# Patient Record
Sex: FEMALE | Race: WHITE | NOT HISPANIC OR LATINO | Employment: UNEMPLOYED | URBAN - METROPOLITAN AREA
[De-identification: names, ages, dates, MRNs, and addresses within clinical notes are randomized per-mention and may not be internally consistent; named-entity substitution may affect disease eponyms.]

---

## 2016-12-05 LAB
ALT SERPL-CCNC: 21 U/L (ref 14–63)
AST SERPL-CCNC: 30 U/L (ref 15–37)
CREAT SERPL-MCNC: 0.35 MG/DL (ref 0.51–0.95)
GFR SERPL CREATININE-BSD FRML MDRD: >60 ML/MIN/1.73M2 (ref 60–150)

## 2017-01-19 ENCOUNTER — TELEPHONE (OUTPATIENT)
Dept: MATERNAL FETAL MEDICINE | Facility: CLINIC | Age: 22
End: 2017-01-19

## 2017-01-19 NOTE — TELEPHONE ENCOUNTER
Returned phone call to pt and message left. Pt had called re when could she stop her Lovenox. Per Dr. Roberson, pt needs to have an appointment with Hematology set. Will also offer to help pt set up an appointment for birth control as discussed at last visit. Argenis Cardona RN

## 2017-01-20 ENCOUNTER — TELEPHONE (OUTPATIENT)
Dept: MATERNAL FETAL MEDICINE | Facility: CLINIC | Age: 22
End: 2017-01-20

## 2017-01-20 NOTE — TELEPHONE ENCOUNTER
Second attempt to reach patient re pt phone call from yesterday. Left message for pt to call back re plan to discontinue her Lovenox. Per Dr. Roberson, she would like to have pt set up with Hematology to establish plan. Will await pt phone call. Argenis Cardona RN

## 2017-01-23 DIAGNOSIS — O22.33 DVT COMPLICATING PREGNANCY, THIRD TRIMESTER: Primary | ICD-10-CM

## 2017-01-23 NOTE — NURSING NOTE
"Dianna called in today and said that she needed a refill on her Lovenox as she was out.  Per last [facility] note Dianna should continue on Lovenox for 8 weeks after her delivery.  Prescription was eprescribed to [facility] in [place] for 18 more doses and then will set up appointment with hematology.  Dianna \"wasn't sure\" about having an IUD placed today, but spoke with Dianna and will schedule her an appointment with hematology and apt with S to discuss birth control options and possible IUD placement. Analia Moura, RN  "

## 2017-01-24 ENCOUNTER — TELEPHONE (OUTPATIENT)
Dept: MATERNAL FETAL MEDICINE | Facility: CLINIC | Age: 22
End: 2017-01-24

## 2017-01-24 NOTE — TELEPHONE ENCOUNTER
Dianna was called and message left regarding Hematology apt at the infusion center on the 2nd Floor in the [facility] on Thurs Feb 2 at 10:30 am.  Dianna was given the [facility] phone number to call back also as she has an apt with [facility] for postpartum and possible IUD placed on 2/2 at 1:15 pm. Analia Moura RN

## 2017-01-25 ENCOUNTER — HOSPITAL ENCOUNTER (OUTPATIENT)
Dept: WOUND CARE | Facility: CLINIC | Age: 22
Discharge: HOME | End: 2017-01-25
Attending: SURGERY | Admitting: SURGERY
Payer: MEDICARE

## 2017-01-25 PROCEDURE — 11042 DBRDMT SUBQ TIS 1ST 20SQCM/<: CPT

## 2017-01-25 PROCEDURE — 11042 DBRDMT SUBQ TIS 1ST 20SQCM/<: CPT | Performed by: SURGERY

## 2017-01-25 NOTE — PROGRESS NOTES
[facility]       DATE OF SERVICE:  2017      Ms. Dianna Cottrell is a 21-year-old quadriplegic who we have been treating for a right ischial tuberosity pressure ulcer.  She began treatment for this in 2015.  In 2016, we began negative pressure wound therapy, and she has been making slow but steady progress.  She has no new complaints.      PHYSICAL EXAMINATION:  The patient today is afebrile with temperature of 98.4, blood pressure 73/47, pulse of 95 and respirations of 16.  The right IT pressure ulcer measures 0.6 x 0.6 x 0.6 cm with undermining from 12 to 12 of 1 cm.  There is some slough in this and I think it should be debrided.      PROCEDURE NOTE:  After informed consent and topical anesthetic of 4% Xylocaine, the wound is debrided into and including the subcutaneous tissue with a sharp curet.  Hemostasis is secured with packing.      PLAN:  Since we are making progress, and although the wound is small, we will continue with the negative wound pressure therapy at this time.  The patient will return to the [facility] [place] [facility] in 4 weeks' time for reassessment.         ERICK HOLLINGSWORTH MD             D: 2017 11:57   T: 2017 12:14   MT: SHAHID      Name:     DIANNA COTTRELL   MRN:      [id number]        Account:      [id number]   :      1995           Visit Date:   2017      Document: [id number]

## 2017-02-02 ENCOUNTER — OFFICE VISIT (OUTPATIENT)
Dept: HEMATOLOGY | Facility: CLINIC | Age: 22
End: 2017-02-02
Attending: PHYSICIAN ASSISTANT
Payer: MEDICARE

## 2017-02-02 VITALS
DIASTOLIC BLOOD PRESSURE: 58 MMHG | HEART RATE: 94 BPM | TEMPERATURE: 98.6 F | OXYGEN SATURATION: 94 % | SYSTOLIC BLOOD PRESSURE: 93 MMHG

## 2017-02-02 DIAGNOSIS — I82.4Z2 ACUTE VENOUS EMBOLISM AND THROMBOSIS OF DEEP VESSELS OF DISTAL LOWER EXTREMITY, LEFT (H): Primary | ICD-10-CM

## 2017-02-02 DIAGNOSIS — G82.20 PARAPLEGIA FOLLOWING SPINAL CORD INJURY (H): ICD-10-CM

## 2017-02-02 DIAGNOSIS — O22.33 DVT COMPLICATING PREGNANCY, THIRD TRIMESTER: ICD-10-CM

## 2017-02-02 DIAGNOSIS — I82.409 DVT (DEEP VENOUS THROMBOSIS) (H): Primary | ICD-10-CM

## 2017-02-02 PROCEDURE — 40000809 ZZH STATISTIC NO DOCUMENTATION TO SUPPORT CHARGE

## 2017-02-02 PROCEDURE — 99215 OFFICE O/P EST HI 40 MIN: CPT | Performed by: PHYSICIAN ASSISTANT

## 2017-02-02 ASSESSMENT — PAIN SCALES - GENERAL: PAINLEVEL: NO PAIN (0)
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
## 2017-02-02 NOTE — PATIENT INSTRUCTIONS
1.  Restart Lovenox 50 mg every 12 hour and continue through 11/8/17.  2.  See IR (Interventional Radiology) to rule out possible May-Thurner (May need to continue anticoagulation if stenting is required).  3.  Call if at high risk times for venous clots to determine whether blood thinners are needed.

## 2017-02-08 ENCOUNTER — TELEPHONE (OUTPATIENT)
Dept: INTERVENTIONAL RADIOLOGY/VASCULAR | Facility: CLINIC | Age: 22
End: 2017-02-08

## 2017-02-08 NOTE — TELEPHONE ENCOUNTER
Previsit call for new pt consult for lower extremity swelling, DVT, referral from Hematology    Informed her that we will need to do imaging prior to her appt on Tues 2/14 with Dr. Tena.     I will see if I can preschedule imaging however I would like for her to return my call so that we can further discuss and talk about her appts.   Left direct line for her.     Floridalma Rodriguez RN, BSN  Interventional Radiology Nurse Coordinator   Phone: [phone]

## 2017-02-13 ENCOUNTER — TELEPHONE (OUTPATIENT)
Dept: INTERVENTIONAL RADIOLOGY/VASCULAR | Facility: CLINIC | Age: 22
End: 2017-02-13

## 2017-02-13 ENCOUNTER — TELEPHONE (OUTPATIENT)
Dept: MATERNAL FETAL MEDICINE | Facility: CLINIC | Age: 22
End: 2017-02-13

## 2017-02-13 NOTE — TELEPHONE ENCOUNTER
Returning pt's phone call regarding her appt tomorrow with Dr Tena for consult DVT LLE.   Informed her about the appts for tomorrow regarding the US and as to why we need it as her last US was when it was dx.  Informed her of US appt details and that she is to fast prior to her US Iliac.     She verbalized understanding of this information and will call us with any other questions.     Floridalma Rodriguez RN, BSN  Interventional Radiology Nurse Coordinator   Phone: [phone]

## 2017-02-13 NOTE — TELEPHONE ENCOUNTER
Writer left message with Dianna regarding appointments tomorrow with hematology and that MFM could possibly schedule an IUD placement to coordinate with her appointment with hematology for tomorrow.  Pt left phone number for [facility] line [phone] to call back to discuss IUD placement appointment. Analia Moura RN

## 2017-02-13 NOTE — TELEPHONE ENCOUNTER
Called pt and left a VM on her cell phone regarding her upcoming appt tomorrow with Dr. Tena.     Informed her that I did prescheule US prior to her appt with Dr. Tena and have asked that she return my phone call so that we can further talk in detail.     Left direct line for her.     Floridalma Rodriguez RN, BSN  Interventional Radiology Nurse Coordinator   Phone: [phone]

## 2017-02-14 ENCOUNTER — OFFICE VISIT (OUTPATIENT)
Dept: RADIOLOGY | Facility: CLINIC | Age: 22
End: 2017-02-14
Attending: RADIOLOGY
Payer: MEDICARE

## 2017-02-14 VITALS
DIASTOLIC BLOOD PRESSURE: 64 MMHG | OXYGEN SATURATION: 95 % | HEART RATE: 99 BPM | TEMPERATURE: 98.5 F | SYSTOLIC BLOOD PRESSURE: 99 MMHG | HEIGHT: 65 IN | RESPIRATION RATE: 16 BRPM

## 2017-02-14 DIAGNOSIS — I82.402 ACUTE DEEP VEIN THROMBOSIS (DVT) OF LEFT LOWER EXTREMITY, UNSPECIFIED VEIN (H): Primary | ICD-10-CM

## 2017-02-14 PROCEDURE — 99212 OFFICE O/P EST SF 10 MIN: CPT | Mod: ZF

## 2017-02-14 ASSESSMENT — PAIN SCALES - GENERAL: PAINLEVEL: NO PAIN (0)
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
## 2017-02-14 NOTE — MR AVS SNAPSHOT
"              After Visit Summary   2/14/2017    Dianna Cottrell    MRN: [id number]           Patient Information     Date Of Birth          1995        Visit Information        Provider Department      2/14/2017 2:00 PM Garland Tena MD [facility]        Today's Diagnoses     Acute deep vein thrombosis (DVT) of left lower extremity, unspecified vein (H)    -  1       Follow-ups after your visit        Your next 10 appointments already scheduled     Mar 08, 2017 11:15 AM CST   Return Visit with Manan Burroughs MD   [facility] [place] ([facility])    [address]  [address]  [facility] [address] [phone]              Who to contact     If you have questions or need follow up information about today's clinic visit or your schedule please contact [facility] directly at [phone].  Normal or non-critical lab and imaging results will be communicated to you by MyChart, letter or phone within 4 business days after the clinic has received the results. If you do not hear from us within 7 days, please contact the clinic through MyChart or phone. If you have a critical or abnormal lab result, we will notify you by phone as soon as possible.  Submit refill requests through [employer] or call your pharmacy and they will forward the refill request to us. Please allow 3 business days for your refill to be completed.          Additional Information About Your Visit        MyChart Information     [employer] lets you send messages to your doctor, view your test results, renew your prescriptions, schedule appointments and more. To sign up, go to [email or web address] . Click on \"Log in\" on the left side of the screen, which will take you to the Welcome page. Then click on \"Sign up Now\" on the right side of the page.     You will be asked to enter the access code listed below, as well as some personal information. Please follow the " "directions to create your username and password.     Your access code is: [email or web address]  Expires: 2017  1:34 PM     Your access code will  in 90 days. If you need help or a new code, please call your [facility] clinic or [phone].        Care EveryWhere ID     This is your Care EveryWhere ID. This could be used by other organizations to access your [facility] medical records  [id number]        Your Vitals Were     Pulse Temperature Respirations Height Pulse Oximetry       99 98.5  F (36.9  C) (Oral) 16 1.651 m (5' 5\") 95%        Blood Pressure from Last 3 Encounters:   17 99/64   17 93/58   16 (!) 84/59    Weight from Last 3 Encounters:   16 44.6 kg (98 lb 6.4 oz)   16 50.1 kg (110 lb 8 oz)   16 50.1 kg (110 lb 8 oz)              Today, you had the following     No orders found for display         Today's Medication Changes          These changes are accurate as of: 17 11:59 PM.  If you have any questions, ask your nurse or doctor.               These medicines have changed or have updated prescriptions.        Dose/Directions    oxyCODONE 5 MG IR tablet   Commonly known as:  ROXICODONE   This may have changed:    - when to take this  - reasons to take this  - Another medication with the same name was removed. Continue taking this medication, and follow the directions you see here.   Used for:  S/P  section        Dose:  5 mg   Take 1 tablet (5 mg) by mouth every 6 hours   Quantity:  30 tablet   Refills:  0       sertraline 100 MG tablet   Commonly known as:  ZOLOFT   This may have changed:  Another medication with the same name was removed. Continue taking this medication, and follow the directions you see here.        TAKE 1 TABLET BY MOUTH ONCE DAILY. NEED TO BE SEEN FOR FURTHER FILLS.   Refills:  0         Stop taking these medicines if you haven't already. Please contact your care team if you have questions.     cephALEXin 500 MG capsule   Commonly " known as:  KEFLEX   Stopped by:  Garland Tena MD           omeprazole 20 MG CR capsule   Commonly known as:  priLOSEC   Stopped by:  Garland Tena MD           valACYclovir 500 MG tablet   Commonly known as:  VALTREX   Stopped by:  Garland Tena MD                    Primary Care Provider Fax #    [facility] [phone]       [address] [place] [address]        Thank you!     Thank you for choosing [facility]  for your care. Our goal is always to provide you with excellent care. Hearing back from our patients is one way we can continue to improve our services. Please take a few minutes to complete the written survey that you may receive in the mail after your visit with us. Thank you!             Your Updated Medication List - Protect others around you: Learn how to safely use, store and throw away your medicines at www.disposemymeds.org.          This list is accurate as of: 2/14/17 11:59 PM.  Always use your most recent med list.                   Brand Name Dispense Instructions for use    baclofen 10 MG tablet    LIORESAL     Take 10 mg by mouth 3 times daily.       BISACODYL      1 suppository daily       COLACE PO      Take by mouth daily.       Cranberry 450 MG Tabs      Take 450 mg by mouth daily.       DAILY MULTIVITAMIN PO      Take 2 tablets by mouth daily       ferrous sulfate 325 (65 FE) MG tablet    IRON    60 tablet    TAKE 1 TABLET BY MOUTH TWICE A DAY WITH MEALS       fesoterodine fumarate 4 MG Tb24 24 hr tablet    TOVIAZ     Take 8 mg by mouth daily       gabapentin 300 MG capsule    NEURONTIN     Take 300 mg by mouth daily       medroxyPROGESTERone 150 MG/ML injection    DEPO-PROVERA    1 mL    Inject 1 mL (150 mg) into the muscle every 3 months       midodrine 1.25 mg half-tab    PROAMATINE     Take 2.5 mg by mouth 3 times daily.       order for DME     30 days    Equipment being ordered:[facility] Fax [phone]   Secondary Dressing Microklenz Qty  1 bottle Length of Need: 1 month Frequency of dressing change: QOD       oxyCODONE 5 MG IR tablet    ROXICODONE    30 tablet    Take 1 tablet (5 mg) by mouth every 6 hours       promethazine 25 MG tablet    PHENERGAN     TAKE 1 TABLET BY MOUTH EVERY 6 HOURS AS NEEDED FOR NAUSEA       senna-docusate 8.6-50 MG per tablet    SENOKOT-S;PERICOLACE    40 tablet    Take 1-2 tablets by mouth 2 times daily as needed for constipation       sertraline 100 MG tablet    ZOLOFT     TAKE 1 TABLET BY MOUTH ONCE DAILY. NEED TO BE SEEN FOR FURTHER FILLS.       TRAZODONE HCL      1 tablet. PRN       TYLENOL 325 MG tablet   Generic drug:  acetaminophen      Take 325-650 mg by mouth every 6 hours as needed.

## 2017-02-14 NOTE — NURSING NOTE
"Dianna Cottrell is a 21 year old female who presents for:  Chief Complaint   Patient presents with     Oncology Clinic Visit     DVT        Initial Vitals:  BP (!) 75/43 (BP Location: Left arm, Patient Position: Chair, Cuff Size: Adult Small)  Pulse 99  Temp 98.5  F (36.9  C) (Oral)  Resp 16  Ht 1.651 m (5' 5\")  LMP   SpO2 95% Estimated body mass index is 16.37 kg/(m^2) as calculated from the following:    Height as of 12/6/16: 1.651 m (5' 5\").    Weight as of 12/20/16: 44.6 kg (98 lb 6.4 oz).. There is no height or weight on file to calculate BSA. BP completed using cuff size: small regular  No Pain (0) No LMP recorded. Allergies and medications reviewed.     Medications: Medication refills not needed today.  Pharmacy name entered into [facility]: [id number] [place] [address]    Comments:     7 minutes for nursing intake (face to face time)   Nola Pelayo LPN        "

## 2017-02-22 ENCOUNTER — HOSPITAL ENCOUNTER (OUTPATIENT)
Dept: WOUND CARE | Facility: CLINIC | Age: 22
Discharge: HOME | End: 2017-02-22
Attending: SURGERY | Admitting: SURGERY
Payer: MEDICARE

## 2017-02-22 DIAGNOSIS — G82.20 PARAPLEGIA (H): ICD-10-CM

## 2017-02-22 DIAGNOSIS — L89.313 DECUBITUS ULCER OF RIGHT ISCHIUM, STAGE 3 (H): ICD-10-CM

## 2017-02-22 PROCEDURE — 97602 WOUND(S) CARE NON-SELECTIVE: CPT

## 2017-02-22 PROCEDURE — A6021 COLLAGEN DRESSING <=16 SQ IN: HCPCS

## 2017-02-22 PROCEDURE — A6212 FOAM DRG <=16 SQ IN W/BORDER: HCPCS

## 2017-02-22 PROCEDURE — 99212 OFFICE O/P EST SF 10 MIN: CPT | Performed by: SURGERY
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
## 2017-03-08 ENCOUNTER — HOSPITAL ENCOUNTER (OUTPATIENT)
Dept: WOUND CARE | Facility: CLINIC | Age: 22
Discharge: HOME | End: 2017-03-08
Attending: SURGERY | Admitting: SURGERY
Payer: MEDICARE

## 2017-03-08 DIAGNOSIS — L89.313: ICD-10-CM

## 2017-03-08 PROCEDURE — 99212 OFFICE O/P EST SF 10 MIN: CPT | Performed by: SURGERY

## 2017-03-08 PROCEDURE — A6212 FOAM DRG <=16 SQ IN W/BORDER: HCPCS

## 2017-03-08 PROCEDURE — 97602 WOUND(S) CARE NON-SELECTIVE: CPT

## 2017-03-08 PROCEDURE — A6021 COLLAGEN DRESSING <=16 SQ IN: HCPCS

## 2017-03-08 NOTE — PROGRESS NOTES
[facility]        REQUESTING PHYSICIAN:  None stated.        DATE OF SERVICE:  2017        Ms. Dianna Cottrell is a 21-year-old quadriplegic with decubiti over the right ischial tuberosity.  She had been on NPWT for quite some time but 2 weeks ago, we put the VAC on hold and had her wound being dressed with Endoform and Mepilex cover.  This is being changed 3 times a week.  The patient has no new complaints.      PHYSICAL EXAMINATION:  The patient today has blood pressure of 90/57, pulse of 77, respirations 16.  The right IT pressure ulcer today measures 0.4 x 0.4 x 1.1 and the undermining is 1.4.  These measurements are improvements from 2 weeks ago.  There is no sign of infection and no purulence so that I do not believe that debridement is necessary.        IMPRESSION:  Improving right IT pressure ulcer in a quadriplegic patient.      PLAN:  I think at this time it would be best to continue the Endoform and this will be changed 3 times a week.  The patient will return to the [facility] in 4 weeks' time.         ERICK HOLLINGSWORTH MD             D: 2017 12:02   T: 2017 12:29   MT: krista      Name:     DIANNA COTTRELL   MRN:      [id number]        Account:      [id number]   :      1995           Visit Date:   2017      Document: [id number]

## 2017-03-14 ENCOUNTER — TELEPHONE (OUTPATIENT)
Dept: HEMATOLOGY | Facility: CLINIC | Age: 22
End: 2017-03-14

## 2017-03-14 NOTE — TELEPHONE ENCOUNTER
[facility]  [address] [place] [address], [facility] [address], [place] [address]  Phone: [phone], Fax: [phone].      Telephone call to follow-up visit with IR (seen by IR 2/14/17)    [facility] in [facility] on 2/2/17  In summary, Dianna is a 21 year old female paraplegic who experienced an extensive left lower extremity DVT at 29 weeks pregnancy (11/8/16). Delivered by C section on 12/6/16 and was on anticoagulation for about 6 weeks post-partum and then stopped.  She is currently not on anticoagulation.  She states she was unable to afford the prescription that was given to her on 2/2/17 visit.  She reports no increase in leg swelling or symptoms similar to initial clot.    Her last ultrasound done off anticoagulation on 2/14/17 did not show extension of the clot when compared to initial clot.  IR reportedly did not feel that stenting would help her as she is asymptomatic and non-ambulatory.  It is unclear if she has May Thurner syndrome.   Discussed with Dianna that she is at some increased risk of recurrent clotting in that leg given the extensive clot burden.  She could consider ongoing anticoagulation with an oral agent.  She will think about this and call our clinic if she would like to discuss further.  We did discuss that she needs to be on preventative anticoagulation during times of increased risk in the future (pregnancy, surgery, hospitalization).  She will continue to monitor her leg for signs of recurrent clot.            Grace Coffman MPH, PA-C  [facility]  [facility]  [phone] main line  [phone] pager  [phone] fax           2/14/17:  Extensive left lower extremity DVT extending from the distal  common iliac vein through the proximal calf. Extent does not appear to  be significantly changed from 11/8/2016.

## 2017-04-05 ENCOUNTER — HOSPITAL ENCOUNTER (OUTPATIENT)
Dept: WOUND CARE | Facility: CLINIC | Age: 22
Discharge: HOME | End: 2017-04-05
Attending: SURGERY | Admitting: SURGERY
Payer: MEDICARE

## 2017-04-05 PROCEDURE — 99212 OFFICE O/P EST SF 10 MIN: CPT | Performed by: SURGERY

## 2017-04-05 PROCEDURE — 99211 OFF/OP EST MAY X REQ PHY/QHP: CPT

## 2017-04-05 NOTE — PROGRESS NOTES
[facility]       DATE OF VISIT:  2017      Ms. Dianna Cottrell is a 22-year-old quadriplegic with a pressure ulcer over the right IT.  She has had pressure ulcers in the past which have healed.  At any rate, she returns for ongoing care for right IT pressure ulcer.  She has no new complaints.      PHYSICAL EXAMINATION:  Examining the patient today, she is quite cheerful and has no new complaints.  Examining her buttocks, the right IT pressure ulcer is completely healed.      PLAN:  At this point, she just needs ongoing offloading.  She is well aware of this and would call should there be any new problems.  Otherwise, she will return to the [facility] on a p.r.n. basis.         ERICK HOLLINGSWORTH MD             D: 2017 13:08   T: 2017 14:57   MT: MILTON      Name:     DIANNA COTTRELL   MRN:      [id number]        Account:      [id number]   :      1995           Visit Date:   2017      Document: [id number]

## 2017-11-20 ENCOUNTER — OFFICE VISIT (OUTPATIENT)
Dept: OBGYN | Facility: CLINIC | Age: 22
End: 2017-11-20
Attending: OBSTETRICS & GYNECOLOGY
Payer: MEDICARE

## 2017-11-20 DIAGNOSIS — Z30.09 GENERAL COUNSELLING AND ADVICE ON CONTRACEPTION: Primary | ICD-10-CM

## 2017-11-20 PROCEDURE — 99212 OFFICE O/P EST SF 10 MIN: CPT | Mod: ZF

## 2017-11-20 NOTE — MR AVS SNAPSHOT
After Visit Summary   2017    Dianna Cottrell    MRN: [id number]           Patient Information     Date Of Birth          1995        Visit Information        Provider Department      2017 2:45 PM Carlotta Lock MD [facility]        Today's Diagnoses     General counselling and advice on contraception    -  1       Follow-ups after your visit        Your next 10 appointments already scheduled     Dec 06, 2017  2:45 PM CST   Pro[facility]   [facility] ([facility])    [facility] [address]  [address]  [facility] [id number] [phone]              Who to contact     Please call your clinic at [phone] to:    Ask questions about your health    Make or cancel appointments    Discuss your medicines    Learn about your test results    Speak to your doctor   If you have compliments or concerns about an experience at your clinic, or if you wish to file a complaint, please contact [facility] Patient Relations at [phone] or email us at [email or web address]         Additional Information About Your Visit        MyChart Information     [employer] is an electronic gateway that provides easy, online access to your medical records. With [facility], you can request a clinic appointment, read your test results, renew a prescription or communicate with your care team.     To sign up for [employer] visit the website at [email or web address]   You will be asked to enter the access code listed below, as well as some personal information. Please follow the directions to create your username and password.     Your access code is: [email or web address]  Expires: 2018  6:30 AM     Your access code will  in 90 days. If you need help or a new code, please contact your [facility] or  call [phone] for assistance.        Care EveryWhere ID     This is your Care EveryWhere ID. This could be used by other organizations to access your [facility] medical records  [id number]         Blood Pressure from Last 3 Encounters:   17 99/64   17 93/58   16 (!) 84/59    Weight from Last 3 Encounters:   16 44.6 kg (98 lb 6.4 oz)   16 50.1 kg (110 lb 8 oz)   16 50.1 kg (110 lb 8 oz)              Today, you had the following     No orders found for display         Today's Medication Changes          These changes are accurate as of: 17 11:59 PM.  If you have any questions, ask your nurse or doctor.               These medicines have changed or have updated prescriptions.        Dose/Directions    oxyCODONE IR 5 MG tablet   Commonly known as:  ROXICODONE   This may have changed:    - when to take this  - reasons to take this   Used for:  S/P  section        Dose:  5 mg   Take 1 tablet (5 mg) by mouth every 6 hours   Quantity:  30 tablet   Refills:  0                Primary Care Provider Fax #    [facility] [phone]       [address] [place] [address]        Equal Access to Services     OVI SU AH: Sandy Leal, wapatoda alicja, qaybta kaalmada adelynn, ander campuzano. So [facility] [phone].    ATENCIÓN: Si habla español, tiene a villa disposición servicios gratuitos de asistencia lingüística. Llame al [phone].    We comply with applicable federal civil rights laws and [place] laws. We do not discriminate on the basis of race, color, national origin, age, disability, sex, sexual orientation, or gender identity.            Thank you!     Thank you for choosing [facility]  for your care. Our goal is always to provide you with excellent care. Hearing back from our patients is one way we can continue to improve our services. Please take a few minutes to  complete the written survey that you may receive in the mail after your visit with us. Thank you!             Your Updated Medication List - Protect others around you: Learn how to safely use, store and throw away your medicines at www.disposemymeds.org.          This list is accurate as of: 17 11:59 PM.  Always use your most recent med list.                   Brand Name Dispense Instructions for use Diagnosis    baclofen 10 MG tablet    LIORESAL     Take 10 mg by mouth 3 times daily.        BISACODYL      1 suppository daily        COLACE PO      Take by mouth daily.        Cranberry 450 MG Tabs      Take 450 mg by mouth daily.        DAILY MULTIVITAMIN PO      Take 2 tablets by mouth daily        ferrous sulfate 325 (65 FE) MG tablet    IRON    60 tablet    TAKE 1 TABLET BY MOUTH TWICE A DAY WITH MEALS    S/P  section       fesoterodine fumarate 4 MG Tb24 24 hr tablet    TOVIAZ     Take 8 mg by mouth daily        gabapentin 300 MG capsule    NEURONTIN     Take 300 mg by mouth daily        medroxyPROGESTERone 150 MG/ML injection    DEPO-PROVERA    1 mL    Inject 1 mL (150 mg) into the muscle every 3 months    Postpartum care and examination immediately after delivery       midodrine 1.25 mg half-tab    PROAMATINE     Take 2.5 mg by mouth 3 times daily.        * order for DME     30 days    Equipment being ordered: [facility] Fax [phone]  Wheelchair seating eval and mapping of current cushion    Decubitus ulcer of right ischium, stage 3 (H), Paraplegia (H)       * order for DME     30 days    Equipment being ordered:[facility] Fax [phone]  Primary dressing Endform 2x2 Qty 1 box Secondary Dressing 4x4 mepilex boder Length of Need: 1 month Frequency of dressing change: QOD    Decubitus ulcer of buttock, right, stage III       oxyCODONE IR 5 MG tablet    ROXICODONE    30 tablet    Take 1 tablet (5 mg) by mouth every 6 hours    S/P  section       promethazine 25 MG  tablet    PHENERGAN     TAKE 1 TABLET BY MOUTH EVERY 6 HOURS AS NEEDED FOR NAUSEA        senna-docusate 8.6-50 MG per tablet    SENOKOT-S;PERICOLACE    40 tablet    Take 1-2 tablets by mouth 2 times daily as needed for constipation    S/P  section       sertraline 100 MG tablet    ZOLOFT     TAKE 1 TABLET BY MOUTH ONCE DAILY. NEED TO BE SEEN FOR FURTHER FILLS.        TRAZODONE HCL      1 tablet. PRN        TYLENOL 325 MG tablet   Generic drug:  acetaminophen      Take 325-650 mg by mouth every 6 hours as needed.        * Notice:  This list has 2 medication(s) that are the same as other medications prescribed for you. Read the directions carefully, and ask your doctor or other care provider to review them with you.

## 2017-11-20 NOTE — LETTER
11/20/2017       RE: Dianna Cottrell  [address] [facility] [address]     Dear Colleague,    Thank you for referring your patient, Dianna Cottrell, to the [facility] at [facility]. Please see a copy of my visit note below.    Pt is here to discuss IUD.  Has not had one before but really wants LARC. Had c/s for first pregnancy.  Plans to consider future pregnancy, but not in near future.     She does not have really any sensation in the vagina or abdomen.      Today she is here alone. No PCA.     O: VSS  Gen: in wheel chair, but able to get into lithotomy position    A/P: 23 yo  who desires Mirena IUD.    1, Plan to place IUD in office w/ u/s guidance d/t lack of sensation  2. F/u for appt.     Carlotta Lock MD, FACOG  [facility]  OB/GYN    11/21/2017  2:20 PM          Again, thank you for allowing me to participate in the care of your patient.      Sincerely,    Carlotta Lock MD

## 2017-11-21 NOTE — PROGRESS NOTES
Pt is here to discuss IUD.  Has not had one before but really wants LARC. Had c/s for first pregnancy.  Plans to consider future pregnancy, but not in near future.     She does not have really any sensation in the vagina or abdomen.      Today she is here alone. No PCA.     O: VSS  Gen: in wheel chair, but able to get into lithotomy position    A/P: 23 yo  who desires Mirena IUD.    1, Plan to place IUD in office w/ u/s guidance d/t lack of sensation  2. F/u for appt.     Carlotta Lock MD, FACOG  [facility]  OB/GYN    11/21/2017  2:20 PM

## 2017-12-06 ENCOUNTER — APPOINTMENT (OUTPATIENT)
Dept: OBGYN | Facility: CLINIC | Age: 22
End: 2017-12-06
Attending: OBSTETRICS & GYNECOLOGY
Payer: MEDICARE

## 2017-12-08 ENCOUNTER — TELEPHONE (OUTPATIENT)
Dept: OBGYN | Facility: CLINIC | Age: 22
End: 2017-12-08

## 2017-12-08 NOTE — TELEPHONE ENCOUNTER
Spoke with Dianna who prefers the 12/13 appointment at 3:00pm. Informed her that there is a chance that Dr. Lock would be delayed due to an emergency since she is on-call that day.  Dianna indicated understanding and reported that she doesn't have somewhere else to be that day so if Dr Lewis is delayed its OK.    Scheduled ultrasound, procedure room, and Dr. Lock for the above named dated/time.

## 2017-12-08 NOTE — TELEPHONE ENCOUNTER
Tried to reach Dianna but received voicemail.  Left message to call back to advise if either of the following dates will work for her to come to clinic:  December 13 from 3-4pm  December 19 from 1:30-2:30 OR 2:00-3:00pm    NOTE: pt needs ultrasound guided IUD placement. Dr. Lock advised to schedule her on her admin time or call day. The above dates/times are held for ultrasound and the procedure room.    IF ALL TIMES WORK: SCHEDULING 12/19 AT 1:30pm is best, 2:00pm second best, 12/13 at 3:00pm least best.

## 2017-12-13 ENCOUNTER — OFFICE VISIT (OUTPATIENT)
Dept: OBGYN | Facility: CLINIC | Age: 22
End: 2017-12-13
Attending: OBSTETRICS & GYNECOLOGY
Payer: MEDICARE

## 2017-12-13 VITALS
SYSTOLIC BLOOD PRESSURE: 110 MMHG | BODY MASS INDEX: 16.88 KG/M2 | WEIGHT: 105 LBS | DIASTOLIC BLOOD PRESSURE: 67 MMHG | HEART RATE: 47 BPM | HEIGHT: 66 IN

## 2017-12-13 DIAGNOSIS — Z30.430 ENCOUNTER FOR IUD INSERTION: Primary | ICD-10-CM

## 2017-12-13 PROCEDURE — 25000125 ZZHC RX 250: Mod: ZF

## 2017-12-13 PROCEDURE — 58300 INSERT INTRAUTERINE DEVICE: CPT | Mod: ZF | Performed by: OBSTETRICS & GYNECOLOGY

## 2017-12-13 PROCEDURE — 40000809 ZZH STATISTIC NO DOCUMENTATION TO SUPPORT CHARGE

## 2017-12-13 PROCEDURE — 99215 OFFICE O/P EST HI 40 MIN: CPT | Mod: 25,ZF

## 2017-12-13 NOTE — MR AVS SNAPSHOT
After Visit Summary   2017    Dianna Cottrell    MRN: [id number]           Patient Information     Date Of Birth          1995        Visit Information        Provider Department      2017 3:00 PM Shelbie Castellanos MD [facility]        Today's Diagnoses     Encounter for IUD insertion    -  1      Care Instructions      IUD pamphlet reviewed and given to patient.          Follow-ups after your visit        Who to contact     Please call your clinic at [phone] to:    Ask questions about your health    Make or cancel appointments    Discuss your medicines    Learn about your test results    Speak to your doctor   If you have compliments or concerns about an experience at your clinic, or if you wish to file a complaint, please contact [facility] Patient Relations at [phone] or email us at [email or web address]         Additional Information About Your Visit        MyChart Information     CoverHound is an electronic gateway that provides easy, online access to your medical records. With CoverHound, you can request a clinic appointment, read your test results, renew a prescription or communicate with your care team.     To sign up for [employer] visit the website at [email or web address]   You will be asked to enter the access code listed below, as well as some personal information. Please follow the directions to create your username and password.     Your access code is: [email or web address]  Expires: 2018  6:30 AM     Your access code will  in 90 days. If you need help or a new code, please contact your [facility] or call [phone] for assistance.        Care EveryWhere ID     This is your Care EveryWhere ID. This could be used by other organizations to access your [place] medical records  [id number]        Your Vitals Were     Pulse Height Last Period BMI (Body Mass Index)     "      47 1.676 m (5' 6\") 12/09/2017 16.95 kg/m2         Blood Pressure from Last 3 Encounters:   12/13/17 110/67   02/14/17 99/64   02/02/17 93/58    Weight from Last 3 Encounters:   12/13/17 47.6 kg (105 lb)   12/20/16 44.6 kg (98 lb 6.4 oz)   12/06/16 50.1 kg (110 lb 8 oz)              We Performed the Following     Insertion of IUD [14242]          Today's Medication Changes          These changes are accurate as of: 12/13/17  4:24 PM.  If you have any questions, ask your nurse or doctor.               Start taking these medicines.        Dose/Directions    levonorgestrel 20 MCG/24HR IUD   Commonly known as:  MIRENA (52 MG)   Used for:  Encounter for IUD insertion   Started by:  Shelbie Castellanos MD        Dose:  1 each   1 each (20 mcg) by Intrauterine route once for 1 dose   Quantity:  1 each   Refills:  0            Where to get your medicines      Some of these will need a paper prescription and others can be bought over the counter.  Ask your nurse if you have questions.     You don't need a prescription for these medications     levonorgestrel 20 MCG/24HR IUD                Primary Care Provider Fax #    [facility] [phone]       [address] [place] [address]        Equal Access to Services     OVI SU AH: Hadii robin andre hadasho Soomaali, waaxda luqadaha, qaybta kaalmada adealexda, ander campuzano. So [facility] [phone].    ATENCIÓN: Si habla español, tiene a villa disposición servicios gratuitos de asistencia lingüística. Dano al [phone].    We comply with applicable federal civil rights laws and [place] laws. We do not discriminate on the basis of race, color, national origin, age, disability, sex, sexual orientation, or gender identity.            Thank you!     Thank you for choosing [facility]  for your care. Our goal is always to provide you with excellent care. Hearing back from our patients is one way we can " continue to improve our services. Please take a few minutes to complete the written survey that you may receive in the mail after your visit with us. Thank you!             Your Updated Medication List - Protect others around you: Learn how to safely use, store and throw away your medicines at www.disposemymeds.org.          This list is accurate as of: 17  4:24 PM.  Always use your most recent med list.                   Brand Name Dispense Instructions for use Diagnosis    baclofen 10 MG tablet    LIORESAL     Take 10 mg by mouth 3 times daily.        BISACODYL      1 suppository daily        COLACE PO      Take by mouth daily.        Cranberry 450 MG Tabs      Take 450 mg by mouth daily.        DAILY MULTIVITAMIN PO      Take 2 tablets by mouth daily        ferrous sulfate 325 (65 FE) MG tablet    IRON    60 tablet    TAKE 1 TABLET BY MOUTH TWICE A DAY WITH MEALS    S/P  section       fesoterodine fumarate 4 MG Tb24 24 hr tablet    TOVIAZ     Take 8 mg by mouth daily        gabapentin 300 MG capsule    NEURONTIN     Take 300 mg by mouth daily        levonorgestrel 20 MCG/24HR IUD    MIRENA (52 MG)    1 each    1 each (20 mcg) by Intrauterine route once for 1 dose    Encounter for IUD insertion       medroxyPROGESTERone 150 MG/ML injection    DEPO-PROVERA    1 mL    Inject 1 mL (150 mg) into the muscle every 3 months    Postpartum care and examination immediately after delivery       midodrine 1.25 mg half-tab    PROAMATINE     Take 2.5 mg by mouth 3 times daily.        * order for DME     30 days    Equipment being ordered: [facility] Fax [phone]  Wheelchair seating eval and mapping of current cushion    Decubitus ulcer of right ischium, stage 3 (H), Paraplegia (H)       * order for DME     30 days    Equipment being ordered:[facility] [employer] [facility] Fax [phone]  Primary dressing Endform 2x2 Qty 1 box Secondary Dressing 4x4 mepilex boder Length of Need: 1 month Frequency of dressing  change: QOD    Decubitus ulcer of buttock, right, stage III       senna-docusate 8.6-50 MG per tablet    SENOKOT-S;PERICOLACE    40 tablet    Take 1-2 tablets by mouth 2 times daily as needed for constipation    S/P  section       sertraline 100 MG tablet    ZOLOFT     TAKE 1 TABLET BY MOUTH ONCE DAILY. NEED TO BE SEEN FOR FURTHER FILLS.        TRAZODONE HCL      1 tablet. PRN        TYLENOL 325 MG tablet   Generic drug:  acetaminophen      Take 325-650 mg by mouth every 6 hours as needed.        * Notice:  This list has 2 medication(s) that are the same as other medications prescribed for you. Read the directions carefully, and ask your doctor or other care provider to review them with you.

## 2017-12-13 NOTE — LETTER
"2017       RE: Dianna Cottrell  [address] [facility] [address]     Dear Colleague,    Thank you for referring your patient, Dianna Cottrell, to the [facility] at [facility]. Please see a copy of my visit note below.      SUBJECTIVE: Dianna Cottrell is a 22 year old  female, requests Mirena IUD insertion    Verification of Procedure:  Just before the procedure begans through verbal and active participation of team members, I verified:     Initials   Patient Name [facility]   Patient  [facility]   Procedure to be performed [facility]     Consent:  Risks, benefits of treatment, and alternative options for contraception were discussed.  Patient's questions were elicited and answered.  Written consent was obtained and scanned into medical record.       OBJECTIVE: /67  Pulse (!) 47  Ht 1.676 m (5' 6\")  Wt 47.6 kg (105 lb)  LMP 2017  BMI 16.95 kg/m2    Pelvic Exam:  EG/BUS: Normal genital architecture without lesions, erythema or abnormal secretions. Bartholin's, Urethra, [place]'s glands are normal.   Vagina: moist, pink, rugae with creamy, white and odorlesssecretions  Cervix: no lesions  Uterus: anteverted,    Adnexa: Within normal limits and No masses, nodularity, tenderness  Rectum: anus normal      PROCEDURE NOTE  --  Mirena Insertion    Reason for Insertion:  contraception.      Counseling:  Patient counseled on efficacy, benefits, risks, potential side effects of IUD.  Insertion procedure and risk of infection, perforation, and spontaneous expulsion reviewed.   Advised to plan removal and/or replacement of IUD in 5 years from today or when desired.        Under sterile technique, cervix was visualized with a medium Latonia speculum and prepped with Betadine solution. The uterus was sounded to 7 cm. The Mirena IUD insertion apparatus was prepared and placed through the cervix without significant resistance and deployed at the " fundus in the usual fashion. The strings were trimmed 3 cm from the external os.  Procedure done under ultrasound guidance, IUD confirmed fundal placement    Device Lot #: [id number]  Device Expiration Date: 06/20     EBL:  Minimal     Complications:  None      Dianna Cottrell tolerated procedure well.    PLAN:      Written information on IUD use reviewed and given.  Symptoms to report reviewed. To report heavy bleeding, severe cramping, or abnormal vaginal discharge.  May take Ibuprofen 400-800 mg PO TID PRN or Naproxen 500 mg PO BID for cramping. Reminded to check for IUD strings every month. Patient has been counseled to use backup birth control method for 1 week.  Return to clinic in 4-6 weeks for string check. Dianna Cottrell  verbalized understanding of instructions.      Again, thank you for allowing me to participate in the care of your patient.      Sincerely,    Shelbie Castellanos MD

## 2017-12-13 NOTE — PROGRESS NOTES
"  SUBJECTIVE: Dianna Cottrell is a 22 year old  female, requests Mirena IUD insertion    Verification of Procedure:  Just before the procedure begans through verbal and active participation of team members, I verified:     Initials   Patient Name [facility]   Patient  [facility]   Procedure to be performed SL     Consent:  Risks, benefits of treatment, and alternative options for contraception were discussed.  Patient's questions were elicited and answered.  Written consent was obtained and scanned into medical record.       OBJECTIVE: /67  Pulse (!) 47  Ht 1.676 m (5' 6\")  Wt 47.6 kg (105 lb)  LMP 2017  BMI 16.95 kg/m2    Pelvic Exam:  EG/BUS: Normal genital architecture without lesions, erythema or abnormal secretions. Bartholin's, Urethra, Grace[place]'s glands are normal.   Vagina: moist, pink, rugae with creamy, white and odorlesssecretions  Cervix: no lesions  Uterus: anteverted,    Adnexa: Within normal limits and No masses, nodularity, tenderness  Rectum: anus normal      PROCEDURE NOTE  --  Mirena Insertion    Reason for Insertion:  contraception.      Counseling:  Patient counseled on efficacy, benefits, risks, potential side effects of IUD.  Insertion procedure and risk of infection, perforation, and spontaneous expulsion reviewed.   Advised to plan removal and/or replacement of IUD in 5 years from today or when desired.        Under sterile technique, cervix was visualized with a medium Latonia speculum and prepped with Betadine solution. The uterus was sounded to 7 cm. The Mirena IUD insertion apparatus was prepared and placed through the cervix without significant resistance and deployed at the fundus in the usual fashion. The strings were trimmed 3 cm from the external os.  Procedure done under ultrasound guidance, IUD confirmed fundal placement    Device Lot #: [id number]  Device Expiration Date:      EBL:  Minimal     Complications:  None      Dianna Cottrell tolerated procedure " well.    PLAN:      Written information on IUD use reviewed and given.  Symptoms to report reviewed. To report heavy bleeding, severe cramping, or abnormal vaginal discharge.  May take Ibuprofen 400-800 mg PO TID PRN or Naproxen 500 mg PO BID for cramping. Reminded to check for IUD strings every month. Patient has been counseled to use backup birth control method for 1 week.  Return to clinic in 4-6 weeks for string check. Dianna Cottrell  verbalized understanding of instructions.    Shelbie Castellanos MD

## 2018-01-11 NOTE — PROGRESS NOTES
[facility] (formerly [facility])[3] [place]  [address]  [place] [address]  [phone]  Dept: [phone]    PRE-OP EVALUATION:  Today's date: 2018    Dianna Cottrell (: 1995) presents for pre-operative evaluation assessment as requested by Dr. Clement  She requires evaluation and anesthesia risk assessment prior to undergoing surgery/procedure for treatment of  Neurogenic bladder- botox injections on bladder .  Proposed procedure: Botox injections    Date of Surgery/ Procedure: 2018  Time of Surgery/ Procedure: 2:00pm  Hospital/Surgical Facility: [facility]   Fax number for surgical facility: [phone]  Primary Physician: [facility]  Type of Anesthesia Anticipated: to be determined    Patient has a Health Care Directive or Living Will:  NO    1. NO - Do you have a history of heart attack, stroke, stent, bypass or surgery on an artery in the head, neck, heart or legs?  2. NO - Do you ever have any pain or discomfort in your chest?  3. NO - Do you have a history of  Heart Failure?  4. NO - Are you troubled by shortness of breath when: walking on the level, up a slight hill or at night?  5. NO - Do you currently have a cold, bronchitis or other respiratory infection?  6. NO - Do you have a cough, shortness of breath or wheezing?  7. NO - Do you sometimes get pains in the calves of your legs when you walk?  8. YES - Do you or anyone in your family have previous history of blood clots?- Had DVT in her 2nd pregnancy at 29 weeks (2016). Consult with hematology [facility] 2017.   9. NO - Do you or does anyone in your family have a serious bleeding problem such as prolonged bleeding following surgeries or cuts?  10. NO - Have you ever had problems with anemia or been told to take iron pills?  11. NO - Have you had any abnormal blood loss such as black, tarry or bloody stools, or abnormal vaginal bleeding?  12. NO - Have you ever had a blood  "transfusion?  13. Yes - Have you or any of your relatives ever had problems with anesthesia?   14. NO - Do you have sleep apnea, excessive snoring or daytime drowsiness?  15. NO - Do you have any prosthetic heart valves?  16. NO - Do you have prosthetic joints?  17. NO - Is there any chance that you may be pregnant?        HPI:                                                      Brief HPI related to upcoming procedure:   Neurogenic bladder with urinary incontinence secondary to paraplegia. Cystoscopy done every 2 years. Has been on myrbetriz, toviaz in the past. Abx for UTI prophylaxis. Had botox injections previously, attempting again.      Mirena IUD- so far \"ok\". Placed 2017.   Has not been sexually active since it was placed.     Bowels- all last week diarrhea. Eating habits consistent. One-two episode of diarrhea daily and a few accidents. Over weekend then normal BM. That weekend after getting out of shower had episode of nausea, emesis into mouth but nothing that came out. Then another episode of diarrhea after that.  Last unusual BM 2 days ago. BMs normal since. No blood in the stool. Normal amount of stool.   No fevers.   Has live in care giver in her private home- who got it. Son had diarrheal illness.     No longer on iron. Was on during pregnancy.     Blood clot- during 2nd pregnancy w/son. No longer on blood thinners.         MEDICAL HISTORY:                                                    Patient Active Problem List    Diagnosis Date Noted     Lesions of vulva 2016     Priority: Medium     Hypertension 2016     Priority: Medium      labor 2016     Priority: Medium     DVT complicating pregnancy (H) 2016     Priority: Medium     2016       Postpartum care and examination immediately after delivery 2013     Priority: Medium     S/P  section 08/15/2013     Priority: Medium     SROM (spontaneous rupture of membranes) 2013     Priority: " Medium     Paraplegia following spinal cord injury (H) 2013     Priority: Medium     Supervision of other high-risk pregnancy 2013     Priority: Medium     Problem list name updated by automated process. Provider to review       c5 burst fracture 2012     Priority: Medium     C5-C7 fracture with cord injury, Dec. 2012          Past Medical History:   Diagnosis Date     Anemia     with pregnancy     c5 burst fracture 2012    C5-C7 fracture with cord injury     Compression fracture of L1 lumbar vertebra (H) 2012    L1 superior endplate compression fracture     Fracture of thoracic spine without spinal cord lesion (H) 2012    T3-T8 spinous process fractures     Hypertension 2016     Vocal cord dysfunction     Left vocal cord weakness noted by ENT post extubation 2012     Past Surgical History:   Procedure Laterality Date     C4-C7 interbody fusion with anterior screw and plate fixation and posterior erna and pedicle screw fixation with interspace bone graft and C5 and C6 partial corpectomies  2012      SECTION  2013    Procedure:  SECTION;   Section ;  Surgeon: Ricki Nelson MD;  Location: [facility]      SECTION N/A 2016    Procedure:  SECTION;  Surgeon: Floridalma Kiran MD;  Location: [facility]     IR IVC FILTER PLACEMENT  2012     LUMBAR DRAIN  2012     Current Outpatient Prescriptions   Medication Sig Dispense Refill     order for DME Equipment being ordered:[facility] Fax [phone]    Primary dressing Endform 2x2 Qty 1 box  Secondary Dressing 4x4 mepilex boder  Length of Need: 1 month  Frequency of dressing change: QOD 30 days 0     order for DME Equipment being ordered: [facility] Fax [phone]    Wheelchair seating eval and mapping of current cushion 30 days 0     senna-docusate (SENOKOT-S;PERICOLACE) 8.6-50 MG per tablet Take 1-2 tablets by mouth 2 times daily as needed for  constipation 40 tablet 0     sertraline (ZOLOFT) 100 MG tablet TAKE 1 TABLET BY MOUTH ONCE DAILY. NEED TO BE SEEN FOR FURTHER FILLS.  0     Multiple Vitamin (DAILY MULTIVITAMIN PO) Take 2 tablets by mouth daily       Docusate Sodium (COLACE PO) Take by mouth daily.        gabapentin (NEURONTIN) 300 MG capsule Take 300 mg by mouth daily        midodrine (PROAMATINE) 1.25 mg Take 2.5 mg by mouth 3 times daily.       baclofen (LIORESAL) 10 MG tablet Take 10 mg by mouth 3 times daily.       acetaminophen (TYLENOL) 325 MG tablet Take 325-650 mg by mouth every 6 hours as needed.       BISACODYL 1 suppository daily        Cranberry 450 MG TABS Take 450 mg by mouth daily.       levonorgestrel (MIRENA, 52 MG,) 20 MCG/24HR IUD 1 each (20 mcg) by Intrauterine route once for 1 dose 1 each 0     ferrous sulfate (IRON) 325 (65 FE) MG tablet TAKE 1 TABLET BY MOUTH TWICE A DAY WITH MEALS (Patient not taking: Reported on 1/12/2018) 60 tablet 1     fesoterodine fumarate (TOVIAZ) 4 MG TB24 Take 8 mg by mouth daily       TRAZODONE HCL 1 tablet. PRN       OTC products: None, except as noted above    Allergies   Allergen Reactions     Succinylcholine      Spinal cord injury 12/18/12, patient at risk for extrajunctional receptors and hyperkalemia      Latex Allergy: NO    Social History   Substance Use Topics     Smoking status: Former Smoker     Packs/day: 0.50     Types: Cigarettes     Smokeless tobacco: Never Used      Comment: have 2-3 cigarettes a day.     Alcohol use No     History   Drug Use No       REVIEW OF SYSTEMS:                                                    C: NEGATIVE for fever, chills, change in weight  I: NEGATIVE for worrisome rashes, moles or lesions  E: NEGATIVE for vision changes or irritation  E/M: NEGATIVE for ear, mouth and throat problems  R: NEGATIVE for significant cough or SOB  B: NEGATIVE for masses, tenderness or discharge  CV: NEGATIVE for chest pain, palpitations or peripheral edema  GI: NEGATIVE for  nausea, abdominal pain, heartburn, or change in bowel habits  : NEGATIVE for frequency, dysuria, or hematuria  M: NEGATIVE for significant arthralgias or myalgia  NEURO: POSITIVE for paraplegia, with limited function of upper extremities.  E: NEGATIVE for temperature intolerance, skin/hair changes  H: NEGATIVE for bleeding problems  P: NEGATIVE for changes in mood or affect    EXAM:                                                    /58  Pulse 60  Temp 98.2  F (36.8  C)  Resp 16  LMP 12/09/2017  Breastfeeding? No    GENERAL APPEARANCE: healthy, alert and no distress, in her power chair     EYES: EOMI, PERRL, wearing glasses     HENT: ear canals and TM's normal and nose and mouth without ulcers or lesions     NECK: no adenopathy, no asymmetry, masses, and thyroid normal to palpation     RESP: lungs clear to auscultation - no rales, rhonchi or wheezes     CV: regular rates and rhythm, normal S1 S2, no S3 or S4 and no murmur, click or rub     ABDOMEN:  soft, nontender, no masses and bowel sounds normal     MS: atrophic lower extremities, no edema or asymmetry. Limited function of upper extremities     SKIN: warm and dry     NEURO: alert and oriented, mentation intact and speech normal     PSYCH: mentation appears normal. and affect normal/bright     LYMPHATICS: No axillary, cervical, or supraclavicular nodes    DIAGNOSTICS:                                                      Results for orders placed or performed in visit on 01/12/18   CBC with platelets   Result Value Ref Range    WBC 15.0 (H) 4.0 - 11.0 10e9/L    RBC Count 4.91 3.8 - 5.2 10e12/L    Hemoglobin 14.0 11.7 - 15.7 g/dL    Hematocrit 41.8 35.0 - 47.0 %    MCV 85 78 - 100 fl    MCH 28.5 26.5 - 33.0 pg    MCHC 33.5 31.5 - 36.5 g/dL    RDW 14.7 10.0 - 15.0 %    Platelet Count 366 150 - 450 10e9/L   HCG qualitative Blood   Result Value Ref Range    HCG Qualitative Serum Negative NEG^Negative         Recent Labs   Lab Test  12/10/16   0632   12/09/16   0700  12/08/16   0705  12/07/16   1550   HGB  8.3*  8.4*  8.7*   --    PLT  578*  480*  496*   --    NA   --    --   140  140   POTASSIUM   --    --   3.8  3.4   CR   --    --   0.33*  0.33*        IMPRESSION:                                                    Reason for surgery/procedure: Neurogenic bladder  Diagnosis/reason for consult: Preoperative clearance    The proposed surgical procedure is considered LOW risk.    REVISED CARDIAC RISK INDEX  The patient has the following serious cardiovascular risks for perioperative complications such as (MI, PE, VFib and 3  AV Block):  No serious cardiac risks  INTERPRETATION: 0 risks: Class I (very low risk - 0.4% complication rate)    The patient has the following additional risks for perioperative complications:  History of DVT      ICD-10-CM    1. Preop general physical exam Z01.818 CBC with platelets     HCG qualitative Blood   2. Neurogenic bladder N31.9    3. Paraplegia following spinal cord injury (H) G82.20    4. Personal history of DVT (deep vein thrombosis) Z86.718        RECOMMENDATIONS:                                                      History of DVT, consulted with pt's hematology provider:  bladder injections are a low risk procedure in regard to clotting and a place where bleeding would be an issue so I do not think she needs prophylactic anticoagulation.        --Patient is to take all scheduled medications on the day of surgery     APPROVAL GIVEN to proceed with proposed procedure, without further diagnostic evaluation       Signed Electronically by: Suzan Willis PA-C    Copy of this evaluation report is provided to requesting physician.    [facility]

## 2018-01-12 ENCOUNTER — OFFICE VISIT (OUTPATIENT)
Dept: FAMILY MEDICINE | Facility: CLINIC | Age: 23
End: 2018-01-12
Payer: MEDICARE

## 2018-01-12 VITALS
HEART RATE: 60 BPM | RESPIRATION RATE: 16 BRPM | TEMPERATURE: 98.2 F | DIASTOLIC BLOOD PRESSURE: 58 MMHG | SYSTOLIC BLOOD PRESSURE: 112 MMHG

## 2018-01-12 DIAGNOSIS — N31.9 NEUROGENIC BLADDER: ICD-10-CM

## 2018-01-12 DIAGNOSIS — Z86.718 PERSONAL HISTORY OF DVT (DEEP VEIN THROMBOSIS): ICD-10-CM

## 2018-01-12 DIAGNOSIS — G82.20 PARAPLEGIA FOLLOWING SPINAL CORD INJURY (H): ICD-10-CM

## 2018-01-12 DIAGNOSIS — Z01.818 PREOP GENERAL PHYSICAL EXAM: Primary | ICD-10-CM

## 2018-01-12 PROBLEM — Z97.5 IUD (INTRAUTERINE DEVICE) IN PLACE: Status: ACTIVE | Noted: 2018-01-12

## 2018-01-12 PROBLEM — Z97.5 IUD (INTRAUTERINE DEVICE) IN PLACE: Chronic | Status: ACTIVE | Noted: 2018-01-12

## 2018-01-12 LAB
ERYTHROCYTE [DISTWIDTH] IN BLOOD BY AUTOMATED COUNT: 14.7 % (ref 10–15)
HCG SERPL QL: NEGATIVE
HCT VFR BLD AUTO: 41.8 % (ref 35–47)
HGB BLD-MCNC: 14 G/DL (ref 11.7–15.7)
MCH RBC QN AUTO: 28.5 PG (ref 26.5–33)
MCHC RBC AUTO-ENTMCNC: 33.5 G/DL (ref 31.5–36.5)
MCV RBC AUTO: 85 FL (ref 78–100)
PLATELET # BLD AUTO: 366 10E9/L (ref 150–450)
RBC # BLD AUTO: 4.91 10E12/L (ref 3.8–5.2)
WBC # BLD AUTO: 15 10E9/L (ref 4–11)

## 2018-01-12 PROCEDURE — 85027 COMPLETE CBC AUTOMATED: CPT | Performed by: PHYSICIAN ASSISTANT

## 2018-01-12 PROCEDURE — 36415 COLL VENOUS BLD VENIPUNCTURE: CPT | Performed by: PHYSICIAN ASSISTANT

## 2018-01-12 PROCEDURE — 84703 CHORIONIC GONADOTROPIN ASSAY: CPT | Performed by: PHYSICIAN ASSISTANT

## 2018-01-12 PROCEDURE — 99215 OFFICE O/P EST HI 40 MIN: CPT | Performed by: PHYSICIAN ASSISTANT

## 2018-01-12 ASSESSMENT — PAIN SCALES - GENERAL: PAINLEVEL: NO PAIN (0)

## 2018-01-12 NOTE — MR AVS SNAPSHOT
After Visit Summary   1/12/2018    Dianna [place]rell    MRN: [id number]           Patient Information     Date Of Birth          1995        Visit Information        Provider Department      1/12/2018 1:00 PM Suzan Willis PA-C [facility]        Today's Diagnoses     Preop general physical exam    -  1    Neurogenic bladder        Paraplegia following spinal cord injury (H)        Personal history of DVT (deep vein thrombosis)          Care Instructions      Before Your Surgery      Call your surgeon if there is any change in your health. This includes signs of a cold or flu (such as a sore throat, runny nose, cough, rash or fever).    Do not smoke, drink alcohol or take over the counter medicine (unless your surgeon or primary care doctor tells you to) for the 24 hours before and after surgery.    If you take prescribed drugs: Follow your doctor s orders about which medicines to take and which to stop until after surgery.    Eating and drinking prior to surgery: follow the instructions from your surgeon    Take a shower or bath the night before surgery. Use the soap your surgeon gave you to gently clean your skin. If you do not have soap from your surgeon, use your regular soap. Do not shave or scrub the surgery site.  Wear clean pajamas and have clean sheets on your bed.             Follow-ups after your visit        Who to contact     If you have questions or need follow up information about today's clinic visit or your schedule please contact [facility] directly at [phone].  Normal or non-critical lab and imaging results will be communicated to you by MyChart, letter or phone within 4 business days after the clinic has received the results. If you do not hear from us within 7 days, please contact the clinic through MyChart or phone. If you have a critical or abnormal lab result, we will notify you by phone as soon as possible.  Submit refill requests  "through [employer] or call your pharmacy and they will forward the refill request to us. Please allow 3 business days for your refill to be completed.          Additional Information About Your Visit        [employer] Information     [employer] lets you send messages to your doctor, view your test results, renew your prescriptions, schedule appointments and more. To sign up, go to [email or web address] . Click on \"Log in\" on the left side of the screen, which will take you to the Welcome page. Then click on \"Sign up Now\" on the right side of the page.     You will be asked to enter the access code listed below, as well as some personal information. Please follow the directions to create your username and password.     Your access code is: [email or web address]  Expires: 2018  6:30 AM     Your access code will  in 90 days. If you need help or a new code, please call your [place] clinic or [phone].        Care EveryWhere ID     This is your Care EveryWhere ID. This could be used by other organizations to access your [place] medical records  [id number]        Your Vitals Were     Pulse Temperature Respirations Last Period Breastfeeding?       60 98.2  F (36.8  C) 16 2017 No        Blood Pressure from Last 3 Encounters:   18 112/58   17 110/67   17 99/64    Weight from Last 3 Encounters:   17 105 lb (47.6 kg)   16 98 lb 6.4 oz (44.6 kg)   16 110 lb 8 oz (50.1 kg)              We Performed the Following     CBC with platelets     HCG qualitative Blood          Today's Medication Changes          These changes are accurate as of: 18  1:36 PM.  If you have any questions, ask your nurse or doctor.               Stop taking these medicines if you haven't already. Please contact your care team if you have questions.     medroxyPROGESTERone 150 MG/ML injection   Commonly known as:  DEPO-PROVERA   Stopped by:  Suzan Willis PA-C                    Primary Care Provider " Fax #    [facility] [phone]       [address] [facility] [address]        Equal Access to Services     OVI SU : Hadii aad ku hadjodisalma Elvis, wapatoda christineyaaha, eronta kaalmada stefano, ander tonyin hayaajon castrolexis felix tavo campuzano. So [facility] [phone].    ATENCIÓN: Si habla español, tiene a villa disposición servicios gratuitos de asistencia lingüística. Dano al [phone].    We comply with applicable federal civil rights laws and [place] laws. We do not discriminate on the basis of race, color, national origin, age, disability, sex, sexual orientation, or gender identity.            Thank you!     Thank you for choosing [facility]  for your care. Our goal is always to provide you with excellent care. Hearing back from our patients is one way we can continue to improve our services. Please take a few minutes to complete the written survey that you may receive in the mail after your visit with us. Thank you!             Your Updated Medication List - Protect others around you: Learn how to safely use, store and throw away your medicines at www.disposemymeds.org.          This list is accurate as of: 18  1:36 PM.  Always use your most recent med list.                   Brand Name Dispense Instructions for use Diagnosis    baclofen 10 MG tablet    LIORESAL     Take 10 mg by mouth 3 times daily.        BISACODYL      1 suppository daily        COLACE PO      Take by mouth daily.        Cranberry 450 MG Tabs      Take 450 mg by mouth daily.        DAILY MULTIVITAMIN PO      Take 2 tablets by mouth daily        ferrous sulfate 325 (65 FE) MG tablet    IRON    60 tablet    TAKE 1 TABLET BY MOUTH TWICE A DAY WITH MEALS    S/P  section       fesoterodine fumarate 4 MG Tb24 24 hr tablet    TOVIAZ     Take 8 mg by mouth daily        gabapentin 300 MG capsule    NEURONTIN     Take 300 mg by mouth daily        levonorgestrel 20 MCG/24HR IUD    MIRENA (52 MG)    1  each    1 each (20 mcg) by Intrauterine route once for 1 dose    Encounter for IUD insertion       midodrine 1.25 mg half-tab    PROAMATINE     Take 2.5 mg by mouth 3 times daily.        * order for DME     30 days    Equipment being ordered: [facility] Fax [phone]  Wheelchair seating eval and mapping of current cushion    Decubitus ulcer of right ischium, stage 3 (H), Paraplegia (H)       * order for DME     30 days    Equipment being ordered:[facility] Fax [phone]  Primary dressing Endform 2x2 Qty 1 box Secondary Dressing 4x4 mepilex boder Length of Need: 1 month Frequency of dressing change: QOD    Decubitus ulcer of buttock, right, stage III       senna-docusate 8.6-50 MG per tablet    SENOKOT-S;PERICOLACE    40 tablet    Take 1-2 tablets by mouth 2 times daily as needed for constipation    S/P  section       sertraline 100 MG tablet    ZOLOFT     TAKE 1 TABLET BY MOUTH ONCE DAILY. NEED TO BE SEEN FOR FURTHER FILLS.        TRAZODONE HCL      1 tablet. PRN        TYLENOL 325 MG tablet   Generic drug:  acetaminophen      Take 325-650 mg by mouth every 6 hours as needed.        * Notice:  This list has 2 medication(s) that are the same as other medications prescribed for you. Read the directions carefully, and ask your doctor or other care provider to review them with you.

## 2018-01-12 NOTE — NURSING NOTE
"Chief Complaint   Patient presents with     Pre-Op Exam     Panel Management     HPv, flu shot, chlamydia screening        Initial /58  Pulse 60  Temp 98.2  F (36.8  C)  Resp 16  LMP 12/09/2017  Breastfeeding? No Estimated body mass index is 16.95 kg/(m^2) as calculated from the following:    Height as of 12/13/17: 5' 6\" (1.676 m).    Weight as of 12/13/17: 105 lb (47.6 kg).  Medication Reconciliation: complete     Jillian Mcmillan MA    "

## 2018-01-17 ENCOUNTER — TELEPHONE (OUTPATIENT)
Dept: FAMILY MEDICINE | Facility: CLINIC | Age: 23
End: 2018-01-17

## 2018-01-17 NOTE — TELEPHONE ENCOUNTER
Notes Recorded by Tammie Barlow CMA on 1/16/2018 at 1:45 PM  Left message for patient to call back. Please give below labs           Please contact pt with the following message:     Pre- op labs are stable. Her hemoglobin is normal at 14.0.   Take care, Suzan Willis PA-C

## 2018-01-28 ENCOUNTER — TRANSFERRED RECORDS (OUTPATIENT)
Dept: HEALTH INFORMATION MANAGEMENT | Facility: CLINIC | Age: 23
End: 2018-01-28

## 2018-02-03 ENCOUNTER — TRANSFERRED RECORDS (OUTPATIENT)
Dept: HEALTH INFORMATION MANAGEMENT | Facility: CLINIC | Age: 23
End: 2018-02-03

## 2018-02-06 LAB
CREAT SERPL-MCNC: 0.39 MG/DL (ref 0.51–0.95)
GFR SERPL CREATININE-BSD FRML MDRD: >60 ML/MIN/1.73M2 (ref 60–150)
GLUCOSE SERPL-MCNC: 101 MG/DL (ref 74–100)
POTASSIUM SERPL-SCNC: 3.4 MMOL/L (ref 3.5–5.1)

## 2018-02-08 NOTE — PROGRESS NOTES
SUBJECTIVE:   Dianna Cottrell is a 22 year old female who presents to clinic today for the following health issues:      History of Present Illness     Diet:  Regular (no restrictions)  Frequency of exercise:  None  Taking medications regularly:  Yes  Medication side effects:  None  Additional concerns today:  No        Hospital Follow-up Visit:    Hospital/Nursing Home/IP Rehab Facility: [facility]   Date of Admission: 1/28/2018  Date of Discharge: 2/6/2018  Reason(s) for Admission:  Pneumonia  - right (?)             Problems taking medications regularly:  None       Medication changes since discharge: None    Summary of hospitalization:  [facility] discharge summary reviewed  See outside records, reviewed and scanned  Diagnostic Tests/Treatments reviewed.  Follow up needed: likely repeat chest xray  Other Healthcare Providers Involved in Patient s Care:         Homecare  Update since discharge: improved.     Post Discharge Medication Reconciliation: discharge medications reconciled, continue medications without change.  Plan of care communicated with patient     Coding guidelines for this visit:  Type of Medical   Decision Making Face-to-Face Visit       within 7 Days of discharge Face-to-Face Visit        within 14 days of discharge   Moderate Complexity [id number]   High Complexity 97611 [id number]          Had cough for a few days with chills- went to the ER near her home- [facility]- on 01/28/2018. Developed a fever in the ER.  Dx with pneumonia. Negative influenza testing.   Was going to be discharged after 3 days but developed mucus plug and kept an additional 6 days.    Was on levaquin, albuterol and heparin due to DVT hx.    Finishing levaquin antibiotic. Last dose was today or tomorrow.   Side effects: Little bit looser stools but still doing daily bowel regimen. One episode of incontinence of stool a few days ago.     Is feeling good, better.   Cough is gone.  Sent home with nebulizer -  albuterol. Using every 4 hours for a tightness in chest and that goes away with the neb.  More sx sitting upright. Better with slight recline. When laying feels some pressure on her chest that is new since being sick.     Eating has been normal for a week.   Sleeps at 30 degree HOB elevation at healthy baseline. Has been doing ok with that.     No fevers. No painful breathing.  No nausea. No vomiting.   No LE edema/swelling.   No sinus/ENT sx.     Did get flu shot this year.  Pneumococcal 13-Sommer : 2015  Pneumovax 23 : 2014  Tdap: 2014    This morning her care staff (RN) who does am cath noted low BP. BP this am systolic was 86. She felt well. Denied dizziness.   Pt takes midodrine for hypotension.     Had botox injections of the bladder 2018 a few days prior to the pneumonia.   Thinks has been helpful.   Not wet between cathing since the botox.   Has been having more urine output at times where previously was having low output.    Problem list and histories reviewed & adjusted, as indicated.  Additional history: as documented    Patient Active Problem List   Diagnosis     Paraplegia following spinal cord injury (H)     Supervision of other high-risk pregnancy     c5 burst fracture     S/P  section     DVT complicating pregnancy (H)     Hypertension     Lesions of vulva     IUD (intrauterine device) in place- placed 2017     Past Surgical History:   Procedure Laterality Date     C4-C7 interbody fusion with anterior screw and plate fixation and posterior erna and pedicle screw fixation with interspace bone graft and C5 and C6 partial corpectomies  2012      SECTION  2013    Procedure:  SECTION;   Section ;  Surgeon: Ricki Nelson MD;  Location: [facility] L[facility]      SECTION N/A 2016    Procedure:  SECTION;  Surgeon: Floridalma Kiran MD;  Location: [facility]     IR IVC FILTER PLACEMENT  2012     LUMBAR DRAIN  2012        Social History   Substance Use Topics     Smoking status: Former Smoker     Packs/day: 0.50     Types: Cigarettes     Smokeless tobacco: Never Used     Alcohol use No     History reviewed. No pertinent family history.      Current Outpatient Prescriptions   Medication Sig Dispense Refill     order for DME Equipment being ordered:[facility] Fax [phone]    Primary dressing Endform 2x2 Qty 1 box  Secondary Dressing 4x4 mepilex boder  Length of Need: 1 month  Frequency of dressing change: QOD 30 days 0     order for DME Equipment being ordered: [facility] Fax [phone]    Wheelchair seating eval and mapping of current cushion 30 days 0     senna-docusate (SENOKOT-S;PERICOLACE) 8.6-50 MG per tablet Take 1-2 tablets by mouth 2 times daily as needed for constipation 40 tablet 0     sertraline (ZOLOFT) 100 MG tablet TAKE 1 TABLET BY MOUTH ONCE DAILY. NEED TO BE SEEN FOR FURTHER FILLS.  0     Multiple Vitamin (DAILY MULTIVITAMIN PO) Take 2 tablets by mouth daily       Docusate Sodium (COLACE PO) Take by mouth daily.        gabapentin (NEURONTIN) 300 MG capsule Take 300 mg by mouth daily        midodrine (PROAMATINE) 1.25 mg Take 2.5 mg by mouth 3 times daily.       baclofen (LIORESAL) 10 MG tablet Take 10 mg by mouth 3 times daily.       Cranberry 450 MG TABS Take 450 mg by mouth daily.       levonorgestrel (MIRENA, 52 MG,) 20 MCG/24HR IUD 1 each (20 mcg) by Intrauterine route once for 1 dose 1 each 0     ferrous sulfate (IRON) 325 (65 FE) MG tablet TAKE 1 TABLET BY MOUTH TWICE A DAY WITH MEALS (Patient not taking: Reported on 1/12/2018) 60 tablet 1     fesoterodine fumarate (TOVIAZ) 4 MG TB24 Take 8 mg by mouth daily       TRAZODONE HCL 1 tablet. PRN       acetaminophen (TYLENOL) 325 MG tablet Take 325-650 mg by mouth every 6 hours as needed.       BISACODYL 1 suppository daily        Allergies   Allergen Reactions     Succinylcholine      Spinal cord injury 12/18/12, patient at risk for  extrajunctional receptors and hyperkalemia     BP Readings from Last 3 Encounters:   02/12/18 120/58   01/12/18 112/58   12/13/17 110/67    Wt Readings from Last 3 Encounters:   12/13/17 105 lb (47.6 kg)   12/20/16 98 lb 6.4 oz (44.6 kg)   12/06/16 110 lb 8 oz (50.1 kg)                  Labs reviewed in EPIC    ROS:  Constitutional, HEENT, cardiovascular, pulmonary, gi and gu systems are negative, except as otherwise noted.    OBJECTIVE:     /58  Pulse 85  Temp 98.9  F (37.2  C) (Temporal)  Resp 18  LMP 02/10/2018  SpO2 100%  There is no height or weight on file to calculate BMI.  GENERAL: healthy, alert and no distress, slender, sitting in power chair  EYES: Eyes grossly normal to inspection, PERRL and conjunctivae and sclerae normal, wearing glasses  HENT: ear canals and TM's normal, nose and mouth without ulcers or lesions  NECK: no adenopathy, no asymmetry, masses, or scars and thyroid normal to palpation  RESP: lungs clear to auscultation - no rales, rhonchi or wheezes  CV: regular rate and rhythm, normal S1 S2, no S3 or S4, no murmur, click or rub, no peripheral edema and peripheral pulses strong  ABDOMEN: +BS, soft, nontender  MS: atrophic lower extremities, no edema  NEURO: sitting in power chair, limited use of upper extremities/arms. Normal speech, mentation.   PSYCH: mentation appears normal, affect normal/bright    Diagnostic Test Results:  none     ASSESSMENT/PLAN:     1. Hospital discharge follow-up  Records were not available at time of discharge. Obtained and reviewed later in the week.    2. H/O: pneumonia  Plan for repeat chest xray in 4 weeks.   Symptomatically is doing better.  Continue nebs as needed- if sx do not continue on improving course as expected follow up for recheck  Discussed possible referral to pulmonology due to frequent hospitalizations for pneumonia (1 per year). Not structural lung disease but mechanical, neuromuscular difficulty clearing mucus with URIs due to  incomplete-quadriplegic status.    Pneumonia vaccinations up to date- see [facility] Where- see above  - PULMONARY MEDICINE REFERRAL    3. Paraplegia following spinal cord injury (H)  Continue with PMR specialists- through [facility].   Continue w/urology.       Follow Up: For worsening symptoms (ie new fevers, worsening pain, etc), non-improvement as expected/discussed, questions regarding your medications or treatment plan. Discussed parameters for follow up and included in After Visit Summary given to patient.      Suzan Willis PA-C  [facility]

## 2018-02-12 ENCOUNTER — OFFICE VISIT (OUTPATIENT)
Dept: FAMILY MEDICINE | Facility: CLINIC | Age: 23
End: 2018-02-12
Payer: MEDICARE

## 2018-02-12 VITALS
SYSTOLIC BLOOD PRESSURE: 120 MMHG | TEMPERATURE: 98.9 F | HEART RATE: 85 BPM | OXYGEN SATURATION: 100 % | RESPIRATION RATE: 18 BRPM | DIASTOLIC BLOOD PRESSURE: 58 MMHG

## 2018-02-12 DIAGNOSIS — Z09 HOSPITAL DISCHARGE FOLLOW-UP: Primary | ICD-10-CM

## 2018-02-12 DIAGNOSIS — Z87.01 H/O: PNEUMONIA: ICD-10-CM

## 2018-02-12 DIAGNOSIS — G82.20 PARAPLEGIA FOLLOWING SPINAL CORD INJURY (H): Chronic | ICD-10-CM

## 2018-02-12 PROCEDURE — 99214 OFFICE O/P EST MOD 30 MIN: CPT | Performed by: PHYSICIAN ASSISTANT

## 2018-02-12 ASSESSMENT — PAIN SCALES - GENERAL: PAINLEVEL: NO PAIN (0)

## 2018-02-12 NOTE — MR AVS SNAPSHOT
After Visit Summary   2/12/2018    Dianna Cottrell    MRN: [id number]           Patient Information     Date Of Birth          1995        Visit Information        Provider Department      2/12/2018 1:00 PM Suzan Willis PA-C [facility]        Today's Diagnoses     Hospital discharge follow-up    -  1    H/O: pneumonia        Paraplegia following spinal cord injury (H)           Follow-ups after your visit        Additional Services     PULMONARY MEDICINE REFERRAL       Your provider has referred you to: [facility]: [facility] (Adults & Pediatrics) [facility] [phone]   [email or web address]    Please be aware that coverage of these services is subject to the terms and limitations of your health insurance plan.  Call member services at your health plan with any benefit or coverage questions.      Please bring the following with you to your appointment:    (1) Any X-Rays, CTs or MRIs which have been performed.  Contact the facility where they were done to arrange for  prior to your scheduled appointment.    (2) List of current medications   (3) This referral request   (4) Any documents/labs given to you for this referral            PULMONARY MEDICINE REFERRAL       Your provider has referred you to: [facility]: [facility] (Adults & Pediatrics) [facility] [phone]   [email or web address]    Please be aware that coverage of these services is subject to the terms and limitations of your health insurance plan.  Call member services at your health plan with any benefit or coverage questions.      Please bring the following with you to your appointment:    (1) Any X-Rays, CTs or MRIs which have been performed.  Contact the facility where they were done to arrange for  prior to your scheduled appointment.    (2) List of current  "medications   (3) This referral request   (4) Any documents/labs given to you for this referral                  Who to contact     If you have questions or need follow up information about today's clinic visit or your schedule please contact [facility] directly at [phone].  Normal or non-critical lab and imaging results will be communicated to you by MyChart, letter or phone within 4 business days after the clinic has received the results. If you do not hear from us within 7 days, please contact the clinic through MyChart or phone. If you have a critical or abnormal lab result, we will notify you by phone as soon as possible.  Submit refill requests through [employer] or call your pharmacy and they will forward the refill request to us. Please allow 3 business days for your refill to be completed.          Additional Information About Your Visit        MyChart Information     [employer] lets you send messages to your doctor, view your test results, renew your prescriptions, schedule appointments and more. To sign up, go to [email or web address] . Click on \"Log in\" on the left side of the screen, which will take you to the Welcome page. Then click on \"Sign up Now\" on the right side of the page.     You will be asked to enter the access code listed below, as well as some personal information. Please follow the directions to create your username and password.     Your access code is: [email or web address]  Expires: 2018  2:40 PM     Your access code will  in 90 days. If you need help or a new code, please call your [place] clinic or [phone].        Care EveryWhere ID     This is your Care EveryWhere ID. This could be used by other organizations to access your [place] medical records  [id number]        Your Vitals Were     Pulse Temperature Respirations Last Period Pulse Oximetry       85 98.9  F (37.2  C) (Temporal) 18 02/10/2018 100%        Blood Pressure from Last 3 Encounters:   18 " 108/64   02/12/18 120/58   01/12/18 112/58    Weight from Last 3 Encounters:   12/13/17 105 lb (47.6 kg)   12/20/16 98 lb 6.4 oz (44.6 kg)   12/06/16 110 lb 8 oz (50.1 kg)              We Performed the Following     PULMONARY MEDICINE REFERRAL     PULMONARY MEDICINE REFERRAL        Primary Care Provider Fax #    [facility] [phone]       [address] [place] [address]        Equal Access to Services     OVI SU : Hadii aad ku hadasho Soomaali, waaxda luqadaha, qaybta kaalmada adeegyada, waxay idiin hayaan adeeg kharagagan laesther . So [facility] [phone].    ATENCIÓN: Si habla español, tiene a villa disposición servicios gratuitos de asistencia lingüística. [facility] [phone].    We comply with applicable federal civil rights laws and [place] laws. We do not discriminate on the basis of race, color, national origin, age, disability, sex, sexual orientation, or gender identity.            Thank you!     Thank you for choosing [facility]  for your care. Our goal is always to provide you with excellent care. Hearing back from our patients is one way we can continue to improve our services. Please take a few minutes to complete the written survey that you may receive in the mail after your visit with us. Thank you!             Your Updated Medication List - Protect others around you: Learn how to safely use, store and throw away your medicines at www.disposemymeds.org.          This list is accurate as of 2/12/18 11:59 PM.  Always use your most recent med list.                   Brand Name Dispense Instructions for use Diagnosis    baclofen 10 MG tablet    LIORESAL     Take 10 mg by mouth 3 times daily.        BISACODYL      1 suppository daily        COLACE PO      Take by mouth daily.        Cranberry 450 MG Tabs      Take 450 mg by mouth daily.        DAILY MULTIVITAMIN PO      Take 2 tablets by mouth daily        ferrous sulfate 325 (65 FE) MG tablet    IRON    60  tablet    TAKE 1 TABLET BY MOUTH TWICE A DAY WITH MEALS    S/P  section       fesoterodine fumarate 4 MG Tb24 24 hr tablet    TOVIAZ     Take 8 mg by mouth daily        gabapentin 300 MG capsule    NEURONTIN     Take 300 mg by mouth daily        levonorgestrel 20 MCG/24HR IUD    MIRENA (52 MG)    1 each    1 each (20 mcg) by Intrauterine route once for 1 dose    Encounter for IUD insertion       midodrine 1.25 mg half-tab    PROAMATINE     Take 2.5 mg by mouth 3 times daily.        * order for DME     30 days    Equipment being ordered: [facility] [employer] [facility] Fax [phone]  Wheelchair seating eval and mapping of current cushion    Decubitus ulcer of right ischium, stage 3 (H), Paraplegia (H)       * order for DME     30 days    Equipment being ordered:[facility] Fax [phone]  Primary dressing Endform 2x2 Qty 1 box Secondary Dressing 4x4 mepilex boder Length of Need: 1 month Frequency of dressing change: QOD    Decubitus ulcer of buttock, right, stage III       senna-docusate 8.6-50 MG per tablet    SENOKOT-S;PERICOLACE    40 tablet    Take 1-2 tablets by mouth 2 times daily as needed for constipation    S/P  section       sertraline 100 MG tablet    ZOLOFT     TAKE 1 TABLET BY MOUTH ONCE DAILY. NEED TO BE SEEN FOR FURTHER FILLS.        TRAZODONE HCL      1 tablet. PRN        TYLENOL 325 MG tablet   Generic drug:  acetaminophen      Take 325-650 mg by mouth every 6 hours as needed.        * Notice:  This list has 2 medication(s) that are the same as other medications prescribed for you. Read the directions carefully, and ask your doctor or other care provider to review them with you.

## 2018-02-13 ENCOUNTER — HOSPITAL ENCOUNTER (OUTPATIENT)
Dept: WOUND CARE | Facility: CLINIC | Age: 23
Discharge: HOME | End: 2018-02-13
Attending: PHYSICIAN ASSISTANT | Admitting: PHYSICIAN ASSISTANT
Payer: MEDICARE

## 2018-02-13 VITALS — DIASTOLIC BLOOD PRESSURE: 64 MMHG | SYSTOLIC BLOOD PRESSURE: 108 MMHG | HEART RATE: 94 BPM | TEMPERATURE: 97.4 F

## 2018-02-13 PROCEDURE — 99213 OFFICE O/P EST LOW 20 MIN: CPT | Performed by: PHYSICIAN ASSISTANT

## 2018-02-13 PROCEDURE — G0463 HOSPITAL OUTPT CLINIC VISIT: HCPCS

## 2018-02-13 NOTE — DISCHARGE INSTRUCTIONS
[facility]  [address] [facility] [address], [place] [address]  Appointment Phone [phone] Nurse Advisors [phone]    Dianna Cottrell      1995   Right IT: Cleanse with soap and water, apply Sween 24 to buttocks daily to prevent drying and cracking and callus.       Call us at [phone] if you have any questions about your wounds, have redness or swelling around your wound, have a fever of 101 or greater or if you have any other problems or concerns. We answer the phone Monday through Friday 8 am to 4 pm, please leave a message as we check the voicemail frequently throughout the day.     Follow up with Provider - if needed

## 2018-02-13 NOTE — PROGRESS NOTES
[facility]    HISTORY OF PRESENT ILLNESS: Ms. Dianna Cottrell is a 22-year-old quadriplegic woman known to our clinic for a right IT pressure ulcer that has been resolved for some time now. She presents today as she was told by her caretaker that there was some concern of the area opening up again. Reports good adherence to offloading techniques.     Recently discharged for pneumonia inpatient treatment.     REVIEW OF SYSTEMS:   CONSTITUTIONAL: Denies fever or chills  GI: denies nausea or vomiting    PAST MEDICAL HISTORY:  has a past medical history of Anemia; c5 burst fracture (12/18/2012); Compression fracture of L1 lumbar vertebra (H) (12/18/2012); Fracture of thoracic spine without spinal cord lesion (H) (12/18/2012); Hypertension (12/6/2016); and Vocal cord dysfunction.    MEDICATIONS:   Current Outpatient Prescriptions   Medication     levonorgestrel (MIRENA, 52 MG,) 20 MCG/24HR IUD     order for DME     order for DME     senna-docusate (SENOKOT-S;PERICOLACE) 8.6-50 MG per tablet     ferrous sulfate (IRON) 325 (65 FE) MG tablet     sertraline (ZOLOFT) 100 MG tablet     fesoterodine fumarate (TOVIAZ) 4 MG TB24     Multiple Vitamin (DAILY MULTIVITAMIN PO)     Docusate Sodium (COLACE PO)     gabapentin (NEURONTIN) 300 MG capsule     midodrine (PROAMATINE) 1.25 mg     baclofen (LIORESAL) 10 MG tablet     TRAZODONE HCL     acetaminophen (TYLENOL) 325 MG tablet     BISACODYL     Cranberry 450 MG TABS     No current facility-administered medications for this encounter.      VITALS: /64  Pulse 94  Temp 97.4  F (36.3  C) (Tympanic)  LMP 02/10/2018    PHYSICAL EXAM:  GENERAL: Patient is alert and oriented and in no acute distress  INTEGUMENTARY: callus over right IT, partial thickness ulcer about 2mm x 2mm    PROCEDURE: None indicated.     ASSESSMENT: Stage 2 pressure ulcer of right IT    PLAN:   1. Moisturize with bland emollient daily  2. Continue good offloading techniques    FOLLOW-UP:  AUSTIN FERRER PA-C

## 2018-02-13 NOTE — MR AVS SNAPSHOT
"                  MRN:[id number]                      After Visit Summary   2018    Dianna Cottrell    MRN: [id number]           Visit Information        Provider Department      2018  2:15 PM Toyin Larson PA-C [facility] [place]          Further instructions from your care team             [facility] [place]  [address] [facility] [address], [place] [address]  Appointment Phone [phone] Nurse Advisors [phone]    Dianna Cottrell      1995   Right IT: Cleanse with soap and water, apply Sween 24 to buttocks daily to prevent drying and cracking and callus.       Call us at [phone] if you have any questions about your wounds, have redness or swelling around your wound, have a fever of 101 or greater or if you have any other problems or concerns. We answer the phone Monday through Friday 8 am to 4 pm, please leave a message as we check the voicemail frequently throughout the day.     Follow up with Provider - if needed             MyChart Information     [employer] lets you send messages to your doctor, view your test results, renew your prescriptions, schedule appointments and more. To sign up, go to [email or web address] . Click on \"Log in\" on the left side of the screen, which will take you to the Welcome page. Then click on \"Sign up Now\" on the right side of the page.     You will be asked to enter the access code listed below, as well as some personal information. Please follow the directions to create your username and password.     Your access code is: [email or web address]  Expires: 2018  2:40 PM     Your access code will  in 90 days. If you need help or a new code, please call your [place] clinic or [phone].        Care EveryWhere ID     This is your Care EveryWhere ID. This could be used by other organizations to access your [place] medical records  [id number]        Equal Access to Services     OVI SU " AH: Sandy Leal, wapatoda lumichaeladaha, qacathleenta kaander allen. So [facility] [phone].    ATENCIÓN: Si habla español, tiene a villa disposición servicios gratuitos de asistencia lingüística. Llame al [phone].    We comply with applicable federal civil rights laws and [place] laws. We do not discriminate on the basis of race, color, national origin, age, disability, sex, sexual orientation, or gender identity.

## 2018-02-14 PROBLEM — Z87.01 H/O: PNEUMONIA: Status: ACTIVE | Noted: 2018-02-14

## 2018-02-19 ENCOUNTER — TELEPHONE (OUTPATIENT)
Dept: FAMILY MEDICINE | Facility: CLINIC | Age: 23
End: 2018-02-19

## 2018-02-19 NOTE — TELEPHONE ENCOUNTER
Please review and advise if you would like the patient to be evaluated in clinic or alternative setting for blood pressure 86/50 with a heart rate 92 and has no other symptoms. Home care called the clinic. Patient has not taking  Midodrine 10mg yet today but is about to. Please have team call patient with plan.     [facility] called -  Dianna Cottrell is a 22 year old female who calls with concerns of low blood pressure.    NURSING ASSESSMENT:  Description:  Past 30 minutes, average blood pressures are 86/50 with a heart rate 92. Feels fine. Takes her blood pressure medication (Midodrine 10mg taking twice a day updated 02/08/2018) after home care visit. Denies sweaty, clammy, pale, faint, dizzy, bleeding.   Onset/duration:  today  Precip. factors:  Spinal cord injury - takes medications to increase blood pressure   Associated symptoms:  Low blood pressure 86/50 with heart rate 92  Improves/worsens symptoms: same   Pain scale (0-10)   0/10  LMP/preg/breast feeding:  Patient's last menstrual period was 02/10/2018.  Last exam/Treatment:  02/12/2018  Allergies:   Allergies   Allergen Reactions     Succinylcholine      Spinal cord injury 12/18/12, patient at risk for extrajunctional receptors and hyperkalemia     NURSING PLAN: Routed to provider Yes    RECOMMENDED DISPOSITION:  Will await providers recommendations as the patient is not having symptoms and is about to take her Midodrine 10mg   Will comply with recommendation: Yes  If further questions/concerns or if symptoms do not improve, worsen or new symptoms develop, call your PCP or [place] [facility] as soon as possible.    NOTES:  Disposition was determined by the first positive assessment question, therefore all previous assessment questions were negative    Guideline used:  Telephone Triage Protocols for Nurses, Fifth Edition, [place]ty Shipman  [facility]  Nursing Judgment    Lilli Simmons, RN, BSN

## 2018-02-19 NOTE — TELEPHONE ENCOUNTER
Pt should take her midodrine and the have BP rechecked. Pt takes the midodrine to treat the low pressures. So prior to taking her dose, pressures likely are on the low side. Suzan Willis PA-C

## 2018-02-19 NOTE — TELEPHONE ENCOUNTER
LM for the patient to return call to the clinic to discuss the below. Will await to hear from patient. Lilli Simmons RN, BSN

## 2018-02-21 NOTE — TELEPHONE ENCOUNTER
LM for the patient to return call to the clinic to discuss the below. Will await to hear from patient. Lilli Simmons RN, BSN     If no response by 02/22/2018 will close encounter. Lilli Simmons RN, BSN

## 2018-03-05 ENCOUNTER — TELEPHONE (OUTPATIENT)
Dept: FAMILY MEDICINE | Facility: CLINIC | Age: 23
End: 2018-03-05

## 2018-03-05 NOTE — TELEPHONE ENCOUNTER
You placed a referral for patient to pulmonary on 2/18/18.  Patient has not scheduled as of yet.      Please review and forward to team if follow up with the patient is needed.     Thank you!  Carol Ann/Clinic Referrals Dyad II

## 2018-03-05 NOTE — LETTER
[facility] ([facility])  [address]  [place] [address]  [phone]      March 5, 2018    Dianna Cottrell                                                                                                                     [address] [address]        Dear Dianna,    We received a notice that you are to be scheduled with a specialty clinic. The referral has been placed by your provider and you can call to schedule an appointment directly.     Enclosed, you will find the referral with the phone number to call to schedule an appointment.  If you have already scheduled this, you may disregard this letter.    Please call us if you have any questions or concerns.      Sincerely,       [facility] Support Staff / lynda

## 2018-04-02 ENCOUNTER — TELEPHONE (OUTPATIENT)
Dept: FAMILY MEDICINE | Facility: CLINIC | Age: 23
End: 2018-04-02

## 2018-04-02 NOTE — TELEPHONE ENCOUNTER
Reason for call:  Form  Reason for Call:  Form, our goal is to have forms completed with 72 hours, however, some forms may require a visit or additional information.    Type of letter, form or note:  medical    Who is the form from?: [facility]    Where did the form come from: form was faxed in    What clinic location was the form placed at?: [facility] - [phone]    Where the form was placed:  in box     What number is listed as a contact on the form?: [phone]       Additional comments: Fax to [phone]    Call taken on 11/8/2016 at 3:08 PM by Jacque Ball

## 2018-04-09 ENCOUNTER — TELEPHONE (OUTPATIENT)
Dept: FAMILY MEDICINE | Facility: CLINIC | Age: 23
End: 2018-04-09

## 2018-04-09 NOTE — TELEPHONE ENCOUNTER
Reason for call:  Form  Reason for Call:  Form, our goal is to have forms completed with 72 hours, however, some forms may require a visit or additional information.    Type of letter, form or note:  medical    Who is the form from?: [facility]    Where did the form come from: form was faxed in    What clinic location was the form placed at?: [facility] - [phone]    Where the form was placed:  in box     What number is listed as a contact on the form?:[phone]       Additional comments: Fax to [phone]    Call taken on 11/8/2016 at 3:08 PM by Jacque Ball

## 2018-04-20 ENCOUNTER — TELEPHONE (OUTPATIENT)
Dept: FAMILY MEDICINE | Facility: CLINIC | Age: 23
End: 2018-04-20

## 2018-04-20 NOTE — TELEPHONE ENCOUNTER
Reason for call:  Form  Reason for Call:  Form, our goal is to have forms completed with 72 hours, however, some forms may require a visit or additional information.    Type of letter, form or note:  medical    Who is the form from?: [facility]    Where did the form come from: form was faxed in    What clinic location was the form placed at?: [facility] - [phone]    Where the form was placed:  in box     What number is listed as a contact on the form?: [phone]       Additional comments: Fax to [phone]    Call taken on 11/8/2016 at 3:08 PM by Jacque Ball

## 2018-05-07 ENCOUNTER — TELEPHONE (OUTPATIENT)
Dept: FAMILY MEDICINE | Facility: CLINIC | Age: 23
End: 2018-05-07

## 2018-05-09 NOTE — PROGRESS NOTES
"  SUBJECTIVE:   Dianna Cottrell is a 23 year old female who presents to clinic today for the following health issues:      History of Present Illness     Depression & Anxiety Follow-up:     Depression/Anxiety:  Depression & Anxiety    Status since last visit::  Worsened    Other associated symptoms of depression and anxiety::  YES    Significant life event::  No    Current substance use::  Cannabis       Today's PHQ-9         PHQ-9 Total Score:         PHQ-9 Q9 Suicidal ideation:       Thoughts of suicide or self harm:      Self-harm Plan:        Self-harm Action:          Safety concerns for self or others:            Diet:  Regular (no restrictions)  Frequency of exercise:  None  Taking medications regularly:  Yes  Medication side effects:  None  Additional concerns today:  No    PHQ-9 SCORE 8/27/2013 10/1/2013 5/10/2018   Total Score 0 0 -   Total Score MyChart - - 2 (Minimal depression)   Total Score - - 2     АНДРЕЙ-7 SCORE 5/10/2018   Total Score 6 (mild anxiety)   Total Score 6     Depression is manageable. But endorses irritable, frustrated. Not down. Not withdrawing. No SI/HI  Anxiety is worse now. Sx increase when there are changes in her PCA care. Frequently has changes in her PCAs- Requires 24 hr PCA care- quadriplegic. Working with new services to find care.   When anxious chews finger nails. \"No nails left\"  Son is 15mo old- very busy child, active, has temper. Then that spikes stress levels and anxiety. \"He knows I can't do anything about his behavior\". She is never alone with him- has care assistance.   Thinks needs something more when anxiety is bad that she can take to calm my nerves. A friend told her about xanax.   Sleep- not problem initiating, more waking at times recently.   Eating- stable.   Worry- yes  Using marijuana daily. Because helps w/anxiety and spasticity and also for recreation. Has used for years  Has not seen psychologist for \"awhile\".     On Sertraline 100mg   Side effects: " none  Med hx: celexa maybe in teen years- but had side effects.     Problem list and histories reviewed & adjusted, as indicated.  Additional history: as documented      Patient Active Problem List   Diagnosis     Paraplegia following spinal cord injury (H)     c5 burst fracture     DVT complicating pregnancy (H)     Lesions of vulva     IUD (intrauterine device) in place- placed 2017     H/O: pneumonia     АНДРЕЙ (generalized anxiety disorder)     Past Surgical History:   Procedure Laterality Date     C4-C7 interbody fusion with anterior screw and plate fixation and posterior erna and pedicle screw fixation with interspace bone graft and C5 and C6 partial corpectomies  2012      SECTION  2013    Procedure:  SECTION;   Section ;  Surgeon: Ricki Nelson MD;  Location: [facility]      SECTION N/A 2016    Procedure:  SECTION;  Surgeon: Floridalma Kiran MD;  Location: [facility]     IR IVC FILTER PLACEMENT  2012     LUMBAR DRAIN  2012       Social History   Substance Use Topics     Smoking status: Former Smoker     Packs/day: 0.50     Types: Cigarettes     Smokeless tobacco: Never Used     Alcohol use No     History reviewed. No pertinent family history.      Current Outpatient Prescriptions   Medication Sig Dispense Refill     baclofen (LIORESAL) 10 MG tablet Take 10 mg by mouth 3 times daily.       BISACODYL 1 suppository daily        Cranberry 450 MG TABS Take 450 mg by mouth daily.       Docusate Sodium (COLACE PO) Take by mouth daily.        fesoterodine fumarate (TOVIAZ) 4 MG TB24 Take 8 mg by mouth daily       gabapentin (NEURONTIN) 300 MG capsule Take 300 mg by mouth daily        midodrine (PROAMATINE) 1.25 mg Take 2.5 mg by mouth 3 times daily.       Multiple Vitamin (DAILY MULTIVITAMIN PO) Take 2 tablets by mouth daily       order for DME Equipment being ordered: [facility] Fax [phone]    Wheelchair seating eval and mapping of  current cushion 30 days 0     order for DME Equipment being ordered:[facility] Fax [phone]    Primary dressing Endform 2x2 Qty 1 box  Secondary Dressing 4x4 mepilex boder  Length of Need: 1 month  Frequency of dressing change: QOD 30 days 0     senna-docusate (SENOKOT-S;PERICOLACE) 8.6-50 MG per tablet Take 1-2 tablets by mouth 2 times daily as needed for constipation 40 tablet 0     sertraline (ZOLOFT) 100 MG tablet TAKE 1 TABLET BY MOUTH ONCE DAILY. NEED TO BE SEEN FOR FURTHER FILLS.  0     acetaminophen (TYLENOL) 325 MG tablet Take 325-650 mg by mouth every 6 hours as needed.       ferrous sulfate (IRON) 325 (65 FE) MG tablet TAKE 1 TABLET BY MOUTH TWICE A DAY WITH MEALS (Patient not taking: Reported on 1/12/2018) 60 tablet 1     levonorgestrel (MIRENA, 52 MG,) 20 MCG/24HR IUD 1 each (20 mcg) by Intrauterine route once for 1 dose 1 each 0     TRAZODONE HCL 1 tablet. PRN       Allergies   Allergen Reactions     Succinylcholine      Spinal cord injury 12/18/12, patient at risk for extrajunctional receptors and hyperkalemia     BP Readings from Last 3 Encounters:   05/10/18 90/52   02/13/18 108/64   02/12/18 120/58    Wt Readings from Last 3 Encounters:   12/13/17 105 lb (47.6 kg)   12/20/16 98 lb 6.4 oz (44.6 kg)   12/06/16 110 lb 8 oz (50.1 kg)                Labs reviewed in EPIC    ROS:  CONSTITUTIONAL: NEGATIVE for fever, chills, change in weight  ENT/MOUTH: NEGATIVE for ear, mouth and throat problems  RESP: NEGATIVE for significant cough or SOB  CV: NEGATIVE for chest pain, palpitations or peripheral edema  GI: NEGATIVE for nausea, abdominal pain, heartburn, or change in bowel habits  : NEGATIVE for frequency, dysuria, or hematuria. Recently treated UTI.     OBJECTIVE:     BP 90/52  Pulse 94  Temp 99.3  F (37.4  C) (Temporal)  Resp 18  LMP 04/26/2018 (Approximate)  Breastfeeding? No  There is no height or weight on file to calculate BMI.  GENERAL: thin, well appearing, sitting in power  chair. Finger nails are chewed down.   EYES: Eyes grossly normal to inspection, PERRL and conjunctivae and sclerae normal  HENT: ear canals and TM's normal, nose and mouth without ulcers or lesions  NECK: no adenopathy, no asymmetry  RESP: lungs clear to auscultation - no rales, rhonchi or wheezes  CV: regular rate and rhythm, normal S1 S2, no S3 or S4, no murmur, click or rub, no peripheral edema and peripheral pulses strong  ABDOMEN: soft, nontender, no hepatosplenomegaly, no masses and bowel sounds normal  MS: no gross musculoskeletal defects noted, no edema  PSYCH: mentation appears normal, affect normal/bright, no agitation, speech normal volume, eye contact good. No agitaiton.         ASSESSMENT/PLAN:     1. АНДРЕЙ (generalized anxiety disorder)  Increase sertraline from 100mg to 150mg daily since tolerates well and has been helpful  Vistaril prn anxiety- discussed possible sedating side effects  Discussed benzo's and would like to avoid.  Encouraged to refrain from marijuana use  Discussed getting back in touch with psychologist- has been a while since has had a visit  4 part breathing, biofeedback, self cares, involving son's pediatrician w/behaviors discussed.   - hydrOXYzine (VISTARIL) 25 MG capsule; Take 1 capsule (25 mg) by mouth 3 times daily as needed for itching or anxiety  Dispense: 20 capsule; Refill: 0  - sertraline (ZOLOFT) 100 MG tablet; Take 1.5 tablets (150 mg) by mouth daily  Dispense: 90 tablet; Refill: 0    Follow Up: For worsening or changing symptoms, non-improvement as expected/discussed, questions regarding your medications or treatment plan patient was instructed to contact the clinic. Discussed parameters for follow up and included in After Visit Summary given to patient.      Suzan Willis PA-C  [facility]  Answers for HPI/ROS submitted by the patient on 5/10/2018   PHQ-2 Score: 0

## 2018-05-10 ENCOUNTER — OFFICE VISIT (OUTPATIENT)
Dept: FAMILY MEDICINE | Facility: CLINIC | Age: 23
End: 2018-05-10
Payer: MEDICARE

## 2018-05-10 VITALS
TEMPERATURE: 99.3 F | RESPIRATION RATE: 18 BRPM | SYSTOLIC BLOOD PRESSURE: 90 MMHG | HEART RATE: 94 BPM | DIASTOLIC BLOOD PRESSURE: 52 MMHG

## 2018-05-10 DIAGNOSIS — F41.1 GAD (GENERALIZED ANXIETY DISORDER): Primary | ICD-10-CM

## 2018-05-10 PROCEDURE — 99214 OFFICE O/P EST MOD 30 MIN: CPT | Performed by: PHYSICIAN ASSISTANT

## 2018-05-10 RX ORDER — SERTRALINE HYDROCHLORIDE 100 MG/1
150 TABLET, FILM COATED ORAL DAILY
Qty: 90 TABLET | Refills: 0 | Status: SHIPPED | OUTPATIENT
Start: 2018-05-10 | End: 2018-07-02

## 2018-05-10 RX ORDER — HYDROXYZINE PAMOATE 25 MG/1
25 CAPSULE ORAL 3 TIMES DAILY PRN
Qty: 20 CAPSULE | Refills: 0 | Status: SHIPPED | OUTPATIENT
Start: 2018-05-10 | End: 2019-04-22

## 2018-05-10 ASSESSMENT — ANXIETY QUESTIONNAIRES
5. BEING SO RESTLESS THAT IT IS HARD TO SIT STILL: SEVERAL DAYS
7. FEELING AFRAID AS IF SOMETHING AWFUL MIGHT HAPPEN: NOT AT ALL
GAD7 TOTAL SCORE: 6
6. BECOMING EASILY ANNOYED OR IRRITABLE: SEVERAL DAYS
GAD7 TOTAL SCORE: 6
4. TROUBLE RELAXING: NOT AT ALL
1. FEELING NERVOUS, ANXIOUS, OR ON EDGE: SEVERAL DAYS
2. NOT BEING ABLE TO STOP OR CONTROL WORRYING: SEVERAL DAYS
GAD7 TOTAL SCORE: 6
7. FEELING AFRAID AS IF SOMETHING AWFUL MIGHT HAPPEN: NOT AT ALL
3. WORRYING TOO MUCH ABOUT DIFFERENT THINGS: MORE THAN HALF THE DAYS

## 2018-05-10 ASSESSMENT — PATIENT HEALTH QUESTIONNAIRE - PHQ9
10. IF YOU CHECKED OFF ANY PROBLEMS, HOW DIFFICULT HAVE THESE PROBLEMS MADE IT FOR YOU TO DO YOUR WORK, TAKE CARE OF THINGS AT HOME, OR GET ALONG WITH OTHER PEOPLE: SOMEWHAT DIFFICULT
SUM OF ALL RESPONSES TO PHQ QUESTIONS 1-9: 2
SUM OF ALL RESPONSES TO PHQ QUESTIONS 1-9: 2

## 2018-05-10 ASSESSMENT — PAIN SCALES - GENERAL: PAINLEVEL: NO PAIN (0)

## 2018-05-10 NOTE — PATIENT INSTRUCTIONS
Sertraline dose increased form 100mg to 150mg daily  Hydroxyzine 25mg as needed for high anxiety situations- may make you tired    Get back in with your counselor    4 Part breathing for anxiety    Talk with son's pediatrician about behavior

## 2018-05-10 NOTE — MR AVS SNAPSHOT
"              After Visit Summary   5/10/2018    Dianna [place]rell    MRN: [id number]           Patient Information     Date Of Birth          1995        Visit Information        Provider Department      5/10/2018 2:20 PM Suzan Willis PA-C [facility] [place]        Today's Diagnoses     АНДРЕЙ (generalized anxiety disorder)    -  1    Need for HPV vaccine          Care Instructions    Sertraline dose increased form 100mg to 150mg daily  Hydroxyzine 25mg as needed for high anxiety situations- may make you tired    Get back in with your counselor    4 Part breathing for anxiety    Talk with son's pediatrician about behavior                    Follow-ups after your visit        Follow-up notes from your care team     Return in about 4 weeks (around 6/7/2018).      Who to contact     If you have questions or need follow up information about today's clinic visit or your schedule please [facility] at [phone].  Normal or non-critical lab and imaging results will be communicated to you by MyChart, letter or phone within 4 business days after the clinic has received the results. If you do not hear from us within 7 days, please contact the clinic through [employer] or phone. If you have a critical or abnormal lab result, we will notify you by phone as soon as possible.  Submit refill requests through [employer] or call your pharmacy and they will forward the refill request to us. Please allow 3 business days for your refill to be completed.          Additional Information About Your Visit        [employer] Information     [employer] lets you send messages to your doctor, view your test results, renew your prescriptions, schedule appointments and more. To sign up, go to [email or web address] . Click on \"Log in\" on the left side of the screen, which will take you to the Welcome page. Then click on \"Sign up Now\" on the right side of the page.     You will be asked to enter the access " code listed below, as well as some personal information. Please follow the directions to create your username and password.     Your access code is: [email or web address]  Expires: 2018  3:40 PM     Your access code will  in 90 days. If you need help or a new code, please call your [facility] clinic or [phone].        Care EveryWhere ID     This is your Care EveryWhere ID. This could be used by other organizations to access your [facility] medical records  [id number]        Your Vitals Were     Pulse Temperature Respirations Last Period Breastfeeding?       94 99.3  F (37.4  C) (Temporal) 18 2018 (Approximate) No        Blood Pressure from Last 3 Encounters:   05/10/18 90/52   18 108/64   18 120/58    Weight from Last 3 Encounters:   17 105 lb (47.6 kg)   16 98 lb 6.4 oz (44.6 kg)   16 110 lb 8 oz (50.1 kg)              Today, you had the following     No orders found for display         Today's Medication Changes          These changes are accurate as of 5/10/18  3:22 PM.  If you have any questions, ask your nurse or doctor.               Start taking these medicines.        Dose/Directions    hydrOXYzine 25 MG capsule   Commonly known as:  VISTARIL   Used for:  АНДРЕЙ (generalized anxiety disorder)   Started by:  Suzan Willis PA-C        Dose:  25 mg   Take 1 capsule (25 mg) by mouth 3 times daily as needed for itching or anxiety   Quantity:  20 capsule   Refills:  0         These medicines have changed or have updated prescriptions.        Dose/Directions    * sertraline 100 MG tablet   Commonly known as:  ZOLOFT   This may have changed:  Another medication with the same name was added. Make sure you understand how and when to take each.   Changed by:  Suzan Willis PA-C        TAKE 1 TABLET BY MOUTH ONCE DAILY. NEED TO BE SEEN FOR FURTHER FILLS.   Refills:  0       * sertraline 100 MG tablet   Commonly known as:  ZOLOFT   This may have changed:  You were  already taking a medication with the same name, and this prescription was added. Make sure you understand how and when to take each.   Used for:  АНДРЕЙ (generalized anxiety disorder)   Changed by:  Suzan Willis PA-C        Dose:  150 mg   Take 1.5 tablets (150 mg) by mouth daily   Quantity:  90 tablet   Refills:  0       * Notice:  This list has 2 medication(s) that are the same as other medications prescribed for you. Read the directions carefully, and ask your doctor or other care provider to review them with you.         Where to get your medicines      These medications were sent to [place] [address] [place] [facility] [place] - [address]  [address] [facility] [address]     Phone:  [phone]     hydrOXYzine 25 MG capsule    sertraline 100 MG tablet                Primary Care Provider Office Phone # Fax #    Suzan Willis PA-C [phone] [address] [address]        Equal Access to Services     [facility] AH: Hadii aad ku hadasho Soomaali, waaxda luqadaha, qaybta kaalmada adeegyada, waxay idiin hayaan maegan vo . So [facility] [phone].    ATENCIÓN: Si habla español, tiene a villa disposición servicios gratuitos de asistencia lingüística. Llame al [phone].    We comply with applicable federal civil rights laws and [place] laws. We do not discriminate on the basis of race, color, national origin, age, disability, sex, sexual orientation, or gender identity.            Thank you!     Thank you for choosing [facility]  for your care. Our goal is always to provide you with excellent care. Hearing back from our patients is one way we can continue to improve our services. Please take a few minutes to complete the written survey that you may receive in the mail after your visit with us. Thank you!             Your Updated Medication List - Protect others around you: Learn how to safely use, store and throw away your medicines at  www.disposemymeds.org.          This list is accurate as of 5/10/18  3:22 PM.  Always use your most recent med list.                   Brand Name Dispense Instructions for use Diagnosis    baclofen 10 MG tablet    LIORESAL     Take 10 mg by mouth 3 times daily.        BISACODYL      1 suppository daily        COLACE PO      Take by mouth daily.        Cranberry 450 MG Tabs      Take 450 mg by mouth daily.        DAILY MULTIVITAMIN PO      Take 2 tablets by mouth daily        ferrous sulfate 325 (65 Fe) MG tablet    IRON    60 tablet    TAKE 1 TABLET BY MOUTH TWICE A DAY WITH MEALS    S/P  section       fesoterodine fumarate 4 MG Tb24 24 hr tablet    TOVIAZ     Take 8 mg by mouth daily        gabapentin 300 MG capsule    NEURONTIN     Take 300 mg by mouth daily        hydrOXYzine 25 MG capsule    VISTARIL    20 capsule    Take 1 capsule (25 mg) by mouth 3 times daily as needed for itching or anxiety    АНДРЕЙ (generalized anxiety disorder)       levonorgestrel 20 MCG/24HR IUD    MIRENA (52 MG)    1 each    1 each (20 mcg) by Intrauterine route once for 1 dose    Encounter for IUD insertion       midodrine 1.25 mg half-tab    PROAMATINE     Take 2.5 mg by mouth 3 times daily.        * order for DME     30 days    Equipment being ordered: [facility] Fax [phone]  Wheelchair seating eval and mapping of current cushion    Decubitus ulcer of right ischium, stage 3 (H), Paraplegia (H)       * order for DME     30 days    Equipment being ordered:[facility] Fax [phone]  Primary dressing Endform 2x2 Qty 1 box Secondary Dressing 4x4 mepilex boder Length of Need: 1 month Frequency of dressing change: QOD    Decubitus ulcer of buttock, right, stage III       senna-docusate 8.6-50 MG per tablet    SENOKOT-S;PERICOLACE    40 tablet    Take 1-2 tablets by mouth 2 times daily as needed for constipation    S/P  section       * sertraline 100 MG tablet    ZOLOFT     TAKE 1 TABLET BY MOUTH ONCE  DAILY. NEED TO BE SEEN FOR FURTHER FILLS.        * sertraline 100 MG tablet    ZOLOFT    90 tablet    Take 1.5 tablets (150 mg) by mouth daily    АНДРЕЙ (generalized anxiety disorder)       TRAZODONE HCL      1 tablet. PRN        TYLENOL 325 MG tablet   Generic drug:  acetaminophen      Take 325-650 mg by mouth every 6 hours as needed.        * Notice:  This list has 4 medication(s) that are the same as other medications prescribed for you. Read the directions carefully, and ask your doctor or other care provider to review them with you.

## 2018-05-11 ASSESSMENT — ANXIETY QUESTIONNAIRES: GAD7 TOTAL SCORE: 6

## 2018-05-11 ASSESSMENT — PATIENT HEALTH QUESTIONNAIRE - PHQ9: SUM OF ALL RESPONSES TO PHQ QUESTIONS 1-9: 2

## 2018-05-17 ENCOUNTER — HOSPITAL ENCOUNTER (OUTPATIENT)
Dept: WOUND CARE | Facility: CLINIC | Age: 23
Discharge: HOME | End: 2018-05-17
Attending: SURGERY | Admitting: SURGERY
Payer: MEDICARE

## 2018-05-17 VITALS
TEMPERATURE: 99 F | SYSTOLIC BLOOD PRESSURE: 111 MMHG | HEART RATE: 99 BPM | DIASTOLIC BLOOD PRESSURE: 76 MMHG | RESPIRATION RATE: 16 BRPM

## 2018-05-17 PROCEDURE — G0463 HOSPITAL OUTPT CLINIC VISIT: HCPCS

## 2018-05-17 PROCEDURE — 99213 OFFICE O/P EST LOW 20 MIN: CPT | Performed by: PHYSICIAN ASSISTANT

## 2018-05-17 NOTE — DISCHARGE INSTRUCTIONS
[facility]  [address] [facility] [address] [address]  Appointment Phone [phone] [facility]visors [phone]    Dianna Cottrell      1995  Your wound is Healed!! Dressings are only required for one more week to protect the skin. Get Pressure Mapped through [facility] to ensure you are not getting new pressure areas.    Repositioning:    Bed:  Reposition pt MINIMALLY every 1-2 hours in bed to relieve pressure and promote perfusion to tissue.     Chair:  When up to chair pt should not sit for longer than one hour total before either tilt or returning to bed for at least 10 minutes, again to relieve pressure and promote perfusion to tissue.     o Pt should also sit on a chair cushion when up to the chair.          Toyin Yi PA-C. May 17, 2018    Call us at [phone] if you have any questions about your wounds, have redness or swelling around your wound, have a fever of 101 or greater or if you have any other problems or concerns. We answer the phone Monday through Friday 8 am to 4 pm, please leave a message as we check the voicemail frequently throughout the day.     Follow up with Provider - if needed

## 2018-05-17 NOTE — MR AVS SNAPSHOT
"                  MRN:[id number]                      After Visit Summary   5/17/2018    Dianna [place]    MRN: [id number]           Visit Information        Provider Department      5/17/2018  2:00 PM Toyin Yi PA-C [facility] [place]          Further instructions from your care team             [facility] [place]  [address] [facility] [address], [place] [address]  Appointment Phone [phone] [facility] [phone]    Dianna Cottrell      1995  Your wound is Healed!! Dressings are only required for one more week to protect the skin. Get Pressure Mapped through [facility] to ensure you are not getting new pressure areas.    Repositioning:    Bed:  Reposition pt MINIMALLY every 1-2 hours in bed to relieve pressure and promote perfusion to tissue.     Chair:  When up to chair pt should not sit for longer than one hour total before either tilt or returning to bed for at least 10 minutes, again to relieve pressure and promote perfusion to tissue.     o Pt should also sit on a chair cushion when up to the chair.          Toyin Yi PA-C. May 17, 2018    Call us at [phone] if you have any questions about your wounds, have redness or swelling around your wound, have a fever of 101 or greater or if you have any other problems or concerns. We answer the phone Monday through Friday 8 am to 4 pm, please leave a message as we check the voicemail frequently throughout the day.     Follow up with Provider - if needed         MyChart Information     [employer] lets you send messages to your doctor, view your test results, renew your prescriptions, schedule appointments and more. To sign up, go to [email or web address] . Click on \"Log in\" on the left side of the screen, which will take you to the Welcome page. Then click on \"Sign up Now\" on the right side of the page.     You will be asked to enter the access code listed below, as well as " some personal information. Please follow the directions to create your username and password.     Your access code is: [email or web address]  Expires: 8/15/2018  2:39 PM     Your access code will  in 90 days. If you need help or a new code, please call your [facility] clinic or [phone].        Care EveryWhere ID     This is your Care EveryWhere ID. This could be used by other organizations to access your [facility] medical records  [id number]        Equal Access to Services     [facility]ATHIE : Sandy Leal, wajeniffer helm, qagorge kaalval agarwal, ander vo . So [facility] [phone].    ATENCIÓN: Si habla español, tiene a villa disposición servicios gratuitos de asistencia lingüística. Llame al [phone].    We comply with applicable federal civil rights laws and [place] laws. We do not discriminate on the basis of race, color, national origin, age, disability, sex, sexual orientation, or gender identity.

## 2018-05-22 NOTE — ADDENDUM NOTE
Encounter addended by: Toyin Yi PA-C on: 5/22/2018  1:57 PM<BR>     Actions taken: Pend clinical note, Sign clinical note

## 2018-05-22 NOTE — PROGRESS NOTES
[place]     HISTORY OF PRESENT ILLNESS: Ms. Dianna Cottrell is a 23-year-old quadriplegic woman known to our clinic for a right IT pressure ulcer that has been resolved for some time now. She presents today as she was told by her caretaker that there was a red, bruised area over her sacrum. Reports good adherence to offloading techniques.      REVIEW OF SYSTEMS:   CONSTITUTIONAL: Denies fever or chills  GI: denies nausea or vomiting     PAST MEDICAL HISTORY:  has a past medical history of Anemia; c5 burst fracture (12/18/2012); Compression fracture of L1 lumbar vertebra (H) (12/18/2012); Fracture of thoracic spine without spinal cord lesion (H) (12/18/2012); Hypertension (12/6/2016); and Vocal cord dysfunction.     MEDICATIONS:   Current Outpatient Prescriptions   Medication     acetaminophen (TYLENOL) 325 MG tablet     baclofen (LIORESAL) 10 MG tablet     BISACODYL     Cranberry 450 MG TABS     Docusate Sodium (COLACE PO)     ferrous sulfate (IRON) 325 (65 FE) MG tablet     fesoterodine fumarate (TOVIAZ) 4 MG TB24     gabapentin (NEURONTIN) 300 MG capsule     hydrOXYzine (VISTARIL) 25 MG capsule     midodrine (PROAMATINE) 1.25 mg     Multiple Vitamin (DAILY MULTIVITAMIN PO)     order for DME     order for DME     senna-docusate (SENOKOT-S;PERICOLACE) 8.6-50 MG per tablet     sertraline (ZOLOFT) 100 MG tablet     TRAZODONE HCL     levonorgestrel (MIRENA, 52 MG,) 20 MCG/24HR IUD     No current facility-administered medications for this encounter.      VITALS: /76  Pulse 99  Temp 99  F (37.2  C)  Resp 16  LMP 04/26/2018 (Approximate)     PHYSICAL EXAM:  GENERAL: Patient is alert and oriented and in no acute distress  INTEGUMENTARY: non-blanchable erythema, no open sore     PROCEDURE: None indicated.      ASSESSMENT: Stage 2 pressure ulcer of sacrum     PLAN:   1. CriticAid  2. Continue good offloading techniques  3. Recommend using Mepilex foam for about a week just to protect the  area     FOLLOW-UP: AUSTIN FERRER PA-C

## 2018-05-24 ENCOUNTER — TELEPHONE (OUTPATIENT)
Dept: FAMILY MEDICINE | Facility: CLINIC | Age: 23
End: 2018-05-24

## 2018-05-24 DIAGNOSIS — G82.20 PARAPLEGIA FOLLOWING SPINAL CORD INJURY (H): Primary | Chronic | ICD-10-CM

## 2018-05-24 NOTE — TELEPHONE ENCOUNTER
Fax from [facility] requesting Rx for power wheel chair repair. Rx printed.. Suzan Willis PA-C

## 2018-06-06 ENCOUNTER — HOSPITAL ENCOUNTER (OUTPATIENT)
Dept: WOUND CARE | Facility: CLINIC | Age: 23
Discharge: HOME | End: 2018-06-06
Attending: PHYSICIAN ASSISTANT | Admitting: PHYSICIAN ASSISTANT
Payer: MEDICARE

## 2018-06-06 VITALS — HEART RATE: 82 BPM | TEMPERATURE: 96.7 F | DIASTOLIC BLOOD PRESSURE: 73 MMHG | SYSTOLIC BLOOD PRESSURE: 113 MMHG

## 2018-06-06 DIAGNOSIS — L89.153 DECUBITUS ULCER OF SACRAL REGION, STAGE 3 (H): Primary | ICD-10-CM

## 2018-06-06 PROCEDURE — 11042 DBRDMT SUBQ TIS 1ST 20SQCM/<: CPT

## 2018-06-06 PROCEDURE — 11042 DBRDMT SUBQ TIS 1ST 20SQCM/<: CPT | Performed by: PHYSICIAN ASSISTANT

## 2018-06-06 NOTE — DISCHARGE INSTRUCTIONS
[facility]  [address] [facility] [address], [place] [address]  Appointment Phone [phone] Nurse Advisors [phone]    Dianna Cottrell      1995    Wound Dressing Change: Sacral Wound   Cleanse wound with: saline or wound   Cover wound with Medihoney Gel  Cover wound with ABD pad  Change dressing daily and as needed.  Repositioning:    Bed:  Reposition pt MINIMALLY every 1-2 hours in bed to relieve pressure and promote perfusion to tissue.     Chair:  When up to chair pt should not sit for longer than one hour total before either tilting or returning to bed for at least 10 minutes, again to relieve pressure and promote perfusion to tissue.     o Pt should also sit on a chair cushion when up to the chair.     Call Choco Hanks to get pressure mapping [phone]   Toyin Yi PA-C. June 6, 2018    Call us at [phone] if you have any questions about your wounds, have redness or swelling around your wound, have a fever of 101 or greater or if you have any other problems or concerns. We answer the phone Monday through Friday 8 am to 4 pm, please leave a message as we check the voicemail frequently throughout the day.     Follow up with Provider - 2 weeks       
Improved

## 2018-06-06 NOTE — MR AVS SNAPSHOT
"                  MRN:[id number]                      After Visit Summary   6/6/2018    Dianna Cottrell    MRN: [id number]           Visit Information        Provider Department      6/6/2018  1:15 PM Toyin Yi PA-C [facility] [place]          Further instructions from your care team             [facility]  [address] [facility] [address], [facility] [address]  Appointment Phone [phone] Nurse Advisors [phone]    Dianna Cottrell      1995    Wound Dressing Change: Sacral Wound   Cleanse wound with: saline or wound   Cover wound with Medihoney Gel  Cover wound with ABD pad  Change dressing daily and as needed.  Repositioning:    Bed:  Reposition pt MINIMALLY every 1-2 hours in bed to relieve pressure and promote perfusion to tissue.     Chair:  When up to chair pt should not sit for longer than one hour total before either tilting or returning to bed for at least 10 minutes, again to relieve pressure and promote perfusion to tissue.     o Pt should also sit on a chair cushion when up to the chair.     Call Choco Hanks to get pressure mapping [phone]   Toyin Yi PA-C. June 6, 2018    Call us at [phone] if you have any questions about your wounds, have redness or swelling around your wound, have a fever of 101 or greater or if you have any other problems or concerns. We answer the phone Monday through Friday 8 am to 4 pm, please leave a message as we check the voicemail frequently throughout the day.     Follow up with Provider - 2 weeks         Tyf[employer] Information     Ty[employer]one lets you send messages to your doctor, view your test results, renew your prescriptions, schedule appointments and more. To sign up, go to [email or web address] . Click on \"Log in\" on the left side of the screen, which will take you to the Welcome page. Then click on \"Sign up Now\" on the right side of the page.     You will be " asked to enter the access code listed below, as well as some personal information. Please follow the directions to create your username and password.     Your access code is: [email or web address]  Expires: 8/15/2018  2:39 PM     Your access code will  in 90 days. If you need help or a new code, please call your [facility] clinic or [phone].        Care EveryWhere ID     This is your Care EveryWhere ID. This could be used by other organizations to access your [facility] medical records  [id number]        Equal Access to Services     [facility]: Hadcahno Leal, barbara helm, alonso agarwal, ander campuzano. So [facility] [phone].    ATENCIÓN: Si habla español, tiene a villa disposición servicios gratuitos de asistencia lingüística. Dano al [phone].    We comply with applicable federal civil rights laws and [place] laws. We do not discriminate on the basis of race, color, national origin, age, disability, sex, sexual orientation, or gender identity.

## 2018-06-11 ENCOUNTER — TELEPHONE (OUTPATIENT)
Dept: WOUND CARE | Facility: CLINIC | Age: 23
End: 2018-06-11

## 2018-06-11 DIAGNOSIS — L89.153 DECUBITUS ULCER OF SACRAL REGION, STAGE 3 (H): Primary | ICD-10-CM

## 2018-06-12 NOTE — ADDENDUM NOTE
Encounter addended by: Toyin Yi PA-C on: 6/11/2018  8:55 PM<BR>     Actions taken: Pend clinical note, Sign clinical note

## 2018-06-12 NOTE — PROGRESS NOTES
[place] [facility]     HISTORY OF PRESENT ILLNESS: Ms. Dianna Cottrell is a 23-year-old quadriplegic woman known to our clinic for a right IT pressure ulcer that has been resolved for some time now. We saw her a couple of weeks ago for a pressure injury over her coccyx that was not open and unfortunately it has since broken down.     WOUND CARE: None    OFFLOADING: Sleeps on group 2 mattress. Has not had her chair mapped recently (from Kirill Hanks)     REVIEW OF SYSTEMS:   CONSTITUTIONAL: Denies fever or chills  GI: denies nausea or vomiting     PAST MEDICAL HISTORY:  has a past medical history of Anemia; c5 burst fracture (12/18/2012); Compression fracture of L1 lumbar vertebra (H) (12/18/2012); Fracture of thoracic spine without spinal cord lesion (H) (12/18/2012); Hypertension (12/6/2016); and Vocal cord dysfunction.     MEDICATIONS:   Current Outpatient Prescriptions   Medication     acetaminophen (TYLENOL) 325 MG tablet     baclofen (LIORESAL) 10 MG tablet     BISACODYL     Cranberry 450 MG TABS     Docusate Sodium (COLACE PO)     ferrous sulfate (IRON) 325 (65 FE) MG tablet     fesoterodine fumarate (TOVIAZ) 4 MG TB24     gabapentin (NEURONTIN) 300 MG capsule     hydrOXYzine (VISTARIL) 25 MG capsule     midodrine (PROAMATINE) 1.25 mg     Multiple Vitamin (DAILY MULTIVITAMIN PO)     order for DME     order for DME     order for DME     order for DME     senna-docusate (SENOKOT-S;PERICOLACE) 8.6-50 MG per tablet     sertraline (ZOLOFT) 100 MG tablet     TRAZODONE HCL     levonorgestrel (MIRENA, 52 MG,) 20 MCG/24HR IUD     No current facility-administered medications for this encounter.      VITALS: /73  Pulse 82  Temp 96.7  F (35.9  C)     PHYSICAL EXAM:  GENERAL: Patient is alert and oriented and in no acute distress  INTEGUMENTARY:   WOUND ASSESSMENT #3:     Location: coccyx     Size: 1.2 cm x 0.9 cm with a depth of 0.1 cm    Drainage: copious amount of serosanguinous drainage    Wound  description: shallow and granular, some overlying slough    PROCEDURE: Per standing protocol 4% topical lidocaine was applied to the wound by the CMA. After informed consent was obtained, a surgical debridement was performed using a 15 blade down to and including subcutaneous tissue of <20 cm. Hemostasis was achieved with pressure. The patient tolerated the procedure well.       ASSESSMENT: Stage 3 pressure ulcer of sacrum     PLAN:   1. Dress with MediHoney and Mepilex border  2. Recommend minimal time in chair  3. Recommend getting chair pressure mapped      FOLLOW-UP: 1-2 weeks     VANIA FERRER PA-C

## 2018-07-02 ENCOUNTER — TELEPHONE (OUTPATIENT)
Dept: FAMILY MEDICINE | Facility: CLINIC | Age: 23
End: 2018-07-02

## 2018-07-02 DIAGNOSIS — F41.1 GAD (GENERALIZED ANXIETY DISORDER): ICD-10-CM

## 2018-07-02 NOTE — TELEPHONE ENCOUNTER
Reason for call:  Form  Reason for Call:  Form, our goal is to have forms completed with 72 hours, however, some forms may require a visit or additional information.    Type of letter, form or note:  medical    Who is the form from?: [facility]    Where did the form come from: form was faxed in    What clinic location was the form placed at?: [facility] - [phone]    Where the form was placed:  in box     What number is listed as a contact on the form?: [phone]       Additional comments: Fax to [phone]    Call taken on 11/8/2016 at 3:08 PM by Jacque Ball

## 2018-07-03 RX ORDER — SERTRALINE HYDROCHLORIDE 100 MG/1
TABLET, FILM COATED ORAL
Qty: 135 TABLET | Refills: 0 | Status: SHIPPED | OUTPATIENT
Start: 2018-07-03 | End: 2019-02-11

## 2018-07-03 NOTE — TELEPHONE ENCOUNTER
"Requested Prescriptions   Pending Prescriptions Disp Refills     sertraline (ZOLOFT) 100 MG tablet [Pharmacy Med Name: SERTRALINE  MG TABLET] 90 tablet 0     Sig: TAKE 1.5 TABLETS BY MOUTH DAILY    SSRIs Protocol Passed    7/2/2018 10:26 AM       Passed - Recent (12 mo) or future (30 days) visit within the authorizing provider's specialty    Patient had office visit in the last 12 months or has a visit in the next 30 days with authorizing provider or within the authorizing provider's specialty.  See \"Patient Info\" tab in inbasket, or \"Choose Columns\" in Meds & Orders section of the refill encounter.           Passed - Patient is age 18 or older       Passed - No active pregnancy on record       Passed - No positive pregnancy test in last 12 months        Routing refill request to provider for review/approval because:  Patient needs to be seen because:  Per 5/10/18 OV plan, pt was to f/u in 4 weeks.    Kristen Loving RN          "

## 2018-07-05 DIAGNOSIS — F41.1 GAD (GENERALIZED ANXIETY DISORDER): ICD-10-CM

## 2018-07-06 RX ORDER — SERTRALINE HYDROCHLORIDE 100 MG/1
TABLET, FILM COATED ORAL
Qty: 90 TABLET | Refills: 1 | Status: SHIPPED | OUTPATIENT
Start: 2018-07-06 | End: 2018-09-11

## 2018-07-06 NOTE — TELEPHONE ENCOUNTER
"Requested Prescriptions   Pending Prescriptions Disp Refills     sertraline (ZOLOFT) 100 MG tablet [Pharmacy Med Name: SERTRALINE  MG TABLET] 90 tablet 0     Sig: TAKE 1.5 TABLETS BY MOUTH DAILY    SSRIs Protocol Passed    7/5/2018 10:23 AM       Passed - Recent (12 mo) or future (30 days) visit within the authorizing provider's specialty    Patient had office visit in the last 12 months or has a visit in the next 30 days with authorizing provider or within the authorizing provider's specialty.  See \"Patient Info\" tab in inbasket, or \"Choose Columns\" in Meds & Orders section of the refill encounter.      АНДРЕЙ-7 SCORE 5/10/2018   Total Score 6 (mild anxiety)   Total Score 6       PHQ-9 SCORE 8/27/2013 10/1/2013 5/10/2018   Total Score 0 0 -   Total Score MyChart - - 2 (Minimal depression)   Total Score - - 2          Passed - Patient is age 18 or older       Passed - No active pregnancy on record       Passed - No positive pregnancy test in last 12 months        Prescription approved per [facility] Refill Protocol.    Hadley García, RN, BSN    "

## 2018-07-13 ENCOUNTER — TRANSFERRED RECORDS (OUTPATIENT)
Dept: HEALTH INFORMATION MANAGEMENT | Facility: CLINIC | Age: 23
End: 2018-07-13

## 2018-07-18 ENCOUNTER — HOSPITAL ENCOUNTER (OUTPATIENT)
Dept: WOUND CARE | Facility: CLINIC | Age: 23
Discharge: HOME | End: 2018-07-18
Attending: PHYSICIAN ASSISTANT | Admitting: PHYSICIAN ASSISTANT
Payer: MEDICARE

## 2018-07-18 VITALS
SYSTOLIC BLOOD PRESSURE: 126 MMHG | TEMPERATURE: 97.7 F | RESPIRATION RATE: 16 BRPM | HEART RATE: 75 BPM | DIASTOLIC BLOOD PRESSURE: 79 MMHG

## 2018-07-18 DIAGNOSIS — L89.153 DECUBITUS ULCER OF COCCYGEAL REGION, STAGE 3 (H): ICD-10-CM

## 2018-07-18 PROCEDURE — 11043 DBRDMT MUSC&/FSCA 1ST 20/<: CPT

## 2018-07-18 PROCEDURE — 11043 DBRDMT MUSC&/FSCA 1ST 20/<: CPT | Performed by: SURGERY

## 2018-07-18 NOTE — DISCHARGE INSTRUCTIONS
[facility]  [address] [facility] [address], [place] [address]  Appointment Phone [phone] Nurse Advisors [phone]  [facility] Phone:  Fax: [phone]  Dianna Cottrell      1995  Wound Dressing Change: sacral  Cleanse wound with saline or wound cleanser  Open up the sheet of Mesalt, cut a single layer of mesalt to the size of the wound, than cover wound with Mesalt   Followed by gauze and tape  Change dressing daily.  Repositioning:    Bed:  Reposition pt MINIMALLY every 1-2 hours in bed to relieve pressure and promote perfusion to tissue Head of Bed less than 30 degrees    Chair:  When up to chair pt should not sit for longer than 15-30 minutes at a time before either tiliting or returning to bed for at least 10 minutes, again to relieve pressure and promote perfusion to tissue.     o Pt should also sit on a chair cushion when up to the chair for max of 2 hours at a time       Toyin Yi PA-C. July 18, 2018    Call us at [phone] if you have any questions about your wounds, have redness or swelling around your wound, have a fever of 101 or greater or if you have any other problems or concerns. We answer the phone Monday through Friday 8 am to 4 pm, please leave a message as we check the voicemail frequently throughout the day.     Follow up with Provider - 1-2 weeks

## 2018-07-18 NOTE — MR AVS SNAPSHOT
MRN:[id number]                      After Visit Summary   7/18/2018    Dianna Cottrell    MRN: [id number]           Visit Information        Provider Department      7/18/2018  3:00 PM Toyin Yi PA-C [facility] [place]        Your next 10 appointments already scheduled     Jul 18, 2018  3:00 PM CDT   Return Visit with Toyin Yi PA-C   [facility] [place] ([facility])    [address]  [address]  [facility] [address] [phone]                Further instructions from your care team             [facility] [place]  [address] [facility] [address], [place] [address]  Appointment Phone [phone] Nurse Advisors [phone]  [facility] Phone:  Fax: [phone]  Dianna LEISA Simba      1995  Wound Dressing Change: sacral  Cleanse wound with saline or wound cleanser  Open up the sheet of Mesalt, cut a single layer of mesalt to the size of the wound, than cover wound with Mesalt   Followed by gauze and tape  Change dressing daily.  Repositioning:    Bed:  Reposition pt MINIMALLY every 1-2 hours in bed to relieve pressure and promote perfusion to tissue Head of Bed less than 30 degrees    Chair:  When up to chair pt should not sit for longer than 15-30 minutes at a time before either tiliting or returning to bed for at least 10 minutes, again to relieve pressure and promote perfusion to tissue.     o Pt should also sit on a chair cushion when up to the chair for max of 2 hours at a time       Toyin Yi PA-C. July 18, 2018    Call us at [phone] if you have any questions about your wounds, have redness or swelling around your wound, have a fever of 101 or greater or if you have any other problems or concerns. We answer the phone Monday through Friday 8 am to 4 pm, please leave a message as we check the voicemail frequently throughout the  day.     Follow up with Provider - 1-2 weeks              Care EveryWhere ID     This is your Care EveryWhere ID. This could be used by other organizations to access your [facility] medical records  [id number]        Equal Access to Services     [facility] : Sandy Leal, wajeniffer helm, qaybta kaalmada stefano, ander campuzano. So [facility] [phone].    ATENCIÓN: Si habla español, tiene a villa disposición servicios gratuitos de asistencia lingüística. Llame al [phone].    We comply with applicable federal civil rights laws and [place] laws. We do not discriminate on the basis of race, color, national origin, age, disability, sex, sexual orientation, or gender identity.

## 2018-07-19 NOTE — PROGRESS NOTES
[place] [facility]      HISTORY OF PRESENT ILLNESS: Ms. Dianna Cottrell is a 23-year-old quadriplegic woman known to our clinic for a right IT pressure ulcer that has been resolved for some time now. In the last month she has developed breakdown over her coccyx. Today it is necrotic.     DATE WOUND ACQUIRED: 6/2018     WOUND CARE: MediHoney and Mepilex     OFFLOADING: Sleeps on group 2 mattress. Just had chair mapped but was not mapped while tilted which she spends a lot of time doing.       REVIEW OF SYSTEMS:   CONSTITUTIONAL: Denies fever or chills  GI: denies nausea or vomiting      PAST MEDICAL HISTORY:  has a past medical history of Anemia; c5 burst fracture (12/18/2012); Compression fracture of L1 lumbar vertebra (H) (12/18/2012); Fracture of thoracic spine without spinal cord lesion (H) (12/18/2012); Hypertension (12/6/2016); and Vocal cord dysfunction.      MEDICATIONS:   Current Outpatient Prescriptions   Medication     acetaminophen (TYLENOL) 325 MG tablet     baclofen (LIORESAL) 10 MG tablet     BISACODYL     Cranberry 450 MG TABS     Docusate Sodium (COLACE PO)     ferrous sulfate (IRON) 325 (65 FE) MG tablet     fesoterodine fumarate (TOVIAZ) 4 MG TB24     gabapentin (NEURONTIN) 300 MG capsule     hydrOXYzine (VISTARIL) 25 MG capsule     midodrine (PROAMATINE) 1.25 mg     Multiple Vitamin (DAILY MULTIVITAMIN PO)     order for DME     order for DME     order for DME     order for DME     senna-docusate (SENOKOT-S;PERICOLACE) 8.6-50 MG per tablet     sertraline (ZOLOFT) 100 MG tablet     sertraline (ZOLOFT) 100 MG tablet     TRAZODONE HCL     levonorgestrel (MIRENA, 52 MG,) 20 MCG/24HR IUD     No current facility-administered medications for this encounter.      VITALS: /79  Pulse 75  Temp 97.7  F (36.5  C) (Tympanic)  Resp 16      PHYSICAL EXAM:  GENERAL: Patient is alert and oriented and in no acute distress  INTEGUMENTARY:   WOUND ASSESSMENT #3:     Location: coccyx                        Size: 2.0 cm x 2.0 cm with a depth of 0.6 cm    Drainage: copious amount of serosanguinous drainage    Wound description: necrotic muscle     PROCEDURE: Per standing protocol 4% topical lidocaine was applied to the wound by the [facility]. After informed consent was obtained, a surgical debridement was performed using a 15 blade down to and including necrotic muscle of <20 cm. Hemostasis was achieved with pressure. The patient tolerated the procedure well.        ASSESSMENT: Stage 3 pressure ulcer of sacrum      PLAN:   1. Dress with MeSalt and change daily  2. Recommend minimal time in chair  3. Recommend getting chair re-pressure mapped       FOLLOW-UP: 1-2 weeks      VANIA FERRER PA-C

## 2018-07-25 ENCOUNTER — MEDICAL CORRESPONDENCE (OUTPATIENT)
Dept: HEALTH INFORMATION MANAGEMENT | Facility: CLINIC | Age: 23
End: 2018-07-25

## 2018-07-31 ENCOUNTER — HOSPITAL ENCOUNTER (OUTPATIENT)
Dept: WOUND CARE | Facility: CLINIC | Age: 23
Discharge: HOME | End: 2018-07-31
Attending: PHYSICIAN ASSISTANT | Admitting: PHYSICIAN ASSISTANT
Payer: MEDICARE

## 2018-07-31 VITALS
TEMPERATURE: 98.5 F | HEART RATE: 74 BPM | SYSTOLIC BLOOD PRESSURE: 120 MMHG | DIASTOLIC BLOOD PRESSURE: 81 MMHG | RESPIRATION RATE: 18 BRPM

## 2018-07-31 DIAGNOSIS — L89.153 DECUBITUS ULCER OF COCCYGEAL REGION, STAGE 3 (H): ICD-10-CM

## 2018-07-31 PROCEDURE — 11042 DBRDMT SUBQ TIS 1ST 20SQCM/<: CPT | Performed by: PHYSICIAN ASSISTANT

## 2018-07-31 PROCEDURE — 11042 DBRDMT SUBQ TIS 1ST 20SQCM/<: CPT

## 2018-07-31 PROCEDURE — A6212 FOAM DRG <=16 SQ IN W/BORDER: HCPCS

## 2018-07-31 NOTE — MR AVS SNAPSHOT
MRN:[id number]                      After Visit Summary   7/31/2018    Dianna Cottrell    MRN: [id number]           Visit Information        Provider Department      7/31/2018  2:30 PM Toyin Yi PA-C [facility] [place]          Further instructions from your care team                 [facility]  [address] [facility] [address], [place] [address]  Appointment Phone [phone] Nurse Advisors [phone]  [facility] Phone:  Fax: [phone]  Dianna Cottrell      1995  Wound Dressing Change: sacral  Cleanse wound with saline or wound cleanser  Open up the sheet of Mesalt, cut a single layer of mesalt to the size of the wound, than cover wound with Mesalt   Followed by gauze and tape  Change dressing daily.  Repositioning:    Bed:  Reposition pt MINIMALLY every 1-2 hours in bed to relieve pressure and promote perfusion to tissue Head of Bed less than 30 degrees    Chair:  When up to chair pt should not sit for longer than 15-30 minutes at a time before either tiliting or returning to bed for at least 10 minutes, again to relieve pressure and promote perfusion to tissue.       Pt should also sit on a chair cushion when up to the chair for max of 2 hours at a time         Toyin Yi PA-C. July 31, 2018    Call us at [phone] if you have any questions about your wounds, have redness or swelling around your wound, have a fever of 101 or greater or if you have any other problems or concerns. We answer the phone Monday through Friday 8 am to 4 pm, please leave a message as we check the voicemail frequently throughout the day.      Follow up with Provider - 1-2 weeks     Kirill Hanks Phone [phone] call about wheelchair    Care EveryWhere ID     This is your Care EveryWhere ID. This could be used by other organizations to access your [place] medical records  [id number]        Equal  Access to Services     [facility] : Sandy Leal, wajeniffer helm, qaander steele. So [facility] [phone].    ATENCIÓN: Si habla español, tiene a villa disposición servicios gratuitos de asistencia lingüística. Llame al [phone].    We comply with applicable federal civil rights laws and [place] laws. We do not discriminate on the basis of race, color, national origin, age, disability, sex, sexual orientation, or gender identity.

## 2018-07-31 NOTE — DISCHARGE INSTRUCTIONS
[facility]  [address] [facility] [address], [place] [address]  Appointment Phone [phone] Nurse Advisors [phone]  [facility] Phone:  Fax: [phone]  Dianna DE LA FUENTE Simba      1995  Wound Dressing Change: sacral  Cleanse wound with saline or wound cleanser  Open up the sheet of Mesalt, cut a single layer of mesalt to the size of the wound, than cover wound with Mesalt   Followed by gauze and tape  Change dressing daily.  Repositioning:    Bed:  Reposition pt MINIMALLY every 1-2 hours in bed to relieve pressure and promote perfusion to tissue Head of Bed less than 30 degrees    Chair:  When up to chair pt should not sit for longer than 15-30 minutes at a time before either tiliting or returning to bed for at least 10 minutes, again to relieve pressure and promote perfusion to tissue.       Pt should also sit on a chair cushion when up to the chair for max of 2 hours at a time         Toyin Yi PA-C. July 31, 2018    Call us at [phone] if you have any questions about your wounds, have redness or swelling around your wound, have a fever of 101 or greater or if you have any other problems or concerns. We answer the phone Monday through Friday 8 am to 4 pm, please leave a message as we check the voicemail frequently throughout the day.      Follow up with Provider - 1-2 weeks     Kirill Hanks Phone [phone] call about wheelchair

## 2018-08-21 ENCOUNTER — HOSPITAL ENCOUNTER (OUTPATIENT)
Dept: WOUND CARE | Facility: CLINIC | Age: 23
Discharge: HOME | End: 2018-08-21
Attending: PHYSICIAN ASSISTANT | Admitting: PHYSICIAN ASSISTANT
Payer: MEDICARE

## 2018-08-21 VITALS
TEMPERATURE: 97.4 F | HEART RATE: 79 BPM | SYSTOLIC BLOOD PRESSURE: 96 MMHG | DIASTOLIC BLOOD PRESSURE: 58 MMHG | RESPIRATION RATE: 18 BRPM

## 2018-08-21 DIAGNOSIS — L89.153 DECUBITUS ULCER OF SACRAL REGION, STAGE 3 (H): ICD-10-CM

## 2018-08-21 DIAGNOSIS — L89.153 DECUBITUS ULCER OF COCCYGEAL REGION, STAGE 3 (H): ICD-10-CM

## 2018-08-21 PROCEDURE — 11042 DBRDMT SUBQ TIS 1ST 20SQCM/<: CPT

## 2018-08-21 PROCEDURE — 11042 DBRDMT SUBQ TIS 1ST 20SQCM/<: CPT | Performed by: PHYSICIAN ASSISTANT

## 2018-08-21 PROCEDURE — A6021 COLLAGEN DRESSING <=16 SQ IN: HCPCS

## 2018-08-21 NOTE — PROGRESS NOTES
[facility]      HISTORY OF PRESENT ILLNESS: Ms. Dianna Cottrell is a 23-year-old quadriplegic woman known to our clinic for a right IT pressure ulcer that has been resolved for some time now. Since June she has developed a pressure ulcer over her tailbone. We have been regularly debriding it and she has made improvement. It continues to decrease in size.   DATE WOUND ACQUIRED: 6/2018     WOUND CARE: MeSalt      OFFLOADING: Sleeps on group 2 mattress. Just had chair mapped but was not mapped while tilted which she spends a lot of time doing.       REVIEW OF SYSTEMS:   CONSTITUTIONAL: Denies fever or chills  GI: denies nausea or vomiting      PAST MEDICAL HISTORY:  has a past medical history of Anemia; c5 burst fracture (12/18/2012); Compression fracture of L1 lumbar vertebra (H) (12/18/2012); Fracture of thoracic spine without spinal cord lesion (H) (12/18/2012); Hypertension (12/6/2016); and Vocal cord dysfunction.    SOCIAL HISTORY: has PCA help, has two kids, hoping to do public speaking in the future      MEDICATIONS:   Current Outpatient Prescriptions   Medication     acetaminophen (TYLENOL) 325 MG tablet     baclofen (LIORESAL) 10 MG tablet     BISACODYL     Cranberry 450 MG TABS     Docusate Sodium (COLACE PO)     ferrous sulfate (IRON) 325 (65 FE) MG tablet     fesoterodine fumarate (TOVIAZ) 4 MG TB24     gabapentin (NEURONTIN) 300 MG capsule     hydrOXYzine (VISTARIL) 25 MG capsule     levonorgestrel (MIRENA, 52 MG,) 20 MCG/24HR IUD     midodrine (PROAMATINE) 1.25 mg     Multiple Vitamin (DAILY MULTIVITAMIN PO)     order for DME     order for DME     order for DME     order for DME     senna-docusate (SENOKOT-S;PERICOLACE) 8.6-50 MG per tablet     sertraline (ZOLOFT) 100 MG tablet     sertraline (ZOLOFT) 100 MG tablet     TRAZODONE HCL     No current facility-administered medications for this encounter.      VITALS: BP 96/58  Pulse 79  Temp 97.4  F (36.3  C)  Resp 18      PHYSICAL  EXAM:  GENERAL: Patient is alert and oriented and in no acute distress  INTEGUMENTARY:   WOUND ASSESSMENT #3:     Location: coccyx                       Size: 1.0 cm x 1.1 cm with a depth of 0.8 cm with 1 cm of undermining    Drainage: copious amount of serosanguinous drainage    Wound description: slough overlying granulation tissue         PROCEDURE: Per standing protocol 4% topical lidocaine was applied to the wound by the [facility]. After informed consent was obtained, a surgical debridement was performed using a sharp curette down to and including subcutaneous tissue of <20 cm. Hemostasis was achieved with pressure. The patient tolerated the procedure well.        ASSESSMENT: Stage 3 pressure ulcer of sacrum      PLAN:   1. Dress with Endoform and change daily  2. Recommend minimal time in chair  3. Recommend getting chair re-pressure mapped       FOLLOW-UP: 2 weeks      VANIA FERRER PA-C

## 2018-08-21 NOTE — DISCHARGE INSTRUCTIONS
[facility]  [address] [facility] [address], [place] [address]  Appointment Phone [phone] Nurse Advisors [phone]  [facility] Phone:  Fax: [phone]  Dianna DE LA FUENTE Simba      1995  Wound Dressing Change: sacral  Cleanse wound with saline or wound cleanser  Lightly pack wound with Endoform to size of wound and undermining.  Followed by gauze and tape  Change dressing daily.  Repositioning:    Bed:  Reposition pt MINIMALLY every 1-2 hours in bed to relieve pressure and promote perfusion to tissue Head of Bed less than 30 degrees    Chair:  When up to chair pt should not sit for longer than 15-30 minutes at a time before either tiliting or returning to bed for at least 10 minutes, again to relieve pressure and promote perfusion to tissue.       Pt should also sit on a chair cushion when up to the chair for max of 2 hours at a time           Toyin Yi PA-C.August 21, 2018    Call us at [phone] if you have any questions about your wounds, have redness or swelling around your wound, have a fever of 101 or greater or if you have any other problems or concerns. We answer the phone Monday through Friday 8 am to 4 pm, please leave a message as we check the voicemail frequently throughout the day.       Follow up with Provider - 1-2 weeks     Kirill Hanks Phone [phone] call about wheelchair

## 2018-08-21 NOTE — MR AVS SNAPSHOT
MRN:[id number]                      After Visit Summary   8/21/2018    Dianna Cottrell    MRN: [id number]           Visit Information        Provider Department      8/21/2018  2:30 PM Toyin Yi PA-C [facility] [place]          Further instructions from your care team              [facility]  [address] [facility] [address], [place] [address]  Appointment Phone [phone] Nurse Advisors [phone]  [facility] Phone:  Fax: [phone]  Dianna Cottrell      1995  Wound Dressing Change: sacral  Cleanse wound with saline or wound cleanser  Lightly pack wound with Endoform to size of wound and undermining.  Followed by gauze and tape  Change dressing daily.  Repositioning:    Bed:  Reposition pt MINIMALLY every 1-2 hours in bed to relieve pressure and promote perfusion to tissue Head of Bed less than 30 degrees    Chair:  When up to chair pt should not sit for longer than 15-30 minutes at a time before either tiliting or returning to bed for at least 10 minutes, again to relieve pressure and promote perfusion to tissue.       Pt should also sit on a chair cushion when up to the chair for max of 2 hours at a time           Toyin Yi PA-C.August 21, 2018    Call us at [phone] if you have any questions about your wounds, have redness or swelling around your wound, have a fever of 101 or greater or if you have any other problems or concerns. We answer the phone Monday through Friday 8 am to 4 pm, please leave a message as we check the voicemail frequently throughout the day.       Follow up with Provider - 1-2 weeks     Kirill Hanks Phone [phone] call about wheelchair      Care EveryWhere ID     This is your Care EveryWhere ID. This could be used by other organizations to access your [place] medical records  [id number]        Equal Access to Services     OVI HILL: Sandy  robin Leal, barbara helm, alonso agarwal, ander campuzano. So [facility] [phone].    ATENCIÓN: Si habla español, tiene a villa disposición servicios gratuitos de asistencia lingüística. Llame al [phone].    We comply with applicable federal civil rights laws and [place] laws. We do not discriminate on the basis of race, color, national origin, age, disability, sex, sexual orientation, or gender identity.

## 2018-09-11 ENCOUNTER — HOSPITAL ENCOUNTER (OUTPATIENT)
Dept: WOUND CARE | Facility: CLINIC | Age: 23
Discharge: HOME | End: 2018-09-11
Attending: PHYSICIAN ASSISTANT | Admitting: PHYSICIAN ASSISTANT
Payer: MEDICARE

## 2018-09-11 VITALS
SYSTOLIC BLOOD PRESSURE: 82 MMHG | RESPIRATION RATE: 18 BRPM | DIASTOLIC BLOOD PRESSURE: 52 MMHG | TEMPERATURE: 97.7 F | HEART RATE: 96 BPM

## 2018-09-11 DIAGNOSIS — L89.153 DECUBITUS ULCER OF COCCYGEAL REGION, STAGE 3 (H): ICD-10-CM

## 2018-09-11 PROCEDURE — A6021 COLLAGEN DRESSING <=16 SQ IN: HCPCS

## 2018-09-11 PROCEDURE — 11042 DBRDMT SUBQ TIS 1ST 20SQCM/<: CPT

## 2018-09-11 PROCEDURE — 11042 DBRDMT SUBQ TIS 1ST 20SQCM/<: CPT | Performed by: PHYSICIAN ASSISTANT

## 2018-09-11 NOTE — MR AVS SNAPSHOT
MRN:[id number]                      After Visit Summary   9/11/2018    Dianna Cottrell    MRN: [id number]           Visit Information        Provider Department      9/11/2018  2:15 PM Toyin Yi PA-C [facility] [place]          Further instructions from your care team              [facility]  [address] [facility] [address], [place] [address]  Appointment Phone [phone] Nurse Advisors [phone]  [facility] Phone:  Fax: [phone]  Dianna Cottrell      1995  Wound Dressing Change: sacral  Cleanse wound with saline or wound cleanser  Lightly pack wound with Endoform to size of wound and undermining.  Followed by gauze and tape  Change dressing daily.  Repositioning:    Bed:  Reposition pt MINIMALLY every 1-2 hours in bed to relieve pressure and promote perfusion to tissue Head of Bed less than 30 degrees    Chair:  When up to chair pt should not sit for longer than 15-30 minutes at a time before either tiliting or returning to bed for at least 10 minutes, again to relieve pressure and promote perfusion to tissue.       Pt should also sit on a chair cushion when up to the chair for max of 2 hours at a time           Toyin Yi PA-C.September 11, 2018     Call us at [phone] if you have any questions about your wounds, have redness or swelling around your wound, have a fever of 101 or greater or if you have any other problems or concerns. We answer the phone Monday through Friday 8 am to 4 pm, please leave a message as we check the voicemail frequently throughout the day.       Follow up with Provider - 1-2 weeks     Kirill Hanks Phone [phone] call about wheelchair      Care EveryWhere ID     This is your Care EveryWhere ID. This could be used by other organizations to access your [place] medical records  [id number]        Equal Access to Services     OVI SU AH:  Hadii robin Leal, wapatoda lumichaeladaha, qacathleenta kaalmada stefano, ander campuzano. So [facility] [phone].    ATENCIÓN: Si habla español, tiene a villa disposición servicios gratuitos de asistencia lingüística. Llame al [phone].    We comply with applicable federal civil rights laws and [place] laws. We do not discriminate on the basis of race, color, national origin, age, disability, sex, sexual orientation, or gender identity.

## 2018-09-11 NOTE — DISCHARGE INSTRUCTIONS
[facility]  [address] [facility] [address], [place] [address]  Appointment Phone [phone] Nurse Advisors [phone]  [facility] Phone:  Fax: [phone]  Dianna DE LA FUENTE Simba      1995  Wound Dressing Change: sacral  Cleanse wound with saline or wound cleanser  Lightly pack wound with Endoform to size of wound and undermining.  Followed by gauze and tape  Change dressing daily.  Repositioning:    Bed:  Reposition pt MINIMALLY every 1-2 hours in bed to relieve pressure and promote perfusion to tissue Head of Bed less than 30 degrees    Chair:  When up to chair pt should not sit for longer than 15-30 minutes at a time before either tiliting or returning to bed for at least 10 minutes, again to relieve pressure and promote perfusion to tissue.       Pt should also sit on a chair cushion when up to the chair for max of 2 hours at a time           Toyin Yi PA-C.September 11, 2018     Call us at [phone] if you have any questions about your wounds, have redness or swelling around your wound, have a fever of 101 or greater or if you have any other problems or concerns. We answer the phone Monday through Friday 8 am to 4 pm, please leave a message as we check the voicemail frequently throughout the day.       Follow up with Provider - 1-2 weeks     Kirill Hanks Phone [phone] call about wheelchair

## 2018-09-13 NOTE — PROGRESS NOTES
[facility]      HISTORY OF PRESENT ILLNESS: Ms. Dianna Cottrell is a 23-year-old quadriplegic woman known to our clinic for a right IT pressure ulcer that has been resolved for some time now. Since June she has developed a pressure ulcer over her tailbone. We have been regularly debriding it and she has made improvement. It continues to decrease in size.     DATE WOUND ACQUIRED: 6/2018     WOUND CARE: MeSalt      OFFLOADING: Sleeps on group 2 mattress. Just had chair mapped but was not mapped while tilted which she spends a lot of time doing.       PAST MEDICAL HISTORY:  has a past medical history of Anemia; c5 burst fracture (12/18/2012); Compression fracture of L1 lumbar vertebra (H) (12/18/2012); Fracture of thoracic spine without spinal cord lesion (H) (12/18/2012); Hypertension (12/6/2016); and Vocal cord dysfunction.    SOCIAL HISTORY: has PCA help, has two kids, hoping to do public speaking in the future      MEDICATIONS: reviewed, see med rec for up to date list, negative for anticoagulants, negative for immunosuppressants, negative for NSAIDS     VITALS: BP (!) 82/52  Pulse 96  Temp 97.7  F (36.5  C) (Temporal)  Resp 18      PHYSICAL EXAM:  GENERAL: Patient is alert and oriented and in no acute distress  INTEGUMENTARY:   WOUND ASSESSMENT #3:     Location: coccyx                       Size: 0.5 cm x 0.8 cm with a depth of 0.3 cm     Drainage: copious amount of serosanguinous drainage    Wound description: slough overlying granulation tissue         PROCEDURE: Per standing protocol 4% topical lidocaine was applied to the wound by the [facility]. After informed consent was obtained, a surgical debridement was performed using a sharp curette down to and including subcutaneous tissue of <20 cm. Hemostasis was achieved with pressure. The patient tolerated the procedure well.        ASSESSMENT: Stage 3 pressure ulcer of sacrum      PLAN:   1. Dress with Endoform and change daily  2. Recommend  minimal time in chair  3. Recommend getting chair re-pressure mapped       FOLLOW-UP: 2 weeks      VANIA FERRER PA-C

## 2018-10-02 ENCOUNTER — HOSPITAL ENCOUNTER (OUTPATIENT)
Dept: WOUND CARE | Facility: CLINIC | Age: 23
Discharge: HOME | End: 2018-10-02
Attending: PHYSICIAN ASSISTANT | Admitting: PHYSICIAN ASSISTANT
Payer: MEDICARE

## 2018-10-02 VITALS
RESPIRATION RATE: 18 BRPM | SYSTOLIC BLOOD PRESSURE: 66 MMHG | HEART RATE: 98 BPM | TEMPERATURE: 97.5 F | DIASTOLIC BLOOD PRESSURE: 44 MMHG

## 2018-10-02 DIAGNOSIS — L89.153 DECUBITUS ULCER OF COCCYGEAL REGION, STAGE 3 (H): ICD-10-CM

## 2018-10-02 PROCEDURE — G0463 HOSPITAL OUTPT CLINIC VISIT: HCPCS

## 2018-10-02 PROCEDURE — 99213 OFFICE O/P EST LOW 20 MIN: CPT | Performed by: PHYSICIAN ASSISTANT

## 2018-10-02 NOTE — DISCHARGE INSTRUCTIONS
.      [facility]  [address] [facility] [address], [place] [address]  Appointment Phone [phone] Nurse Advisors [phone]  [facility] Phone:  Fax: [phone]    Dianna LEISA Simba      1995  Your wound is Healed!! Dressings are no longer required.        Toyin Yi PA-C. October 2, 2018    Call us at [phone] if you have any questions about your wounds, have redness or swelling around your wound, have a fever of 101 or greater or if you have any other problems or concerns. We answer the phone Monday through Friday 8 am to 4 pm, please leave a message as we check the voicemail frequently throughout the day.     Follow up with Provider - as needed

## 2018-10-02 NOTE — PROGRESS NOTES
Patient arrived for wound care visit. Certified Wound Care Nurse time spent evaluating patient record, completed a full evaluation and documented healed wound(s) & adonay-wound skin; provided recommendation based on treatment plan. Applied dressing, reviewed discharge instructions, patient education, and discussed plan of care with appropriate medical team staff members and patient and/or family members.

## 2018-10-02 NOTE — MR AVS SNAPSHOT
MRN:[id number]                      After Visit Summary   10/2/2018    Dianna Cottrell    MRN: [id number]           Visit Information        Provider Department      10/2/2018  2:15 PM Toyin Yi PA-C [facility] [place]          Further instructions from your care team       .      [facility]  [address], [place] [address]  Appointment Phone [phone] Nurse Advisors [phone]  [facility] Phone:  Fax: [phone]    Dianna Cottrell      1995  Your wound is Healed!! Dressings are no longer required.        Toyin Yi PA-C. October 2, 2018    Call us at [phone] if you have any questions about your wounds, have redness or swelling around your wound, have a fever of 101 or greater or if you have any other problems or concerns. We answer the phone Monday through Friday 8 am to 4 pm, please leave a message as we check the voicemail frequently throughout the day.     Follow up with Provider - as needed       Care EveryWhere ID     This is your Care EveryWhere ID. This could be used by other organizations to access your [place] medical records  [id number]        Equal Access to Services     OVI SU : Sandy browno Somianali, waaxda luqadaha, qaybta kaalmada adeegyada, ander campuzano. So [facility] [phone].    ATENCIÓN: Si habla español, tiene a villa disposición servicios gratuitos de asistencia lingüística. Llcole al [phone].    We comply with applicable federal civil rights laws and [place] laws. We do not discriminate on the basis of race, color, national origin, age, disability, sex, sexual orientation, or gender identity.

## 2018-10-02 NOTE — PROGRESS NOTES
[facility]      HISTORY OF PRESENT ILLNESS: Ms. Dianna Cottrell is a 23-year-old quadriplegic woman known to our clinic for a right IT pressure ulcer that has been resolved for some time now. Since June she has developed a pressure ulcer over her tailbone. We have treated this with MeSalt and then most recently Endoform. Today it appears completely resolved.     DATE WOUND ACQUIRED: 6/2018     WOUND CARE: Endoform     OFFLOADING: Sleeps on group 2 mattress. Just had chair mapped but was not mapped while tilted which she spends a lot of time doing.     REVIEW OF SYSTEMS:   CONSTITUTIONAL: Denies fever or chills  GI: denies nausea or vomiting      PAST MEDICAL HISTORY:  has a past medical history of Anemia; c5 burst fracture (12/18/2012); Compression fracture of L1 lumbar vertebra (H) (12/18/2012); Fracture of thoracic spine without spinal cord lesion (H) (12/18/2012); Hypertension (12/6/2016); and Vocal cord dysfunction.    SOCIAL HISTORY: has PCA help, has two kids, hoping to do public speaking in the future      MEDICATIONS: reviewed, see med rec for up to date list, negative for anticoagulants, negative for immunosuppressants, negative for NSAIDS     VITALS: BP (!) 66/44  Pulse 98  Temp 97.5  F (36.4  C) (Temporal)  Resp 18      PHYSICAL EXAM:  GENERAL: Patient is alert and oriented and in no acute distress  INTEGUMENTARY: coccyx now well healed         ASSESSMENT: resolved, stage 3 pressure ulcer of sacrum      PLAN:   1. Recommend getting chair re-pressure mapped       FOLLOW-UP: AUSTIN AGUILAR PA-C

## 2018-10-25 ENCOUNTER — TELEPHONE (OUTPATIENT)
Dept: FAMILY MEDICINE | Facility: CLINIC | Age: 23
End: 2018-10-25

## 2018-10-25 NOTE — TELEPHONE ENCOUNTER
Reason for Call:  Form, our goal is to have forms completed with 72 hours, however, some forms may require a visit or additional information.    Type of letter, form or note:  medical    Who is the form from?: Home care    Where did the form come from: form was faxed in    What clinic location was the form placed at?: [facility] - [phone]    Where the form was placed: [facility]    What number is listed as a contact on the form?: [phone]       Additional comments: please fax completed form to [phone]    Call taken on 10/25/2018 at 6:21 PM by Tomasa Hand

## 2018-10-26 ENCOUNTER — TELEPHONE (OUTPATIENT)
Dept: FAMILY MEDICINE | Facility: CLINIC | Age: 23
End: 2018-10-26

## 2018-10-26 NOTE — TELEPHONE ENCOUNTER
Reason for Call:  Form, our goal is to have forms completed with 72 hours, however, some forms may require a visit or additional information.     Type of letter, form or note:  medical     Who is the form from?: Home care     Where did the form come from: form was faxed in     What clinic location was the form placed at?: [facility] - [phone]     Where the form was placed: 's Box     What number is listed as a contact on the form?: [phone]     Additional comments: please fax completed form to [phone]

## 2019-01-27 DIAGNOSIS — F41.1 GAD (GENERALIZED ANXIETY DISORDER): ICD-10-CM

## 2019-01-29 RX ORDER — SERTRALINE HYDROCHLORIDE 100 MG/1
TABLET, FILM COATED ORAL
Qty: 45 TABLET | Refills: 0 | Status: SHIPPED | OUTPATIENT
Start: 2019-01-29 | End: 2019-02-11

## 2019-01-29 NOTE — TELEPHONE ENCOUNTER
Zoloft  Routing refill request to provider for review/approval because:  Appears to be a break in medication - should have been out around 10/2018    Suzan Malagon, RN, BSN

## 2019-02-07 NOTE — PROGRESS NOTES
"  SUBJECTIVE:   Dianna Cottrell is a 23 year old female who presents to clinic today for the following health issues:      HPI   Depression and Anxiety Follow-Up- sertraline- doing well.     Status since last visit: stable     Other associated symptoms:None    Complicating factors:     Significant life event: No     Current substance abuse:  Marijuana- once daily. Thinks helps with spasticity. Wants to talk with PMR MD about it. Sometimes helps anxiety and sometimes makes it worse.  Thinks dose good.  No side effects    Has been feeling good- mood good. A lot of it has to do with not being in a relationship. That has been stressful in the past. Hector's father is in \"treatment\" and has mental health problems. She does not want to be in a relationship and is happy with having boundaries.   Sleep good.     Wants to go and volunteer- speaking engagements to tell her story.   Has some contacts with [facility] and [place].     Having problems trying to find PCAs.  Has live-in [facility] for the children- ages 6yo and 1yo (son)- due to her physical limitations. Current  put in her notice and she is in the process of finding someone new.   Son is in  now- he is high energy. That has helped her mood - he is doing well and it provides some structure for him, relief for her.  John is in - doing well.     Has been having diarrhea - for 4 days.  One episode per day w/her suppository at night.   No vomiting. No fevers. No recent abx.  Her son has had diarrhea also but more solid today than days prior; daughter w/vomitig and diarrhea; and mother has same sx now too after care taking for everyone.      Seeing urologist still.   Has RNs that come daily and urine has been checked and they correspond w/urology with concerns.   Spasticity gets worse w/UTIs. No fevers/chills.     Flu shot-  Yes up to date.   Pneumonia shots UTD    Due to see her PMR MD. Overdue- rescheduled due to weather. Annual visits. "     Pt states no active pressure sores    PHQ 5/10/2018   PHQ-9 Total Score 2   Q9: Suicide Ideation Not at all     АНДРЕЙ-7 SCORE 5/10/2018   Total Score 6 (mild anxiety)   Total Score 6       PHQ-9  English  PHQ-9   Any Language  АНДРЕЙ-7  Suicide Assessment Five-step Evaluation and Treatment (SAFE-T)  Problem list and histories reviewed & adjusted, as indicated.  Additional history: as documented    Patient Active Problem List   Diagnosis     Paraplegia following spinal cord injury (H)     c5 burst fracture     DVT complicating pregnancy     Lesions of vulva     IUD (intrauterine device) in place- placed 2017     H/O: pneumonia     АНДРЕЙ (generalized anxiety disorder)     Past Surgical History:   Procedure Laterality Date     C4-C7 interbody fusion with anterior screw and plate fixation and posterior erna and pedicle screw fixation with interspace bone graft and C5 and C6 partial corpectomies  2012      SECTION  2013    Procedure:  SECTION;   Section ;  Surgeon: Ricki Nelson MD;  Location: [facility]      SECTION N/A 2016    Procedure:  SECTION;  Surgeon: Floridalma Kiran MD;  Location: [facility]     IR IVC FILTER PLACEMENT  2012     LUMBAR DRAIN  2012       Social History     Tobacco Use     Smoking status: Former Smoker     Packs/day: 0.50     Types: Cigarettes     Smokeless tobacco: Never Used     Tobacco comment: was social smoker    Substance Use Topics     Alcohol use: No     Alcohol/week: 0.0 oz     Frequency: Never     Family History   Problem Relation Age of Onset     Hypertension No family hx of      Diabetes No family hx of          Current Outpatient Medications   Medication Sig Dispense Refill     baclofen (LIORESAL) 10 MG tablet Take 10 mg by mouth 3 times daily.       BISACODYL 1 suppository daily        Cranberry 450 MG TABS Take 450 mg by mouth daily.       Docusate Sodium (COLACE PO) Take by mouth daily.        gabapentin  "(NEURONTIN) 300 MG capsule Take 300 mg by mouth daily        hydrOXYzine (VISTARIL) 25 MG capsule Take 1 capsule (25 mg) by mouth 3 times daily as needed for itching or anxiety 20 capsule 0     midodrine (PROAMATINE) 1.25 mg Take 2.5 mg by mouth 3 times daily.       Multiple Vitamin (DAILY MULTIVITAMIN PO) Take 2 tablets by mouth daily       order for DME Equipment being ordered: Pressure Mapping of wheelchair at [facility] Phone [phone] Fax [phone] 1 Device 0     order for DME [facility] [facility] Phone [phone] Fax [phone]    Primary Dressing Medihoney GEl   Qty not needed  Secondary Dressing 2x2 gauze loaf Qty 1  Secondary Dressing abd pad Qty 10  Secondary Dressing 2'medipore tape Qty  1  Length of Need: 1 month  Frequency of dressing change: daily    Frequency of dressing change: QOD 30 days 0     order for DME Equipment being ordered: Wheelchair-  \"Patient continues to need and use daily her power wheelchair and the wheelchair will continue to need repairs for the next 12 months.\" 1 each 0     order for DME Equipment being ordered: [facility] Fax [phone]    Wheelchair seating eval and mapping of current cushion 30 days 0     senna-docusate (SENOKOT-S;PERICOLACE) 8.6-50 MG per tablet Take 1-2 tablets by mouth 2 times daily as needed for constipation 40 tablet 0     sertraline (ZOLOFT) 100 MG tablet TAKE 1.5 TABLETS BY MOUTH ONCE DAILY 45 tablet 0     sertraline (ZOLOFT) 100 MG tablet TAKE 1.5 TABLETS BY MOUTH DAILY 135 tablet 0     acetaminophen (TYLENOL) 325 MG tablet Take 325-650 mg by mouth every 6 hours as needed.       ferrous sulfate (IRON) 325 (65 FE) MG tablet TAKE 1 TABLET BY MOUTH TWICE A DAY WITH MEALS (Patient not taking: Reported on 2/11/2019) 60 tablet 1     TRAZODONE HCL 1 tablet. PRN       Allergies   Allergen Reactions     Succinylcholine      Spinal cord injury 12/18/12, patient at risk for extrajunctional receptors and hyperkalemia     BP Readings from Last " "3 Encounters:   02/11/19 106/70   10/02/18 (!) 66/44   09/11/18 (!) 82/52    Wt Readings from Last 3 Encounters:   12/13/17 47.6 kg (105 lb)   12/20/16 44.6 kg (98 lb 6.4 oz)   12/06/16 50.1 kg (110 lb 8 oz)                  Labs reviewed in EPIC    ROS:  CONSTITUTIONAL: NEGATIVE for fever, chills;+ change in weight gain- wondering if mirena but \"I also snack at night\".  INTEGUMENTARY/SKIN: NEGATIVE for worrisome rashes, moles or lesions- no active sores or wounds  EYES: NEGATIVE for vision changes or irritation  ENT/MOUTH: NEGATIVE for ear, mouth and throat problems  RESP: NEGATIVE for significant cough or SOB  BREAST: NEGATIVE for masses, tenderness or discharge  CV: NEGATIVE for chest pain, palpitations or peripheral edema  GI: as above  : as above  MUSCULOSKELETAL: no concerns, not currently in any OT/PT. Does have some improved strength in arms attributes to her kids and trying to do more with them.  NEURO: NEGATIVE for weakness, dizziness   ENDOCRINE: NEGATIVE for temperature intolerance, skin/hair changes  HEME: NEGATIVE for bleeding problems  PSYCHIATRIC: NEGATIVE for changes in mood or affect      OBJECTIVE:     /70   Pulse 68   Temp 99.4  F (37.4  C) (Oral)   Resp 16   SpO2 97%   Breastfeeding? No   There is no height or weight on file to calculate BMI.  GENERAL: thin, well appearing, alert and no distress  EYES: Eyes grossly normal to inspection, PERRL and conjunctivae and sclerae normal  HENT: ear canals and TM's normal, nose and mouth without ulcers or lesions  NECK: no adenopathy, no asymmetry, masses, or scars and thyroid normal to palpation  RESP: lungs clear to auscultation - no rales, rhonchi or wheezes  CV: regular rate and rhythm, normal S1 S2, no S3 or S4, no murmur, click or rub  ABDOMEN: soft, nontender, no masses and bowel sounds normal; muscle spasm noted after palpation  MS: atrophy of lower extremities; pedal pulses normal, no edema  SKIN: no suspicious lesions or " rashes  NEURO: atrophy of extremities, some limited ROM of upper extremities; mentation intact and speech normal  PSYCH: mentation appears normal, affect normal/bright, good eye contact, conversant. Neatly groomed. No SI/HI. No agitation.     Diagnostic Test Results:  none     ASSESSMENT/PLAN:     1. АНДРЕЙ (generalized anxiety disorder)  2. Major depressive disorder with single episode, in partial remission (H)  Doing well on present dose. Refilled x 6mo  Continue with therapist prn  Advised cessation of marijuana use  Continue w/self cares  - sertraline (ZOLOFT) 100 MG tablet; Take 1.5 tablets (150 mg) by mouth daily  Dispense: 135 tablet; Refill: 1    3. Quadriplegia, post-traumatic (H)- incomplete quad, limited use upper extremities  Continue with PMR MD and team    Follow Up: 6mo  Due for GYN exam 09/2019  The patient was instructed to contact clinic for worsening symptoms, non-improvement as expected/discussed, and for questions regarding medications or treatment plan. Discussed parameters for follow up and included in After Visit Summary given to patient.      Suzan Willis PA-C  [facility]

## 2019-02-11 ENCOUNTER — OFFICE VISIT (OUTPATIENT)
Dept: FAMILY MEDICINE | Facility: CLINIC | Age: 24
End: 2019-02-11
Payer: MEDICARE

## 2019-02-11 VITALS
OXYGEN SATURATION: 97 % | DIASTOLIC BLOOD PRESSURE: 70 MMHG | HEART RATE: 68 BPM | SYSTOLIC BLOOD PRESSURE: 106 MMHG | TEMPERATURE: 99.4 F | RESPIRATION RATE: 16 BRPM

## 2019-02-11 DIAGNOSIS — F32.4 MAJOR DEPRESSIVE DISORDER WITH SINGLE EPISODE, IN PARTIAL REMISSION (H): ICD-10-CM

## 2019-02-11 DIAGNOSIS — S14.109S QUADRIPLEGIA, POST-TRAUMATIC (H): ICD-10-CM

## 2019-02-11 DIAGNOSIS — G82.50 QUADRIPLEGIA, POST-TRAUMATIC (H): ICD-10-CM

## 2019-02-11 DIAGNOSIS — F41.1 GAD (GENERALIZED ANXIETY DISORDER): Primary | ICD-10-CM

## 2019-02-11 PROCEDURE — 99214 OFFICE O/P EST MOD 30 MIN: CPT | Performed by: PHYSICIAN ASSISTANT

## 2019-02-11 RX ORDER — SERTRALINE HYDROCHLORIDE 100 MG/1
150 TABLET, FILM COATED ORAL DAILY
Qty: 135 TABLET | Refills: 1 | Status: SHIPPED | OUTPATIENT
Start: 2019-02-11 | End: 2019-11-19

## 2019-02-11 SDOH — HEALTH STABILITY: MENTAL HEALTH: HOW OFTEN DO YOU HAVE A DRINK CONTAINING ALCOHOL?: NEVER

## 2019-02-11 ASSESSMENT — PAIN SCALES - GENERAL: PAINLEVEL: NO PAIN (0)

## 2019-02-11 NOTE — PATIENT INSTRUCTIONS
Refilled the sertraline at the same dose  Follow up in 6 months    You will be due to see GYN at end of September 2019     Talk with PMR doctor about exercises/options and the questions about spasticity medical/marijuana if any indication/prescribers

## 2019-02-12 ASSESSMENT — ANXIETY QUESTIONNAIRES
6. BECOMING EASILY ANNOYED OR IRRITABLE: NOT AT ALL
2. NOT BEING ABLE TO STOP OR CONTROL WORRYING: SEVERAL DAYS
5. BEING SO RESTLESS THAT IT IS HARD TO SIT STILL: NOT AT ALL
IF YOU CHECKED OFF ANY PROBLEMS ON THIS QUESTIONNAIRE, HOW DIFFICULT HAVE THESE PROBLEMS MADE IT FOR YOU TO DO YOUR WORK, TAKE CARE OF THINGS AT HOME, OR GET ALONG WITH OTHER PEOPLE: NOT DIFFICULT AT ALL
3. WORRYING TOO MUCH ABOUT DIFFERENT THINGS: SEVERAL DAYS
GAD7 TOTAL SCORE: 3
1. FEELING NERVOUS, ANXIOUS, OR ON EDGE: SEVERAL DAYS
7. FEELING AFRAID AS IF SOMETHING AWFUL MIGHT HAPPEN: NOT AT ALL

## 2019-02-12 ASSESSMENT — PATIENT HEALTH QUESTIONNAIRE - PHQ9
5. POOR APPETITE OR OVEREATING: NOT AT ALL
SUM OF ALL RESPONSES TO PHQ QUESTIONS 1-9: 4

## 2019-02-13 ASSESSMENT — ANXIETY QUESTIONNAIRES: GAD7 TOTAL SCORE: 3

## 2019-04-01 ENCOUNTER — TELEPHONE (OUTPATIENT)
Dept: FAMILY MEDICINE | Facility: CLINIC | Age: 24
End: 2019-04-01

## 2019-04-01 NOTE — TELEPHONE ENCOUNTER
Reason for call:  Form  Reason for Call:  Form, our goal is to have forms completed with 72 hours, however, some forms may require a visit or additional information.    Type of letter, form or note:  Medical    Who is the form from?: [facility]      Where did the form come from: form was faxed in    What clinic location was the form placed at?: [facility] - [phone]    Where the form was placed: 's in box     What number is listed as a contact on the form?: [phone]       Additional comments: Fax back to [phone]    Call taken on 4/1/2019 at 3:07 PM by Vasquez Mcmillan

## 2019-04-04 ENCOUNTER — TELEPHONE (OUTPATIENT)
Dept: FAMILY MEDICINE | Facility: CLINIC | Age: 24
End: 2019-04-04

## 2019-04-04 NOTE — TELEPHONE ENCOUNTER
Please contact pt- I would like her to be seen due to duration of sx and her comorbidities. I am not comfortable ordering the UA. She does need to establish with a new urologist also. Suzan Willis PA-C

## 2019-04-04 NOTE — TELEPHONE ENCOUNTER
"Dianna Cottrell is a 24 year old female. I spoke with [facility]eline from [facility].    NURSING ASSESSMENT:  Description: \"She just gets the feeling that she has a UTI\" along with strong smelling urine and cloudiness.   Onset/duration: Over the weekend  Precip. factors: Chronic UTI's - they just found out that her urologist retired and she has not established with someone new. Patient is a quadriplegic   Associated symptoms:  Afebrile. She is voided between straight caths as well. RN noticed white discharge last weekend  Improves/worsens symptoms:  Cranberry juice and fluids  Last exam/Treatment: on file  Allergies:   Allergies   Allergen Reactions     Succinylcholine      Spinal cord injury 12/18/12, patient at risk for extrajunctional receptors and hyperkalemia       RECOMMENDED DISPOSITION: I recommend she be seen. Jackeline reports that her urologist previously ordered UA's for her. Patient does not have means to get in to be seen. Will have PCP review.   Will comply with recommendation:   If further questions/concerns or if Sx do not improve, worsen or new Sx develop, call your PCP or [facility] as soon as possible.    NOTES:  Disposition was determined by the first positive assessment question, therefore all previous assessment questions were negative.     Guideline used:  Telephone Triage Protocols for Nurses, Fifth Edition, Katty Malagon, RN, BSN      "

## 2019-04-04 NOTE — TELEPHONE ENCOUNTER
Clarified with Jackeline at [facility].  Request for orders for UA and UC.  Please fax to [phone] asap.            Reason for Call: Request for an order or referral:     Order or referral being requested: UA     Date needed: as soon as possible     Has the patient been seen by the PCP for this problem? YES     Additional comments: none     Phone number Patient can be reached at:  Other phone number:  Jackeline at [phone]     Best Time:  now     Can we leave a detailed message on this number?  YES     Call taken on 4/4/2019 at 1:54 PM by Vasquez Mcmillan

## 2019-04-04 NOTE — TELEPHONE ENCOUNTER
Spoke with home care Jackeline.  She will let patient know and help her schedule an appointment and try to find a new urologist.    Meño Lin, RN, BSN

## 2019-04-22 DIAGNOSIS — F41.1 GAD (GENERALIZED ANXIETY DISORDER): ICD-10-CM

## 2019-04-22 RX ORDER — HYDROXYZINE PAMOATE 25 MG/1
25 CAPSULE ORAL 3 TIMES DAILY PRN
Qty: 20 CAPSULE | Refills: 0 | Status: SHIPPED | OUTPATIENT
Start: 2019-04-22 | End: 2020-09-03

## 2019-05-13 ENCOUNTER — TRANSFERRED RECORDS (OUTPATIENT)
Dept: HEALTH INFORMATION MANAGEMENT | Facility: CLINIC | Age: 24
End: 2019-05-13

## 2019-05-20 ENCOUNTER — TRANSFERRED RECORDS (OUTPATIENT)
Dept: HEALTH INFORMATION MANAGEMENT | Facility: CLINIC | Age: 24
End: 2019-05-20

## 2019-05-24 ENCOUNTER — TELEPHONE (OUTPATIENT)
Dept: FAMILY MEDICINE | Facility: CLINIC | Age: 24
End: 2019-05-24

## 2019-05-24 NOTE — TELEPHONE ENCOUNTER
Form needs to be signed by MD.  Routing to SF.  Form placed in [employer] in-box to be completed.

## 2019-05-24 NOTE — TELEPHONE ENCOUNTER
Reason for Call:  Form, our goal is to have forms completed with 72 hours, however, some forms may require a visit or additional information.    Type of letter, form or note:  medical    Who is the form from?: Home care    Where did the form come from: form was faxed in    What clinic location was the form placed at?: [facility] - [phone]    Where the form was placed: [employer] Box/Folder    What number is listed as a contact on the form?: [phone]       Additional comments: Please sign and fax back to [phone]    Call taken on 5/24/2019 at 8:33 AM by Renetta Carmona

## 2019-05-30 NOTE — PATIENT INSTRUCTIONS
Before Your Surgery      Call your surgeon if there is any change in your health. This includes signs of a cold or flu (such as a sore throat, runny nose, cough, rash or fever).    Do not smoke, drink alcohol or take over the counter medicine (unless your surgeon or primary care doctor tells you to) for the 24 hours before and after surgery.    If you take prescribed drugs: Follow your doctor s orders about which medicines to take and which to stop until after surgery.    Eating and drinking prior to surgery: follow the instructions from your surgeon    Take a shower or bath the night before surgery. Use the soap your surgeon gave you to gently clean your skin. If you do not have soap from your surgeon, use your regular soap. Do not shave or scrub the surgery site.  Wear clean pajamas and have clean sheets on your bed.     ---  Can take morning meds with small sip of water  Hold the stool softener, cranberry and multivit until later in the day.    Will check labs today

## 2019-05-30 NOTE — PROGRESS NOTES
[facility] [place]  [address]  [place] [address]  [phone]  Dept: [phone]    PRE-OP EVALUATION:  Today's date: 2019    Dianna Cottrell (: 1995) presents for pre-operative evaluation assessment as requested by Dr. Max.  She requires evaluation and anesthesia risk assessment prior to undergoing surgery/procedure for treatment of botox Injection.    Fax number for surgical facility: [facility]   Primary Physician: Suzan Willis  Type of Anesthesia Anticipated: to be determined    Patient has a Health Care Directive or Living Will:  NO    Preop Questions 2019   Who is doing your surgery? leatha   What are you having done? botox on bladder   Date of Surgery/Procedure: jackson 10 2019   Facility or Hospital where procedure/surgery will be performed: [facility]   1.  Do you have a history of Heart attack, stroke, stent, coronary bypass surgery, or other heart surgery? No   2.  Do you ever have any pain or discomfort in your chest? No   3.  Do you have a history of  Heart Failure? No   4.   Are you troubled by shortness of breath when:  walking on a level surface, or up a slight hill, or at night? No   5.  Do you currently have a cold, bronchitis or other respiratory infection? No   6.  Do you have a cough, shortness of breath, or wheezing? No   7.  Do you sometimes get pains in the calves of your legs when you walk? No   8. Do you or anyone in your family have previous history of blood clots? YES - pt had DVT with 2nd pregnancy.  Consult hematology at [facility] 2017   9.  Do you or does anyone in your family have a serious bleeding problem such as prolonged bleeding following surgeries or cuts? No   10. Have you ever had problems with anemia or been told to take iron pills? No   11. Have you had any abnormal blood loss such as black, tarry or bloody stools, or abnormal vaginal bleeding? No   12. Have you ever had a blood  "transfusion? No   13. Have you or any of your relatives ever had problems with anesthesia? YES- succinylcholine noted in history- Spinal cord injury 12/18/12, patient at risk for extrajunctional receptors and hyperkalemia   14. Do you have sleep apnea, excessive snoring or daytime drowsiness? No   15. Do you have any prosthetic heart valves? No   16. Do you have prosthetic joints? No   17. Is there any chance that you may be pregnant? No         HPI:     HPI related to upcoming procedure:   Neurogenic bladder with urinary incontinence secondary to incomplete quadriplegia. Has had botox injections in the bladder in the past which has been beneficial. Cystoscopy done every 2 years. Has been on myrbetriz, toviaz in the past. Abx for UTI prophylaxis. New Urologist- Dr. Marco Max.     Mirena IUD placed 12/13/2017. Spotting sporadically. Sexually active -not presently    DVT during 2nd pregnancy as noted above. No longer on blood thinners. Previously have consulted with pt's hematology provider prior to botox injections and was advised:  \"Bladder injections are a low risk procedure in regard to clotting and a place where bleeding would be an issue so I do not think she needs prophylactic anticoagulation.\"    Doing well on her sertraline.     On midodrine for hypotension and baclofen for spasticity ad gabapentin at night for tingling/sensation changes- per PMR MD Pak at [facility] - last visit with him was 03/01/2019. Hx of autonomic dysreflexia- infrequently but triggers have been bladder distention.     Pt denies any personal medical history of diabetes, hypertension, hyperlipidemia, thyroid problems, CKD, irregular heartbeat/a.fib, CAD/PVD, sleep apnea, Asthma/COPD.   Patient is a nonsmoker.      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 2012          Past Medical History:   Diagnosis Date     Anemia     with pregnancy     c5 burst fracture 2012    C5-C7 fracture with cord injury     Compression fracture of L1 lumbar vertebra (H) 2012    L1 superior endplate compression fracture     Fracture of thoracic spine without spinal cord lesion (H) 2012    T3-T8 spinous process fractures     Hypertension 2016     Vocal cord dysfunction     Left vocal cord weakness noted by ENT post extubation 2012     Past Surgical History:   Procedure Laterality Date     C4-C7 interbody fusion with anterior screw and plate fixation and posterior erna and pedicle screw fixation with interspace bone graft and C5 and C6 partial corpectomies  2012      SECTION  2013    Procedure:  SECTION;   Section ;  Surgeon: Ricki Nelson MD;  Location: [facility]      SECTION N/A 2016    Procedure:  SECTION;  Surgeon: Floridalma Kiran MD;  [facility]: [facility]     IR IVC FILTER PLACEMENT  2012     LUMBAR DRAIN  2012     Current Outpatient Medications   Medication Sig Dispense Refill     acetaminophen (TYLENOL) 325 MG tablet Take 325-650 mg by mouth every 6 hours as needed.       baclofen (LIORESAL) 10 MG tablet Take 10 mg by mouth 3 times daily.       BISACODYL 1 suppository daily        Cranberry 450 MG TABS Take 450 mg by mouth daily.       Docusate Sodium (COLACE PO) Take  "by mouth daily.        ferrous sulfate (IRON) 325 (65 FE) MG tablet TAKE 1 TABLET BY MOUTH TWICE A DAY WITH MEALS (Patient not taking: Reported on 2/11/2019) 60 tablet 1     gabapentin (NEURONTIN) 300 MG capsule Take 300 mg by mouth daily        hydrOXYzine (VISTARIL) 25 MG capsule Take 1 capsule (25 mg) by mouth 3 times daily as needed for anxiety 20 capsule 0     midodrine (PROAMATINE) 1.25 mg Take 2.5 mg by mouth 3 times daily.       Multiple Vitamin (DAILY MULTIVITAMIN PO) Take 2 tablets by mouth daily       order for DME Equipment being ordered: Pressure Mapping of wheelchair at [facility] Phone [phone] Fax [phone] 1 Device 0     order for DME [facility] Phone [phone] Fax [phone]    Primary Dressing Medihoney GEl   Qty not needed  Secondary Dressing 2x2 gauze loaf Qty 1  Secondary Dressing abd pad Qty 10  Secondary Dressing 2'medipore tape Qty  1  Length of Need: 1 month  Frequency of dressing change: daily    Frequency of dressing change: QOD 30 days 0     order for DME Equipment being ordered: Wheelchair-  \"Patient continues to need and use daily her power wheelchair and the wheelchair will continue to need repairs for the next 12 months.\" 1 each 0     order for DME Equipment being ordered: [facility] Fax [phone]    Wheelchair seating eval and mapping of current cushion 30 days 0     senna-docusate (SENOKOT-S;PERICOLACE) 8.6-50 MG per tablet Take 1-2 tablets by mouth 2 times daily as needed for constipation 40 tablet 0     sertraline (ZOLOFT) 100 MG tablet Take 1.5 tablets (150 mg) by mouth daily 135 tablet 1     TRAZODONE HCL 1 tablet. PRN       OTC products: Daily vitamin     Allergies   Allergen Reactions     Succinylcholine      Spinal cord injury 12/18/12, patient at risk for extrajunctional receptors and hyperkalemia      Latex Allergy: NO    Social History     Tobacco Use     Smoking status: Former Smoker     Packs/day: 0.50     Types: Cigarettes     " "Smokeless tobacco: Never Used     Tobacco comment: was social smoker    Substance Use Topics     Alcohol use: No     Alcohol/week: 0.0 oz     Frequency: Never     History   Drug Use     Types: Marijuana     Comment: pt states \"sometimes\"       REVIEW OF SYSTEMS:   CONSTITUTIONAL: NEGATIVE for fever, chills, change in weight  INTEGUMENTARY/SKIN: NEGATIVE for worrisome rashes, moles or lesions  EYES: NEGATIVE for vision changes or irritation  ENT/MOUTH: NEGATIVE for ear, mouth and throat problems- seasonal allergies- itchy/watery eyes, runny nose- some days worse than others   RESP: NEGATIVE for significant cough or SOB  BREAST: NEGATIVE for masses, tenderness or discharge  CV: NEGATIVE for chest pain, palpitations or peripheral edema  GI: NEGATIVE for nausea, abdominal pain, heartburn; regular BM on her bowel program- soft due to recent abx for UTI (augmentin)   : neurogenic bladder; recent UTI. Finished treatment this week. Spacticity and odor to urine is better.   MUSCULOSKELETAL: NEGATIVE for significant arthralgias or myalgia  NEURO: NEGATIVE for weakness, dizziness or paresthesias  ENDOCRINE: NEGATIVE for temperature intolerance, skin/hair changes  HEME: NEGATIVE for bleeding problems  PSYCHIATRIC: NEGATIVE for changes in mood or affect    EXAM:   BP 98/56   Pulse 67   Temp 99.3  F (37.4  C) (Oral)   Resp 18   LMP  (LMP Unknown)   SpO2 98%   Breastfeeding? No   GENERAL: thin, well appearing, sitting in power chair.   EYES: Eyes grossly normal to inspection, wearing glasses, PERRL and conjunctivae and sclerae normal  HENT: ear canals and TM's normal, nose and mouth without ulcers or lesions  NECK: no adenopathy, no asymmetry, thyroid normal to palpation  RESP: lungs clear to auscultation - no rales, rhonchi or wheezes  CV: regular rate and rhythm, normal S1 S2, no S3 or S4, no murmur, click or rub  ABDOMEN: soft, nontender, no masses and bowel sounds normal  MS: atrophic lower extremities, no peripheral " edema and peripheral pulses strongno gross musculoskeletal defects noted, no edema  PSYCH: mentation appears normal, affect normal/bright, no agitation, speech normal volume, eye contact good. No agitaiton.   SKIN: warm and dry, no lesions, mild acne.   NEURO: alert and oriented, mentation intact and speech normal  PSYCH:mentation appears normal. and affect normal/bright  LYMPHATICS: No axillary, cervical, or supraclavicular nodes      DIAGNOSTICS:     Results for orders placed or performed in visit on 05/31/19   CBC with platelets   Result Value Ref Range    WBC 15.4 (H) 4.0 - 11.0 10e9/L    RBC Count 4.80 3.8 - 5.2 10e12/L    Hemoglobin 13.5 11.7 - 15.7 g/dL    Hematocrit 41.2 35.0 - 47.0 %    MCV 86 78 - 100 fl    MCH 28.1 26.5 - 33.0 pg    MCHC 32.8 31.5 - 36.5 g/dL    RDW 14.3 10.0 - 15.0 %    Platelet Count 393 150 - 450 10e9/L   HCG qualitative, Blood ([id number])   Result Value Ref Range    HCG Qualitative Serum Negative NEG^Negative         Recent Labs   Lab Test 02/06/18 01/12/18  1338 12/10/16  0645  12/08/16  0705 12/07/16  1550   HGB  --  14.0 8.3*   < > 8.7*  --    PLT  --  366 578*   < > 496*  --    NA  --   --   --   --  140 140   POTASSIUM 3.4*  --   --   --  3.8 3.4   CR 0.39*  --   --   --  0.33* 0.33*    < > = values in this interval not displayed.        IMPRESSION:   Reason for surgery/procedure: Neurogenic bladder  Diagnosis/reason for consult: Preoperative clearance    The proposed surgical procedure is considered LOW risk.    REVISED CARDIAC RISK INDEX  The patient has the following serious cardiovascular risks for perioperative complications such as (MI, PE, VFib and 3  AV Block):  No serious cardiac risks  INTERPRETATION: 0 risks: Class I (very low risk - 0.4% complication rate)    The patient has the following additional risks for perioperative complications:  No identified additional risks  Hx of DVT      ICD-10-CM    1. Preop general physical exam Z01.818    2. Neurogenic bladder N31.9     3. Quadriplegia, post-traumatic (H)- incomplete quad, limited use upper extremities G82.50    4. Personal history of DVT (deep vein thrombosis) Z86.718      1. Preop general physical exam  - CBC with platelets  - HCG qualitative, Blood ([id number])    2. Neurogenic bladder  Surgery as scheduled    3. Quadriplegia, post-traumatic (H)- incomplete quad, limited use upper extremities  Continue w/PMR MD   Stable on current medications    4. Personal history of DVT (deep vein thrombosis)  As above in HPI. Low risk procedure, anticoagulation not advised.       RECOMMENDATIONS:       Pulmonary Risk  Incentive spirometry post op  Respiratory Therapy (Respiratory Care IP Consult)  post op  NG tube decompression if abdominal distension or significant vomiting       --Patient is to take all scheduled medications on the day of surgery EXCEPT for modifications listed below.    APPROVAL GIVEN to proceed with proposed procedure, without further diagnostic evaluation       Signed Electronically by: Suzan Willis PA-C    Copy of this evaluation report is provided to requesting physician.    [facility] Preop Guidelines    Revised Cardiac Risk Index

## 2019-05-31 ENCOUNTER — OFFICE VISIT (OUTPATIENT)
Dept: FAMILY MEDICINE | Facility: CLINIC | Age: 24
End: 2019-05-31
Payer: MEDICARE

## 2019-05-31 VITALS
TEMPERATURE: 99.3 F | DIASTOLIC BLOOD PRESSURE: 56 MMHG | OXYGEN SATURATION: 98 % | HEART RATE: 67 BPM | SYSTOLIC BLOOD PRESSURE: 98 MMHG | RESPIRATION RATE: 18 BRPM

## 2019-05-31 DIAGNOSIS — S14.109S QUADRIPLEGIA, POST-TRAUMATIC (H): ICD-10-CM

## 2019-05-31 DIAGNOSIS — Z01.818 PREOP GENERAL PHYSICAL EXAM: Primary | ICD-10-CM

## 2019-05-31 DIAGNOSIS — G82.50 QUADRIPLEGIA, POST-TRAUMATIC (H): ICD-10-CM

## 2019-05-31 DIAGNOSIS — Z86.718 PERSONAL HISTORY OF DVT (DEEP VEIN THROMBOSIS): ICD-10-CM

## 2019-05-31 DIAGNOSIS — N31.9 NEUROGENIC BLADDER: ICD-10-CM

## 2019-05-31 LAB
ERYTHROCYTE [DISTWIDTH] IN BLOOD BY AUTOMATED COUNT: 14.3 % (ref 10–15)
HCG SERPL QL: NEGATIVE
HCT VFR BLD AUTO: 41.2 % (ref 35–47)
HGB BLD-MCNC: 13.5 G/DL (ref 11.7–15.7)
MCH RBC QN AUTO: 28.1 PG (ref 26.5–33)
MCHC RBC AUTO-ENTMCNC: 32.8 G/DL (ref 31.5–36.5)
MCV RBC AUTO: 86 FL (ref 78–100)
PLATELET # BLD AUTO: 393 10E9/L (ref 150–450)
RBC # BLD AUTO: 4.8 10E12/L (ref 3.8–5.2)
WBC # BLD AUTO: 15.4 10E9/L (ref 4–11)

## 2019-05-31 PROCEDURE — 36415 COLL VENOUS BLD VENIPUNCTURE: CPT | Performed by: PHYSICIAN ASSISTANT

## 2019-05-31 PROCEDURE — 85027 COMPLETE CBC AUTOMATED: CPT | Performed by: PHYSICIAN ASSISTANT

## 2019-05-31 PROCEDURE — 99214 OFFICE O/P EST MOD 30 MIN: CPT | Performed by: PHYSICIAN ASSISTANT

## 2019-05-31 PROCEDURE — 84703 CHORIONIC GONADOTROPIN ASSAY: CPT | Performed by: PHYSICIAN ASSISTANT

## 2019-05-31 RX ORDER — MIDODRINE HYDROCHLORIDE 5 MG/1
10 TABLET ORAL 2 TIMES DAILY
Qty: 6 TABLET | Refills: 0 | COMMUNITY
Start: 2019-05-31

## 2019-05-31 ASSESSMENT — ANXIETY QUESTIONNAIRES
6. BECOMING EASILY ANNOYED OR IRRITABLE: SEVERAL DAYS
1. FEELING NERVOUS, ANXIOUS, OR ON EDGE: SEVERAL DAYS
7. FEELING AFRAID AS IF SOMETHING AWFUL MIGHT HAPPEN: SEVERAL DAYS
GAD7 TOTAL SCORE: 5
4. TROUBLE RELAXING: NOT AT ALL
GAD7 TOTAL SCORE: 5
5. BEING SO RESTLESS THAT IT IS HARD TO SIT STILL: NOT AT ALL
GAD7 TOTAL SCORE: 5
7. FEELING AFRAID AS IF SOMETHING AWFUL MIGHT HAPPEN: SEVERAL DAYS
3. WORRYING TOO MUCH ABOUT DIFFERENT THINGS: SEVERAL DAYS
2. NOT BEING ABLE TO STOP OR CONTROL WORRYING: SEVERAL DAYS

## 2019-05-31 ASSESSMENT — PAIN SCALES - GENERAL: PAINLEVEL: NO PAIN (0)

## 2019-05-31 ASSESSMENT — PATIENT HEALTH QUESTIONNAIRE - PHQ9
SUM OF ALL RESPONSES TO PHQ QUESTIONS 1-9: 1
10. IF YOU CHECKED OFF ANY PROBLEMS, HOW DIFFICULT HAVE THESE PROBLEMS MADE IT FOR YOU TO DO YOUR WORK, TAKE CARE OF THINGS AT HOME, OR GET ALONG WITH OTHER PEOPLE: NOT DIFFICULT AT ALL
SUM OF ALL RESPONSES TO PHQ QUESTIONS 1-9: 1

## 2019-06-01 ASSESSMENT — ANXIETY QUESTIONNAIRES: GAD7 TOTAL SCORE: 5

## 2019-06-01 ASSESSMENT — PATIENT HEALTH QUESTIONNAIRE - PHQ9: SUM OF ALL RESPONSES TO PHQ QUESTIONS 1-9: 1

## 2019-06-10 ENCOUNTER — TELEPHONE (OUTPATIENT)
Dept: FAMILY MEDICINE | Facility: CLINIC | Age: 24
End: 2019-06-10

## 2019-06-10 ENCOUNTER — TRANSFERRED RECORDS (OUTPATIENT)
Dept: HEALTH INFORMATION MANAGEMENT | Facility: CLINIC | Age: 24
End: 2019-06-10

## 2019-06-10 NOTE — TELEPHONE ENCOUNTER
Our goal is to have forms completed with 72 hours, however some forms may require a visit or additional information.    Who is the form from?: [facility]  (if other please explain)  Where the form came from: form was faxed in  What clinic location was the form placed at?: [facility]  Where the form was placed: placed in TC inbox     What number is listed as a contact on the form?: [phone]  Fax number to return form to:  [phone]        Call taken on 6/10/2019 at 1:24 PM by Floridalma Merino

## 2019-06-11 ENCOUNTER — TELEPHONE (OUTPATIENT)
Dept: FAMILY MEDICINE | Facility: CLINIC | Age: 24
End: 2019-06-11

## 2019-06-11 NOTE — TELEPHONE ENCOUNTER
Our goal is to have forms completed with 72 hours, however some forms may require a visit or additional information.    Who is the form from?: [facility] (if other please explain)  Where the form came from: form was faxed in  What clinic location was the form placed at?: [facility]  Where the form was placed: placed in TC inbox     What number is listed as a contact on the form?: [phone]  Fax number to return form to:  [phone]        Call taken on 6/11/2019 at 1:41 PM by Floridalma [place]

## 2019-06-14 ENCOUNTER — TELEPHONE (OUTPATIENT)
Dept: FAMILY MEDICINE | Facility: CLINIC | Age: 24
End: 2019-06-14

## 2019-06-14 NOTE — TELEPHONE ENCOUNTER
Reason for call:  Form  Reason for Call:  Form, our goal is to have forms completed with 72 hours, however, some forms may require a visit or additional information.    Type of letter, form or note:  Medical    Who is the form from?: [facility]      Where did the form come from: form was faxed in    What clinic location was the form placed at?: [facility] - [phone]    Where the form was placed: 's in box     What number is listed as a contact on the form?: [phone]       Additional comments: Fax back to [phone]    Call taken on 6/14/2019 at 8:11 AM by Vasquez Mcmillan

## 2019-06-17 NOTE — TELEPHONE ENCOUNTER
These records are for notification only.    It is not a form to be signed by the Provider.    Records placed at Providers desk to review.

## 2019-06-19 ENCOUNTER — TELEPHONE (OUTPATIENT)
Dept: FAMILY MEDICINE | Facility: CLINIC | Age: 24
End: 2019-06-19

## 2019-06-19 NOTE — TELEPHONE ENCOUNTER
The form does not require a signature. It is for notification only.  Placed at Providers desk to review.

## 2019-06-19 NOTE — TELEPHONE ENCOUNTER
Reason for call:  Form  Reason for Call:  Form, our goal is to have forms completed with 72 hours, however, some forms may require a visit or additional information.    Type of letter, form or note:  Medical    Who is the form from?: [facility]      Where did the form come from: form was faxed in    What clinic location was the form placed at?: [facility] - [phone]    Where the form was placed: 's in box     What number is listed as a contact on the form?: [phone]       Additional comments: Fax back to [phone]    Call taken on 6/19/2019 at 8:16 AM by Vasquez Mcmillan

## 2019-06-24 ENCOUNTER — TELEPHONE (OUTPATIENT)
Dept: FAMILY MEDICINE | Facility: CLINIC | Age: 24
End: 2019-06-24

## 2019-06-24 NOTE — TELEPHONE ENCOUNTER
Our goal is to have forms completed with 72 hours, however some forms may require a visit or additional information.    Who is the form from?: [employer]. (if other please explain)  Where the form came from: form was faxed in  What clinic location was the form placed at?: [facility]  Where the form was placed: placed in TC inbox     What number is listed as a contact on the form?: [phone]  Fax number to return form to:  [phone]        Call taken on 6/24/2019 at 7:32 AM by Floridalma [place]

## 2019-06-27 ENCOUNTER — MEDICAL CORRESPONDENCE (OUTPATIENT)
Dept: HEALTH INFORMATION MANAGEMENT | Facility: CLINIC | Age: 24
End: 2019-06-27

## 2019-06-27 ENCOUNTER — TRANSFERRED RECORDS (OUTPATIENT)
Dept: HEALTH INFORMATION MANAGEMENT | Facility: CLINIC | Age: 24
End: 2019-06-27

## 2019-07-08 ENCOUNTER — TELEPHONE (OUTPATIENT)
Dept: FAMILY MEDICINE | Facility: CLINIC | Age: 24
End: 2019-07-08

## 2019-07-08 NOTE — TELEPHONE ENCOUNTER
Our goal is to have forms completed with 72 hours, however some forms may require a visit or additional information.    Who is the form from?: [facility] (if other please explain)  Where the form came from: form was faxed in  What clinic location was the form placed at?: [facility]  Where the form was placed: placed in TC inbox     What number is listed as a contact on the form?: [phone]  Fax number to return form to:  [phone]        Call taken on 7/8/2019 at 1:35 PM by Floridalma [place]

## 2019-07-19 ENCOUNTER — TRANSFERRED RECORDS (OUTPATIENT)
Dept: HEALTH INFORMATION MANAGEMENT | Facility: CLINIC | Age: 24
End: 2019-07-19

## 2019-08-13 ENCOUNTER — TELEPHONE (OUTPATIENT)
Dept: FAMILY MEDICINE | Facility: CLINIC | Age: 24
End: 2019-08-13

## 2019-08-13 NOTE — TELEPHONE ENCOUNTER
Reason for call:  Form  Reason for Call:  Form, our goal is to have forms completed with 72 hours, however, some forms may require a visit or additional information.    Type of letter, form or note:  Medical    Who is the form from?: [facility]      Where did the form come from: form was faxed in    What clinic location was the form placed at?: [facility] - [phone]    Where the form was placed: 's in box     What number is listed as a contact on the form?: [phone]       Additional comments: Fax back to [phone]    Call taken on 8/13/2019 at 2:03 PM by Vasquez Mcmillan

## 2019-08-14 ENCOUNTER — TRANSFERRED RECORDS (OUTPATIENT)
Dept: HEALTH INFORMATION MANAGEMENT | Facility: CLINIC | Age: 24
End: 2019-08-14

## 2019-08-14 DIAGNOSIS — Z53.9 DIAGNOSIS NOT YET DEFINED: Primary | ICD-10-CM

## 2019-08-14 PROCEDURE — G0180 MD CERTIFICATION HHA PATIENT: HCPCS | Performed by: FAMILY MEDICINE

## 2019-09-03 ENCOUNTER — TELEPHONE (OUTPATIENT)
Dept: FAMILY MEDICINE | Facility: CLINIC | Age: 24
End: 2019-09-03

## 2019-09-03 ENCOUNTER — MEDICAL CORRESPONDENCE (OUTPATIENT)
Dept: HEALTH INFORMATION MANAGEMENT | Facility: CLINIC | Age: 24
End: 2019-09-03

## 2019-09-03 NOTE — TELEPHONE ENCOUNTER
Reason for call:  Form  Reason for Call:  Form, our goal is to have forms completed with 72 hours, however, some forms may require a visit or additional information.    Type of letter, form or note:  Medical    Who is the form from?: [facility]      Where did the form come from: form was faxed in    What clinic location was the form placed at?: [facility] - [phone]    Where the form was placed: 's in box     What number is listed as a contact on the form?: [phone]       Additional comments: Fax back to [phone]    Call taken on 9/3/2019 at 2:56 PM by Vasquez Mcmillan

## 2019-09-10 ENCOUNTER — TELEPHONE (OUTPATIENT)
Dept: FAMILY MEDICINE | Facility: CLINIC | Age: 24
End: 2019-09-10

## 2019-09-10 NOTE — TELEPHONE ENCOUNTER
Our goal is to have forms completed with 72 hours, however some forms may require a visit or additional information.    Who is the form from?: [facility]  (if other please explain)  Where the form came from: form was faxed in  What clinic location was the form placed at?: [facility]  Where the form was placed: placed in TC inbox     What number is listed as a contact on the form?: [phone]  Fax number to return form to:  [phone]        Call taken on 9/10/2019 at 1:33 PM by Floridalma Merino

## 2019-09-11 ENCOUNTER — TELEPHONE (OUTPATIENT)
Dept: FAMILY MEDICINE | Facility: CLINIC | Age: 24
End: 2019-09-11

## 2019-09-11 NOTE — TELEPHONE ENCOUNTER
Reason for call:  Form  Reason for Call:  Form, our goal is to have forms completed with 72 hours, however, some forms may require a visit or additional information.    Type of letter, form or note:  Medical    Who is the form from?: [facility]    Where did the form come from: form was faxed in    What clinic location was the form placed at?: [facility] - [phone]    Where the form was placed: 's in box     What number is listed as a contact on the form?: [phone]       Additional comments: Fax back to [phone]    Call taken on 9/11/2019 at 11:30 AM by Vasquez Mcmillan

## 2019-09-11 NOTE — TELEPHONE ENCOUNTER
This form is for orders/ updated medications.    Form placed in Dr. Yuli Petersen in-box to be completed.

## 2019-09-12 ENCOUNTER — MEDICAL CORRESPONDENCE (OUTPATIENT)
Dept: HEALTH INFORMATION MANAGEMENT | Facility: CLINIC | Age: 24
End: 2019-09-12

## 2019-09-20 ENCOUNTER — TELEPHONE (OUTPATIENT)
Dept: FAMILY MEDICINE | Facility: CLINIC | Age: 24
End: 2019-09-20

## 2019-09-20 NOTE — TELEPHONE ENCOUNTER
Form placed in Providers in-box to be completed.    
Forms completed. Please fax.   
Reason for call:  Form  Reason for Call:  Form, our goal is to have forms completed with 72 hours, however, some forms may require a visit or additional information.    Type of letter, form or note:  Medical    Who is the form from?: [facility]        Where did the form come from: form was faxed in    What clinic location was the form placed at?: [facility] - [phone]    Where the form was placed: 's in box     What number is listed as a contact on the form?: [phone]       Additional comments: Fax back to [phone]    Call taken on 9/20/2019 at 10:42 AM by Vasquez Mcmillan        
faxed  
normal...

## 2019-09-26 ENCOUNTER — TELEPHONE (OUTPATIENT)
Dept: FAMILY MEDICINE | Facility: CLINIC | Age: 24
End: 2019-09-26

## 2019-09-26 NOTE — TELEPHONE ENCOUNTER
Our goal is to have forms completed with 72 hours, however some forms may require a visit or additional information.    Who is the form from?: [facility](if other please explain)  Where the form came from: form was faxed in  What clinic location was the form placed at?: [facility]  Where the form was placed: placed in TC inbox     What number is listed as a contact on the form?: [phone]  Fax number to return form to:  [phone]        Call taken on 9/26/2019 at 12:15 PM by Floridalma [place]

## 2019-10-02 ENCOUNTER — TRANSFERRED RECORDS (OUTPATIENT)
Dept: HEALTH INFORMATION MANAGEMENT | Facility: CLINIC | Age: 24
End: 2019-10-02

## 2019-10-04 ENCOUNTER — TELEPHONE (OUTPATIENT)
Dept: FAMILY MEDICINE | Facility: CLINIC | Age: 24
End: 2019-10-04

## 2019-10-04 DIAGNOSIS — B37.31 YEAST INFECTION OF THE VAGINA: Primary | ICD-10-CM

## 2019-10-04 RX ORDER — MICONAZOLE NITRATE 2 %
1 CREAM WITH APPLICATOR VAGINAL DAILY
Qty: 45 G | Refills: 0 | Status: SHIPPED | OUTPATIENT
Start: 2019-10-04 | End: 2019-11-19

## 2019-10-04 NOTE — TELEPHONE ENCOUNTER
Reason for Call: Request for an order or referral:    Order or referral being requested: Verbal orders to apply bacitracin to an open sore and OTC meds for yeast infection    Date needed: as soon as possible    Has the patient been seen by the PCP for this problem? NO    Additional comments: Home care is requesting a verbal order for bacitracin to be applied to an open sore. Additionally, the patient appears to have a yeast infection and they are requesting OTC medication until she can come in for and office visit. Please call [facility] with any questions.     Phone number Patient can be reached at:  Other phone number:  [place] [phone]    Best Time:  any    Can we leave a detailed message on this number?  YES    Call taken on 10/4/2019 at 11:20 AM by Cristina Kauffman

## 2019-10-04 NOTE — TELEPHONE ENCOUNTER
Spoke with Isael lowry's RN Case manager. Dianna has white, thick cottage cheese like discharge coming form the vagina. She has UTI which urology is managing starting antibiotic to treat. Requesting order for OTC treatment for the yeast.    Per - having leaking urine and is wet between cathing. This is new issue to be addressed w/her urology team. Treating infection. Skin irritation, inflammation of vulva and yeast sx as above. Treat yeast sx. Keep area clean, dry, barrier cream if needed. OV if not improving.   Suzan Willis PA-C

## 2019-10-08 ENCOUNTER — TELEPHONE (OUTPATIENT)
Dept: FAMILY MEDICINE | Facility: CLINIC | Age: 24
End: 2019-10-08

## 2019-10-08 NOTE — TELEPHONE ENCOUNTER
Reason for call:  Form  Reason for Call:  Form, our goal is to have forms completed with 72 hours, however, some forms may require a visit or additional information.    Type of letter, form or note:  Medical    Who is the form from?: [facility]      Where did the form come from: form was faxed in    What clinic location was the form placed at?: [facility] - [phone]    Where the form was placed: 's in box     What number is listed as a contact on the form?: [phone]       Additional comments: Fax back to [phone]    Call taken on 10/8/2019 at 11:03 AM by Vasquez Mcmillan

## 2019-10-09 ENCOUNTER — TELEPHONE (OUTPATIENT)
Dept: FAMILY MEDICINE | Facility: CLINIC | Age: 24
End: 2019-10-09

## 2019-10-10 ENCOUNTER — TELEPHONE (OUTPATIENT)
Dept: FAMILY MEDICINE | Facility: CLINIC | Age: 24
End: 2019-10-10

## 2019-10-10 NOTE — TELEPHONE ENCOUNTER
Reason for call:  Form  Reason for Call:  Form, our goal is to have forms completed with 72 hours, however, some forms may require a visit or additional information.    Type of letter, form or note:  Medical    Who is the form from?: [facility]      Where did the form come from: form was faxed in    What clinic location was the form placed at?: [facility] - [phone]    Where the form was placed: 's in box     What number is listed as a contact on the form?: [phone]       Additional comments: Fax back to [phone]    Call taken on 10/10/2019 at 1:29 PM by Vasquez Mcmillan

## 2019-10-10 NOTE — TELEPHONE ENCOUNTER
Our goal is to have forms completed with 72 hours, however some forms may require a visit or additional information.    Who is the form from?: [facility]  (if other please explain)  Where the form came from: form was faxed in  What clinic location was the form placed at?: [facility]  Where the form was placed: placed in TC inbox     What number is listed as a contact on the form?: [phone]  Fax number to return form to:  [phone]        Call taken on 10/10/2019 at 8:10 AM by Floridalma Merino

## 2019-10-11 ENCOUNTER — MEDICAL CORRESPONDENCE (OUTPATIENT)
Dept: HEALTH INFORMATION MANAGEMENT | Facility: CLINIC | Age: 24
End: 2019-10-11

## 2019-10-14 ENCOUNTER — HOSPITAL ENCOUNTER (OUTPATIENT)
Dept: WOUND CARE | Facility: CLINIC | Age: 24
Discharge: HOME | End: 2019-10-14
Attending: PHYSICIAN ASSISTANT | Admitting: PHYSICIAN ASSISTANT
Payer: MEDICARE

## 2019-10-14 ENCOUNTER — TELEPHONE (OUTPATIENT)
Dept: FAMILY MEDICINE | Facility: CLINIC | Age: 24
End: 2019-10-14

## 2019-10-14 VITALS
HEART RATE: 96 BPM | SYSTOLIC BLOOD PRESSURE: 99 MMHG | RESPIRATION RATE: 18 BRPM | TEMPERATURE: 96.8 F | DIASTOLIC BLOOD PRESSURE: 69 MMHG

## 2019-10-14 DIAGNOSIS — L89.153 DECUBITUS ULCER OF SACRAL REGION, STAGE 3 (H): ICD-10-CM

## 2019-10-14 DIAGNOSIS — L89.313 PRESSURE INJURY OF RIGHT ISCHIUM, STAGE 3 (H): ICD-10-CM

## 2019-10-14 DIAGNOSIS — B37.2 YEAST INFECTION OF THE SKIN: Primary | ICD-10-CM

## 2019-10-14 PROCEDURE — A6212 FOAM DRG <=16 SQ IN W/BORDER: HCPCS

## 2019-10-14 PROCEDURE — 99214 OFFICE O/P EST MOD 30 MIN: CPT | Performed by: PHYSICIAN ASSISTANT

## 2019-10-14 PROCEDURE — A6022 COLLAGEN DRSG>16<=48 SQ IN: HCPCS

## 2019-10-14 PROCEDURE — 97602 WOUND(S) CARE NON-SELECTIVE: CPT

## 2019-10-14 RX ORDER — DOCUSATE CALCIUM 240 MG
CAPSULE ORAL
COMMUNITY
End: 2019-11-19

## 2019-10-14 RX ORDER — FLUCONAZOLE 150 MG/1
150 TABLET ORAL ONCE
Qty: 1 TABLET | Refills: 0 | Status: SHIPPED | OUTPATIENT
Start: 2019-10-14 | End: 2019-10-14

## 2019-10-14 RX ORDER — POVIDONE-IODINE 10 MG/ML
SOLUTION TOPICAL DAILY PRN
Status: ON HOLD | COMMUNITY
Start: 2014-05-16 | End: 2023-06-28

## 2019-10-14 NOTE — PROGRESS NOTES
[facility]      HISTORY OF PRESENT ILLNESS: Ms. Dianna Cottrell is a 24-year-old quadriplegic woman known to our clinic for a right IT pressure ulcer who returns for a recurrence of this ulceration. Her primary concern today was a rash on her left buttocks and she was unaware that her right scar tissue had broken down. Having issues with UTIs and vaginal yeast infections and feels like she has not been good about managing the extra moisture. Has not received antibiotics for her UTI yet and has been doing just monistat suppositories for her vaginal infection.     DATE WOUND ACQUIRED: 6/2018     WOUND CARE: Endoform     OFFLOADING: Sleeps on group 2 mattress. Just had chair mapped but was not mapped while tilted which she spends a lot of time doing.     REVIEW OF SYSTEMS:   CONSTITUTIONAL: Denies fever or chills  GI: denies nausea or vomiting      PAST MEDICAL HISTORY:  has a past medical history of Anemia; c5 burst fracture (12/18/2012); Compression fracture of L1 lumbar vertebra (H) (12/18/2012); Fracture of thoracic spine without spinal cord lesion (H) (12/18/2012); Hypertension (12/6/2016); and Vocal cord dysfunction.    SOCIAL HISTORY: has PCA help, has two kids, hoping to do public speaking in the future      MEDICATIONS: reviewed, see med rec for up to date list, negative for anticoagulants, negative for immunosuppressants, negative for NSAIDS     VITALS: BP 99/69   Pulse 96   Temp 96.8  F (36  C) (Temporal)   Resp 18       PHYSICAL EXAM:  GENERAL: Patient is alert and oriented and in no acute distress  INTEGUMENTARY:   Wound (used by OP [facility] only) 10/14/19 1407 Right ischial tuberosity pressure injury (Active)   Length (cm) 1.5 10/14/2019  2:00 PM   Width (cm) 1.2 10/14/2019  2:00 PM   Depth (cm) 0.2 10/14/2019  2:00 PM   Wound (cm^2) 1.8 cm^2 10/14/2019  2:00 PM   Wound Volume (cm^3) 0.36 cm^3 10/14/2019  2:00 PM   Dressing Appearance moist drainage 10/14/2019  2:00 PM   Drainage  Characteristics/Odor serosanguineous 10/14/2019  2:00 PM   Drainage Amount moderate 10/14/2019  2:00 PM   Thickness/Stage Stage 3 10/14/2019  2:15 PM   Base slough;red/granulating 10/14/2019  2:15 PM   Periwound intact 10/14/2019  2:15 PM   Periwound Temperature warm 10/14/2019  2:15 PM   Care, Wound non-select wound debridement performed 10/14/2019  2:15 PM       ASSESSMENT:   1. Stage 3 pressure ulcer R IT  2. Cutaneous candidiasis of left buttocks      PLAN:   1. Cindy + Mepilex changed every other day  2. Diflucan 150mg every day, if vaginal symptoms persist needs to follow up with PCP/OBGYN  3. Antifungal cream on buttocks rash BID  4. Follow-up with urology regarding urology      FOLLOW-UP: 2-3 wks      VANIA AGUILAR PA-C

## 2019-10-14 NOTE — TELEPHONE ENCOUNTER
Reason for call:  Form  Reason for Call:  Form, our goal is to have forms completed with 72 hours, however, some forms may require a visit or additional information.    Type of letter, form or note:  Medical    Who is the form from?: [facility]      Where did the form come from: form was faxed in    What clinic location was the form placed at?: [facility] - [phone]    Where the form was placed: 's in box     What number is listed as a contact on the form?: [phone]       Additional comments: Fax back to [phone]    Call taken on 10/14/2019 at 9:58 AM by Vasquez Mcmillan

## 2019-10-14 NOTE — DISCHARGE INSTRUCTIONS
[facility] [place]  [address] [facility] [address], [place] [address]  Appointment Phone [phone] [facility] [phone]    Dianna Cottrell      1995    [facility] [place] Phone: [phone] Fax: [phone]    Wound Dressing Change to right ischial tuberosity  Cleanse wound with wound cleanser  Skin Care: Apply moisturizing cream to skin surrounding the wound (but not in the wound):Antifungal cream to rash on left gluteal  Cover wound with Cindy cut to size of wound (this will dissolve into the wound)  Cover wound with 4x4 Mepilex border  Change dressing Monday, Wednesday, Friday    Repositioning:    Bed:  Reposition pt MINIMALLY every 1-2 hours in bed to relieve pressure and promote perfusion to tissue.     Chair:  When up to chair pt should not sit for longer than one hour total before returning to bed for at least 10 minutes, again to relieve pressure and promote perfusion to tissue. Pt should also sit on a chair cushion when up to the chair.      Toyin Yi PA-C. October 14, 2019    Call us at [phone] if you have any questions about your wounds, have redness or swelling around your wound, have a fever of 101 or greater or if you have any other problems or concerns. We answer the phone Monday through Friday 8 am to 4 pm, please leave a message as we check the voicemail frequently throughout the day.     Follow up with Suzan Yi PA-C in 2 weeks

## 2019-10-16 ENCOUNTER — TELEPHONE (OUTPATIENT)
Dept: FAMILY MEDICINE | Facility: CLINIC | Age: 24
End: 2019-10-16

## 2019-10-16 NOTE — TELEPHONE ENCOUNTER
Reason for call:  Form  Reason for Call:  Form, our goal is to have forms completed with 72 hours, however, some forms may require a visit or additional information.    Type of letter, form or note:  Medical    Who is the form from?: [facility]      Where did the form come from: form was faxed in    What clinic location was the form placed at?: [facility] - [phone]    Where the form was placed: 's in box     What number is listed as a contact on the form?: [phone]       Additional comments: Fax back to [phone]    Call taken on 10/16/2019 at 4:40 PM by Vasquez Mcmillan

## 2019-10-17 NOTE — ADDENDUM NOTE
Encounter addended by: Juana Shetty RN on: 10/17/2019 2:01 PM   Actions taken: Order list changed, Diagnosis association updated

## 2019-10-18 ENCOUNTER — MEDICAL CORRESPONDENCE (OUTPATIENT)
Dept: HEALTH INFORMATION MANAGEMENT | Facility: CLINIC | Age: 24
End: 2019-10-18

## 2019-10-18 ENCOUNTER — TELEPHONE (OUTPATIENT)
Dept: FAMILY MEDICINE | Facility: CLINIC | Age: 24
End: 2019-10-18

## 2019-10-18 NOTE — TELEPHONE ENCOUNTER
Reason for call:  Form  Reason for Call:  Form, our goal is to have forms completed with 72 hours, however, some forms may require a visit or additional information.    Type of letter, form or note:  Medical    Who is the form from?: [facility]      Where did the form come from: form was faxed in    What clinic location was the form placed at?: [facility] - [phone]    Where the form was placed: 's in box     What number is listed as a contact on the form?: [phone]       Additional comments: Fax back to [phone]    Call taken on 10/18/2019 at 1:47 PM by Vasquez Mcmillan

## 2019-10-25 ENCOUNTER — TELEPHONE (OUTPATIENT)
Dept: FAMILY MEDICINE | Facility: CLINIC | Age: 24
End: 2019-10-25

## 2019-10-25 NOTE — TELEPHONE ENCOUNTER
Our goal is to have forms completed with 72 hours, however some forms may require a visit or additional information.    Who is the form from?: [facility]  (if other please explain)  Where the form came from: form was faxed in  What clinic location was the form placed at?: [facility]  Where the form was placed: placed in TC inbox     What number is listed as a contact on the form?: [phone] -[facility]  Fax number to return form to:  [phone]        Call taken on 10/25/2019 at 11:11 AM by Floridalma Merino

## 2019-10-28 ENCOUNTER — MEDICAL CORRESPONDENCE (OUTPATIENT)
Dept: HEALTH INFORMATION MANAGEMENT | Facility: CLINIC | Age: 24
End: 2019-10-28

## 2019-10-30 ENCOUNTER — TELEPHONE (OUTPATIENT)
Dept: WOUND CARE | Facility: CLINIC | Age: 24
End: 2019-10-30

## 2019-10-30 DIAGNOSIS — L89.153 DECUBITUS ULCER OF SACRAL REGION, STAGE 3 (H): ICD-10-CM

## 2019-10-30 NOTE — TELEPHONE ENCOUNTER
Received call that Patient is out of dressing supplies. Reviewed order, Cindy was not ordered will refill rx for cindy. Adhesives foams were delivered on 10/26/19 per Handi

## 2019-11-14 ENCOUNTER — TELEPHONE (OUTPATIENT)
Dept: FAMILY MEDICINE | Facility: CLINIC | Age: 24
End: 2019-11-14

## 2019-11-14 NOTE — TELEPHONE ENCOUNTER
Our goal is to have forms completed with 72 hours, however some forms may require a visit or additional information.    Who is the form from?: [facility] (if other please explain)  Where the form came from: form was faxed in  What clinic location was the form placed at?: [facility]  Where the form was placed: placed in [facility] inbox     What number is listed as a contact on the form?: [phone]  Fax number to return form to: [phone]        Call taken on 11/14/2019 at 2:05 PM by Flroidalma Merino

## 2019-11-15 ENCOUNTER — MEDICAL CORRESPONDENCE (OUTPATIENT)
Dept: HEALTH INFORMATION MANAGEMENT | Facility: CLINIC | Age: 24
End: 2019-11-15

## 2019-11-19 ENCOUNTER — TRANSFERRED RECORDS (OUTPATIENT)
Dept: HEALTH INFORMATION MANAGEMENT | Facility: CLINIC | Age: 24
End: 2019-11-19

## 2019-11-19 ENCOUNTER — HOSPITAL ENCOUNTER (OUTPATIENT)
Dept: WOUND CARE | Facility: CLINIC | Age: 24
Discharge: HOME | End: 2019-11-19
Attending: SURGERY | Admitting: SURGERY
Payer: MEDICARE

## 2019-11-19 VITALS
TEMPERATURE: 97.2 F | DIASTOLIC BLOOD PRESSURE: 59 MMHG | HEART RATE: 59 BPM | SYSTOLIC BLOOD PRESSURE: 131 MMHG | RESPIRATION RATE: 16 BRPM

## 2019-11-19 DIAGNOSIS — F41.1 GAD (GENERALIZED ANXIETY DISORDER): ICD-10-CM

## 2019-11-19 DIAGNOSIS — L89.153 DECUBITUS ULCER OF SACRAL REGION, STAGE 3 (H): ICD-10-CM

## 2019-11-19 DIAGNOSIS — G82.20 PARAPLEGIA (H): ICD-10-CM

## 2019-11-19 DIAGNOSIS — L89.313 PRESSURE INJURY OF RIGHT ISCHIUM, STAGE 3 (H): ICD-10-CM

## 2019-11-19 DIAGNOSIS — F32.4 MAJOR DEPRESSIVE DISORDER WITH SINGLE EPISODE, IN PARTIAL REMISSION (H): ICD-10-CM

## 2019-11-19 DIAGNOSIS — L89.313 DECUBITUS ULCER OF RIGHT ISCHIUM, STAGE 3 (H): ICD-10-CM

## 2019-11-19 PROCEDURE — 11042 DBRDMT SUBQ TIS 1ST 20SQCM/<: CPT | Performed by: PHYSICIAN ASSISTANT

## 2019-11-19 PROCEDURE — 11042 DBRDMT SUBQ TIS 1ST 20SQCM/<: CPT

## 2019-11-19 PROCEDURE — A6212 FOAM DRG <=16 SQ IN W/BORDER: HCPCS

## 2019-11-19 RX ORDER — HYOSCYAMINE SULFATE 0.125 MG
TABLET ORAL
Refills: 3 | COMMUNITY
Start: 2019-10-30 | End: 2022-04-08

## 2019-11-19 NOTE — DISCHARGE INSTRUCTIONS
[facility]  [address], [place] [address]  Appointment Phone [phone]    Dianna DE LA FUENTE Simba      1995    [facility] Phone: [phone] Fax: [phone]    Wound Dressing Change to right ischial tuberosity  Cleanse wound with wound cleanser  Open up the sheet of Mesalt, cut a single layer of mesalt to the size of the wound, than cover wound with Mesalt   Followed by 4x4 Mepilex border  Change dressing daily    Repositioning:    Bed:  Reposition pt MINIMALLY every 1-2 hours in bed to relieve pressure and promote perfusion to tissue.     Chair:  When up to chair pt should not sit for longer than one hour total before returning to bed for at least 10 minutes, again to relieve pressure and promote perfusion to tissue. We will send a referral to Kirill Saunders to get your wheelchair pressure mapped.    Please call [facility] [phone] ([address]. [place], [place]) to schedule your MRI appointment if you have not heard from them in two business days. Arrive 15 minutes early. If you need to cancel or change the appointment please call [facility] [phone] ([address] [place], [place])     Toyin Yi PA-C. November 19, 2019    Call us at [phone] if you have any questions about your wounds, have redness or swelling around your wound, have a fever of 101 or greater or if you have any other problems or concerns. We answer the phone Monday through Friday 8 am to 4 pm, please leave a message as we check the voicemail frequently throughout the day.     Follow up with Suzan Yi PA-C in 2 weeks

## 2019-11-19 NOTE — PROGRESS NOTES
[facility]      HISTORY OF PRESENT ILLNESS: Ms. Dianna Cottrell is a 24-year-old quadriplegic woman known to our clinic for a right IT pressure ulcer who returns for a recurrence of this ulceration. Having issues with UTIs and vaginal yeast infections and feels like she has not been good about managing the extra moisture.     INTERVAL HISTORY:    Wound is the same diameter but deeper    Has been up for 6-8 hours at a time on occasion    In the past NPWT has worked well for her    DATE WOUND ACQUIRED: 6/2018     WOUND CARE: Cindy     OFFLOADING: Sleeps on group 2 mattress. chair mapped well summer 2018     REVIEW OF SYSTEMS:   CONSTITUTIONAL: Denies fever or chills  GI: denies nausea or vomiting      PAST MEDICAL HISTORY:  has a past medical history of Anemia; c5 burst fracture (12/18/2012); Compression fracture of L1 lumbar vertebra (H) (12/18/2012); Fracture of thoracic spine without spinal cord lesion (H) (12/18/2012); Hypertension (12/6/2016); and Vocal cord dysfunction.    SOCIAL HISTORY: has PCA help, has two kids, hoping to do public speaking in the future      MEDICATIONS: reviewed, see med rec for up to date list, negative for anticoagulants, negative for immunosuppressants, negative for NSAIDS     VITALS: /59   Pulse 59   Temp 97.2  F (36.2  C) (Temporal)   Resp 16       PHYSICAL EXAM:  GENERAL: Patient is alert and oriented and in no acute distress  INTEGUMENTARY:   Wound (used by OP [facility] only) 10/14/19 1407 Right ischial tuberosity pressure injury (Active)   Length (cm) 1.5 11/19/2019  1:00 PM   Width (cm) 1.2 11/19/2019  1:00 PM   Depth (cm) 1.4 11/19/2019  1:00 PM   Wound (cm^2) 1.8 cm^2 11/19/2019  1:00 PM   Wound Volume (cm^3) 2.52 cm^3 11/19/2019  1:00 PM   Wound healing % 0 11/19/2019  1:00 PM   Dressing Appearance moist drainage 11/19/2019  1:00 PM   Drainage Characteristics/Odor serosanguineous 11/19/2019  1:00 PM   Drainage Amount moderate 11/19/2019  1:00 PM        PROCEDURE: 4% topical lidocaine was applied to the wound by the nursing staff. Patient was determined to be capable of making their own medical decisions and informed consent was obtained. Using a 15 blade a surgical debridement was performed down to and including subcutaneous tissue of <20 cm. Hemostasis was achieved with pressure. The patient tolerated the procedure well.        ASSESSMENT:   1. Stage 3 pressure ulcer R IT        PLAN:   1. MeSalt + cover dressing of choice  2. Stressed the need for better offloading  3. MRI to r/o osteo  4. Plan to begin VAC if  MRI cleared and wound       FOLLOW-UP: 2 weeks      VANIA AGUILAR PA-C

## 2019-11-20 NOTE — TELEPHONE ENCOUNTER
Pending Prescriptions:                       Disp   Refills    sertraline (ZOLOFT) 100 MG tablet [Pharma*135 ta*1            Sig: TAKE 1.5 TABLETS BY MOUTH DAILY    Routing refill request to provider for review/approval because:  A break in medication  LOV 5/31/2019    Next 5 appointments (look out 90 days)    Dec 03, 2019  1:00 PM CST  (Arrive by 12:45 PM)  Return Visit with Toyin Yi PA-C  [facility] [place] ([facility]) [address] [place] [address] [address]  [phone]        Dorcas Gutierrez, RN, BSN

## 2019-11-25 ENCOUNTER — TELEPHONE (OUTPATIENT)
Dept: FAMILY MEDICINE | Facility: CLINIC | Age: 24
End: 2019-11-25

## 2019-11-25 RX ORDER — SERTRALINE HYDROCHLORIDE 100 MG/1
TABLET, FILM COATED ORAL
Qty: 135 TABLET | Refills: 0 | Status: SHIPPED | OUTPATIENT
Start: 2019-11-25 | End: 2020-02-20

## 2019-11-25 NOTE — TELEPHONE ENCOUNTER
Reason for call:  Form  Reason for Call:  Form, our goal is to have forms completed with 72 hours, however, some forms may require a visit or additional information.    Type of letter, form or note:  Medical    Who is the form from?: [facility]      Where did the form come from: form was faxed in    What clinic location was the form placed at?: [facility] - [phone]    Where the form was placed: 's in box     What number is listed as a contact on the form?: [phone]       Additional comments: Fax back to [phone]    Call taken on 11/25/2019 at 1:53 PM by Vasquez Mcmillan

## 2019-11-27 ENCOUNTER — TELEPHONE (OUTPATIENT)
Dept: FAMILY MEDICINE | Facility: CLINIC | Age: 24
End: 2019-11-27

## 2019-11-27 NOTE — TELEPHONE ENCOUNTER
Our goal is to have forms completed with 72 hours, however some forms may require a visit or additional information.    Who is the form from?: [facility]  (if other please explain)  Where the form came from: form was faxed in  What clinic location was the form placed at?: [facility]  Where the form was placed: placed in [facility] inbox     What number is listed as a contact on the form?: [phone]  Fax number to return form to:  [phone]      Call taken on 11/27/2019 at 8:33 AM by Floridalma Merino

## 2019-12-03 ENCOUNTER — HOSPITAL ENCOUNTER (OUTPATIENT)
Dept: MRI IMAGING | Facility: CLINIC | Age: 24
Discharge: HOME | End: 2019-12-03
Attending: PHYSICIAN ASSISTANT | Admitting: PHYSICIAN ASSISTANT
Payer: MEDICARE

## 2019-12-03 DIAGNOSIS — L89.313 DECUBITUS ULCER OF RIGHT ISCHIUM, STAGE 3 (H): ICD-10-CM

## 2019-12-03 PROCEDURE — 25500064 ZZH RX 255 OP 636: Performed by: PHYSICIAN ASSISTANT

## 2019-12-03 PROCEDURE — 72197 MRI PELVIS W/O & W/DYE: CPT

## 2019-12-03 PROCEDURE — A9585 GADOBUTROL INJECTION: HCPCS | Performed by: PHYSICIAN ASSISTANT

## 2019-12-03 RX ORDER — GADOBUTROL 604.72 MG/ML
0.1 INJECTION INTRAVENOUS ONCE
Status: DISCONTINUED | OUTPATIENT
Start: 2019-12-03 | End: 2019-12-03

## 2019-12-03 RX ADMIN — GADOBUTROL 7 ML: 604.72 INJECTION INTRAVENOUS at 17:23

## 2019-12-04 ENCOUNTER — TELEPHONE (OUTPATIENT)
Dept: WOUND CARE | Facility: CLINIC | Age: 24
End: 2019-12-04

## 2019-12-04 DIAGNOSIS — L89.313 PRESSURE INJURY OF RIGHT ISCHIUM, STAGE 3 (H): Primary | ICD-10-CM

## 2019-12-04 NOTE — TELEPHONE ENCOUNTER
Spoke with patient over the phone. MRI suspicious for osteo of R IT. Hopefully acute as sore is relatively new. Will refer to ID with the hope to initiate IV antibiotics so we can start wound VAC. Will also refer to Dr. Zacarias in January for surgical consult in case this is needed.

## 2019-12-05 ENCOUNTER — TRANSFERRED RECORDS (OUTPATIENT)
Dept: HEALTH INFORMATION MANAGEMENT | Facility: CLINIC | Age: 24
End: 2019-12-05

## 2019-12-06 ENCOUNTER — TRANSFERRED RECORDS (OUTPATIENT)
Dept: HEALTH INFORMATION MANAGEMENT | Facility: CLINIC | Age: 24
End: 2019-12-06

## 2019-12-09 ENCOUNTER — TELEPHONE (OUTPATIENT)
Dept: WOUND CARE | Facility: CLINIC | Age: 24
End: 2019-12-09

## 2019-12-09 NOTE — TELEPHONE ENCOUNTER
Attempted to call RN and her voicemail is not set up. Will fax the end of visit summary to [facility] in [place].

## 2019-12-09 NOTE — PROGRESS NOTES
[facility] [place]  [address], [address]  [place] [address]  [phone]  Dept: [phone]    PRE-OP EVALUATION:  Today's date: 2019    Dianna Cottrell (: 1995) presents for pre-operative evaluation assessment as requested by Dr. Max.  She requires evaluation and anesthesia risk assessment prior to undergoing surgery/procedure for treatment of Bladder botox .    Fax number for surgical facility:   Primary Physician: Suzan Willis  Type of Anesthesia Anticipated: to be determined    Patient has a Health Care Directive or Living Will:  NO    Preop Questions 2019   Who is doing your surgery? mason max   What are you having done? botox on bladder   Date of Surgery/Procedure: 2019   Facility or Hospital where procedure/surgery will be performed: [facility]   1.  Do you have a history of Heart attack, stroke, stent, coronary bypass surgery, or other heart surgery? No   2.  Do you ever have any pain or discomfort in your chest? No   3.  Do you have a history of  Heart Failure? No   4.   Are you troubled by shortness of breath when:  walking on a level surface, or up a slight hill, or at night? No   5.  Do you currently have a cold, bronchitis or other respiratory infection? No   6.  Do you have a cough, shortness of breath, or wheezing? No   7.  Do you sometimes get pains in the calves of your legs when you walk? No   8. Do you or anyone in your family have previous history of blood clots? YES - pt with DVT in pregnancy    9.  Do you or does anyone in your family have a serious bleeding problem such as prolonged bleeding following surgeries or cuts? No   10. Have you ever had problems with anemia or been told to take iron pills? No   11. Have you had any abnormal blood loss such as black, tarry or bloody stools, or abnormal vaginal bleeding? No   12. Have you ever had a blood transfusion? No   13. Have you or any of your relatives ever had problems  "with anesthesia? YES-succinylcholine noted in history- Spinal cord injury 12/18/12, patient at risk for extrajunctional receptors and hyperkalemia    14. Do you have sleep apnea, excessive snoring or daytime drowsiness? No   15. Do you have any prosthetic heart valves? No   16. Do you have prosthetic joints? No   17. Is there any chance that you may be pregnant? No         HPI:     HPI related to upcoming procedure: neurogenic bladder with incontinence due to quadriplegia. She has more difficulty with her bladder for a few months. Has been wet between intermittent cathing. Had skin breakdown from not staying dry. Her urologist now has her with indwelling catheter until she can have botox injections due to the skin issues.  She went to ER in [place] in [place] last week for febrile UTI concerns after noticing blood in urinary catheter. She had fever. She can't feel pain really with quadriplegic status. She states they were concerned for kidney infection. They did IV abx, IV fluids. She is on levaquin.     She has an ischial pressure sore for a few months.   [facility]   Has been seeing wound care since original sore a few years ago- 2016. Sees them every 2 weeks.   Recently she had MRI - concern for osteomyelitis. They did not start antibiotics yet.   Had wound vac in the past for ulcer in this area in 2016. She would like that again f its an option.   She states she does not reposition as often as she should- \"me being stubborn\". States she and therapist discussed and may have periods of up to 17 hr of not repositioning.    PMR is [facility] neuroscience and [facility] for psychology/mental health. Next PMR visit is Jan 5, 2020  On midodrine for hypotension and baclofen for spasticity and gabapentin at night for tingling/sensation changes- rx from her PMR MD Pak at [facility] [employer] - last visit with him was 03/01/2019. Hx of autonomic dysreflexia- " infrequently but triggers have been bladder distention.     Mirena IUD placed 12/13/2017. Spotting sporadically. Sexually active -not presently     DVT during 2nd pregnancy as noted above. No longer on blood thinners. Previously have consulted with pt's hematology provider prior to botox injections- Bladder injections are a low risk procedure in regard to clotting and a place where bleeding would be an issue so advised to defer prophylactic anticoagulation.     Doing well on her sertraline. Sees psychologist at [facility] regularly.      Pt denies any personal medical history of diabetes, hypertension, hyperlipidemia, thyroid problems, CKD, irregular heartbeat/a.fib, CAD/PVD, sleep apnea, Asthma/COPD.   Patient is a nonsmoker.    MEDICAL HISTORY:     Patient Active Problem List    Diagnosis Date Noted     Autonomic dysreflexia 12/13/2019     Priority: Medium     History of- infrequently; triggers bladder distention       Leukocytosis 12/13/2019     Priority: Medium     Pressure injury of right ischium, stage 3 (H) 10/14/2019     Priority: Medium     Quadriplegia, post-traumatic (H)- incomplete quad, limited use upper extremities 02/11/2019     Priority: Medium     Major depressive disorder with single episode, in partial remission (H) 02/11/2019     Priority: Medium     АНДРЕЙ (generalized anxiety disorder) 05/10/2018     Priority: Medium     H/O: pneumonia 02/14/2018     Priority: Medium     IUD (intrauterine device) in place- placed 12/2017 01/12/2018     Priority: Medium     Lesions of vulva 12/31/2016     Priority: Medium     DVT complicating pregnancy 11/08/2016     Priority: Medium     11/8/2016       c5 burst fracture 12/18/2012     Priority: Medium     C5-C7 fracture with cord injury, Dec. 2012          Past Medical History:   Diagnosis Date     Anemia     with pregnancy     c5 burst fracture 12/18/2012    C5-C7 fracture with cord injury     Compression fracture of L1 lumbar vertebra (H) 12/18/2012    L1 superior  endplate compression fracture     Fracture of thoracic spine without spinal cord lesion (H) 2012    T3-T8 spinous process fractures     Hypertension 2016     Vocal cord dysfunction     Left vocal cord weakness noted by ENT post extubation 2012     Past Surgical History:   Procedure Laterality Date     C4-C7 interbody fusion with anterior screw and plate fixation and posterior erna and pedicle screw fixation with interspace bone graft and C5 and C6 partial corpectomies  2012      SECTION  2013    Procedure:  SECTION;   Section ;  Surgeon: Ricki Nelson MD;  Location: [facility]      SECTION N/A 2016    Procedure:  SECTION;  Surgeon: Floridalma Kiran MD;  [facility]     IR IVC FILTER PLACEMENT  2012     LUMBAR DRAIN  2012     Current Outpatient Medications   Medication Sig Dispense Refill     acetaminophen (TYLENOL) 325 MG tablet Take 325-650 mg by mouth every 6 hours as needed.       baclofen (LIORESAL) 10 MG tablet Take 10 mg by mouth 3 times daily.       BISACODYL 1 suppository daily        Cranberry 450 MG TABS Take 450 mg by mouth daily.       Docusate Sodium (COLACE PO) Take by mouth daily.        gabapentin (NEURONTIN) 300 MG capsule Take 300 mg by mouth daily        GABAPENTIN PO        hydrOXYzine (VISTARIL) 25 MG capsule Take 1 capsule (25 mg) by mouth 3 times daily as needed for anxiety 20 capsule 0     hyoscyamine (ANASPAZ/LEVSIN) 0.125 MG tablet TAKE 1 TABLET BY MOUTH THREE TIMES A DAY.  3     midodrine (PROAMATINE) 5 MG tablet Take 10 mg by mouth 2 times daily  6 tablet 0     Multiple Vitamin (DAILY MULTIVITAMIN PO) Take 2 tablets by mouth daily       order for DME [facility] Phone [phone] Fax [phone]    Primary Dressing Mesalt 2x2 Qty 5 sheets  Secondary Dressing 4x4 adhesive foam Qty 30  One box of skin prep, please contact patient's CADI waiver if not covered by insurance  Length of Need: 1  "month  Frequency of dressing change: daily 30 days 0     order for DME Equipment being ordered: [facility] Fax [phone]    Wheelchair seating eval and mapping of current cushion 30 days 0     order for DME Equipment being ordered: Pressure Mapping of wheelchair at [facility] Phone [phone] Fax [phone] 1 Device 0     order for DME Equipment being ordered: Wheelchair-  \"Patient continues to need and use daily her power wheelchair and the wheelchair will continue to need repairs for the next 12 months.\" 1 each 0     povidone-iodine 10 % swab        senna-docusate (SENOKOT-S;PERICOLACE) 8.6-50 MG per tablet Take 1-2 tablets by mouth 2 times daily as needed for constipation 40 tablet 0     sertraline (ZOLOFT) 100 MG tablet TAKE 1.5 TABLETS BY MOUTH DAILY 135 tablet 0     TRAZODONE HCL 1 tablet. PRN       OTC products: None, except as noted above    Allergies   Allergen Reactions     Succinylcholine      Spinal cord injury 12/18/12, patient at risk for extrajunctional receptors and hyperkalemia      Latex Allergy: NO    Social History     Tobacco Use     Smoking status: Former Smoker     Packs/day: 0.50     Types: Cigarettes     Smokeless tobacco: Never Used     Tobacco comment: was social smoker    Substance Use Topics     Alcohol use: No     Alcohol/week: 0.0 standard drinks     Frequency: Never     History   Drug Use     Types: Marijuana     Comment: pt states \"sometimes\"       REVIEW OF SYSTEMS:   CONSTITUTIONAL: NEGATIVE for fever, chills- since the ER visit   INTEGUMENTARY/SKIN: as above  EYES: NEGATIVE for vision changes or irritation  ENT/MOUTH: NEGATIVE for ear, mouth and throat problems  RESP: NEGATIVE for significant cough or SOB  BREAST: NEGATIVE for masses, tenderness or discharge  CV: NEGATIVE for chest pain, palpitations or peripheral edema  GI: NEGATIVE for nausea, abdominal pain, heartburn, or change in bowel habits  : as above   MUSCULOSKELETAL: NEGATIVE for significant " "arthralgias or myalgia  NEURO: in powerchair; quadriplegic    ENDOCRINE: NEGATIVE for temperature intolerance, skin/hair changes  HEME: NEGATIVE for bleeding problems  PSYCHIATRIC: NEGATIVE for changes in mood or affect; ran out of her sertraline for awhile \"it was bad, I need to be on it, very emotional\". Has been back on and feeling stable.     EXAM:   BP 96/72   Pulse 88   Temp 99  F (37.2  C) (Temporal)   Resp 16   SpO2 94%   GENERAL: thin, well appearing, sitting in power chair.   EYES: Eyes grossly normal to inspection, wearing glasses, PERRL and conjunctivae and sclerae normal  HENT: ear canals and TM's normal, nose and mouth without ulcers or lesions  NECK: no adenopathy, no asymmetry, thyroid normal to palpation  RESP: lungs clear to auscultation - no rales, rhonchi or wheezes  CV: regular rate and rhythm, normal S1 S2, no S3 or S4, no murmur, click or rub  ABDOMEN: soft, nontender, no masses and bowel sounds normal  : limited exam of labia w/pt reclined in power chair- no obvious erythema of visible labia majora or skin breakdown, catheter in place.   MS: atrophic lower extremities, no peripheral edema and peripheral pulses strong, no edema  PSYCH: mentation appears normal, affect normal/bright, no agitation, speech normal volume, eye contact good. No agitaiton.   SKIN: warm and dry, no lesions  NEURO: alert and oriented, mentation intact and speech normal  PSYCH:mentation appears normal. and affect normal/bright  LYMPHATICS: No axillary, cervical, or supraclavicular nodes    DIAGNOSTICS:     Results for orders placed or performed in visit on 12/12/19   CBC with platelets     Status: Abnormal   Result Value Ref Range    WBC 12.2 (H) 4.0 - 11.0 10e9/L    RBC Count 4.65 3.8 - 5.2 10e12/L    Hemoglobin 13.4 11.7 - 15.7 g/dL    Hematocrit 40.8 35.0 - 47.0 %    MCV 88 78 - 100 fl    MCH 28.8 26.5 - 33.0 pg    MCHC 32.8 31.5 - 36.5 g/dL    RDW 14.2 10.0 - 15.0 %    Platelet Count 345 150 - 450 10e9/L   HCG " Qual, Urine ([id number])     Status: None   Result Value Ref Range    HCG Qual Urine Negative NEG^Negative         Recent Labs   Lab Test 05/31/19  1428 02/06/18 01/12/18  1338  12/08/16  0705 12/07/16  1550   HGB 13.5  --  14.0   < > 8.7*  --      --  366   < > 496*  --    NA  --   --   --   --  140 140   POTASSIUM  --  3.4*  --   --  3.8 3.4   CR  --  0.39*  --   --  0.33* 0.33*    < > = values in this interval not displayed.        IMPRESSION:   Reason for surgery/procedure: Neurogenic bladder   Diagnosis/reason for consult: preoperative clearance     The proposed surgical procedure is considered LOW risk.    REVISED CARDIAC RISK INDEX  The patient has the following serious cardiovascular risks for perioperative complications such as (MI, PE, VFib and 3  AV Block):  No serious cardiac risks  INTERPRETATION: 1 risks: Class II (low risk - 0.9% complication rate)    The patient has the following additional risks for perioperative complications:  Hx of DVT      ICD-10-CM    1. Preop general physical exam Z01.818 CBC with platelets     HCG Qual, Urine ([id number])   2. Neurogenic bladder N31.9    3. Pressure injury of right ischium, stage 3 (H) L89.313    4. Quadriplegia, post-traumatic (H)- incomplete quad, limited use upper extremities G82.50    5. Personal history of DVT (deep vein thrombosis) Z86.718    6. Leukocytosis, unspecified type D72.829    7. Need for vaccination Z23 VACCINE ADMINISTRATION, EACH ADDITIONAL     1. Preop general physical exam  - CBC with platelets  - HCG Qual, Urine ([id number])    2. Neurogenic bladder  Surgery as scheduled. Continue with urology     3. Pressure injury of right ischium, stage 3 (H)  Pt is seeing wound care team today.   Discussed risks for pressure sores. She has admitted to not allowing repositioning as often as advised- advised following recommendations from care team and PMR MD and wound team.     4. Quadriplegia, post-traumatic (H)- incomplete quad, limited use upper  extremities  Continue w/PMR MD  Discussed today pt's are teams are across multiple health systems and various parts of the [facility] area- [facility], [facility], [facility], and [place] (nearest ER).   She is not interested in trying to consolidate to 1 health system and prefers to continue with her known care teams.     5. Personal history of DVT (deep vein thrombosis)  Bladder injections are a low risk procedure in regard to clotting and a place where bleeding would be an issue so advised to defer prophylactic anticoagulation.    6. Leukocytosis, unspecified type  Noted on prior CBCs dating back a number of years. Consider hematology consult. Stable.     7. Need for vaccination  Updated immunizations today   - INFLUENZA VACCINE IM > 6 MONTHS VALENT IIV4 [40617]  - VACCINE ADMINISTRATION, INITIAL  - PPSV23, IM/SUBQ (2+ YRS) - [id number]  - VACCINE ADMINISTRATION, EACH ADDITIONAL        RECOMMENDATIONS:       Pulmonary Risk  Incentive spirometry post op  Respiratory Therapy (Respiratory Care IP Consult)  post op  NG tube decompression if abdominal distension or significant vomiting       --Patient is to take all scheduled medications on the day of surgery    APPROVAL GIVEN to proceed with proposed procedure, without further diagnostic evaluation       Signed Electronically by: Suzan Willis PA-C    Copy of this evaluation report is provided to requesting physician.    [facility] Preop Guidelines    Revised Cardiac Risk Index  Answers for HPI/ROS submitted by the patient on 12/12/2019   If you checked off any problems, how difficult have these problems made it for you to do your work, take care of things at home, or get along with other people?: Somewhat difficult  PHQ9 TOTAL SCORE: 4  АНДРЕЙ 7 TOTAL SCORE: 3

## 2019-12-09 NOTE — PATIENT INSTRUCTIONS
Before Your Surgery      Call your surgeon if there is any change in your health. This includes signs of a cold or flu (such as a sore throat, runny nose, cough, rash or fever).    Do not smoke, drink alcohol or take over the counter medicine (unless your surgeon or primary care doctor tells you to) for the 24 hours before and after surgery.    If you take prescribed drugs: Follow your doctor s orders about which medicines to take and which to stop until after surgery.    Eating and drinking prior to surgery: follow the instructions from your surgeon    Take a shower or bath the night before surgery. Use the soap your surgeon gave you to gently clean your skin. If you do not have soap from your surgeon, use your regular soap. Do not shave or scrub the surgery site.  Wear clean pajamas and have clean sheets on your bed.     Can take morning meds with small sip of water  Hold the stool softener, cranberry and multivit until later in the day.  Will check labs today

## 2019-12-09 NOTE — TELEPHONE ENCOUNTER
[facility] had not received referral, faxed referral to them.  Called, left message with Dianna of actions taken.

## 2019-12-09 NOTE — TELEPHONE ENCOUNTER
Dianna called because they have not received the referral for infection control yet.  Please call her when completed at   [phone].

## 2019-12-09 NOTE — TELEPHONE ENCOUNTER
[facility] Wound    Who is the name of the provider?:  Kassidy      What is the location you see this provider at?: [place]    Reason for call:  Needs to updated Rx for wound supplies.    Can we leave a detailed message on this number?  YES

## 2019-12-10 ENCOUNTER — TELEPHONE (OUTPATIENT)
Dept: WOUND CARE | Facility: CLINIC | Age: 24
End: 2019-12-10

## 2019-12-10 DIAGNOSIS — L89.153 DECUBITUS ULCER OF SACRAL REGION, STAGE 3 (H): ICD-10-CM

## 2019-12-10 NOTE — TELEPHONE ENCOUNTER
[facility] Wound    Who is the name of the provider?:  Kassidy      What is the location you see this provider at?: [place]    Reason for call:   2nd Message   Needs order for daily wound care and a Rx for supplies.  [facility] cannot fill supplies w/o order.   Wound is getting deeper.    Can we leave a detailed message on this number?  YES

## 2019-12-12 ENCOUNTER — HOSPITAL ENCOUNTER (OUTPATIENT)
Dept: WOUND CARE | Facility: CLINIC | Age: 24
Discharge: HOME | End: 2019-12-12
Attending: PHYSICIAN ASSISTANT | Admitting: PHYSICIAN ASSISTANT
Payer: MEDICARE

## 2019-12-12 ENCOUNTER — OFFICE VISIT (OUTPATIENT)
Dept: FAMILY MEDICINE | Facility: CLINIC | Age: 24
End: 2019-12-12
Payer: MEDICARE

## 2019-12-12 VITALS
HEART RATE: 88 BPM | OXYGEN SATURATION: 94 % | TEMPERATURE: 99 F | RESPIRATION RATE: 16 BRPM | SYSTOLIC BLOOD PRESSURE: 96 MMHG | DIASTOLIC BLOOD PRESSURE: 72 MMHG

## 2019-12-12 VITALS
TEMPERATURE: 97 F | RESPIRATION RATE: 16 BRPM | HEART RATE: 121 BPM | SYSTOLIC BLOOD PRESSURE: 111 MMHG | DIASTOLIC BLOOD PRESSURE: 58 MMHG

## 2019-12-12 DIAGNOSIS — L89.313 PRESSURE INJURY OF RIGHT ISCHIUM, STAGE 3 (H): ICD-10-CM

## 2019-12-12 DIAGNOSIS — N31.9 NEUROGENIC BLADDER: ICD-10-CM

## 2019-12-12 DIAGNOSIS — Z23 NEED FOR VACCINATION: ICD-10-CM

## 2019-12-12 DIAGNOSIS — G82.50 QUADRIPLEGIA, POST-TRAUMATIC (H): Chronic | ICD-10-CM

## 2019-12-12 DIAGNOSIS — Z86.718 PERSONAL HISTORY OF DVT (DEEP VEIN THROMBOSIS): ICD-10-CM

## 2019-12-12 DIAGNOSIS — D72.829 LEUKOCYTOSIS, UNSPECIFIED TYPE: ICD-10-CM

## 2019-12-12 DIAGNOSIS — Z01.818 PREOP GENERAL PHYSICAL EXAM: Primary | ICD-10-CM

## 2019-12-12 DIAGNOSIS — L89.153 DECUBITUS ULCER OF SACRAL REGION, STAGE 3 (H): ICD-10-CM

## 2019-12-12 DIAGNOSIS — S14.109S QUADRIPLEGIA, POST-TRAUMATIC (H): Chronic | ICD-10-CM

## 2019-12-12 LAB
ERYTHROCYTE [DISTWIDTH] IN BLOOD BY AUTOMATED COUNT: 14.2 % (ref 10–15)
HCG UR QL: NEGATIVE
HCT VFR BLD AUTO: 40.8 % (ref 35–47)
HGB BLD-MCNC: 13.4 G/DL (ref 11.7–15.7)
MCH RBC QN AUTO: 28.8 PG (ref 26.5–33)
MCHC RBC AUTO-ENTMCNC: 32.8 G/DL (ref 31.5–36.5)
MCV RBC AUTO: 88 FL (ref 78–100)
PLATELET # BLD AUTO: 345 10E9/L (ref 150–450)
RBC # BLD AUTO: 4.65 10E12/L (ref 3.8–5.2)
WBC # BLD AUTO: 12.2 10E9/L (ref 4–11)

## 2019-12-12 PROCEDURE — 85027 COMPLETE CBC AUTOMATED: CPT | Performed by: PHYSICIAN ASSISTANT

## 2019-12-12 PROCEDURE — 97597 DBRDMT OPN WND 1ST 20 CM/<: CPT

## 2019-12-12 PROCEDURE — 99215 OFFICE O/P EST HI 40 MIN: CPT | Mod: 25 | Performed by: PHYSICIAN ASSISTANT

## 2019-12-12 PROCEDURE — 90732 PPSV23 VACC 2 YRS+ SUBQ/IM: CPT | Performed by: PHYSICIAN ASSISTANT

## 2019-12-12 PROCEDURE — 90686 IIV4 VACC NO PRSV 0.5 ML IM: CPT | Performed by: PHYSICIAN ASSISTANT

## 2019-12-12 PROCEDURE — G0009 ADMIN PNEUMOCOCCAL VACCINE: HCPCS | Performed by: PHYSICIAN ASSISTANT

## 2019-12-12 PROCEDURE — 36415 COLL VENOUS BLD VENIPUNCTURE: CPT | Performed by: PHYSICIAN ASSISTANT

## 2019-12-12 PROCEDURE — 11042 DBRDMT SUBQ TIS 1ST 20SQCM/<: CPT | Performed by: PHYSICIAN ASSISTANT

## 2019-12-12 PROCEDURE — G0008 ADMIN INFLUENZA VIRUS VAC: HCPCS | Performed by: PHYSICIAN ASSISTANT

## 2019-12-12 PROCEDURE — A6021 COLLAGEN DRESSING <=16 SQ IN: HCPCS

## 2019-12-12 PROCEDURE — 11042 DBRDMT SUBQ TIS 1ST 20SQCM/<: CPT

## 2019-12-12 PROCEDURE — A6212 FOAM DRG <=16 SQ IN W/BORDER: HCPCS

## 2019-12-12 PROCEDURE — 81025 URINE PREGNANCY TEST: CPT | Performed by: PHYSICIAN ASSISTANT

## 2019-12-12 RX ORDER — LEVOFLOXACIN 500 MG/1
TABLET, FILM COATED ORAL
Refills: 0 | COMMUNITY
Start: 2019-12-06 | End: 2020-02-05

## 2019-12-12 ASSESSMENT — ANXIETY QUESTIONNAIRES
5. BEING SO RESTLESS THAT IT IS HARD TO SIT STILL: NOT AT ALL
GAD7 TOTAL SCORE: 3
GAD7 TOTAL SCORE: 3
7. FEELING AFRAID AS IF SOMETHING AWFUL MIGHT HAPPEN: NOT AT ALL
7. FEELING AFRAID AS IF SOMETHING AWFUL MIGHT HAPPEN: NOT AT ALL
3. WORRYING TOO MUCH ABOUT DIFFERENT THINGS: SEVERAL DAYS
1. FEELING NERVOUS, ANXIOUS, OR ON EDGE: SEVERAL DAYS
4. TROUBLE RELAXING: NOT AT ALL
GAD7 TOTAL SCORE: 3
6. BECOMING EASILY ANNOYED OR IRRITABLE: NOT AT ALL
2. NOT BEING ABLE TO STOP OR CONTROL WORRYING: SEVERAL DAYS

## 2019-12-12 ASSESSMENT — PATIENT HEALTH QUESTIONNAIRE - PHQ9
10. IF YOU CHECKED OFF ANY PROBLEMS, HOW DIFFICULT HAVE THESE PROBLEMS MADE IT FOR YOU TO DO YOUR WORK, TAKE CARE OF THINGS AT HOME, OR GET ALONG WITH OTHER PEOPLE: SOMEWHAT DIFFICULT
SUM OF ALL RESPONSES TO PHQ QUESTIONS 1-9: 4
SUM OF ALL RESPONSES TO PHQ QUESTIONS 1-9: 4

## 2019-12-12 NOTE — NURSING NOTE
Prior to immunization administration, verified patients identity using patient s name and date of birth. Please see Immunization Activity for additional information.     Screening Questionnaire for Adult Immunization    Are you sick today?   No   Do you have allergies to medications, food, a vaccine component or latex?   No   Have you ever had a serious reaction after receiving a vaccination?   No   Do you have a long-term health problem with heart, lung, kidney, or metabolic disease (e.g., diabetes), asthma, a blood disorder, no spleen, complement component deficiency, a cochlear implant, or a spinal fluid leak?  Are you on long-term aspirin therapy?   No   Do you have cancer, leukemia, HIV/AIDS, or any other immune system problem?   No   Do you have a parent, brother, or sister with an immune system problem?   No   In the past 3 months, have you taken medications that affect  your immune system, such as prednisone, other steroids, or anticancer drugs; drugs for the treatment of rheumatoid arthritis, Crohn s disease, or psoriasis; or have you had radiation treatments?   No   Have you had a seizure, or a brain or other nervous system problem?   No   During the past year, have you received a transfusion of blood or blood     products, or been given immune (gamma) globulin or antiviral drug?   No   For women: Are you pregnant or is there a chance you could become        pregnant during the next month?   No   Have you received any vaccinations in the past 4 weeks?   No     Immunization questionnaire answers were all negative.        Patient instructed to remain in clinic for 15 minutes afterwards, and to report any adverse reaction to me immediately.       Screening performed by Shelbie Acosta MA on 12/12/2019 at 12:08 PM.

## 2019-12-13 ENCOUNTER — TELEPHONE (OUTPATIENT)
Dept: FAMILY MEDICINE | Facility: CLINIC | Age: 24
End: 2019-12-13

## 2019-12-13 PROBLEM — G90.4 AUTONOMIC DYSREFLEXIA: Status: ACTIVE | Noted: 2019-12-13

## 2019-12-13 PROBLEM — D72.829 LEUKOCYTOSIS: Status: ACTIVE | Noted: 2019-12-13

## 2019-12-13 ASSESSMENT — ANXIETY QUESTIONNAIRES: GAD7 TOTAL SCORE: 3

## 2019-12-13 ASSESSMENT — PATIENT HEALTH QUESTIONNAIRE - PHQ9: SUM OF ALL RESPONSES TO PHQ QUESTIONS 1-9: 4

## 2019-12-13 NOTE — TELEPHONE ENCOUNTER
Suzan Willis PA-C Peterson, Katie Rae, PA-C             Please contact pt with the following message:     Negative urine pregnancy. Hemoglobin is normal- no anemia. White count stable .   Suzan Willis PA-C      Left detailed message informing patient.

## 2019-12-17 ENCOUNTER — TELEPHONE (OUTPATIENT)
Dept: WOUND CARE | Facility: CLINIC | Age: 24
End: 2019-12-17

## 2019-12-17 ENCOUNTER — TELEPHONE (OUTPATIENT)
Dept: FAMILY MEDICINE | Facility: CLINIC | Age: 24
End: 2019-12-17

## 2019-12-17 DIAGNOSIS — L89.313 PRESSURE INJURY OF RIGHT ISCHIUM, STAGE 3 (H): Primary | ICD-10-CM

## 2019-12-17 DIAGNOSIS — G82.20 PARAPLEGIA (H): ICD-10-CM

## 2019-12-17 NOTE — DISCHARGE INSTRUCTIONS
[facility]  [address] [facility] [address], [place] [address]  Appointment Phone [phone]    Dianna Cottrell 1995    [facility] fax [phone]    Wound Dressing Change to right ischial tuberosity, bone not exposed but there is scar tissue at base of wound  Cleanse wound with wound cleanser  Apply Endoform Antimicrobial into base of wound  Followed by 4x4 Mepilex border. May use gauze and tape when you run out of foam  Change dressing daily    Call Infectious Disease at [phone] to make an appointment.    Repositioning:    Bed: Reposition pt MINIMALLY every 1-2 hours in bed to relieve pressure and  promote perfusion to tissue.    Chair: When up to chair pt should not sit for longer than one hour total before  returning to bed for at least 10 minutes, again to relieve pressure and promote  perfusion to tissue. We will send a referral to [facility] to get your wheelchair  pressure mapped. Call them at [phone] to make this appointment.    Stop smoking your e-cigarettes    Add in 1 packet of Gael Supplement into your favorite beverage TWICE a day. You may purchase at [facility] outpatient pharmacy located in [facility].  A diet high in protein is important for wound healing, we recommend getting 90 grams of protein per day. Taking protein shakes or bars are a good way to get extra protein in your diet.     Good sources of protein:  Pork 26g per 3 oz  Whey protein powder - 24g per scoop (on average)  Greek yogurt - 23g per 8oz   Chicken or Turkey - 23g per 3oz  Fish - 20-25g per 3oz  Beef - 18-23g per 3oz  Navy beans - 20g per cup  Cottage cheese - 14g per 1/2 cup   Lentils - 13g per 1/4 cup  Beef jerky 13g per 1oz  2% milk - 8g per cup  Peanut butter - 8g per 2 tablespoons  Eggs - 6g per egg  Mixed nuts - 6g per 2oz    Toyin Yi PA-C. December 12, 2019    Call us at [phone] if you have any  questions about your wounds, have redness or  swelling around your wound, have a fever of 101 or greater or if you have any other  problems or concerns. We answer the phone Monday through Friday 8 am to 4 pm,  please leave a message as we check the voicemail frequently throughout the day.    Follow up with Dr. Zacarias as scheduled

## 2019-12-17 NOTE — PROGRESS NOTES
[facility]      HISTORY OF PRESENT ILLNESS: Ms. Dianna Cottrell is a 24-year-old quadriplegic woman known to our clinic for a right IT pressure ulcer who returns for a recurrence of this ulceration. Having issues with UTIs and vaginal yeast infections and feels like she has not been good about managing the extra moisture.     INTERVAL HISTORY:    12/3/19 - MRI suspicious for osteomyelitis    Referred to ID to discuss whether she could trial IV antibiotics to treat osteo, also referred to Dr. Zacarias     Base of wound is now pale scar tissue    Has not made appt with [facility] to have chair mapped    DATE WOUND ACQUIRED: 6/2018     WOUND CARE: [facility]     OFFLOADING: chair mapped well summer 2018     REVIEW OF SYSTEMS:   CONSTITUTIONAL: Denies fever or chills  GI: denies nausea or vomiting      PAST MEDICAL HISTORY:  has a past medical history of Anemia; c5 burst fracture (12/18/2012); Compression fracture of L1 lumbar vertebra (H) (12/18/2012); Fracture of thoracic spine without spinal cord lesion (H) (12/18/2012); Hypertension (12/6/2016); and Vocal cord dysfunction.    SOCIAL HISTORY: has PCA help, has two kids, hoping to do public speaking in the future      MEDICATIONS: reviewed, see med rec for up to date list, negative for anticoagulants, negative for immunosuppressants, negative for NSAIDS     VITALS: /58   Pulse 121   Temp 97  F (36.1  C) (Temporal)   Resp 16       PHYSICAL EXAM:  GENERAL: Patient is alert and oriented and in no acute distress  INTEGUMENTARY:   See wound care flowsheet for detailed exam and wound measurements.       PROCEDURE: 4% topical lidocaine was applied to the wound by the nursing staff. Patient was determined to be capable of making their own medical decisions and informed consent was obtained. Using a 15 blade a surgical debridement was performed down to and including subcutaneous tissue of <20 cm. Hemostasis was achieved with pressure. The patient  tolerated the procedure well.      ASSESSMENT:   1. Stage 3 pressure ulcer R IT  2. Probable R IT osteomyelitis      PLAN:   1. endo + cover dressing of choice  2. Stressed the need for better offloading - get chair mapped at [facility]  3. Patient will need group 2 mattress due to stage 3 ulceration that has failed to improve on a normal mattress despite regular wound cares and repositioning. Patient has impaired sensation and is unable to reposition independently.  4. The patient needs a semi electric hospital bed for the following reasons:The patient requires positioning of the body in ways not feasible with an ordinary bed due to the pressure ulcers which are expected to last at least 1 month. The patient requires a bed height different than a fixed height hospital bed to permit transfers to a wheelchair. The patient requires changes in body position every 2 hours to offload areas of pressure. The patient is repositioned by caregivers as they are unable to make their own body changes due to paraplegia.   5. Referred to ID to discuss antibiotic therapy  6. Referred to Dr. Zacarias to discuss debridement/flap, this would likely be difficult for her as she has small children      FOLLOW-UP: 3 weeks with Dr. Rell AGUILAR PA-C

## 2019-12-17 NOTE — TELEPHONE ENCOUNTER
Reason for call:  Form  Reason for Call:  Form, our goal is to have forms completed with 72 hours, however, some forms may require a visit or additional information.    Type of letter, form or note:  Medical    Who is the form from?: Latanya      Where did the form come from: form was faxed in    What clinic location was the form placed at?: [facility] - [phone]    Where the form was placed: 's in box     What number is listed as a contact on the form?: [phone]       Additional comments: Fax back to [phone]    Call taken on 12/17/2019 at 1:00 PM by Vasquez Mcmillan

## 2019-12-17 NOTE — TELEPHONE ENCOUNTER
Lisette from [facility] wants treatment plan for Dianna.  Call her at .    She also wants treatment plan for another patient who we have never seen.

## 2019-12-17 NOTE — TELEPHONE ENCOUNTER
Returned call to home care nurse who gave a new fax number of [phone] to fax wound care orders to. Discussed with her that the patient needs to call Infectious Disease and [facility] to get her wheelchair pressure mapped. Home care nurse relayed that patient does spend a significant time in bed but admits that the HOB is more than 30 degrees and the patient does not have a Group 2 mattress on her hospital bed frame. An order will be submitted to [facility] for a Group 2 mattress but educated the home care nurse that this is not 100% offloading of the patient's right IT wound while she is sitting straight up in bed and she still needs to lay flat. Verbalizes understanding.

## 2019-12-19 ENCOUNTER — TELEPHONE (OUTPATIENT)
Dept: FAMILY MEDICINE | Facility: CLINIC | Age: 24
End: 2019-12-19

## 2019-12-19 NOTE — TELEPHONE ENCOUNTER
Reason for call:  Form  Reason for Call:  Form, our goal is to have forms completed with 72 hours, however, some forms may require a visit or additional information.    Type of letter, form or note:  Medical    Who is the form from?: [employer]      Where did the form come from: form was faxed in    What clinic location was the form placed at?: [facility] - [phone]    Where the form was placed: 's in box     What number is listed as a contact on the form?: [phone]       Additional comments: Fax back to [phone]    Call taken on 12/19/2019 at 9:03 AM by Vasquez Mcmillan

## 2019-12-24 ENCOUNTER — TELEPHONE (OUTPATIENT)
Dept: WOUND CARE | Facility: CLINIC | Age: 24
End: 2019-12-24

## 2019-12-24 DIAGNOSIS — L89.313 PRESSURE INJURY OF RIGHT ISCHIUM, STAGE 3 (H): Primary | Chronic | ICD-10-CM

## 2019-12-24 NOTE — TELEPHONE ENCOUNTER
Wrote the orders for pressure mapping and faxed them to [facility]. M for Dianna Cottrell that it had been done.

## 2019-12-24 NOTE — TELEPHONE ENCOUNTER
Dianna Johnson called because [facility] has not received the orders for pressure mapping.   They need orders faxed to .  She would like a call back after they have been faxed.   Her phone number is .

## 2019-12-27 ENCOUNTER — TELEPHONE (OUTPATIENT)
Dept: FAMILY MEDICINE | Facility: CLINIC | Age: 24
End: 2019-12-27
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
## 2020-01-07 ENCOUNTER — TRANSFERRED RECORDS (OUTPATIENT)
Dept: HEALTH INFORMATION MANAGEMENT | Facility: CLINIC | Age: 25
End: 2020-01-07

## 2020-01-08 ENCOUNTER — OFFICE VISIT (OUTPATIENT)
Dept: FAMILY MEDICINE | Facility: CLINIC | Age: 25
End: 2020-01-08
Payer: MEDICARE

## 2020-01-08 VITALS
SYSTOLIC BLOOD PRESSURE: 98 MMHG | TEMPERATURE: 97.7 F | HEART RATE: 88 BPM | OXYGEN SATURATION: 98 % | RESPIRATION RATE: 16 BRPM | DIASTOLIC BLOOD PRESSURE: 60 MMHG

## 2020-01-08 DIAGNOSIS — Z12.4 SCREENING FOR MALIGNANT NEOPLASM OF CERVIX: ICD-10-CM

## 2020-01-08 DIAGNOSIS — S14.109S QUADRIPLEGIA, POST-TRAUMATIC (H): Chronic | ICD-10-CM

## 2020-01-08 DIAGNOSIS — G82.50 QUADRIPLEGIA, POST-TRAUMATIC (H): Chronic | ICD-10-CM

## 2020-01-08 DIAGNOSIS — Z87.01 HISTORY OF PNEUMONIA: ICD-10-CM

## 2020-01-08 DIAGNOSIS — Z09 HOSPITAL DISCHARGE FOLLOW-UP: Primary | ICD-10-CM

## 2020-01-08 DIAGNOSIS — R06.2 WHEEZE: ICD-10-CM

## 2020-01-08 PROCEDURE — 99214 OFFICE O/P EST MOD 30 MIN: CPT | Performed by: PHYSICIAN ASSISTANT

## 2020-01-08 RX ORDER — INHALER, ASSIST DEVICES
SPACER (EA) MISCELLANEOUS
Qty: 1 EACH | Refills: 0 | Status: ON HOLD | OUTPATIENT
Start: 2020-01-08 | End: 2023-03-20

## 2020-01-08 RX ORDER — LEVALBUTEROL INHALATION SOLUTION 0.63 MG/3ML
1 SOLUTION RESPIRATORY (INHALATION) EVERY 4 HOURS PRN
Qty: 75 ML | Refills: 2 | Status: SHIPPED | OUTPATIENT
Start: 2020-01-08 | End: 2020-02-05

## 2020-01-08 RX ORDER — LEVALBUTEROL TARTRATE 45 UG/1
45 AEROSOL, METERED ORAL
COMMUNITY
Start: 2020-01-03 | End: 2020-02-05

## 2020-01-08 ASSESSMENT — ANXIETY QUESTIONNAIRES
5. BEING SO RESTLESS THAT IT IS HARD TO SIT STILL: NOT AT ALL
4. TROUBLE RELAXING: NOT AT ALL
GAD7 TOTAL SCORE: 3
7. FEELING AFRAID AS IF SOMETHING AWFUL MIGHT HAPPEN: NOT AT ALL
2. NOT BEING ABLE TO STOP OR CONTROL WORRYING: SEVERAL DAYS
6. BECOMING EASILY ANNOYED OR IRRITABLE: NOT AT ALL
3. WORRYING TOO MUCH ABOUT DIFFERENT THINGS: SEVERAL DAYS
1. FEELING NERVOUS, ANXIOUS, OR ON EDGE: SEVERAL DAYS
GAD7 TOTAL SCORE: 3
7. FEELING AFRAID AS IF SOMETHING AWFUL MIGHT HAPPEN: NOT AT ALL
GAD7 TOTAL SCORE: 3

## 2020-01-08 ASSESSMENT — PATIENT HEALTH QUESTIONNAIRE - PHQ9
SUM OF ALL RESPONSES TO PHQ QUESTIONS 1-9: 2
SUM OF ALL RESPONSES TO PHQ QUESTIONS 1-9: 2
10. IF YOU CHECKED OFF ANY PROBLEMS, HOW DIFFICULT HAVE THESE PROBLEMS MADE IT FOR YOU TO DO YOUR WORK, TAKE CARE OF THINGS AT HOME, OR GET ALONG WITH OTHER PEOPLE: NOT DIFFICULT AT ALL

## 2020-01-08 ASSESSMENT — PAIN SCALES - GENERAL: PAINLEVEL: NO PAIN (0)

## 2020-01-08 NOTE — PATIENT INSTRUCTIONS
Letter of medical necessity for having bladder botox done in hospital setting vs clinic setting- request from urology(?)    Due for pap smear and GYN exam w/ this year.     Repeat chest xray end of February - can be done radiology at [facility] Locations:  [facility] radiology- Call to schedule [phone]     Stop vaping   Use xopenex neb continue for now.  Spacer if using the inhaler     pulm medicine consult - prevention strategies in winter months- nebulized steroids daily vs other options(?)

## 2020-01-09 ENCOUNTER — HOSPITAL ENCOUNTER (OUTPATIENT)
Dept: WOUND CARE | Facility: CLINIC | Age: 25
Discharge: HOME | End: 2020-01-09
Attending: SURGERY | Admitting: SURGERY
Payer: MEDICARE

## 2020-01-09 VITALS
SYSTOLIC BLOOD PRESSURE: 117 MMHG | RESPIRATION RATE: 16 BRPM | HEART RATE: 75 BPM | DIASTOLIC BLOOD PRESSURE: 78 MMHG | TEMPERATURE: 97 F

## 2020-01-09 DIAGNOSIS — L89.313 PRESSURE INJURY OF RIGHT ISCHIUM, STAGE 3 (H): ICD-10-CM

## 2020-01-09 PROCEDURE — 97602 WOUND(S) CARE NON-SELECTIVE: CPT

## 2020-01-09 PROCEDURE — A6209 FOAM DRSG <=16 SQ IN W/O BDR: HCPCS

## 2020-01-09 PROCEDURE — A6021 COLLAGEN DRESSING <=16 SQ IN: HCPCS

## 2020-01-09 ASSESSMENT — PATIENT HEALTH QUESTIONNAIRE - PHQ9: SUM OF ALL RESPONSES TO PHQ QUESTIONS 1-9: 2

## 2020-01-09 ASSESSMENT — ANXIETY QUESTIONNAIRES: GAD7 TOTAL SCORE: 3
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
## 2020-01-09 NOTE — DISCHARGE INSTRUCTIONS
[facility]  [address] [place] [place] [address]    Call us at [phone] if you have any questions about your wounds, have redness or swelling around your wound, have a fever of 101 or greater or if you have any other problems or concerns. We answer the phone Monday through Friday 8 am to 4 pm, please leave a message as we check the voicemail frequently throughout the day.     Dianna Cottrell      1995    [facility] fax [phone]    Wound Dressing Change to right ischial tuberosity, bone not exposed but there is scar tissue at base of wound  Cleanse wound with wound cleanser  Apply Mesalt into base of wound  Followed by 4x4 Mepilex border. May use gauze and tape when you run out of foam  Change dressing daily     Fred Zacarias M.D.. January 9, 2020    Follow up with Provider: Suzan in 4 weeks  Dr. Zacarias's surgery scheduler will call you to schedule surgery

## 2020-01-13 ENCOUNTER — TELEPHONE (OUTPATIENT)
Dept: FAMILY MEDICINE | Facility: CLINIC | Age: 25
End: 2020-01-13

## 2020-01-14 ENCOUNTER — TELEPHONE (OUTPATIENT)
Dept: FAMILY MEDICINE | Facility: CLINIC | Age: 25
End: 2020-01-14

## 2020-01-14 DIAGNOSIS — R06.2 WHEEZE: Primary | ICD-10-CM

## 2020-01-14 NOTE — TELEPHONE ENCOUNTER
Start a prior auth. Pt has side effects on albuterol (shakiness) that she does not have with xopenex.  Suzan Willis PA-C

## 2020-01-14 NOTE — TELEPHONE ENCOUNTER
Prior Authorization Retail Medication Request  Medication/Dose:  levalbuterol (XOPENEX) 0.63 MG/3ML neb solution    Provider, do you want to change the medication or start a PA?    Please outline justification why patient needs this medication?    What medications has patient previously Tried and Failed?      Insurance ID (if provided):    Insurance Phone (if provided):

## 2020-01-14 NOTE — TELEPHONE ENCOUNTER
Reason for Call:  Other prescription    Detailed comments: patient calling because she was informed that the xopenex neb solution is not covered. She is wondering if KP wants to do a PA or prescribe something else. Patient kind of would like to try and get it covered.     Phone Number Patient can be reached at: Cell number on file:    Telephone Information:   Mobile [phone]       Best Time: any    Can we leave a detailed message on this number? YES    Call taken on 1/14/2020 at 9:26 AM by Donya Tuttle

## 2020-01-15 NOTE — TELEPHONE ENCOUNTER
Central Prior Authorization Team   Phone: [phone]      PA Initiation    Medication: levalbuterol (XOPENEX) 0.63 MG/3ML neb solution  Insurance Company: [facility] [employer] - Phone [phone] Fax [phone]  Pharmacy Filling the Rx[id number] - [place], [place] - [address] [place] [address] TRAIL  Filling Pharmacy Phone: [phone]  Filling Pharmacy Fax:    Start Date: 1/15/2020

## 2020-01-16 ENCOUNTER — MEDICAL CORRESPONDENCE (OUTPATIENT)
Dept: HEALTH INFORMATION MANAGEMENT | Facility: CLINIC | Age: 25
End: 2020-01-16

## 2020-01-16 RX ORDER — ALBUTEROL SULFATE 0.83 MG/ML
2.5 SOLUTION RESPIRATORY (INHALATION) EVERY 6 HOURS PRN
Qty: 1 BOX | Refills: 5 | Status: SHIPPED | OUTPATIENT
Start: 2020-01-16 | End: 2020-02-05

## 2020-01-16 NOTE — TELEPHONE ENCOUNTER
PRIOR AUTHORIZATION DENIED    Medication: levalbuterol (XOPENEX) 0.63 MG/3ML neb solution    Denial Date: 1/16/2020    Denial Rational:      Appeal Information:    If you would like to appeal, please supply P/A team with a letter of medical necessity with clinical reason.

## 2020-01-16 NOTE — TELEPHONE ENCOUNTER
Please let pt know prior auth was denied. I will send albuterol in as alternative. Suzan Wilils PA-C

## 2020-01-21 ENCOUNTER — TELEPHONE (OUTPATIENT)
Dept: WOUND CARE | Facility: CLINIC | Age: 25
End: 2020-01-21

## 2020-01-21 DIAGNOSIS — Z87.01 HISTORY OF PNEUMONIA: Primary | ICD-10-CM

## 2020-01-22 ENCOUNTER — TELEPHONE (OUTPATIENT)
Dept: FAMILY MEDICINE | Facility: CLINIC | Age: 25
End: 2020-01-22

## 2020-01-22 NOTE — TELEPHONE ENCOUNTER
Called patient and gave her the surgery scheduler's phone number [phone]. She will call them.

## 2020-01-22 NOTE — TELEPHONE ENCOUNTER
RECORDS RECEIVED FROM: internal   DATE RECEIVED: 4/22/20   NOTES STATUS DETAILS   OFFICE NOTE from referring provider Internal 1/8/20 Suzan Willis   OFFICE NOTE from other specialist N/A    DISCHARGE SUMMARY from hospital N/A    DISCHARGE REPORT from the ER N/A    MEDICATION LIST Internal    IMAGING  (NEED IMAGES AND REPORTS)     CT SCAN N/A    CHEST XRAY (CXR) Received/ internal / in process  2/4/18, 2/6/18, 1/28/18,   4/22/20- scheduled    TESTS     PULMONARY FUNCTION TESTING (PFT) In process 4/22/20- scheduled

## 2020-01-22 NOTE — TELEPHONE ENCOUNTER
Our goal is to have forms completed with 72 hours, however some forms may require a visit or additional information.    Who is the form from?: [facility]  (if other please explain)  Where the form came from: form was faxed in  What clinic location was the form placed at?: [facility]  Where the form was placed: placed in TC inbox     What number is listed as a contact on the form?: [phone]  Fax number to return form to:  [phone]        Call taken on 1/22/2020 at 10:39 AM by Floridalma Merino

## 2020-01-24 ENCOUNTER — TELEPHONE (OUTPATIENT)
Dept: WOUND CARE | Facility: CLINIC | Age: 25
End: 2020-01-24

## 2020-01-24 DIAGNOSIS — L89.313 PRESSURE INJURY OF RIGHT ISCHIUM, STAGE 3 (H): Primary | Chronic | ICD-10-CM

## 2020-01-24 NOTE — TELEPHONE ENCOUNTER
Lisette would like Verbal Orders for patient, due to her paying for her own supplies.     Orders, Monday, Wednesday, Friday. Or As Neccessary

## 2020-01-24 NOTE — TELEPHONE ENCOUNTER
Patient has to pay for dressings on her own. There is minimal drainage. Current order is mesalt and foam and change daily.  Home Care Nurse Lisette wanted to ask for additional Mesalt to be ordered and also a need to change the cover dressing.   Gave verbal orders for :   Cut mesalt into strips fill undermining and leave a 2 inch tail. Cover with adhesive foam and change it mwf as long as there is no more than a quarter sized drainage at the time to change. Lisette the home care nurse said she understood the instructions.   Requests another order for mesalt gauze. Will place the order for that threw [employer].

## 2020-01-24 NOTE — TELEPHONE ENCOUNTER
LVM with verbal orders for patient so she can get her dressings.     Wound Dressing Change to right ischial tuberosity, bone not exposed but there  is scar tissue at base of wound  Cleanse wound with wound cleanser  Apply Mesalt into base of wound  Followed by 4x4 Mepilex border. May use gauze and tape when you run out of foam  Change dressing daily

## 2020-01-27 ENCOUNTER — TELEPHONE (OUTPATIENT)
Dept: WOUND CARE | Facility: CLINIC | Age: 25
End: 2020-01-27

## 2020-02-04 DIAGNOSIS — L89.314 DECUBITUS ULCER OF RIGHT ISCHIUM, STAGE 4 (H): Primary | ICD-10-CM

## 2020-02-04 RX ORDER — CEFAZOLIN SODIUM 2 G/50ML
2 SOLUTION INTRAVENOUS
Status: CANCELLED | OUTPATIENT
Start: 2020-02-04

## 2020-02-04 RX ORDER — CEFAZOLIN SODIUM 1 G/50ML
1 INJECTION, SOLUTION INTRAVENOUS SEE ADMIN INSTRUCTIONS
Status: CANCELLED | OUTPATIENT
Start: 2020-02-04

## 2020-02-05 ENCOUNTER — HOSPITAL ENCOUNTER (OUTPATIENT)
Facility: CLINIC | Age: 25
End: 2020-02-05
Attending: SURGERY | Admitting: SURGERY
Payer: MEDICARE

## 2020-02-05 ENCOUNTER — HOSPITAL ENCOUNTER (OUTPATIENT)
Dept: WOUND CARE | Facility: CLINIC | Age: 25
Discharge: HOME | End: 2020-02-05
Attending: PHYSICIAN ASSISTANT | Admitting: PHYSICIAN ASSISTANT
Payer: MEDICARE

## 2020-02-05 ENCOUNTER — TELEPHONE (OUTPATIENT)
Dept: SURGERY | Facility: CLINIC | Age: 25
End: 2020-02-05

## 2020-02-05 VITALS
HEART RATE: 77 BPM | SYSTOLIC BLOOD PRESSURE: 117 MMHG | DIASTOLIC BLOOD PRESSURE: 79 MMHG | RESPIRATION RATE: 16 BRPM | TEMPERATURE: 97.4 F

## 2020-02-05 DIAGNOSIS — L89.314 DECUBITUS ULCER OF RIGHT ISCHIUM, STAGE 4 (H): ICD-10-CM

## 2020-02-05 DIAGNOSIS — L89.313 PRESSURE INJURY OF RIGHT ISCHIUM, STAGE 3 (H): ICD-10-CM

## 2020-02-05 PROCEDURE — 97602 WOUND(S) CARE NON-SELECTIVE: CPT

## 2020-02-05 PROCEDURE — 99213 OFFICE O/P EST LOW 20 MIN: CPT | Performed by: PHYSICIAN ASSISTANT

## 2020-02-05 PROCEDURE — A6212 FOAM DRG <=16 SQ IN W/BORDER: HCPCS

## 2020-02-05 NOTE — DISCHARGE INSTRUCTIONS
[facility]  [address] [id number] [address]    Call us at [phone] if you have any questions about your wounds, have redness or swelling around your wound, have a fever of 101 or greater or if you have any other problems or concerns. We answer the phone Monday through Friday 8 am to 4 pm, please leave a message as we check the voicemail frequently throughout the day.     Dianna Cottrell      1995    [facility] fax [phone]    Wound care recommendation to right ischial tuberosity  Irrigate undermining with wound cleanser, apply Mesalt strip with the insertion end narrowed into a point into wound bed and leave a tail and cover with 4x4 Mepilex border. Change Monday, Wednesday and Friday     Toyin Yi PA-C. February 5, 2020    Wound Healing Clinic follow up with Suzan in one month and call Dr. Rell Naqvi's surgery scheduler at [phone] to reschedule your flap surgery

## 2020-02-05 NOTE — PROGRESS NOTES
[facility] [place]      HISTORY OF PRESENT ILLNESS: Ms. Dianna Cottrell is a 24-year-old quadriplegic woman who returns for a R IT ulcer. MRI on 12/3/19 was indicative of osteomyelitis. Met with Dr. Zacarias last visit and anticipating a flap procedure in March. Wound has been stable since last follow-up.     DATE WOUND ACQUIRED: 6/2018     WOUND CARE: MeSalt + Mepilex     OFFLOADING: APM mattress, chair with Roho cushion mapped well 12/20/19     REVIEW OF SYSTEMS:   CONSTITUTIONAL: Denies fever or chills  GI: denies nausea or vomiting      PAST MEDICAL HISTORY:  has a past medical history of Anemia; C5 burst fracture (12/18/2012); Compression fracture of L1 lumbar vertebra (H) (12/18/2012); Fracture of thoracic spine without spinal cord lesion (H) (12/18/2012); Hypertension (12/6/2016); and Vocal cord dysfunction.    SOCIAL HISTORY: has PCA help, has two kids, hoping to do public speaking in the future      MEDICATIONS: reviewed, see med rec for up to date list, negative for anticoagulants, negative for immunosuppressants, negative for NSAIDS     VITALS: /79   Pulse 77   Temp 97.4  F (36.3  C) (Temporal)   Resp 16       PHYSICAL EXAM:  GENERAL: Patient is alert and oriented and in no acute distress  INTEGUMENTARY: RIT with stage 4 pressure ulcer measuring 1.2 cm x 1 cm x 1.2 cm, copious amount of serosanguinous drainage (See wound care flowsheet for detailed exam and wound measurements.)         ASSESSMENT:   1. Stage 4 pressure ulcer R IT  2. R IT osteomyelitis      PLAN:   1. To the right IT wound: MeSalt + 4x4 Mepilex foam changed three times a week   2. Foam dressings are medically necessary due to copious drainage from the wounds as well as the offloading properties that they provide.       FOLLOW-UP: 4 weeks      VANIA AGUILAR PA-C

## 2020-02-06 ENCOUNTER — TELEPHONE (OUTPATIENT)
Dept: FAMILY MEDICINE | Facility: CLINIC | Age: 25
End: 2020-02-06

## 2020-02-06 NOTE — TELEPHONE ENCOUNTER
Our goal is to have forms completed with 72 hours, however some forms may require a visit or additional information.    Who is the form from?: [facility]  (if other please explain)  Where the form came from: form was faxed in  What clinic location was the form placed at?: [facility]  Where the form was placed: placed in TC inbox     What number is listed as a contact on the form?: [phone]  Fax number to return form to:  [phone]        Call taken on 2/6/2020 at 3:08 PM by Floridalma Merino

## 2020-02-07 ENCOUNTER — TELEPHONE (OUTPATIENT)
Dept: SURGERY | Facility: CLINIC | Age: 25
End: 2020-02-07

## 2020-02-07 NOTE — TELEPHONE ENCOUNTER
Patient LVM for me that she wants surgery after 3/16.    I LVM for her that the only other available date in March is 3/23. I will tentatively schedule for 3/23 with Dr. Zacarias at [facility].    In my VM, I reminded her that she will need to see her PCP for an H&P within 30 days of surgery.     Provided my direct number.

## 2020-02-11 ENCOUNTER — TELEPHONE (OUTPATIENT)
Dept: SURGERY | Facility: CLINIC | Age: 25
End: 2020-02-11

## 2020-02-11 ENCOUNTER — TELEPHONE (OUTPATIENT)
Dept: WOUND CARE | Facility: CLINIC | Age: 25
End: 2020-02-11

## 2020-02-11 NOTE — TELEPHONE ENCOUNTER
I scheduled surgery for this patient with Dr. Zacarias on 3/23 at [facility].   Scheduled per pt.    I called the patient and was able to confirm the scheduled dates.     I sent a surgery packet to them via US mail, per their preference. This contains education and directions for the surgery.     They are aware that they will receive a call  ~2 days prior to the scheduled procedure and will be given an exact arrival/start time.    Will have a pre-op H&P on 3/12.    Will schedule post-op with [facility].

## 2020-02-12 ENCOUNTER — TELEPHONE (OUTPATIENT)
Dept: FAMILY MEDICINE | Facility: CLINIC | Age: 25
End: 2020-02-12

## 2020-02-12 NOTE — TELEPHONE ENCOUNTER
Our goal is to have forms completed with 72 hours, however some forms may require a visit or additional information.    [facility] other please explain[facility]  Where the form came from: form was faxed in  What clinic location was the form placed at?: [facility]  Where the form was placed: placed in TC inbox     What number is listed as a contact on the form?: [phone]  Fax number to return form to:  [phone]        Call taken on 2/12/2020 at 2:24 PM by Floridalma Merino

## 2020-02-20 DIAGNOSIS — F41.1 GAD (GENERALIZED ANXIETY DISORDER): ICD-10-CM

## 2020-02-20 DIAGNOSIS — F32.4 MAJOR DEPRESSIVE DISORDER WITH SINGLE EPISODE, IN PARTIAL REMISSION (H): ICD-10-CM

## 2020-02-20 RX ORDER — SERTRALINE HYDROCHLORIDE 100 MG/1
TABLET, FILM COATED ORAL
Qty: 135 TABLET | Refills: 0 | Status: SHIPPED | OUTPATIENT
Start: 2020-02-20 | End: 2020-05-18

## 2020-02-21 NOTE — TELEPHONE ENCOUNTER
Prescription approved per [facility] Refill Protocol.  Meño Lin, RN, BSN

## 2020-02-26 ENCOUNTER — TELEPHONE (OUTPATIENT)
Dept: FAMILY MEDICINE | Facility: CLINIC | Age: 25
End: 2020-02-26

## 2020-02-26 NOTE — TELEPHONE ENCOUNTER
Reason for call:  Form  Reason for Call:  Form, our goal is to have forms completed with 72 hours, however, some forms may require a visit or additional information.    Type of letter, form or note:  Medical    Who is the form from?: [facility]      Where did the form come from: form was faxed in    What clinic location was the form placed at?: [facility] - [phone]    Where the form was placed: 's in box     What number is listed as a contact on the form?: [phone]       Additional comments: Fax back to [phone]    Call taken on 2/26/2020 at 4:50 PM by Vasquez Mcmillan

## 2020-03-03 ENCOUNTER — HOSPITAL ENCOUNTER (OUTPATIENT)
Dept: WOUND CARE | Facility: CLINIC | Age: 25
Discharge: HOME | End: 2020-03-03
Attending: PHYSICIAN ASSISTANT | Admitting: PHYSICIAN ASSISTANT
Payer: MEDICARE

## 2020-03-03 VITALS
RESPIRATION RATE: 16 BRPM | SYSTOLIC BLOOD PRESSURE: 107 MMHG | HEART RATE: 110 BPM | TEMPERATURE: 96.7 F | DIASTOLIC BLOOD PRESSURE: 73 MMHG

## 2020-03-03 DIAGNOSIS — L89.313 PRESSURE INJURY OF RIGHT ISCHIUM, STAGE 3 (H): ICD-10-CM

## 2020-03-03 PROCEDURE — A6212 FOAM DRG <=16 SQ IN W/BORDER: HCPCS

## 2020-03-03 PROCEDURE — 99213 OFFICE O/P EST LOW 20 MIN: CPT | Performed by: PHYSICIAN ASSISTANT

## 2020-03-03 PROCEDURE — 97602 WOUND(S) CARE NON-SELECTIVE: CPT

## 2020-03-03 NOTE — PROGRESS NOTES
[place] [facility]      HISTORY OF PRESENT ILLNESS: Ms. Dianna Cottrell is a 24-year-old quadriplegic woman who returns for a R IT ulcer. MRI on 12/3/19 was indicative of osteomyelitis. Met with Dr. Zacarias last visit and anticipating a flap procedure in March. Wound has been stable since last follow-up. As surgery nears she is wondering about other options that would not require as prolonged of bed rest.     DATE WOUND ACQUIRED: 6/2018     WOUND CARE: MeSalt + Mepilex     OFFLOADING: APM mattress, chair with Roho cushion mapped well 12/20/19     REVIEW OF SYSTEMS:   CONSTITUTIONAL: Denies fever or chills  GI: denies nausea or vomiting      PAST MEDICAL HISTORY:  has a past medical history of Anemia; C5 burst fracture (12/18/2012); Compression fracture of L1 lumbar vertebra (H) (12/18/2012); Fracture of thoracic spine without spinal cord lesion (H) (12/18/2012); Hypertension (12/6/2016); and Vocal cord dysfunction.    SOCIAL HISTORY: has PCA help, has two kids, hoping to do public speaking in the future      MEDICATIONS: reviewed, see med rec for up to date list, negative for anticoagulants, negative for immunosuppressants, negative for NSAIDS     VITALS: /73   Pulse 110   Temp 96.7  F (35.9  C) (Temporal)   Resp 16       PHYSICAL EXAM:  GENERAL: Patient is alert and oriented and in no acute distress  INTEGUMENTARY:   Wound (used by OP [facility] only) 10/14/19 1407 Right ischial tuberosity pressure injury (Active)   Length (cm) 1.5 3/3/2020  1:00 PM   Width (cm) 1 3/3/2020  1:00 PM   Depth (cm) 1.2 3/3/2020  1:00 PM   Wound (cm^2) 1.5 cm^2 3/3/2020  1:00 PM   Wound Volume (cm^3) 1.8 cm^3 3/3/2020  1:00 PM   Wound healing % 16.67 3/3/2020  1:00 PM   Undermining [Depth (cm)/Location] 8-1 O'clock/Depth 2.2 3/3/2020  1:00 PM   Dressing Appearance moist drainage 3/3/2020  1:00 PM   Drainage Characteristics/Odor serosanguineous 3/3/2020  1:00 PM   Drainage Amount copious 3/3/2020  1:52 PM    Thickness/Stage Stage 4 3/3/2020  1:34 PM   Base red/granulating 3/3/2020  1:34 PM   Periwound intact 3/3/2020  1:34 PM   Care, Wound non-select wound debridement performed 3/3/2020  1:34 PM         ASSESSMENT:   1. Stage 4 pressure ulcer R IT  2. R IT osteomyelitis      PLAN:   1. Discussed that other options will likely result in recurrence of wound but encouraged her to discuss with Dr. Zacarias before surgery  2. To the right IT wound: MeSalt + 4x4 Mepilex foam changed three times a week   3. Foam dressings are medically necessary due to copious drainage from the wounds as well as the offloading properties that they provide.       FOLLOW-UP: with Dr. Zacarias before surgery      VANIA AGUILAR PA-C

## 2020-03-03 NOTE — DISCHARGE INSTRUCTIONS
[facility]  [address] [id number] [address]    Call us at [phone] if you have any questions about your wounds, have redness  or swelling around your wound, have a fever of 101 or greater or if you have any  other problems or concerns. We answer the phone Monday through Friday 8 am to 4  pm, please leave a message as we check the voicemail frequently throughout the day.    Dianna Cottrell 1995    [facility] fax [phone]    Wound care recommendation to right ischial tuberosity  Irrigate undermining with wound cleanser, apply Mesalt strip with the insertion end narrowed into a point into wound bed and leave a tail and cover with 4x4 Mepilex  border. Change Monday, Wednesday and Friday    Toyin Yi PA-C. March 3, 2020    Wound Healing Clinic follow up with Rell on April 19th at 11:40 to discuss your surgery questions

## 2020-03-06 NOTE — PATIENT INSTRUCTIONS
Before Your Surgery      Call your surgeon if there is any change in your health. This includes signs of a cold or flu (such as a sore throat, runny nose, cough, rash or fever).    Do not smoke, drink alcohol or take over the counter medicine (unless your surgeon or primary care doctor tells you to) for the 24 hours before and after surgery.    If you take prescribed drugs: Follow your doctor s orders about which medicines to take and which to stop until after surgery.    Eating and drinking prior to surgery: follow the instructions from your surgeon    Take a shower or bath the night before surgery. Use the soap your surgeon gave you to gently clean your skin. If you do not have soap from your surgeon, use your regular soap. Do not shave or scrub the surgery site.  Wear clean pajamas and have clean sheets on your bed.     Can take the morning medications with sip of water    Hold the stool softener, cranberry, and multivit until later in the day    Plan to see hematology prior to the wound surgery- in [facility]

## 2020-03-06 NOTE — PROGRESS NOTES
[facility] [place]  [address], [address]  [place] [address]  [phone]  Dept: [phone]    PRE-OP EVALUATION:  Today's date: 3/12/2020    Dianna Cottrell (: 1995) presents for pre-operative evaluation assessment as requested by Dr. Zacarias.  She requires evaluation and anesthesia risk assessment prior to undergoing surgery/procedure for treatment of       Proposed Surgery/ Procedure:  1. Botox bladder injections         2. Debridement right ischial decubitus, possible posterior thigh       flap, possible VAC, possible SPY-PHI.   Date of Surgery/ Procedure:  1. Botox is 3/16/20 with Dr. Max        2. Posterior thigh flap is 3/23/20 with Dr. Zacarias  Time of Surgery/ Procedure: [facility]  Hospital/Surgical Facility:  1. [facility]       2. [facility]      Primary Physician: Suzan Willis  Type of Anesthesia Anticipated: to be determined    Patient has a Health Care Directive or Living Will:  NO    1. NO - Do you have a history of heart attack, stroke, stent, bypass or surgery on an artery in the head, neck, heart or legs?  2. NO - Do you ever have any pain or discomfort in your chest?  3. NO - Do you have a history of  Heart Failure?  4. NO - Are you troubled by shortness of breath when: walking on the level, up a slight hill or at night?  5. NO - Do you currently have a cold, bronchitis or other respiratory infection?  6. NO - Do you have a cough, shortness of breath or wheezing?  7. NO - Do you sometimes get pains in the calves of your legs when you walk?  8. YES - Do you or anyone in your family have previous history of blood clots? Personal hx of DVT in pregnancy #2  9. NO - Do you or does anyone in your family have a serious bleeding problem such as prolonged bleeding following surgeries or cuts?  10. NO - Have you ever had problems with anemia or been told to take iron pills?  11. NO - Have you had any abnormal blood  loss such as black, tarry or bloody stools, or abnormal vaginal bleeding?  12. NO - Have you ever had a blood transfusion?  13. YES - Have you or any of your relatives ever had problems with anesthesia?YES-succinylcholine noted in allergy  history- Spinal cord injury 12/18/12, patient at risk for extrajunctional receptors and hyperkalemia   14. NO - Do you have sleep apnea, excessive snoring or daytime drowsiness?  15. NO - Do you have any prosthetic heart valves?  16. NO - Do you have prosthetic joints?  17. NO - Is there any chance that you may be pregnant?      HPI:     HPI related to upcoming procedure:   1. neurogenic bladder with incontinence due to quadriplegia. She has botox injections every 4-6months. She has a rogers currently because she is wet between intermittent cathing and they are avoiding that due to her wound.     2. Ischial pressure sore  Wound care- Kassidy LIEBERMAN and  at [facility] today.   Has been seeing wound care since original sore a few years ago- 2016. Sees them every 2 weeks.   Had MRI 12/2019- concern for osteomyelitis. No antibiotics   Had wound vac in the past for ulcer in this area in 2016. She would like that again if its an option.      PMR is with [facility] neuroscience (PMR MD Pak) and [facility] for psychology/mental health.   On midodrine for hypotension and baclofen for spasticity and gabapentin at night for tingling/sensation changes. Hx of autonomic dysreflexia- infrequently but triggers have been bladder distention.     DVT during 2nd pregnancy as noted above. No longer on blood thinners. Previously have consulted with pt's hematology provider prior to botox injections- Bladder injections are a low risk procedure in regard to clotting and a place where bleeding would be an issue so advised to defer prophylactic anticoagulation.    She has had frequent pneumonia. She is seeing pulmonology for consult- scheduled for April. She is  concerned she may need to reschedule. No recent fevers, chills, cough, wheeze, or URI sx.     IUD in place     Pt denies any personal medical history of diabetes, hypertension, hyperlipidemia, thyroid problems, CKD, irregular heartbeat/a.fib, CAD/PVD, sleep apnea, Asthma/COPD.       MEDICAL HISTORY:     Patient Active Problem List    Diagnosis Date Noted     Decubitus ulcer of right ischium, stage 4 (H) 02/05/2020     Priority: Medium     Added automatically from request for surgery [id number]       Autonomic dysreflexia 12/13/2019     Priority: Medium     History of- infrequently; triggers bladder distention       Leukocytosis 12/13/2019     Priority: Medium     Pressure injury of right ischium, stage 3 (H) 10/14/2019     Priority: Medium     Quadriplegia, post-traumatic (H)- incomplete quad, limited use upper extremities 02/11/2019     Priority: Medium     Major depressive disorder with single episode, in partial remission (H) 02/11/2019     Priority: Medium     АНДРЕЙ (generalized anxiety disorder) 05/10/2018     Priority: Medium     H/O: pneumonia 02/14/2018     Priority: Medium     IUD (intrauterine device) in place- placed 12/2017 01/12/2018     Priority: Medium     Lesions of vulva 12/31/2016     Priority: Medium     DVT complicating pregnancy 11/08/2016     Priority: Medium     11/8/2016       c5 burst fracture 12/18/2012     Priority: Medium     C5-C7 fracture with cord injury, Dec. 2012          Past Medical History:   Diagnosis Date     Anemia     with pregnancy     c5 burst fracture 12/18/2012    C5-C7 fracture with cord injury     Compression fracture of L1 lumbar vertebra (H) 12/18/2012    L1 superior endplate compression fracture     Fracture of thoracic spine without spinal cord lesion (H) 12/18/2012    T3-T8 spinous process fractures     Hypertension 12/6/2016     Vocal cord dysfunction     Left vocal cord weakness noted by ENT post extubation 12/2012     Past Surgical History:   Procedure Laterality Date  "    C4-C7 interbody fusion with anterior screw and plate fixation and posterior erna and pedicle screw fixation with interspace bone graft and C5 and C6 partial corpectomies  2012      SECTION  2013    Procedure:  SECTION;   Section ;  Surgeon: Ricki Nelson MD;  Location: [facility] L[facility]      SECTION N/A 2016    Procedure:  SECTION;  Surgeon: Floridalma Kiran MD;  Location: [facility]     IR IVC FILTER PLACEMENT  2012     LUMBAR DRAIN  2012     Current Outpatient Medications   Medication Sig Dispense Refill     acetaminophen (TYLENOL) 325 MG tablet Take 325-650 mg by mouth every 6 hours as needed.       baclofen (LIORESAL) 10 MG tablet Take 10 mg by mouth 3 times daily.       BISACODYL 1 suppository daily        Cranberry 450 MG TABS Take 450 mg by mouth daily.       Docusate Sodium (COLACE PO) Take by mouth daily.        gabapentin (NEURONTIN) 300 MG capsule Take 300 mg by mouth daily        GABAPENTIN PO        hydrOXYzine (VISTARIL) 25 MG capsule Take 1 capsule (25 mg) by mouth 3 times daily as needed for anxiety 20 capsule 0     hyoscyamine (ANASPAZ/LEVSIN) 0.125 MG tablet TAKE 1 TABLET BY MOUTH THREE TIMES A DAY.  3     midodrine (PROAMATINE) 5 MG tablet Take 10 mg by mouth 2 times daily  6 tablet 0     Multiple Vitamin (DAILY MULTIVITAMIN PO) Take 2 tablets by mouth daily       order for DME [facility] Phone [phone] Fax [phone]    Primary Dressing Mesalt gauze strips  Qty 12  Secondary Dressing 4x4 Mepilex border Qty 30    Length of Need: 1 month  Frequency of dressing change: every other day 30 Device 0     order for DME Equipment being ordered: [facility]   p: [phone] f: [phone]  EMAIL to [email or web address]  [email or web address]    Request for group 2 air mattress & semi-electric hospital bed    Ht:5'8\"  Wt:110 ;bs  Length of need: lifetime    Pt. is wheelchair bound & has been in a " "comprehensive ulcer treatment program for >2 months. We will follow monthly until wound closure    To obtain medical records:  p: [phone] f: [phone] 1 Device 0     order for DME [facility] Phone [phone] Fax [phone]    Primary Dressing Endoform Antimicrobial 2x2 Qty 1 box  Secondary Dressing [id number] foams Qty 30  Secondary Dressing 4x4 nonsterile gauze Qty 200 ct loaf  Secondary Dressing 2\" medipore tape Qty 3 rolls  One box of skin prep, please contact patient's CADI waiver if not covered by insurance  Length of Need: 1 month  Frequency of dressing change: daily 30 days 0     order for DME Equipment being ordered: [facility] Fax [phone]    Wheelchair seating eval and mapping of current cushion 30 days 0     order for DME Equipment being ordered: Pressure Mapping of wheelchair at [facility] Phone [phone] Fax [phone] 1 Device 0     order for DME Equipment being ordered: Wheelchair-  \"Patient continues to need and use daily her power wheelchair and the wheelchair will continue to need repairs for the next 12 months.\" 1 each 0     povidone-iodine 10 % swab        senna-docusate (SENOKOT-S;PERICOLACE) 8.6-50 MG per tablet Take 1-2 tablets by mouth 2 times daily as needed for constipation 40 tablet 0     SERTRALINE 100 MG PO tablet TAKE 1 AND 1/2 TABLETS BY MOUTH EVERY  tablet 0     spacer (OPTICHAMBER JAIDA) holding chamber To be used with inhaler 1 each 0     OTC products: None, except as noted above    Allergies   Allergen Reactions     Succinylcholine      Spinal cord injury 12/18/12, patient at risk for extrajunctional receptors and hyperkalemia      Latex Allergy: NO    Social History     Tobacco Use     Smoking status: Former Smoker     Packs/day: 0.50     Types: Cigarettes     Smokeless tobacco: Never Used     Tobacco comment: was social smoker/uses an e-cig   Substance Use Topics     Alcohol use: No     Alcohol/week: 0.0 standard drinks     " "Frequency: Never     History   Drug Use     Types: Marijuana     Comment: pt states \"sometimes\"       REVIEW OF SYSTEMS:   CONSTITUTIONAL: NEGATIVE for fever, chills, change in weight  INTEGUMENTARY/SKIN: NEGATIVE for worrisome rashes, moles or lesions  EYES: NEGATIVE for vision changes or irritation  ENT/MOUTH: NEGATIVE for ear, mouth and throat problems  RESP: NEGATIVE for significant cough or SOB  BREAST: NEGATIVE for masses, tenderness or discharge  CV: NEGATIVE for chest pain, palpitations or peripheral edema  GI: NEGATIVE for nausea, abdominal pain, heartburn, or change in bowel habits  : NEGATIVE for frequency, dysuria, or hematuria  MUSCULOSKELETAL: NEGATIVE for significant arthralgias or myalgia  NEURO: in power chair, quadriplegic   ENDOCRINE: NEGATIVE for temperature intolerance, skin/hair changes  HEME: NEGATIVE for bleeding problems  PSYCHIATRIC: NEGATIVE for changes in mood or affect    EXAM:   /64   Pulse 68   Temp 97.6  F (36.4  C) (Temporal)   Resp 16   SpO2 98%   GENERAL: thin, well appearing, sitting in power chair.   EYES: Eyes grossly normal to inspection, wearing glasses, PERRL and conjunctivae and sclerae normal  HENT: ear canals and TM's normal, nose and mouth without ulcers or lesions  NECK: no adenopathy, no asymmetry, thyroid normal to palpation  RESP: lungs clear to auscultation - no rales, rhonchi or wheezes  CV: regular rate and rhythm, normal S1 S2, no S3 or S4, no murmur, click or rub  ABDOMEN: soft, nontender, no masses and bowel sounds normal  MS: atrophic lower extremities, no peripheral edema and peripheral pulses strong, no edema  PSYCH: mentation appears normal, affect normal/bright, no agitation, speech normal volume, eye contact good. No agitaiton.   SKIN: warm and dry, no lesions  NEURO: alert and oriented, mentation intact and speech normal  PSYCH:mentation appears normal. and affect normal/bright  LYMPHATICS: No axillary, cervical, or supraclavicular " nodes      DIAGNOSTICS:     Results for orders placed or performed in visit on 03/12/20   CBC with platelets differential     Status: Abnormal   Result Value Ref Range    WBC 14.9 (H) 4.0 - 11.0 10e9/L    RBC Count 4.81 3.8 - 5.2 10e12/L    Hemoglobin 13.3 11.7 - 15.7 g/dL    Hematocrit 40.6 35.0 - 47.0 %    MCV 84 78 - 100 fl    MCH 27.7 26.5 - 33.0 pg    MCHC 32.8 31.5 - 36.5 g/dL    RDW 14.4 10.0 - 15.0 %    Platelet Count 411 150 - 450 10e9/L    % Neutrophils 68.2 %    % Lymphocytes 21.6 %    % Monocytes 7.0 %    % Eosinophils 2.9 %    % Basophils 0.3 %    Absolute Neutrophil 10.2 (H) 1.6 - 8.3 10e9/L    Absolute Lymphocytes 3.2 0.8 - 5.3 10e9/L    Absolute Monocytes 1.1 0.0 - 1.3 10e9/L    Absolute Eosinophils 0.4 0.0 - 0.7 10e9/L    Absolute Basophils 0.1 0.0 - 0.2 10e9/L    Diff Method Automated Method    Reticulocyte Count     Status: None   Result Value Ref Range    % Retic 1.3 0.5 - 2.0 %    Absolute Retic 65.0 25 - 95 10e9/L         Recent Labs   Lab Test 12/12/19  1207 05/31/19  1428 02/06/18 12/08/16  0705 12/07/16  1550   HGB 13.4 13.5  --    < > 8.7*  --     393  --    < > 496*  --    NA  --   --   --   --  140 140   POTASSIUM  --   --  3.4*  --  3.8 3.4   CR  --   --  0.39*  --  0.33* 0.33*    < > = values in this interval not displayed.        IMPRESSION:   Reason for surgery/procedure: Neurogenic bladder and decubitus ulcer   Diagnosis/reason for consult: preoperative clearance     The proposed surgical procedure is considered INTERMEDIATE risk.    REVISED CARDIAC RISK INDEX  The patient has the following serious cardiovascular risks for perioperative complications such as (MI, PE, VFib and 3  AV Block):  No serious cardiac risks  INTERPRETATION: 2 risks: Class III (moderate risk - 6.6% complication rate)    The patient has the following additional risks for perioperative complications:  Hx of DVT  Quadriplegic status w/hx of pneumonia       ICD-10-CM    1. Preop general physical exam   Z01.818    2. Neurogenic bladder  N31.9    3. Decubitus ulcer of right ischium, stage 4 (H)  L89.314    4. Quadriplegia, post-traumatic (H)- incomplete quad, limited use upper extremities  G82.50    5. Personal history of DVT (deep vein thrombosis)  Z86.718 Oncology/Hematology Adult Referral   6. IUD (intrauterine device) in place- placed 12/2017  Z97.5    7. Leukocytosis, unspecified type  D72.829 Blood Morphology Pathologist Review     CBC with platelets differential     Reticulocyte Count     Oncology/Hematology Adult Referral   8. H/O: pneumonia  Z87.01      1. Preop general physical exam  Labs and evaluation as above     2. Neurogenic bladder  Surgery as scheduled, continue with urology    3. Decubitus ulcer of right ischium, stage 4 (H)  Continue with wound care team.     4. Quadriplegia, post-traumatic (H)- incomplete quad, limited use upper extremities  Continue w/PMR MD    5. Personal history of DVT (deep vein thrombosis)  Bladder injections are a low risk procedure in regard to clotting.  However, her surgery for the pressure ulcer per pt may be 2-3 hr surgery. With her hx of DVT, immobile status, would like hematology consult prior to surgery for anticoagulation recommendations   - Oncology/Hematology Adult Referral; Future    6. IUD (intrauterine device) in place- placed 12/2017  Not sexually active, IUD in place, urine pregnancy not obtained. Can be obtained day of surgery as desired by surgical team .     7. Leukocytosis, unspecified type  Noted on prior CBCs dating back a few years, hematology consult.   - Blood Morphology Pathologist Review  - CBC with platelets differential  - Reticulocyte Count  - Oncology/Hematology Adult Referral; Future    8. H/O: pneumonia  Pt is seeing pulmonology in April.   Advised strongly discontinuation of vaping and smoking     RECOMMENDATIONS:     --Consult hospital rounder / IM to assist post-op medical management    Pulmonary Risk  Incentive spirometry post  op  Respiratory Therapy (Respiratory Care IP Consult)  post op  NG tube decompression if abdominal distension or significant vomiting       --Patient is to take all scheduled medications on the day of surgery EXCEPT for modifications listed below.    APPROVAL GIVEN to proceed with proposed procedure, without further diagnostic evaluation for neurogenic bladder.  Approval for surgery for decubitus ulcer pending consult hematology.      Signed Electronically by: Suzan Willis PA-C    Copy of this evaluation report is provided to requesting physician.    [facility] Preop Guidelines    Revised Cardiac Risk Index

## 2020-03-09 ENCOUNTER — OFFICE VISIT (OUTPATIENT)
Dept: OBGYN | Facility: CLINIC | Age: 25
End: 2020-03-09
Attending: OBSTETRICS & GYNECOLOGY
Payer: MEDICARE

## 2020-03-09 ENCOUNTER — TELEPHONE (OUTPATIENT)
Dept: WOUND CARE | Facility: CLINIC | Age: 25
End: 2020-03-09

## 2020-03-09 VITALS
HEART RATE: 103 BPM | WEIGHT: 112 LBS | BODY MASS INDEX: 18.08 KG/M2 | DIASTOLIC BLOOD PRESSURE: 60 MMHG | SYSTOLIC BLOOD PRESSURE: 95 MMHG

## 2020-03-09 DIAGNOSIS — Z00.00 ENCOUNTER FOR HEALTH MAINTENANCE EXAMINATION: Primary | ICD-10-CM

## 2020-03-09 DIAGNOSIS — N83.201 CYST OF RIGHT OVARY: ICD-10-CM

## 2020-03-09 DIAGNOSIS — Z12.4 SCREENING FOR MALIGNANT NEOPLASM OF CERVIX: ICD-10-CM

## 2020-03-09 PROCEDURE — G0145 SCR C/V CYTO,THINLAYER,RESCR: HCPCS | Performed by: OBSTETRICS & GYNECOLOGY

## 2020-03-09 PROCEDURE — 87624 HPV HI-RISK TYP POOLED RSLT: CPT | Performed by: OBSTETRICS & GYNECOLOGY

## 2020-03-09 PROCEDURE — G0463 HOSPITAL OUTPT CLINIC VISIT: HCPCS | Mod: 25,ZF

## 2020-03-09 PROCEDURE — G0476 HPV COMBO ASSAY CA SCREEN: HCPCS | Performed by: OBSTETRICS & GYNECOLOGY

## 2020-03-09 NOTE — TELEPHONE ENCOUNTER
Lisette requesting orders to see her Tuesday Thursday and Saturday instead of MW. Gave the Ok and also let her know that she is having surgery on 3/23/2020.

## 2020-03-09 NOTE — PROGRESS NOTES
CC/HPI:   Dianna Cottrell is a 24 year old female  who presents today for her annual exam. She is currently using IUD Mirena  and would like to continue with this method of birth control.    She has really light bleeding every once in awhile, nothing consistent. Mirena in place, got it in 2017. Needs Pap today.    Does not desire breast exam today.     Had pneumonia this past winter, but has been healthy otherwise.     HISTORIES:  Patient Active Problem List   Diagnosis     c5 burst fracture     DVT complicating pregnancy     Lesions of vulva     IUD (intrauterine device) in place- placed 2017     H/O: pneumonia     АНДРЕЙ (generalized anxiety disorder)     Quadriplegia, post-traumatic (H)- incomplete quad, limited use upper extremities     Major depressive disorder with single episode, in partial remission (H)     Pressure injury of right ischium, stage 3 (H)     Autonomic dysreflexia     Leukocytosis     Decubitus ulcer of right ischium, stage 4 (H)     Past Medical History:   Diagnosis Date     Anemia     with pregnancy     c5 burst fracture 2012    C5-C7 fracture with cord injury     Compression fracture of L1 lumbar vertebra (H) 2012    L1 superior endplate compression fracture     Fracture of thoracic spine without spinal cord lesion (H) 2012    T3-T8 spinous process fractures     Hypertension 2016     Vocal cord dysfunction     Left vocal cord weakness noted by ENT post extubation 2012     Past Surgical History:   Procedure Laterality Date     C4-C7 interbody fusion with anterior screw and plate fixation and posterior erna and pedicle screw fixation with interspace bone graft and C5 and C6 partial corpectomies  2012      SECTION  2013    Procedure:  SECTION;   Section ;  Surgeon: Ricki Nelson MD;  Location: [facility]      SECTION N/A 2016    Procedure:  SECTION;  Surgeon: Floridalma Kiran MD;  Location: [facility]  "    IR IVC FILTER PLACEMENT  12/19/2012     LUMBAR DRAIN  12/18/2012     Current Outpatient Medications   Medication Sig Dispense Refill     acetaminophen (TYLENOL) 325 MG tablet Take 325-650 mg by mouth every 6 hours as needed.       baclofen (LIORESAL) 10 MG tablet Take 10 mg by mouth 3 times daily.       BISACODYL 1 suppository daily        Cranberry 450 MG TABS Take 450 mg by mouth daily.       Docusate Sodium (COLACE PO) Take by mouth daily.        gabapentin (NEURONTIN) 300 MG capsule Take 300 mg by mouth daily        GABAPENTIN PO        hydrOXYzine (VISTARIL) 25 MG capsule Take 1 capsule (25 mg) by mouth 3 times daily as needed for anxiety 20 capsule 0     hyoscyamine (ANASPAZ/LEVSIN) 0.125 MG tablet TAKE 1 TABLET BY MOUTH THREE TIMES A DAY.  3     midodrine (PROAMATINE) 5 MG tablet Take 10 mg by mouth 2 times daily  6 tablet 0     Multiple Vitamin (DAILY MULTIVITAMIN PO) Take 2 tablets by mouth daily       order for DME [facility] Phone [phone] Fax [phone]    Primary Dressing Mesalt gauze strips  Qty 12  Secondary Dressing 4x4 Mepilex border Qty 30    Length of Need: 1 month  Frequency of dressing change: every other day 30 Device 0     order for DME Equipment being ordered: [facility]   p: [phone] f: [phone]  EMAIL to [email or web address]  [email or web address]    Request for group 2 air mattress & semi-electric hospital bed    Ht:5'8\"  Wt:110 ;bs  Length of need: lifetime    Pt. is wheelchair bound & has been in a comprehensive ulcer treatment program for >2 months. We will follow monthly until wound closure    To obtain medical records:  p: [phone] f: [phone] 1 Device 0     order for DME [facility] Phone [phone] Fax [phone]    Primary Dressing Endoform Antimicrobial 2x2 Qty 1 box  Secondary Dressing 3a3Nzzfyqsj foams Qty 30  Secondary Dressing 4x4 nonsterile gauze Qty 200 ct loaf  Secondary Dressing 2\" medipore tape Qty " "3 rolls  One box of skin prep, please contact patient's CADI waiver if not covered by insurance  Length of Need: 1 month  Frequency of dressing change: daily 30 days 0     order for DME Equipment being ordered: [facility] Fax [phone]    Wheelchair seating eval and mapping of current cushion 30 days 0     order for DME Equipment being ordered[facility] Phone [phone] Fax [phone] 1 Device 0     order for DME Equipment being ordered: Wheelchair-  \"Patient continues to need and use daily her power wheelchair and the wheelchair will continue to need repairs for the next 12 months.\" 1 each 0     povidone-iodine 10 % swab        senna-docusate (SENOKOT-S;PERICOLACE) 8.6-50 MG per tablet Take 1-2 tablets by mouth 2 times daily as needed for constipation 40 tablet 0     SERTRALINE 100 MG PO tablet TAKE 1 AND 1/2 TABLETS BY MOUTH EVERY  tablet 0     spacer (OPTICHAMBER JAIDA) holding chamber To be used with inhaler 1 each 0     Allergies   Allergen Reactions     Succinylcholine      Spinal cord injury 12/18/12, patient at risk for extrajunctional receptors and hyperkalemia     Social History     Socioeconomic History     Marital status: Single     Spouse name: Not on file     Number of children: Not on file     Years of education: Not on file     Highest education level: Not on file   Occupational History     Not on file   Social Needs     Financial resource strain: Not on file     Food insecurity     Worry: Not on file     Inability: Not on file     Transportation needs     Medical: Not on file     Non-medical: Not on file   Tobacco Use     Smoking status: Former Smoker     Packs/day: 0.50     Types: Cigarettes     Smokeless tobacco: Never Used     Tobacco comment: was social smoker/uses an e-cig   Substance and Sexual Activity     Alcohol use: No     Alcohol/week: 0.0 standard drinks     Frequency: Never     Drug use: Yes     Types: Marijuana     " "Comment: pt states \"sometimes\"     Sexual activity: Not Currently     Partners: Male     Birth control/protection: I.U.D.   Lifestyle     Physical activity     Days per week: Not on file     Minutes per session: Not on file     Stress: Not on file   Relationships     Social connections     Talks on phone: Not on file     Gets together: Not on file     Attends Taoism service: Not on file     Active member of club or organization: Not on file     Attends meetings of clubs or organizations: Not on file     Relationship status: Not on file     Intimate partner violence     Fear of current or ex partner: Not on file     Emotionally abused: Not on file     Physically abused: Not on file     Forced sexual activity: Not on file   Other Topics Concern     Parent/sibling w/ CABG, MI or angioplasty before 65F 55M? Not Asked   Social History Narrative     Not on file     Family History   Problem Relation Age of Onset     Hypertension No family hx of      Diabetes No family hx of           Gyn Hx:   No LMP recorded. (Menstrual status: IUD).      Review Of Systems:  CONSTITUTIONAL: NEGATIVE for fever, chills  EYES: NEGATIVE for vision changes   RESP: NEGATIVE for significant cough or SOB  CV: NEGATIVE for chest pain, palpitations   GI: NEGATIVE for nausea, abdominal pain, heartburn, or change in bowel habits  : NEGATIVE for frequency, dysuria, or hematuria  MUSCULOSKELETAL: NEGATIVE for significant arthralgias or myalgia  NEURO: NEGATIVE for weakness, dizziness or paresthesias or headache    EXAM:  BP 95/60   Pulse 103   Wt 50.8 kg (112 lb)   Breastfeeding No   BMI 18.08 kg/m    Body mass index is 18.08 kg/m .    General - pleasant female in no acute distress.  Skin - no suspicious lesions or rashes  Lungs - clear to auscultation bilaterally.  Heart - regular rate and rhythm without murmur.  Breasts- declined  Abdomen - soft, nontender, nondistended, no masses or organomegaly noted.  Musculoskeletal - no gross " deformities, in wheelchair with no sensation in lower extremities or pelvis.  Pelvic - EG: normal  female, rogers present, vulva reveals no erythema or lesions.   BUS: within normal limits.  Vagina: well rugated, no lesions polyps or suspicious  discharge.     Cervix: no lesions, polyps discharge or CMT, anterior. Easily visualized to obtain Pap smear and HPV. IUD strings extend 2.5 cm from cervical os.   Uterus: firm, anteverted, normal size and nontender.  Adnexa: no masses or tenderness.  Anus- normal, no lesions.  Rectovaginal - deferred.      ASSESSMENT/PLAN  Dianna is a 24 year old  who presents for a routine annual wellness visit and is doing well today.     Encounter for health maintenance examination  - Pap with reflex HPV    R ovarian asymmetry  This may represent an ovarian cyst. Plan to imagine to determine next steps.   - Transvaginal US    Additional teaching done at this visit regarding birth control.     The patient will be notified of any results via Telephone Call.    I have reviewed this patient with the attending physician, Dr. Lock.   Zena Shrestha, MS3  [facility] [employer]    I was present with the medical student who participated in the service and in the documentation of the note. I have verified the history and personally performed the physical exam and medical decision making. I agree with the assessment and plan of care as documented in the note.    Carlotta Lock MD

## 2020-03-09 NOTE — LETTER
March 18, 2020      Dianna Cottrell  [address] [facility] [address]        Dear ,    We are writing to inform you of your test results.    {results letter list:[id number]}    Resulted Orders   Pap imaged thin layer screen reflex to HPV if ASCUS - recommended age 25 - 29 years   Result Value Ref Range    PAP NIL     Copath Report         Patient Name: DIANNA COTTRELL  MR#: [id number]  Specimen #: [id number]  Collected: 3/9/2020  Received: 3/11/2020  Reported: 3/13/2020 09:08  Ordering Phy(s): YESSICA SCHUSTER    For improved result formatting, select 'View Enhanced Report Format' under   Linked Documents section.    SPECIMEN/STAIN PROCESS:  Pap imaged thin layer prep screening (Surepath, FocalPoint with guided   screening)       Pap-Cyto x 1, HPV ordered x 1    SOURCE: Endocervical  ----------------------------------------------------------------   Pap imaged thin layer prep screening (Surepath, FocalPoint with guided   screening)  SPECIMEN ADEQUACY:  Satisfactory for evaluation.  -Transformation zone component absent.    CYTOLOGIC INTERPRETATION:    Negative for intraepithelial lesion or malignancy    Electronically signed out by:  FLOR Lane (ASCP)    CLINICAL HISTORY:    Intra-Uterine Device, A previous normal pap  Date of Last Pap: 09/29/2016,    Papanicolaou Test Limitations:  Cervical cytology is a  screening test with   limited sensitivity; regular  screening is critical for cancer prevention; Pap tests are primarily   effective for the diagnosis/prevention of  squamous cell carcinoma, not adenocarcinomas or other cancers.    The technical component of this testing was completed at the [facility], with the professional component performed   at the [facility], [address] [place] [id number] ([phone])    COLLECTION SITE:  Client:  [facility]  [facility], [place]  Location: [facility] (B)       HPV High Risk Types DNA Cervical   Result Value Ref Range    HPV Source SurePath     HPV 16 DNA Positive (A) NEG^Negative    HPV 18 DNA Negative NEG^Negative    Other HR HPV Positive (A) NEG^Negative    Final Diagnosis       This patient's sample is positive for HPV 16 DNA and other HR HPV DNA (types 31, 33, 35,   39, 45, 51, 52, 56, 58, 59, 66 or 68).        Comment:      This test was developed and its performance characteristics determined by the   [facility], [facility]. It   has not been cleared or approved by the FDA. The laboratory is regulated under   CLIA as qualified to perform high-complexity testing. This test is used for   clinical purposes. It should not be regarded as investigational or for   research.  (Note)  METHODOLOGY:  The Roche carrillo 4800 system uses automated extraction,   simultaneous amplification of HPV (L1 region) and beta-globin,    followed by  real time detection of fluorescent labeled HPV and beta   globin using specific oligonucleotide probes . The test specifically   identifies types HPV 16 DNA and HPV 18 DNA while concurrently   detecting the rest of the high risk types (31, 33, 35, 39, 45, 51,   52, 56, 58, 59, 66 or 68).  COMMENTS:  This test is not intended for use as a screening device   for women under age 30 with normal cervical cytology.  Results should   be correl ated with cytologic and histologic findings. Close clinical   followup is recommended.      Specimen Description Cervical Cells       Comment:      [id number]         If you have any questions or concerns, please call the clinic at the number listed above.       Sincerely,        Carlotta Lock MD

## 2020-03-09 NOTE — LETTER
March 18, 2020      Dianna Cottrell  [address] [facility] [facility] [address]        Dear ,    We are writing to inform you of your test results.    {results letter list:[id number]}    Resulted Orders   Pap imaged thin layer screen reflex to HPV if ASCUS - recommended age 25 - 29 years   Result Value Ref Range    PAP NIL     Copath Report         Patient Name: DIANNA COTTRELL  MR#: [id number]  Specimen #: [id number]  Collected: 3/9/2020  Received: 3/11/2020  Reported: 3/13/2020 09:08  Ordering Phy(s): YESSICA SCHUSTER    For improved result formatting, select 'View Enhanced Report Format' under   Linked Documents section.    SPECIMEN/STAIN PROCESS:  Pap imaged thin layer prep screening (Surepath, FocalPoint with guided   screening)       Pap-Cyto x 1, HPV ordered x 1    SOURCE: Endocervical  ----------------------------------------------------------------   Pap imaged thin layer prep screening (Surepath, FocalPoint with guided   screening)  SPECIMEN ADEQUACY:  Satisfactory for evaluation.  -Transformation zone component absent.    CYTOLOGIC INTERPRETATION:    Negative for intraepithelial lesion or malignancy    Electronically signed out by:  FLOR Lane (ASCP)    CLINICAL HISTORY:    Intra-Uterine Device, A previous normal pap  Date of Last Pap: 09/29/2016,    Papanicolaou Test Limitations:  Cervical cytology is a  screening test with   limited sensitivity; regular  screening is critical for cancer prevention; Pap tests are primarily   effective for the diagnosis/prevention of  squamous cell carcinoma, not adenocarcinomas or other cancers.    The technical component of this testing was completed at the [facility], with the professional component performed   at the [facility], [address] [place] [phone] ([phone])    COLLECTION SITE:  Client:  [facility]  [facility], [place]  Location: [facility] (B)       HPV High Risk Types DNA Cervical   Result Value Ref Range    HPV Source SurePath     HPV 16 DNA Positive (A) NEG^Negative    HPV 18 DNA Negative NEG^Negative    Other HR HPV Positive (A) NEG^Negative    Final Diagnosis       This patient's sample is positive for HPV 16 DNA and other HR HPV DNA (types 31, 33, 35,   39, 45, 51, 52, 56, 58, 59, 66 or 68).        Comment:      This test was developed and its performance characteristics determined by the   [facility], [facility]. It   has not been cleared or approved by the FDA. The laboratory is regulated under   CLIA as qualified to perform high-complexity testing. This test is used for   clinical purposes. It should not be regarded as investigational or for   research.  (Note)  METHODOLOGY:  The Roche carrillo 4800 system uses automated extraction,   simultaneous amplification of HPV (L1 region) and beta-globin,    followed by  real time detection of fluorescent labeled HPV and beta   globin using specific oligonucleotide probes . The test specifically   identifies types HPV 16 DNA and HPV 18 DNA while concurrently   detecting the rest of the high risk types (31, 33, 35, 39, 45, 51,   52, 56, 58, 59, 66 or 68).  COMMENTS:  This test is not intended for use as a screening device   for women under age 30 with normal cervical cytology.  Results should   be correl ated with cytologic and histologic findings. Close clinical   followup is recommended.      Specimen Description Cervical Cells       Comment:      [id number]         If you have any questions or concerns, please call the clinic at the number listed above.       Sincerely,        Carlotta Lock MD

## 2020-03-09 NOTE — LETTER
3/9/2020       RE: Dianna Cottrell  [address] [facility] [address]     Dear Colleague,    Thank you for referring your patient, Dianna Cottrell, to the [facility] at [facility]. Please see a copy of my visit note below.    CC/HPI:   Dianna Cottrell is a 24 year old female  who presents today for her annual exam. She is currently using IUD Mirena  and would like to continue with this method of birth control.    She has really light bleeding every once in awhile, nothing consistent. Mirena in place, got it in December of 2017. Needs Pap today.    Does not desire breast exam today.     Had pneumonia this past winter, but has been healthy otherwise.     HISTORIES:  Patient Active Problem List   Diagnosis     c5 burst fracture     DVT complicating pregnancy     Lesions of vulva     IUD (intrauterine device) in place- placed 12/2017     H/O: pneumonia     АНДРЕЙ (generalized anxiety disorder)     Quadriplegia, post-traumatic (H)- incomplete quad, limited use upper extremities     Major depressive disorder with single episode, in partial remission (H)     Pressure injury of right ischium, stage 3 (H)     Autonomic dysreflexia     Leukocytosis     Decubitus ulcer of right ischium, stage 4 (H)     Past Medical History:   Diagnosis Date     Anemia     with pregnancy     c5 burst fracture 12/18/2012    C5-C7 fracture with cord injury     Compression fracture of L1 lumbar vertebra (H) 12/18/2012    L1 superior endplate compression fracture     Fracture of thoracic spine without spinal cord lesion (H) 12/18/2012    T3-T8 spinous process fractures     Hypertension 12/6/2016     Vocal cord dysfunction     Left vocal cord weakness noted by ENT post extubation 12/2012     Past Surgical History:   Procedure Laterality Date     C4-C7 interbody fusion with anterior screw and plate fixation and posterior erna and pedicle screw fixation with interspace bone graft  "and C5 and C6 partial corpectomies  2012      SECTION  2013    Procedure:  SECTION;   Section ;  Surgeon: Ricki Nelson MD;  Location: [facility]      SECTION N/A 2016    Procedure:  SECTION;  Surgeon: Floridalma Kiran MD;  Location: [facility]     IR IVC FILTER PLACEMENT  2012     LUMBAR DRAIN  2012     Current Outpatient Medications   Medication Sig Dispense Refill     acetaminophen (TYLENOL) 325 MG tablet Take 325-650 mg by mouth every 6 hours as needed.       baclofen (LIORESAL) 10 MG tablet Take 10 mg by mouth 3 times daily.       BISACODYL 1 suppository daily        Cranberry 450 MG TABS Take 450 mg by mouth daily.       Docusate Sodium (COLACE PO) Take by mouth daily.        gabapentin (NEURONTIN) 300 MG capsule Take 300 mg by mouth daily        GABAPENTIN PO        hydrOXYzine (VISTARIL) 25 MG capsule Take 1 capsule (25 mg) by mouth 3 times daily as needed for anxiety 20 capsule 0     hyoscyamine (ANASPAZ/LEVSIN) 0.125 MG tablet TAKE 1 TABLET BY MOUTH THREE TIMES A DAY.  3     midodrine (PROAMATINE) 5 MG tablet Take 10 mg by mouth 2 times daily  6 tablet 0     Multiple Vitamin (DAILY MULTIVITAMIN PO) Take 2 tablets by mouth daily       order for DME [facility] Phone [phone] Fax [phone]    Primary Dressing Mesalt gauze strips  Qty 12  Secondary Dressing 4x4 Mepilex border Qty 30    Length of Need: 1 month  Frequency of dressing change: every other day 30 Device 0     order for DME Equipment being ordered: [facility]   p: [phone] f: [phone]  EMAIL to [email or web address]    Request for group 2 air mattress & semi-electric hospital bed    Ht:5'8\"  Wt:110 ;bs  Length of need: lifetime    Pt. is wheelchair bound & has been in a comprehensive ulcer treatment program for >2 months. We will follow monthly until wound closure    To obtain medical records:  p: [phone] " "f: [phone] 1 Device 0     order for DME [facility] Phone [phone] Fax [phone]    Primary Dressing Endoform Antimicrobial 2x2 Qty 1 box  Secondary Dressing [id number] foams Qty 30  Secondary Dressing 4x4 nonsterile gauze Qty 200 ct loaf  Secondary Dressing 2\" medipore tape Qty 3 rolls  One box of skin prep, please contact patient's CADI waiver if not covered by insurance  Length of Need: 1 month  Frequency of dressing change: daily 30 days 0     order for DME Equipment being ordered: [facility] Fax [phone]    Wheelchair seating eval and mapping of current cushion 30 days 0     order for DME Equipment being ordered: Pressure Mapping of wheelchair at [facility] Phone [phone] Fax [phone] 1 Device 0     order for DME Equipment being ordered: Wheelchair-  \"Patient continues to need and use daily her power wheelchair and the wheelchair will continue to need repairs for the next 12 months.\" 1 each 0     povidone-iodine 10 % swab        senna-docusate (SENOKOT-S;PERICOLACE) 8.6-50 MG per tablet Take 1-2 tablets by mouth 2 times daily as needed for constipation 40 tablet 0     SERTRALINE 100 MG PO tablet TAKE 1 AND 1/2 TABLETS BY MOUTH EVERY  tablet 0     spacer (OPTICHAMBER JAIDA) holding chamber To be used with inhaler 1 each 0     Allergies   Allergen Reactions     Succinylcholine      Spinal cord injury 12/18/12, patient at risk for extrajunctional receptors and hyperkalemia     Social History     Socioeconomic History     Marital status: Single     Spouse name: Not on file     Number of children: Not on file     Years of education: Not on file     Highest education level: Not on file   Occupational History     Not on file   Social Needs     Financial resource strain: Not on file     Food insecurity     Worry: Not on file     Inability: Not on file     Transportation needs     Medical: Not on file     Non-medical: Not on file   Tobacco Use     Smoking status: " "Former Smoker     Packs/day: 0.50     Types: Cigarettes     Smokeless tobacco: Never Used     Tobacco comment: was social smoker/uses an e-cig   Substance and Sexual Activity     Alcohol use: No     Alcohol/week: 0.0 standard drinks     Frequency: Never     Drug use: Yes     Types: Marijuana     Comment: pt states \"sometimes\"     Sexual activity: Not Currently     Partners: Male     Birth control/protection: I.U.D.   Lifestyle     Physical activity     Days per week: Not on file     Minutes per session: Not on file     Stress: Not on file   Relationships     Social connections     Talks on phone: Not on file     Gets together: Not on file     Attends [employer] service: Not on file     Active member of club or organization: Not on file     Attends meetings of clubs or organizations: Not on file     Relationship status: Not on file     Intimate partner violence     Fear of current or ex partner: Not on file     Emotionally abused: Not on file     Physically abused: Not on file     Forced sexual activity: Not on file   Other Topics Concern     Parent/sibling w/ CABG, MI or angioplasty before 65F 55M? Not Asked   Social History Narrative     Not on file     Family History   Problem Relation Age of Onset     Hypertension No family hx of      Diabetes No family hx of           Gyn Hx:   No LMP recorded. (Menstrual status: IUD).      Review Of Systems:  CONSTITUTIONAL: NEGATIVE for fever, chills  EYES: NEGATIVE for vision changes   RESP: NEGATIVE for significant cough or SOB  CV: NEGATIVE for chest pain, palpitations   GI: NEGATIVE for nausea, abdominal pain, heartburn, or change in bowel habits  : NEGATIVE for frequency, dysuria, or hematuria  MUSCULOSKELETAL: NEGATIVE for significant arthralgias or myalgia  NEURO: NEGATIVE for weakness, dizziness or paresthesias or headache    EXAM:  BP 95/60   Pulse 103   Wt 50.8 kg (112 lb)   Breastfeeding No   BMI 18.08 kg/m    Body mass index is 18.08 kg/m .    General - " pleasant female in no acute distress.  Skin - no suspicious lesions or rashes  Lungs - clear to auscultation bilaterally.  Heart - regular rate and rhythm without murmur.  Breasts- declined  Abdomen - soft, nontender, nondistended, no masses or organomegaly noted.  Musculoskeletal - no gross deformities, in wheelchair with no sensation in lower extremities or pelvis.  Pelvic - EG: normal  female, rogers present, vulva reveals no erythema or lesions.   BUS: within normal limits.  Vagina: well rugated, no lesions polyps or suspicious  discharge.     Cervix: no lesions, polyps discharge or CMT, anterior. Easily visualized to obtain Pap smear and HPV. IUD strings extend 2.5 cm from cervical os.   Uterus: firm, anteverted, normal size and nontender.  Adnexa: no masses or tenderness.  Anus- normal, no lesions.  Rectovaginal - deferred.      ASSESSMENT/PLAN  Dianna is a 24 year old  who presents for a routine annual wellness visit and is doing well today.     Encounter for health maintenance examination  - Pap with reflex HPV    R ovarian asymmetry  This may represent an ovarian cyst. Plan to imagine to determine next steps.   - Transvaginal US    Additional teaching done at this visit regarding birth control.     The patient will be notified of any results via Telephone Call.    I have reviewed this patient with the attending physician, Dr. Lock.   Zena Shrestha, MS3  [employer]    I was present with the medical student who participated in the service and in the documentation of the note. I have verified the history and personally performed the physical exam and medical decision making. I agree with the assessment and plan of care as documented in the note.    Carlotta Lock MD

## 2020-03-12 ENCOUNTER — OFFICE VISIT (OUTPATIENT)
Dept: FAMILY MEDICINE | Facility: CLINIC | Age: 25
End: 2020-03-12
Payer: MEDICARE

## 2020-03-12 ENCOUNTER — TELEPHONE (OUTPATIENT)
Dept: OBGYN | Facility: CLINIC | Age: 25
End: 2020-03-12

## 2020-03-12 ENCOUNTER — TELEPHONE (OUTPATIENT)
Dept: FAMILY MEDICINE | Facility: CLINIC | Age: 25
End: 2020-03-12

## 2020-03-12 VITALS
RESPIRATION RATE: 16 BRPM | TEMPERATURE: 97.6 F | DIASTOLIC BLOOD PRESSURE: 64 MMHG | OXYGEN SATURATION: 98 % | HEART RATE: 68 BPM | SYSTOLIC BLOOD PRESSURE: 102 MMHG

## 2020-03-12 DIAGNOSIS — Z87.01 H/O: PNEUMONIA: ICD-10-CM

## 2020-03-12 DIAGNOSIS — Z01.818 PREOP GENERAL PHYSICAL EXAM: Primary | ICD-10-CM

## 2020-03-12 DIAGNOSIS — N31.9 NEUROGENIC BLADDER: ICD-10-CM

## 2020-03-12 DIAGNOSIS — Z97.5 IUD (INTRAUTERINE DEVICE) IN PLACE: Chronic | ICD-10-CM

## 2020-03-12 DIAGNOSIS — L89.314 DECUBITUS ULCER OF RIGHT ISCHIUM, STAGE 4 (H): ICD-10-CM

## 2020-03-12 DIAGNOSIS — G82.50 QUADRIPLEGIA, POST-TRAUMATIC (H): Chronic | ICD-10-CM

## 2020-03-12 DIAGNOSIS — S14.109S QUADRIPLEGIA, POST-TRAUMATIC (H): Chronic | ICD-10-CM

## 2020-03-12 DIAGNOSIS — D72.829 LEUKOCYTOSIS, UNSPECIFIED TYPE: ICD-10-CM

## 2020-03-12 DIAGNOSIS — Z86.718 PERSONAL HISTORY OF DVT (DEEP VEIN THROMBOSIS): ICD-10-CM

## 2020-03-12 LAB
BASOPHILS # BLD AUTO: 0.1 10E9/L (ref 0–0.2)
BASOPHILS NFR BLD AUTO: 0.3 %
DIFFERENTIAL METHOD BLD: ABNORMAL
EOSINOPHIL # BLD AUTO: 0.4 10E9/L (ref 0–0.7)
EOSINOPHIL NFR BLD AUTO: 2.9 %
ERYTHROCYTE [DISTWIDTH] IN BLOOD BY AUTOMATED COUNT: 14.4 % (ref 10–15)
HCT VFR BLD AUTO: 40.6 % (ref 35–47)
HGB BLD-MCNC: 13.3 G/DL (ref 11.7–15.7)
LYMPHOCYTES # BLD AUTO: 3.2 10E9/L (ref 0.8–5.3)
LYMPHOCYTES NFR BLD AUTO: 21.6 %
MCH RBC QN AUTO: 27.7 PG (ref 26.5–33)
MCHC RBC AUTO-ENTMCNC: 32.8 G/DL (ref 31.5–36.5)
MCV RBC AUTO: 84 FL (ref 78–100)
MONOCYTES # BLD AUTO: 1.1 10E9/L (ref 0–1.3)
MONOCYTES NFR BLD AUTO: 7 %
NEUTROPHILS # BLD AUTO: 10.2 10E9/L (ref 1.6–8.3)
NEUTROPHILS NFR BLD AUTO: 68.2 %
PLATELET # BLD AUTO: 411 10E9/L (ref 150–450)
RBC # BLD AUTO: 4.81 10E12/L (ref 3.8–5.2)
RETICS # AUTO: 65 10E9/L (ref 25–95)
RETICS/RBC NFR AUTO: 1.3 % (ref 0.5–2)
WBC # BLD AUTO: 14.9 10E9/L (ref 4–11)

## 2020-03-12 PROCEDURE — 36415 COLL VENOUS BLD VENIPUNCTURE: CPT | Performed by: PHYSICIAN ASSISTANT

## 2020-03-12 PROCEDURE — 85025 COMPLETE CBC W/AUTO DIFF WBC: CPT | Performed by: PHYSICIAN ASSISTANT

## 2020-03-12 PROCEDURE — 85045 AUTOMATED RETICULOCYTE COUNT: CPT | Performed by: PHYSICIAN ASSISTANT

## 2020-03-12 PROCEDURE — 85060 BLOOD SMEAR INTERPRETATION: CPT | Performed by: PHYSICIAN ASSISTANT

## 2020-03-12 PROCEDURE — 99214 OFFICE O/P EST MOD 30 MIN: CPT | Performed by: PHYSICIAN ASSISTANT

## 2020-03-12 NOTE — TELEPHONE ENCOUNTER
Our goal is to have forms completed with 72 hours, however some forms may require a visit or additional information.    Who is the form from?: [facility]  (if other please explain)  Where the form came from: form was faxed in  What clinic location was the form placed at?: [facility]  Where the form was placed: placed in TC inbox     What number is listed as a contact on the form?: [phone]  Fax number to return form to:  [phone]        Call taken on 3/12/2020 at 2:19 PM by Floridalma Merino

## 2020-03-13 ENCOUNTER — TELEPHONE (OUTPATIENT)
Dept: HEMATOLOGY | Facility: CLINIC | Age: 25
End: 2020-03-13

## 2020-03-13 LAB
COPATH REPORT: NORMAL
PAP: NORMAL

## 2020-03-16 ENCOUNTER — TRANSFERRED RECORDS (OUTPATIENT)
Dept: HEALTH INFORMATION MANAGEMENT | Facility: CLINIC | Age: 25
End: 2020-03-16

## 2020-03-16 ENCOUNTER — TELEPHONE (OUTPATIENT)
Dept: SURGERY | Facility: CLINIC | Age: 25
End: 2020-03-16

## 2020-03-16 PROBLEM — Z87.01 H/O: PNEUMONIA: Chronic | Status: ACTIVE | Noted: 2018-02-14

## 2020-03-16 NOTE — TELEPHONE ENCOUNTER
Left detailed voicemail that we are cancelling surgery with Dr. Zacarias on 3/23 due to the COVID-19 concerns. Stated that we will call when we are able to reschedule as we are not able to do so at this time.    Noted that I am not a nurse and cannot answer any medical questions regarding this. Provided my direct number.

## 2020-03-17 LAB
FINAL DIAGNOSIS: ABNORMAL
HPV HR 12 DNA CVX QL NAA+PROBE: POSITIVE
HPV16 DNA SPEC QL NAA+PROBE: POSITIVE
HPV18 DNA SPEC QL NAA+PROBE: NEGATIVE
SPECIMEN DESCRIPTION: ABNORMAL
SPECIMEN SOURCE CVX/VAG CYTO: ABNORMAL

## 2020-03-18 LAB — COPATH REPORT: NORMAL

## 2020-03-25 ENCOUNTER — TELEPHONE (OUTPATIENT)
Dept: FAMILY MEDICINE | Facility: CLINIC | Age: 25
End: 2020-03-25

## 2020-03-25 NOTE — TELEPHONE ENCOUNTER
Reason for call:  Form  Reason for Call:  Form, our goal is to have forms completed with 72 hours, however, some forms may require a visit or additional information.    Type of letter, form or note:  Medical    Who is the form from?: [facility]      Where did the form come from: form was faxed in    What clinic location was the form placed at?: [facility] - [phone]    Where the form was placed: 's in box     What number is listed as a contact on the form?: [phone]       Additional comments: Fax back to [phone]    Call taken on 3/25/2020 at 3:56 PM by Vasquez Mcmillan

## 2020-03-25 NOTE — ADDENDUM NOTE
Encounter addended by: Renetta Hong RN on: 3/25/2020 2:21 PM   Actions taken: Flowsheet accepted, Clinical Note Signed

## 2020-03-26 ENCOUNTER — TELEPHONE (OUTPATIENT)
Dept: WOUND CARE | Facility: CLINIC | Age: 25
End: 2020-03-26

## 2020-03-26 ENCOUNTER — TELEPHONE (OUTPATIENT)
Dept: FAMILY MEDICINE | Facility: CLINIC | Age: 25
End: 2020-03-26

## 2020-03-26 NOTE — TELEPHONE ENCOUNTER
LVM again and repeated the previous message about doing a telephone visit via face time so we an order supplies.

## 2020-03-26 NOTE — TELEPHONE ENCOUNTER
Reason for call:  Form  Reason for Call:  Form, our goal is to have forms completed with 72 hours, however, some forms may require a visit or additional information.    Type of letter, form or note:  Medical    Who is the form from?: [facility]      Where did the form come from: form was faxed in    What clinic location was the form placed at?: [facility] - [phone]    Where the form was placed: 's in box     What number is listed as a contact on the form?: [phone]       Additional comments: Fax back to [phone]    Call taken on 3/26/2020 at 4:55 PM by Vasquez Mcmillan

## 2020-03-26 NOTE — TELEPHONE ENCOUNTER
Called and LVM for patient that she will likely need a new order of supplies by 4/3/2020. We are not bringing patients into the clinic so we would like to set up a telephone visit to get a picture of the wound and measurements of the wound so we can evaluate the plan and get you through to surgery.

## 2020-03-27 ENCOUNTER — TELEPHONE (OUTPATIENT)
Dept: FAMILY MEDICINE | Facility: CLINIC | Age: 25
End: 2020-03-27

## 2020-04-08 ENCOUNTER — TRANSFERRED RECORDS (OUTPATIENT)
Dept: HEALTH INFORMATION MANAGEMENT | Facility: CLINIC | Age: 25
End: 2020-04-08

## 2020-04-10 ENCOUNTER — MYC MEDICAL ADVICE (OUTPATIENT)
Dept: OBGYN | Facility: CLINIC | Age: 25
End: 2020-04-10

## 2020-04-15 ENCOUNTER — HOSPITAL ENCOUNTER (OUTPATIENT)
Dept: WOUND CARE | Facility: CLINIC | Age: 25
End: 2020-04-15
Attending: PHYSICIAN ASSISTANT
Payer: MEDICARE

## 2020-04-15 DIAGNOSIS — L89.313 PRESSURE INJURY OF RIGHT ISCHIUM, STAGE 3 (H): ICD-10-CM

## 2020-04-15 PROCEDURE — 99442 ZZC PHYSICIAN TELEPHONE EVALUATION 11-20 MIN: CPT | Performed by: PHYSICIAN ASSISTANT

## 2020-04-15 RX ORDER — LEVOFLOXACIN 500 MG/1
TABLET, FILM COATED ORAL
COMMUNITY
Start: 2020-04-14 | End: 2020-05-06

## 2020-04-15 NOTE — PROGRESS NOTES
[facility]      HISTORY OF PRESENT ILLNESS: Ms. Dianna Cottrell is a 24-year-old quadriplegic woman who returns for a R IT ulcer. MRI on 12/3/19 was indicative of osteomyelitis. Was anticipating flap procedure in March with Dr. Zacarias btu has been on hold due to COVID19 pandemic. She is is wondering about other options that would not require as prolonged of bed rest.     INTERVAL HISTORY:    Wound about the same size    Home care nurse feels wound bed looks healthier    DATE WOUND ACQUIRED: 6/2018     WOUND CARE: MeSalt + Mepilex     OFFLOADING: APM mattress, chair with Roho cushion mapped well 12/20/19     REVIEW OF SYSTEMS:   CONSTITUTIONAL: Denies fever or chills  GI: denies nausea or vomiting      PAST MEDICAL HISTORY:  has a past medical history of Anemia; C5 burst fracture (12/18/2012); Compression fracture of L1 lumbar vertebra (H) (12/18/2012); Fracture of thoracic spine without spinal cord lesion (H) (12/18/2012); Hypertension (12/6/2016); and Vocal cord dysfunction.    SOCIAL HISTORY: has PCA help, has two kids, hoping to do public speaking in the future      MEDICATIONS: reviewed, see med rec for up to date list, negative for anticoagulants, negative for immunosuppressants, negative for NSAIDS       PHYSICAL EXAM:  GENERAL: Patient is alert and oriented and in no acute distress  INTEGUMENTARY:   Wound (used by OP [facility] only) 10/14/19 1407 Right ischial tuberosity pressure injury (Active)   Length (cm) 1.5 04/15/20 1535   Width (cm) 1 04/15/20 1535   Depth (cm) 1.2 04/15/20 1535   Wound (cm^2) 1.5 cm^2 04/15/20 1535   Wound Volume (cm^3) 1.8 cm^3 04/15/20 1535   Wound healing % 16.67 04/15/20 1535   Dressing Appearance moist drainage 04/15/20 1535   Drainage Characteristics/Odor serosanguineous 04/15/20 1535   Drainage Amount copious 04/15/20 1535   Thickness/Stage Stage 4 04/15/20 1535   Base red/granulating 04/15/20 1535   Periwound intact 04/15/20 1535   Care, Wound non-select wound  debridement performed 04/15/20 2644             ASSESSMENT:   1. Stage 4 pressure ulcer R IT  2. R IT osteomyelitis      PLAN:   1. Discussed that other options will likely result in recurrence of wound but encouraged her to discuss with Dr. Zacarias before surgery  2. To the right IT wound: Khurram + 4x4 Mepilex foam changed three times a week   3. Foam dressings are medically necessary due to copious drainage from the wounds as well as the offloading properties that they provide.       FOLLOW-UP: 3 weeks with e-visit then with Dr. Zacarias before surgery  DURATION OF CALL: 11-20mins      VANIA AGUILAR PA-C

## 2020-04-15 NOTE — DISCHARGE INSTRUCTIONS
[facility]  [address] [address]    Call us at [phone] if you have any questions about your wounds, have redness  or swelling around your wound, have a fever of 101 or greater or if you have any  other problems or concerns. We answer the phone Monday through Friday 8 am to 4  pm, please leave a message as we check the voicemail frequently throughout the day.    Dianna Cottrell 1995    [facility][note 3] fax [phone]    Wound care recommendation to right ischial tuberosity  Cleanse wound with wound cleanser or saline, apply Cindy to wound bed followed by Mepliex border and change Monday, Wednesday and Friday    Toyin Yi PA-C. April 15, 2020    [facility] follow up with a telephone visit with Suzan Yi PA-C May 6th at 11:30

## 2020-04-15 NOTE — PROGRESS NOTES
Patient evaluated during telephone visit. Certified Wound Care Nurse time spent evaluating patient record, completed a full evaluation and documented wound(s) & adonay-wound skin; provided recommendation based on treatment plan. Reviewed discharge instructions, patient education, and discussed plan of care with appropriate medical team staff members and patient and/or family members.

## 2020-04-16 ENCOUNTER — TELEPHONE (OUTPATIENT)
Dept: FAMILY MEDICINE | Facility: CLINIC | Age: 25
End: 2020-04-16

## 2020-04-16 NOTE — TELEPHONE ENCOUNTER
Reason for call:  Form  Reason for Call:  Form, our goal is to have forms completed with 72 hours, however, some forms may require a visit or additional information.    Type of letter, form or note:  Medical    Who is the form from?: [facility]      Where did the form come from: form was faxed in    What clinic location was the form placed at?: [facility] - [phone]    Where the form was placed: 's in box     What number is listed as a contact on the form?: [phone]       Additional comments: Fax back to [phone]    Call taken on 4/16/2020 at 4:04 PM by Vasquez Mcmillan

## 2020-04-17 ENCOUNTER — TELEPHONE (OUTPATIENT)
Dept: OBGYN | Facility: CLINIC | Age: 25
End: 2020-04-17

## 2020-04-17 NOTE — TELEPHONE ENCOUNTER
Patient left message asking to discuss test results from Dr. Lock. Nurse left message for patient to call back.

## 2020-04-17 NOTE — TELEPHONE ENCOUNTER
"Form faxed by me and placed in tray 2 \"items faxed\" on Rachael's desk.    Sergo Cornelius MA on 4/17/2020 at 11:35 AM    "

## 2020-04-17 NOTE — TELEPHONE ENCOUNTER
From filled out and placed on Rachael's desk to fax.    Dariel Ugalde MD  [facility]

## 2020-04-21 ENCOUNTER — TELEPHONE (OUTPATIENT)
Dept: FAMILY MEDICINE | Facility: CLINIC | Age: 25
End: 2020-04-21

## 2020-04-21 NOTE — TELEPHONE ENCOUNTER
Our goal is to have forms completed with 72 hours, however some forms may require a visit or additional information.    Who is the form from?: [facility]  (if other please explain)  Where the form came from: form was faxed in  What clinic location was the form placed at?: [facility]  Where the form was placed: placed in [facility] inbox     What number is listed as a contact on the form?: [phone]  Fax number to return form to:  [phone]        Call taken on 4/21/2020 at 2:40 PM by Floridalma Merino
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
## 2020-04-22 ENCOUNTER — VIRTUAL VISIT (OUTPATIENT)
Dept: PULMONOLOGY | Facility: CLINIC | Age: 25
End: 2020-04-22
Attending: PHYSICIAN ASSISTANT
Payer: MEDICARE

## 2020-04-22 ENCOUNTER — PRE VISIT (OUTPATIENT)
Dept: PULMONOLOGY | Facility: CLINIC | Age: 25
End: 2020-04-22

## 2020-04-22 DIAGNOSIS — J18.9 FREQUENT EPISODES OF PNEUMONIA: Primary | ICD-10-CM

## 2020-04-22 DIAGNOSIS — R06.89 AIRWAY CLEARANCE IMPAIRMENT: ICD-10-CM

## 2020-04-22 DIAGNOSIS — Z78.9 ELECTRONIC CIGARETTE USE: ICD-10-CM

## 2020-04-22 DIAGNOSIS — Z72.0 CURRENT TOBACCO USE: ICD-10-CM

## 2020-04-22 DIAGNOSIS — G82.53 QUADRIPLEGIA, C5-C7 COMPLETE (H): ICD-10-CM

## 2020-04-22 DIAGNOSIS — F12.90 MARIJUANA SMOKER: ICD-10-CM

## 2020-04-22 RX ORDER — ALBUTEROL SULFATE 0.83 MG/ML
2.5 SOLUTION RESPIRATORY (INHALATION) EVERY 4 HOURS PRN
Qty: 100 ML | Refills: 3 | Status: SHIPPED | OUTPATIENT
Start: 2020-04-22 | End: 2022-09-28

## 2020-04-22 RX ORDER — SODIUM CHLORIDE FOR INHALATION 3 %
3 VIAL, NEBULIZER (ML) INHALATION EVERY 6 HOURS PRN
Qty: 300 ML | Refills: 1 | Status: SHIPPED | OUTPATIENT
Start: 2020-04-22 | End: 2024-05-20

## 2020-04-22 NOTE — TELEPHONE ENCOUNTER
Form completed and placed in  inbox.    Sunny Armijo MD, FAAFP  Family Medicine Physician  [facility] [place]  [address] [place] [address]

## 2020-04-22 NOTE — PATIENT INSTRUCTIONS
I think most of your issues with frequent pneumonias are due to impaired clearance from your quadriplegia.     In the winter months, you should try to use albuterol nebulizer, at least twice a day, ideally with the Aerobika (flutter valve) connected to the nebulizer machine. Immediately after using the nebulizer, use the cough assist at least 5 times to try to clear up any loosened sputum.     If this does not seem to be effective, add in saline nebulizers with the albuterol (either right after albuterol, or combined with it), then do the cough assist as above.     Work on complete cessation of e cigarettes and combustible marijuana!     Follow up in 6 months with breathing tests.

## 2020-04-27 ENCOUNTER — TELEPHONE (OUTPATIENT)
Dept: FAMILY MEDICINE | Facility: CLINIC | Age: 25
End: 2020-04-27

## 2020-04-27 NOTE — TELEPHONE ENCOUNTER
Our goal is to have forms completed with 72 hours, however some forms may require a visit or additional information.    Who is the form from?: [facility]  (if other please explain)  Where the form came from: form was faxed in  What clinic location was the form placed at?: [place]  Where the form was placed: placed in TC inbox     What number is listed as a contact on the form?: [phone]  Fax number to return form to:  [phone]        Call taken on 4/27/2020 at 4:45 PM by Floridalma Merino

## 2020-04-28 NOTE — TELEPHONE ENCOUNTER
Forms faxed to Dr. Petersen.    Hailey Martinez, [facility]

## 2020-05-01 ENCOUNTER — PREP FOR PROCEDURE (OUTPATIENT)
Dept: SURGERY | Facility: CLINIC | Age: 25
End: 2020-05-01

## 2020-05-01 DIAGNOSIS — L89.154 PRESSURE INJURY OF SACRAL REGION, STAGE 4 (H): Primary | ICD-10-CM

## 2020-05-06 ENCOUNTER — HOSPITAL ENCOUNTER (OUTPATIENT)
Dept: WOUND CARE | Facility: CLINIC | Age: 25
End: 2020-05-06
Attending: PHYSICIAN ASSISTANT
Payer: MEDICARE

## 2020-05-06 DIAGNOSIS — L89.313 PRESSURE INJURY OF RIGHT ISCHIUM, STAGE 3 (H): ICD-10-CM

## 2020-05-06 PROCEDURE — 99442 ZZC PHYSICIAN TELEPHONE EVALUATION 11-20 MIN: CPT | Performed by: PHYSICIAN ASSISTANT
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
## 2020-05-06 NOTE — DISCHARGE INSTRUCTIONS
[facility]  [address] [address]    Call us at [phone] if you have any questions about your wounds, have redness  or swelling around your wound, have a fever of 101 or greater or if you have any  other problems or concerns. We answer the phone Monday through Friday 8 am to 4  pm, please leave a message as we check the voicemail frequently throughout the day.    Dianna Cottrell 1995    [facility] fax [phone]    Wound care recommendation to right ischial tuberosity  Cleanse wound with wound cleanser or saline, apply Cindy to wound bed followed by Mepliex border and change Monday, Wednesday and Friday    Gerald Bruner PA-C, May 6, 2020    [facility] follow up with Dr. Zacarias on Thursday May 21st at 2:40

## 2020-05-16 DIAGNOSIS — F41.1 GAD (GENERALIZED ANXIETY DISORDER): ICD-10-CM

## 2020-05-16 DIAGNOSIS — F32.4 MAJOR DEPRESSIVE DISORDER WITH SINGLE EPISODE, IN PARTIAL REMISSION (H): ICD-10-CM

## 2020-05-18 RX ORDER — SERTRALINE HYDROCHLORIDE 100 MG/1
TABLET, FILM COATED ORAL
Qty: 135 TABLET | Refills: 0 | Status: SHIPPED | OUTPATIENT
Start: 2020-05-18 | End: 2020-09-03

## 2020-05-18 NOTE — TELEPHONE ENCOUNTER
Prescription approved per [facility] Refill Protocol.    Suzan Malagon, RN, BSN

## 2020-05-21 ENCOUNTER — HOSPITAL ENCOUNTER (OUTPATIENT)
Dept: WOUND CARE | Facility: CLINIC | Age: 25
Discharge: HOME | End: 2020-05-21
Attending: SURGERY | Admitting: SURGERY
Payer: MEDICARE

## 2020-05-21 VITALS
TEMPERATURE: 97.3 F | SYSTOLIC BLOOD PRESSURE: 104 MMHG | RESPIRATION RATE: 20 BRPM | HEART RATE: 107 BPM | DIASTOLIC BLOOD PRESSURE: 64 MMHG

## 2020-05-21 DIAGNOSIS — L89.313 PRESSURE INJURY OF RIGHT ISCHIUM, STAGE 3 (H): ICD-10-CM

## 2020-05-21 DIAGNOSIS — L89.314 DECUBITUS ULCER OF RIGHT ISCHIUM, STAGE 4 (H): Primary | ICD-10-CM

## 2020-05-21 LAB
CRP SERPL-MCNC: 10.5 MG/L (ref 0–8)
ERYTHROCYTE [SEDIMENTATION RATE] IN BLOOD BY WESTERGREN METHOD: 18 MM/H (ref 0–20)
PREALB SERPL IA-MCNC: 24 MG/DL (ref 15–45)

## 2020-05-21 PROCEDURE — 84134 ASSAY OF PREALBUMIN: CPT | Performed by: SURGERY

## 2020-05-21 PROCEDURE — 17250 CHEM CAUT OF GRANLTJ TISSUE: CPT

## 2020-05-21 PROCEDURE — A6022 COLLAGEN DRSG>16<=48 SQ IN: HCPCS

## 2020-05-21 PROCEDURE — A6212 FOAM DRG <=16 SQ IN W/BORDER: HCPCS

## 2020-05-21 PROCEDURE — 85652 RBC SED RATE AUTOMATED: CPT | Performed by: SURGERY

## 2020-05-21 PROCEDURE — 86140 C-REACTIVE PROTEIN: CPT | Performed by: SURGERY

## 2020-05-21 NOTE — DISCHARGE INSTRUCTIONS
[facility]  [address] [facility] [address], [place] [address]    Call us at [phone] if you have any questions about your wounds, have redness or swelling around your wound, have a fever of 101 or greater or if you have any other problems or concerns. We answer the phone Monday through Friday 8 am to 4 pm, please leave a message as we check the voicemail frequently throughout the day.     Dianna Cottrell      1995    [facility] fax [phone]    Wound care recommendation to right ischial tuberosity  Cleanse wound with wound cleanser or saline  Apply Cindy to wound bed   Cover with Mepliex border   Change dressing Monday, Wednesday and Friday     Fred Zacarias M.D.. May 21, 2020    Follow up with Provider - 4-6 weeks     Please call [facility] [phone] ([phone]) to schedule your MRI appointment if you have not heard from them in two business days.    Arrive 15 minutes early.  If you need to cancel or change the appointment please call [facility] [phone] ([phone]LE[address]ilar)

## 2020-05-26 ENCOUNTER — MEDICAL CORRESPONDENCE (OUTPATIENT)
Dept: HEALTH INFORMATION MANAGEMENT | Facility: CLINIC | Age: 25
End: 2020-05-26

## 2020-06-11 NOTE — PROGRESS NOTES
Visit Date:   05/21/2020      [facility]      This is a 25-year-old quadriplegic female who we originally had scheduled for at least a debridement of her wound over a month ago, but this was canceled due to the pandemic.  She is here today for followup and we had a chance to discuss more of the options, given the current public health risks and the main concern has been for any of my dequan and quads that need flap coverage that unless they have a safe place to do the 4-6 weeks of them extended bed rest, it is probably not a good idea for me to be sending the patient into a congruent living facilities.  If someone has resources at home in that could replace that being in a facility or if there are already coming from a facility that is another story, but Dianna does not have those resources or in that current care setting.  Currently, she is getting visiting home nursing wound care Monday, Wednesday, Friday for her right ischial decubitus.  The wound itself is pretty small, but there is underlying osteo.  She is not really having any systemic signs or symptoms that are interfering with her daily life other than having the dressings, but she agrees that at this point, it is probably not a safe time to be proceeding with definitive closure.  We could conceivably bring her in for a quick day surgery procedure where we just debride things and get bone biopsies to treat with antibiotics, but there is always a chance that she could have recurrent infection if there is a delay between antibiotic therapy and surgery.  The wound itself as I mentioned is not terribly large.  It measured 1.2 x 0.8 x 0.5 with 0.9 cm undermining from the side of the 3 o'clock position.  The tissues themselves appeared clean, but there is kind of hypertrophic quality to the granulation tissue kind of friable that was suggestive of a biofilm layer.  The issue honestly that she is getting her dressings every other day.  Today we did  just treat that hypertrophic granulation tissue with silver nitrate and will have her continue with the Cindy every other day.      I wanted to get a sed rate and CRP as well as a prealbumin to see what her inflammatory markers. To see if her osteo is stable as well.  Her MRI was a while back, so I think actually redoing the MRI might not be a bad thing.  The wound is trying to heal, so perhaps there is underlying osteo has either resolved or subclinical.  We did talk about the possibility of biopsies just for antibiotics and then we could possibly treat it with a VAC and see if we could get things to heal up without surgery, but we will base that possibility on her inflammatory markers.  She really should have mattress mapping at home if she is going to be spending you now a fair amount of time offloading in bed.  We will see how things go here with the testing and then see her back in 4-6 week to talk again and also to see how her wound is progressing.  If we were to do surgery probably the soonest she would consider would be in the fall since there is a lot of unknowns with regard to the COVID-19 pandemic.  Please see nursing notes for wound dimensions as well as photos and vital signs, which were essentially within normal limits other than a heart rate of 107.      ADDENDUM:  We did discuss the options briefly on the phone with her mother who was not able to be physically present with her in the exam room today.     With regard to proper offloading bed and mattress, Dianna requires positioning of her body parts in ways not feasible with an ordinary bed and for alleviation of pain due to stage 4 pressure injury on her ischial region. She requires an adjustable bed height different than that of a fixed height bed to permit transfers from bed to chair, commode and wheelchair. She also requires frequent and or immediate changes in body position due to the above diagnosis and these diagnoses; C5 burst fracture  (2012); Compression fracture of L1 lumbar vertebra (H) (2012); Fracture of thoracic spine without spinal cord lesion (H) (2012), in addition to quadriplegia.     Labs:   Recent Labs   Lab Test 20  1600 20  1404  16  1430   ALBUMIN  --   --   --  3.6   HGB  --  13.3   < >  --    WBC  --  14.9*   < >  --    CRP 10.5*  --   --  4.8    < > = values in this interval not displayed.     Nutrition requirements were discussed with patient today.  Vitals:  /64   Pulse 107   Temp 97.3  F (36.3  C) (Temporal)   Resp 20   Wound:     Photo:          CRAIG THAYER MD             D: 2020   T: 2020   MT:       Name:     SYLVESTER BLACKWELL   MRN:      [id number]        Account:      [id number]   :      1995           Visit Date:   2020      Document: [id number]

## 2020-06-12 ENCOUNTER — TELEPHONE (OUTPATIENT)
Dept: FAMILY MEDICINE | Facility: CLINIC | Age: 25
End: 2020-06-12

## 2020-06-12 NOTE — TELEPHONE ENCOUNTER
Our goal is to have forms completed with 72 hours, however some forms may require a visit or additional information.    Who is the form from?: [facility](if other please explain)  Where the form came from: form was faxed in  What clinic location was the form placed at?: [place]  Where the form was placed: placed in TC inbox     What number is listed as a contact on the form?: [phone]  Fax number to return form to:  [phone]        Call taken on 6/12/2020 at 8:14 AM by Floridalma Merino

## 2020-06-12 NOTE — TELEPHONE ENCOUNTER
Our goal is to have forms completed with 72 hours, however some forms may require a visit or additional information.    Who is the form from?: [facility] (if other please explain)  Where the form came from: form was faxed in  What clinic location was the form placed at?: [facility]  Where the form was placed: placed in TC inbox     What number is listed as a contact on the form?: [phone]  Fax number to return form to:  [phone]        Call taken on 6/12/2020 at 3:46 PM by Floridalma Merino

## 2020-06-14 ENCOUNTER — MEDICAL CORRESPONDENCE (OUTPATIENT)
Dept: HEALTH INFORMATION MANAGEMENT | Facility: CLINIC | Age: 25
End: 2020-06-14

## 2020-06-15 ENCOUNTER — TELEPHONE (OUTPATIENT)
Dept: WOUND CARE | Facility: CLINIC | Age: 25
End: 2020-06-15

## 2020-06-15 NOTE — TELEPHONE ENCOUNTER
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 She requires a bed  Height different than that of a fixed height bed to permit transfers from bed to chair, commode and wheelchair. She also requires frequent and or immediate changes in body position due to the above diagnosis and these diagnoses; C5 burst fracture (12/18/2012); Compression fracture of L1 lumbar vertebra (H) (12/18/2012); Fracture of thoracic spine without spinal cord lesion (H) (12/18/2012), in addition to quadriplegia.

## 2020-06-18 ENCOUNTER — TELEPHONE (OUTPATIENT)
Dept: WOUND CARE | Facility: CLINIC | Age: 25
End: 2020-06-18

## 2020-06-18 NOTE — TELEPHONE ENCOUNTER
[facility] Wound    Who is the name of the provider?:  Fay      What is the location you see this provider at?: [place]    Reason for call:  2nd Request, faxed request for update on 6/15.  Still waiting for face to face update for hospital bed.     Please fax updated order to: [phone]    Can we leave a detailed message on this number?  YES

## 2020-06-22 ENCOUNTER — TELEPHONE (OUTPATIENT)
Dept: FAMILY MEDICINE | Facility: CLINIC | Age: 25
End: 2020-06-22

## 2020-06-22 NOTE — TELEPHONE ENCOUNTER
Our goal is to have forms completed with 72 hours, however some forms may require a visit or additional information.    Who is the form from?: [facility] (if other please explain)  Where the form came from: form was faxed in  What clinic location was the form placed at?: [facility]  Where the form was placed: placed in TC inbox     What number is listed as a contact on the form?: [phone]  Fax number to return form to:  [phone]        Call taken on 6/22/2020 at 3:57 PM by Floridalma Merino

## 2020-06-23 ENCOUNTER — TELEPHONE (OUTPATIENT)
Dept: WOUND CARE | Facility: CLINIC | Age: 25
End: 2020-06-23

## 2020-06-23 ENCOUNTER — MEDICAL CORRESPONDENCE (OUTPATIENT)
Dept: HEALTH INFORMATION MANAGEMENT | Facility: CLINIC | Age: 25
End: 2020-06-23

## 2020-06-23 DIAGNOSIS — Z53.9 DIAGNOSIS NOT YET DEFINED: Primary | ICD-10-CM

## 2020-06-23 DIAGNOSIS — L89.314 DECUBITUS ULCER OF RIGHT ISCHIUM, STAGE 4 (H): Primary | ICD-10-CM

## 2020-06-23 PROCEDURE — G0180 MD CERTIFICATION HHA PATIENT: HCPCS | Performed by: NURSE PRACTITIONER

## 2020-07-06 ENCOUNTER — TELEPHONE (OUTPATIENT)
Dept: FAMILY MEDICINE | Facility: CLINIC | Age: 25
End: 2020-07-06

## 2020-07-06 NOTE — TELEPHONE ENCOUNTER
Our goal is to have forms completed with 72 hours, however some forms may require a visit or additional information.    Who is the form from?: [facility]  (if other please explain)  Where the form came from: form was faxed in  What clinic location was the form placed at?: [facility]  Where the form was placed: placed in TC inbox     What number is listed as a contact on the form?: [phone]  Fax number to return form to:  [phone]        Call taken on 7/6/2020 at 3:03 PM by Floridalma Merino

## 2020-07-09 ENCOUNTER — MEDICAL CORRESPONDENCE (OUTPATIENT)
Dept: HEALTH INFORMATION MANAGEMENT | Facility: CLINIC | Age: 25
End: 2020-07-09

## 2020-07-10 ENCOUNTER — TELEPHONE (OUTPATIENT)
Dept: FAMILY MEDICINE | Facility: CLINIC | Age: 25
End: 2020-07-10

## 2020-07-10 ENCOUNTER — HOSPITAL ENCOUNTER (OUTPATIENT)
Dept: MRI IMAGING | Facility: CLINIC | Age: 25
Discharge: HOME | End: 2020-07-10
Attending: SURGERY | Admitting: SURGERY
Payer: MEDICARE

## 2020-07-10 DIAGNOSIS — L89.314 DECUBITUS ULCER OF RIGHT ISCHIUM, STAGE 4 (H): ICD-10-CM

## 2020-07-10 PROCEDURE — 25500064 ZZH RX 255 OP 636: Performed by: SURGERY

## 2020-07-10 PROCEDURE — A9585 GADOBUTROL INJECTION: HCPCS | Performed by: SURGERY

## 2020-07-10 PROCEDURE — 72197 MRI PELVIS W/O & W/DYE: CPT

## 2020-07-10 RX ORDER — GADOBUTROL 604.72 MG/ML
5 INJECTION INTRAVENOUS ONCE
Status: COMPLETED | OUTPATIENT
Start: 2020-07-10 | End: 2020-07-10

## 2020-07-10 RX ADMIN — GADOBUTROL 5 ML: 604.72 INJECTION INTRAVENOUS at 14:21

## 2020-07-10 NOTE — TELEPHONE ENCOUNTER
KP out until 7/20/20.  Would anyone be willing for me to [facility]?

## 2020-07-10 NOTE — TELEPHONE ENCOUNTER
Our goal is to have forms completed with 72 hours, however some forms may require a visit or additional information.    Who is the form from?: [facility] (if other please explain)  Where the form came from: form was faxed in  What clinic location was the form placed at?: [facility]  Where the form was placed: placed in TC inbox     What number is listed as a contact on the form?: [phone]  Fax number to return form to:  [phone]        Call taken on 7/10/2020 at 9:44 AM by Floridalma Merino

## 2020-07-17 ENCOUNTER — TELEPHONE (OUTPATIENT)
Dept: FAMILY MEDICINE | Facility: CLINIC | Age: 25
End: 2020-07-17

## 2020-07-17 NOTE — TELEPHONE ENCOUNTER
Reason for call:  Form  Reason for Call:  Form, our goal is to have forms completed with 72 hours, however, some forms may require a visit or additional information.    Type of letter, form or note:  medical    Who is the form from?:  [facility]     Where did the form come from: form was faxed in    What clinic location was the form placed at?: [facility] - [phone]    Where the form was placed: faxing to [facility] - as  is out of clinic this week.    What number is listed as a contact on the form?:  [phone]       Additional comments:  Fax to [phone]    created by Rachael Bahena

## 2020-07-17 NOTE — TELEPHONE ENCOUNTER
Provider,  We did not receive the forms via fax when you last sent them from [employer].  Please try to fax again to the pod.  Thanks.

## 2020-07-22 ENCOUNTER — TELEPHONE (OUTPATIENT)
Dept: FAMILY MEDICINE | Facility: CLINIC | Age: 25
End: 2020-07-22

## 2020-07-22 NOTE — TELEPHONE ENCOUNTER
Our goal is to have forms completed with 72 hours, however some forms may require a visit or additional information.    Who is the form from?: [facility] (if other please explain)  Where the form came from: form was faxed in  What clinic location was the form placed at?: [facility]  Where the form was placed: placed in TC inbox     What number is listed as a contact on the form?: [phone]  Fax number to return form to:  [phone]        Call taken on 7/22/2020 at 3:37 PM by Floridalma Merino

## 2020-08-06 ENCOUNTER — HOSPITAL ENCOUNTER (OUTPATIENT)
Dept: WOUND CARE | Facility: CLINIC | Age: 25
Discharge: HOME | End: 2020-08-06
Attending: SURGERY | Admitting: SURGERY
Payer: MEDICARE

## 2020-08-06 ENCOUNTER — MEDICAL CORRESPONDENCE (OUTPATIENT)
Dept: HEALTH INFORMATION MANAGEMENT | Facility: CLINIC | Age: 25
End: 2020-08-06

## 2020-08-06 VITALS
TEMPERATURE: 97.4 F | SYSTOLIC BLOOD PRESSURE: 122 MMHG | DIASTOLIC BLOOD PRESSURE: 70 MMHG | RESPIRATION RATE: 16 BRPM | HEART RATE: 112 BPM

## 2020-08-06 DIAGNOSIS — M86.60 OTHER CHRONIC OSTEOMYELITIS, UNSPECIFIED SITE (H): ICD-10-CM

## 2020-08-06 DIAGNOSIS — L89.313 PRESSURE INJURY OF RIGHT ISCHIUM, STAGE 3 (H): ICD-10-CM

## 2020-08-06 DIAGNOSIS — S31.819D: Primary | ICD-10-CM

## 2020-08-06 PROCEDURE — 97602 WOUND(S) CARE NON-SELECTIVE: CPT

## 2020-08-06 RX ORDER — CEFAZOLIN SODIUM 2 G/50ML
2 SOLUTION INTRAVENOUS
Status: CANCELLED | OUTPATIENT
Start: 2020-08-06

## 2020-08-06 RX ORDER — CEFAZOLIN SODIUM 1 G/50ML
1 INJECTION, SOLUTION INTRAVENOUS SEE ADMIN INSTRUCTIONS
Status: CANCELLED | OUTPATIENT
Start: 2020-08-06

## 2020-08-06 NOTE — DISCHARGE INSTRUCTIONS
[facility]  [address] [facility] [address], [place] [address]    Call us at [phone] if you have any questions about your wounds, have redness or swelling around your wound, have a fever of 101 or greater or if you have any other problems or concerns. We answer the phone Monday through Friday 8 am to 4 pm, please leave a message as we check the voicemail frequently throughout the day.     [facility] fax [phone]    Dianna Cottrell      1995    Medications/supplements to aid in healin. 1 packet of Gael Supplement into your favorite beverage twice a day. Purchase at [facility] in [address]  2. Vitamin D3 10,000 iu per day.  3. Vitamin C 2,000 mg daily  4. Folate 400 mcg daily   5. Vitamin B 12 1000 mcg daily    A diet high in protein is important for wound healing, we recommend getting 90 grams of protein per day. Taking protein shakes or bars are a good way to get extra protein in your diet.  Other good sources of protein:  Pork 26g per 3 oz  Whey protein powder - 24g per scoop (on average)  Greek yogurt - 23g per 8oz   Chicken or Turkey - 23g per 3oz  Fish - 20-25g per 3oz  Beef - 18-23g per 3oz  Navy beans - 20g per cup  Cottage cheese - 14g per 1/2 cup   Lentils - 13g per 1/4 cup  Beef jerky 13g per 1oz  2% milk - 8g per cup  Peanut butter - 8g per 2 tablespoons  Eggs - 6g per egg  Mixed nuts - 6g per 2oz     Wound care recommendations to right ischial tuberosity ulcer  Cleanse wound with saline or wound cleanser. Apply Cindy to wound bed followed by 4x4 Mepilex border and change Monday, Wednesday and Friday     Plan for surgical debridement in the OR at [facility], Dr. Zacarias's surgery scheduler will call you for a date and time.      Fred Zacarias M.D. 2020    Wound Clinc follow up as scheduled

## 2020-08-06 NOTE — PROGRESS NOTES
Visit Date:   08/06/2020      [facility] WOUND INSTITUTE VISIT NOTE      This is a 25-year-old partial quadriplegic female who is here today for further followup of her chronic right ischial decubitus.  She has been doing Cindy every other day with visiting home nursing services Monday, Wednesday, Friday from [facility] which is now known as [facility].  She does not report any issues or problems with that and overall she is feeling fine.  I had last seen her at the end of May and at that time we sent some labs that showed a prealbumin 24, sed rate of 10.5 and CRP of 18.  We also got an MRI that showed a slight increase in activity that is suggestive of osteomyelitis in the right ischial tuberosity region.  That being said, for the most part she feels fine physically.  The pandemic certainly is getting her down a bit as she tries to manage her 3 and 7-year-old kids at home.  For the most part, she is homebound other than her occasional doctors' appointments.  She is still waiting to get her mattress mapped, but that is in the works.        Her wound today looks clean, very small, minimal undermining superiorly, minimal depth of thickened edges.  Today's measurements are 0.2 x 0.2 x 0.5 cm.  It really seems like it should be able to just finish healing.  She had originally talked about not doing any kind of flap closure until it is safe to be in a skilled nursing facility because she would not be able to do her postop recovery at home.  In the interest of doing something to try and gain closure rather than doing a big flap for a small hole, I did offer her the possibility of an I and D with bone biopsies to see if anything grows that we could possibly treat with antibiotics.  If that were the case, then we could go ahead and use a VAC or at least stimulate some more acute healing by secondary intention.  She is open to that and would be willing to get a history and physical and COVID test if we can find her a date this  month.        For now, we will continue with the Cindy.  She can follow-up with our physician's assistant, Suzan Yi, in September and then I can see her back in October to see if we still need to do something.        Please see nursing notes for dimensions of the wound, photos and vital signs today.  Other than a heart rate of 112, her vitals are within normal limits.      Labs:   Recent Labs   Lab Test 20  1600 20  1404  16  1430   ALBUMIN  --   --   --  3.6   HGB  --  13.3   < >  --    WBC  --  14.9*   < >  --    CRP 10.5*  --   --  4.8    < > = values in this interval not displayed.     Nutrition requirements were discussed with patient today.  Vitals:  /70   Pulse 112   Temp 97.4  F (36.3  C) (Temporal)   Resp 16   Wound:   Wound (used by OP [facility] only) 10/14/19 1407 Right ischial tuberosity pressure injury (Active)   Length (cm) 0.2 20 1400   Width (cm) 0.5 20 1400   Depth (cm) 0.5 20 1400   Wound (cm^2) 0.1 cm^2 20 1400   Wound Volume (cm^3) 0.05 cm^3 20 1400   Wound healing % 94.44 20 1400   Dressing Appearance moist drainage 20 1400   Drainage Characteristics/Odor serosanguineous 20 1400   Drainage Amount moderate 20 1400   Thickness/Stage Stage 4 20 1441   Base red/granulating 20 1441   Periwound intact 20 1441   Periwound Temperature warm 20 1441   Care, Wound non-select wound debridement performed 20 1441      Photo:        CRAIG THAYER MD             D: 2020   T: 2020   MT: BELLA      Name:     SYLVESTER BLACKWELL   MRN:      [id number]        Account:      [id number]   :      1995           Visit Date:   2020      Document: [id number]

## 2020-08-12 ENCOUNTER — TELEPHONE (OUTPATIENT)
Dept: FAMILY MEDICINE | Facility: CLINIC | Age: 25
End: 2020-08-12

## 2020-08-12 NOTE — TELEPHONE ENCOUNTER
Our goal is to have forms completed with 72 hours, however some forms may require a visit or additional information.    Who is the form from?: [facility](if other please explain)  Where the form came from: form was faxed in  What clinic location was the form placed at?: [facility]  Where the form was placed: placed in TC inbox     What number is listed as a contact on the form?: [phone]  Fax number to return form to:  [phone]        Call taken on 8/12/2020 at 6:23 PM by Floridalma Merino

## 2020-08-17 DIAGNOSIS — F32.4 MAJOR DEPRESSIVE DISORDER WITH SINGLE EPISODE, IN PARTIAL REMISSION (H): ICD-10-CM

## 2020-08-17 DIAGNOSIS — F41.1 GAD (GENERALIZED ANXIETY DISORDER): ICD-10-CM

## 2020-08-18 ENCOUNTER — TELEPHONE (OUTPATIENT)
Dept: FAMILY MEDICINE | Facility: CLINIC | Age: 25
End: 2020-08-18

## 2020-08-18 NOTE — TELEPHONE ENCOUNTER
Our goal is to have forms completed with 72 hours, however some forms may require a visit or additional information.    Who is the form from?: [facility]  (if other please explain)  Where the form came from: form was faxed in  What clinic location was the form placed at?: [facility]  Where the form was placed: placed in TC inbox     What number is listed as a contact on the form?: [phone]  Fax number to return form to:  [phone]        Call taken on 8/18/2020 at 11:51 AM by Floridalma Merino

## 2020-08-18 NOTE — TELEPHONE ENCOUNTER
I am virtual at home until 08/24/2020. Can forms wait or does covering provider need to review?  Suzan Willis PA-C

## 2020-08-18 NOTE — TELEPHONE ENCOUNTER
Pending Prescriptions:                       Disp   Refills    sertraline (ZOLOFT) 100 MG tablet [Pharmac*135 ta*0        Sig: TAKE 1 AND 1/2 TABLETS BY MOUTH EVERY DAY    Patient is due for med check. Please call and schedule.    Dorcas Gutierrez, MSN, RN

## 2020-08-19 ENCOUNTER — TRANSFERRED RECORDS (OUTPATIENT)
Dept: HEALTH INFORMATION MANAGEMENT | Facility: CLINIC | Age: 25
End: 2020-08-19

## 2020-08-19 NOTE — TELEPHONE ENCOUNTER
Called patient left message. Need to schedule a virtual visit for medication check per note below. Thank you

## 2020-08-21 RX ORDER — SERTRALINE HYDROCHLORIDE 100 MG/1
TABLET, FILM COATED ORAL
Qty: 135 TABLET | Refills: 0 | OUTPATIENT
Start: 2020-08-21

## 2020-08-24 ENCOUNTER — TELEPHONE (OUTPATIENT)
Dept: FAMILY MEDICINE | Facility: CLINIC | Age: 25
End: 2020-08-24

## 2020-08-24 NOTE — TELEPHONE ENCOUNTER
Our goal is to have forms completed with 72 hours, however some forms may require a visit or additional information.    Who is the form from?: [facility] (if other please explain)  Where the form came from: form was faxed in  What clinic location was the form placed at?: [facility]  Where the form was placed: placed in TC inbox     What number is listed as a contact on the form?: [phone]   Fax number to return form to:  [phone]        Call taken on 8/24/2020 at 4:29 PM by Floridalma Merino

## 2020-08-25 ENCOUNTER — TELEPHONE (OUTPATIENT)
Dept: FAMILY MEDICINE | Facility: CLINIC | Age: 25
End: 2020-08-25

## 2020-08-25 ENCOUNTER — MYC MEDICAL ADVICE (OUTPATIENT)
Dept: OBGYN | Facility: CLINIC | Age: 25
End: 2020-08-25

## 2020-08-25 NOTE — TELEPHONE ENCOUNTER
Message to pt to schedule colpo.    Carlotta Lock MD, FACOG  [facility]  OB/GYN    8/25/2020  2:08 PM

## 2020-08-25 NOTE — TELEPHONE ENCOUNTER
Reason for call:  Form  Reason for Call:  Form, our goal is to have forms completed with 72 hours, however, some forms may require a visit or additional information.    Type of letter, form or note:  Medical    Who is the form from?: [facility]      Where did the form come from: form was faxed in    What clinic location was the form placed at?: [facility] - [phone]    Where the form was placed: 's in box     What number is listed as a contact on the form?: [phone]       Additional comments: Fax back to [phone]    Call taken on 8/25/2020 at 2:05 PM by Vasquez Mcmillan

## 2020-08-31 NOTE — PROGRESS NOTES
"Dianna Cottrell is a 25 year old female who is being evaluated via a billable telephone visit.      The patient has been notified of following:     \"This telephone visit will be conducted via a call between you and your physician/provider. We have found that certain health care needs can be provided without the need for a physical exam.  This service lets us provide the care you need with a short phone conversation.  If a prescription is necessary we can send it directly to your pharmacy.  If lab work is needed we can place an order for that and you can then stop by our lab to have the test done at a later time.    Telephone visits are billed at different rates depending on your insurance coverage. During this emergency period, for some insurers they may be billed the same as an in-person visit.  Please reach out to your insurance provider with any questions.    If during the course of the call the physician/provider feels a telephone visit is not appropriate, you will not be charged for this service.\"    Patient has given verbal consent for Telephone visit?  Yes    What phone number would you like to be contacted at? [phone]    How would you like to obtain your AVS? Desmondt    Kenisha     Dianna Cottrell is a 25 year old female who presents via phone visit today for the following health issues:    HPI    Depression and Anxiety Follow-Up- stable     How are you doing with your depression since your last visit? Worsened due to family loss, otherwise stable.      How are you doing with your anxiety since your last visit?  Stable.    Are you having other symptoms that might be associated with depression or anxiety? No    Have you had a significant life event? Cousin passed away recently.      Do you have any concerns with your use of alcohol or other drugs? No    She continues to see her therapist  She feels her mood is stable on her sertraline. She would like to continue on the present dose.   Her mom has " been living with her which she thinks has been mostly a good change.   Her daughter is starting school next week.   Her son is Pre-K age.     She is having surgery for a chonic  Decubitus ulcer. She was going to have surgery in the spring but it was canceled due to covid. At her spring preop she was referred to Hematology due to her DVT hx and a longer surgery.     She did see Pulm for consult for her pneumonia hx. She was advised nebs with saline on a scheduled basis and a follow up with PFTs in 6months. Also advised smoking cessation.   She is still vaping, using marijuana.           Social History     Tobacco Use     Smoking status: Current Every Day Smoker     Packs/day: 0.50     Types: Cigarettes, Vaping Device     Smokeless tobacco: Never Used     Tobacco comment: used 1/2-1 ppd for 4 years, quit 2012, now daily e cig use.    Substance Use Topics     Alcohol use: No     Alcohol/week: 0.0 standard drinks     Frequency: Never     Drug use: Yes     Types: Marijuana     Comment: 3 joints per day     PHQ 12/12/2019 1/8/2020 9/3/2020   PHQ-9 Total Score 4 2 2   Q9: Thoughts of better off dead/self-harm past 2 weeks Not at all Not at all Not at all     АНДРЕЙ-7 SCORE 12/12/2019 1/8/2020 9/3/2020   Total Score 3 (minimal anxiety) 3 (minimal anxiety) -   Total Score 3 3 3     Last PHQ-9 9/3/2020   1.  Little interest or pleasure in doing things 0   2.  Feeling down, depressed, or hopeless 1   3.  Trouble falling or staying asleep, or sleeping too much 0   4.  Feeling tired or having little energy 1   5.  Poor appetite or overeating 0   6.  Feeling bad about yourself 0   7.  Trouble concentrating 0   8.  Moving slowly or restless 0   Q9: Thoughts of better off dead/self-harm past 2 weeks 0   PHQ-9 Total Score 2   Difficulty at work, home, or with people Not difficult at all     АНДРЕЙ-7  9/3/2020   1. Feeling nervous, anxious, or on edge 1   2. Not being able to stop or control worrying 1   3. Worrying too much about  different things 1   4. Trouble relaxing 0   5. Being so restless that it is hard to sit still 0   6. Becoming easily annoyed or irritable 0   7. Feeling afraid, as if something awful might happen 0   АНДРЕЙ-7 Total Score 3   If you checked any problems, how difficult have they made it for you to do your work, take care of things at home, or get along with other people? Not difficult at all       Suicide Assessment Five-step Evaluation and Treatment (SAFE-T)      Review of Systems   Constitutional, HEENT, cardiovascular, pulmonary, gi and gu systems are negative, except as otherwise noted.       Objective          Vitals:  No vitals were obtained today due to virtual visit.    healthy, alert and no distress  PSYCH: Alert and oriented times 3; coherent speech, normal   rate and volume, able to articulate logical thoughts, able   to abstract reason, no tangential thoughts, no hallucinations   or delusions  Her affect is normal and pleasant  RESP: No cough, no audible wheezing, able to talk in full sentences  Remainder of exam unable to be completed due to telephone visits            Assessment/Plan:    Assessment & Plan     АНДРЕЙ (generalized anxiety disorder)  Refilled sertraline at present dose.   Refilled hydroxyzine for prn use  Continue with therapist  Encouraged to discontinue marijuana use  Self cares   - hydrOXYzine (VISTARIL) 25 MG capsule; Take 1 capsule (25 mg) by mouth 3 times daily as needed for anxiety  - sertraline (ZOLOFT) 100 MG tablet; Take 1.5 tablets (150 mg) by mouth daily    Major depressive disorder with single episode, in partial remission (H)  See abvoe   - sertraline (ZOLOFT) 100 MG tablet; Take 1.5 tablets (150 mg) by mouth daily    Decubitus ulcer of right ischium, stage 4 (H)   Personal history of DVT (deep vein thrombosis)  Pt will be seen in clinic for preop.   She was referred for hematology consult earlier in the year.  Plan to schedule prior to her surgery       Tobacco Cessation:    reports that she has been smoking cigarettes and vaping device. She has been smoking about 0.50 packs per day. She has never used smokeless tobacco.  Tobacco Cessation Action Plan: Information offered: Patient not interested at this time        Follow Up: The patient was instructed to contact clinic for worsening symptoms, non-improvement as expected/discussed, and for questions regarding medications or treatment plan. Discussed parameters for follow up and included in After Visit Summary given to patient.      Return in about 2 weeks (around 9/17/2020) for for preop.    Suzan Willis PA-C  [facility]3] GIANNI    Phone call duration:  14 minutes

## 2020-09-01 ENCOUNTER — TELEPHONE (OUTPATIENT)
Dept: SURGERY | Facility: CLINIC | Age: 25
End: 2020-09-01

## 2020-09-01 NOTE — TELEPHONE ENCOUNTER
Patient called and left a message wondering when she would have her biopsy done with Dr. Zacarias. Sent staff message.

## 2020-09-02 ENCOUNTER — TELEPHONE (OUTPATIENT)
Dept: SURGERY | Facility: CLINIC | Age: 25
End: 2020-09-02

## 2020-09-02 ENCOUNTER — TELEPHONE (OUTPATIENT)
Dept: FAMILY MEDICINE | Facility: CLINIC | Age: 25
End: 2020-09-02

## 2020-09-02 DIAGNOSIS — Z11.59 ENCOUNTER FOR SCREENING FOR OTHER VIRAL DISEASES: Primary | ICD-10-CM

## 2020-09-02 PROBLEM — M86.60: Status: ACTIVE | Noted: 2020-09-02

## 2020-09-02 PROBLEM — S31.819D: Status: ACTIVE | Noted: 2020-09-02

## 2020-09-02 NOTE — TELEPHONE ENCOUNTER
Reason for call:  Form  Reason for Call:  Form, our goal is to have forms completed with 72 hours, however, some forms may require a visit or additional information.    Type of letter, form or note:  Medical  320  Who is the form from?: [facility]    Where did the form come from: form was faxed in    What clinic location was the form placed at?: [facility] - [phone]    Where the form was placed: 's in box     What number is listed as a contact on the form?: [phone]       Additional comments: Fax back to [phone]    Call taken on 9/2/2020 at 2:37 PM by Vasquez Mcmillan

## 2020-09-02 NOTE — TELEPHONE ENCOUNTER
Surgery is scheduled with Dr. Zacarias on 9/18 at [place].  Scheduled per MD    H&P: to be completed by PCP.    The surgery packet was provided via [employer].        Pt is aware that they will be contacted to schedule a COVID-19 test before surgery.    They are aware that they will receive a call  ~2 days prior to the scheduled procedure and will be given an exact arrival/start time.    I contacted the patient via phone and left a message to confirm the scheduled dates. [employer] also sent.

## 2020-09-03 ENCOUNTER — TELEPHONE (OUTPATIENT)
Dept: FAMILY MEDICINE | Facility: CLINIC | Age: 25
End: 2020-09-03

## 2020-09-03 ENCOUNTER — VIRTUAL VISIT (OUTPATIENT)
Dept: FAMILY MEDICINE | Facility: CLINIC | Age: 25
End: 2020-09-03
Payer: MEDICARE

## 2020-09-03 DIAGNOSIS — L89.314 DECUBITUS ULCER OF RIGHT ISCHIUM, STAGE 4 (H): Primary | ICD-10-CM

## 2020-09-03 DIAGNOSIS — Z86.718 PERSONAL HISTORY OF DVT (DEEP VEIN THROMBOSIS): ICD-10-CM

## 2020-09-03 DIAGNOSIS — F32.4 MAJOR DEPRESSIVE DISORDER WITH SINGLE EPISODE, IN PARTIAL REMISSION (H): ICD-10-CM

## 2020-09-03 DIAGNOSIS — F41.1 GAD (GENERALIZED ANXIETY DISORDER): ICD-10-CM

## 2020-09-03 PROCEDURE — 99442 ZZC PHYSICIAN TELEPHONE EVALUATION 11-20 MIN: CPT | Performed by: PHYSICIAN ASSISTANT

## 2020-09-03 RX ORDER — HYDROXYZINE PAMOATE 25 MG/1
25 CAPSULE ORAL 3 TIMES DAILY PRN
Qty: 20 CAPSULE | Refills: 1 | Status: SHIPPED | OUTPATIENT
Start: 2020-09-03 | End: 2021-09-15

## 2020-09-03 RX ORDER — SERTRALINE HYDROCHLORIDE 100 MG/1
150 TABLET, FILM COATED ORAL DAILY
Qty: 135 TABLET | Refills: 1 | Status: SHIPPED | OUTPATIENT
Start: 2020-09-03 | End: 2021-03-01

## 2020-09-03 ASSESSMENT — ANXIETY QUESTIONNAIRES
2. NOT BEING ABLE TO STOP OR CONTROL WORRYING: SEVERAL DAYS
IF YOU CHECKED OFF ANY PROBLEMS ON THIS QUESTIONNAIRE, HOW DIFFICULT HAVE THESE PROBLEMS MADE IT FOR YOU TO DO YOUR WORK, TAKE CARE OF THINGS AT HOME, OR GET ALONG WITH OTHER PEOPLE: NOT DIFFICULT AT ALL
3. WORRYING TOO MUCH ABOUT DIFFERENT THINGS: SEVERAL DAYS
7. FEELING AFRAID AS IF SOMETHING AWFUL MIGHT HAPPEN: NOT AT ALL
1. FEELING NERVOUS, ANXIOUS, OR ON EDGE: SEVERAL DAYS
5. BEING SO RESTLESS THAT IT IS HARD TO SIT STILL: NOT AT ALL
GAD7 TOTAL SCORE: 3
6. BECOMING EASILY ANNOYED OR IRRITABLE: NOT AT ALL

## 2020-09-03 ASSESSMENT — PATIENT HEALTH QUESTIONNAIRE - PHQ9
SUM OF ALL RESPONSES TO PHQ QUESTIONS 1-9: 2
5. POOR APPETITE OR OVEREATING: NOT AT ALL

## 2020-09-03 NOTE — TELEPHONE ENCOUNTER
TC please help pt scheduling the following per Suzan Willis.     I placed a hematology referral for pt March 2020- it was canceled due to covid. Surgery rescheduled (has a preop with me next week). She needs hematology consult prior to surgery - can we help her schedule this?     Also- she saw  pulmonlogy virtually at the  or [place] April 2020. She is supposed to do PFTs and see him back (in person maybe?) Oct 2020- can we help her arrange this? Thanks.

## 2020-09-04 ENCOUNTER — TRANSCRIBE ORDERS (OUTPATIENT)
Dept: FAMILY MEDICINE | Facility: CLINIC | Age: 25
End: 2020-09-04

## 2020-09-04 ENCOUNTER — TELEPHONE (OUTPATIENT)
Dept: OBGYN | Facility: CLINIC | Age: 25
End: 2020-09-04

## 2020-09-04 DIAGNOSIS — D72.829 LEUKOCYTOSIS, UNSPECIFIED TYPE: Primary | ICD-10-CM

## 2020-09-04 DIAGNOSIS — Z86.718 PERSONAL HISTORY OF DVT (DEEP VEIN THROMBOSIS): ICD-10-CM

## 2020-09-04 ASSESSMENT — ANXIETY QUESTIONNAIRES: GAD7 TOTAL SCORE: 3

## 2020-09-04 NOTE — TELEPHONE ENCOUNTER
Read Dr. Chavez's recommendation and we are to do colposcopy in clinic- I left this message for patient and to call back and schedule at her convenience/        ----- Message from Leon Vincent sent at 9/4/2020  3:31 PM CDT -----  Regarding: FW: call back/ colposcopy  Does she need to be seen or scheduled in a surgery setting?     Thanks    Leon  ----- Message -----  From: Crista Garcia  Sent: 9/3/2020   1:49 PM CDT  To: [facility]  Subject: call back/ colposcopy                            Pt requesting call back to discuss scheduling a colposcopy procedure with Dr. Lock. Pt stated that she had discussed with Dr. Lock in the past that her appt may need to be scheduled in an OR setting due to her health condition. Pt can be reached at [phone]. Thanks.      Call Center    Please DO NOT send this message and/or reply back to sender.  Call Center Representatives DO NOT respond to messages.

## 2020-09-04 NOTE — TELEPHONE ENCOUNTER
Left message asking patient to return call.  Please inform pt of information below.    Hematology - Called [facility] [place]: [phone] - they will have a nurse review the referral and call pt to help her get appt set up.  Informed them that we need to get an appt before her surgery on 9/18.  The pt rep will make a note of this and have the nurse navigator reach out to pt.    PFT- where does pt want this to be scheduled?  [facility] in ?  They can call [phone] to do this.  Pulmonology - with Dr. Bahena - they can call the number below to schedule this follow up appt as well.  [address] [facility] [address]    Department Phone: [phone]

## 2020-09-04 NOTE — TELEPHONE ENCOUNTER
Patient called clinic back. I relayed message from below. No call back needed. I have her the numbers for each.

## 2020-09-09 ENCOUNTER — MEDICAL CORRESPONDENCE (OUTPATIENT)
Dept: HEALTH INFORMATION MANAGEMENT | Facility: CLINIC | Age: 25
End: 2020-09-09

## 2020-09-10 ENCOUNTER — VIRTUAL VISIT (OUTPATIENT)
Dept: HEMATOLOGY | Facility: CLINIC | Age: 25
End: 2020-09-10
Attending: PHYSICIAN ASSISTANT
Payer: MEDICARE

## 2020-09-10 DIAGNOSIS — L89.314 DECUBITUS ULCER OF RIGHT ISCHIUM, STAGE 4 (H): ICD-10-CM

## 2020-09-10 DIAGNOSIS — G90.4 AUTONOMIC DYSREFLEXIA: ICD-10-CM

## 2020-09-10 DIAGNOSIS — M86.60 OTHER CHRONIC OSTEOMYELITIS, UNSPECIFIED SITE (H): ICD-10-CM

## 2020-09-10 DIAGNOSIS — S14.109S QUADRIPLEGIA, POST-TRAUMATIC (H): Chronic | ICD-10-CM

## 2020-09-10 DIAGNOSIS — Z86.718 PERSONAL HISTORY OF DVT (DEEP VEIN THROMBOSIS): Primary | ICD-10-CM

## 2020-09-10 DIAGNOSIS — D72.829 LEUKOCYTOSIS, UNSPECIFIED TYPE: ICD-10-CM

## 2020-09-10 DIAGNOSIS — G82.50 QUADRIPLEGIA, POST-TRAUMATIC (H): Chronic | ICD-10-CM

## 2020-09-10 PROCEDURE — 40001009 ZZH VIDEO/TELEPHONE VISIT; NO CHARGE

## 2020-09-10 PROCEDURE — 99207 ZZC CDG-CODE CATEGORY CHANGED: CPT | Performed by: PHYSICIAN ASSISTANT

## 2020-09-10 PROCEDURE — 99214 OFFICE O/P EST MOD 30 MIN: CPT | Mod: 95 | Performed by: PHYSICIAN ASSISTANT

## 2020-09-10 NOTE — PROGRESS NOTES
[facility] [place]  [address], [address]  [place] [address]  [phone]  Dept: [phone]    PRE-OP EVALUATION:  Today's date: 2020    Dianna Cottrell (: 1995) presents for pre-operative evaluation assessment as requested by Dr. Zacarias.  She requires evaluation and anesthesia risk assessment prior to undergoing surgery/procedure for treatment of Wound, open, buttock with complication, right. Other Chronic osteomyelitis, unspecified site .    Proposed Surgery/ Procedure: Irrigation and Debridement, buttock, right ischial with bone biopsy   Date of Surgery/ Procedure: 2020  Time of Surgery/ Procedure: 9:15  Hospital/Surgical Facility: [facility]   Surgery Fax Number: Note does not need to be faxed, will be available electronically in Epic.  Primary Physician: Suzan Willis  Type of Anesthesia Anticipated: General    Preoperative Questionnaire:   No - Have you ever had a heart attack or stroke?  No - Have you ever had surgery on your heart or blood vessels, such as a stent, coronary (heart) bypass, or surgery on an artery in the head, neck, heart, or legs?  No - Do you have chest pain when you are physically active?  No - Do you have a history of heart failure?  No - Do you currently have a cold, bronchitis, or symptoms of other respiratory (head and chest) infections?  No - Do you have a cough, shortness of breath, or wheezing?  YES - DO YOU OR ANYONE IN YOUR FAMILY HAVE A HISTORY OF BLOOD CLOTS? DVT in her 2nd pregnancy  No - Do you or anyone in your family have a serious bleeding problem, such as long-lasting bleeding after surgeries or cuts?  No - Have you ever had anemia or been told to take iron pills?  No - Have you had any abnormal blood loss such as black, tarry or bloody stools, or abnormal vaginal bleeding?  No - Have you ever had a blood transfusion?  Yes - Are you willing to have a blood transfusion if it is medically needed before, during, or after  your surgery?  YES - HAVE YOU OR ANYONE IN YOUR FAMILY EVER HAD PROBLEMS WITH ANESTHESIA (SEDATION FOR SURGERY)? YES-succinylcholine noted in allergy  history- Spinal cord injury 12/18/12, patient at risk for extrajunctional receptors and hyperkalemia   No - Do you have sleep apnea, excessive snoring, or daytime drowsiness?   No - Do you have any artifical heart valves or other implanted medical devices, such as a pacemaker, defibrillator, or continuous glucose monitor?  No - Do you have any artifical joints?  No - Are you allergic to latex?  No - Is there any chance that you may be pregnant?    Patient has a Health Care Directive or Living Will:  NO    HPI:     HPI related to upcoming procedure: pressure sore with osetomyelitis. Kassidy LIEBERMAN and  at [facility]     PMR is with [facility] neuroscience (PMR MD ) and [facility] for psychology/mental health.   On midodrine for hypotension and baclofen for spasticity and gabapentin at night for tingling/sensation changes. Hx of autonomic dysreflexia- infrequently but triggers have been bladder distention.     Neurogenic bladder - Urology . Incontience. Had episode of dyreflexia last night- Winston catheter wasn't draining well, was removed. Winston has been in place to keep wound dry. Now straight cath every 2 hr.  coming tomorrow to take urine sample for urologist. No fevers. No blood in the urine.     IUD in place.     Frequent lung infections, probable impaired pulmonary clearance from quadriplegia:  No cough, no SOB, no wheezing.   Had pulm consult 04/22/2020- Not using the albuterol or saline. Needs PFTs still.   Smoking/vaping- cut back from 4x per day to 2x per day.   Pt denies any personal medical history of diabetes, hypertension, hyperlipidemia, thyroid problems, CKD, irregular heartbeat/a.fib, CAD/PVD, sleep apnea.     Hx of DVT   Had consult w/hematology     MEDICAL HISTORY:     Patient Active Problem List    Diagnosis  Date Noted     Wound, open, buttock with complication, right, subsequent encounter 09/02/2020     Priority: Medium     Added automatically from request for surgery [id number]       Other chronic osteomyelitis, unspecified site (H) 09/02/2020     Priority: Medium     Added automatically from request for surgery [id number]       Personal history of DVT (deep vein thrombosis) 03/12/2020     Priority: Medium     Decubitus ulcer of right ischium, stage 4 (H) 02/05/2020     Priority: Medium     Added automatically from request for surgery [id number]       Autonomic dysreflexia 12/13/2019     Priority: Medium     History of- infrequently; triggers bladder distention       Leukocytosis 12/13/2019     Priority: Medium     Pressure injury of right ischium, stage 3 (H) 10/14/2019     Priority: Medium     Quadriplegia, post-traumatic (H)- incomplete quad, limited use upper extremities 02/11/2019     Priority: Medium     Major depressive disorder with single episode, in partial remission (H) 02/11/2019     Priority: Medium     АНДРЕЙ (generalized anxiety disorder) 05/10/2018     Priority: Medium     H/O: pneumonia 02/14/2018     Priority: Medium     IUD (intrauterine device) in place- placed 12/2017 01/12/2018     Priority: Medium     Lesions of vulva 12/31/2016     Priority: Medium     c5 burst fracture 12/18/2012     Priority: Medium     C5-C7 fracture with cord injury, Dec. 2012          Past Medical History:   Diagnosis Date     Anemia     with pregnancy     c5 burst fracture 12/18/2012    C5-C7 fracture with cord injury     Compression fracture of L1 lumbar vertebra (H) 12/18/2012    L1 superior endplate compression fracture     Fracture of thoracic spine without spinal cord lesion (H) 12/18/2012    T3-T8 spinous process fractures     Hypertension 12/6/2016     Vocal cord dysfunction     Left vocal cord weakness noted by ENT post extubation 12/2012     Past Surgical History:   Procedure Laterality Date     C4-C7 interbody fusion with  anterior screw and plate fixation and posterior erna and pedicle screw fixation with interspace bone graft and C5 and C6 partial corpectomies  2012      SECTION  2013    Procedure:  SECTION;   Section ;  Surgeon: Ricki Nelson MD;  Location: [facility] L[facility]      SECTION N/A 2016    Procedure:  SECTION;  Surgeon: Floridalma Kiran MD;  Location: [facility]     IR IVC FILTER PLACEMENT  2012     LUMBAR DRAIN  2012     Current Outpatient Medications   Medication Sig Dispense Refill     acetaminophen (TYLENOL) 325 MG tablet Take 325-650 mg by mouth every 6 hours as needed.       albuterol (PROVENTIL) (2.5 MG/3ML) 0.083% neb solution Take 1 vial (2.5 mg) by nebulization every 4 hours as needed for shortness of breath / dyspnea or wheezing 100 mL 3     baclofen (LIORESAL) 10 MG tablet Take 10 mg by mouth 3 times daily.       BISACODYL 1 suppository daily        Cranberry 450 MG TABS Take 450 mg by mouth daily.       Docusate Sodium (COLACE PO) Take by mouth daily.        gabapentin (NEURONTIN) 300 MG capsule Take 300 mg by mouth daily        GABAPENTIN PO        hydrOXYzine (VISTARIL) 25 MG capsule Take 1 capsule (25 mg) by mouth 3 times daily as needed for anxiety 20 capsule 1     hyoscyamine (ANASPAZ/LEVSIN) 0.125 MG tablet TAKE 1 TABLET BY MOUTH THREE TIMES A DAY.  3     midodrine (PROAMATINE) 5 MG tablet Take 10 mg by mouth 2 times daily  6 tablet 0     Multiple Vitamin (DAILY MULTIVITAMIN PO) Take 2 tablets by mouth daily       order for DME [facility] Phone [phone] Fax [phone]  Maria Fernanda Albert to pressure map bed 1 Units 0     order for DME [facility] Phone [phone] Fax [phone]    Primary Dressing Mesalt gauze strips  Qty 12  Secondary Dressing 4x4 Mepilex border Qty 30    Length of Need: 1 month  Frequency of dressing change: every other day 30 Device 0     order for DME Equipment being ordered: [facility]   p:  "[phone] f: [phone]  EMAIL to [email or web address]  [email or web address]    Request for group 2 air mattress & semi-electric hospital bed    Ht:5'8\"  Wt:110 ;bs  Length of need: lifetime    Pt. is wheelchair bound & has been in a comprehensive ulcer treatment program for >2 months. We will follow monthly until wound closure    To obtain medical records:  p: [phone] f: [phone] 1 Device 0     order for DME [facility] Phone [phone] Fax [phone]    Primary Dressing Endoform Antimicrobial 2x2 Qty 1 box  Secondary Dressing [id number] foams Qty 30  Secondary Dressing 4x4 nonsterile gauze Qty 200 ct loaf  Secondary Dressing 2\" medipore tape Qty 3 rolls  One box of skin prep, please contact patient's CADI waiver if not covered by insurance  Length of Need: 1 month  Frequency of dressing change: daily 30 days 0     order for DME Equipment being ordered: [facility] Fax [phone]    Wheelchair seating eval and mapping of current cushion 30 days 0     order for DME Equipment being ordered: Pressure Mapping of wheelchair at [facility] Phone [phone] Fax [phone] 1 Device 0     order for DME Equipment being ordered: Wheelchair-  \"Patient continues to need and use daily her power wheelchair and the wheelchair will continue to need repairs for the next 12 months.\" 1 each 0     povidone-iodine 10 % swab        senna-docusate (SENOKOT-S;PERICOLACE) 8.6-50 MG per tablet Take 1-2 tablets by mouth 2 times daily as needed for constipation 40 tablet 0     sertraline (ZOLOFT) 100 MG tablet Take 1.5 tablets (150 mg) by mouth daily 135 tablet 1     sodium chloride (NEBUSAL) 3 % neb solution Take 3 mLs by nebulization every 6 hours as needed for wheezing or other (sputum clearance difficulty due to quadridplegia.) 300 mL 1     spacer (OPTICHAMBER JAIDA) holding chamber To be used with inhaler 1 each 0     [START ON 9/19/2020] rivaroxaban ANTICOAGULANT " (XARELTO ANTICOAGULANT) 10 MG TABS tablet Take 1 tablet (10 mg) by mouth daily (with dinner) (Patient not taking: Reported on 9/14/2020) 7 tablet 0     OTC products: None, except as noted above    Allergies   Allergen Reactions     Succinylcholine      Spinal cord injury 12/18/12, patient at risk for extrajunctional receptors and hyperkalemia      Latex Allergy: NO    Social History     Tobacco Use     Smoking status: Current Every Day Smoker     Packs/day: 0.50     Types: Cigarettes, Vaping Device     Smokeless tobacco: Never Used     Tobacco comment: used 1/2-1 ppd for 4 years, quit 2012, now daily e cig use.    Substance Use Topics     Alcohol use: No     Alcohol/week: 0.0 standard drinks     Frequency: Never     History   Drug Use     Types: Marijuana     Comment: 3 joints per day       REVIEW OF SYSTEMS:   CONSTITUTIONAL: NEGATIVE for fever, chills, change in weight  INTEGUMENTARY/SKIN: known ulcer. No other rashes  EYES: NEGATIVE for vision changes or irritation; new glasses recently   ENT/MOUTH: NEGATIVE for ear, mouth and throat problems  RESP: NEGATIVE for significant cough or SOB  BREAST: NEGATIVE for masses, tenderness or discharge  CV: NEGATIVE for chest pain, palpitations or peripheral edema  GI: NEGATIVE for nausea, abdominal pain, heartburn, or change in bowel habits  : as above. Mirena IUD- spotting at times- infrequent. Not active.  MUSCULOSKELETAL: NEGATIVE for significant arthralgias or myalgia  NEURO: in power chair NEGATIVE for weakness, dizziness or paresthesias  ENDOCRINE: NEGATIVE for temperature intolerance, skin/hair changes  HEME: NEGATIVE for bleeding problems  PSYCHIATRIC: NEGATIVE for changes in mood or affect    EXAM:   BP 94/60   Pulse 98   Temp 97.2  F (36.2  C) (Temporal)   SpO2 98%   GENERAL: thin, well appearing, sitting in power chair.   EYES: Eyes grossly normal to inspection, wearing glasses, PERRL and conjunctivae and sclerae normal  HENT: ear canals and TM's normal, nose  and mouth without ulcers or lesions  NECK: no adenopathy, no asymmetry, thyroid normal to palpation  RESP: lungs clear to auscultation - no rales, rhonchi or wheezes  CV: regular rate and rhythm, normal S1 S2, no S3 or S4, no murmur, click or rub  ABDOMEN: soft, nontender, no masses and bowel sounds normal  MS: atrophic lower extremities, no peripheral edema and peripheral pulses strong, no edema  PSYCH: mentation appears normal, affect normal/bright, no agitation, speech normal volume, eye contact good. No agitaiton.   SKIN: warm and dry, no lesions  NEURO: alert and oriented, mentation intact and speech normal  PSYCH:mentation appears normal. and affect normal/bright    DIAGNOSTICS:     Results for orders placed or performed in visit on 09/14/20   CBC with platelets     Status: Abnormal   Result Value Ref Range    WBC 18.6 (H) 4.0 - 11.0 10e9/L    RBC Count 4.93 3.8 - 5.2 10e12/L    Hemoglobin 13.7 11.7 - 15.7 g/dL    Hematocrit 43.0 35.0 - 47.0 %    MCV 87 78 - 100 fl    MCH 27.8 26.5 - 33.0 pg    MCHC 31.9 31.5 - 36.5 g/dL    RDW 13.8 10.0 - 15.0 %    Platelet Count 338 150 - 450 10e9/L   Results for orders placed or performed in visit on 09/14/20   Asymptomatic COVID-19 Virus (Coronavirus) by PCR     Status: None    Specimen: Nasopharyngeal   Result Value Ref Range    COVID-19 Virus PCR to [facility] - Source Nasopharyngeal     COVID-19 Virus PCR to [facility] - Result Not Detected        Recent Labs   Lab Test 03/12/20  1404 12/12/19  1207  02/06/18 12/08/16  0705 12/07/16  1550   HGB 13.3 13.4   < >  --    < > 8.7*  --     345   < >  --    < > 496*  --    NA  --   --   --   --   --  140 140   POTASSIUM  --   --   --  3.4*  --  3.8 3.4   CR  --   --   --  0.39*  --  0.33* 0.33*    < > = values in this interval not displayed.        IMPRESSION:   Reason for surgery/procedure: ulcer and osteomyelitis   Diagnosis/reason for consult: preoperative clearance     The proposed surgical procedure is considered  INTERMEDIATE risk.    REVISED CARDIAC RISK INDEX  The patient has the following serious cardiovascular risks for perioperative complications such as (MI, PE, VFib and 3  AV Block):  No serious cardiac risks  INTERPRETATION: 0 risks: Class I (very low risk - 0.4% complication rate)    The patient has the following additional risks for perioperative complications:  Neurogenic bladder  Impaired pulmonary clearance       ICD-10-CM    1. Preop general physical exam  Z01.818 CBC with platelets   2. Other chronic osteomyelitis, unspecified site (H)  M86.60    3. Decubitus ulcer of right ischium, stage 4 (H)  L89.314    4. Quadriplegia, post-traumatic (H)- incomplete quad, limited use upper extremities  G82.50     S14.109S    5. Personal history of DVT (deep vein thrombosis)  Z86.718    6. H/O: pneumonia  Z87.01    7. Impaired lung function  R94.2    8. IUD (intrauterine device) in place- placed 12/2017  Z97.5    9. Neurogenic bladder  N31.9    10. Need for prophylactic vaccination and inoculation against influenza  Z23 INFLUENZA VACCINE IM > 6 MONTHS VALENT IIV4 [27260]     Vaccine Administration, Initial [71394]     1. Preop general physical exam  - CBC with platelets    2. Other chronic osteomyelitis, unspecified site (H)  3. Decubitus ulcer of right ischium, stage 4 (H)  Surgery as scheduled     4. Quadriplegia, post-traumatic (H)- incomplete quad, limited use upper extremities  Continue w/PMR MD    5. Personal history of DVT (deep vein thrombosis)  Hematology consult 09/10/2020- cleared, advised DVT/PE prophylaxis w/xarelto post-op. Has RX to start post-op from hematology.     6. H/O: pneumonia  7. Impaired lung function  Again advised smoking/vaping cessation  Start saline/albuterol nebs fall/winter per pulmonologist.   Normal exam today.   She needs to schedule PFTs and 6mo f/u with pulmonology     8. IUD (intrauterine device) in place- placed 12/2017      9. Neurogenic bladder  Pt to continue w/ , having  sx more recently.     10. Need for prophylactic vaccination and inoculation against influenza  Given   - INFLUENZA VACCINE IM > 6 MONTHS VALENT IIV4 [24016]  - Vaccine Administration, Initial [69260]        RECOMMENDATIONS:     --Consult hospital rounder / IM to assist post-op medical management    Pulmonary Risk  Incentive spirometry post op  Respiratory Therapy (Respiratory Care IP Consult)  post op  NG tube decompression if abdominal distension or significant vomiting   Advised smoking cessation.       --Patient is to take all scheduled medications on the day of surgery    APPROVAL GIVEN to proceed with proposed procedure, without further diagnostic evaluation       Signed Electronically by: Suzan Willis PA-C    Copy of this evaluation report is provided to requesting physician.    [facility] Preop Guidelines    Revised Cardiac Risk Index

## 2020-09-10 NOTE — PATIENT INSTRUCTIONS
"Dianna,    It was nice to meet you via video visit today.    Below is what we have discussed:  1. For your upcoming surgery, I recommend that you start taking a \"blood thinning\" medication the day following your surgery for prevention of blood clot. We have elected to have you be on Xarelto at 10 mg by mouth every 24 hours starting on 9/19/2020 (day following your surgery). Duration of Xarelto is for 7 days post operatively. Prescription has been sent to your preferred pharmacy.   2. At this time, I do not have plans to see you back in our clinic routinely. However, please do not hesitate to call our clinic at [phone] and ask to speak to one of the nurses if you have any further questions or concerns.    Thank you once again in choosing our clinic as part of your healthcare team.      Oleg Collins PA-C, MPAS  Physician Assistant  [facility].       "

## 2020-09-10 NOTE — PATIENT INSTRUCTIONS
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
## 2020-09-14 ENCOUNTER — TELEPHONE (OUTPATIENT)
Dept: WOUND CARE | Facility: CLINIC | Age: 25
End: 2020-09-14

## 2020-09-14 ENCOUNTER — OFFICE VISIT (OUTPATIENT)
Dept: FAMILY MEDICINE | Facility: CLINIC | Age: 25
End: 2020-09-14
Payer: MEDICARE

## 2020-09-14 VITALS
SYSTOLIC BLOOD PRESSURE: 94 MMHG | OXYGEN SATURATION: 98 % | HEART RATE: 98 BPM | DIASTOLIC BLOOD PRESSURE: 60 MMHG | TEMPERATURE: 97.2 F

## 2020-09-14 DIAGNOSIS — S14.109S QUADRIPLEGIA, POST-TRAUMATIC (H): Chronic | ICD-10-CM

## 2020-09-14 DIAGNOSIS — N31.9 NEUROGENIC BLADDER: ICD-10-CM

## 2020-09-14 DIAGNOSIS — G82.50 QUADRIPLEGIA, POST-TRAUMATIC (H): Chronic | ICD-10-CM

## 2020-09-14 DIAGNOSIS — Z11.59 ENCOUNTER FOR SCREENING FOR OTHER VIRAL DISEASES: ICD-10-CM

## 2020-09-14 DIAGNOSIS — Z87.01 H/O: PNEUMONIA: Chronic | ICD-10-CM

## 2020-09-14 DIAGNOSIS — Z01.818 PREOP GENERAL PHYSICAL EXAM: Primary | ICD-10-CM

## 2020-09-14 DIAGNOSIS — R94.2 IMPAIRED LUNG FUNCTION: ICD-10-CM

## 2020-09-14 DIAGNOSIS — Z97.5 IUD (INTRAUTERINE DEVICE) IN PLACE: Chronic | ICD-10-CM

## 2020-09-14 DIAGNOSIS — L89.314 DECUBITUS ULCER OF RIGHT ISCHIUM, STAGE 4 (H): Chronic | ICD-10-CM

## 2020-09-14 DIAGNOSIS — M86.60 OTHER CHRONIC OSTEOMYELITIS, UNSPECIFIED SITE (H): ICD-10-CM

## 2020-09-14 DIAGNOSIS — Z86.718 PERSONAL HISTORY OF DVT (DEEP VEIN THROMBOSIS): ICD-10-CM

## 2020-09-14 DIAGNOSIS — Z23 NEED FOR PROPHYLACTIC VACCINATION AND INOCULATION AGAINST INFLUENZA: ICD-10-CM

## 2020-09-14 LAB
ERYTHROCYTE [DISTWIDTH] IN BLOOD BY AUTOMATED COUNT: 13.8 % (ref 10–15)
HCT VFR BLD AUTO: 43 % (ref 35–47)
HGB BLD-MCNC: 13.7 G/DL (ref 11.7–15.7)
MCH RBC QN AUTO: 27.8 PG (ref 26.5–33)
MCHC RBC AUTO-ENTMCNC: 31.9 G/DL (ref 31.5–36.5)
MCV RBC AUTO: 87 FL (ref 78–100)
PLATELET # BLD AUTO: 338 10E9/L (ref 150–450)
RBC # BLD AUTO: 4.93 10E12/L (ref 3.8–5.2)
WBC # BLD AUTO: 18.6 10E9/L (ref 4–11)

## 2020-09-14 PROCEDURE — G0008 ADMIN INFLUENZA VIRUS VAC: HCPCS | Performed by: PHYSICIAN ASSISTANT

## 2020-09-14 PROCEDURE — 90686 IIV4 VACC NO PRSV 0.5 ML IM: CPT | Performed by: PHYSICIAN ASSISTANT

## 2020-09-14 PROCEDURE — 99214 OFFICE O/P EST MOD 30 MIN: CPT | Mod: 25 | Performed by: PHYSICIAN ASSISTANT

## 2020-09-14 PROCEDURE — 85027 COMPLETE CBC AUTOMATED: CPT | Performed by: PHYSICIAN ASSISTANT

## 2020-09-14 PROCEDURE — U0003 INFECTIOUS AGENT DETECTION BY NUCLEIC ACID (DNA OR RNA); SEVERE ACUTE RESPIRATORY SYNDROME CORONAVIRUS 2 (SARS-COV-2) (CORONAVIRUS DISEASE [COVID-19]), AMPLIFIED PROBE TECHNIQUE, MAKING USE OF HIGH THROUGHPUT TECHNOLOGIES AS DESCRIBED BY CMS-2020-01-R: HCPCS | Performed by: SURGERY

## 2020-09-14 PROCEDURE — 36415 COLL VENOUS BLD VENIPUNCTURE: CPT | Performed by: PHYSICIAN ASSISTANT

## 2020-09-14 ASSESSMENT — PAIN SCALES - GENERAL: PAINLEVEL: NO PAIN (0)

## 2020-09-14 NOTE — TELEPHONE ENCOUNTER
[facility] Wound    Who is the name of the provider?:  Rell      What is the location you see this provider at?: [place]    Reason for call:  Please fax clinical for wound vac to: [phone]    Can we leave a detailed message on this number?  YES

## 2020-09-15 LAB
SARS-COV-2 RNA SPEC QL NAA+PROBE: NOT DETECTED
SPECIMEN SOURCE: NORMAL

## 2020-09-16 PROBLEM — L89.313 PRESSURE INJURY OF RIGHT ISCHIUM, STAGE 3 (H): Chronic | Status: RESOLVED | Noted: 2019-10-14 | Resolved: 2020-09-16

## 2020-09-16 PROBLEM — M86.60: Chronic | Status: ACTIVE | Noted: 2020-09-02

## 2020-09-16 PROBLEM — R94.2: Status: ACTIVE | Noted: 2020-09-16

## 2020-09-16 PROBLEM — R94.2: Chronic | Status: ACTIVE | Noted: 2020-09-16

## 2020-09-17 ENCOUNTER — TELEPHONE (OUTPATIENT)
Dept: FAMILY MEDICINE | Facility: CLINIC | Age: 25
End: 2020-09-17

## 2020-09-17 ENCOUNTER — ANESTHESIA EVENT (OUTPATIENT)
Dept: SURGERY | Facility: CLINIC | Age: 25
End: 2020-09-17
Payer: MEDICARE

## 2020-09-17 ASSESSMENT — LIFESTYLE VARIABLES: TOBACCO_USE: 1

## 2020-09-17 NOTE — ANESTHESIA PREPROCEDURE EVALUATION
Anesthesia Pre-Procedure Evaluation    Patient: Dianna Cottrell   MRN:     [id number] Gender:   female   Age:    25 year old :      1995        Preoperative Diagnosis: Wound, open, buttock with complication, right, subsequent encounter [S31.819D]  Other chronic osteomyelitis, unspecified site (H) [M86.60]   Procedure(s):  IRRIGATION AND DEBRIDEMENT, BUTTOCK, right ischial with bone biopsy.     LABS:  CBC:   Lab Results   Component Value Date    WBC 18.6 (H) 2020    WBC 14.9 (H) 2020    HGB 13.7 2020    HGB 13.3 2020    HCT 43.0 2020    HCT 40.6 2020     2020     2020     BMP:   Lab Results   Component Value Date     2016     2016    POTASSIUM 3.4 (L) 2018    POTASSIUM 3.8 2016    CHLORIDE 103 2016    CHLORIDE 103 2016    CO2 28 2016    CO2 30 2016    BUN 3 (L) 2016    BUN 3 (L) 2016    CR 0.39 (L) 2018    CR 0.33 (L) 2016     (H) 2018    GLC 88 2016     COAGS: No results found for: PTT, INR, FIBR  POC:   Lab Results   Component Value Date    BGM 82 10/21/2016    HCG Negative 2019    HCGS Negative 2019     OTHER:   Lab Results   Component Value Date    LACT 0.9 2016    LOR 8.6 2016    ALBUMIN 3.6 2016    ALT 25 2016    AST 34 2016    TSH 1.01 2016    T4 1.17 2016    CRP 10.5 (H) 2020    SED 18 2020        Preop Vitals    BP Readings from Last 3 Encounters:   20 94/60   20 122/70   20 104/64    Pulse Readings from Last 3 Encounters:   20 98   20 112   20 107      Resp Readings from Last 3 Encounters:   20 16   20 20   20 16    SpO2 Readings from Last 3 Encounters:   20 98%   20 98%   20 98%      Temp Readings from Last 1 Encounters:   20 36.2  C (97.2  F) (Temporal)    Ht Readings from Last 1 Encounters:  "  17 1.676 m (5' 6\")      Wt Readings from Last 1 Encounters:   20 50.8 kg (112 lb)    Estimated body mass index is 18.08 kg/m  as calculated from the following:    Height as of 17: 1.676 m (5' 6\").    Weight as of 3/9/20: 50.8 kg (112 lb).     LDA:  Urethral Catheter (Active)   Number of days: 1381        Past Medical History:   Diagnosis Date     Anemia     with pregnancy     c5 burst fracture 2012    C5-C7 fracture with cord injury     Compression fracture of L1 lumbar vertebra (H) 2012    L1 superior endplate compression fracture     Fracture of thoracic spine without spinal cord lesion (H) 2012    T3-T8 spinous process fractures     Hypertension 2016     Vocal cord dysfunction     Left vocal cord weakness noted by ENT post extubation 2012      Past Surgical History:   Procedure Laterality Date     C4-C7 interbody fusion with anterior screw and plate fixation and posterior erna and pedicle screw fixation with interspace bone graft and C5 and C6 partial corpectomies  2012      SECTION  2013    Procedure:  SECTION;   Section ;  Surgeon: Ricki Nelson MD;  Location: [facility]      SECTION N/A 2016    Procedure:  SECTION;  Surgeon: Floridalma Kiran MD;  Location: [facility]     IR IVC FILTER PLACEMENT  2012     LUMBAR DRAIN  2012      Allergies   Allergen Reactions     Succinylcholine      Spinal cord injury 12, patient at risk for extrajunctional receptors and hyperkalemia        Anesthesia Evaluation     .             ROS/MED HX    ENT/Pulmonary: Comment: Vocal cord dysfunction    (+)vocal cord abnormalities- tobacco use, , . .    Neurologic:     (+)Spinal cord injury     Cardiovascular:     (+) hypertension----. : . . . :. .       METS/Exercise Tolerance:     Hematologic:     (+) Anemia, -      Musculoskeletal: Comment: Hx  Vertebral fractures        GI/Hepatic:  - neg GI/hepatic ROS     "   Renal/Genitourinary:  - ROS Renal section negative       Endo:  - neg endo ROS       Psychiatric:         Infectious Disease:         Malignancy:      - no malignancy   Other:                     [facility] AN PHYSICAL EXAM    Assessment:   ASA SCORE: 3    H&P: History and physical reviewed and following examination; no interval change.   Smoking Status:  Active Smoker   NPO Status: NPO Appropriate     Plan:   Anes. Type:  General   Pre-Medication: None   Induction:  IV (Standard)   Airway: LMA   Access/Monitoring: PIV   Maintenance: Balanced     Postop Plan:   Postop Pain: Opioids  Postop Sedation/Airway: Not planned     PONV Management:   Adult Risk Factors: Female, Postop Opioids   Prevention: Ondansetron, Dexamethasone     CONSENT: Direct conversation   Plan and risks discussed with: Patient   Blood Products: Consented (ALL Blood Products)       Comments for Plan/Consent:  I agree with the residents assessment and plan.    Chris Behrens M.D.  Anesthesiology                 Cornelio Brown MD

## 2020-09-17 NOTE — TELEPHONE ENCOUNTER
Our goal is to have forms completed with 72 hours, however some forms may require a visit or additional information.    Who is the form from?: [facility] (if other please explain)  Where the form came from: form was faxed in  What clinic location was the form placed at?: [place]  Where the form was placed: placed in [facility] inbox     What number is listed as a contact on the form?: [phone]  Fax number to return form to:  [phone]        Call taken on 9/17/2020 at 5:34 PM by Floridalma Merino

## 2020-09-18 ENCOUNTER — HOSPITAL ENCOUNTER (OUTPATIENT)
Facility: CLINIC | Age: 25
Discharge: HOME | End: 2020-09-18
Attending: SURGERY | Admitting: SURGERY
Payer: MEDICARE

## 2020-09-18 ENCOUNTER — ANESTHESIA (OUTPATIENT)
Dept: SURGERY | Facility: CLINIC | Age: 25
End: 2020-09-18
Payer: MEDICARE

## 2020-09-18 VITALS
HEIGHT: 66 IN | OXYGEN SATURATION: 95 % | SYSTOLIC BLOOD PRESSURE: 93 MMHG | DIASTOLIC BLOOD PRESSURE: 50 MMHG | BODY MASS INDEX: 18.49 KG/M2 | WEIGHT: 115.08 LBS | TEMPERATURE: 97.1 F | RESPIRATION RATE: 18 BRPM | HEART RATE: 91 BPM

## 2020-09-18 DIAGNOSIS — S31.819D: ICD-10-CM

## 2020-09-18 DIAGNOSIS — M86.60 OTHER CHRONIC OSTEOMYELITIS, UNSPECIFIED SITE (H): ICD-10-CM

## 2020-09-18 LAB — GLUCOSE BLDC GLUCOMTR-MCNC: 89 MG/DL (ref 70–99)

## 2020-09-18 PROCEDURE — 88311 DECALCIFY TISSUE: CPT | Mod: 26 | Performed by: SURGERY

## 2020-09-18 PROCEDURE — 88307 TISSUE EXAM BY PATHOLOGIST: CPT | Performed by: SURGERY

## 2020-09-18 PROCEDURE — 87077 CULTURE AEROBIC IDENTIFY: CPT | Performed by: SURGERY

## 2020-09-18 PROCEDURE — 27210794 ZZH OR GENERAL SUPPLY STERILE: Performed by: SURGERY

## 2020-09-18 PROCEDURE — 71000014 ZZH RECOVERY PHASE 1 LEVEL 2 FIRST HR: Performed by: SURGERY

## 2020-09-18 PROCEDURE — 25000566 ZZH SEVOFLURANE, EA 15 MIN: Performed by: SURGERY

## 2020-09-18 PROCEDURE — 25000128 H RX IP 250 OP 636

## 2020-09-18 PROCEDURE — 82962 GLUCOSE BLOOD TEST: CPT

## 2020-09-18 PROCEDURE — 37000009 ZZH ANESTHESIA TECHNICAL FEE, EACH ADDTL 15 MIN: Performed by: SURGERY

## 2020-09-18 PROCEDURE — 87102 FUNGUS ISOLATION CULTURE: CPT | Performed by: SURGERY

## 2020-09-18 PROCEDURE — 25000128 H RX IP 250 OP 636: Performed by: SURGERY

## 2020-09-18 PROCEDURE — 36000053 ZZH SURGERY LEVEL 2 EA 15 ADDTL MIN - UMMC: Performed by: SURGERY

## 2020-09-18 PROCEDURE — 88311 DECALCIFY TISSUE: CPT | Performed by: SURGERY

## 2020-09-18 PROCEDURE — 71000027 ZZH RECOVERY PHASE 2 EACH 15 MINS: Performed by: SURGERY

## 2020-09-18 PROCEDURE — 88307 TISSUE EXAM BY PATHOLOGIST: CPT | Mod: 26 | Performed by: SURGERY

## 2020-09-18 PROCEDURE — 36000051 ZZH SURGERY LEVEL 2 1ST 30 MIN - UMMC: Performed by: SURGERY

## 2020-09-18 PROCEDURE — 87075 CULTR BACTERIA EXCEPT BLOOD: CPT | Performed by: SURGERY

## 2020-09-18 PROCEDURE — 25000125 ZZHC RX 250

## 2020-09-18 PROCEDURE — 27211024 ZZHC OR SUPPLY OTHER OPNP: Performed by: SURGERY

## 2020-09-18 PROCEDURE — 40000170 ZZH STATISTIC PRE-PROCEDURE ASSESSMENT II: Performed by: SURGERY

## 2020-09-18 PROCEDURE — 37000008 ZZH ANESTHESIA TECHNICAL FEE, 1ST 30 MIN: Performed by: SURGERY

## 2020-09-18 PROCEDURE — 25800030 ZZH RX IP 258 OP 636

## 2020-09-18 PROCEDURE — 87070 CULTURE OTHR SPECIMN AEROBIC: CPT | Performed by: SURGERY

## 2020-09-18 PROCEDURE — 87186 SC STD MICRODIL/AGAR DIL: CPT | Performed by: SURGERY

## 2020-09-18 RX ORDER — NALOXONE HYDROCHLORIDE 0.4 MG/ML
.1-.4 INJECTION, SOLUTION INTRAMUSCULAR; INTRAVENOUS; SUBCUTANEOUS
Status: DISCONTINUED | OUTPATIENT
Start: 2020-09-18 | End: 2020-09-18 | Stop reason: HOSPADM

## 2020-09-18 RX ORDER — MEPERIDINE HYDROCHLORIDE 25 MG/ML
12.5 INJECTION INTRAMUSCULAR; INTRAVENOUS; SUBCUTANEOUS
Status: DISCONTINUED | OUTPATIENT
Start: 2020-09-18 | End: 2020-09-18 | Stop reason: HOSPADM

## 2020-09-18 RX ORDER — ONDANSETRON 2 MG/ML
INJECTION INTRAMUSCULAR; INTRAVENOUS PRN
Status: DISCONTINUED | OUTPATIENT
Start: 2020-09-18 | End: 2020-09-18

## 2020-09-18 RX ORDER — SODIUM CHLORIDE, SODIUM LACTATE, POTASSIUM CHLORIDE, CALCIUM CHLORIDE 600; 310; 30; 20 MG/100ML; MG/100ML; MG/100ML; MG/100ML
INJECTION, SOLUTION INTRAVENOUS CONTINUOUS
Status: DISCONTINUED | OUTPATIENT
Start: 2020-09-18 | End: 2020-09-18 | Stop reason: HOSPADM

## 2020-09-18 RX ORDER — LIDOCAINE 40 MG/G
CREAM TOPICAL
Status: DISCONTINUED | OUTPATIENT
Start: 2020-09-18 | End: 2020-09-18 | Stop reason: HOSPADM

## 2020-09-18 RX ORDER — LIDOCAINE HYDROCHLORIDE 20 MG/ML
INJECTION, SOLUTION INFILTRATION; PERINEURAL PRN
Status: DISCONTINUED | OUTPATIENT
Start: 2020-09-18 | End: 2020-09-18

## 2020-09-18 RX ORDER — PROPOFOL 10 MG/ML
INJECTION, EMULSION INTRAVENOUS PRN
Status: DISCONTINUED | OUTPATIENT
Start: 2020-09-18 | End: 2020-09-18

## 2020-09-18 RX ORDER — SODIUM CHLORIDE, SODIUM LACTATE, POTASSIUM CHLORIDE, CALCIUM CHLORIDE 600; 310; 30; 20 MG/100ML; MG/100ML; MG/100ML; MG/100ML
INJECTION, SOLUTION INTRAVENOUS CONTINUOUS PRN
Status: DISCONTINUED | OUTPATIENT
Start: 2020-09-18 | End: 2020-09-18

## 2020-09-18 RX ORDER — CEFAZOLIN SODIUM 2 G/100ML
2 INJECTION, SOLUTION INTRAVENOUS
Status: COMPLETED | OUTPATIENT
Start: 2020-09-18 | End: 2020-09-18

## 2020-09-18 RX ORDER — FENTANYL CITRATE 50 UG/ML
25-50 INJECTION, SOLUTION INTRAMUSCULAR; INTRAVENOUS
Status: DISCONTINUED | OUTPATIENT
Start: 2020-09-18 | End: 2020-09-18 | Stop reason: HOSPADM

## 2020-09-18 RX ORDER — CEFAZOLIN SODIUM 1 G/3ML
1 INJECTION, POWDER, FOR SOLUTION INTRAMUSCULAR; INTRAVENOUS SEE ADMIN INSTRUCTIONS
Status: DISCONTINUED | OUTPATIENT
Start: 2020-09-18 | End: 2020-09-18 | Stop reason: HOSPADM

## 2020-09-18 RX ORDER — DEXAMETHASONE SODIUM PHOSPHATE 4 MG/ML
INJECTION, SOLUTION INTRA-ARTICULAR; INTRALESIONAL; INTRAMUSCULAR; INTRAVENOUS; SOFT TISSUE PRN
Status: DISCONTINUED | OUTPATIENT
Start: 2020-09-18 | End: 2020-09-18

## 2020-09-18 RX ORDER — OXYCODONE HCL 5 MG/5 ML
5 SOLUTION, ORAL ORAL EVERY 4 HOURS PRN
Status: DISCONTINUED | OUTPATIENT
Start: 2020-09-18 | End: 2020-09-18 | Stop reason: HOSPADM

## 2020-09-18 RX ORDER — ONDANSETRON 2 MG/ML
4 INJECTION INTRAMUSCULAR; INTRAVENOUS EVERY 30 MIN PRN
Status: DISCONTINUED | OUTPATIENT
Start: 2020-09-18 | End: 2020-09-18 | Stop reason: HOSPADM

## 2020-09-18 RX ORDER — ONDANSETRON 4 MG/1
4 TABLET, ORALLY DISINTEGRATING ORAL EVERY 30 MIN PRN
Status: DISCONTINUED | OUTPATIENT
Start: 2020-09-18 | End: 2020-09-18 | Stop reason: HOSPADM

## 2020-09-18 RX ADMIN — PROPOFOL 30 MG: 10 INJECTION, EMULSION INTRAVENOUS at 10:19

## 2020-09-18 RX ADMIN — DEXAMETHASONE SODIUM PHOSPHATE 6 MG: 4 INJECTION, SOLUTION INTRAMUSCULAR; INTRAVENOUS at 09:40

## 2020-09-18 RX ADMIN — ONDANSETRON 4 MG: 2 INJECTION INTRAMUSCULAR; INTRAVENOUS at 10:18

## 2020-09-18 RX ADMIN — PROPOFOL 100 MG: 10 INJECTION, EMULSION INTRAVENOUS at 10:51

## 2020-09-18 RX ADMIN — PHENYLEPHRINE HYDROCHLORIDE 100 MCG: 10 INJECTION INTRAVENOUS at 09:26

## 2020-09-18 RX ADMIN — SODIUM CHLORIDE, SODIUM LACTATE, POTASSIUM CHLORIDE, CALCIUM CHLORIDE: 600; 310; 30; 20 INJECTION, SOLUTION INTRAVENOUS at 09:14

## 2020-09-18 RX ADMIN — MIDAZOLAM 1 MG: 1 INJECTION INTRAMUSCULAR; INTRAVENOUS at 09:12

## 2020-09-18 RX ADMIN — PROPOFOL 80 MG: 10 INJECTION, EMULSION INTRAVENOUS at 09:20

## 2020-09-18 RX ADMIN — AMPICILLIN SODIUM 2 G: 1 INJECTION, POWDER, FOR SOLUTION INTRAMUSCULAR; INTRAVENOUS at 10:08

## 2020-09-18 RX ADMIN — MIDAZOLAM 1 MG: 1 INJECTION INTRAMUSCULAR; INTRAVENOUS at 09:20

## 2020-09-18 RX ADMIN — LIDOCAINE HYDROCHLORIDE 50 MG: 20 INJECTION, SOLUTION INFILTRATION; PERINEURAL at 09:20

## 2020-09-18 ASSESSMENT — MIFFLIN-ST. JEOR: SCORE: 1273.75

## 2020-09-18 NOTE — DISCHARGE INSTRUCTIONS
Same-Day Surgery   Adult Discharge Orders & Instructions     For 24 hours after surgery:  1. Get plenty of rest.  A responsible adult must stay with you for at least 24 hours after you leave the hospital.   2. Pain medication can slow your reflexes. Do not drive or use heavy equipment.  If you have weakness or tingling, don't drive or use heavy equipment until this feeling goes away.  3. Mixing alcohol and pain medication can cause dizziness and slow your breathing. It can even be fatal. Do not drink alcohol while taking pain medication.  4. Avoid strenuous or risky activities.  Ask for help when climbing stairs.   5. You may feel lightheaded.  If so, sit for a few minutes before standing.  Have someone help you get up.   6. If you have nausea (feel sick to your stomach), drink only clear liquids such as apple juice, ginger ale, broth or 7-Up.  Rest may also help.  Be sure to drink enough fluids.  Move to a regular diet as you feel able. Take pain medications with a small amount of solid food, such as toast or crackers, to avoid nausea.   7. A slight fever is normal. Call the doctor if your fever is over 100 F (37.7 C) (taken under the tongue) or lasts longer than 24 hours.  8. You may have a dry mouth, muscle aches, trouble sleeping or a sore throat.  These symptoms should go away after 24 hours.  9. Do not make important or legal decisions.   Pain Management:      1. Take pain medication (if prescribed) for pain as directed by your physician.        2. WARNING: If the pain medication you have been prescribed contains Tylenol  (acetaminophen), DO NOT take additional doses of Tylenol (acetaminophen).     Call your doctor for any of the followin.  Signs of infection (fever, growing tenderness at the surgery site, severe pain, a large amount of drainage or bleeding, foul-smelling drainage, redness, swelling).    2.  It has been over 8 to 10 hours since surgery and you are still not able to urinate (pee).    3.   Headache for over 24 hours.    4.  Numbness, tingling or weakness the day after surgery (if you had spinal anesthesia).  To contact a doctor, call _____________________________________ or:      [phone] and ask for the Resident On Call for:          __________________________________________ (answered 24 hours a day)      Emergency Department:  [facility] [place] [facility]: [phone]  [facility]: [phone]               Rev. 10/2014

## 2020-09-18 NOTE — ANESTHESIA POSTPROCEDURE EVALUATION
Anesthesia POST Procedure Evaluation    Patient: Dianna Cottrell   MRN:     [id number] Gender:   female   Age:    25 year old :      1995        Preoperative Diagnosis: Wound, open, buttock with complication, right, subsequent encounter [S31.819D]  Other chronic osteomyelitis, unspecified site (H) [M86.60]   Procedure(s):  IRRIGATION AND DEBRIDEMENT, BUTTOCK, right ischial with bone biopsy.   Postop Comments: No value filed.     Anesthesia Type: General       Disposition: Outpatient   Postop Pain Control: Uneventful            Sign Out: Well controlled pain   PONV: No   Neuro/Psych: Uneventful            Sign Out: Acceptable/Baseline neuro status   Airway/Respiratory: Uneventful            Sign Out: Acceptable/Baseline resp. status   CV/Hemodynamics: Uneventful            Sign Out: Acceptable CV status   Other NRE: NONE   DID A NON-ROUTINE EVENT OCCUR? No         Last Anesthesia Record Vitals:  CRNA VITALS  2020 1014 - 2020 1114      2020             Resp Rate (observed):  (!) 5    EKG:  Sinus rhythm          Last PACU Vitals:  Vitals Value Taken Time   /84 2020 11:45 AM   Temp 36.8  C (98.2  F) 2020 11:45 AM   Pulse 69 2020 11:45 AM   Resp 27 2020 11:45 AM   SpO2 98 % 2020 11:45 AM   Temp src     NIBP     Pulse     SpO2     Resp     Temp     Ht Rate     Temp 2           Electronically Signed By: Christopher J. Behrens, MD, 2020, 1:40 PM

## 2020-09-18 NOTE — BRIEF OP NOTE
[facility] Brief Operative Note    Pre-operative diagnosis: Wound, open, buttock with complication, right, subsequent encounter [S31.534D]  Other chronic osteomyelitis, unspecified site (H) [M86.60]   Post-operative diagnosis * No post-op diagnosis entered * same as above   Procedure: Procedure(s):  IRRIGATION AND DEBRIDEMENT, BUTTOCK, right ischial with bone biopsy.   Surgeon(s): Surgeon(s) and Role:     * Vira Zacarias MD - Primary     * Ruthie Mccormick PA-C - Resident - Assisting   Estimated blood loss: 10cc   Specimens: ID Type Source Tests Collected by Time Destination   1 : Right Ischial Bone Biopsy Tissue Pelvis, Right ANAEROBIC BACTERIAL CULTURE, FUNGUS CULTURE, TISSUE CULTURE AEROBIC BACTERIAL Vira Zacarias MD 9/18/2020  9:55 AM    A : Right Ischial Bone biopsy Tissue Pelvis, Right SURGICAL PATHOLOGY EXAM Vira Zacarias MD 9/18/2020  9:55 AM       Findings: Debridement of R ischial wound. Bone culture sent. Vac via placed.

## 2020-09-18 NOTE — ANESTHESIA CARE TRANSFER NOTE
Patient: Dianna Cottrell    Procedure(s):  IRRIGATION AND DEBRIDEMENT, BUTTOCK, right ischial with bone biopsy.    Diagnosis: Wound, open, buttock with complication, right, subsequent encounter [S31.894D]  Other chronic osteomyelitis, unspecified site (H) [M86.60]  Diagnosis Additional Information: No value filed.    Anesthesia Type:   General     Note:  Airway :Face Mask  Patient transferred to:PACU  Handoff Report: Identifed the Patient, Identified the Reponsible Provider, Reviewed the pertinent medical history, Discussed the surgical course, Reviewed Intra-OP anesthesia mangement and issues during anesthesia, Set expectations for post-procedure period and Allowed opportunity for questions and acknowledgement of understanding      Vitals: (Last set prior to Anesthesia Care Transfer)    CRNA VITALS  9/18/2020 1014 - 9/18/2020 1049      9/18/2020             Resp Rate (observed):  (!) 5                Electronically Signed By: Cornelio Brown MD  September 18, 2020  10:49 AM

## 2020-09-21 LAB
BACTERIA SPEC CULT: ABNORMAL
SPECIMEN SOURCE: ABNORMAL

## 2020-09-21 NOTE — OP NOTE
Procedure Date: 09/18/2020      ATTENDING:  Vira Zacarias MD      ASSISTANT:  Ruthie Mccormick PA-C (no residents available.  MIO did greater than 50% of case).      PREOPERATIVE DIAGNOSIS:  Stage IV right ischial decubitus with probable osteomyelitis.      POSTOPERATIVE DIAGNOSIS:  Stage IV right ischial decubitus with probable osteomyelitis.      PROCEDURES:  Sharp excisional debridement of right ischial tuberosity decubitus.  Bone biopsies for cultures and path.  VAC via placement.      ANESTHESIA:  GET.      ESTIMATED BLOOD LOSS:  10 mL      IV FLUIDS:  400 mL      URINE OUTPUT:  Not registered.      COUNTS:  Correct.      COMPLICATIONS:  None.      DRAINS:  None.      INDICATIONS:  This is a 25-year-old partial quadriplegic female who has had a very chronic right ischial decubitus ulcer.  She has had imaging and laboratory results, which would suggest that there is underlying osteomyelitis.  She has not been interested in doing definitive flap closure during pandemic due to health risks of recovering at a skilled nursing facility.  She did, however, agree to debridement and bone biopsy to see if we can treat her osteomyelitis with antibiotics prior to any surgeries.  Of note, the wound has become quite narrow much like a sinus tract with a lot of contraction of the outer skin defect.  She does have a good bone.      DESCRIPTION OF PROCEDURE:  The patient was seen in the preoperative waiting area.  The operative site was marked on the anatomical diagram.  Informed consent was obtained after reviewing the possible risks and complications including but not limited to the following:  Infection, bleeding, hematoma, seroma formation, poor healing, possible injury to surrounding neurovascular and musculoskeletal structures, as well as colorectal and genitourinary structures, residual deformities, need for further surgery, altered sensation of the operative site, and anesthetic risks such as DVT, PE and  cardiopulmonary arrest.  The patient was then brought to the operating room and intubated supine.  She was placed prone on the OR table with chest pillows and a hip roll.  All other places were padded against any pressure injury.  She was secured across her forelegs anterior chest.  Her arms were in modified superman position.  Preop dressings were removed and photos were taken.  The area was then prepped and draped in the usual sterile fashion using Techni-Care.      After a timeout was taken and the proper patient and procedure were identified, we incised the contracted a portion of the skin defect.  This allowed us to get into the deeper wound after removing the lining of the sinus tract.  Interestingly, we got down to the bony tuberosity, there was some slight extension inferiorly along the ramus toward the pubis, but more importantly, there was a nonadherent capsular lined pocket over and beyond the area of bony tuberosity itself.  This was debrided sharply with rongeurs and some scratch pad along with Bovie and then irrigated with saline.  We then used the Esha rongeur to take multiple bony samples for cultures including aerobic, anaerobic and fungal, as well as pathology for formalin for histologic examination.  When we were happy with our debridement, we got hemostasis with cautery.  Her tissues for the most part were soft and not really fibrotic.  Pulsavac was then used with 350 mL of contrast and Prontosan as a surfactant irrigant.  The wound was then measured to be 3 x 2 x 2 cm with 4 cm undermining between 11 o'clock and 2 o'clock positions.  A VAC via black sponge was then fit to the wound and a bridge was created to the right hip, protecting the skin using Cavilon.  We kept this to 125 mmHg continuous therapy and there was no leak.  The patient was then returned to a supine position on stretcher, extubated in the operating room and taken to the recovery room in satisfactory condition, having  tolerated the procedure without difficulty or complication.         CRAIG THAYER MD             D: 2020   T: 2020   MT: JOSE ROBERTO      Name:     SYLVESTER BLACKWELL   MRN:      [id number]        Account:        [id number]   :      1995           Procedure Date: 2020      Document: [id number]

## 2020-09-22 ENCOUNTER — MYC MEDICAL ADVICE (OUTPATIENT)
Dept: PLASTIC SURGERY | Facility: CLINIC | Age: 25
End: 2020-09-22

## 2020-09-22 DIAGNOSIS — S31.819D: Primary | ICD-10-CM

## 2020-09-22 LAB — COPATH REPORT: NORMAL

## 2020-09-25 ENCOUNTER — TELEPHONE (OUTPATIENT)
Dept: FAMILY MEDICINE | Facility: CLINIC | Age: 25
End: 2020-09-25

## 2020-09-25 LAB
BACTERIA SPEC CULT: NORMAL
Lab: NORMAL
SPECIMEN SOURCE: NORMAL

## 2020-09-25 NOTE — TELEPHONE ENCOUNTER
Our goal is to have forms completed with 72 hours, however some forms may require a visit or additional information.    Who is the form from?: [facility]  (if other please explain)  Where the form came from: form was faxed in  What clinic location was the form placed at?: [facility]  Where the form was placed: placed in [facility] inbox     What number is listed as a contact on the form?: [phone]  Fax number to return form to:  [phone]        Call taken on 9/25/2020 at 2:31 PM by Floridalma Merino

## 2020-09-30 ENCOUNTER — TELEPHONE (OUTPATIENT)
Dept: FAMILY MEDICINE | Facility: CLINIC | Age: 25
End: 2020-09-30

## 2020-09-30 NOTE — TELEPHONE ENCOUNTER
Our goal is to have forms completed with 72 hours, however some forms may require a visit or additional information.    Who is the form from?: [facility]  (if other please explain)  Where the form came from: form was faxed in  What clinic location was the form placed at?: [facility]  Where the form was placed: placed in [facility] inbox     What number is listed as a contact on the form?: [phone]  Fax number to return form to:  [phone]        Call taken on 9/30/2020 at 5:38 PM by Floridalma Merino

## 2020-10-01 ENCOUNTER — OFFICE VISIT (OUTPATIENT)
Dept: NURSING | Facility: CLINIC | Age: 25
End: 2020-10-01
Attending: INTERNAL MEDICINE
Payer: MEDICARE

## 2020-10-01 DIAGNOSIS — G82.53 QUADRIPLEGIA, C5-C7 COMPLETE (H): ICD-10-CM

## 2020-10-01 DIAGNOSIS — Z72.0 CURRENT TOBACCO USE: ICD-10-CM

## 2020-10-01 DIAGNOSIS — J18.9 FREQUENT EPISODES OF PNEUMONIA: ICD-10-CM

## 2020-10-01 PROCEDURE — 99207 PR DROP WITH A PROCEDURE: CPT | Performed by: INTERNAL MEDICINE

## 2020-10-01 PROCEDURE — 94729 DIFFUSING CAPACITY: CPT | Performed by: INTERNAL MEDICINE

## 2020-10-01 PROCEDURE — 94060 EVALUATION OF WHEEZING: CPT | Performed by: INTERNAL MEDICINE

## 2020-10-01 NOTE — PROGRESS NOTES
PFT Lab Note: [place]/DLCO done per order from Dr. Bahena. Plethysmography deferred as pt unable to transfer to body box for testing. MIP/MEP added per protocol due to quadriplegia diagnosis.

## 2020-10-02 ENCOUNTER — VIRTUAL VISIT (OUTPATIENT)
Dept: INFECTIOUS DISEASES | Facility: CLINIC | Age: 25
End: 2020-10-02
Attending: SURGERY
Payer: MEDICARE

## 2020-10-02 DIAGNOSIS — G82.50 QUADRIPLEGIA (H): ICD-10-CM

## 2020-10-02 DIAGNOSIS — M86.651 CHRONIC OSTEOMYELITIS OF PELVIS, RIGHT (H): Primary | ICD-10-CM

## 2020-10-02 DIAGNOSIS — L89.324 DECUBITUS ULCER OF ISCHIAL AREA, LEFT, STAGE IV (H): ICD-10-CM

## 2020-10-02 PROCEDURE — 99205 OFFICE O/P NEW HI 60 MIN: CPT | Mod: 95 | Performed by: INTERNAL MEDICINE

## 2020-10-02 ASSESSMENT — PAIN SCALES - GENERAL: PAINLEVEL: NO PAIN (0)
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
## 2020-10-05 ENCOUNTER — TELEPHONE (OUTPATIENT)
Dept: INFECTIOUS DISEASES | Facility: CLINIC | Age: 25
End: 2020-10-05

## 2020-10-05 ENCOUNTER — TELEPHONE (OUTPATIENT)
Dept: FAMILY MEDICINE | Facility: CLINIC | Age: 25
End: 2020-10-05

## 2020-10-05 ENCOUNTER — HOME INFUSION (PRE-WILLOW HOME INFUSION) (OUTPATIENT)
Dept: PHARMACY | Facility: CLINIC | Age: 25
End: 2020-10-05

## 2020-10-05 DIAGNOSIS — M86.651 CHRONIC OSTEOMYELITIS OF PELVIS, RIGHT (H): Primary | ICD-10-CM

## 2020-10-05 DIAGNOSIS — Z53.9 DIAGNOSIS NOT YET DEFINED: Primary | ICD-10-CM

## 2020-10-05 LAB
DLCOCOR-%PRED-PRE: 78 %
DLCOCOR-PRE: 18.86 ML/MIN/MMHG
DLCOUNC-%PRED-PRE: 79 %
DLCOUNC-PRE: 19.03 ML/MIN/MMHG
DLCOUNC-PRED: 23.97 ML/MIN/MMHG
ERV-%PRED-PRE: 9 %
ERV-PRE: 0.16 L
ERV-PRED: 1.61 L
EXPTIME-PRE: 4.63 SEC
FEF2575-%PRED-POST: 80 %
FEF2575-%PRED-PRE: 77 %
FEF2575-POST: 3.11 L/SEC
FEF2575-PRE: 3.01 L/SEC
FEF2575-PRED: 3.87 L/SEC
FEFMAX-%PRED-PRE: 59 %
FEFMAX-PRE: 4.36 L/SEC
FEFMAX-PRED: 7.28 L/SEC
FEV1-%PRED-PRE: 46 %
FEV1-PRE: 1.62 L
FEV1FEV6-PRE: 98 %
FEV1FEV6-PRED: 86 %
FEV1FVC-PRE: 98 %
FEV1FVC-PRED: 86 %
FEV1SVC-PRE: 99 %
FEV1SVC-PRED: 82 %
FIFMAX-PRE: 2.95 L/SEC
FVC-%PRED-PRE: 40 %
FVC-PRE: 1.66 L
FVC-PRED: 4.08 L
IC-%PRED-PRE: 56 %
IC-PRE: 1.47 L
IC-PRED: 2.61 L
MEP-PRE: 71 CMH2O
MIP-PRE: -52 CMH2O
VA-%PRED-PRE: 66 %
VA-PRE: 3.5 L
VC-%PRED-PRE: 38 %
VC-PRE: 1.63 L
VC-PRED: 4.22 L

## 2020-10-05 PROCEDURE — G0179 MD RECERTIFICATION HHA PT: HCPCS | Performed by: NURSE PRACTITIONER

## 2020-10-05 NOTE — TELEPHONE ENCOUNTER
[facility] Call Center    Phone Message    May a detailed message be left on voicemail: yes     Reason for Call: Other: Lisset from  [facility] is requesting PICC line orders to be placed in epic ASAP.  Varsha can be reached at [phone]     Action Taken: Message routed to:  [facility] ([facility]): ID    Travel Screening: Not Applicable

## 2020-10-05 NOTE — TELEPHONE ENCOUNTER
Our goal is to have forms completed with 72 hours, however some forms may require a visit or additional information.    Who is the form from?: [facility]  (if other please explain)  Where the form came from: form was faxed in  What clinic location was the form placed at?: [facility]  Where the form was placed: placed in TC inbox     What number is listed as a contact on the form?: [phone]  Fax number to return form to:  [phone]        Call taken on 10/5/2020 at 5:23 PM by Floridalma Merino

## 2020-10-06 ENCOUNTER — HOSPITAL ENCOUNTER (OUTPATIENT)
Facility: CLINIC | Age: 25
Discharge: HOME | End: 2020-10-06
Attending: INTERNAL MEDICINE | Admitting: NURSE ANESTHETIST, CERTIFIED REGISTERED
Payer: MEDICARE

## 2020-10-06 ENCOUNTER — HOME INFUSION (PRE-WILLOW HOME INFUSION) (OUTPATIENT)
Dept: PHARMACY | Facility: CLINIC | Age: 25
End: 2020-10-06

## 2020-10-06 VITALS
OXYGEN SATURATION: 95 % | RESPIRATION RATE: 16 BRPM | DIASTOLIC BLOOD PRESSURE: 75 MMHG | HEART RATE: 55 BPM | SYSTOLIC BLOOD PRESSURE: 108 MMHG | TEMPERATURE: 97.9 F

## 2020-10-06 PROCEDURE — 36569 INSJ PICC 5 YR+ W/O IMAGING: CPT

## 2020-10-06 PROCEDURE — 999N000040 HC STATISTIC CONSULT NO CHARGE VASC ACCESS

## 2020-10-06 PROCEDURE — 272N000450 HC KIT 4FR POWER PICC SINGLE LUMEN

## 2020-10-06 PROCEDURE — 250N000009 HC RX 250: Performed by: INTERNAL MEDICINE

## 2020-10-06 RX ADMIN — LIDOCAINE HYDROCHLORIDE 3 ML: 10 INJECTION, SOLUTION EPIDURAL; INFILTRATION; INTRACAUDAL; PERINEURAL at 14:31

## 2020-10-06 NOTE — PROGRESS NOTES
This is a recent snapshot of the patient's [facility] medical record.  For current drug dose and complete information and questions, call [phone] or In Basket pool, fv home infusion ([id number])  CSN Number:  [id number]

## 2020-10-06 NOTE — PROCEDURES
[facility]    Single Lumen PICC Placement    Date/Time: 10/6/2020 2:01 PM  Performed by: Dianne Kinney RN  Authorized by: Jony Dobbins MD   Indications: vascular access    UNIVERSAL PROTOCOL   Site Marked: Yes  Prior Images Obtained and Reviewed:  Yes  Required items: Required blood products, implants, devices and special equipment available    Patient identity confirmed:  Verbally with patient, arm band and hospital-assigned identification number  NA - No sedation, light sedation, or local anesthesia  Confirmation Checklist:  Patient's identity using two indicators, relevant allergies, procedure was appropriate and matched the consent or emergent situation and correct equipment/implants were available  Time out: Immediately prior to the procedure a time out was called    Universal Protocol: the Joint Commission Universal Protocol was followed    Preparation: Patient was prepped and draped in usual sterile fashion           ANESTHESIA    Anesthesia: Local infiltration  Local Anesthetic:  Lidocaine 1% without epinephrine      SEDATION    Patient Sedated: No        Preparation: skin prepped with ChloraPrep  Skin prep agent: skin prep agent completely dried prior to procedure  Sterile barriers: maximum sterile barriers were used: cap, mask, sterile gown, sterile gloves, and large sterile sheet  Hand hygiene: hand hygiene performed prior to central venous catheter insertion  Type of line used: Power PICC  Catheter type: single lumen  Catheter size: 4 Fr  Brand: Bard  Lot number: [id number]  Placement method: MST, ultrasound and tip confirmation system  Number of attempts: 3  Successful placement: yes  Orientation: right  Location: brachial vein (medial)  Arm circumference: adults 10 cm  Extremity circumference: 22  Visible catheter length: 2  Internal length: 37 cm  Total catheter length: 39  Dressing and securement: blood cleaned with CHG, chlorhexidine disc applied,  occlusive dressing applied and securement device  Post procedure assessment: blood return through all ports  PROCEDURE   Patient Tolerance:  Patient tolerated the procedure well with no immediate complications

## 2020-10-08 ENCOUNTER — HOME INFUSION (PRE-WILLOW HOME INFUSION) (OUTPATIENT)
Dept: PHARMACY | Facility: CLINIC | Age: 25
End: 2020-10-08

## 2020-10-08 ENCOUNTER — HOSPITAL ENCOUNTER (OUTPATIENT)
Dept: WOUND CARE | Facility: CLINIC | Age: 25
End: 2020-10-08
Attending: SURGERY
Payer: MEDICARE

## 2020-10-08 VITALS — TEMPERATURE: 97.7 F | HEART RATE: 76 BPM | DIASTOLIC BLOOD PRESSURE: 54 MMHG | SYSTOLIC BLOOD PRESSURE: 94 MMHG

## 2020-10-08 DIAGNOSIS — L89.314 DECUBITUS ULCER OF RIGHT ISCHIUM, STAGE 4 (H): Chronic | ICD-10-CM

## 2020-10-08 PROBLEM — S31.819D: Status: RESOLVED | Noted: 2020-09-02 | Resolved: 2020-10-08

## 2020-10-08 PROCEDURE — 99024 POSTOP FOLLOW-UP VISIT: CPT | Performed by: SURGERY

## 2020-10-08 RX ORDER — SULFAMETHOXAZOLE AND TRIMETHOPRIM 400; 80 MG/1; MG/1
TABLET ORAL
COMMUNITY
Start: 2020-09-25 | End: 2020-10-31

## 2020-10-08 NOTE — PROGRESS NOTES
Visit Date:   10/08/2020      [facility]      This is a 25-year-old partial quadriplegic female who presents for further followup of her right ischial decubitus.  She had undergone an intraoperative debridement to get bone biopsies for cultures and was found to have a bit of a pocket.  She was aggressively debrided and bone samples were sent for path and microbiology.  Ultimately, she was seen by Dr. Collins from Infectious Disease and started on antibiotics on Tuesday, after she got her PICC.  She is currently getting cefazolin and we will follow her labs after that.  She does have a visiting home nurse coming from [facility] on Mondays, Wednesdays and Fridays usually to change the VAC and although there is a little bit of skin irritation from the drape near the anus, her adonay-wound is otherwise intact.  The wound itself appears clean with a thin covering of granulation over the bone, but the superior pocket has a smooth lining to it already and there is almost like a shelf of tissue, so I am not really sure if the sponges are getting up into that pocket area.  Our rooming staff did not appreciate this pocket, but at least 6 cm between the 10 and 2 o'clock position a marker was used to map out this pocket so we can ensure that the home nurses are getting it fully packed.  It sounds like she asked her mom to move in with her to help with the kids while she is trying to deal with this wound and that seems to be going well.  She also had asked her mom to take away her nicotine products, but she still has a small vape actually sitting on her wheelchair cushion today.  At this point, I would continue with the VAC, hopefully getting that into the pocket.  She will see our physician's assistant in November and then I will see her back in December.  Please see nursing notes for dimensions of the wound updated as well as photos.  Her vitals were within normal limits today.    Labs:   Recent Labs   Lab Test  20  1437 20  1600 16  1430 16  1430   ALBUMIN  --   --   --  3.6   HGB 13.7  --    < >  --    WBC 18.6*  --    < >  --    CRP  --  10.5*  --  4.8    < > = values in this interval not displayed.     Nutrition requirements were discussed with patient today.  Vitals:  BP 94/54 (BP Location: Left arm)   Pulse 76   Temp 97.7  F (36.5  C)   Wound:     Photo:        CRAIG THAYER MD             D: 10/08/2020   T: 10/08/2020   MT: [id number]      Name:     SYLVESTER BLACKWELL   MRN:      [id number]        Account:      [id number]   :      1995           Visit Date:   10/08/2020      Document: [id number]

## 2020-10-08 NOTE — DISCHARGE INSTRUCTIONS
[facility]  [address] [facility] [address] [address]    Call us at [phone] if you have any questions about your wounds, have redness or  swelling around your wound, have a fever of 101 or greater or if you have any other  problems or concerns. We answer the phone Monday through Friday 8 am to 4 pm,  please leave a message as we check the voicemail frequently throughout the day.    [facility] fax [phone]    iDanna Cottrell 1995    Medications/supplements to aid in healin packet of Gael Supplement into your favorite beverage twice a day. Purchase at [facility] in [address]  Vitamin D3 10,000 iu per day  Vitamin C 2,000 mg daily  Folate 400 mcg daily  Vitamin B 12 1000 mcg daily    A diet high in protein is important for wound healing, we recommend getting 90 grams  of protein per day. Taking protein shakes or bars are a good way to get extra protein in  your diet. Other good sources of protein:  Pork 26g per 3 oz  Whey protein powder - 24g per scoop (on average)  Greek yogurt - 23g per 8oz  Chicken or Turkey - 23g per 3oz  Fish - 20-25g per 3oz  Beef - 18-23g per 3oz  Navy beans - 20g per cup  Cottage cheese - 14g per 1/2 cup  Lentils - 13g per 1/4 cup  Beef jerky 13g per 1oz  2% milk - 8g per cup  Peanut butter - 8g per 2 tablespoons  Eggs - 6g per egg  Mixed nuts - 6g per 2oz    Wound care recommendations to right ischial tuberosity ulcer. Wound has undermining that is 6cm deep from 12-2 o'clock. Please make sure that black sponge is getting into this area.  Cleanse wound with saline or wound cleanser. Apply skin prep to periwound skin followed by NPWT at 125 mmHg continuous pressure.  Monday, Wednesday and Friday    Fred Zacarias M.D. 2020    [facility] follow up as scheduled

## 2020-10-09 NOTE — PROGRESS NOTES
This is a recent snapshot of the patient's [facility] medical record.  For current drug dose and complete information and questions, call [phone] or In Basket pool, [facility]v home infusion ([id number])  CSN Number:  [id number]

## 2020-10-12 ENCOUNTER — TELEPHONE (OUTPATIENT)
Dept: FAMILY MEDICINE | Facility: CLINIC | Age: 25
End: 2020-10-12

## 2020-10-12 NOTE — TELEPHONE ENCOUNTER
Our goal is to have forms completed with 72 hours, however some forms may require a visit or additional information.    Who is the form from?: [facility] (if other please explain)  Where the form came from: form was faxed in  What clinic location was the form placed at?: [facility]  Where the form was placed: placed in TC inbox     What number is listed as a contact on the form?: [phone]  Fax number to return form to:  [phone]        Call taken on 10/12/2020 at 7:39 AM by Floridalma Merino

## 2020-10-13 ENCOUNTER — MYC MEDICAL ADVICE (OUTPATIENT)
Dept: INFECTIOUS DISEASES | Facility: CLINIC | Age: 25
End: 2020-10-13

## 2020-10-14 ENCOUNTER — VIRTUAL VISIT (OUTPATIENT)
Dept: INFECTIOUS DISEASES | Facility: CLINIC | Age: 25
End: 2020-10-14
Attending: INTERNAL MEDICINE
Payer: MEDICARE

## 2020-10-14 DIAGNOSIS — L89.324 DECUBITUS ULCER OF ISCHIAL AREA, LEFT, STAGE IV (H): ICD-10-CM

## 2020-10-14 DIAGNOSIS — M86.651 CHRONIC OSTEOMYELITIS OF PELVIS, RIGHT (H): Primary | ICD-10-CM

## 2020-10-14 DIAGNOSIS — G82.50 QUADRIPLEGIA (H): ICD-10-CM

## 2020-10-14 PROCEDURE — 99442 PR PHYSICIAN TELEPHONE EVALUATION 11-20 MIN: CPT | Mod: 95 | Performed by: INTERNAL MEDICINE

## 2020-10-14 ASSESSMENT — PAIN SCALES - GENERAL: PAINLEVEL: NO PAIN (0)
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
## 2020-10-15 ENCOUNTER — HOME INFUSION (PRE-WILLOW HOME INFUSION) (OUTPATIENT)
Dept: PHARMACY | Facility: CLINIC | Age: 25
End: 2020-10-15

## 2020-10-16 ENCOUNTER — MEDICAL CORRESPONDENCE (OUTPATIENT)
Dept: HEALTH INFORMATION MANAGEMENT | Facility: CLINIC | Age: 25
End: 2020-10-16

## 2020-10-16 ENCOUNTER — TELEPHONE (OUTPATIENT)
Dept: FAMILY MEDICINE | Facility: CLINIC | Age: 25
End: 2020-10-16

## 2020-10-16 LAB
FUNGUS SPEC CULT: NORMAL
SPECIMEN SOURCE: NORMAL

## 2020-10-16 NOTE — PROGRESS NOTES
This is a recent snapshot of the patient's [facility] medical record.  For current drug dose and complete information and questions, call [phone] or In Basket pool, [facility] home infusion ([id number])  CSN Number:  [id number]

## 2020-10-16 NOTE — TELEPHONE ENCOUNTER
Our goal is to have forms completed with 72 hours, however some forms may require a visit or additional information.    Who is the form from?: [facility]  (if other please explain)  Where the form came from: form was faxed in  What clinic location was the form placed at?: [place]  Where the form was placed: placed in [facility] inbox     What number is listed as a contact on the form?: [phone]  Fax number to return form to:  [phone]        Call taken on 10/16/2020 at 6:53 AM by Floridalma Merino

## 2020-10-20 ENCOUNTER — TELEPHONE (OUTPATIENT)
Dept: INFECTIOUS DISEASES | Facility: CLINIC | Age: 25
End: 2020-10-20

## 2020-10-21 LAB
AST SERPL W P-5'-P-CCNC: 16 U/L (ref 0–45)
BASOPHILS # BLD AUTO: 0.1 10E9/L (ref 0–0.2)
BASOPHILS NFR BLD AUTO: 0.6 %
BUN SERPL-MCNC: 4 MG/DL (ref 7–30)
CREAT SERPL-MCNC: 0.39 MG/DL (ref 0.52–1.04)
CRP SERPL-MCNC: 16.3 MG/L (ref 0–8)
DIFFERENTIAL METHOD BLD: NORMAL
EOSINOPHIL # BLD AUTO: 0.4 10E9/L (ref 0–0.7)
EOSINOPHIL NFR BLD AUTO: 4 %
ERYTHROCYTE [DISTWIDTH] IN BLOOD BY AUTOMATED COUNT: 13.6 % (ref 10–15)
ERYTHROCYTE [SEDIMENTATION RATE] IN BLOOD BY WESTERGREN METHOD: 31 MM/H (ref 0–20)
GFR SERPL CREATININE-BSD FRML MDRD: >90 ML/MIN/{1.73_M2}
HCT VFR BLD AUTO: 38.4 % (ref 35–47)
HGB BLD-MCNC: 12.5 G/DL (ref 11.7–15.7)
IMM GRANULOCYTES # BLD: 0 10E9/L (ref 0–0.4)
IMM GRANULOCYTES NFR BLD: 0.3 %
LYMPHOCYTES # BLD AUTO: 2.7 10E9/L (ref 0.8–5.3)
LYMPHOCYTES NFR BLD AUTO: 25.4 %
MCH RBC QN AUTO: 28.9 PG (ref 26.5–33)
MCHC RBC AUTO-ENTMCNC: 32.6 G/DL (ref 31.5–36.5)
MCV RBC AUTO: 89 FL (ref 78–100)
MONOCYTES # BLD AUTO: 1 10E9/L (ref 0–1.3)
MONOCYTES NFR BLD AUTO: 8.9 %
NEUTROPHILS # BLD AUTO: 6.5 10E9/L (ref 1.6–8.3)
NEUTROPHILS NFR BLD AUTO: 60.8 %
NRBC # BLD AUTO: 0 10*3/UL
NRBC BLD AUTO-RTO: 0 /100
PLATELET # BLD AUTO: 351 10E9/L (ref 150–450)
RBC # BLD AUTO: 4.33 10E12/L (ref 3.8–5.2)
WBC # BLD AUTO: 10.8 10E9/L (ref 4–11)

## 2020-10-21 PROCEDURE — 84520 ASSAY OF UREA NITROGEN: CPT | Performed by: SURGERY

## 2020-10-21 PROCEDURE — 84450 TRANSFERASE (AST) (SGOT): CPT | Performed by: SURGERY

## 2020-10-21 PROCEDURE — 85025 COMPLETE CBC W/AUTO DIFF WBC: CPT | Performed by: SURGERY

## 2020-10-21 PROCEDURE — 82565 ASSAY OF CREATININE: CPT | Performed by: SURGERY

## 2020-10-21 PROCEDURE — 86140 C-REACTIVE PROTEIN: CPT | Performed by: SURGERY

## 2020-10-21 PROCEDURE — 85652 RBC SED RATE AUTOMATED: CPT | Performed by: SURGERY

## 2020-10-22 ENCOUNTER — HOME INFUSION (PRE-WILLOW HOME INFUSION) (OUTPATIENT)
Dept: PHARMACY | Facility: CLINIC | Age: 25
End: 2020-10-22

## 2020-10-23 NOTE — PROGRESS NOTES
This is a recent snapshot of the patient's [facility] medical record.  For current drug dose and complete information and questions, call [phone] or In Basket pool, [facility] home infusion ([id number])  CSN Number:  [id number]

## 2020-10-28 ENCOUNTER — MEDICAL CORRESPONDENCE (OUTPATIENT)
Dept: HEALTH INFORMATION MANAGEMENT | Facility: CLINIC | Age: 25
End: 2020-10-28

## 2020-10-28 LAB
AST SERPL W P-5'-P-CCNC: 13 U/L (ref 0–45)
BASOPHILS # BLD AUTO: 0.1 10E9/L (ref 0–0.2)
BASOPHILS NFR BLD AUTO: 0.5 %
BUN SERPL-MCNC: 8 MG/DL (ref 7–30)
CREAT SERPL-MCNC: 0.4 MG/DL (ref 0.52–1.04)
CRP SERPL-MCNC: 14.1 MG/L (ref 0–8)
DIFFERENTIAL METHOD BLD: NORMAL
EOSINOPHIL # BLD AUTO: 0.5 10E9/L (ref 0–0.7)
EOSINOPHIL NFR BLD AUTO: 4.6 %
ERYTHROCYTE [DISTWIDTH] IN BLOOD BY AUTOMATED COUNT: 13.4 % (ref 10–15)
ERYTHROCYTE [SEDIMENTATION RATE] IN BLOOD BY WESTERGREN METHOD: 29 MM/H (ref 0–20)
GFR SERPL CREATININE-BSD FRML MDRD: >90 ML/MIN/{1.73_M2}
HCT VFR BLD AUTO: 39.7 % (ref 35–47)
HGB BLD-MCNC: 12.8 G/DL (ref 11.7–15.7)
IMM GRANULOCYTES # BLD: 0.1 10E9/L (ref 0–0.4)
IMM GRANULOCYTES NFR BLD: 0.5 %
LYMPHOCYTES # BLD AUTO: 2.8 10E9/L (ref 0.8–5.3)
LYMPHOCYTES NFR BLD AUTO: 25.6 %
MCH RBC QN AUTO: 28.8 PG (ref 26.5–33)
MCHC RBC AUTO-ENTMCNC: 32.2 G/DL (ref 31.5–36.5)
MCV RBC AUTO: 89 FL (ref 78–100)
MONOCYTES # BLD AUTO: 0.8 10E9/L (ref 0–1.3)
MONOCYTES NFR BLD AUTO: 7.4 %
NEUTROPHILS # BLD AUTO: 6.7 10E9/L (ref 1.6–8.3)
NEUTROPHILS NFR BLD AUTO: 61.4 %
NRBC # BLD AUTO: 0 10*3/UL
NRBC BLD AUTO-RTO: 0 /100
PLATELET # BLD AUTO: 276 10E9/L (ref 150–450)
RBC # BLD AUTO: 4.44 10E12/L (ref 3.8–5.2)
WBC # BLD AUTO: 10.9 10E9/L (ref 4–11)

## 2020-10-28 PROCEDURE — 85025 COMPLETE CBC W/AUTO DIFF WBC: CPT | Performed by: INTERNAL MEDICINE

## 2020-10-28 PROCEDURE — 84520 ASSAY OF UREA NITROGEN: CPT | Performed by: INTERNAL MEDICINE

## 2020-10-28 PROCEDURE — 82565 ASSAY OF CREATININE: CPT | Performed by: INTERNAL MEDICINE

## 2020-10-28 PROCEDURE — 86140 C-REACTIVE PROTEIN: CPT | Performed by: INTERNAL MEDICINE

## 2020-10-28 PROCEDURE — 84450 TRANSFERASE (AST) (SGOT): CPT | Performed by: INTERNAL MEDICINE

## 2020-10-28 PROCEDURE — 85652 RBC SED RATE AUTOMATED: CPT | Performed by: INTERNAL MEDICINE

## 2020-10-30 ENCOUNTER — VIRTUAL VISIT (OUTPATIENT)
Dept: INFECTIOUS DISEASES | Facility: CLINIC | Age: 25
End: 2020-10-30
Attending: INTERNAL MEDICINE
Payer: MEDICARE

## 2020-10-30 DIAGNOSIS — G82.50 QUADRIPLEGIA (H): ICD-10-CM

## 2020-10-30 DIAGNOSIS — M86.651 CHRONIC OSTEOMYELITIS OF PELVIS, RIGHT (H): Primary | ICD-10-CM

## 2020-10-30 DIAGNOSIS — L89.324 DECUBITUS ULCER OF ISCHIAL AREA, LEFT, STAGE IV (H): ICD-10-CM

## 2020-10-30 PROCEDURE — 99213 OFFICE O/P EST LOW 20 MIN: CPT | Mod: 95 | Performed by: INTERNAL MEDICINE
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
## 2020-11-02 ENCOUNTER — HOME INFUSION (PRE-WILLOW HOME INFUSION) (OUTPATIENT)
Dept: PHARMACY | Facility: CLINIC | Age: 25
End: 2020-11-02

## 2020-11-02 NOTE — PROGRESS NOTES
This is a recent snapshot of the patient's [facility] medical record.  For current drug dose and complete information and questions, call [phone] or In [facility] pool, fv home infusion ([id number])  CSN Number:  [id number]

## 2020-11-04 LAB
AST SERPL W P-5'-P-CCNC: 22 U/L (ref 0–45)
BASOPHILS # BLD AUTO: 0.1 10E9/L (ref 0–0.2)
BASOPHILS NFR BLD AUTO: 0.5 %
BUN SERPL-MCNC: 7 MG/DL (ref 7–30)
CREAT SERPL-MCNC: 0.45 MG/DL (ref 0.52–1.04)
CRP SERPL-MCNC: 30.1 MG/L (ref 0–8)
DIFFERENTIAL METHOD BLD: ABNORMAL
EOSINOPHIL # BLD AUTO: 0.6 10E9/L (ref 0–0.7)
EOSINOPHIL NFR BLD AUTO: 4.9 %
ERYTHROCYTE [DISTWIDTH] IN BLOOD BY AUTOMATED COUNT: 13.5 % (ref 10–15)
ERYTHROCYTE [SEDIMENTATION RATE] IN BLOOD BY WESTERGREN METHOD: 37 MM/H (ref 0–20)
GFR SERPL CREATININE-BSD FRML MDRD: >90 ML/MIN/{1.73_M2}
HCT VFR BLD AUTO: 40.3 % (ref 35–47)
HGB BLD-MCNC: 13.1 G/DL (ref 11.7–15.7)
IMM GRANULOCYTES # BLD: 0.1 10E9/L (ref 0–0.4)
IMM GRANULOCYTES NFR BLD: 0.4 %
LYMPHOCYTES # BLD AUTO: 2.6 10E9/L (ref 0.8–5.3)
LYMPHOCYTES NFR BLD AUTO: 23.1 %
MCH RBC QN AUTO: 29.1 PG (ref 26.5–33)
MCHC RBC AUTO-ENTMCNC: 32.5 G/DL (ref 31.5–36.5)
MCV RBC AUTO: 90 FL (ref 78–100)
MONOCYTES # BLD AUTO: 1.1 10E9/L (ref 0–1.3)
MONOCYTES NFR BLD AUTO: 10 %
NEUTROPHILS # BLD AUTO: 6.8 10E9/L (ref 1.6–8.3)
NEUTROPHILS NFR BLD AUTO: 61.1 %
NRBC # BLD AUTO: 0 10*3/UL
NRBC BLD AUTO-RTO: 0 /100
PLATELET # BLD AUTO: 394 10E9/L (ref 150–450)
RBC # BLD AUTO: 4.5 10E12/L (ref 3.8–5.2)
WBC # BLD AUTO: 11.2 10E9/L (ref 4–11)

## 2020-11-04 PROCEDURE — 84450 TRANSFERASE (AST) (SGOT): CPT | Performed by: INTERNAL MEDICINE

## 2020-11-04 PROCEDURE — 82565 ASSAY OF CREATININE: CPT | Performed by: INTERNAL MEDICINE

## 2020-11-04 PROCEDURE — 84520 ASSAY OF UREA NITROGEN: CPT | Performed by: INTERNAL MEDICINE

## 2020-11-04 PROCEDURE — 86140 C-REACTIVE PROTEIN: CPT | Performed by: INTERNAL MEDICINE

## 2020-11-04 PROCEDURE — 85652 RBC SED RATE AUTOMATED: CPT | Performed by: INTERNAL MEDICINE

## 2020-11-04 PROCEDURE — 85025 COMPLETE CBC W/AUTO DIFF WBC: CPT | Performed by: INTERNAL MEDICINE

## 2020-11-05 ENCOUNTER — HOME INFUSION (PRE-WILLOW HOME INFUSION) (OUTPATIENT)
Dept: PHARMACY | Facility: CLINIC | Age: 25
End: 2020-11-05

## 2020-11-06 NOTE — PROGRESS NOTES
This is a recent snapshot of the patient's [facility] medical record.  For current drug dose and complete information and questions, call [phone] or In [facility] pool, fv home infusion ([id number])  CSN Number:  [id number]

## 2020-11-10 ENCOUNTER — HOSPITAL ENCOUNTER (OUTPATIENT)
Dept: WOUND CARE | Facility: CLINIC | Age: 25
Discharge: HOME | End: 2020-11-10
Attending: PHYSICIAN ASSISTANT | Admitting: PHYSICIAN ASSISTANT
Payer: MEDICARE

## 2020-11-10 VITALS — DIASTOLIC BLOOD PRESSURE: 53 MMHG | HEART RATE: 102 BPM | TEMPERATURE: 97.3 F | SYSTOLIC BLOOD PRESSURE: 81 MMHG

## 2020-11-10 DIAGNOSIS — L89.314 DECUBITUS ULCER OF RIGHT ISCHIUM, STAGE 4 (H): ICD-10-CM

## 2020-11-10 PROCEDURE — 97602 WOUND(S) CARE NON-SELECTIVE: CPT

## 2020-11-10 PROCEDURE — 99213 OFFICE O/P EST LOW 20 MIN: CPT | Performed by: PHYSICIAN ASSISTANT

## 2020-11-10 NOTE — DISCHARGE INSTRUCTIONS
[facility]  [address] [facility] [address] [address]    Call us at [phone] if you have any questions about your wounds, have redness  or swelling around your wound, have a fever of 101 or greater or if you have any other  problems or concerns. We answer the phone Monday through Friday 8 am to 4 pm,  please leave a message as we check the voicemail frequently throughout the day.    [facility] fax [phone]    Dianna Cottrell 1995    Medications/supplements to aid in healin packet of Gael Supplement into your favorite beverage twice a day. Purchase at [facility] in [address]  Vitamin D3 10,000 iu per day  Vitamin C 2,000 mg daily  Folate 400 mcg daily  Vitamin B 12 1000 mcg daily    A diet high in protein is important for wound healing, we recommend getting 90  grams of protein per day. Taking protein shakes or bars are a good way to get extra protein  in your diet. Other good sources of protein:  Pork 26g per 3 oz  Whey protein powder - 24g per scoop (on average)  Greek yogurt - 23g per 8oz  Chicken or Turkey - 23g per 3oz  Fish - 20-25g per 3oz  Beef - 18-23g per 3oz  Navy beans - 20g per cup  Cottage cheese - 14g per 1/2 cup  Lentils - 13g per 1/4 cup  Beef jerky 13g per 1oz  2% milk - 8g per cup  Peanut butter - 8g per 2 tablespoons  Eggs - 6g per egg  Mixed nuts - 6g per 2oz    Wound care recommendations to right ischial tuberosity ulcer. Do not pack black sponge into the undermining anymore. This area is too tight and applying black sponge into the wound that wraps around her bone will prevent the wound from filling in.   Cleanse wound with saline or wound cleanser. Apply skin prep to periwound skin followed by 3 cm of black sponge into the wound opening then NPWT at 125 mmHg continuous pressure. Monday, Wednesday and Friday    Toyin Yi PA-C, November 10, 2020    Wound Clinc follow up as  scheduled

## 2020-11-11 ENCOUNTER — MEDICAL CORRESPONDENCE (OUTPATIENT)
Dept: HEALTH INFORMATION MANAGEMENT | Facility: CLINIC | Age: 25
End: 2020-11-11

## 2020-11-11 LAB
AST SERPL W P-5'-P-CCNC: 18 U/L (ref 0–45)
BASOPHILS # BLD AUTO: 0.1 10E9/L (ref 0–0.2)
BASOPHILS NFR BLD AUTO: 0.5 %
BUN SERPL-MCNC: 6 MG/DL (ref 7–30)
CREAT SERPL-MCNC: 0.45 MG/DL (ref 0.52–1.04)
CRP SERPL-MCNC: 20.2 MG/L (ref 0–8)
DIFFERENTIAL METHOD BLD: NORMAL
EOSINOPHIL # BLD AUTO: 0.6 10E9/L (ref 0–0.7)
EOSINOPHIL NFR BLD AUTO: 6.1 %
ERYTHROCYTE [DISTWIDTH] IN BLOOD BY AUTOMATED COUNT: 13.4 % (ref 10–15)
ERYTHROCYTE [SEDIMENTATION RATE] IN BLOOD BY WESTERGREN METHOD: 36 MM/H (ref 0–20)
GFR SERPL CREATININE-BSD FRML MDRD: >90 ML/MIN/{1.73_M2}
HCT VFR BLD AUTO: 36.4 % (ref 35–47)
HGB BLD-MCNC: 11.7 G/DL (ref 11.7–15.7)
IMM GRANULOCYTES # BLD: 0 10E9/L (ref 0–0.4)
IMM GRANULOCYTES NFR BLD: 0.3 %
LYMPHOCYTES # BLD AUTO: 2.6 10E9/L (ref 0.8–5.3)
LYMPHOCYTES NFR BLD AUTO: 26.1 %
MCH RBC QN AUTO: 28.7 PG (ref 26.5–33)
MCHC RBC AUTO-ENTMCNC: 32.1 G/DL (ref 31.5–36.5)
MCV RBC AUTO: 89 FL (ref 78–100)
MONOCYTES # BLD AUTO: 0.9 10E9/L (ref 0–1.3)
MONOCYTES NFR BLD AUTO: 9.1 %
NEUTROPHILS # BLD AUTO: 5.8 10E9/L (ref 1.6–8.3)
NEUTROPHILS NFR BLD AUTO: 57.9 %
NRBC # BLD AUTO: 0 10*3/UL
NRBC BLD AUTO-RTO: 0 /100
PLATELET # BLD AUTO: 367 10E9/L (ref 150–450)
RBC # BLD AUTO: 4.07 10E12/L (ref 3.8–5.2)
WBC # BLD AUTO: 10.1 10E9/L (ref 4–11)

## 2020-11-11 PROCEDURE — 82565 ASSAY OF CREATININE: CPT | Performed by: INTERNAL MEDICINE

## 2020-11-11 PROCEDURE — 85025 COMPLETE CBC W/AUTO DIFF WBC: CPT | Performed by: INTERNAL MEDICINE

## 2020-11-11 PROCEDURE — 86140 C-REACTIVE PROTEIN: CPT | Performed by: INTERNAL MEDICINE

## 2020-11-11 PROCEDURE — 84450 TRANSFERASE (AST) (SGOT): CPT | Performed by: INTERNAL MEDICINE

## 2020-11-11 PROCEDURE — 84520 ASSAY OF UREA NITROGEN: CPT | Performed by: INTERNAL MEDICINE

## 2020-11-11 PROCEDURE — 85652 RBC SED RATE AUTOMATED: CPT | Performed by: INTERNAL MEDICINE

## 2020-11-11 NOTE — PROGRESS NOTES
[facility]      HISTORY OF PRESENT ILLNESS: Ms. Dianna Cottrell is a 25-year-old quadriplegic woman who returns for a R IT ulcer. MRI on 12/3/19 was indicative of osteomyelitis. Was anticipating flap procedure in March with Dr. Zacarias but this was converted to I&D with VAC placement in an effort to keep her out of a facility during the pandemic. This was performed on 9/18/20.      11/11/20: Continues on Cefazolin per ID, they recommend reculture if wound stalls/worsens. Wound appears to be improving today, undermined pocket is very tight and I suspect VAC sponge is keeping it open. Had seating assessment at [facility] 11/4/20 and a new seating system has been recommended and ordered, this includes a new high profile Roho cushion.     DATE WOUND ACQUIRED: 6/2018     WOUND CARE: NPWT     OFFLOADING: APM mattress, chair with Roho cushion mapped well 12/20/19     REVIEW OF SYSTEMS:   CONSTITUTIONAL: Denies fever or chills  GI: denies nausea or vomiting      PAST MEDICAL HISTORY:  has a past medical history of Anemia; C5 burst fracture (12/18/2012); Compression fracture of L1 lumbar vertebra (H) (12/18/2012); Fracture of thoracic spine without spinal cord lesion (H) (12/18/2012); Hypertension (12/6/2016); and Vocal cord dysfunction.    SOCIAL HISTORY: has PCA help, has two kids, hoping to do public speaking in the future      MEDICATIONS: reviewed, see med rec for up to date list, negative for anticoagulants, negative for immunosuppressants, negative for NSAIDS       PHYSICAL EXAM:  GENERAL: Patient is alert and oriented and in no acute distress  INTEGUMENTARY:   Wound (used by OP [facility] only) 10/14/19 1407 Right ischial tuberosity pressure injury (Active)   Thickness/Stage full thickness 11/10/20 1346   Dressing Appearance intact 11/10/20 1346   Base granulating 11/10/20 1346   Periwound intact 11/10/20 1346   Periwound Temperature warm 11/10/20 1346   Length (cm) 1.5 11/10/20 1334   Width (cm)  1.4 11/10/20 1334   Depth (cm) 2.4 11/10/20 1334   Wound (cm^2) 2.1 cm^2 11/10/20 1334   Wound Volume (cm^3) 5.04 cm^3 11/10/20 1334   Wound healing % -16.67 11/10/20 1334   Tunneling [Depth (cm)/Location] 12:00/dep.4.7 11/10/20 1334   Drainage Characteristics/Odor serosanguineous 11/10/20 1334   Drainage Amount moderate 11/10/20 1334   Care, Wound negative pressure wound therapy;non-select wound debridement performed 11/10/20 1346         ASSESSMENT:   1. Stage 4 pressure ulcer R IT  2. R IT osteomyelitis - s/p bone debridement and currently receiving IV antibiotic therapy      PLAN:   1. Continue NPWT. We will have homecare pull back slightly on packing the tunnel as it is now so tight I fear they are damaging this more than aiding in healing by packing.       FOLLOW-UP: as scheduled with Dr. Rell AGUILAR PA-C

## 2020-11-12 ENCOUNTER — HOME INFUSION (PRE-WILLOW HOME INFUSION) (OUTPATIENT)
Dept: PHARMACY | Facility: CLINIC | Age: 25
End: 2020-11-12

## 2020-11-13 NOTE — PROGRESS NOTES
This is a recent snapshot of the patient's [facility] medical record.  For current drug dose and complete information and questions, call [phone] or In Basket pool, [facility]v home infusion ([id number])  CSN Number:  [id number]

## 2020-11-16 ENCOUNTER — TELEPHONE (OUTPATIENT)
Dept: WOUND CARE | Facility: CLINIC | Age: 25
End: 2020-11-16

## 2020-11-16 ENCOUNTER — MEDICAL CORRESPONDENCE (OUTPATIENT)
Dept: HEALTH INFORMATION MANAGEMENT | Facility: CLINIC | Age: 25
End: 2020-11-16

## 2020-11-16 LAB
AST SERPL W P-5'-P-CCNC: 20 U/L (ref 0–45)
BASOPHILS # BLD AUTO: 0.1 10E9/L (ref 0–0.2)
BASOPHILS NFR BLD AUTO: 0.7 %
BUN SERPL-MCNC: 6 MG/DL (ref 7–30)
CREAT SERPL-MCNC: 0.39 MG/DL (ref 0.52–1.04)
CRP SERPL-MCNC: 6.6 MG/L (ref 0–8)
DIFFERENTIAL METHOD BLD: NORMAL
EOSINOPHIL # BLD AUTO: 0.6 10E9/L (ref 0–0.7)
EOSINOPHIL NFR BLD AUTO: 5.4 %
ERYTHROCYTE [DISTWIDTH] IN BLOOD BY AUTOMATED COUNT: 13.6 % (ref 10–15)
ERYTHROCYTE [SEDIMENTATION RATE] IN BLOOD BY WESTERGREN METHOD: 22 MM/H (ref 0–20)
GFR SERPL CREATININE-BSD FRML MDRD: >90 ML/MIN/{1.73_M2}
HCT VFR BLD AUTO: 39.6 % (ref 35–47)
HGB BLD-MCNC: 12.9 G/DL (ref 11.7–15.7)
IMM GRANULOCYTES # BLD: 0 10E9/L (ref 0–0.4)
IMM GRANULOCYTES NFR BLD: 0.4 %
LYMPHOCYTES # BLD AUTO: 2.3 10E9/L (ref 0.8–5.3)
LYMPHOCYTES NFR BLD AUTO: 22.3 %
MCH RBC QN AUTO: 29.1 PG (ref 26.5–33)
MCHC RBC AUTO-ENTMCNC: 32.6 G/DL (ref 31.5–36.5)
MCV RBC AUTO: 89 FL (ref 78–100)
MONOCYTES # BLD AUTO: 1 10E9/L (ref 0–1.3)
MONOCYTES NFR BLD AUTO: 9.6 %
NEUTROPHILS # BLD AUTO: 6.4 10E9/L (ref 1.6–8.3)
NEUTROPHILS NFR BLD AUTO: 61.6 %
NRBC # BLD AUTO: 0 10*3/UL
NRBC BLD AUTO-RTO: 0 /100
PLATELET # BLD AUTO: 356 10E9/L (ref 150–450)
RBC # BLD AUTO: 4.43 10E12/L (ref 3.8–5.2)
WBC # BLD AUTO: 10.4 10E9/L (ref 4–11)

## 2020-11-16 PROCEDURE — 86140 C-REACTIVE PROTEIN: CPT | Performed by: INTERNAL MEDICINE

## 2020-11-16 PROCEDURE — 85652 RBC SED RATE AUTOMATED: CPT | Performed by: INTERNAL MEDICINE

## 2020-11-16 PROCEDURE — 84450 TRANSFERASE (AST) (SGOT): CPT | Performed by: INTERNAL MEDICINE

## 2020-11-16 PROCEDURE — 85025 COMPLETE CBC W/AUTO DIFF WBC: CPT | Performed by: INTERNAL MEDICINE

## 2020-11-16 PROCEDURE — 82565 ASSAY OF CREATININE: CPT | Performed by: INTERNAL MEDICINE

## 2020-11-16 PROCEDURE — 84520 ASSAY OF UREA NITROGEN: CPT | Performed by: INTERNAL MEDICINE

## 2020-11-16 NOTE — TELEPHONE ENCOUNTER
Returned call to [facility] to let them know that patient was not seen in the office during the requested dates. They will follow up with [facility] in [place] for measurements.

## 2020-11-16 NOTE — TELEPHONE ENCOUNTER
[facility] Wound    Who is the name of the provider?:  Rell       What is the location you see this provider at?: [place]    Reason for call:  Past due for wound measurement for wound vac.  Please provide wound measurement for 10/14 - 10/29.  Verbal or fax to [phone]    Can we leave a detailed message on this number?  YES

## 2020-11-18 ENCOUNTER — VIRTUAL VISIT (OUTPATIENT)
Dept: INFECTIOUS DISEASES | Facility: CLINIC | Age: 25
End: 2020-11-18
Attending: INTERNAL MEDICINE
Payer: MEDICARE

## 2020-11-18 ENCOUNTER — HOME INFUSION (PRE-WILLOW HOME INFUSION) (OUTPATIENT)
Dept: PHARMACY | Facility: CLINIC | Age: 25
End: 2020-11-18

## 2020-11-18 VITALS — BODY MASS INDEX: 18.56 KG/M2 | WEIGHT: 115 LBS

## 2020-11-18 DIAGNOSIS — L89.324 DECUBITUS ULCER OF ISCHIAL AREA, LEFT, STAGE IV (H): ICD-10-CM

## 2020-11-18 DIAGNOSIS — M86.651 CHRONIC OSTEOMYELITIS OF PELVIS, RIGHT (H): Primary | ICD-10-CM

## 2020-11-18 PROCEDURE — 99214 OFFICE O/P EST MOD 30 MIN: CPT | Mod: 95 | Performed by: INTERNAL MEDICINE

## 2020-11-18 RX ORDER — CEPHALEXIN 500 MG/1
500 CAPSULE ORAL 4 TIMES DAILY
Qty: 40 CAPSULE | Refills: 0 | Status: SHIPPED | OUTPATIENT
Start: 2020-11-20 | End: 2020-11-30
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
## 2020-11-19 ENCOUNTER — HOME INFUSION (PRE-WILLOW HOME INFUSION) (OUTPATIENT)
Dept: PHARMACY | Facility: CLINIC | Age: 25
End: 2020-11-19

## 2020-11-19 NOTE — PROGRESS NOTES
This is a recent snapshot of the patient's [facility] medical record.  For current drug dose and complete information and questions, call [phone] or In Basket pool, fv home infusion ([id number])  CSN Number:  [id number]

## 2020-11-20 NOTE — PROGRESS NOTES
This is a recent snapshot of the patient's [facility] medical record.  For current drug dose and complete information and questions, call [phone] or In Basket pool, fv home infusion ([id number])  CSN Number:  [id number]

## 2020-11-23 ENCOUNTER — TELEPHONE (OUTPATIENT)
Dept: WOUND CARE | Facility: CLINIC | Age: 25
End: 2020-11-23

## 2020-11-23 NOTE — TELEPHONE ENCOUNTER
Dianna wants to know if we have any idea how long her wound will take to heal and has questions regarding her rogers too.

## 2020-11-25 ENCOUNTER — HOME INFUSION (PRE-WILLOW HOME INFUSION) (OUTPATIENT)
Dept: PHARMACY | Facility: CLINIC | Age: 25
End: 2020-11-25

## 2020-11-27 ENCOUNTER — TELEPHONE (OUTPATIENT)
Dept: FAMILY MEDICINE | Facility: CLINIC | Age: 25
End: 2020-11-27

## 2020-11-27 NOTE — TELEPHONE ENCOUNTER
Our goal is to have forms completed with 72 hours, however some forms may require a visit or additional information.    Who is the form from?: [facility]  (if other please explain)  Where the form came from: form was faxed in  What clinic location was the form placed at?: [facility]  Where the form was placed: placed in [facility] inbox     What number is listed as a contact on the form?: [phone]  Fax number to return form to:  [phone]        Call taken on 11/27/2020 at 4:53 PM by Floridalma Merino

## 2020-12-07 NOTE — PROGRESS NOTES
This is a recent snapshot of the patient's [facility] medical record.  For current drug dose and complete information and questions, call [phone] or In Basket pool, fv home infusion ([id number])  CSN Number:  [id number]

## 2020-12-08 ENCOUNTER — TELEPHONE (OUTPATIENT)
Dept: WOUND CARE | Facility: CLINIC | Age: 25
End: 2020-12-08

## 2020-12-08 NOTE — TELEPHONE ENCOUNTER
[facility] Wound    Who is the name of the provider?:  Rell      What is the location you see this provider at?: [place]    Reason for call:  Patient needs appointment for wound debridement, has piece of foam adhered to wound.      Can we leave a detailed message on this number?  YES

## 2020-12-08 NOTE — TELEPHONE ENCOUNTER
Discussion with Philomena and informed her if she is unable to retrieve the foam with each dressing change then she would need to send the pt into the ED. She will try using forceps to retrieve old foam.     Return appt 12/17

## 2020-12-09 ENCOUNTER — TELEPHONE (OUTPATIENT)
Dept: INFECTIOUS DISEASES | Facility: CLINIC | Age: 25
End: 2020-12-09

## 2020-12-09 ENCOUNTER — VIRTUAL VISIT (OUTPATIENT)
Dept: INFECTIOUS DISEASES | Facility: CLINIC | Age: 25
End: 2020-12-09
Attending: INTERNAL MEDICINE
Payer: MEDICARE

## 2020-12-09 DIAGNOSIS — L89.324 DECUBITUS ULCER OF ISCHIAL AREA, LEFT, STAGE IV (H): ICD-10-CM

## 2020-12-09 DIAGNOSIS — M86.651 CHRONIC OSTEOMYELITIS OF PELVIS, RIGHT (H): Primary | ICD-10-CM

## 2020-12-09 DIAGNOSIS — G82.50 QUADRIPLEGIA (H): ICD-10-CM

## 2020-12-09 PROCEDURE — 99214 OFFICE O/P EST MOD 30 MIN: CPT | Mod: 95 | Performed by: INTERNAL MEDICINE

## 2020-12-09 ASSESSMENT — PAIN SCALES - GENERAL: PAINLEVEL: NO PAIN (0)
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
## 2020-12-09 NOTE — PROGRESS NOTES
Left voicemail for patient to call back to set up telemedicine visit, will call again before appointment time.  Alice Roy CMA on 12/9/2020 at 9:09 AM

## 2020-12-09 NOTE — TELEPHONE ENCOUNTER
----- Message from Jony Dobbins MD sent at 12/9/2020 10:27 AM CST -----  Regarding: labs  Hello,     Please call her home health nurse ( [facility] tel [phone]) to get blood sp for labs    - BMP, CBC diff, CRP, ESR     Thanks   Dennis
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
## 2020-12-09 NOTE — TELEPHONE ENCOUNTER
Spoke w/[employer]. Faxed lab orders for labs below from Dr. Collins to fax # [phone] and requested that results be faxed to us.  Coty Marroquin RN

## 2020-12-14 ENCOUNTER — TELEPHONE (OUTPATIENT)
Dept: FAMILY MEDICINE | Facility: CLINIC | Age: 25
End: 2020-12-14

## 2020-12-14 NOTE — TELEPHONE ENCOUNTER
Reason for Call:  Form, our goal is to have forms completed with 72 hours, however, some forms may require a visit or additional information.    Type of letter, form or note:  medical    Who is the form from?: Home care    Where did the form come from: form was faxed in    What clinic location was the form placed at?: [facility] - [phone]    Where the form was placed: Bin    What number is listed as a contact on the form?: fax [phone]       Additional comments: Please complete and fax    Call taken on 12/14/2020 at 4:10 PM by Analia Joe

## 2020-12-17 ENCOUNTER — HOSPITAL ENCOUNTER (OUTPATIENT)
Dept: WOUND CARE | Facility: CLINIC | Age: 25
Discharge: HOME | End: 2020-12-17
Attending: SURGERY | Admitting: SURGERY
Payer: MEDICARE

## 2020-12-17 VITALS
SYSTOLIC BLOOD PRESSURE: 80 MMHG | TEMPERATURE: 97 F | DIASTOLIC BLOOD PRESSURE: 53 MMHG | HEART RATE: 87 BPM | RESPIRATION RATE: 18 BRPM

## 2020-12-17 DIAGNOSIS — L89.314 DECUBITUS ULCER OF RIGHT ISCHIUM, STAGE 4 (H): ICD-10-CM

## 2020-12-17 LAB
GRAM STN SPEC: NORMAL
GRAM STN SPEC: NORMAL
Lab: NORMAL
SPECIMEN SOURCE: NORMAL

## 2020-12-17 PROCEDURE — 11042 DBRDMT SUBQ TIS 1ST 20SQCM/<: CPT

## 2020-12-17 PROCEDURE — 87186 SC STD MICRODIL/AGAR DIL: CPT | Performed by: SURGERY

## 2020-12-17 PROCEDURE — 87075 CULTR BACTERIA EXCEPT BLOOD: CPT | Performed by: SURGERY

## 2020-12-17 PROCEDURE — 99024 POSTOP FOLLOW-UP VISIT: CPT | Performed by: SURGERY

## 2020-12-17 PROCEDURE — 87077 CULTURE AEROBIC IDENTIFY: CPT | Performed by: SURGERY

## 2020-12-17 PROCEDURE — 87070 CULTURE OTHR SPECIMN AEROBIC: CPT | Performed by: SURGERY

## 2020-12-17 PROCEDURE — 87076 CULTURE ANAEROBE IDENT EACH: CPT | Performed by: SURGERY

## 2020-12-17 PROCEDURE — 87205 SMEAR GRAM STAIN: CPT | Performed by: SURGERY

## 2020-12-17 PROCEDURE — 11042 DBRDMT SUBQ TIS 1ST 20SQCM/<: CPT | Performed by: SURGERY

## 2020-12-17 PROCEDURE — 87185 SC STD ENZYME DETCJ PER NZM: CPT | Performed by: SURGERY

## 2020-12-17 RX ORDER — OXYBUTYNIN CHLORIDE 5 MG/1
5 TABLET, EXTENDED RELEASE ORAL EVERY EVENING
Status: ON HOLD | COMMUNITY
Start: 2020-11-06

## 2020-12-17 NOTE — DISCHARGE INSTRUCTIONS
[facility]  [address] [facility] [address] [address]    Call us at [phone] if you have any questions about your wounds, have redness or swelling around your wound, have a fever of 101 or greater or if you have any other problems or concerns. We answer the phone Monday through Friday 8 am to 4 pm, please leave a message as we check the voicemail frequently throughout the day.     [facility] [place] fax [phone]    Dianna Cottrell      1995    A diet high in protein is important for wound healing, we recommend getting 90 grams of protein per day. Taking protein shakes or bars are a good way to get extra protein in your diet.  Other good sources of protein:  Pork 26g per 3 oz  Whey protein powder - 24g per scoop (on average)  Greek yogurt - 23g per 8oz   Chicken or Turkey - 23g per 3oz  Fish - 20-25g per 3oz  Beef - 18-23g per 3oz  Navy beans - 20g per cup  Cottage cheese - 14g per 1/2 cup   Lentils - 13g per 1/4 cup  Beef jerky 13g per 1oz  2% milk - 8g per cup  Peanut butter - 8g per 2 tablespoons  Eggs - 6g per egg  Mixed nuts - 6g per 2oz     Wound care recommendations to right ischial tuberosity. WOUND VAC ON HOLD 12/17-12/31  Cleanse with wound cleanser, apply antifungal barrier cream to periwound skin followed by antimicrobial gauze such as AMD or Bioguard to wound bed. Cover with ABD pad and tape. Change daily    When wound vac resumes and if there is still a fungal rash, apply antifungal powder then skin prep followed by NPWT at 125 mmHg continuous pressure. Change Monday, Wednesday and Friday.     Toyin Yi PA-C. December 17, 2020    Wound Clinc follow up as scheduled

## 2020-12-17 NOTE — PROGRESS NOTES
Visit Date:   12/17/2020      [facility]      This is a 25-year-old partial quad who is here today with her mom for further followup of her right ischial decubitus.  She saw our physician's assistant, Suzan Yi, last month and since that time has noted increasing malodorous drainage and also had a bump in her ESR from 22-56.  She is followed by Dr. Dobbins in Infectious Disease.  She has finished her antibiotic course and has been using the VAC, but it sounds like they have had some issues with sealage and are worried that there might have been some contamination due to the urine soilage.  There is also question of whether or not that might be a retained sponge in the wound.      PHYSICAL EXAMINATION:  On exam, the opening is too small for me to really examine or palpate the wound bed digitally.  Also, it was quite difficult due to the tunnel to really see the depths of the wound.  With verbal consent, we sharply debrided the wound including skin and subcutaneous using the Bovie after using 10 mL of 1% lidocaine with epinephrine injected into the adonay-wound.  Approximately 7 minutes was allowed for analgesic and hemostatic effect.  The patient had quite a bit of a thick scarring in the entry way to the tunnel, but there was friable boggy-looking tissue in the base with a thin coverage over the bone and no palpable or visible retained dressings after things were opened up.  After irrigating the wound with saline, a culture swab was taken to Dr. Collins's request.  Bleeding was controlled with the cautery.  The adonay-wound skin has evidence of previous rash or irritation that is drying and peeling.  The wound itself has a bit of irregular contour for the surrounding skin.  It sounds like that is why they had problems with keeping a seal at times.        For now, since she has no odor and she may have a brewing infection, we will go ahead and hold the VAC for a couple of weeks.  We will  switch to AMD on a daily basis.  We will them either use antifungal cream for the adonay-wound rash while she is doing these dressings or if we go back to the VAC, they can do antifungal powder with skin prep crusting.        She did experience some autonomic symptoms with injection of the epinephrine and then the stress of the cautery, although she did not experience it as a direct pain.  Things were resolving by the end of the procedure.        Please see nursing notes for dimensions of the wound, vital signs and photos.        Since I am booking up next month, we will have her see Suzan next month and I can see her the following month after that.         CRAIG THAYER MD             D: 2020   T: 2020   MT: BELLA      Name:     SYLVESTER BLACKWELL   MRN:      [id number]        Account:      [id number]   :      1995           Visit Date:   2020      Document: [id number]

## 2020-12-18 ENCOUNTER — MYC MEDICAL ADVICE (OUTPATIENT)
Dept: PLASTIC SURGERY | Facility: CLINIC | Age: 25
End: 2020-12-18

## 2020-12-18 DIAGNOSIS — R39.9 UTI SYMPTOMS: ICD-10-CM

## 2020-12-18 DIAGNOSIS — M86.651 CHRONIC OSTEOMYELITIS OF PELVIS, RIGHT (H): Primary | ICD-10-CM

## 2020-12-18 DIAGNOSIS — L89.324 DECUBITUS ULCER OF ISCHIAL AREA, LEFT, STAGE IV (H): ICD-10-CM

## 2020-12-18 NOTE — TELEPHONE ENCOUNTER
Josiah Bustamante,    That is just a part of the wound culture that Dr. Zacarias took yesterday. It is a preliminary result letting us know if there is bacteria in your wound. More testing will be done on your culture though and we will let you know when the final results are in, which might not be until early next week.    Thanks!  Renetta

## 2020-12-19 DIAGNOSIS — M86.651 CHRONIC OSTEOMYELITIS OF PELVIS, RIGHT (H): ICD-10-CM

## 2020-12-19 DIAGNOSIS — L89.324 DECUBITUS ULCER OF ISCHIAL AREA, LEFT, STAGE IV (H): Primary | ICD-10-CM

## 2020-12-20 LAB
BACTERIA SPEC CULT: ABNORMAL
Lab: ABNORMAL
SPECIMEN SOURCE: ABNORMAL

## 2020-12-23 ENCOUNTER — VIRTUAL VISIT (OUTPATIENT)
Dept: INFECTIOUS DISEASES | Facility: CLINIC | Age: 25
End: 2020-12-23
Attending: INTERNAL MEDICINE
Payer: MEDICARE

## 2020-12-23 DIAGNOSIS — L89.324 DECUBITUS ULCER OF ISCHIAL AREA, LEFT, STAGE IV (H): Primary | ICD-10-CM

## 2020-12-23 DIAGNOSIS — M86.651 CHRONIC OSTEOMYELITIS OF PELVIS, RIGHT (H): ICD-10-CM

## 2020-12-23 PROCEDURE — 99214 OFFICE O/P EST MOD 30 MIN: CPT | Mod: 95 | Performed by: INTERNAL MEDICINE

## 2020-12-23 RX ORDER — METRONIDAZOLE 500 MG/1
500 TABLET ORAL 3 TIMES DAILY
Qty: 42 TABLET | Refills: 0 | Status: SHIPPED | OUTPATIENT
Start: 2020-12-23 | End: 2021-01-06

## 2020-12-23 RX ORDER — CIPROFLOXACIN 500 MG/1
500 TABLET, FILM COATED ORAL 2 TIMES DAILY
Qty: 28 TABLET | Refills: 0 | Status: SHIPPED | OUTPATIENT
Start: 2020-12-23 | End: 2021-01-06
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
## 2020-12-24 LAB
BACTERIA SPEC CULT: ABNORMAL
Lab: ABNORMAL
SPECIMEN SOURCE: ABNORMAL

## 2020-12-30 ENCOUNTER — TELEPHONE (OUTPATIENT)
Dept: WOUND CARE | Facility: CLINIC | Age: 25
End: 2020-12-30

## 2020-12-30 NOTE — TELEPHONE ENCOUNTER
Left message for Marina. We received her faxed request we do not have measurements with in that time frame.  Any request for measurements need to go to [facility].

## 2020-12-31 ENCOUNTER — TELEPHONE (OUTPATIENT)
Dept: INFECTIOUS DISEASES | Facility: CLINIC | Age: 25
End: 2020-12-31

## 2020-12-31 ENCOUNTER — TELEPHONE (OUTPATIENT)
Dept: FAMILY MEDICINE | Facility: CLINIC | Age: 25
End: 2020-12-31

## 2020-12-31 NOTE — TELEPHONE ENCOUNTER
Reason for Call:  Form, our goal is to have forms completed with 72 hours, however, some forms may require a visit or additional information.    Type of letter, form or note:  Revision to Plan of Care    Who is the form from?: Home care    Where did the form come from: form was faxed in    What clinic location was the form placed at?: [facility] - [phone]    Where the form was placed: form drop off Box/Folder    What number is listed as a contact on the form?: [phone]       Additional comments: Please review, sign, date and fax back to [phone]    Call taken on 12/31/2020 at 4:36 PM by Jorge Vidal

## 2021-01-04 ENCOUNTER — OFFICE VISIT (OUTPATIENT)
Dept: OBGYN | Facility: CLINIC | Age: 26
End: 2021-01-04
Attending: OBSTETRICS & GYNECOLOGY
Payer: MEDICARE

## 2021-01-04 VITALS
BODY MASS INDEX: 18.56 KG/M2 | WEIGHT: 115 LBS | SYSTOLIC BLOOD PRESSURE: 76 MMHG | DIASTOLIC BLOOD PRESSURE: 48 MMHG | HEART RATE: 114 BPM

## 2021-01-04 DIAGNOSIS — B97.7 HIGH RISK HPV INFECTION: ICD-10-CM

## 2021-01-04 DIAGNOSIS — B37.2 YEAST INFECTION OF THE SKIN: Primary | ICD-10-CM

## 2021-01-04 PROBLEM — Z30.430 ENCOUNTER FOR INSERTION OF MIRENA IUD: Status: ACTIVE | Noted: 2017-12-13

## 2021-01-04 PROCEDURE — 57452 EXAM OF CERVIX W/SCOPE: CPT | Performed by: OBSTETRICS & GYNECOLOGY

## 2021-01-04 PROCEDURE — 88305 TISSUE EXAM BY PATHOLOGIST: CPT | Mod: 26 | Performed by: PATHOLOGY

## 2021-01-04 PROCEDURE — G0463 HOSPITAL OUTPT CLINIC VISIT: HCPCS

## 2021-01-04 PROCEDURE — 88342 IMHCHEM/IMCYTCHM 1ST ANTB: CPT | Mod: 26 | Performed by: PATHOLOGY

## 2021-01-04 PROCEDURE — 57455 BIOPSY OF CERVIX W/SCOPE: CPT | Performed by: OBSTETRICS & GYNECOLOGY

## 2021-01-04 PROCEDURE — 88305 TISSUE EXAM BY PATHOLOGIST: CPT | Mod: TC | Performed by: OBSTETRICS & GYNECOLOGY

## 2021-01-04 PROCEDURE — 88342 IMHCHEM/IMCYTCHM 1ST ANTB: CPT | Mod: TC | Performed by: OBSTETRICS & GYNECOLOGY

## 2021-01-04 RX ORDER — FLUCONAZOLE 150 MG/1
150 TABLET ORAL ONCE
Qty: 1 TABLET | Refills: 0 | Status: SHIPPED | OUTPATIENT
Start: 2021-01-04 | End: 2021-01-04

## 2021-01-04 NOTE — PROGRESS NOTES
Colposcopy Visit/Procedure Note:    Dianna Cottrell is an 25 year old, , who comes in for diagnosis of abnormal pap screen.     Menstrual History:  Menstrual History 2018 5/10/2018 2020   LAST MENSTRUAL PERIOD 2/10/2018 2018 2020       Lab Results   Component Value Date    PAP NIL 2020    PAP NIL 2016    PAP NIL 2013       Last   Lab Results   Component Value Date    HPV16 Positive 2020     Last   Lab Results   Component Value Date    HPV18 Negative 2020     Last   Lab Results   Component Value Date    HRHPV Positive 2020       Dates/results of previous cervical pathology: none        Dates of previous cervical pathology treatment: none      History   Smoking Status     Current Every Day Smoker     Packs/day: 0.50     Types: Cigarettes, Vaping Device   Smokeless Tobacco     Never Used     Comment: used 1/2-1 ppd for 4 years, quit , now daily e cig use.        Allergies as of 2021 - Reviewed 2021   Allergen Reaction Noted     Succinylcholine  2013        Colposcopy Procedure:    Consent:  Details of the colposcopic procedure were reviewed. Risks, benefits of treatment, and alternate forms of evaluation were discussed.  Patient's questions were elicited and answered.   Written consent was obtained and scanned into medical record.     Verification of Procedure  Just before the procedure begins, through verbal and active participation of team members, I verified:   Initials   Patient Name smd   Patient  smd   Procedure to be performed smd       OBJECTIVE: BP (!) 76/48 (BP Location: Left arm, Patient Position: Chair)   Pulse 114   Wt 52.2 kg (115 lb)   Breastfeeding No   BMI 18.56 kg/m      Pelvic Exam:  EG/BUS: Bartholin's, Urethra, Blessing's glands are normal. Severe erythema and redness in symmetric pattern including clitoris, inguinal creases, entire vulva and involving the rectum. C/w yeast and/or bacterial overgrowth.     Vagina: moist, pink, rugae with creamy, white and odorlesssecretions  Cervix: no lesions and IUD strings extend 2 cm from external os.  Rectum:anus normal    PROCEDURE:    Acetic acid and/or Lugol's solution applied to cervix.  Colposcopic exam of the cervix and apex of the vagina was conducted in the usual fashion.     Findings:  SCJ was  seen entirely and the exam satisfactory.    There were acetowhite area from 4-8:00 to 11-1:00    Biopsies were  obtained at 6 and placed in labeled Formalin Jar.    ECC: was NOT obtained       Assessment: CIN1-2    Plan:     Biopsies sent to pathology.  Will contact patient with results and recommended follow-up plan.    Patient advised to contact clinic with heavy vaginal bleeding, fever over 101 degrees F, or any other concerns.    Advised that evaluation of sexual contacts is NOT warranted as she can not get the same virus again. Risk of exposure to a NEW virus is possible with partner change.    Verbalized understanding and agreement with plan.    Yeast tx sent to pharmacy. Photo uploaded to Epic.     Carlotta Lock MD, FACOG  (she/her/hers)    Department of Ob/Gyn/Women's Health  [employer] [facility] [employer]  [facility]  [address] [place] [address]  [email or web address]  p. [phone]  f. [phone]

## 2021-01-04 NOTE — LETTER
2021       RE: Dianna Cottrell  [address] [address]     Dear Colleague,    Thank you for referring your patient, Dinana Cottrell, to the [facility] [place] at [facility]. Please see a copy of my visit note below.    Colposcopy Visit/Procedure Note:    Dianna Cottrell is an 25 year old, , who comes in for diagnosis of abnormal pap screen.     Menstrual History:  Menstrual History 2018 5/10/2018 2020   LAST MENSTRUAL PERIOD 2/10/2018 2018 2020       Lab Results   Component Value Date    PAP NIL 2020    PAP NIL 2016    PAP NIL 2013       Last   Lab Results   Component Value Date    HPV16 Positive 2020     Last   Lab Results   Component Value Date    HPV18 Negative 2020     Last   Lab Results   Component Value Date    HRHPV Positive 2020       Dates/results of previous cervical pathology: none        Dates of previous cervical pathology treatment: none      History   Smoking Status     Current Every Day Smoker     Packs/day: 0.50     Types: Cigarettes, Vaping Device   Smokeless Tobacco     Never Used     Comment: used 1/2-1 ppd for 4 years, quit , now daily e cig use.        Allergies as of 2021 - Reviewed 2021   Allergen Reaction Noted     Succinylcholine  2013        Colposcopy Procedure:    Consent:  Details of the colposcopic procedure were reviewed. Risks, benefits of treatment, and alternate forms of evaluation were discussed.  Patient's questions were elicited and answered.   Written consent was obtained and scanned into medical record.     Verification of Procedure  Just before the procedure begins, through verbal and active participation of team members, I verified:   Initials   Patient Name smd   Patient  smd   Procedure to be performed smd       OBJECTIVE: BP (!) 76/48 (BP Location: Left arm, Patient Position: Chair)   Pulse  114   Wt 52.2 kg (115 lb)   Breastfeeding No   BMI 18.56 kg/m      Pelvic Exam:  EG/BUS: Bartholin's, Urethra, [place]'s glands are normal. Severe erythema and redness in symmetric pattern including clitoris, inguinal creases, entire vulva and involving the rectum. C/w yeast and/or bacterial overgrowth.    Vagina: moist, pink, rugae with creamy, white and odorlesssecretions  Cervix: no lesions and IUD strings extend 2 cm from external os.  Rectum:anus normal    PROCEDURE:    Acetic acid and/or Lugol's solution applied to cervix.  Colposcopic exam of the cervix and apex of the vagina was conducted in the usual fashion.     Findings:  SCJ was  seen entirely and the exam satisfactory.    There were acetowhite area from 4-8:00 to 11-1:00    Biopsies were  obtained at 6 and placed in labeled Formalin Jar.    ECC: was NOT obtained       Assessment: CIN1-2    Plan:     Biopsies sent to pathology.  Will contact patient with results and recommended follow-up plan.    Patient advised to contact clinic with heavy vaginal bleeding, fever over 101 degrees F, or any other concerns.    Advised that evaluation of sexual contacts is NOT warranted as she can not get the same virus again. Risk of exposure to a NEW virus is possible with partner change.    Verbalized understanding and agreement with plan.    Yeast tx sent to pharmacy. Photo uploaded to Epic.     Carlotta Lock MD, FACOG  (she/her/hers)    Department of Ob/Gyn/Women's Health  [employer]  [facility]  [address] [place], [place] [address] [address]  [email or web address]  p. [phone]  f. [phone]

## 2021-01-05 ENCOUNTER — TELEPHONE (OUTPATIENT)
Dept: FAMILY MEDICINE | Facility: CLINIC | Age: 26
End: 2021-01-05

## 2021-01-05 NOTE — TELEPHONE ENCOUNTER
Our goal is to have forms completed with 72 hours, however some forms may require a visit or additional information.    Who is the form from?: [facility] (if other please explain)  Where the form came from: form was faxed in  What clinic location was the form placed at?: [facility]  Where the form was placed: placed in TC inbox     What number is listed as a contact on the form?: [phone]  Fax number to return form to:  [phone]        Call taken on 1/5/2021 at 5:09 PM by Floridalma Merino

## 2021-01-06 ENCOUNTER — TELEPHONE (OUTPATIENT)
Dept: WOUND CARE | Facility: CLINIC | Age: 26
End: 2021-01-06

## 2021-01-06 LAB — COPATH REPORT: NORMAL

## 2021-01-06 NOTE — TELEPHONE ENCOUNTER
Philomena the home care nurse wants to know if she can put the wound vac back on Dianna when she goes to see her today at noon.

## 2021-01-06 NOTE — TELEPHONE ENCOUNTER
Returned call to Philomena letting her know that wound vac was ok to go back on 12/31/2020. Will apply today.

## 2021-01-07 ENCOUNTER — TELEPHONE (OUTPATIENT)
Dept: FAMILY MEDICINE | Facility: CLINIC | Age: 26
End: 2021-01-07

## 2021-01-07 NOTE — TELEPHONE ENCOUNTER
Reason for Call:  Form, our goal is to have forms completed with 72 hours, however, some forms may require a visit or additional information.    Type of letter, form or note:  medical    Who is the form from?: Home care    Where did the form come from: form was faxed in    What clinic location was the form placed at?: [facility] - [phone]    Where the form was placed: TC box    What number is listed as a contact on the form?: fax [phone]       Additional comments: Please complete and fax    Call taken on 1/7/2021 at 4:45 PM by Analia Joe

## 2021-01-08 ENCOUNTER — PREP FOR PROCEDURE (OUTPATIENT)
Dept: OBGYN | Facility: CLINIC | Age: 26
End: 2021-01-08

## 2021-01-08 DIAGNOSIS — D06.9 CIN III WITH SEVERE DYSPLASIA: Primary | ICD-10-CM

## 2021-01-11 ENCOUNTER — TELEPHONE (OUTPATIENT)
Dept: OBGYN | Facility: CLINIC | Age: 26
End: 2021-01-11

## 2021-01-12 ENCOUNTER — TELEPHONE (OUTPATIENT)
Dept: FAMILY MEDICINE | Facility: CLINIC | Age: 26
End: 2021-01-12

## 2021-01-12 ENCOUNTER — TELEPHONE (OUTPATIENT)
Dept: OBGYN | Facility: CLINIC | Age: 26
End: 2021-01-12

## 2021-01-12 PROBLEM — D06.9 CIN III WITH SEVERE DYSPLASIA: Status: ACTIVE | Noted: 2021-01-12

## 2021-01-12 NOTE — TELEPHONE ENCOUNTER
Our goal is to have forms completed with 72 hours, however some forms may require a visit or additional information.    Who is the form from?: [facility]  (if other please explain)  Where the form came from: form was faxed in  What clinic location was the form placed at?: [facility]  Where the form was placed: placed in TC inbox     What number is listed as a contact on the form?: [phone]  Fax number to return form to:  [phone]        Call taken on 1/12/2021 at 3:33 PM by Floridalma Mernio

## 2021-01-12 NOTE — TELEPHONE ENCOUNTER
Confirmed surgery date, time and location, 2/2/21, arrival time at 8:30a.m with nothing to eat eight hours before scheduled surgery time, clear liquids up to two hours before scheduled surgery time, h&p required within 30 days of surgery, COVID testing 96 hours prior to surgery, nurse will call to arrange, map and letter mailed out.     to complete the following fields:            CHECKLIST     Google Calendar : Yes     Resident notified:YES and Not Applicable     Clinic schedule blocked: YES and Not Applicable    Patient notified:Yes      Pre op information sent: Yes     Given to patient over the phone.Yes    Comments:

## 2021-01-13 ENCOUNTER — MEDICAL CORRESPONDENCE (OUTPATIENT)
Dept: HEALTH INFORMATION MANAGEMENT | Facility: CLINIC | Age: 26
End: 2021-01-13

## 2021-01-13 DIAGNOSIS — Z11.59 ENCOUNTER FOR SCREENING FOR OTHER VIRAL DISEASES: Primary | ICD-10-CM

## 2021-01-15 NOTE — PROGRESS NOTES
Dianna is a 25 year old who is being evaluated via a billable telephone visit.      What phone number would you like to be contacted at? [phone]    How would you like to obtain your AVS? Camron    Phone call duration: 14 minutes    [facility] for Lung Science and [facility]- General Pulmonary Clinic    Assessment and Plan:  Dianna Cottrell is a 25 year old female with a history of spinal cord injury (C4-C6) in 2012, resulting in quadriplegia with neurogenic bladder,and autonomic dysreflexia, complicated by ischial pressure ulcers, as well as a history of DVT, frequent pneumonia, АНДРЕЙ, and depression, who presents for evaluation of frequent pneumonias and likely impaired pulmonary clearance.      Frequent lung infections, probable impaired pulmonary clearance, probable severe restriction with normal diffusion: Ms. Cottrell has typically had pneumonias each winter since 2014, requiring hospitalizations. No cultures have been done to help us determine what infection is present. Has restriction with normal diffusion on PFTs, most likely secondary to her C5 quadriplegia, no known underlying lung disease, but hx of smoking, vaping, and marijuana use. Additionally, she also has exposure to young children that may be leading to infections. Seasonal allergies are most active in the spring, and less likely to blame for her pneumonia. Impaired ciliary clearance from smoking may also contribute, as could contamination from marijuana.  Thankfully, she is doing quite well in regards to mucous clearance therapies, and feels well currently.   -Will obtain CT chest to evaluate for parenchymal lung disease given probable restriction on PFTs (could not do lung volumes to confirm)   -Would recommend she uses nebulizer treatments with albuterol at least twice a day in the winter months, with flutter valve (Aerobika) in line with nebulizer, to help clear secretions.  -Should continue to add saline nebs at least twice a day if she  "feels secretions are thick and difficult to cough up. This should be used with albuterol.   -Recommend she uses the cough assist device she has at home immediately after these neb treatments.   -Has already received both pneumonia shots (13 valent in 2015, 23-valent in 2014).   -follow up in 6 months with PFTs, if doing well, will space out visits more at that time.   -Should stop smoking and using marijuana. Working on this herself, but not quite ready to commit.      E cigarette use, Marijuana use: 3 joints per day, plus e cig use. Former combustible cigarette use.   -Counseled on complete smoking cessation of both e cigarettes and marijuana for lung health and to help cilia function properly.   -Offered quit line, other tobacco cessation assistance, not currently desired.     Follow up in 6 months, with PFTs     Rajesh Bahena MD   of Medicine  Division of Pulmonary, Critical Care, Allergy, and Sleep Medicine  ____________________________________________________________________  CC: \"frequent pneumonias\"    HPI:   Dianna Cottrell is a 25 year old female with a history of spinal cord injury (C4-C6) in 2012, resulting in quadriplegia with neurogenic bladder,and autonomic dysreflexia, complicated by ischial pressure ulcers, as well as a history of DVT, frequent pneumonia, АНДРЕЙ, and depression, who presents for follow up of frequent pneumonias and likely impaired pulmonary clearance.     Brief summary (from prior visits):   Ms. Cottrell established care in April 2020. At that time, she complained of frequent pneumonias, typically annually in the winter, since 2014, often requiring hospitalization. There was no known underlying lung disease present. It was thought the reason for her frequent pneumonias was impaired pulmonary secretion clearance due to there C5 quadriplegia. She was not using secretion clearance tools at home. Impaired cilia clearance from smoking could also contribute or her " marijuana use. She was told to use cough assist, flutter valve, and incentive spirometer more regularly. Saline nebs were added to her albuterol nebs.     Interval history:   Overall, Ms. Cottrell states she is doing relatively well. She is doing her neb treatments (albuterol and saline nebs). She mostly does them more when she feels more congested. She has also been using her incentive spirometer to keep her lungs open. She denies any particular issues currently. She is also using her cough assist. Typically she isn't getting a lot of sputum up, but when she does produce sputum, it is thick and yellow or just clear and thick. No fevers, chills, rhinorrhea, sinus congestion, sore throat.     She denies significant dyspnea. No wheeze. She feels breathing does feel better after doing treatments and incentive spirometer treatments. She does do her nebs every 4 hrs while awake, and uses the incentive spirometer after these treatments. No chest pain. No other complaints. No other changes in health since we last spoke.     She does continue to use E cigs, about the same as before, but is thinking about cutting back/quitting. Stress seems to be a trigger for her. She is working on reducing triggers.     Exposure History: reviewed, no change   Tobacco: former combustible cigarette use (used for about 4 years, 1/2-1 ppd, quit in 2012), now on e cigs (N-fariba) 10-20 puffs per day, goes through 1 cartridge about 1 week.   Other inhaled substances: daily marijuana use (3 times daily, full joint each time). No sandblasting, welding, asbestos exposures.   Occupation: Not working, on disability.   Pets: No pets, no birds.   Allergies: seasonal allergies   Hobbies: watch [employer], spend time outside.   Travel: No recent travel  Home: Lives in a house with kids (2, ages 4 and 7) and live-in aid, additional aids come in during the day to help as well. There is mold in the house, in her personal bathroom. Does use humidifier in the winter  (does clean this).     ROS: Complete 10 point ROS negative unless mentioned in HPI    Allergies and current medications: Reviewed and updated in EMR.   Past medical, surgical, social, and family histories: Personally reviewed and updated in EMR during this visit.     Labs and Radiology: All new data personally reviewed and summarized below. I have reviewed the results with the patient today.   Laboratory data:  12/11/20 WBC 13.4, Hgb 12.4 plts 400. Abs eos 700  ESR 56, CRP 2.7  K 3.6 Cr 0.48  Cardiac studies:  No new testing  Imaging studies:   No new studies    Pulmonary Function Testing: personally reviewed.   Date and Site FEV1 FVC FEV1/FVC TLC RV DLCO   10/1/20 [facility] 1.62 (46%)-->1.67 (47%) 1.66 (40%)-->1.76 (42%) 98%-->95%   18.86 (78%)   Most recent PFT interpretation: Suggesting of severe restriction, however no lung volumes were done (could not transfer to body box). Normal diffusion. No significant response to bronchodilator challenge. MIP is normal, MEP is decreased.

## 2021-01-19 DIAGNOSIS — S14.109S QUADRIPLEGIA, POST-TRAUMATIC (H): Primary | Chronic | ICD-10-CM

## 2021-01-19 DIAGNOSIS — G82.50 QUADRIPLEGIA, POST-TRAUMATIC (H): Primary | Chronic | ICD-10-CM

## 2021-01-19 PROCEDURE — 94729 DIFFUSING CAPACITY: CPT | Performed by: INTERNAL MEDICINE

## 2021-01-19 PROCEDURE — 94375 RESPIRATORY FLOW VOLUME LOOP: CPT | Performed by: INTERNAL MEDICINE

## 2021-01-19 NOTE — PROGRESS NOTES
[facility]  [address], [address]  [place] [address]  Phone: [phone]  Fax: [phone]  Primary Provider: Suzan Quevedo  Pre-op Performing Provider: SUZAN QUEVEDO    PREOPERATIVE EVALUATION:  Today's date: 1/21/2021    Dianna Cottrell is a 25 year old female who presents for a preoperative evaluation.    Surgical Information:  Surgery/Procedure: CONE BIOPSY, CERVIX, USING LOOP ELECTROSURGICAL EXCISION PROCEDURE (LEEP) and ECC, possible replacement of Mirena Intrauterine device  Surgery Location: [facility]   Surgeon: Carlotta Lock MD  Surgery Date: 2/2/2021  Time of Surgery: 10:30 AM   Where patient plans to recover: At home with family  Fax number for surgical facility: Note does not need to be faxed, will be available electronically in Epic.    Type of Anesthesia Anticipated: MAC with block    Subjective     HPI related to upcoming procedure: Had pap normal with + HPV16. Colposcopy done early Jan 2021, path showing YONI 3.       Preop Questions 12/12/2019   1. Have you ever had a heart attack or stroke? No   3. Do you have chest pain with activity? No   4. Do you have a history of  heart failure? No   5. Do you currently have a cold, bronchitis or symptoms of other infection? No   6. Do you have a cough, shortness of breath, or wheezing? No   7. Do you or anyone in your family have previous history of blood clots? YES - personal history of DVT in her 2nd pregnancy   8. Do you or does anyone in your family have a serious bleeding problem such as prolonged bleeding following surgeries or cuts? No   9. Have you ever had problems with anemia or been told to take iron pills? No   10. Have you had any abnormal blood loss such as black, tarry or bloody stools, or abnormal vaginal bleeding? No   11. Have you ever had a blood transfusion? No   13. Have you or any of your relatives ever had problems with anesthesia? No   14. Do you have sleep apnea, excessive  "snoring or daytime drowsiness? No   15. Do you have any artifical heart valves or other implanted medical devices like a pacemaker, defibrillator, or continuous glucose monitor? No   16. Do you have artificial joints? No   18. Is there any chance that you may be pregnant? No     Health Care Directive:  Patient does not have a Health Care Directive or Living Will: Discussed advance care planning with patient; however, patient declined at this time.    Preoperative Review of :   reviewed - no record of controlled substances prescribed.      Frequent pneumonia; restrictive lung function due to quadriplegia  Had PFTs and visit with Pulmonologist yesterday- restrictive w/normal diffusion.  Planning for chest CT.   She is doing better this year with breathing than in prior years. She is doing her neb treatments and incentive spirometry.   Smoking/vaping- \"could be better\"- contemplating reducing.     Ulcer and chronic osteomyelitis   Still has wound vac. Wound clinic yesterday. Infectious dieasese- - she is off antibiotics. Had PICC line which is out.     Quadriplegia  Saw her PMR physician  Nov 2020. Oxybutynin for bladder. Increase baclofen.    Hx of DVT  Consult w/Oleg Collins PA-C 09/10/2020 prior to last surgery.      Review of Systems  CONSTITUTIONAL: NEGATIVE for fever, chills; has not been weighed recently. Un sure if change in weight  INTEGUMENTARY/SKIN:Rash/irritation in  area. Chronic wound   EYES: NEGATIVE for vision changes or irritation  ENT/MOUTH: NEGATIVE for ear, mouth and throat problems  RESP: NEGATIVE for significant cough or SOB  CV: NEGATIVE for chest pain, palpitations or peripheral edema  GI: NEGATIVE for nausea, abdominal pain, heartburn, or change in bowel habits  : OLIVEIRA in place ; UTI last 2 mo ago. NEGATIVE for sx typical of her UTIs  MUSCULOSKELETAL: NEGATIVE for significant arthralgias or myalgia  NEURO: NEGATIVE for weakness, dizziness or paresthesias  ENDOCRINE: " NEGATIVE for temperature intolerance, skin/hair changes  HEME: NEGATIVE for bleeding problems  PSYCHIATRIC: NEGATIVE for changes in mood or affect    Patient Active Problem List    Diagnosis Date Noted     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     Priority: Medium     Added automatically from request for surgery [id number]       Neurogenic bladder 09/16/2020     Priority: Medium     Impaired lung function 09/16/2020     Priority: Medium     Other chronic osteomyelitis, unspecified site (H) 09/02/2020     Priority: Medium     Added automatically from request for surgery [id number]       Personal history of DVT (deep vein thrombosis) 03/12/2020     Priority: Medium     Decubitus ulcer of right ischium, stage 4 (H) 02/05/2020     Priority: Medium     Added automatically from request for surgery [id number]       Autonomic dysreflexia 12/13/2019     Priority: Medium     History of- infrequently; triggers bladder distention       Leukocytosis 12/13/2019     Priority: Medium     Quadriplegia, post-traumatic (H)- incomplete quad, limited use upper extremities 02/11/2019     Priority: Medium     Major depressive disorder with single episode, in partial remission (H) 02/11/2019     Priority: Medium     АНДРЕЙ (generalized anxiety disorder) 05/10/2018     Priority: Medium     H/O: pneumonia 02/14/2018     Priority: Medium     IUD (intrauterine device) in place- placed 12/2017 01/12/2018     Priority: Medium     Encounter for insertion of mirena IUD 12/13/2017     Priority: Medium     Lesions of vulva 12/31/2016     Priority: Medium     c5 burst fracture 12/18/2012     Priority: Medium     C5-C7 fracture with cord injury, Dec. 2012          Past Medical History:   Diagnosis Date     Anemia     with pregnancy     c5 burst fracture 12/18/2012    C5-C7 fracture with cord injury     Compression fracture of L1 lumbar vertebra (H) 12/18/2012    L1 superior endplate compression fracture     Encounter for insertion of mirena IUD 12/13/2017      Fracture of thoracic spine without spinal cord lesion (H) 2012    T3-T8 spinous process fractures     Hypertension 2016     Vocal cord dysfunction     Left vocal cord weakness noted by ENT post extubation 2012     Past Surgical History:   Procedure Laterality Date     C4-C7 interbody fusion with anterior screw and plate fixation and posterior erna and pedicle screw fixation with interspace bone graft and C5 and C6 partial corpectomies  2012      SECTION  2013    Procedure:  SECTION;   Section ;  Surgeon: Ricki Nelson MD;  Location: [facility] L[facility]      SECTION N/A 2016    Procedure:  SECTION;  Surgeon: Floridalma Kiran MD;  Location: [facility] L[facility]     IR IVC FILTER PLACEMENT  2012     IRRIGATION AND DEBRIDEMENT BUTTOCKS Right 2020    Procedure: Sharp excisional debridement of right ischial tuberosity decubitus,   Bone biopsies for cultures and path,  VAC Via placement.;  Surgeon: Vira Zacarias MD;  Location: [facility] OR     LUMBAR DRAIN  2012     Current Outpatient Medications   Medication Sig Dispense Refill     acetaminophen (TYLENOL) 325 MG tablet Take 325-650 mg by mouth every 6 hours as needed.       albuterol (PROVENTIL) (2.5 MG/3ML) 0.083% neb solution Take 1 vial (2.5 mg) by nebulization every 4 hours as needed for shortness of breath / dyspnea or wheezing 100 mL 3     baclofen (LIORESAL) 10 MG tablet Take 10 mg by mouth 3 times daily.       BISACODYL 1 suppository daily        Coloplast barrier cream CREA Apply a thin layer of cream to affected area twice daily. 4 g 1     Cranberry 450 MG TABS Take 450 mg by mouth daily.       Docusate Sodium (COLACE PO) Take by mouth daily.        gabapentin (NEURONTIN) 300 MG capsule Take 300 mg by mouth daily        GABAPENTIN PO        hydrOXYzine (VISTARIL) 25 MG capsule Take 1 capsule (25 mg) by mouth 3 times daily as needed for anxiety 20 capsule 1     hyoscyamine (ANASPAZ/LEVSIN)  "0.125 MG tablet TAKE 1 TABLET BY MOUTH THREE TIMES A DAY.  3     midodrine (PROAMATINE) 5 MG tablet Take 10 mg by mouth 2 times daily  6 tablet 0     Multiple Vitamin (DAILY MULTIVITAMIN PO) Take 2 tablets by mouth daily       order for DME [facility] Phone [phone] Fax [phone]  Maria Fernanda Price to pressure map bed 1 Units 0     order for DME Equipment being ordered: [facility]   p: [phone] f: [phone]  EMAIL to [email or web address]  [email or web address]    Request for group 2 air mattress & semi-electric hospital bed    Ht:5'8\"  Wt:110 ;bs  Length of need: lifetime    Pt. is wheelchair bound & has been in a comprehensive ulcer treatment program for >2 months. We will follow monthly until wound closure    To obtain medical records:  p: [phone] f: [phone] 1 Device 0     order for DME Equipment being ordered: [facility] Fax [phone]    Wheelchair seating eval and mapping of current cushion 30 days 0     order for DME Equipment being ordered: Pressure Mapping of wheelchair at [facility] Phone [phone] Fax [phone] Device 0     order for DME Equipment being ordered: Wheelchair-  \"Patient continues to need and use daily her power wheelchair and the wheelchair will continue to need repairs for the next 12 months.\" 1 each 0     oxybutynin ER (DITROPAN-XL) 5 MG 24 hr tablet Take 5 mg by mouth daily       povidone-iodine 10 % swab        senna-docusate (SENOKOT-S;PERICOLACE) 8.6-50 MG per tablet Take 1-2 tablets by mouth 2 times daily as needed for constipation 40 tablet 0     sertraline (ZOLOFT) 100 MG tablet Take 1.5 tablets (150 mg) by mouth daily 135 tablet 1     sodium chloride (NEBUSAL) 3 % neb solution Take 3 mLs by nebulization every 6 hours as needed for wheezing or other (sputum clearance difficulty due to quadridplegia.) 300 mL 1     spacer (OPTICHAMBER JAIDA) holding chamber To be used with inhaler 1 each 0       Allergies "   Allergen Reactions     Succinylcholine      Spinal cord injury 12/18/12, patient at risk for extrajunctional receptors and hyperkalemia        Social History     Tobacco Use     Smoking status: Current Every Day Smoker     Packs/day: 0.50     Types: Cigarettes, Vaping Device     Smokeless tobacco: Never Used     Tobacco comment: used 1/2-1 ppd for 4 years, quit 2012, now daily e cig use.    Substance Use Topics     Alcohol use: No     Alcohol/week: 0.0 standard drinks     Frequency: Never     Family History   Problem Relation Age of Onset     Lung Cancer Maternal Grandfather      Hypertension No family hx of      Diabetes No family hx of      History   Drug Use     Types: Marijuana     Comment: 3 joints per day         Objective     BP 92/60   Pulse 93   Temp 97.8  F (36.6  C) (Temporal)   Resp 20   LMP  (LMP Unknown)   SpO2 99%   Breastfeeding No     Physical Exam    GENERAL APPEARANCE: thin, healthy, alert and no distress; sitting in power chair     EYES: EOMI, PERRL     HENT: ear canals and TM's normal, wearing glasses and nose and mouth without ulcers or lesions     NECK: no adenopathy, no asymmetry, masses, or scars and thyroid normal to palpation     RESP: lungs clear to auscultation - no rales, rhonchi or wheezes     CV: regular rates and rhythm, normal S1 S2, no S3 or S4 and no murmur, click or rub     ABDOMEN:  soft, nontender, no HSM or masses and bowel sounds normal     MS: atrophic lower extremities normal, no edema, symmetric     SKIN: left corner of mouth - dry fissured skin with scabbing/crusting. Otherwise no suspicious lesions or rashes     NEURO: Alert and oriented, mentation intact and speech normal     PSYCH: mentation appears normal. and affect normal/bright     LYMPHATICS: No cervical adenopathy    Recent Labs   Lab Test 11/16/20  1315 11/11/20  1235   HGB 12.9 11.7    367   CR 0.39* 0.45*        Diagnostics: N/A    Revised Cardiac Risk Index (RCRI):  The patient has the  following serious cardiovascular risks for perioperative complications:   - No serious cardiac risks = 0 points     RCRI Interpretation: 0 points: Class I (very low risk - 0.4% complication rate)           Assessment & Plan   The proposed surgical procedure is considered LOW-INTERMEDIATE risk.    Preop general physical exam  YONI III with severe dysplasia  Surgery as scheduled     Quadriplegia, post-traumatic (H)- incomplete quad, limited use upper extremities  Continue w/PMR physician     Personal history of DVT (deep vein thrombosis)  Consulted w/hematology, Oleg Collins PA-C; Dianna does not need post-procedure pharmacologic DVT prophylaxis following this surgery.     Other chronic osteomyelitis, unspecified site (H)  Continue with wound care team and I&D. She is not currently on antibiotics     Impaired lung function  Continue w/saline albuterol nebs. Plan to use nebs on schedule morning prior to surgery.  Advised continud efforts at smoking reduction and ultimate cessation  Normal exam today  Continue w/CT and followup w/pulmonology     IUD (intrauterine device) in place- placed 12/2017    Angular cheilitis  Pt has what appears to be angular cheilitis, which she admits to picking at out of habit; now scabbed and crusted.  Treat for possible early impetigo as below. Advised to stop picking at the area and allow to heal. If not resolving prior to surgery please follow up.   - mupirocin (BACTROBAN) 2 % external ointment; Apply topically 3 times daily To corner of mouth  Possible Sleep Apnea: no     Risks and Recommendations:  The patient has the following additional risks and recommendations for perioperative complications:   - History of urinary retention  Pulmonary:    - Incentive spirometry post-op   - Active nicotine user, advised smoking cessation  Social and Substance:    - Active nicotine user, advised smoking cessation      Medication Instructions:  Patient is to take all scheduled medications on the day of  surgery    RECOMMENDATION:  APPROVAL GIVEN to proceed with proposed procedure, without further diagnostic evaluation.    Signed Electronically by: Suzan Willis PA-C    Copy of this evaluation report is provided to requesting physician.    Preop [facility] Preop Guidelines    Revised Cardiac Risk Index

## 2021-01-19 NOTE — PATIENT INSTRUCTIONS
"  Use nebs morning or surgery     Continue to work on tobacco and smoking cessation        Preparing for Your Surgery  Getting started  A surgery nurse will call you to review your health history and instructions. They will give you an arrival time based on your scheduled surgery time.  Please be ready to share the following:    Your doctor's clinic name and phone number    Your medical, surgical and anesthesia history    A list of allergies and sensitivities    A list of medicines, including herbal treatments and over-the-counter drugs    Whether the patient has a legal guardian (ask how to send us the papers in advance)  If your child is having surgery, please ask for a copy of Preparing for Your Child's Surgery.    Preparing for surgery    Within 30 days of surgery: Have an exam at your family clinic (primary care clinic), or go to a pre-operative clinic. This exam is called a \"History and Physical,\" or H&P.    At your H&P exam, talk to your care team about all medicines you take. If you need to stop any medicines before surgery, ask when to start taking them again.  ? We do this for your safety. Many medicines can make you bleed too much during surgery. Some change how well surgery (anesthesia) drugs work.    Call your insurance company to see what it will and won't pay for. Ask if they need to pre-approve the surgery. (If no insurance, call [phone].)    Call your surgeon's clinic if there's any change in your health. This includes signs of a cold or flu (sore throat, runny nose, cough, rash, fever). It also includes a scrape or scratch near the surgery site.    If you have questions on the day of surgery, call your surgery center.  Eating and drinking guidelines  For your safety: Unless your surgeon tells you otherwise, follow the guidelines below.    Eat and drink as usual until 8 hours before surgery. After that, no food or milk.    Drink clear liquids until 2 hours before surgery. These are liquids you " can see through, like water, Gatorade and Propel Water. You may also have black coffee and tea (no cream or milk).    Nothing by mouth within 2 hours of surgery. This includes gum, candy and breath mints.    Stop alcohol the midnight before surgery.    If your family clinic tells you to take medicine on the morning of surgery, it's okay to take it with a sip of water.  Preventing infection    Shower or bathe the night before and morning of your surgery. Follow the instructions your clinic gave you. (If no instructions, use regular soap.)    Don't shave or clip hair near your surgery site. This can lead to skin infection.    Don't smoke the morning of surgery. Smoking increases the risk of infection. You may chew nicotine gum up to 2 hours before surgery. A nicotine patch is okay.  ? Note: Some surgeries require you to completely quit smoking and nicotine. Check with your surgeon.    Your care team will make every effort to keep you safe from infection. We will:  ? Clean our hands often with soap and water (or an alcohol-based hand rub).  ? Clean the skin at your surgery site with a special soap that kills germs. We'll also remove hair from the site as needed.  ? Wear special hair covers, masks, gowns and gloves during surgery.  ? Give antibiotic medicine, if prescribed. Not all surgeries need antibiotics.  What to bring on the day of surgery    Photo ID and insurance card    Copy of your health care directive, if you have one    Glasses and hearing aides (bring cases)  ? You can't wear contacts during surgery    Inhaler and eye drops, if you use them (tell us about these when you arrive)    CPAP machine or breathing device, if you use them    A few personal items, if spending the night    If you have . . .  ? A pacemaker or ICD (cardiac defibrillator): Bring the ID card.  ? An implanted stimulator: Bring the remote control.  ? A legal guardian: Bring a copy of the certified (court-stamped) guardianship  papers.  Please remove any jewelry, including body piercings. Leave jewelry and other valuables at home.  If you're going home the day of surgery  Important: If you don't follow the rules below, we must cancel your surgery.     Arrange for someone to drive you home after surgery. You may not drive, take a taxi or take public transportation by yourself (unless you'll have local anesthesia only).    Arrange for a responsible adult to stay with you overnight. If you don't, we may keep you in the hospital overnight, and you may need to pay the costs yourself.  Questions?   If you have any questions for your care team, list them here: _________________________________________________________________________________________________________________________________________________________________________________________________________________________________________________________________________________________________________________________  For informational purposes only. Not to replace the advice of your health care provider. Copyright   1143-7148 [employer]. All rights reserved. Clinically reviewed by Shannon Clark MD. [employer] [id number] - REV 07/19.

## 2021-01-20 ENCOUNTER — TELEPHONE (OUTPATIENT)
Dept: PULMONOLOGY | Facility: CLINIC | Age: 26
End: 2021-01-20

## 2021-01-20 ENCOUNTER — HOSPITAL ENCOUNTER (OUTPATIENT)
Dept: WOUND CARE | Facility: CLINIC | Age: 26
Discharge: HOME | End: 2021-01-20
Attending: PHYSICIAN ASSISTANT | Admitting: PHYSICIAN ASSISTANT
Payer: MEDICARE

## 2021-01-20 ENCOUNTER — VIRTUAL VISIT (OUTPATIENT)
Dept: PULMONOLOGY | Facility: CLINIC | Age: 26
End: 2021-01-20
Attending: INTERNAL MEDICINE
Payer: MEDICARE

## 2021-01-20 VITALS
HEART RATE: 102 BPM | RESPIRATION RATE: 16 BRPM | SYSTOLIC BLOOD PRESSURE: 85 MMHG | DIASTOLIC BLOOD PRESSURE: 56 MMHG | TEMPERATURE: 97.1 F

## 2021-01-20 DIAGNOSIS — J98.4 RESTRICTIVE LUNG DISEASE: ICD-10-CM

## 2021-01-20 DIAGNOSIS — F12.90 MARIJUANA SMOKER: ICD-10-CM

## 2021-01-20 DIAGNOSIS — I73.9 PAD (PERIPHERAL ARTERY DISEASE) (H): Primary | ICD-10-CM

## 2021-01-20 DIAGNOSIS — E44.0 MODERATE PROTEIN-CALORIE MALNUTRITION (H): ICD-10-CM

## 2021-01-20 DIAGNOSIS — L89.314 DECUBITUS ULCER OF RIGHT ISCHIUM, STAGE 4 (H): ICD-10-CM

## 2021-01-20 DIAGNOSIS — Z72.0 CURRENT TOBACCO USE: ICD-10-CM

## 2021-01-20 DIAGNOSIS — J18.9 FREQUENT EPISODES OF PNEUMONIA: Primary | ICD-10-CM

## 2021-01-20 DIAGNOSIS — R06.89 AIRWAY CLEARANCE IMPAIRMENT: ICD-10-CM

## 2021-01-20 DIAGNOSIS — Z78.9 ELECTRONIC CIGARETTE USE: ICD-10-CM

## 2021-01-20 LAB
CRP SERPL-MCNC: 17.6 MG/L (ref 0–8)
DLCOUNC-%PRED-PRE: 75 %
DLCOUNC-PRE: 18.17 ML/MIN/MMHG
DLCOUNC-PRED: 23.97 ML/MIN/MMHG
ERV-%PRED-PRE: 5 %
ERV-PRE: 0.09 L
ERV-PRED: 1.61 L
ERYTHROCYTE [SEDIMENTATION RATE] IN BLOOD BY WESTERGREN METHOD: 48 MM/H (ref 0–20)
EXPTIME-PRE: 5.33 SEC
FEF2575-%PRED-PRE: 80 %
FEF2575-PRE: 3.13 L/SEC
FEF2575-PRED: 3.87 L/SEC
FEFMAX-%PRED-PRE: 63 %
FEFMAX-PRE: 4.62 L/SEC
FEFMAX-PRED: 7.28 L/SEC
FEV1-%PRED-PRE: 46 %
FEV1-PRE: 1.6 L
FEV1FEV6-PRE: 99 %
FEV1FEV6-PRED: 86 %
FEV1FVC-PRE: 97 %
FEV1FVC-PRED: 86 %
FEV1SVC-PRE: 107 %
FEV1SVC-PRED: 82 %
FIFMAX-PRE: 3.87 L/SEC
FVC-%PRED-PRE: 40 %
FVC-PRE: 1.65 L
FVC-PRED: 4.08 L
IC-%PRED-PRE: 54 %
IC-PRE: 1.41 L
IC-PRED: 2.61 L
MEP-PRE: 34 CMH2O
MIP-PRE: -50 CMH2O
PREALB SERPL IA-MCNC: 23 MG/DL (ref 15–45)
VA-%PRED-PRE: 64 %
VA-PRE: 3.39 L
VC-%PRED-PRE: 35 %
VC-PRE: 1.5 L
VC-PRED: 4.22 L

## 2021-01-20 PROCEDURE — 85652 RBC SED RATE AUTOMATED: CPT | Performed by: PHYSICIAN ASSISTANT

## 2021-01-20 PROCEDURE — 84134 ASSAY OF PREALBUMIN: CPT | Performed by: PHYSICIAN ASSISTANT

## 2021-01-20 PROCEDURE — 11042 DBRDMT SUBQ TIS 1ST 20SQCM/<: CPT | Performed by: PHYSICIAN ASSISTANT

## 2021-01-20 PROCEDURE — 11042 DBRDMT SUBQ TIS 1ST 20SQCM/<: CPT

## 2021-01-20 PROCEDURE — 86140 C-REACTIVE PROTEIN: CPT | Performed by: PHYSICIAN ASSISTANT

## 2021-01-20 PROCEDURE — 99442 PR PHYSICIAN TELEPHONE EVALUATION 11-20 MIN: CPT | Mod: 95 | Performed by: INTERNAL MEDICINE

## 2021-01-20 PROCEDURE — 82306 VITAMIN D 25 HYDROXY: CPT | Performed by: PHYSICIAN ASSISTANT

## 2021-01-20 NOTE — PATIENT INSTRUCTIONS
I'm glad you are doing well!    Keep up the good work with albuterol and saline nebs, as well as incentive spirometry, flutter valve, and cough assist. If it is working to do these every 4 hours, you can certainly continue, but you may be able to slow down to at least twice daily in the winter months, and more frequent if sputum/mucous increases.     Breathing tests show possible restriction. This could be due to your quadriplegia, but we should examine the lung tissue to make sure there is no inflammation or scarring present. We will obtain a CT scan of your chest.     Follow up in 6 months with breathing tests, sooner if needed. Please let us know if you need refills of anything!

## 2021-01-20 NOTE — LETTER
1/20/2021         RE: Dianna Cottrell  [address] [facility] [address] [id number]        Dear Colleague,    Thank you for referring your patient, Dianna Cottrell, to the [facility] [place]. Please see a copy of my visit note below.    Dianna is a 25 year old who is being evaluated via a billable telephone visit.      What phone number would you like to be contacted at? [phone]    How would you like to obtain your AVS? MyChart    Phone call duration: 14 minutes    [facility]    Assessment and Plan:  Dianna Cottrell is a 25 year old female with a history of spinal cord injury (C4-C6) in 2012, resulting in quadriplegia with neurogenic bladder,and autonomic dysreflexia, complicated by ischial pressure ulcers, as well as a history of DVT, frequent pneumonia, АНДРЕЙ, and depression, who presents for evaluation of frequent pneumonias and likely impaired pulmonary clearance.      Frequent lung infections, probable impaired pulmonary clearance, probable severe restriction with normal diffusion: Ms. Cottrell has typically had pneumonias each winter since 2014, requiring hospitalizations. No cultures have been done to help us determine what infection is present. Has restriction with normal diffusion on PFTs, most likely secondary to her C5 quadriplegia, no known underlying lung disease, but hx of smoking, vaping, and marijuana use. Additionally, she also has exposure to young children that may be leading to infections. Seasonal allergies are most active in the spring, and less likely to blame for her pneumonia. Impaired ciliary clearance from smoking may also contribute, as could contamination from marijuana.  Thankfully, she is doing quite well in regards to mucous clearance therapies, and feels well currently.   -Will obtain CT chest to evaluate for parenchymal lung disease given probable restriction  "on PFTs (could not do lung volumes to confirm)   -Would recommend she uses nebulizer treatments with albuterol at least twice a day in the winter months, with flutter valve (Aerobika) in line with nebulizer, to help clear secretions.  -Should continue to add saline nebs at least twice a day if she feels secretions are thick and difficult to cough up. This should be used with albuterol.   -Recommend she uses the cough assist device she has at home immediately after these neb treatments.   -Has already received both pneumonia shots (13 valent in 2015, 23-valent in 2014).   -follow up in 6 months with PFTs, if doing well, will space out visits more at that time.   -Should stop smoking and using marijuana. Working on this herself, but not quite ready to commit.      E cigarette use, Marijuana use: 3 joints per day, plus e cig use. Former combustible cigarette use.   -Counseled on complete smoking cessation of both e cigarettes and marijuana for lung health and to help cilia function properly.   -Offered quit line, other tobacco cessation assistance, not currently desired.     Follow up in 6 months, with PFTs     Rajesh Bahena MD   of Medicine  Division of Pulmonary, Critical Care, Allergy, and Sleep Medicine  ____________________________________________________________________  CC: \"frequent pneumonias\"    HPI:   Dianna Cottrell is a 25 year old female with a history of spinal cord injury (C4-C6) in 2012, resulting in quadriplegia with neurogenic bladder,and autonomic dysreflexia, complicated by ischial pressure ulcers, as well as a history of DVT, frequent pneumonia, АНДРЕЙ, and depression, who presents for follow up of frequent pneumonias and likely impaired pulmonary clearance.     Brief summary (from prior visits):   Ms. Cottrell established care in April 2020. At that time, she complained of frequent pneumonias, typically annually in the winter, since 2014, often requiring hospitalization. " There was no known underlying lung disease present. It was thought the reason for her frequent pneumonias was impaired pulmonary secretion clearance due to there C5 quadriplegia. She was not using secretion clearance tools at home. Impaired cilia clearance from smoking could also contribute or her marijuana use. She was told to use cough assist, flutter valve, and incentive spirometer more regularly. Saline nebs were added to her albuterol nebs.     Interval history:   Overall, Ms. Cottrell states she is doing relatively well. She is doing her neb treatments (albuterol and saline nebs). She mostly does them more when she feels more congested. She has also been using her incentive spirometer to keep her lungs open. She denies any particular issues currently. She is also using her cough assist. Typically she isn't getting a lot of sputum up, but when she does produce sputum, it is thick and yellow or just clear and thick. No fevers, chills, rhinorrhea, sinus congestion, sore throat.     She denies significant dyspnea. No wheeze. She feels breathing does feel better after doing treatments and incentive spirometer treatments. She does do her nebs every 4 hrs while awake, and uses the incentive spirometer after these treatments. No chest pain. No other complaints. No other changes in health since we last spoke.     She does continue to use E cigs, about the same as before, but is thinking about cutting back/quitting. Stress seems to be a trigger for her. She is working on reducing triggers.     Exposure History: reviewed, no change   Tobacco: former combustible cigarette use (used for about 4 years, 1/2-1 ppd, quit in 2012), now on e cigs (N-fariba) 10-20 puffs per day, goes through 1 cartridge about 1 week.   Other inhaled substances: daily marijuana use (3 times daily, full joint each time). No sandblasting, welding, asbestos exposures.   Occupation: Not working, on disability.   Pets: No pets, no birds.   Allergies:  seasonal allergies   Hobbies: watch [employer], spend time outside.   Travel: No recent travel  Home: Lives in a house with kids (2, ages 4 and 7) and live-in aid, additional aids come in during the day to help as well. There is mold in the house, in her personal bathroom. Does use humidifier in the winter (does clean this).     ROS: Complete 10 point ROS negative unless mentioned in HPI    Allergies and current medications: Reviewed and updated in EMR.   Past medical, surgical, social, and family histories: Personally reviewed and updated in EMR during this visit.     Labs and Radiology: All new data personally reviewed and summarized below. I have reviewed the results with the patient today.   Laboratory data:  12/11/20 WBC 13.4, Hgb 12.4 plts 400. Abs eos 700  ESR 56, CRP 2.7  K 3.6 Cr 0.48  Cardiac studies:  No new testing  Imaging studies:   No new studies    Pulmonary Function Testing: personally reviewed.   Date and Site FEV1 FVC FEV1/FVC TLC RV DLCO   10/1/20 [facility] 1.62 (46%)-->1.67 (47%) 1.66 (40%)-->1.76 (42%) 98%-->95%   18.86 (78%)   Most recent PFT interpretation: Suggesting of severe restriction, however no lung volumes were done (could not transfer to body box). Normal diffusion. No significant response to bronchodilator challenge. MIP is normal, MEP is decreased.         Again, thank you for allowing me to participate in the care of your patient.        Sincerely,        Rajesh Bahena MD

## 2021-01-20 NOTE — DISCHARGE INSTRUCTIONS
[facility]  [address] [facility] [address] [address]    Call us at [phone] if you have any questions about your wounds, have redness or swelling around your wound, have a fever of 101 or greater or if you have any other problems or concerns. We answer the phone Monday through Friday 8 am to 4 pm, please leave a message as we check the voicemail frequently throughout the day.     [facility] fax [phone]    Dianna Cottrell      1995    Medications/supplements to aid in healin. 1 packet of Gael Supplement into your favorite beverage twice a day. Purchase at [facility] in [address] [facility], [place] or [address]  2. Vitamin D3 5000 iu per day  3. Vitamin C 2,000 mg daily    A diet high in protein is important for wound healing, we recommend getting 90 grams of protein per day. Taking protein shakes or bars are a good way to get extra protein in your diet.  Other good sources of protein:  Pork 26g per 3 oz  Whey protein powder - 24g per scoop (on average)  Greek yogurt - 23g per 8oz   Chicken or Turkey - 23g per 3oz  Fish - 20-25g per 3oz  Beef - 18-23g per 3oz  Navy beans - 20g per cup  Cottage cheese - 14g per 1/2 cup   Lentils - 13g per 1/4 cup  Beef jerky 13g per 1oz  2% milk - 8g per cup  Peanut butter - 8g per 2 tablespoons  Eggs - 6g per egg  Mixed nuts - 6g per 2oz     Wound care recommendations to right ischial tuberosity. WOUND HAS BEEN PACKED WITH TOO MUCH SPONGE. ONLY INSERT SPONGE INTO WOUND OPENING, DO NO INSERT INTO TUNNEL ANYMORE.  After cleansing with saline or wound cleanser, apply small amount of VASHE on gauze, lay into wound bed, let sit for 15 minutes, remove gauze (do not rinse) then apply dressing. Apply skin prep to periwound skin followed by NPWT at 125 mmHg continuous pressure. Change Monday, Wednesday and Friday. May use antifungal powder to any rash on periwound skin as needed.    You may  purchase Vashe in our building in [address]    Toyin Yi PA-C. January 20, 2021    Wound Clinc follow up as scheduled

## 2021-01-21 ENCOUNTER — OFFICE VISIT (OUTPATIENT)
Dept: FAMILY MEDICINE | Facility: CLINIC | Age: 26
End: 2021-01-21
Payer: MEDICARE

## 2021-01-21 VITALS
RESPIRATION RATE: 20 BRPM | OXYGEN SATURATION: 99 % | TEMPERATURE: 97.8 F | HEART RATE: 93 BPM | SYSTOLIC BLOOD PRESSURE: 92 MMHG | DIASTOLIC BLOOD PRESSURE: 60 MMHG

## 2021-01-21 DIAGNOSIS — D06.9 CIN III WITH SEVERE DYSPLASIA: ICD-10-CM

## 2021-01-21 DIAGNOSIS — R94.2 IMPAIRED LUNG FUNCTION: Chronic | ICD-10-CM

## 2021-01-21 DIAGNOSIS — K13.0 ANGULAR CHEILITIS: ICD-10-CM

## 2021-01-21 DIAGNOSIS — Z01.818 PREOP GENERAL PHYSICAL EXAM: Primary | ICD-10-CM

## 2021-01-21 DIAGNOSIS — G82.50 QUADRIPLEGIA, POST-TRAUMATIC (H): Chronic | ICD-10-CM

## 2021-01-21 DIAGNOSIS — Z97.5 IUD (INTRAUTERINE DEVICE) IN PLACE: Chronic | ICD-10-CM

## 2021-01-21 DIAGNOSIS — M86.60 OTHER CHRONIC OSTEOMYELITIS, UNSPECIFIED SITE (H): Chronic | ICD-10-CM

## 2021-01-21 DIAGNOSIS — S14.109S QUADRIPLEGIA, POST-TRAUMATIC (H): Chronic | ICD-10-CM

## 2021-01-21 DIAGNOSIS — Z86.718 PERSONAL HISTORY OF DVT (DEEP VEIN THROMBOSIS): ICD-10-CM

## 2021-01-21 LAB — DEPRECATED CALCIDIOL+CALCIFEROL SERPL-MC: 28 UG/L (ref 20–75)

## 2021-01-21 PROCEDURE — 99215 OFFICE O/P EST HI 40 MIN: CPT | Performed by: PHYSICIAN ASSISTANT

## 2021-01-21 RX ORDER — MUPIROCIN 20 MG/G
OINTMENT TOPICAL 3 TIMES DAILY
Qty: 30 G | Refills: 0 | Status: SHIPPED | OUTPATIENT
Start: 2021-01-21 | End: 2022-04-08

## 2021-01-22 ENCOUNTER — TELEPHONE (OUTPATIENT)
Dept: WOUND CARE | Facility: CLINIC | Age: 26
End: 2021-01-22

## 2021-01-22 DIAGNOSIS — E55.9 VITAMIN D DEFICIENCY: Primary | ICD-10-CM

## 2021-01-22 RX ORDER — ERGOCALCIFEROL 1.25 MG/1
50000 CAPSULE, LIQUID FILLED ORAL WEEKLY
Qty: 6 CAPSULE | Refills: 0 | Status: SHIPPED | OUTPATIENT
Start: 2021-01-22 | End: 2021-02-27

## 2021-01-22 NOTE — TELEPHONE ENCOUNTER
Ok to give patient this message: Blood work shows low vitamin D, she will need 6 weeks of prescription strength vitamin D. Ok to send Vit D2 50,000IU, take 1 tab PO once a week, total tabs 6, 0 refills. Inflammation markers are not significantly changed - meaning no obvious sign of infection.

## 2021-01-27 NOTE — PROGRESS NOTES
[facility]    ASSESSMENT:   1. Stage 4 pressure ulcer R IT  2. R IT osteomyelitis - s/p bone debridement and s/p IV antibiotic therapy      PLAN:   1. I am a bit concerned at the friability of the tissue and the pale appearance, my biggest concern is ongoing chronic OM. Will start with drawing ESR, CRP.   2. Will also draw vit D level today.  3. Add in Vashe soaks prior to VAC changes to cut down on biofilm.   4. Will instruct homecare to utilize less sponge as I suspect there is some level of trauma from the amount they are currently packing.       FOLLOW-UP: as scheduled with Dr. Zacarias      HISTORY OF PRESENT ILLNESS: Ms. Dianna Cottrell is a 25-year-old quadriplegic woman who returns for a R IT ulcer. Course of this wound in brief as follows:    12/3/19: MRI indicative of osteomyelitis.  9/18/20:  I&D with VAC placement. Was originally anticipating flap procedure in March but this was converted in an effort to keep her out of a facility during the pandemic  11/4/20: Seating assessment at [facility] - a new seating system has been recommended and ordered, this includes a new high profile Roho cushion  11/11/20: Returns for follow-up. Continues on Cefazolin per ID, they recommend reculture if wound stalls/worsens. Wound appears to be improving today, undermined pocket is very tight and I suspect VAC sponge is keeping it open.  11/23/20: IV antibiotics discontinued, PICC line removed.   12/17/20: Follow-up with Dr. Zacarias. Debrided in clinic with Avel. Held VAC and started on AMD due to concern of possible infection and periwound irritation.   1/6/21: VAC restarted  1/20/21: Returns for follow-up. Wound base friable, I am able to almost dissect tissue along IT with my finger and suspect packing too tightly with vac sponge would have same effect. Tissue is also fairly pale. Pt denies n/v, fever or malaise.     DATE WOUND ACQUIRED: 6/2018     WOUND CARE: NPWT     OFFLOADING: APM mattress,  chair with Roho cushion mapped well 12/20/19     REVIEW OF SYSTEMS:   CONSTITUTIONAL: Denies fever or chills  GI: denies nausea or vomiting      SOCIAL HISTORY: has PCA help, has two kids, hoping to do public speaking in the future     PHYSICAL EXAM:  GENERAL: Patient is alert and oriented and in no acute distress  INTEGUMENTARY:   See wound care flowsheet for detailed exam and wound measurements.       PROCEDURE: 4% topical lidocaine was applied to the wound by the nursing staff. Patient was determined to be capable of making their own medical decisions and informed consent was obtained. Using a sharp curette a surgical debridement was performed down to and including subcutaneous tissue of <20 cm. Hemostasis was achieved with pressure. The patient tolerated the procedure well.    VANIA AGUILAR PA-C

## 2021-01-28 ENCOUNTER — ANCILLARY PROCEDURE (OUTPATIENT)
Dept: CT IMAGING | Facility: CLINIC | Age: 26
End: 2021-01-28
Attending: INTERNAL MEDICINE
Payer: MEDICARE

## 2021-01-28 DIAGNOSIS — J98.4 RESTRICTIVE LUNG DISEASE: ICD-10-CM

## 2021-01-28 DIAGNOSIS — F12.90 MARIJUANA SMOKER: ICD-10-CM

## 2021-01-28 DIAGNOSIS — Z78.9 ELECTRONIC CIGARETTE USE: ICD-10-CM

## 2021-01-28 DIAGNOSIS — J18.9 FREQUENT EPISODES OF PNEUMONIA: ICD-10-CM

## 2021-01-28 PROCEDURE — 71250 CT THORAX DX C-: CPT | Mod: MG | Performed by: RADIOLOGY

## 2021-01-28 PROCEDURE — G1004 CDSM NDSC: HCPCS | Mod: GC | Performed by: RADIOLOGY

## 2021-01-29 DIAGNOSIS — Z11.59 ENCOUNTER FOR SCREENING FOR OTHER VIRAL DISEASES: ICD-10-CM

## 2021-01-29 LAB
SARS-COV-2 RNA RESP QL NAA+PROBE: NORMAL
SPECIMEN SOURCE: NORMAL

## 2021-01-29 PROCEDURE — U0003 INFECTIOUS AGENT DETECTION BY NUCLEIC ACID (DNA OR RNA); SEVERE ACUTE RESPIRATORY SYNDROME CORONAVIRUS 2 (SARS-COV-2) (CORONAVIRUS DISEASE [COVID-19]), AMPLIFIED PROBE TECHNIQUE, MAKING USE OF HIGH THROUGHPUT TECHNOLOGIES AS DESCRIBED BY CMS-2020-01-R: HCPCS | Performed by: OBSTETRICS & GYNECOLOGY

## 2021-01-29 PROCEDURE — U0005 INFEC AGEN DETEC AMPLI PROBE: HCPCS | Performed by: OBSTETRICS & GYNECOLOGY

## 2021-01-30 LAB
LABORATORY COMMENT REPORT: NORMAL
SARS-COV-2 RNA RESP QL NAA+PROBE: NEGATIVE
SPECIMEN SOURCE: NORMAL

## 2021-02-01 ENCOUNTER — ANESTHESIA EVENT (OUTPATIENT)
Dept: SURGERY | Facility: CLINIC | Age: 26
End: 2021-02-01
Payer: MEDICARE

## 2021-02-02 ENCOUNTER — HOSPITAL ENCOUNTER (OUTPATIENT)
Facility: CLINIC | Age: 26
Discharge: HOME | End: 2021-02-02
Attending: OBSTETRICS & GYNECOLOGY | Admitting: OBSTETRICS & GYNECOLOGY
Payer: MEDICARE

## 2021-02-02 ENCOUNTER — ANESTHESIA (OUTPATIENT)
Dept: SURGERY | Facility: CLINIC | Age: 26
End: 2021-02-02
Payer: MEDICARE

## 2021-02-02 VITALS
SYSTOLIC BLOOD PRESSURE: 115 MMHG | HEART RATE: 66 BPM | DIASTOLIC BLOOD PRESSURE: 71 MMHG | BODY MASS INDEX: 20.12 KG/M2 | OXYGEN SATURATION: 94 % | RESPIRATION RATE: 16 BRPM | TEMPERATURE: 97.9 F | HEIGHT: 66 IN | WEIGHT: 125.22 LBS

## 2021-02-02 DIAGNOSIS — D06.9 CIN III WITH SEVERE DYSPLASIA: ICD-10-CM

## 2021-02-02 DIAGNOSIS — D06.9 CIN III WITH SEVERE DYSPLASIA: Primary | ICD-10-CM

## 2021-02-02 LAB
ABO + RH BLD: NORMAL
ABO + RH BLD: NORMAL
B-HCG SERPL-ACNC: <1 IU/L (ref 0–5)
BLD GP AB SCN SERPL QL: NORMAL
BLOOD BANK CMNT PATIENT-IMP: NORMAL
GLUCOSE BLDC GLUCOMTR-MCNC: 102 MG/DL (ref 70–99)
HGB BLD-MCNC: 13.9 G/DL (ref 11.7–15.7)
SPECIMEN EXP DATE BLD: NORMAL

## 2021-02-02 PROCEDURE — 710N000012 HC RECOVERY PHASE 2, PER MINUTE: Performed by: OBSTETRICS & GYNECOLOGY

## 2021-02-02 PROCEDURE — 84702 CHORIONIC GONADOTROPIN TEST: CPT | Mod: GZ | Performed by: OBSTETRICS & GYNECOLOGY

## 2021-02-02 PROCEDURE — 360N000075 HC SURGERY LEVEL 2, PER MIN: Performed by: OBSTETRICS & GYNECOLOGY

## 2021-02-02 PROCEDURE — 86901 BLOOD TYPING SEROLOGIC RH(D): CPT | Performed by: OBSTETRICS & GYNECOLOGY

## 2021-02-02 PROCEDURE — 258N000003 HC RX IP 258 OP 636: Performed by: NURSE ANESTHETIST, CERTIFIED REGISTERED

## 2021-02-02 PROCEDURE — 999N001017 HC STATISTIC GLUCOSE BY METER IP

## 2021-02-02 PROCEDURE — 250N000011 HC RX IP 250 OP 636: Performed by: NURSE ANESTHETIST, CERTIFIED REGISTERED

## 2021-02-02 PROCEDURE — 88307 TISSUE EXAM BY PATHOLOGIST: CPT | Mod: TC | Performed by: OBSTETRICS & GYNECOLOGY

## 2021-02-02 PROCEDURE — 250N000011 HC RX IP 250 OP 636: Performed by: OBSTETRICS & GYNECOLOGY

## 2021-02-02 PROCEDURE — 250N000009 HC RX 250: Performed by: NURSE ANESTHETIST, CERTIFIED REGISTERED

## 2021-02-02 PROCEDURE — 86900 BLOOD TYPING SEROLOGIC ABO: CPT | Performed by: OBSTETRICS & GYNECOLOGY

## 2021-02-02 PROCEDURE — 88305 TISSUE EXAM BY PATHOLOGIST: CPT | Mod: TC | Performed by: OBSTETRICS & GYNECOLOGY

## 2021-02-02 PROCEDURE — 370N000017 HC ANESTHESIA TECHNICAL FEE, PER MIN: Performed by: OBSTETRICS & GYNECOLOGY

## 2021-02-02 PROCEDURE — 999N000141 HC STATISTIC PRE-PROCEDURE NURSING ASSESSMENT: Performed by: OBSTETRICS & GYNECOLOGY

## 2021-02-02 PROCEDURE — 88305 TISSUE EXAM BY PATHOLOGIST: CPT | Mod: 26 | Performed by: PATHOLOGY

## 2021-02-02 PROCEDURE — 250N000009 HC RX 250: Performed by: OBSTETRICS & GYNECOLOGY

## 2021-02-02 PROCEDURE — 85018 HEMOGLOBIN: CPT | Performed by: OBSTETRICS & GYNECOLOGY

## 2021-02-02 PROCEDURE — 86850 RBC ANTIBODY SCREEN: CPT | Performed by: OBSTETRICS & GYNECOLOGY

## 2021-02-02 PROCEDURE — 272N000001 HC OR GENERAL SUPPLY STERILE: Performed by: OBSTETRICS & GYNECOLOGY

## 2021-02-02 PROCEDURE — 36415 COLL VENOUS BLD VENIPUNCTURE: CPT | Performed by: OBSTETRICS & GYNECOLOGY

## 2021-02-02 PROCEDURE — 88307 TISSUE EXAM BY PATHOLOGIST: CPT | Mod: 26 | Performed by: PATHOLOGY

## 2021-02-02 DEVICE — IUD CONTRACEPTIVE DEVICE MIRENA 50419-4230-01: Type: IMPLANTABLE DEVICE | Site: UTERUS | Status: FUNCTIONAL

## 2021-02-02 RX ORDER — PROPOFOL 10 MG/ML
INJECTION, EMULSION INTRAVENOUS CONTINUOUS PRN
Status: DISCONTINUED | OUTPATIENT
Start: 2021-02-02 | End: 2021-02-02

## 2021-02-02 RX ORDER — ACETIC ACID 100 %
LIQUID (ML) MISCELLANEOUS PRN
Status: DISCONTINUED | OUTPATIENT
Start: 2021-02-02 | End: 2021-02-02 | Stop reason: HOSPADM

## 2021-02-02 RX ORDER — NALOXONE HYDROCHLORIDE 0.4 MG/ML
0.2 INJECTION, SOLUTION INTRAMUSCULAR; INTRAVENOUS; SUBCUTANEOUS
Status: CANCELLED | OUTPATIENT
Start: 2021-02-02 | End: 2021-02-03

## 2021-02-02 RX ORDER — SODIUM CHLORIDE, SODIUM LACTATE, POTASSIUM CHLORIDE, CALCIUM CHLORIDE 600; 310; 30; 20 MG/100ML; MG/100ML; MG/100ML; MG/100ML
INJECTION, SOLUTION INTRAVENOUS CONTINUOUS
Status: DISCONTINUED | OUTPATIENT
Start: 2021-02-02 | End: 2021-02-02 | Stop reason: HOSPADM

## 2021-02-02 RX ORDER — FENTANYL CITRATE 50 UG/ML
25-50 INJECTION, SOLUTION INTRAMUSCULAR; INTRAVENOUS
Status: CANCELLED | OUTPATIENT
Start: 2021-02-02

## 2021-02-02 RX ORDER — ONDANSETRON 2 MG/ML
4 INJECTION INTRAMUSCULAR; INTRAVENOUS EVERY 30 MIN PRN
Status: DISCONTINUED | OUTPATIENT
Start: 2021-02-02 | End: 2021-02-02 | Stop reason: HOSPADM

## 2021-02-02 RX ORDER — NALOXONE HYDROCHLORIDE 0.4 MG/ML
0.4 INJECTION, SOLUTION INTRAMUSCULAR; INTRAVENOUS; SUBCUTANEOUS
Status: CANCELLED | OUTPATIENT
Start: 2021-02-02 | End: 2021-02-03

## 2021-02-02 RX ORDER — NALOXONE HYDROCHLORIDE 0.4 MG/ML
0.2 INJECTION, SOLUTION INTRAMUSCULAR; INTRAVENOUS; SUBCUTANEOUS
Status: DISCONTINUED | OUTPATIENT
Start: 2021-02-02 | End: 2021-02-02 | Stop reason: HOSPADM

## 2021-02-02 RX ORDER — NALOXONE HYDROCHLORIDE 0.4 MG/ML
0.4 INJECTION, SOLUTION INTRAMUSCULAR; INTRAVENOUS; SUBCUTANEOUS
Status: DISCONTINUED | OUTPATIENT
Start: 2021-02-02 | End: 2021-02-02 | Stop reason: HOSPADM

## 2021-02-02 RX ORDER — HYDROMORPHONE HYDROCHLORIDE 1 MG/ML
.3-.5 INJECTION, SOLUTION INTRAMUSCULAR; INTRAVENOUS; SUBCUTANEOUS EVERY 10 MIN PRN
Status: DISCONTINUED | OUTPATIENT
Start: 2021-02-02 | End: 2021-02-02 | Stop reason: HOSPADM

## 2021-02-02 RX ORDER — FENTANYL CITRATE 50 UG/ML
25-50 INJECTION, SOLUTION INTRAMUSCULAR; INTRAVENOUS
Status: DISCONTINUED | OUTPATIENT
Start: 2021-02-02 | End: 2021-02-02 | Stop reason: HOSPADM

## 2021-02-02 RX ORDER — ONDANSETRON 2 MG/ML
4 INJECTION INTRAMUSCULAR; INTRAVENOUS EVERY 30 MIN PRN
Status: CANCELLED | OUTPATIENT
Start: 2021-02-02

## 2021-02-02 RX ORDER — ACETAMINOPHEN 325 MG/1
650 TABLET ORAL EVERY 4 HOURS PRN
Qty: 50 TABLET | Refills: 0 | Status: SHIPPED | OUTPATIENT
Start: 2021-02-02 | End: 2021-07-20

## 2021-02-02 RX ORDER — ONDANSETRON 4 MG/1
4 TABLET, ORALLY DISINTEGRATING ORAL EVERY 30 MIN PRN
Status: DISCONTINUED | OUTPATIENT
Start: 2021-02-02 | End: 2021-02-02 | Stop reason: HOSPADM

## 2021-02-02 RX ORDER — LIDOCAINE HYDROCHLORIDE AND EPINEPHRINE 10; 10 MG/ML; UG/ML
INJECTION, SOLUTION INFILTRATION; PERINEURAL PRN
Status: DISCONTINUED | OUTPATIENT
Start: 2021-02-02 | End: 2021-02-02 | Stop reason: HOSPADM

## 2021-02-02 RX ORDER — ONDANSETRON 4 MG/1
4 TABLET, ORALLY DISINTEGRATING ORAL EVERY 30 MIN PRN
Status: CANCELLED | OUTPATIENT
Start: 2021-02-02

## 2021-02-02 RX ORDER — LIDOCAINE HYDROCHLORIDE 20 MG/ML
INJECTION, SOLUTION INFILTRATION; PERINEURAL PRN
Status: DISCONTINUED | OUTPATIENT
Start: 2021-02-02 | End: 2021-02-02

## 2021-02-02 RX ORDER — MEPERIDINE HYDROCHLORIDE 25 MG/ML
12.5 INJECTION INTRAMUSCULAR; INTRAVENOUS; SUBCUTANEOUS
Status: DISCONTINUED | OUTPATIENT
Start: 2021-02-02 | End: 2021-02-02 | Stop reason: HOSPADM

## 2021-02-02 RX ORDER — SODIUM CHLORIDE, SODIUM LACTATE, POTASSIUM CHLORIDE, CALCIUM CHLORIDE 600; 310; 30; 20 MG/100ML; MG/100ML; MG/100ML; MG/100ML
INJECTION, SOLUTION INTRAVENOUS CONTINUOUS
Status: CANCELLED | OUTPATIENT
Start: 2021-02-02

## 2021-02-02 RX ORDER — SODIUM CHLORIDE, SODIUM LACTATE, POTASSIUM CHLORIDE, CALCIUM CHLORIDE 600; 310; 30; 20 MG/100ML; MG/100ML; MG/100ML; MG/100ML
INJECTION, SOLUTION INTRAVENOUS CONTINUOUS PRN
Status: DISCONTINUED | OUTPATIENT
Start: 2021-02-02 | End: 2021-02-02

## 2021-02-02 RX ORDER — PROPOFOL 10 MG/ML
INJECTION, EMULSION INTRAVENOUS PRN
Status: DISCONTINUED | OUTPATIENT
Start: 2021-02-02 | End: 2021-02-02

## 2021-02-02 RX ORDER — ONDANSETRON 4 MG/1
4-8 TABLET, ORALLY DISINTEGRATING ORAL EVERY 8 HOURS PRN
Qty: 4 TABLET | Refills: 0 | Status: SHIPPED | OUTPATIENT
Start: 2021-02-02 | End: 2022-04-08

## 2021-02-02 RX ORDER — IBUPROFEN 600 MG/1
600 TABLET, FILM COATED ORAL EVERY 6 HOURS PRN
Qty: 30 TABLET | Refills: 0 | Status: SHIPPED | OUTPATIENT
Start: 2021-02-02 | End: 2022-04-08

## 2021-02-02 RX ORDER — AMOXICILLIN 250 MG
1-2 CAPSULE ORAL 2 TIMES DAILY
Qty: 30 TABLET | Refills: 0 | Status: SHIPPED | OUTPATIENT
Start: 2021-02-02 | End: 2021-11-08

## 2021-02-02 RX ORDER — IBUPROFEN 600 MG/1
600 TABLET, FILM COATED ORAL
Status: DISCONTINUED | OUTPATIENT
Start: 2021-02-02 | End: 2021-02-02 | Stop reason: HOSPADM

## 2021-02-02 RX ORDER — FENTANYL CITRATE 50 UG/ML
INJECTION, SOLUTION INTRAMUSCULAR; INTRAVENOUS PRN
Status: DISCONTINUED | OUTPATIENT
Start: 2021-02-02 | End: 2021-02-02

## 2021-02-02 RX ORDER — ONDANSETRON 2 MG/ML
INJECTION INTRAMUSCULAR; INTRAVENOUS PRN
Status: DISCONTINUED | OUTPATIENT
Start: 2021-02-02 | End: 2021-02-02

## 2021-02-02 RX ADMIN — PROPOFOL 50 MCG/KG/MIN: 10 INJECTION, EMULSION INTRAVENOUS at 10:24

## 2021-02-02 RX ADMIN — ONDANSETRON 4 MG: 2 INJECTION INTRAMUSCULAR; INTRAVENOUS at 10:24

## 2021-02-02 RX ADMIN — FENTANYL CITRATE 50 MCG: 50 INJECTION, SOLUTION INTRAMUSCULAR; INTRAVENOUS at 10:46

## 2021-02-02 RX ADMIN — LIDOCAINE HYDROCHLORIDE 40 MG: 20 INJECTION, SOLUTION INFILTRATION; PERINEURAL at 10:24

## 2021-02-02 RX ADMIN — PROPOFOL 30 MG: 10 INJECTION, EMULSION INTRAVENOUS at 10:24

## 2021-02-02 RX ADMIN — MIDAZOLAM 1 MG: 1 INJECTION INTRAMUSCULAR; INTRAVENOUS at 10:20

## 2021-02-02 RX ADMIN — PROPOFOL 20 MG: 10 INJECTION, EMULSION INTRAVENOUS at 10:27

## 2021-02-02 RX ADMIN — FENTANYL CITRATE 25 MCG: 50 INJECTION, SOLUTION INTRAMUSCULAR; INTRAVENOUS at 10:38

## 2021-02-02 RX ADMIN — PROPOFOL 50 MG: 10 INJECTION, EMULSION INTRAVENOUS at 10:38

## 2021-02-02 RX ADMIN — SODIUM CHLORIDE, POTASSIUM CHLORIDE, SODIUM LACTATE AND CALCIUM CHLORIDE: 600; 310; 30; 20 INJECTION, SOLUTION INTRAVENOUS at 10:16

## 2021-02-02 RX ADMIN — FENTANYL CITRATE 25 MCG: 50 INJECTION, SOLUTION INTRAMUSCULAR; INTRAVENOUS at 10:24

## 2021-02-02 RX ADMIN — MIDAZOLAM 1 MG: 1 INJECTION INTRAMUSCULAR; INTRAVENOUS at 10:16

## 2021-02-02 ASSESSMENT — MIFFLIN-ST. JEOR: SCORE: 1319.75

## 2021-02-02 ASSESSMENT — LIFESTYLE VARIABLES: TOBACCO_USE: 1

## 2021-02-02 NOTE — OP NOTE
[facility]  Operative Note    Date of service: 2021    Preoperative diagnosis:  YONI III with severe dysplasia  Postoperative diagnosis:  Same    Procedure:  LEEP with mirena IUD removal and replacement    Surgeon:  Dr. Lock    Assistant:    - Jodi Whitman MD PGY 1    Anesthesia:  MAC and local paracervical block  EBL:  10mL  IVF:  500mL  UOP:  Chronic indwelling rogers, 400 clear yellow urine in bag at end of case.    Specimens: Cervical LEEP specimen, ECC    Complications: none    Findings:  Exam under anesthesia revealed normal cervix, anteverted uterus, no adnexal masses. Entire transformation zone visualized easily without need for colposcopy or Lugol's solution.    Indications:  Dianna Cottrell is a 25 year old  history of positive HPV 16 and other HR HPV who underwent a colposcopy on  with cervical biopsy that showed YONI 2-3. Recommendation for LEEP procedure to remove abnormal cells.  UPT negative.  Risks, benefits, and alternatives to the procedure were discussed with the patient who elected to proceed. All questions were answered and an informed consent was obtained.     Procedure:   The patient was taken to the operating room where she underwent MAC anesthesia without difficulty.  She was placed in a dorsal lithotomy position using Demian stirrups.   A medium graves speculum was inserted into the vagina. IUD strings seen and grasped.  With gentle traction, IUD removed and discarded.  Intracervical block with a total of 20cc 1% lidocaine with epinephrine was injected into the cervicovaginal junction at the 4 and 8 o'clock positions. A LOOP device was used to collect the above cervical specimen. Endocervical curettage was used for ECC sample. A roller ball was used to cauterize the cervical bed. The uterus sounded to 6 cm. The Mirena IUD insertion apparatus was prepared and placed through the cervix without significant resistance and deployed at the fundus in the usual fashion. The strings  were trimmed 3 cm from the external os.  The tenaculum was removed from the cervix and the tenaculum site made hemostatic with pressure. Further hemostasis was assured by applying direct pressure with Monsel's solution.  The cervical bed was noted to be hemostatic. All instruments were then removed.The patient was repositioned to the supine position.  The patient tolerated the procedure well and was taken to the recovery room in stable condition.  Dr. Lock was scrubbed and present for the entire procedure.    Mirena IUD Lot#: [id number]  Exp: Mar 2023      Jodi Whitman MD  ObGyn Resident, PGY1  2/2/2021 , 11:18 AM    Staff:  I was scrubbed and present for entire case and agree w/ above note.    Carlotta Lock MD

## 2021-02-02 NOTE — ANESTHESIA PREPROCEDURE EVALUATION
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]   Procedure : Procedure(s):  CONE BIOPSY, CERVIX, USING LOOP ELECTROSURGICAL EXCISION PROCEDURE (LEEP) and ECC  INSERTION, INTRAUTERINE DEVICE , possible replacement of Mirena Intrauterine device     Past Medical History:   Diagnosis Date     Anemia     with pregnancy     c5 burst fracture 2012    C5-C7 fracture with cord injury     Compression fracture of L1 lumbar vertebra (H) 2012    L1 superior endplate compression fracture     Encounter for insertion of mirena IUD 2017     Fracture of thoracic spine without spinal cord lesion (H) 2012    T3-T8 spinous process fractures     Hypertension 2016     Vocal cord dysfunction     Left vocal cord weakness noted by ENT post extubation 2012      Past Surgical History:   Procedure Laterality Date     C4-C7 interbody fusion with anterior screw and plate fixation and posterior erna and pedicle screw fixation with interspace bone graft and C5 and C6 partial corpectomies  2012      SECTION  2013    Procedure:  SECTION;   Section ;  Surgeon: Ricki Nelson MD;  Location: [facility]      SECTION N/A 2016    Procedure:  SECTION;  Surgeon: Floridalma Kiran MD;  [facility]: [facility]     IR IVC FILTER PLACEMENT  2012     IRRIGATION AND DEBRIDEMENT BUTTOCKS Right 2020    Procedure: Sharp excisional debridement of right ischial tuberosity decubitus,   Bone biopsies for cultures and path,  VAC Via placement.;  Surgeon: Vira Zacarias MD;  Location: [facility]     LUMBAR DRAIN  2012      Allergies   Allergen Reactions     Succinylcholine      Spinal cord injury 12, patient at risk for extrajunctional receptors and hyperkalemia      Social History     Tobacco Use     Smoking status: Current Every Day Smoker      Packs/day: 0.50     Types: Cigarettes, Vaping Device     Smokeless tobacco: Never Used     Tobacco comment: used 1/2-1 ppd for 4 years, quit 2012, now daily e cig use.    Substance Use Topics     Alcohol use: No     Alcohol/week: 0.0 standard drinks     Frequency: Never      Wt Readings from Last 1 Encounters:   02/02/21 56.8 kg (125 lb 3.5 oz)        Anesthesia Evaluation            ROS/MED HX  ENT/Pulmonary:     (+) tobacco use,     Neurologic:     (+) Spinal cord injury,     Cardiovascular:     (+) hypertension-----    METS/Exercise Tolerance:     Hematologic:       Musculoskeletal:       GI/Hepatic:       Renal/Genitourinary:       Endo:       Psychiatric/Substance Use:       Infectious Disease:       Malignancy:       Other:            Physical Exam    Airway  airway exam normal      Mallampati: II   TM distance: > 3 FB   Neck ROM: full   Mouth opening: > 3 cm    Respiratory Devices and Support         Dental  no notable dental history         Cardiovascular   cardiovascular exam normal          Pulmonary   pulmonary exam normal                OUTSIDE LABS:  CBC:   Lab Results   Component Value Date    WBC 10.4 11/16/2020    WBC 10.1 11/11/2020    HGB 13.9 02/02/2021    HGB 12.9 11/16/2020    HCT 39.6 11/16/2020    HCT 36.4 11/11/2020     11/16/2020     11/11/2020     BMP:   Lab Results   Component Value Date     12/08/2016     12/07/2016    POTASSIUM 3.4 (L) 02/06/2018    POTASSIUM 3.8 12/08/2016    CHLORIDE 103 12/08/2016    CHLORIDE 103 12/07/2016    CO2 28 12/08/2016    CO2 30 12/07/2016    BUN 6 (L) 11/16/2020    BUN 6 (L) 11/11/2020    CR 0.39 (L) 11/16/2020    CR 0.45 (L) 11/11/2020     (H) 02/06/2018    GLC 88 12/08/2016     COAGS: No results found for: PTT, INR, FIBR  POC:   Lab Results   Component Value Date     (H) 02/02/2021    HCG Negative 12/12/2019    HCGS Negative 05/31/2019     HEPATIC:   Lab Results   Component Value Date    ALBUMIN 3.6 06/22/2016    ALT  25 12/06/2016    AST 20 11/16/2020     OTHER:   Lab Results   Component Value Date    LACT 0.9 12/08/2016    LOR 8.6 12/08/2016    TSH 1.01 12/07/2016    T4 1.17 09/29/2016    CRP 17.6 (H) 01/20/2021    SED 48 (H) 01/20/2021       Anesthesia Plan     History & Physical Review      ASA Status:  3. NPO Status:  NPO Appropriate. .  Plan for MAC Reason for MAC:  Difficulty with conscious sedation (QS).            Consents        Postoperative Care            Christen Avery MD

## 2021-02-02 NOTE — DISCHARGE INSTRUCTIONS
[facility]   Adult Discharge Orders & Instructions     For 24 hours after surgery    1. Get plenty of rest.  A responsible adult must stay with you for at least 24 hours after you leave the hospital.   2. Do not drive or use heavy equipment.  If you have weakness or tingling, don't drive or use heavy equipment until this feeling goes away.  3. Do not drink alcohol.  4. Avoid strenuous or risky activities.  Ask for help when climbing stairs.   5. You may feel lightheaded.  IF so, sit for a few minutes before standing.  Have someone help you get up.   6. If you have nausea (feel sick to your stomach): Drink only clear liquids such as apple juice, ginger ale, broth or 7-Up.  Rest may also help.  Be sure to drink enough fluids.  Move to a regular diet as you feel able.  7. You may have a slight fever. Call the doctor if your fever is over 100 F (37.7 C) (taken under the tongue) or lasts longer than 24 hours.  8. You may have a dry mouth, a sore throat, muscle aches or trouble sleeping.  These should go away after 24 hours.  9. Do not make important or legal decisions.   Call your doctor for any of the followin.  Signs of infection (fever, growing tenderness at the surgery site, a large amount of drainage or bleeding, severe pain, foul-smelling drainage, redness, swelling).    2. It has been over 8 to 10 hours since surgery and you are still not able to urinate (pass water).    3.  Headache for over 24 hours.    4.  Numbness, tingling or weakness the day after surgery (if you had spinal anesthesia).  To contact a doctor, call ________________________________________

## 2021-02-02 NOTE — ANESTHESIA POSTPROCEDURE EVALUATION
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    Anesthesia Type:  MAC    Note:  Disposition: Outpatient   Postop Pain Control: Uneventful            Sign Out: Well controlled pain   PONV:    Neuro/Psych: Uneventful            Sign Out: Acceptable/Baseline neuro status   Airway/Respiratory: Uneventful            Sign Out: Acceptable/Baseline resp. status   CV/Hemodynamics: Uneventful            Sign Out: Acceptable CV status   Other NRE:    DID A NON-ROUTINE EVENT OCCUR?          Last vitals:  Vitals:    02/02/21 0802 02/02/21 1114 02/02/21 1130   BP: 107/72 98/57 95/54   Pulse: 82 90 77   Resp: 16 18 20   Temp: 36.9  C (98.4  F) 36.6  C (97.9  F)    SpO2: 94% 93% 92%       Electronically Signed By: Christen Avery MD  February 2, 2021  12:05 PM

## 2021-02-02 NOTE — ANESTHESIA CARE TRANSFER NOTE
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    Anesthesia Type:   MAC     Note:    Oropharynx: oropharynx clear of all foreign objects  Level of Consciousness: awake and drowsy  Oxygen Supplementation: room air    Independent Airway: airway patency satisfactory and stable  Dentition: dentition unchanged  Vital Signs Stable: post-procedure vital signs reviewed and stable  Report to RN Given: handoff report given  Patient transferred to: Phase II    Handoff Report: Identifed the Patient, Identified the Reponsible Provider, Reviewed the pertinent medical history, Discussed the surgical course, Reviewed Intra-OP anesthesia mangement and issues during anesthesia, Set expectations for post-procedure period and Allowed opportunity for questions and acknowledgement of understanding      Vitals: (Last set prior to Anesthesia Care Transfer)  CRNA VITALS  2/2/2021 1041 - 2/2/2021 1121      2/2/2021             NIBP:  98/57    Pulse:  93    NIBP Mean:  68    Temp:  36.6  C (97.9  F)    SpO2:  94 %    Resp Rate (observed):  16        Electronically Signed By: ODILIA Norris CRNA  February 2, 2021  11:21 AM

## 2021-02-05 LAB — COPATH REPORT: NORMAL

## 2021-02-11 ENCOUNTER — TELEPHONE (OUTPATIENT)
Dept: WOUND CARE | Facility: CLINIC | Age: 26
End: 2021-02-11

## 2021-02-11 NOTE — TELEPHONE ENCOUNTER
Dianna is getting many calls from [facility] saying they need clinical updates and she was wondering if we could help with that.

## 2021-02-11 NOTE — TELEPHONE ENCOUNTER
Called [facility] to inquire what documentation is needed. They are looking for wound care records from October until current. LDA's and progress notes from 10/2/20, 11/10/20, 12/17/20, 1/20/21 faxed to [facility] [phone] attention [place]. Message left for patient regarding above.

## 2021-02-17 ENCOUNTER — VIRTUAL VISIT (OUTPATIENT)
Dept: OBGYN | Facility: CLINIC | Age: 26
End: 2021-02-17
Attending: OBSTETRICS & GYNECOLOGY
Payer: MEDICARE

## 2021-02-17 DIAGNOSIS — D06.9 CIN III WITH SEVERE DYSPLASIA: Primary | ICD-10-CM

## 2021-02-17 PROCEDURE — 99024 POSTOP FOLLOW-UP VISIT: CPT | Mod: 95 | Performed by: OBSTETRICS & GYNECOLOGY

## 2021-02-17 NOTE — PROGRESS NOTES
Sylvester is a 25 year old who is being evaluated via a billable telephone visit.      What phone number would you like to be contacted at? Home number  How would you like to obtain your AVS? 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 Leep specimen results discussed with patient.  Negative margins seen.  With neg ECC.     Labs:   Hemoglobin   Date Value Ref Range Status   2021 13.9 11.7 - 15.7 g/dL Final       Pathology:   Copath Report   Date Value Ref Range Status   2021   Final    Patient Name: SYLVESTER COTTRELL  MR#: [id number]  Specimen #:   Collected: 2021  Received: 2021  Reported: 2021 13:19  Ordering Phy(s): YESSICA SCHUSTER    For improved result formatting, select 'View Enhanced Report Format' under   Linked Documents section.    SPECIMEN(S):  A: Endocervical curettings  B: Cervical biopsy, stitch at 12 o'clock  C: Cervical biopsy, stitch at 6 o'clock    FINAL DIAGNOSIS:  A. ENDOCERVIX, CURETTAGE:  - Fragments of unremarkable endocervical glands; negative for dysplasia    B. CERVIX, STITCH AT 12 O'CLOCK, LEEP:  - High grade squamous intraepithelial lesion (cervical intraepithelial   neoplasia 3), extending into  endocervical glands  - Both ecto-and endocervical margins are negative for dysplasia    C. CERVIX, STITCH AT 6 O'CLOCK, LEEP:  - Unremarkable cervical squamous epithelium; negative for dysplasia    I have personally reviewed all specimens and/or slides, including the   listed special stains, and used them  with my medical judgement to determine or confirm the final diagnosis.    Electronically signed out by:    Candido Figueroa M.D., PhD, Physicians    CLINICAL HISTORY:  25 year-old with a high grade dysplasia on cervical biopsy.    GROSS:  A: The specimen is received in formalin with proper patient  "  identification, labeled \"endocervical curettings\".   The specimen consists of a 1.2 x 1.0 x 0.2 cm aggregate of tan- red soft   tissue.  It is wrapped and entirely  submitted in cassette A1.    B: The specimen is received in formalin with proper patient   identification, labeled \"cervix biopsy stitch at  12:00\".  The specimen consists of a 2.0 x 0.8 cm oriented rectangular   shaped segment of firm ectocervix,  excised to a maximum depth of 1.2 cm.  A suture is present designating the   12:00 aspect.  The specimen is  inked: Deep endocervical margin black, lateral margins blue.  The   ectocervix reveals a 0.7 cm slitlike os.  No  gross lesions are present.  The specimen is entirely submitted.    Summary of Sections:  B1 - 12:00 to 3:00  B2 - 3:00 to 6:00  B3 - 6:00 to 9:00  B4 - 9:00 to 12:00    C: The specimen is received in formalin with proper patient   identification, labeled \"cervix biopsy stitch at  6:00\".  The specimen consists of a 1.2 x 0.6 cm unoriented segment of firm   ectocervix, excised to a maximum  depth of 0.5 cm.  No stitches are present on the specimen.  The margin is   inked black.  It is trisected and  entirely submitted in cassette C1. (Dictated by: Cherie JOE [facility]   2/3/2021 11:17 AM)    MICROSCOPIC:  Microscopic examination was performed.    The technical component of this testing was completed at the [facility], with the professional component performed   at the [facility], [address] [place] [phone] ([phone])    CPT Codes:  A: 27885-RC3  B: 35431-ZW2  C: 51326-BX1    COLLECTION SITE:  Client: [facility]  Location: [facility] (B)       ]    A: 25 year old female PO after LEEP for YONI 3.  Doing well, no concerns.      P:   F/u pap/HPV testing at 6 months.  Pt will call to schedule.    Carlotta Lock MD, " PERCY  (she/her/hers)    Department of Ob/Gyn/Women's Health  [facility]  [place] [facility] Building  [address] [place] [address]  [email or web address]  keith [phone]  fJason [phone]  2/17/2021  2:42 PM

## 2021-02-17 NOTE — LETTER
2021       RE: Dianna Cottrell  [address] [address]     Dear Colleague,    Thank you for referring your patient, Dianna Cottrell, to the [facility] [place] at [facility]. Please see a copy of my visit note below.    Dianna is a 25 year old who is being evaluated via a billable telephone visit.      What phone number would you like to be contacted at? Home number  How would you like to obtain your AVS? 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 Leep specimen results discussed with patient.  Negative margins seen.  With neg ECC.     Labs:   Hemoglobin   Date Value Ref Range Status   2021 13.9 11.7 - 15.7 g/dL Final       Pathology:   Copath Report   Date Value Ref Range Status   2021   Final    Patient Name: DIANNA COTTRELL  MR#: [id number]  Specimen #:   Collected: 2021  Received: 2021  Reported: 2021 13:19  Ordering Phy(s): YESSICA SCHUSTER    For improved result formatting, select 'View Enhanced Report Format' under   Linked Documents section.    SPECIMEN(S):  A: Endocervical curettings  B: Cervical biopsy, stitch at 12 o'clock  C: Cervical biopsy, stitch at 6 o'clock    FINAL DIAGNOSIS:  A. ENDOCERVIX, CURETTAGE:  - Fragments of unremarkable endocervical glands; negative for dysplasia    B. CERVIX, STITCH AT 12 O'CLOCK, LEEP:  - High grade squamous intraepithelial lesion (cervical intraepithelial   neoplasia 3), extending into  endocervical glands  - Both ecto-and endocervical margins are negative for dysplasia    C. CERVIX, STITCH AT 6 O'CLOCK, LEEP:  - Unremarkable cervical squamous epithelium; negative for dysplasia    I have personally reviewed all specimens and/or slides, including the   listed special  "stains, and used them  with my medical judgement to determine or confirm the final diagnosis.    Electronically signed out by:    Candido Figueroa M.D., PhD, Physians    CLINICAL HISTORY:  25 year-old with a high grade dysplasia on cervical biopsy.    GROSS:  A: The specimen is received in formalin with proper patient   identification, labeled \"endocervical curettings\".   The specimen consists of a 1.2 x 1.0 x 0.2 cm aggregate of tan- red soft   tissue.  It is wrapped and entirely  submitted in cassette A1.    B: The specimen is received in formalin with proper patient   identification, labeled \"cervix biopsy stitch at  12:00\".  The specimen consists of a 2.0 x 0.8 cm oriented rectangular   shaped segment of firm ectocervix,  excised to a maximum depth of 1.2 cm.  A suture is present designating the   12:00 aspect.  The specimen is  inked: Deep endocervical margin black, lateral margins blue.  The   ectocervix reveals a 0.7 cm slitlike os.  No  gross lesions are present.  The specimen is entirely submitted.    Summary of Sections:  B1 - 12:00 to 3:00  B2 - 3:00 to 6:00  B3 - 6:00 to 9:00  B4 - 9:00 to 12:00    C: The specimen is received in formalin with proper patient   identification, labeled \"cervix biopsy stitch at  6:00\".  The specimen consists of a 1.2 x 0.6 cm unoriented segment of firm   ectocervix, excised to a maximum  depth of 0.5 cm.  No stitches are present on the specimen.  The margin is   inked black.  It is trisected and  entirely submitted in cassette C1. (Dictated by: Cherie JOE [facility]   2/3/2021 11:17 AM)    MICROSCOPIC:  Microscopic examination was performed.    The technical component of this testing was completed [facility], with the professional component performed   at the [facility], [address] [place] [phone] ([phone])    CPT Codes:  A: " 46164-EE0  B: 58979-HT6  C: 90659-LA9    COLLECTION SITE:  Client: [facility] [place]  Location: [facility] (B)       ]    A: 25 year old female PO after LEEP for YONI 3.  Doing well, no concerns.      P:   F/u pap/HPV testing at 6 months.  Pt will call to schedule.    Carlotta Lock MD, FACOG  (she/her/hers)    Department of Ob/Gyn/Women's Health  [facility]  [place] [facility] Building  [address] [place] [address]. [place] [address]  [email or web address]  p. [phone]  f. [phone]  2/17/2021  2:42 PM

## 2021-02-18 ENCOUNTER — HOSPITAL ENCOUNTER (OUTPATIENT)
Dept: WOUND CARE | Facility: CLINIC | Age: 26
Discharge: HOME | End: 2021-02-18
Attending: SURGERY | Admitting: SURGERY
Payer: MEDICARE

## 2021-02-18 VITALS — SYSTOLIC BLOOD PRESSURE: 99 MMHG | TEMPERATURE: 97 F | DIASTOLIC BLOOD PRESSURE: 63 MMHG | HEART RATE: 85 BPM

## 2021-02-18 DIAGNOSIS — S14.109S QUADRIPLEGIA, POST-TRAUMATIC (H): Primary | Chronic | ICD-10-CM

## 2021-02-18 DIAGNOSIS — G82.50 QUADRIPLEGIA, POST-TRAUMATIC (H): Primary | Chronic | ICD-10-CM

## 2021-02-18 DIAGNOSIS — L89.314 DECUBITUS ULCER OF RIGHT ISCHIUM, STAGE 4 (H): ICD-10-CM

## 2021-02-18 DIAGNOSIS — E44.0 MODERATE PROTEIN-CALORIE MALNUTRITION (H): ICD-10-CM

## 2021-02-18 PROCEDURE — 97602 WOUND(S) CARE NON-SELECTIVE: CPT

## 2021-02-18 PROCEDURE — 99214 OFFICE O/P EST MOD 30 MIN: CPT | Performed by: SURGERY

## 2021-02-18 NOTE — PROGRESS NOTES
Visit Date:   02/18/2021      [facility]      This is a 25-year-old partial quadriplegic female who is here with her mom for followup of her chronic right ischial decubitus.  Currently, they have been using Vashe and the VAC, but there was a short period where they might have used just AMD gauze for a VAC holiday.  She had some problems with adonay-wound skin rashes.  She was also having some issues with drainage.  She saw our physician's assistant, Suzan Yi, on the 20th of last month and she was started on some vitamin D3.  At that point, her prealbumin was 23, which is pretty good and her sed rate and CRP were 48 and 17.6 respectively.  She has not noticed any significant issues, having resumed the VAC again.  She has been very careful with offloading, etc., but mom has some concerns that her current air mattress may be bottoming out.  It sounds like the mattress might be as new as 2019 from [employer], but pressure mapping of her mattress was done within the last 6 months by [employer].  I would recommend that they have Handi come out and evaluate the bed again to make sure that it is not per penetrating ongoing pressure in that area.        Actually, the wound itself is quite small as far as the skin opening is concerned.  There is some kind of callused macerated scar tissue around the opening, but then there is a fairly significant tunnel or sinus tract undermining at the 12 o'clock position actually.  The tissues appear to be clean, but the sinus tract is quite slow as far as making progress filling in.  It sounds like with the VAC, things might be a little too small now.  Adonay-wound is okay except for some skin tear from some tape today.        She is really not interested in having any kind of definitive surgery at this point until it is much safer for her to do rehab at a skilled nursing facility after risk of COVID has decreased.  In the meantime, we will continue with the Vashe.  I think  I would like to stop the VAC at this point and go to alternating Endoform AM and AMD gauze every 2 days.  We have tried a lot of other products including Cindy, Mesalt, Hydrofera Blue and IoPlex.  We will see how things go, however, see Suzan back next month and then I can see her the month after.  They will let us know if there are any other issues or concerns or changes in drainage or overall medical status prior to that.        Please see nursing notes for dimensions of wounds, pictures and vital signs.      Labs:   Recent Labs   Lab Test 21  0831 21  1438 20  1315 16  1430 16  1430   ALBUMIN  --   --   --   --  3.6   HGB 13.9  --  12.9   < >  --    WBC  --   --  10.4   < >  --    CRP  --  17.6* 6.6   < > 4.8    < > = values in this interval not displayed.     Nutrition requirements were discussed with patient today.  Vitals:  BP 99/63   Pulse 85   Temp 97  F (36.1  C)   LMP  (LMP Unknown)   Wound:   Wound (used by OP [facility] only) 10/14/19 1407 Right pressure injury (Active)   Thickness/Stage Stage 4 21 1327   Dressing Appearance intact 21 1200   Base granulating 21 1327   Periwound intact 21 1327   Periwound Temperature warm 21 1327   Length (cm) 0.7 21 1200   Width (cm) 0.7 21 1200   Depth (cm) 0.7 21 1200   Wound (cm^2) 0.49 cm^2 21 1200   Wound Volume (cm^3) 0.34 cm^3 21 1200   Wound healing % 72.78 21 1200   Undermining [Depth (cm)/Location] 12-12 Oclock/ 2.2 CM 21 1200   Drainage Characteristics/Odor serosanguineous 21 1200   Drainage Amount moderate 21 1200   Care, Wound non-select wound debridement performed 21 1327      Photo:        CRAIG THAYER MD             D: 2021   T: 2021   MT: DYLAN      Name:     SYLVESTER BLACKWELL   MRN:      [id number]        Account:      [id number]   :      1995           Visit Date:   2021      Document: [id number]

## 2021-02-18 NOTE — DISCHARGE INSTRUCTIONS
[facility]  [address] [address]    Call us at [phone] if you have any questions about your wounds, have redness or swelling around your wound, have a fever of 101 or greater or if you have any other problems or concerns. We answer the phone Monday through Friday 8 am to 4 pm, please leave a message as we check the voicemail frequently throughout the day.     [facility] fax [phone]    Dianna Cottrell      1995    Medications/supplements to aid in healin. 1 packet of Gael Supplement into your favorite beverage twice a day. Purchase at [facility] in [place] [address], Gift Card Combo or [employer]  2. Vitamin D3 10,000 iu per day  3. Vitamin C 2,000 mg daily  4. Methyl Folate 1000 mcg daily   5. Vitamin B 12 1000 mcg daily    A diet high in protein is important for wound healing, we recommend getting 90 grams of protein per day. Taking protein shakes or bars are a good way to get extra protein in your diet.  Other good sources of protein:  Pork 26g per 3 oz  Whey protein powder - 24g per scoop (on average)  Greek yogurt - 23g per 8oz   Chicken or Turkey - 23g per 3oz  Fish - 20-25g per 3oz  Beef - 18-23g per 3oz  Navy beans - 20g per cup  Cottage cheese - 14g per 1/2 cup   Lentils - 13g per 1/4 cup  Beef jerky 13g per 1oz  2% milk - 8g per cup  Peanut butter - 8g per 2 tablespoons  Eggs - 6g per egg  Mixed nuts - 6g per 2oz     Wound care recommendations to right ischial tuberosity.   After cleansing with saline or wound cleanser, apply small amount of VASHE on gauze, lay into wound bed, let sit for 15 minutes, remove gauze (do not rinse) then apply dressing. Apply AMD antimicrobial gauze into wound opening daily for 2 days in a row then use Endoform Antimicrobial daily for 2 days in a row then repeat cycle. Cover with ABD pad and tape or Mepilex border.     You may purchase Vashe in our building in [address]    We will send an order to [facility] for Maria Fernanda Price to evaluate your mattress    Toyin Yi PA-C. January 20, 2021  Wound Clinc follow up as scheduled    COVID vaccine information at [email or web address]    If you had a positive experience please indicate that on your patient satisfaction survey form that [facility] will be sending you.

## 2021-02-19 ENCOUNTER — VIRTUAL VISIT (OUTPATIENT)
Dept: INFECTIOUS DISEASES | Facility: CLINIC | Age: 26
End: 2021-02-19
Attending: INTERNAL MEDICINE
Payer: MEDICARE

## 2021-02-19 DIAGNOSIS — G82.50 QUADRIPLEGIA (H): ICD-10-CM

## 2021-02-19 DIAGNOSIS — L89.324 DECUBITUS ULCER OF ISCHIAL AREA, LEFT, STAGE IV (H): Primary | ICD-10-CM

## 2021-02-19 DIAGNOSIS — M86.651 CHRONIC OSTEOMYELITIS OF PELVIS, RIGHT (H): ICD-10-CM

## 2021-02-19 PROCEDURE — 99441 PR PHYSICIAN TELEPHONE EVALUATION 5-10 MIN: CPT | Mod: 95 | Performed by: INTERNAL MEDICINE

## 2021-02-19 ASSESSMENT — PAIN SCALES - GENERAL: PAINLEVEL: NO PAIN (0)

## 2021-02-19 NOTE — PROGRESS NOTES
Left voicemail for patient to call back to set up telemedicine visit, will call again before appointment time.  Alice Roy CMA on 2/19/2021 at 8:13 AM
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
## 2021-02-24 ENCOUNTER — TELEPHONE (OUTPATIENT)
Dept: FAMILY MEDICINE | Facility: CLINIC | Age: 26
End: 2021-02-24

## 2021-02-24 ENCOUNTER — TELEPHONE (OUTPATIENT)
Dept: INFECTIOUS DISEASES | Facility: CLINIC | Age: 26
End: 2021-02-24

## 2021-02-24 DIAGNOSIS — Z53.9 DIAGNOSIS NOT YET DEFINED: Primary | ICD-10-CM

## 2021-02-24 PROCEDURE — G0179 MD RECERTIFICATION HHA PT: HCPCS | Performed by: PHYSICIAN ASSISTANT

## 2021-02-24 NOTE — TELEPHONE ENCOUNTER
Reason for Call:  Form, our goal is to have forms completed with 72 hours, however, some forms may require a visit or additional information.    Type of letter, form or note:  Home Health Certification    Who is the form from?: Home care    Where did the form come from: form was mailed in    What clinic location was the form placed at?: [facility] - [phone]    Where the form was placed: [facility] Box/Folder    What number is listed as a contact on the form?: [phone]       Additional comments: Please complete forms and return to: Cornelio Bryan                                                                         [address] [address]    Call taken on 2/24/2021 at 12:18 PM by Fara Leon

## 2021-03-01 DIAGNOSIS — F32.4 MAJOR DEPRESSIVE DISORDER WITH SINGLE EPISODE, IN PARTIAL REMISSION (H): ICD-10-CM

## 2021-03-01 DIAGNOSIS — F41.1 GAD (GENERALIZED ANXIETY DISORDER): ICD-10-CM

## 2021-03-01 RX ORDER — SERTRALINE HYDROCHLORIDE 100 MG/1
TABLET, FILM COATED ORAL
Qty: 135 TABLET | Refills: 1 | Status: SHIPPED | OUTPATIENT
Start: 2021-03-01 | End: 2021-08-26

## 2021-03-01 NOTE — TELEPHONE ENCOUNTER
Prescription approved per [facility] Refill Protocol.  Carmen Joseph RN, BSN  [place]  March 1, 2021

## 2021-03-03 ENCOUNTER — TELEPHONE (OUTPATIENT)
Dept: WOUND CARE | Facility: CLINIC | Age: 26
End: 2021-03-03

## 2021-03-03 DIAGNOSIS — L89.314 DECUBITUS ULCER OF RIGHT ISCHIUM, STAGE 4 (H): Primary | ICD-10-CM

## 2021-03-03 NOTE — TELEPHONE ENCOUNTER
Maria Fernanda returned call. Maria Fernanda has been trying to get a hold of pt to discuss with her recommending replacing her existing mattress as the mattress is not functioning properly. Maria Fernanda is recommending a EZ air ISRAEL mattress. DME order placed through [facility].

## 2021-03-03 NOTE — TELEPHONE ENCOUNTER
Maria Fernanda has been trying to reach Dianna because she feels Dianna needs a new mattress as it is not property inflated.

## 2021-03-05 ENCOUNTER — MEDICAL CORRESPONDENCE (OUTPATIENT)
Dept: HEALTH INFORMATION MANAGEMENT | Facility: CLINIC | Age: 26
End: 2021-03-05

## 2021-03-10 ENCOUNTER — TELEPHONE (OUTPATIENT)
Dept: FAMILY MEDICINE | Facility: CLINIC | Age: 26
End: 2021-03-10

## 2021-03-10 NOTE — TELEPHONE ENCOUNTER
Reason for Call:  Form, our goal is to have forms completed with 72 hours, however, some forms may require a visit or additional information.    Type of letter, form or note:  medical    Who is the form from?: Home care    Where did the form come from: form was faxed in    What clinic location was the form placed at?: [facility] - [phone]    Where the form was placed: [facility] Box/Folder    What number is listed as a contact on the form?: [phone]       Additional comments: Please complete form and fax to [phone]    Call taken on 3/10/2021 at 2:48 PM by Fara Leon

## 2021-03-11 ENCOUNTER — MEDICAL CORRESPONDENCE (OUTPATIENT)
Dept: HEALTH INFORMATION MANAGEMENT | Facility: CLINIC | Age: 26
End: 2021-03-11

## 2021-03-16 ENCOUNTER — HOSPITAL ENCOUNTER (OUTPATIENT)
Dept: WOUND CARE | Facility: CLINIC | Age: 26
Discharge: HOME | End: 2021-03-16
Attending: PHYSICIAN ASSISTANT | Admitting: PHYSICIAN ASSISTANT
Payer: MEDICARE

## 2021-03-16 VITALS — DIASTOLIC BLOOD PRESSURE: 53 MMHG | HEART RATE: 89 BPM | SYSTOLIC BLOOD PRESSURE: 83 MMHG | TEMPERATURE: 97.6 F

## 2021-03-16 DIAGNOSIS — E44.0 MODERATE PROTEIN-CALORIE MALNUTRITION (H): ICD-10-CM

## 2021-03-16 DIAGNOSIS — L89.314 DECUBITUS ULCER OF RIGHT ISCHIUM, STAGE 4 (H): ICD-10-CM

## 2021-03-16 PROCEDURE — 17250 CHEM CAUT OF GRANLTJ TISSUE: CPT | Performed by: PHYSICIAN ASSISTANT

## 2021-03-16 PROCEDURE — 17250 CHEM CAUT OF GRANLTJ TISSUE: CPT

## 2021-03-16 PROCEDURE — 11042 DBRDMT SUBQ TIS 1ST 20SQCM/<: CPT

## 2021-03-16 NOTE — DISCHARGE INSTRUCTIONS
[facility]  [address] [facility] [address], [facility] [address]    Call us at [phone] if you have any questions about your wounds, have redness or  swelling around your wound, have a fever of 101 or greater or if you have any other  problems or concerns. We answer the phone Monday through Friday 8 am to 4 pm,  please leave a message as we check the voicemail frequently throughout the day.    [facility] fax [phone]    Dianna Cottrell 1995    Medications/supplements to aid in healin packet of Gael Supplement into your favorite beverage twice a day. Purchase at [facility] in [address], [place]  Vitamin D3 5000 iu per day  Vitamin C 2,000 mg daily    A diet high in protein is important for wound healing, we recommend getting 90 grams  of protein per day. Taking protein shakes or bars are a good way to get extra protein in  your diet. Other good sources of protein:  Pork 26g per 3 oz  Whey protein powder - 24g per scoop (on average)  Greek yogurt - 23g per 8oz  Chicken or Turkey - 23g per 3oz  Fish - 20-25g per 3oz  Beef - 18-23g per 3oz  Navy beans - 20g per cup  Cottage cheese - 14g per 1/2 cup  Lentils - 13g per 1/4 cup  Beef jerky 13g per 1oz  2% milk - 8g per cup  Peanut butter - 8g per 2 tablespoons  Eggs - 6g per egg  Mixed nuts - 6g per 2oz    Wound care recommendations to right ischial tuberosity.  After cleansing with saline or wound cleanser, apply small amount of VASHE on gauze, lay into wound bed, let sit for 15 minutes, remove gauze (do not rinse) then apply dressing. Apply AMD antimicrobial gauze into wound opening daily for 2 days in a row then use Endoform Antimicrobial daily for 2 days in a row then repeat cycle. Cover with ABD pad and tape or Mepilex border.    Toyin Yi PA-C. 2021    Wound Clinc follow up as scheduled    COVID vaccine information at  [email or web address]    If you had a positive experience please indicate that on your patient satisfaction survey form that [facility] will be sending you.

## 2021-03-16 NOTE — PROGRESS NOTES
[place] [facility]    ASSESSMENT:   1. Stage 4 pressure ulcer R IT  2. R IT osteomyelitis - s/p bone debridement and s/p IV antibiotic therapy  3. Vitamin D deficiency, s/p replacement therapy of 50kIU x 6 wks      PLAN:   1. Start taking 5kIU vitamin D daily  2. Continue with alternating Endoform AM and AMD gauze      FOLLOW-UP: as scheduled with Dr. Zacarias      HISTORY OF PRESENT ILLNESS: Ms. Dianna Cottrell is a 26-year-old quadriplegic woman who returns for a R IT ulcer. Course of this wound in brief as follows:    12/3/19: MRI indicative of osteomyelitis.  9/18/20:  I&D with VAC placement. Was originally anticipating flap procedure in March but this was converted in an effort to keep her out of a facility during the pandemic  11/4/20: Seating assessment at [facility] - a new seating system has been recommended and ordered, this includes a new high profile Roho cushion  11/11/20: Returns for follow-up. Continues on Cefazolin per ID, they recommend reculture if wound stalls/worsens. Wound appears to be improving today, undermined pocket is very tight and I suspect VAC sponge is keeping it open.  11/23/20: IV antibiotics discontinued, PICC line removed.   12/17/20: Follow-up with Dr. Zacarias. Debrided in clinic with Avel. Held VAC and started on AMD due to concern of possible infection and periwound irritation.   1/6/21: VAC restarted  1/20/21: Returns for follow-up. Still on VAC, instructed to back off on how much sponge nursing is using. Vit D drawn and came back low at 28ug/L. Started on 50kIU x 6 wks.  2/18/21: Visited with Dr. Zacarias. NPWT was discontinued, started on alternating Endoform AM and AMD.   03/16/21: Returns for follow-up. Wound is more granular and viable appearing than previous visits. Still has tunnel but seems like it is narrowing.     DATE WOUND ACQUIRED: 6/2018     WOUND CARE: NPWT     OFFLOADING: APM mattress, chair with Roho cushion mapped well 12/20/19     REVIEW OF  SYSTEMS:   CONSTITUTIONAL: Denies fever or chills  GI: denies nausea or vomiting      SOCIAL HISTORY: has PCA help, has two kids, hoping to do public speaking in the future     PHYSICAL EXAM:  GENERAL: Patient is alert and oriented and in no acute distress  INTEGUMENTARY:   See wound care flowsheet for detailed exam and wound measurements.       PROCEDURE: After verbal consent was obtained, hypergranulation tissue was treated with silver nitrate. Patient tolerated this well.     VANIA AGUILAR PA-C

## 2021-03-19 ENCOUNTER — VIRTUAL VISIT (OUTPATIENT)
Dept: INFECTIOUS DISEASES | Facility: CLINIC | Age: 26
End: 2021-03-19
Attending: INTERNAL MEDICINE
Payer: MEDICARE

## 2021-03-19 DIAGNOSIS — L89.324 DECUBITUS ULCER OF ISCHIAL AREA, LEFT, STAGE IV (H): Primary | ICD-10-CM

## 2021-03-19 DIAGNOSIS — M86.651 CHRONIC OSTEOMYELITIS OF PELVIS, RIGHT (H): ICD-10-CM

## 2021-03-19 PROCEDURE — 99212 OFFICE O/P EST SF 10 MIN: CPT | Mod: 95 | Performed by: INTERNAL MEDICINE
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
## 2021-03-25 ENCOUNTER — TELEPHONE (OUTPATIENT)
Dept: FAMILY MEDICINE | Facility: CLINIC | Age: 26
End: 2021-03-25

## 2021-03-25 NOTE — TELEPHONE ENCOUNTER
Reason for Call:  Form, our goal is to have forms completed with 72 hours, however, some forms may require a visit or additional information.    Type of letter, form or note:  Home Health Certification    Who is the form from?: Home care    Where did the form come from: form was faxed in    What clinic location was the form placed at?: [facility] - [phone]    Where the form was placed: TC Box/Folder    What number is listed as a contact on the form?: [phone]        Additional comments: Fax [phone]    Call taken on 3/25/2021 at 3:02 PM by Batool Saucedo

## 2021-03-31 ENCOUNTER — TELEPHONE (OUTPATIENT)
Dept: FAMILY MEDICINE | Facility: CLINIC | Age: 26
End: 2021-03-31

## 2021-03-31 DIAGNOSIS — Z53.9 DIAGNOSIS NOT YET DEFINED: Primary | ICD-10-CM

## 2021-03-31 PROCEDURE — G0179 MD RECERTIFICATION HHA PT: HCPCS | Performed by: PHYSICIAN ASSISTANT

## 2021-03-31 NOTE — TELEPHONE ENCOUNTER
Reason for Call:  Form, our goal is to have forms completed with 72 hours, however, some forms may require a visit or additional information.    Type of letter, form or note:  Home Health Care orders    Who is the form from?: Home care    Where did the form come from: form was faxed in    What clinic location was the form placed at?: [facility] - [phone]    Where the form was placed: [facility] Box/Folder    What number is listed as a contact on the form?: [phone]       Additional comments: fax [phone]    Call taken on 3/31/2021 at 1:48 PM by Batool Saucedo

## 2021-04-01 ENCOUNTER — MEDICAL CORRESPONDENCE (OUTPATIENT)
Dept: HEALTH INFORMATION MANAGEMENT | Facility: CLINIC | Age: 26
End: 2021-04-01

## 2021-04-15 ENCOUNTER — HOSPITAL ENCOUNTER (OUTPATIENT)
Dept: WOUND CARE | Facility: CLINIC | Age: 26
Discharge: HOME | End: 2021-04-15
Attending: SURGERY | Admitting: SURGERY
Payer: MEDICARE

## 2021-04-15 VITALS
TEMPERATURE: 97.9 F | DIASTOLIC BLOOD PRESSURE: 61 MMHG | HEART RATE: 81 BPM | SYSTOLIC BLOOD PRESSURE: 92 MMHG | RESPIRATION RATE: 16 BRPM

## 2021-04-15 DIAGNOSIS — M86.60 OTHER CHRONIC OSTEOMYELITIS, UNSPECIFIED SITE (H): Chronic | ICD-10-CM

## 2021-04-15 DIAGNOSIS — G90.4 AUTONOMIC DYSREFLEXIA: ICD-10-CM

## 2021-04-15 DIAGNOSIS — S14.109S QUADRIPLEGIA, POST-TRAUMATIC (H): Chronic | ICD-10-CM

## 2021-04-15 DIAGNOSIS — Z30.430 ENCOUNTER FOR INSERTION OF MIRENA IUD: ICD-10-CM

## 2021-04-15 DIAGNOSIS — Z97.5 IUD (INTRAUTERINE DEVICE) IN PLACE: Chronic | ICD-10-CM

## 2021-04-15 DIAGNOSIS — F41.1 GAD (GENERALIZED ANXIETY DISORDER): ICD-10-CM

## 2021-04-15 DIAGNOSIS — N90.89 LESION OF VULVA: Primary | ICD-10-CM

## 2021-04-15 DIAGNOSIS — R94.2 IMPAIRED LUNG FUNCTION: Chronic | ICD-10-CM

## 2021-04-15 DIAGNOSIS — Z86.718 PERSONAL HISTORY OF DVT (DEEP VEIN THROMBOSIS): ICD-10-CM

## 2021-04-15 DIAGNOSIS — G82.50 QUADRIPLEGIA, POST-TRAUMATIC (H): Chronic | ICD-10-CM

## 2021-04-15 DIAGNOSIS — L89.314 DECUBITUS ULCER OF RIGHT ISCHIUM, STAGE 4 (H): ICD-10-CM

## 2021-04-15 DIAGNOSIS — D06.9 CIN III WITH SEVERE DYSPLASIA: Chronic | ICD-10-CM

## 2021-04-15 DIAGNOSIS — E44.0 MODERATE PROTEIN-CALORIE MALNUTRITION (H): ICD-10-CM

## 2021-04-15 DIAGNOSIS — F32.4 MAJOR DEPRESSIVE DISORDER WITH SINGLE EPISODE, IN PARTIAL REMISSION (H): ICD-10-CM

## 2021-04-15 DIAGNOSIS — N31.9 NEUROGENIC BLADDER: ICD-10-CM

## 2021-04-15 DIAGNOSIS — Z87.01 H/O: PNEUMONIA: Chronic | ICD-10-CM

## 2021-04-15 LAB
CRP SERPL-MCNC: 9.7 MG/L (ref 0–8)
ERYTHROCYTE [SEDIMENTATION RATE] IN BLOOD BY WESTERGREN METHOD: 19 MM/H (ref 0–20)

## 2021-04-15 PROCEDURE — 86140 C-REACTIVE PROTEIN: CPT | Performed by: SURGERY

## 2021-04-15 PROCEDURE — 97602 WOUND(S) CARE NON-SELECTIVE: CPT

## 2021-04-15 PROCEDURE — 85652 RBC SED RATE AUTOMATED: CPT | Performed by: SURGERY

## 2021-04-16 ENCOUNTER — TELEPHONE (OUTPATIENT)
Dept: FAMILY MEDICINE | Facility: CLINIC | Age: 26
End: 2021-04-16

## 2021-04-16 ENCOUNTER — TELEPHONE (OUTPATIENT)
Dept: WOUND CARE | Facility: CLINIC | Age: 26
End: 2021-04-16

## 2021-04-16 NOTE — TELEPHONE ENCOUNTER
Our goal is to have forms completed with 72 hours, however some forms may require a visit or additional information.    Who is the form from?: [facility]  (if other please explain)  Where the form came from: form was faxed in  What clinic location was the form placed at?: [facility]  Where the form was placed: placed in TC inbox     What number is listed as a contact on the form?: [phone]  Fax number to return form to:  [phone]        Call taken on 4/16/2021 at 1:15 PM by Floridalma Merino

## 2021-04-16 NOTE — PROGRESS NOTES
This is a recent snapshot of the patient's [facility] medical record.  For current drug dose and complete information and questions, call [phone] or In Basket pool, [facility] home infusion ([id number])  CSN Number:  [id number]

## 2021-04-16 NOTE — TELEPHONE ENCOUNTER
Our goal is to have forms completed with 72 hours, however some forms may require a visit or additional information.    Who is the form from?: [facility]  (if other please explain)  Where the form came from: form was faxed in  What clinic location was the form placed at?: [facility]  Where the form was placed: placed in TC inbox     What number is listed as a contact on the form?: [phone]  Fax number to return form to:  [phone]        Call taken on 4/16/2021 at 3:32 PM by Floridalma Merino

## 2021-04-17 NOTE — PROGRESS NOTES
Visit Date:   04/15/2021      [facility] VISIT NOTE      This is a 26-year-old partial quadriplegic who is here today with her mom for further followup of her chronic right ischial decubitus.  She had seen our physician's assistant, Suzan Yi, last month and they decided to continue with the dressings, alternating between Endoform AM and AMD every 2 days.  They are also using Vashe to soak the wound.  Suzan was also prescribed vitamin D 5000 units for just that, but the patient did not know how often she should take that.  It sounds like since then, she has talked with her primary who suggested that she take 5000 units every other day.      As far as her wound is concerned, she states that there has been no odor.  There is minimal drainage and she is feeling well.  She is also not experiencing some of the autonomic dysreflexia that she was having with the VAC when she was in a sitting position.  Much of the time, she is just still at bedrest if there is nothing to do.  When she is out and about, she is usually outside doing activities with her 7-year-old and 4-year-old.  She also is very cognizant of doing tilts for weight shifting.  It sounds like she has a power mobility face-to-face visit scheduled for 04/30, so she can start working on getting a standing wheelchair.      As far as her mattress is concerned, she has been working with [employer] and has an ED mattress on order that her insurance denied but [facility] waiver will .  It sounds like her / is facilitating that process.  As far as her urinary diversion is concerned, she still has a Winston in and she is plugged into a urologist, but I do not think they have yet talked about the possibility of subsequent suprapubic tube.      As far as the wound is concerned, it is very small now.  She had had some significant tunneling or undermining when I last saw her a couple months ago, but now it is a very tiny  with minimal depth  and minimal undermining.  It measures 1 x 0.4 x 0.6 cm.  It looks clean, a little bit bloody when we probed the base with a Q-tip, but did not detect any false bottoms.  We will check a sed rate and CRP today since her last labs were on .  At that time, her CRP was 17.6 and her sed rate was 48.  She had good nutrition with prealbumin of 23 back in January.      At this point, since things are working very well, I would continue her dressing regimen and just get on the books to see Suzan on the  of next month in case things are still open.  If they are not, she just needs to let us know and then she can cancel that appointment.  Please see nursing notes for dimensions of the wound, vital signs, which were within normal limits, and photo.      Labs:   Recent Labs   Lab Test 04/15/21  1445 21  0831 20  1315 20  1315 16  1430 16  1430   ALBUMIN  --   --   --   --   --  3.6   HGB  --  13.9  --  12.9   < >  --    WBC  --   --   --  10.4   < >  --    CRP 9.7*  --    < > 6.6   < > 4.8    < > = values in this interval not displayed.     Nutrition requirements were discussed with patient today.  Vitals:  BP 92/61   Pulse 81   Temp 97.9  F (36.6  C) (Temporal)   Resp 16   Wound:     Photo:            CRAIG THAYER MD             D: 04/15/2021   T: 04/15/2021   MT: PK      Name:     SYLVESTER BLACKWELL   MRN:      [id number]        Account:      [id number]   :      1995           Visit Date:   04/15/2021      Document: [id number]

## 2021-04-18 ENCOUNTER — HEALTH MAINTENANCE LETTER (OUTPATIENT)
Age: 26
End: 2021-04-18

## 2021-04-19 ENCOUNTER — TELEPHONE (OUTPATIENT)
Dept: FAMILY MEDICINE | Facility: CLINIC | Age: 26
End: 2021-04-19

## 2021-04-19 ENCOUNTER — MEDICAL CORRESPONDENCE (OUTPATIENT)
Dept: HEALTH INFORMATION MANAGEMENT | Facility: CLINIC | Age: 26
End: 2021-04-19

## 2021-04-19 NOTE — DISCHARGE INSTRUCTIONS
[facility]  [address] [facility] [address] [address]    Call us at [phone] if you have any questions about your wounds, have redness or swelling around your wound, have a fever of 101 or greater or if you have any other problems or concerns. We answer the phone Monday through Friday 8 am to 4 pm, please leave a message as we check the voicemail frequently throughout the day.     Dianna Cottrell      1995    [facility] fax [phone]    Medications/supplements to aid in healin. 1 packet of Gael Supplement into your favorite beverage twice a day. Purchase at [facility] in [address] [facility], [employer] or [employer]  2. Vitamin D3 5,000 iu per day  3. Vitamin C 2,000 mg daily      A diet high in protein is important for wound healing, we recommend getting 90 grams of protein per day. Taking protein shakes or bars are a good way to get extra protein in your diet.  Other good sources of protein:  Pork 26g per 3 oz  Whey protein powder - 24g per scoop (on average)  Greek yogurt - 23g per 8oz   Chicken or Turkey - 23g per 3oz  Fish - 20-25g per 3oz  Beef - 18-23g per 3oz  Navy beans - 20g per cup  Cottage cheese - 14g per 1/2 cup   Lentils - 13g per 1/4 cup  Beef jerky 13g per 1oz  2% milk - 8g per cup  Peanut butter - 8g per 2 tablespoons  Eggs - 6g per egg  Mixed nuts - 6g per 2oz     Wound care recommendations to right ischial tuberosity.  After cleansing with saline or wound cleanser, apply small amount of VASHE on gauze, lay into wound bed, let sit for 15 minutes, remove gauze (do not rinse) then apply dressing.   Apply AMD antimicrobial gauze into wound opening daily for 2 days in a row  then use Endoform Antimicrobial daily for 2 days in a row then repeat cycle.   Cover with ABD pad and tape or Mepilex border.             Fred Zacarias M.D.. April 15, 2021    Wound Clinic follow up as scheduled    If you had a  positive experience please indicate that on your patient satisfaction survey form that [facility] will be sending you.    It was a pleasure meeting with you today.  Thank you for allowing me and my team the privilege of caring for you today.  YOU are the reason we are here, and I truly hope we provided you with the excellent service you deserve.  Please let us know if there is anything else we can do for you so that we can be sure you are leaving completely satisfied with your care experience.

## 2021-04-19 NOTE — TELEPHONE ENCOUNTER
Reason for Call:  Form, our goal is to have forms completed with 72 hours, however, some forms may require a visit or additional information.    Type of letter, form or note:  medical    Who is the form from?: Home care    Where did the form come from: form was faxed in    What clinic location was the form placed at?: [facility] - [phone]    Where the form was placed: TC box     What number is listed as a contact on the form?: fax [phone]       Additional comments: Please compete and fax    Call taken on 4/19/2021 at 7:28 AM by Analia Joe

## 2021-04-23 ENCOUNTER — VIRTUAL VISIT (OUTPATIENT)
Dept: INFECTIOUS DISEASES | Facility: CLINIC | Age: 26
End: 2021-04-23
Attending: INTERNAL MEDICINE
Payer: MEDICARE

## 2021-04-23 DIAGNOSIS — M86.651 CHRONIC OSTEOMYELITIS OF PELVIS, RIGHT (H): Primary | ICD-10-CM

## 2021-04-23 DIAGNOSIS — G82.50 QUADRIPLEGIA (H): ICD-10-CM

## 2021-04-23 PROCEDURE — 99213 OFFICE O/P EST LOW 20 MIN: CPT | Mod: 95 | Performed by: INTERNAL MEDICINE
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
## 2021-04-26 ENCOUNTER — TELEPHONE (OUTPATIENT)
Dept: FAMILY MEDICINE | Facility: CLINIC | Age: 26
End: 2021-04-26

## 2021-04-26 NOTE — TELEPHONE ENCOUNTER
Reason for call:  Form  Reason for Call:  Form, our goal is to have forms completed with 72 hours, however, some forms may require a visit or additional information.    Type of letter, form or note:  medical    Who is the form from?: [facility] (if other please explain)    Where did the form come from: form was faxed in    What clinic location was the form placed at?: [facility] - [phone]    Where the form was placed: [facility] Box/Folder    What number is listed as a contact on the form?: fax#[phone] #[phone]       Additional comments:     Call taken on 4/26/2021 at 1:20 PM by Renetta Taylor

## 2021-04-29 ENCOUNTER — TELEPHONE (OUTPATIENT)
Dept: FAMILY MEDICINE | Facility: CLINIC | Age: 26
End: 2021-04-29

## 2021-04-29 NOTE — TELEPHONE ENCOUNTER
Reason for Call:  Form, our goal is to have forms completed with 72 hours, however, some forms may require a visit or additional information.    Type of letter, form or note:  Home Health Care    Who is the form from?: Home care    Where did the form come from: form was faxed in    What clinic location was the form placed at?: [facility] - [phone]    Where the form was placed: [facility] Box/Folder    What number is listed as a contact on the form?: [phone] Fax [phone]       Additional comments:     Call taken on 4/29/2021 at 5:31 PM by Batool Saucedo

## 2021-05-03 ENCOUNTER — TRANSFERRED RECORDS (OUTPATIENT)
Dept: HEALTH INFORMATION MANAGEMENT | Facility: CLINIC | Age: 26
End: 2021-05-03

## 2021-05-05 ENCOUNTER — MEDICAL CORRESPONDENCE (OUTPATIENT)
Dept: HEALTH INFORMATION MANAGEMENT | Facility: CLINIC | Age: 26
End: 2021-05-05

## 2021-05-06 ENCOUNTER — TELEPHONE (OUTPATIENT)
Dept: WOUND CARE | Facility: CLINIC | Age: 26
End: 2021-05-06

## 2021-05-06 NOTE — TELEPHONE ENCOUNTER
Nika the Care Coordinator from [facility] has some questions for Dr. Zacarias that she needs answered.

## 2021-05-06 NOTE — TELEPHONE ENCOUNTER
Call returned to Nika. She is requesting more information is given within the order for the mattress to have the mattress qualify. Updated order should be sent [phone] attention Nika.

## 2021-05-07 ENCOUNTER — TELEPHONE (OUTPATIENT)
Dept: WOUND CARE | Facility: CLINIC | Age: 26
End: 2021-05-07

## 2021-05-07 NOTE — TELEPHONE ENCOUNTER
[employer] is denying patient an air mattress.  Nika is working on a benefits exception.  She needs a letter of medical necessity and new DME order to be faxed to .

## 2021-05-12 ENCOUNTER — HOSPITAL ENCOUNTER (OUTPATIENT)
Dept: WOUND CARE | Facility: CLINIC | Age: 26
Discharge: HOME | End: 2021-05-12
Attending: PHYSICIAN ASSISTANT | Admitting: PHYSICIAN ASSISTANT
Payer: MEDICARE

## 2021-05-12 VITALS
RESPIRATION RATE: 16 BRPM | TEMPERATURE: 97.5 F | SYSTOLIC BLOOD PRESSURE: 105 MMHG | DIASTOLIC BLOOD PRESSURE: 66 MMHG | HEART RATE: 79 BPM

## 2021-05-12 DIAGNOSIS — E44.0 MODERATE PROTEIN-CALORIE MALNUTRITION (H): ICD-10-CM

## 2021-05-12 DIAGNOSIS — L89.314 DECUBITUS ULCER OF RIGHT ISCHIUM, STAGE 4 (H): ICD-10-CM

## 2021-05-12 PROCEDURE — 97602 WOUND(S) CARE NON-SELECTIVE: CPT

## 2021-05-12 PROCEDURE — 99214 OFFICE O/P EST MOD 30 MIN: CPT | Performed by: PHYSICIAN ASSISTANT

## 2021-05-12 NOTE — DISCHARGE INSTRUCTIONS
[facility]  [address] [address]    Call us at [phone] if you have any questions about your wounds, have redness or swelling around your wound, have a fever of 101 or greater or if you have any other problems or concerns. We answer the phone Monday through Friday 8 am to 4 pm, please leave a message as we check the voicemail frequently throughout the day.     [facility] fax [phone]    Dianna Cottrell      1995    Wound care recommendations to right ischial tuberosity ulcer  Cleanse with wound cleanser, apply AMD gauze to wound bed and cover with dressing of choice. Change daily.     Toyin Yi PA-C. May 12, 2021    Wound Clinic follow up as scheduled    If you had a positive experience please indicate that on your patient satisfaction survey form that [facility] will be sending you.    It was a pleasure meeting with you today.  Thank you for allowing me and my team the privilege of caring for you today.  YOU are the reason we are here, and I truly hope we provided you with the excellent service you deserve.  Please let us know if there is anything else we can do for you so that we can be sure you are leaving completely satisfied with your care experience.

## 2021-05-12 NOTE — PROGRESS NOTES
[facility]    ASSESSMENT:   1. Stage 4 pressure ulcer R IT  2. R IT osteomyelitis - s/p bone debridement and s/p IV antibiotic therapy      PLAN:   1. Dress with AMD gauze and cover dressing of choice, change daily  2. Dianna will need a group 2, low air loss mattress due to a stage 4 pressure ulceration on the patient's pelvis that has failed to improve on a normal mattress despite regular wound cares and repositioning. She has a long history of large stage 3 and stage 4 pressure ulcerations and it is medically necessary for her to be on a low air loss mattress to continue treating her current wound as well as preventing new ulcerations. Patient has impaired sensation due to quadriplegia and is unable to reposition independently.       FOLLOW-UP: 1 month      HISTORY OF PRESENT ILLNESS: Ms. Dianna Cottrell is a 26-year-old quadriplegic woman who returns for a R IT ulcer. Course of this wound in brief as follows:    12/3/19: MRI indicative of osteomyelitis.  9/18/20:  I&D with VAC placement. Was originally anticipating flap procedure in March but this was converted in an effort to keep her out of a facility during the pandemic  11/4/20: Seating assessment at [facility] - a new seating system has been recommended and ordered, this includes a new high profile Roho cushion  11/11/20: Returns for follow-up. Continues on Cefazolin per ID, they recommend reculture if wound stalls/worsens. Wound appears to be improving today, undermined pocket is very tight and I suspect VAC sponge is keeping it open.  11/23/20: IV antibiotics discontinued, PICC line removed.   12/17/20: Follow-up with Dr. Zacarias. Debrided in clinic with Avel. Held VAC and started on AMD due to concern of possible infection and periwound irritation.   1/6/21: VAC restarted  1/20/21: Returns for follow-up. Still on VAC, instructed to back off on how much sponge nursing is using. Vit D drawn and came back low at 28ug/L. Started on  50kIU x 6 wks.  2/18/21: Visited with Dr. Zacarias. NPWT was discontinued, started on alternating Endoform AM and AMD.   03/16/21: Returns for follow-up. Wound is more granular and viable appearing than previous visits. Still has tunnel but seems like it is narrowing. Continued alternating Endoform AM and AMD.  4/15/21: Follow-up with Dr. Zacarias. Wound was improving. Continued alternating Endoform AM and AMD.  5/12/21: Returns for follow-up.  Wound is nearly closed. Base is granular. Still having issues trying to obtain low air loss mattress.     DATE WOUND ACQUIRED: 6/2018     OFFLOADING: APM mattress, chair with Roho cushion, last assessed 11/4/20 and was recommended to get a new chair      SOCIAL HISTORY: has PCA help, has two kids, hoping to do public speaking in the future     PHYSICAL EXAM:  GENERAL: Patient is alert and oriented and in no acute distress  INTEGUMENTARY:   Wound (used by OP [facility] only) 10/14/19 1407 Right pressure injury (Active)   Thickness/Stage Stage 4 05/12/21 1358   Dressing Appearance moist drainage 05/12/21 1346   Base granulating 05/12/21 1358   Periwound intact 05/12/21 1358   Periwound Temperature warm 05/12/21 1358   Length (cm) 0.6 05/12/21 1346   Width (cm) 0.2 05/12/21 1346   Depth (cm) 0.4 05/12/21 1346   Wound (cm^2) 0.12 cm^2 05/12/21 1346   Wound Volume (cm^3) 0.05 cm^3 05/12/21 1346   Wound healing % 93.33 05/12/21 1346   Drainage Characteristics/Odor serosanguineous 05/12/21 1346   Drainage Amount copious 05/12/21 1358   Care, Wound non-select wound debridement performed 05/12/21 1358         MDM: 30-39 minutes were spent on the date of the visit reviewing previous chart notes, evaluating patient and developing the treatment plan.       VANIA AGUILAR PA-C

## 2021-05-18 ENCOUNTER — TELEPHONE (OUTPATIENT)
Dept: WOUND CARE | Facility: CLINIC | Age: 26
End: 2021-05-18

## 2021-05-18 DIAGNOSIS — G82.50 QUADRIPLEGIA, POST-TRAUMATIC (H): Chronic | ICD-10-CM

## 2021-05-18 DIAGNOSIS — L89.314 DECUBITUS ULCER OF RIGHT ISCHIUM, STAGE 4 (H): Primary | Chronic | ICD-10-CM

## 2021-05-18 DIAGNOSIS — S14.109S QUADRIPLEGIA, POST-TRAUMATIC (H): Chronic | ICD-10-CM

## 2021-05-18 NOTE — TELEPHONE ENCOUNTER
Nika the care coordinator for Dianna needs a letter of medical necessity and prescription for the air mattress as Medicare denied it and she is trying with [employer].

## 2021-05-18 NOTE — TELEPHONE ENCOUNTER
Spoke to Nika. DME order for mattress was faxed to her on April 15 and documentation supporting the order was faxed on April 15th and May 12th. Nika is appealing the Medicare denial to [employer] and needs to start the process over since the original order is over a month old. Updated DME and LMN faxed to Nika at [phone].

## 2021-05-20 ENCOUNTER — MYC MEDICAL ADVICE (OUTPATIENT)
Dept: FAMILY MEDICINE | Facility: CLINIC | Age: 26
End: 2021-05-20

## 2021-05-20 NOTE — TELEPHONE ENCOUNTER
RN Triage    Patient Contact    Attempt # 1    Was call answered?  No.  Unable to leave message. Unidentified voice mail.  Kickplay message sent.  Sis Garcia RN on 5/20/2021 at 1:39 PM

## 2021-05-21 ENCOUNTER — VIRTUAL VISIT (OUTPATIENT)
Dept: INFECTIOUS DISEASES | Facility: CLINIC | Age: 26
End: 2021-05-21
Attending: INTERNAL MEDICINE
Payer: MEDICARE

## 2021-05-21 ENCOUNTER — VIRTUAL VISIT (OUTPATIENT)
Dept: FAMILY MEDICINE | Facility: CLINIC | Age: 26
End: 2021-05-21
Payer: MEDICARE

## 2021-05-21 ENCOUNTER — TRANSFERRED RECORDS (OUTPATIENT)
Dept: HEALTH INFORMATION MANAGEMENT | Facility: CLINIC | Age: 26
End: 2021-05-21

## 2021-05-21 ENCOUNTER — TELEPHONE (OUTPATIENT)
Dept: FAMILY MEDICINE | Facility: CLINIC | Age: 26
End: 2021-05-21

## 2021-05-21 DIAGNOSIS — N39.0 URINARY TRACT INFECTION ASSOCIATED WITH INDWELLING URETHRAL CATHETER, INITIAL ENCOUNTER (H): Primary | ICD-10-CM

## 2021-05-21 DIAGNOSIS — G82.50 QUADRIPLEGIA (H): ICD-10-CM

## 2021-05-21 DIAGNOSIS — T83.511A URINARY TRACT INFECTION ASSOCIATED WITH INDWELLING URETHRAL CATHETER, INITIAL ENCOUNTER (H): Primary | ICD-10-CM

## 2021-05-21 DIAGNOSIS — N31.9 NEUROGENIC BLADDER: ICD-10-CM

## 2021-05-21 DIAGNOSIS — M86.651 CHRONIC OSTEOMYELITIS OF PELVIS, RIGHT (H): Primary | ICD-10-CM

## 2021-05-21 PROCEDURE — 99213 OFFICE O/P EST LOW 20 MIN: CPT | Performed by: FAMILY MEDICINE

## 2021-05-21 PROCEDURE — 99214 OFFICE O/P EST MOD 30 MIN: CPT | Mod: 95 | Performed by: INTERNAL MEDICINE

## 2021-05-21 RX ORDER — LEVOFLOXACIN 750 MG/1
750 TABLET, FILM COATED ORAL DAILY
Qty: 5 TABLET | Refills: 0 | Status: SHIPPED | OUTPATIENT
Start: 2021-05-21 | End: 2021-05-26
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
## 2021-05-21 NOTE — TELEPHONE ENCOUNTER
Home care notified and virtual appointment changed to Dr. Ugalde's schedule.    Sis Garcia RN on 5/21/2021 at 12:14 PM

## 2021-05-21 NOTE — TELEPHONE ENCOUNTER
Schedule virtual appointment and place the faxed results/outside documentation on my desk to review.    Dariel Ugalde MD  [facility]

## 2021-05-21 NOTE — TELEPHONE ENCOUNTER
[facility] nurse calling to inform  that results from last UA and culture are being sent over. Urologist ordered all of it and when he got results, he would not order anything. Patient even made visit with him and he still would not order anything.     Looking to have antibiotic Rx sent for UA and culture that was done at [facility] on 4/14/21. Results are in Epic and also sent over by fax. Is there anything else you need or want done? Please call home health nurse directly and she can assist with getting it done. States they having been pushing fluids but there is still an odor and sediment and would like to get patient started on something.     Call:    IVY (Home health nurse)  [phone]    PLAN:  Routing to provider for review    AVELINO Cortes/[facility]

## 2021-05-21 NOTE — TELEPHONE ENCOUNTER
"\"Schedule urology referral, INES from [facility] for culture records.\" - Dr. Dixon.   "

## 2021-05-21 NOTE — PROGRESS NOTES
Dianna is a 26 year old who is being evaluated via a billable telephone visit.      What phone number would you like to be contacted at? [phone]   How would you like to obtain your AVS? MyChart    Assessment & Plan   1. Urinary tract infection associated with indwelling urethral catheter, initial encounter (H): Most recent culture growing staph, Pseudomonas, and Enterococcus in April.  Was not treated for this.  To have records for why this was done.  Likely colonized but patient is having symptoms.  Given complicated UTI and sensitivities available we will treat with Levaquin once daily for 5 days.  Patient is interested in switching to the [facility] for her urology care.  I have placed a referral.  Obtain culture results sent from [place] today to help direct therapy.  Patient agreeable plan.  Discussed side effects including diarrhea and tendon rupture.  - Adult Urology Referral; Future  - levofloxacin (LEVAQUIN) 750 MG tablet; Take 1 tablet (750 mg) by mouth daily for 5 days  Dispense: 5 tablet; Refill: 0     Return in about 1 week (around 5/28/2021), or if symptoms worsen or fail to improve.    Dariel Ugalde MD  [facility]     This chart is completed utilizing dictation software; typos and/or incorrect word substitutions may unintentionally occur.      Subjective   Dianna is a 26 year old who presents for the following health issues    HPI     Genitourinary - Female  Onset/Duration: End of April  Description:   Painful urination (Dysuria): Unable to tell, does not have sensation. Strong odor (smells like rotten eggs), lower leg spasms           Frequency: Currently has rogers catheter  Blood in urine (Hematuria): no  Delay in urine (Hesitency): no  Intensity:   Progression of Symptoms:    Accompanying Signs & Symptoms:  Fever/chills: YES- low grade  Flank pain: no  Nausea and vomiting: YES- nausea  Vaginal symptoms: none  Abdominal/Pelvic Pain: YES-  cramping  History:   History of frequent UTI s: YES  History of kidney stones: no  Sexually Active: no  Possibility of pregnancy: No  Precipitating or alleviating factors: None  Therapies tried and outcome: Cranberry juice prn (contraindicated in Coumadin patients), Increase fluid intake     Patient reports having a chronic catheter with frequent UTIs.  She does not have normal sensation due to paraplegia; however, will notice more cramping, urinary sediment, odor when she has a UTI.  States she has been treated chronically for this by her outside urology group.  States this time however that he opposed treating her.  She denies any current fever but does have chills.  Denies any flank pain.  States she has never had pyelonephritis.  Previous urine culture showing Enterococcus, Pseudomonas, and staph.    I do not have the records from her urology visit most recently.  She states she has a care attendant who is sending in her urine for repeat culture today at [place].  We will request these records.    Review of Systems   Constitutional, Derm, MSK, cardiovascular, pulmonary, gi and gu systems are negative, except as otherwise noted.      Objective       Vitals:  No vitals were obtained today due to virtual visit.    Physical Exam   healthy, alert and no distress  PSYCH: Alert and oriented times 3; coherent speech, normal   rate and volume, able to articulate logical thoughts, able   to abstract reason, no tangential thoughts, no hallucinations   or delusions  Her affect is normal  RESP: No cough, no audible wheezing, able to talk in full sentences  Remainder of exam unable to be completed due to telephone visits    Obtaining outside labs        Phone call duration: 11 minutes

## 2021-05-26 ENCOUNTER — TELEPHONE (OUTPATIENT)
Dept: WOUND CARE | Facility: CLINIC | Age: 26
End: 2021-05-26

## 2021-05-27 ENCOUNTER — TELEPHONE (OUTPATIENT)
Dept: FAMILY MEDICINE | Facility: CLINIC | Age: 26
End: 2021-05-27

## 2021-05-27 NOTE — TELEPHONE ENCOUNTER
Pt has wound care specialist and team. [facility]

## 2021-05-27 NOTE — TELEPHONE ENCOUNTER
Received notification from [facility] that appeal for mattress has been approved, 5/26/21-5/26/22, reference number [id number]. Message left for Case Coordinator Nika.

## 2021-05-27 NOTE — TELEPHONE ENCOUNTER
What type of form? Plan of Care Wound Care  What day did you drop off your forms? Faxed:  5.27.2021  Is there a due date? asap (7-10 business days to compete forms)   How would you like to receive these forms? Please fax to [phone]    What is the best number to contact you? Work [phone]  What time works best to contact you with in 4 hrs? anytime  Is it okay to leave a message? Yes    Ava Roy (Auto signs name of person logged into Epic)

## 2021-05-27 NOTE — TELEPHONE ENCOUNTER
Received call wanting clinical documentation to receive coverage for mattress under [employer]. Last office note faxed.

## 2021-05-28 ENCOUNTER — VIRTUAL VISIT (OUTPATIENT)
Dept: UROLOGY | Facility: CLINIC | Age: 26
End: 2021-05-28
Payer: MEDICARE

## 2021-05-28 VITALS — WEIGHT: 125 LBS | BODY MASS INDEX: 20.09 KG/M2 | HEIGHT: 66 IN

## 2021-05-28 DIAGNOSIS — N39.0 URINARY TRACT INFECTION ASSOCIATED WITH INDWELLING URETHRAL CATHETER, INITIAL ENCOUNTER (H): ICD-10-CM

## 2021-05-28 DIAGNOSIS — T83.511A URINARY TRACT INFECTION ASSOCIATED WITH INDWELLING URETHRAL CATHETER, INITIAL ENCOUNTER (H): ICD-10-CM

## 2021-05-28 PROCEDURE — 99203 OFFICE O/P NEW LOW 30 MIN: CPT | Mod: 95 | Performed by: PHYSICIAN ASSISTANT

## 2021-05-28 RX ORDER — METHENAMINE HIPPURATE 1000 MG/1
1 TABLET ORAL 2 TIMES DAILY
Qty: 180 TABLET | Refills: 0 | Status: SHIPPED | OUTPATIENT
Start: 2021-05-28 | End: 2021-08-25

## 2021-05-28 ASSESSMENT — MIFFLIN-ST. JEOR: SCORE: 1313.75

## 2021-05-28 ASSESSMENT — PAIN SCALES - GENERAL: PAINLEVEL: NO PAIN (0)

## 2021-05-28 NOTE — PROGRESS NOTES
*SEND LINK TO CELL PHONE*    PT JUST FINISHED ABX YESTERDAY  PT HAS OLIVEIRA  PT DENIES BLOOD IN BAG  SX: ODOR,FEVER, PT HAS MORE SPASMS,CLOUDYNESS OF URINE  PT HAS BOTOX INJECTIONS INTO HER BLADDER.  PT HAS HOME CARE THAT WILL TAKE A SAMPLE TO [facility]  5-21-21 NOTE FOR UTI IN [facility]  USED TO SEE VELMA      Dianna is a 26 year old who is being evaluated via a billable video visit.      How would you like to obtain your AVS? MyChart  If the video visit is dropped, the invitation should be resent by: Text to cell phone:      [phone]  Will anyone else be joining your video visit? No      Video Start Time: 12:36 PM  Video-Visit Details    Type of service:  Video Visit    Video End Time:12:47 PM    Originating Location (pt. Location): Home    Distant Location (provider location):  [facility]     Platform used for Video Visit: [employer]      CC: UTIs    HPI:  Dianna Cottrell is a 26 year old female who presents via billable video visit  for evaluation of the above. Has had several UTIs the past few months (cloudy, odor, hematuria, pseudomonas).    Oliveira exchange every 4 weeks, 16 Fr. Has seen Dr. Max in the past and did require Botox injections in the past for bladder spasticity.  Sees Dr. Collins of ID for wound treatment.     Past Medical History:   Diagnosis Date     Anemia     with pregnancy     c5 burst fracture 12/18/2012    C5-C7 fracture with cord injury     Compression fracture of L1 lumbar vertebra (H) 12/18/2012    L1 superior endplate compression fracture     Encounter for insertion of mirena IUD 12/13/2017     Fracture of thoracic spine without spinal cord lesion (H) 12/18/2012    T3-T8 spinous process fractures     History of spinal cord injury      History of thrombophlebitis      Hypertension 12/06/2016     Vocal cord dysfunction     Left vocal cord weakness noted by ENT post extubation 12/2012       Past Surgical History:   Procedure Laterality Date     BLADDER  SURGERY       C4-C7 interbody fusion with anterior screw and plate fixation and posterior erna and pedicle screw fixation with interspace bone graft and C5 and C6 partial corpectomies  2012      SECTION  2013    Procedure:  SECTION;   Section ;  Surgeon: Ricki Nelson MD;  Location: [facility]      SECTION N/A 2016    Procedure:  SECTION;  Surgeon: Floridalma Kiran MD;  Location: [facility] L[facility]     CONIZATION LEEP N/A 2021    Procedure: CONE BIOPSY, CERVIX, USING LOOP ELECTROSURGICAL EXCISION PROCEDURE (LEEP) and ECC;  Surgeon: Carlotta Lock MD;  Location: [facility] OR     INSERT INTRAUTERINE DEVICE N/A 2021    Procedure: INSERTION, INTRAUTERINE DEVICE , replacement of Mirena Intrauterine device;  Surgeon: Carlotta Lock MD;  Location: [facility] OR     IR IVC FILTER PLACEMENT  2012     IRRIGATION AND DEBRIDEMENT BUTTOCKS Right 2020    Procedure: Sharp excisional debridement of right ischial tuberosity decubitus,   Bone biopsies for cultures and path,  VAC Via placement.;  Surgeon: Vira Zacarias MD;  Location: [facility] OR     LUMBAR DRAIN  2012       Social History     Socioeconomic History     Marital status: Single     Spouse name: Not on file     Number of children: Not on file     Years of education: Not on file     Highest education level: Not on file   Occupational History     Not on file   Social Needs     Financial resource strain: Not on file     Food insecurity     Worry: Not on file     Inability: Not on file     Transportation needs     Medical: Not on file     Non-medical: Not on file   Tobacco Use     Smoking status: Current Every Day Smoker     Packs/day: 0.50     Types: Cigarettes, Vaping Device     Smokeless tobacco: Never Used     Tobacco comment: used 1/2-1 ppd for 4 years, quit , now daily e cig use.    Substance and Sexual Activity     Alcohol use: No     Alcohol/week: 0.0 standard drinks     Frequency: Never      Drug use: Yes     Types: Marijuana     Comment: 3 joints per day     Sexual activity: Not Currently     Partners: Male     Birth control/protection: I.U.D.     Comment: Mirena 12/13/17   Lifestyle     Physical activity     Days per week: Not on file     Minutes per session: Not on file     Stress: Not on file   Relationships     Social connections     Talks on phone: Not on file     Gets together: Not on file     Attends Voodoo service: Not on file     Active member of club or organization: Not on file     Attends meetings of clubs or organizations: Not on file     Relationship status: Not on file     Intimate partner violence     Fear of current or ex partner: Not on file     Emotionally abused: Not on file     Physically abused: Not on file     Forced sexual activity: Not on file   Other Topics Concern     Parent/sibling w/ CABG, MI or angioplasty before 65F 55M? Not Asked   Social History Narrative     Not on file       Family History   Problem Relation Age of Onset     Lung Cancer Maternal Grandfather      Hypertension No family hx of      Diabetes No family hx of        ROS:14 point ROS neg other than the symptoms noted above in the HPI.    Allergies   Allergen Reactions     Succinylcholine      Spinal cord injury 12/18/12, patient at risk for extrajunctional receptors and hyperkalemia       Current Outpatient Medications   Medication     acetaminophen (TYLENOL) 325 MG tablet     acetaminophen (TYLENOL) 325 MG tablet     albuterol (PROVENTIL) (2.5 MG/3ML) 0.083% neb solution     baclofen (LIORESAL) 10 MG tablet     BISACODYL     Coloplast barrier cream CREA     Cranberry 450 MG TABS     Docusate Sodium (COLACE PO)     gabapentin (NEURONTIN) 300 MG capsule     GABAPENTIN PO     hydrOXYzine (VISTARIL) 25 MG capsule     hyoscyamine (ANASPAZ/LEVSIN) 0.125 MG tablet     ibuprofen (ADVIL/MOTRIN) 600 MG tablet     midodrine (PROAMATINE) 5 MG tablet     Multiple Vitamin (DAILY MULTIVITAMIN PO)     mupirocin  "(BACTROBAN) 2 % external ointment     ondansetron (ZOFRAN-ODT) 4 MG ODT tab     order for DME     order for DME     order for DME     order for DME     order for DME     oxybutynin ER (DITROPAN-XL) 5 MG 24 hr tablet     povidone-iodine 10 % swab     senna-docusate (SENOKOT-S/PERICOLACE) 8.6-50 MG tablet     senna-docusate (SENOKOT-S;PERICOLACE) 8.6-50 MG per tablet     sertraline (ZOLOFT) 100 MG tablet     sodium chloride (NEBUSAL) 3 % neb solution     spacer ([facility]) holding chamber     No current facility-administered medications for this visit.      Facility-Administered Medications Ordered in Other Visits   Medication     levonorgestrel (MIRENA) 20 MCG/24HR IUD         PEx:   Height 1.676 m (5' 6\"), weight 56.7 kg (125 lb), not currently breastfeeding.    PSYCH: NAD  EYES: EOMI  MOUTH: MMM      A/P: Dianna Cottrell is a 26 year old female with Chronic indwelling Winston, rUTIs  -Methenamine and Vit C x 90 days  -Cranberry pill daily  -Check UC if symptoms  -May need ID to weigh in if continues to be a problem with the above.     Ashia Pablo PA-C  [facility]      27 minutes spent on the date of the encounter doing chart review, review of outside records, review of test results, interpretation of tests, patient visit and documentation   [id number]}              "

## 2021-05-28 NOTE — PATIENT INSTRUCTIONS
Methenamine and Vit C (check your OTC Vit C--should take 1gm twice a day) x 90 days    ID in follow-up if UTIs while on the above and cranberry pills

## 2021-06-01 ENCOUNTER — TELEPHONE (OUTPATIENT)
Dept: UROLOGY | Facility: CLINIC | Age: 26
End: 2021-06-01

## 2021-06-01 NOTE — TELEPHONE ENCOUNTER
----- Message from Angela Rubio sent at 6/1/2021 11:38 AM CDT -----  3 months, phone/video, med check    RAJ

## 2021-06-02 ENCOUNTER — MEDICAL CORRESPONDENCE (OUTPATIENT)
Dept: HEALTH INFORMATION MANAGEMENT | Facility: CLINIC | Age: 26
End: 2021-06-02

## 2021-06-11 ENCOUNTER — HOSPITAL ENCOUNTER (OUTPATIENT)
Dept: WOUND CARE | Facility: CLINIC | Age: 26
Discharge: HOME | End: 2021-06-11
Attending: PHYSICIAN ASSISTANT | Admitting: PHYSICIAN ASSISTANT
Payer: MEDICARE

## 2021-06-11 VITALS
SYSTOLIC BLOOD PRESSURE: 143 MMHG | TEMPERATURE: 97.2 F | RESPIRATION RATE: 16 BRPM | DIASTOLIC BLOOD PRESSURE: 82 MMHG | HEART RATE: 52 BPM

## 2021-06-11 DIAGNOSIS — E44.0 MODERATE PROTEIN-CALORIE MALNUTRITION (H): ICD-10-CM

## 2021-06-11 DIAGNOSIS — L89.314 DECUBITUS ULCER OF RIGHT ISCHIUM, STAGE 4 (H): ICD-10-CM

## 2021-06-11 PROCEDURE — 99214 OFFICE O/P EST MOD 30 MIN: CPT | Performed by: PHYSICIAN ASSISTANT

## 2021-06-11 PROCEDURE — 97602 WOUND(S) CARE NON-SELECTIVE: CPT

## 2021-06-11 NOTE — PROGRESS NOTES
[facility]    ASSESSMENT:   1. Stage 4 pressure ulcer R IT  2. R IT osteomyelitis - s/p bone debridement and s/p IV antibiotic therapy      PLAN:   1. Dress with Triad paste and cotton ball or gauze and change daily and PRN.   2. We discussed the risks associated with getting the wound in any body of water. If she chooses to swim I recommend she cover with Tegaderm film (or equivalent) and immediately cleanse and put a clean bandage and clean underwear on after leaving the water. She should monitor for signs of infection closely after swimming.   3. Dianna will continue to need a group 2, low air loss mattress due to a stage 4 pressure ulceration on the patient's pelvis that has failed to improve on a normal mattress despite regular wound cares and repositioning. She has a long history of large stage 3 and stage 4 pressure ulcerations and it is medically necessary for her to be on a low air loss mattress to continue treating her current wound as well as preventing new ulcerations. Patient has impaired sensation due to quadriplegia and is unable to reposition independently.       FOLLOW-UP: 1 month      HISTORY OF PRESENT ILLNESS: Ms. Dianna Cottrell is a 26-year-old quadriplegic woman who returns for a R IT ulcer. Course of this wound in brief as follows:    12/3/19: MRI indicative of osteomyelitis.  9/18/20:  I&D with VAC placement. Was originally anticipating flap procedure in March but this was converted in an effort to keep her out of a facility during the pandemic  11/4/20: Seating assessment at [facility] - a new seating system has been recommended and ordered, this includes a new high profile Roho cushion  11/11/20: Returns for follow-up. Continues on Cefazolin per ID, they recommend reculture if wound stalls/worsens. Wound appears to be improving today, undermined pocket is very tight and I suspect VAC sponge is keeping it open.  11/23/20: IV antibiotics discontinued, PICC line  removed.   12/17/20: Follow-up with Dr. Zacarias. Debrided in clinic with Avel. Held VAC and started on AMD due to concern of possible infection and periwound irritation.   1/6/21: VAC restarted  1/20/21: Returns for follow-up. Still on VAC, instructed to back off on how much sponge nursing is using. Vit D drawn and came back low at 28ug/L. Started on 50kIU x 6 wks.  2/18/21: Visited with Dr. Zacarias. NPWT was discontinued, started on alternating Endoform AM and AMD.   03/16/21: Returns for follow-up. Wound is more granular and viable appearing than previous visits. Still has tunnel but seems like it is narrowing. Continued alternating Endoform AM and AMD.  4/15/21: Follow-up with Dr. Zacarias. Wound was improving. Continued alternating Endoform AM and AMD.  5/12/21: Returns for follow-up.  Wound is nearly closed. Base is granular. Still having issues trying to obtain low air loss mattress.   6/11/21: Returns for follow-up. Wound is nearly closed. The base is macerated but very shallow. She is interested in swimming with her kids and inquires about PRN waterproof bandage use.     DATE WOUND ACQUIRED: 6/2018     OFFLOADING: APM mattress, chair with Roho cushion, last assessed 11/4/20 and was recommended to get a new chair      SOCIAL HISTORY: has PCA help, has two kids, hoping to do public speaking in the future     PHYSICAL EXAM:  GENERAL: Patient is alert and oriented and in no acute distress  INTEGUMENTARY:   Wound (used by OP [facility] only) 10/14/19 1407 Right pressure injury (Active)   Thickness/Stage Stage 4 06/11/21 1430   Dressing Appearance moist drainage 06/11/21 1430   Base granulating 06/11/21 1430   Periwound macerated 06/11/21 1430   Periwound Temperature warm 06/11/21 1430   Periwound Skin Turgor soft 06/11/21 1430   Edges open 06/11/21 1430   Length (cm) 0.6 06/11/21 1430   Width (cm) 0.3 06/11/21 1430   Depth (cm) 0.4 06/11/21 1430   Wound (cm^2) 0.18 cm^2 06/11/21 1430   Wound Volume (cm^3) 0.07 cm^3  06/11/21 1430   Wound healing % 90 06/11/21 1430   Drainage Characteristics/Odor serosanguineous 06/11/21 1430   Drainage Amount moderate 06/11/21 1430   Care, Wound non-select wound debridement performed 06/11/21 1430   Dressing Care dressing applied 06/11/21 1430         MDM: 30-39 minutes were spent on the date of the visit reviewing previous chart notes, evaluating patient and developing the treatment plan.       VANIA AGUILAR PA-C

## 2021-06-11 NOTE — DISCHARGE INSTRUCTIONS
[facility]  [address] [address]    Call us at [phone] if you have any questions about your wounds, have redness or swelling around your wound, have a fever of 101 or greater or if you have any other problems or concerns. We answer the phone Monday through Friday 8 am to 4 pm, please leave a message as we check the voicemail frequently throughout the day.     Dianna Cottrell      1995  [facility] Phone: [phone] Fax: [phone]     Wound care recommendations to right ischial tuberosity ulcer  Cleanse with wound cleanser, apply pea amount of Triad paste and place cotton and tape as needed.  If swimming you will need to use Tegaderm to cover and change after swimming. Do not sit in wet bathing suit for long periods.    Change once to twice a day as needed     Toyin Yi PA-C     June 11, 2021       Thank you for coming today and if you had a positive experience please indicate on your patient satisfaction survey form that [facility] will be sending you.    If you have any billing related questions please call the [facility] Business office at [phone]. The clinic staff does not handle billing related matters.

## 2021-06-24 ENCOUNTER — TELEPHONE (OUTPATIENT)
Dept: FAMILY MEDICINE | Facility: CLINIC | Age: 26
End: 2021-06-24

## 2021-06-24 NOTE — TELEPHONE ENCOUNTER
Reason for Call:  Form, our goal is to have forms completed with 72 hours, however, some forms may require a visit or additional information.    Type of letter, form or note:  Home Care Revision    Who is the form from?: Home care    Where did the form come from: form was faxed in    What clinic location was the form placed at?: [facility] [phone]    Where the form was placed: TC Box/Folder    What number is listed as a contact on the form?: [phone]       Additional comments:     Call taken on 6/24/2021 at 4:30 PM by Batool Saucedo

## 2021-06-28 NOTE — TELEPHONE ENCOUNTER
Form has been faxed to Toyin Yi at [facility] fax#[phone], ph#[phone], please see message below from Suzan Willis    Thanks  Sis Taylor RT (R)

## 2021-06-29 ENCOUNTER — MEDICAL CORRESPONDENCE (OUTPATIENT)
Dept: HEALTH INFORMATION MANAGEMENT | Facility: CLINIC | Age: 26
End: 2021-06-29

## 2021-07-02 ENCOUNTER — VIRTUAL VISIT (OUTPATIENT)
Dept: INFECTIOUS DISEASES | Facility: CLINIC | Age: 26
End: 2021-07-02
Attending: INTERNAL MEDICINE
Payer: MEDICARE

## 2021-07-02 DIAGNOSIS — N31.9 NEUROGENIC BLADDER: ICD-10-CM

## 2021-07-02 DIAGNOSIS — M86.651 CHRONIC OSTEOMYELITIS OF PELVIS, RIGHT (H): ICD-10-CM

## 2021-07-02 DIAGNOSIS — L89.319 DECUBITUS ULCER OF RIGHT ISCHIAL TUBEROSITY REGION: ICD-10-CM

## 2021-07-02 DIAGNOSIS — G82.50 QUADRIPLEGIA (H): ICD-10-CM

## 2021-07-02 PROCEDURE — 99214 OFFICE O/P EST MOD 30 MIN: CPT | Mod: 95 | Performed by: INTERNAL MEDICINE
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
## 2021-07-14 ENCOUNTER — TELEPHONE (OUTPATIENT)
Dept: FAMILY MEDICINE | Facility: CLINIC | Age: 26
End: 2021-07-14

## 2021-07-14 NOTE — TELEPHONE ENCOUNTER
Reason for Call:  Form, our goal is to have forms completed with 72 hours, however, some forms may require a visit or additional information.    Type of letter, form or note:  Home Health Certification    Who is the form from?: Home care    Where did the form come from: form was faxed in    What clinic location was the form placed at?: [facility] [phone]    Where the form was placed: tc Box/Folder    What number is listed as a contact on the form?: [phone]       Additional comments:     Call taken on 7/14/2021 at 2:58 PM by Batool Saucedo

## 2021-07-15 ENCOUNTER — MEDICAL CORRESPONDENCE (OUTPATIENT)
Dept: HEALTH INFORMATION MANAGEMENT | Facility: CLINIC | Age: 26
End: 2021-07-15

## 2021-07-15 DIAGNOSIS — Z53.9 DIAGNOSIS NOT YET DEFINED: Primary | ICD-10-CM

## 2021-07-15 PROCEDURE — G0179 MD RECERTIFICATION HHA PT: HCPCS | Performed by: PHYSICIAN ASSISTANT

## 2021-07-20 ENCOUNTER — HOSPITAL ENCOUNTER (OUTPATIENT)
Dept: WOUND CARE | Facility: CLINIC | Age: 26
Discharge: HOME | End: 2021-07-20
Attending: PHYSICIAN ASSISTANT | Admitting: PHYSICIAN ASSISTANT
Payer: MEDICARE

## 2021-07-20 VITALS
DIASTOLIC BLOOD PRESSURE: 61 MMHG | SYSTOLIC BLOOD PRESSURE: 89 MMHG | TEMPERATURE: 97.8 F | HEART RATE: 78 BPM | RESPIRATION RATE: 16 BRPM

## 2021-07-20 DIAGNOSIS — L89.314 DECUBITUS ULCER OF RIGHT ISCHIUM, STAGE 4 (H): ICD-10-CM

## 2021-07-20 DIAGNOSIS — E44.0 MODERATE PROTEIN-CALORIE MALNUTRITION (H): ICD-10-CM

## 2021-07-20 PROCEDURE — 17250 CHEM CAUT OF GRANLTJ TISSUE: CPT

## 2021-07-20 PROCEDURE — 17250 CHEM CAUT OF GRANLTJ TISSUE: CPT | Performed by: PHYSICIAN ASSISTANT

## 2021-07-20 NOTE — PROGRESS NOTES
[facility]    ASSESSMENT:   1. Stage 4 pressure ulcer R IT  2. R IT osteomyelitis - s/p bone debridement and s/p IV antibiotic therapy      PLAN:   1. Cont to dress with Triad paste and cotton ball or gauze and change daily and PRN.   2. We discussed the risks associated with getting the wound in any body of water. If she chooses to swim I recommend she cover with Tegaderm film (or equivalent) and immediately cleanse and put a clean bandage and clean underwear on after leaving the water. She should monitor for signs of infection closely after swimming.   3. Dianna will continue to need a group 2, low air loss mattress due to a stage 4 pressure ulceration on the patient's pelvis that has failed to improve on a normal mattress despite regular wound cares and repositioning. She has a long history of large stage 3 and stage 4 pressure ulcerations and it is medically necessary for her to be on a low air loss mattress to continue treating her current wound as well as preventing new ulcerations. Patient has impaired sensation due to quadriplegia and is unable to reposition independently.       FOLLOW-UP: 1 month      HISTORY OF PRESENT ILLNESS: Ms. Dianna Cottrell is a 26-year-old quadriplegic woman who returns for a R IT ulcer. Course of this wound in brief as follows:    12/3/19: MRI indicative of osteomyelitis.  9/18/20:  I&D with VAC placement. Was originally anticipating flap procedure in March but this was converted in an effort to keep her out of a facility during the pandemic  11/4/20: Seating assessment at [facility] - a new seating system has been recommended and ordered, this includes a new high profile Roho cushion  11/11/20: Returns for follow-up. Continues on Cefazolin per ID, they recommend reculture if wound stalls/worsens. Wound appears to be improving today, undermined pocket is very tight and I suspect VAC sponge is keeping it open.  11/23/20: IV antibiotics discontinued, PICC  line removed.   12/17/20: Follow-up with Dr. Zacarias. Debrided in clinic with Avel. Held VAC and started on AMD due to concern of possible infection and periwound irritation.   1/6/21: VAC restarted  1/20/21: Returns for follow-up. Still on VAC, instructed to back off on how much sponge nursing is using. Vit D drawn and came back low at 28ug/L. Started on 50kIU x 6 wks.  2/18/21: Visited with Dr. Zacarias. NPWT was discontinued, started on alternating Endoform AM and AMD.   03/16/21: Returns for follow-up. Wound is more granular and viable appearing than previous visits. Still has tunnel but seems like it is narrowing. Continued alternating Endoform AM and AMD.  4/15/21: Follow-up with Dr. Zacarias. Wound was improving. Continued alternating Endoform AM and AMD.  5/12/21: Returns for follow-up.  Wound is nearly closed. Base is granular. Still having issues trying to obtain low air loss mattress.   6/11/21: Returns for follow-up. Wound is nearly closed. The base is macerated but very shallow. She is interested in swimming with her kids and inquires about PRN waterproof bandage use.   7/20/21: Returns for follow-up. Wound is just a very small slit with granular base. Treated with silver nitrate today.     DATE WOUND ACQUIRED: 6/2018     OFFLOADING: APM mattress, chair with Roho cushion, last assessed 11/4/20 and was recommended to get a new chair      SOCIAL HISTORY: has PCA help, has two kids, hoping to do public speaking in the future     PHYSICAL EXAM:  GENERAL: Patient is alert and oriented and in no acute distress  INTEGUMENTARY:   Wound (used by OP [facility] only) 10/14/19 1407 Right pressure injury (Active)   Thickness/Stage Stage 4 07/20/21 1344   Dressing Appearance moist drainage 07/20/21 1344   Base granulating 07/20/21 1344   Periwound intact 07/20/21 1344   Periwound Temperature warm 07/20/21 1344   Periwound Skin Turgor soft 07/20/21 1344   Edges open 07/20/21 1344   Length (cm) 0.3 07/20/21 1344   Width (cm)  0.2 07/20/21 1344   Depth (cm) 0.3 07/20/21 1344   Wound (cm^2) 0.06 cm^2 07/20/21 1344   Wound Volume (cm^3) 0.02 cm^3 07/20/21 1344   Wound healing % 96.67 07/20/21 1344   Drainage Characteristics/Odor serosanguineous 07/20/21 1344   Drainage Amount moderate 07/20/21 1344   Care, Wound chemical cautery applied 07/20/21 1344   Dressing Care dressing applied 07/20/21 1344         PROCEDURE: After verbal consent was obtained, hypergranulation tissue was treated with silver nitrate. Patient tolerated this well.          VANIA AGUILAR PA-C

## 2021-07-20 NOTE — DISCHARGE INSTRUCTIONS
[facility]  [address] [address]    Call us at [phone] if you have any questions about your wounds, have redness or swelling around your wound, have a fever of 101 or greater or if you have any other problems or concerns. We answer the phone Monday through Friday 8 am to 4 pm, please leave a message as we check the voicemail frequently throughout the day.     Dianna Cottrell      1995    [facility] Phone: [phone] Fax: [phone]    Wound care recommendations to right ischial tuberosity ulcer  Cleanse with wound cleanser, apply pea amount of Triad paste and place cotton ball or gauze 4x4 and tape as needed.    If swimming you will need to use Tegaderm to cover and change after swimming.   Do not sit in wet bathing suit for long periods.  Change once to twice a day as needed     Toyin Yi PA-C. July 20, 2021    Return to [facility] as scheduled  If you had a positive experience please indicate on your patient satisfaction survey form that [facility] will be sending you.    If you have any billing related questions please call the [facility] Business office at [phone]. The clinic staff does not handle billing related matters.

## 2021-07-20 NOTE — ADDENDUM NOTE
Encounter addended by: Floridalma Barr RN on: 7/20/2021 2:20 PM   Actions taken: Charge Capture section accepted

## 2021-07-28 ENCOUNTER — TELEPHONE (OUTPATIENT)
Dept: UROLOGY | Facility: CLINIC | Age: 26
End: 2021-07-28

## 2021-07-28 NOTE — TELEPHONE ENCOUNTER
[facility] Call Center    Phone Message    May a detailed message be left on voicemail: yes     Reason for Call: Order(s): Home Care Orders: Skilled Nursing: RN Philomena Glover HC nurse for PT Josettte Johnson called requesting same-day verbal orders for UA+UC for PT Dianna due to UTI symptoms. Pls call her to issue verbal orders [phone].    Action Taken: Other: UA URO    Travel Screening: Not Applicable

## 2021-07-28 NOTE — TELEPHONE ENCOUNTER
Nurse Philomena reports cloudy urine with an odor. She informed that this is usually a tell-tale sign that one is coming. Per protocol gave verbal orders for [facility]. Also she mentioned that they will fax results.     Aide Cottrell LPN

## 2021-08-04 ENCOUNTER — TELEPHONE (OUTPATIENT)
Dept: FAMILY MEDICINE | Facility: CLINIC | Age: 26
End: 2021-08-04

## 2021-08-04 NOTE — CONFIDENTIAL NOTE
What type of form? Revision to the Plan of Care  What day did you drop off your forms? Faxed 8.4.2021  Is there a due date? asap (7-10 business days to compete forms)   How would you like to receive these forms? Please fax to [phone]    What is the best number to contact you? Work [phone]  What time works best to contact you with in 4 hrs? any  Is it okay to leave a message? Yes    Ava Roy (Auto signs name of person logged into Epic)

## 2021-08-05 ENCOUNTER — MEDICAL CORRESPONDENCE (OUTPATIENT)
Dept: HEALTH INFORMATION MANAGEMENT | Facility: CLINIC | Age: 26
End: 2021-08-05

## 2021-08-06 NOTE — TELEPHONE ENCOUNTER
Form has been faxed and scanned and confirmed on RightFax   Closing encounter  Sis Taylor RT (R)

## 2021-08-09 NOTE — TELEPHONE ENCOUNTER
Received results last Friday and reviewed. No sensitivities were done on culture, because they thought it was possible contamination. Called nurse Philomena and she reports that patient is having sediment and odor in urine. No other symptoms at present instructed that if worsening symptoms to call back and we can order another urine culture. If patient develops a fever, she will want to call our office or go to ER.     Aide Cottrell LPN

## 2021-08-11 ENCOUNTER — VIRTUAL VISIT (OUTPATIENT)
Dept: INFECTIOUS DISEASES | Facility: CLINIC | Age: 26
End: 2021-08-11
Attending: INTERNAL MEDICINE
Payer: MEDICARE

## 2021-08-11 DIAGNOSIS — N31.9 NEUROGENIC BLADDER: ICD-10-CM

## 2021-08-11 DIAGNOSIS — L89.319 DECUBITUS ULCER OF RIGHT ISCHIAL TUBEROSITY REGION: Primary | ICD-10-CM

## 2021-08-11 DIAGNOSIS — M86.651 CHRONIC OSTEOMYELITIS OF PELVIS, RIGHT (H): ICD-10-CM

## 2021-08-11 DIAGNOSIS — G82.50 QUADRIPLEGIA (H): ICD-10-CM

## 2021-08-11 PROCEDURE — 99442 PR PHYSICIAN TELEPHONE EVALUATION 11-20 MIN: CPT | Mod: 95 | Performed by: INTERNAL MEDICINE

## 2021-08-11 ASSESSMENT — PAIN SCALES - GENERAL: PAINLEVEL: NO PAIN (0)
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
## 2021-08-17 ENCOUNTER — TELEPHONE (OUTPATIENT)
Dept: UROLOGY | Facility: CLINIC | Age: 26
End: 2021-08-17

## 2021-08-17 NOTE — TELEPHONE ENCOUNTER
"Urology pt of Ashia Pablo PA-C last seen 5/28/2021.  Call today from Philomena with [facility] re: UC results from 8/11. Result was noted as \"Specimen appears contaminated. No further workup pending\". Philomena expressed concern because sensitivities were not done to determine antibiotic and pt has on-going symptoms of very strong odor to urine and some sediment in urine. Temp denied. She states symptoms have been present for more than 2 weeks. Dianna does drink good amounts of fluids. Home care is concerned that if untreated, UTI will progress as has happened for pt before. Philomena had called [facility] re: running sensitivities but lab told her they needed something from provider to be able to move forward on this.   Called [facility] at [phone]. Spoke with ALEXI. She will fax results and wants provider to determine which organisms to run sensitivities on, sandra these on the result sheet,sign and fax back to their lab.  All of the above done and faxed back to José at [phone]. Spoke with ALEXI again. They received fax and will run sensitivities. May take about 24 hours for results. Home care updated.  "

## 2021-08-18 ENCOUNTER — TELEPHONE (OUTPATIENT)
Dept: FAMILY MEDICINE | Facility: CLINIC | Age: 26
End: 2021-08-18

## 2021-08-18 NOTE — TELEPHONE ENCOUNTER
Reason for Call:  Form, our goal is to have forms completed with 72 hours, however, some forms may require a visit or additional information.    Type of letter, form or note:  Home Health Certification    Who is the form from?: Home care    Where did the form come from: form was faxed in    What clinic location was the form placed at?: [facility] [phone]    Where the form was placed: TC Box/Folder    What number is listed as a contact on the form?: [phone]       Additional comments:     Call taken on 8/18/2021 at 9:25 AM by Batool Saucedo

## 2021-08-19 ENCOUNTER — MEDICAL CORRESPONDENCE (OUTPATIENT)
Dept: HEALTH INFORMATION MANAGEMENT | Facility: CLINIC | Age: 26
End: 2021-08-19

## 2021-08-20 ENCOUNTER — TRANSFERRED RECORDS (OUTPATIENT)
Dept: HEALTH INFORMATION MANAGEMENT | Facility: CLINIC | Age: 26
End: 2021-08-20

## 2021-08-20 ENCOUNTER — TELEPHONE (OUTPATIENT)
Dept: UROLOGY | Facility: CLINIC | Age: 26
End: 2021-08-20

## 2021-08-20 DIAGNOSIS — N39.0 URINARY TRACT INFECTION: Primary | ICD-10-CM

## 2021-08-20 RX ORDER — LEVOFLOXACIN 750 MG/1
750 TABLET, FILM COATED ORAL DAILY
Qty: 7 TABLET | Refills: 0 | Status: SHIPPED | OUTPATIENT
Start: 2021-08-20 | End: 2021-08-27

## 2021-08-20 NOTE — TELEPHONE ENCOUNTER
"Called home care nurse, Philomena, with the direction as below. She expressed understanding. Rx sent to pt's pharmacy.  JOSE Brown RN      ----- Message from Ashia Pablo PA-C sent at 8/20/2021 10:47 AM CDT -----  Regarding: RE: sensitivities in  Levaquin 750mg daily x 7 days    ----- Message -----  From: Angela Brown RN  Sent: 8/20/2021   9:18 AM CDT  To: Ashia Pablo PA-C, #  Subject: sensitivities in                                 Hello,  Sensitivities in for Dianna. They're in Care Everywhere under [facility], dated 8/11, under \"Urine Culture Additional Workup\".  Thank you,  Angela        "

## 2021-08-24 ENCOUNTER — HOSPITAL ENCOUNTER (OUTPATIENT)
Dept: WOUND CARE | Facility: CLINIC | Age: 26
End: 2021-08-24
Attending: PHYSICIAN ASSISTANT
Payer: MEDICARE

## 2021-08-24 VITALS
DIASTOLIC BLOOD PRESSURE: 56 MMHG | RESPIRATION RATE: 16 BRPM | SYSTOLIC BLOOD PRESSURE: 89 MMHG | WEIGHT: 123.46 LBS | BODY MASS INDEX: 19.93 KG/M2 | TEMPERATURE: 97.3 F | HEART RATE: 88 BPM

## 2021-08-24 DIAGNOSIS — T83.511A URINARY TRACT INFECTION ASSOCIATED WITH INDWELLING URETHRAL CATHETER, INITIAL ENCOUNTER (H): ICD-10-CM

## 2021-08-24 DIAGNOSIS — N39.0 URINARY TRACT INFECTION ASSOCIATED WITH INDWELLING URETHRAL CATHETER, INITIAL ENCOUNTER (H): ICD-10-CM

## 2021-08-24 DIAGNOSIS — L89.314 DECUBITUS ULCER OF RIGHT ISCHIUM, STAGE 4 (H): Primary | Chronic | ICD-10-CM

## 2021-08-24 PROCEDURE — 97602 WOUND(S) CARE NON-SELECTIVE: CPT

## 2021-08-24 PROCEDURE — 99213 OFFICE O/P EST LOW 20 MIN: CPT | Performed by: PHYSICIAN ASSISTANT

## 2021-08-24 NOTE — DISCHARGE INSTRUCTIONS
[facility]  [address] [address]    Call us at [phone] if you have any questions about your wounds, have redness or swelling around your wound, have a fever of 101 or greater or if you have any other problems or concerns. We answer the phone Monday through Friday 8 am to 4 pm, please leave a message as we check the voicemail frequently throughout the day.     Dianna Cottrell      1995    Wound Dressing Change:  Right Ischium  Cleanse wound and surrounding skin with: wound spray or soap and water, pat dry  Cover with Mepilex 4x4 and leave for 2-3 days; may lift to observe wound base; use for 3 dressings  After skin more mature and mepilex are done; apply Sween and gently massage into bottom daily and as needed     Toyin Yi PA-C. August 24, 2021    Phone visit to [facility] with next visit  If you had a positive experience please indicate on your patient satisfaction survey form that [facility] will be sending you.    If you have any billing related questions please call the [facility] Business office at [phone]. The clinic staff does not handle billing related matters.

## 2021-08-24 NOTE — PROGRESS NOTES
[facility]    ASSESSMENT:   1. Healed stage 4 pressure ulcer of R IT      PLAN:   1. Protect with mepilex border, change every 2-3 days as needed for maceration or soilage. After mepilex runs out apply small amount of Sween or other dimethicone moisturizer to area  2. Dianna will continue to need a group 2, low air loss mattress due to a stage 4 pressure ulceration on the patient's pelvis that has failed to improve on a normal mattress despite regular wound cares and repositioning. She has a long history of large stage 3 and stage 4 pressure ulcerations and it is medically necessary for her to be on a low air loss mattress to continue treating her current wound as well as preventing new ulcerations. Patient has impaired sensation due to quadriplegia and is unable to reposition independently.       FOLLOW-UP: 3 week telemed      HISTORY OF PRESENT ILLNESS: Ms. Dianna oCttrell is a 26-year-old quadriplegic woman who returns for a R IT ulcer. Course of this wound in brief as follows:    12/3/19: MRI indicative of osteomyelitis.  9/18/20:  I&D with VAC placement. Was originally anticipating flap procedure in March but this was converted in an effort to keep her out of a facility during the pandemic  11/4/20: Seating assessment at [facility] - a new seating system has been recommended and ordered, this includes a new high profile Roho cushion  11/11/20: Returns for follow-up. Continues on Cefazolin per ID, they recommend reculture if wound stalls/worsens. Wound appears to be improving today, undermined pocket is very tight and I suspect VAC sponge is keeping it open.  11/23/20: IV antibiotics discontinued, PICC line removed.   12/17/20: Follow-up with Dr. Zacarias. Debrided in clinic with Avel. Held VAC and started on AMD due to concern of possible infection and periwound irritation.   1/6/21: VAC restarted  1/20/21: Returns for follow-up. Still on VAC, instructed to back off on how much sponge  nursing is using. Vit D drawn and came back low at 28ug/L. Started on 50kIU x 6 wks.  2/18/21: Visited with Dr. Zacarias. NPWT was discontinued, started on alternating Endoform AM and AMD.   03/16/21: Returns for follow-up. Wound is more granular and viable appearing than previous visits. Still has tunnel but seems like it is narrowing. Continued alternating Endoform AM and AMD.  4/15/21: Follow-up with Dr. Zacarias. Wound was improving. Continued alternating Endoform AM and AMD.  5/12/21: Returns for follow-up.  Wound is nearly closed. Base is granular. Still having issues trying to obtain low air loss mattress.   6/11/21: Returns for follow-up. Wound is nearly closed. The base is macerated but very shallow. She is interested in swimming with her kids and inquires about PRN waterproof bandage use.   7/20/21: Returns for follow-up. Wound is just a very small slit with granular base. Treated with silver nitrate today. Has been using Triad paste and gauze cover dressing, will continue this.   8/24/21: Returns for follow-up. Wound appears closed but there is still a small defect. Possibly partial thickness ulcer at base but too small to really visualize.     DATE WOUND ACQUIRED: 6/2018     OFFLOADING: APM mattress, chair with Roho cushion, last assessed 11/4/20 and was recommended to get a new chair      SOCIAL HISTORY: has PCA help, has two kids, hoping to do public speaking in the future     PHYSICAL EXAM:  GENERAL: Patient is alert and oriented and in no acute distress  INTEGUMENTARY:       MDM: 20-29 minutes was spent on the day of visit reviewing previous chart notes, assessing the patient and developing the plan of care, this excludes time spent on any procedures.       VANIA AGUILAR PA-C

## 2021-08-25 ENCOUNTER — TELEPHONE (OUTPATIENT)
Dept: FAMILY MEDICINE | Facility: CLINIC | Age: 26
End: 2021-08-25

## 2021-08-25 ENCOUNTER — MEDICAL CORRESPONDENCE (OUTPATIENT)
Dept: HEALTH INFORMATION MANAGEMENT | Facility: CLINIC | Age: 26
End: 2021-08-25

## 2021-08-25 DIAGNOSIS — F32.4 MAJOR DEPRESSIVE DISORDER WITH SINGLE EPISODE, IN PARTIAL REMISSION (H): ICD-10-CM

## 2021-08-25 DIAGNOSIS — F41.1 GAD (GENERALIZED ANXIETY DISORDER): ICD-10-CM

## 2021-08-25 RX ORDER — METHENAMINE HIPPURATE 1000 MG/1
TABLET ORAL
Qty: 180 TABLET | Refills: 2 | Status: SHIPPED | OUTPATIENT
Start: 2021-08-25 | End: 2023-03-08

## 2021-08-25 NOTE — TELEPHONE ENCOUNTER
Reason for Call:  Form, our goal is to have forms completed with 72 hours, however, some forms may require a visit or additional information.    Type of letter, form or note:  Home Care Revision to plan care    Who is the form from?: Home care    Where did the form come from: form was faxed in    What clinic location was the form placed at?: [facility] [phone]    Where the form was placed: [facility] Box/Folder    What number is listed as a contact on the form?: [phone]       Additional comments:     Call taken on 8/25/2021 at 2:16 PM by Batool Saucedo

## 2021-08-26 ENCOUNTER — MEDICAL CORRESPONDENCE (OUTPATIENT)
Dept: HEALTH INFORMATION MANAGEMENT | Facility: CLINIC | Age: 26
End: 2021-08-26

## 2021-08-26 DIAGNOSIS — Z53.9 DIAGNOSIS NOT YET DEFINED: Primary | ICD-10-CM

## 2021-08-26 PROCEDURE — G0179 MD RECERTIFICATION HHA PT: HCPCS | Performed by: PHYSICIAN ASSISTANT

## 2021-08-26 RX ORDER — SERTRALINE HYDROCHLORIDE 100 MG/1
TABLET, FILM COATED ORAL
Qty: 135 TABLET | Refills: 0 | Status: SHIPPED | OUTPATIENT
Start: 2021-08-26 | End: 2021-12-30

## 2021-08-26 NOTE — TELEPHONE ENCOUNTER
Form has been faxed and scanned and confirmed on Right Fax   Closing encounter  Sis Taylor RT (R)

## 2021-08-26 NOTE — TELEPHONE ENCOUNTER
Pending Prescriptions:                       Disp   Refills    sertraline (ZOLOFT) 100 MG tablet [Pharmac*135 ta*1        Sig: TAKE 1 AND 1/2 TABLETS BY MOUTH EVERY DAY    Routing refill request to provider for review/approval because:  Labs not current:  PHQ9  PHQ-9 score:    PHQ 9/3/2020   PHQ-9 Total Score 2   Q9: Thoughts of better off dead/self-harm past 2 weeks Not at all

## 2021-08-27 ENCOUNTER — TELEPHONE (OUTPATIENT)
Dept: FAMILY MEDICINE | Facility: CLINIC | Age: 26
End: 2021-08-27

## 2021-08-27 NOTE — TELEPHONE ENCOUNTER
Reason for Call:  Form, our goal is to have forms completed with 72 hours, however, some forms may require a visit or additional information.    Type of letter, form or note:  Home Health Certification    Who is the form from?: Home care    Where did the form come from: Patient or family brought in       What clinic location was the form placed at?: [facility] [phone]    Where the form was placed: PT Box/Folder    What number is listed as a contact on the form?:[phone]       Additional comments:     Call taken on 8/27/2021 at 11:07 AM by Batool Saucedo

## 2021-08-27 NOTE — TELEPHONE ENCOUNTER
Refilled. Please contact pt to schedule a visit. Can be virtual if easier than in person. Last visit Jan 2021.   Suzan Willis PA-C

## 2021-09-01 ENCOUNTER — VIRTUAL VISIT (OUTPATIENT)
Dept: UROLOGY | Facility: CLINIC | Age: 26
End: 2021-09-01
Payer: MEDICARE

## 2021-09-01 DIAGNOSIS — N39.0 URINARY TRACT INFECTION ASSOCIATED WITH INDWELLING URETHRAL CATHETER, INITIAL ENCOUNTER (H): Primary | ICD-10-CM

## 2021-09-01 DIAGNOSIS — T83.511A URINARY TRACT INFECTION ASSOCIATED WITH INDWELLING URETHRAL CATHETER, INITIAL ENCOUNTER (H): Primary | ICD-10-CM

## 2021-09-01 PROCEDURE — 99214 OFFICE O/P EST MOD 30 MIN: CPT | Mod: 95 | Performed by: PHYSICIAN ASSISTANT

## 2021-09-01 NOTE — PATIENT INSTRUCTIONS
Kidney ultrasound.     Will arrange for you to establish care with Dr. Carter to discuss ongoing UTIs with catheter in place.

## 2021-09-01 NOTE — PROGRESS NOTES
*SEND LINK TO CELL PHONE*      Dianna is a 26 year old who is being evaluated via a billable phone visit.      Duration: 6 min     CC: UTIs    HPI:  Dianna Cottrell is a 26 year old female who presents via billable video visit  for evaluation of the above. Has had several UTIs the past few months (cloudy, odor, hematuria, pseudomonas).    Urethral Winston exchange every 4 weeks, 16 Fr. Has seen Dr. Max in the past and did require Botox injections in the past for bladder spasticity (last saw him 3 months ago, retiring).  Last cysto in  and Botox a few years ago.     Sees Dr. Collins of ID for wound treatment.     Multi organisms on recent UC. Noting no odor now and improved with course of Levaquin.     Past Medical History:   Diagnosis Date     Anemia     with pregnancy     c5 burst fracture 2012    C5-C7 fracture with cord injury     Compression fracture of L1 lumbar vertebra (H) 2012    L1 superior endplate compression fracture     Encounter for insertion of mirena IUD 2017     Fracture of thoracic spine without spinal cord lesion (H) 2012    T3-T8 spinous process fractures     History of spinal cord injury      History of thrombophlebitis      Hypertension 2016     Vocal cord dysfunction     Left vocal cord weakness noted by ENT post extubation 2012       Past Surgical History:   Procedure Laterality Date     BLADDER SURGERY       C4-C7 interbody fusion with anterior screw and plate fixation and posterior erna and pedicle screw fixation with interspace bone graft and C5 and C6 partial corpectomies  2012      SECTION  2013    Procedure:  SECTION;   Section ;  Surgeon: Ricki Nelson MD;  [facility]      SECTION N/A 2016    Procedure:  SECTION;  Surgeon: Floridalma Kiran MD;  [facility]     CONIZATION LEEP N/A 2021    Procedure: CONE BIOPSY, CERVIX, USING LOOP ELECTROSURGICAL EXCISION PROCEDURE  (LEEP) and ECC;  Surgeon: Carlotta Lock MD;  [facility]     INSERT INTRAUTERINE DEVICE N/A 2/2/2021    Procedure: INSERTION, INTRAUTERINE DEVICE , replacement of Mirena Intrauterine device;  Surgeon: Carlotta Lock MD;  [facility]     IR IVC FILTER PLACEMENT  12/19/2012     IRRIGATION AND DEBRIDEMENT BUTTOCKS Right 9/18/2020    Procedure: Sharp excisional debridement of right ischial tuberosity decubitus,   Bone biopsies for cultures and path,  VAC Via placement.;  Surgeon: Vira Zacarias MD;  [facility]     LUMBAR DRAIN  12/18/2012       Social History     Socioeconomic History     Marital status: Single     Spouse name: Not on file     Number of children: Not on file     Years of education: Not on file     Highest education level: Not on file   Occupational History     Not on file   Tobacco Use     Smoking status: Current Every Day Smoker     Packs/day: 0.50     Types: Cigarettes, Vaping Device     Smokeless tobacco: Never Used     Tobacco comment: used 1/2-1 ppd for 4 years, quit 2012, now daily e cig use.    Substance and Sexual Activity     Alcohol use: No     Alcohol/week: 0.0 standard drinks     Drug use: Yes     Types: Marijuana     Comment: 3 joints per day     Sexual activity: Not Currently     Partners: Male     Birth control/protection: I.U.D.     Comment: Mirena 12/13/17   Other Topics Concern     Parent/sibling w/ CABG, MI or angioplasty before 65F 55M? Not Asked   Social History Narrative     Not on file     Social Determinants of Health     Financial Resource Strain:      Difficulty of Paying Living Expenses:    Food Insecurity:      Worried About Running Out of Food in the Last Year:      Ran Out of Food in the Last Year:    Transportation Needs:      Lack of Transportation (Medical):      Lack of Transportation (Non-Medical):    Physical Activity:      Days of Exercise per Week:      Minutes of Exercise per Session:    Stress:      Feeling of Stress :     Social Connections:      Frequency of Communication with Friends and Family:      Frequency of Social Gatherings with Friends and Family:      Attends Gnosticism Services:      Active Member of Clubs or Organizations:      Attends Club or Organization Meetings:      Marital Status:    Intimate Partner Violence:      Fear of Current or Ex-Partner:      Emotionally Abused:      Physically Abused:      Sexually Abused:        Family History   Problem Relation Age of Onset     Lung Cancer Maternal Grandfather      Hypertension No family hx of      Diabetes No family hx of        ROS:14 point ROS neg other than the symptoms noted above in the HPI.    Allergies   Allergen Reactions     Succinylcholine      Spinal cord injury 12/18/12, patient at risk for extrajunctional receptors and hyperkalemia       Current Outpatient Medications   Medication     acetaminophen (TYLENOL) 325 MG tablet     albuterol (PROVENTIL) (2.5 MG/3ML) 0.083% neb solution     baclofen (LIORESAL) 10 MG tablet     BISACODYL     Coloplast barrier cream CREA     Cranberry 450 MG TABS     Docusate Sodium (COLACE PO)     gabapentin (NEURONTIN) 300 MG capsule     GABAPENTIN PO     hydrOXYzine (VISTARIL) 25 MG capsule     hyoscyamine (ANASPAZ/LEVSIN) 0.125 MG tablet     ibuprofen (ADVIL/MOTRIN) 600 MG tablet     methenamine (HIPREX) 1 g tablet     midodrine (PROAMATINE) 5 MG tablet     Multiple Vitamin (DAILY MULTIVITAMIN PO)     mupirocin (BACTROBAN) 2 % external ointment     ondansetron (ZOFRAN-ODT) 4 MG ODT tab     order for DME     order for DME     order for DME     order for DME     order for DME     oxybutynin ER (DITROPAN-XL) 5 MG 24 hr tablet     povidone-iodine 10 % swab     senna-docusate (SENOKOT-S/PERICOLACE) 8.6-50 MG tablet     senna-docusate (SENOKOT-S;PERICOLACE) 8.6-50 MG per tablet     sertraline (ZOLOFT) 100 MG tablet     sodium chloride (NEBUSAL) 3 % neb solution     spacer (OPTICHAMBER JAIDA) holding chamber     No current  facility-administered medications for this visit.     Facility-Administered Medications Ordered in Other Visits   Medication     levonorgestrel (MIRENA) 20 MCG/24HR IUD         PEx:     PSYCH: NAD        A/P: Dianna Cottrell is a 26 year old female with Chronic indwelling Winston, rUTIs  -Methenamine and Vit C x 90 days  -Cranberry pill daily  -Check UC if symptoms  -Renal US  -Discussed consideration of suprapubic tube placement.  Would need to establish with female urology to discuss further. Will    Ashia Pablo PA-C  [facility]      16 minutes spent on the date of the encounter doing chart review, review of outside records, review of test results, interpretation of tests, patient visit and documentation   [id number]}

## 2021-09-01 NOTE — LETTER
2021       RE: Dianna Cottrell  [address] [address]     Dear Colleague,    Thank you for referring your patient, Dianna Cottrell, to the [facility] at [facility]. Please see a copy of my visit note below.    *SEND LINK TO CELL PHONE*      Dianna is a 26 year old who is being evaluated via a billable phone visit.      Duration: 6 min     CC: UTIs    HPI:  Dianna Cottrell is a 26 year old female who presents via billable video visit  for evaluation of the above. Has had several UTIs the past few months (cloudy, odor, hematuria, pseudomonas).    Urethral Winston exchange every 4 weeks, 16 Fr. Has seen Dr. Max in the past and did require Botox injections in the past for bladder spasticity (last saw him 3 months ago, retiring).  Last cysto in  and Botox a few years ago.     Sees Dr. Collins of ID for wound treatment.     Multi organisms on recent UC. Noting no odor now and improved with course of Levaquin.     Past Medical History:   Diagnosis Date     Anemia     with pregnancy     c5 burst fracture 2012    C5-C7 fracture with cord injury     Compression fracture of L1 lumbar vertebra (H) 2012    L1 superior endplate compression fracture     Encounter for insertion of mirena IUD 2017     Fracture of thoracic spine without spinal cord lesion (H) 2012    T3-T8 spinous process fractures     History of spinal cord injury      History of thrombophlebitis      Hypertension 2016     Vocal cord dysfunction     Left vocal cord weakness noted by ENT post extubation 2012       Past Surgical History:   Procedure Laterality Date     BLADDER SURGERY       C4-C7 interbody fusion with anterior screw and plate fixation and posterior erna and pedicle screw fixation with interspace bone graft and C5 and C6 partial corpectomies  2012      SECTION  2013    Procedure:   SECTION;   Section ;  Surgeon: Ricki Nelson MD;  [facility]      SECTION N/A 2016    Procedure:  SECTION;  Surgeon: Floridalma Kiran MD;  [facility]     CONIZATION LEEP N/A 2021    Procedure: CONE BIOPSY, CERVIX, USING LOOP ELECTROSURGICAL EXCISION PROCEDURE (LEEP) and ECC;  Surgeon: Carlotta Lock MD;  [facility]     INSERT INTRAUTERINE DEVICE N/A 2021    Procedure: INSERTION, INTRAUTERINE DEVICE , replacement of Mirena Intrauterine device;  Surgeon: Carlotta Lock MD;  [facility]     IR IVC FILTER PLACEMENT  2012     IRRIGATION AND DEBRIDEMENT BUTTOCKS Right 2020    Procedure: Sharp excisional debridement of right ischial tuberosity decubitus,   Bone biopsies for cultures and path,  VAC Via placement.;  Surgeon: Vira Zacarias MD;  [facility]     LUMBAR DRAIN  2012       Social History     Socioeconomic History     Marital status: Single     Spouse name: Not on file     Number of children: Not on file     Years of education: Not on file     Highest education level: Not on file   Occupational History     Not on file   Tobacco Use     Smoking status: Current Every Day Smoker     Packs/day: 0.50     Types: Cigarettes, Vaping Device     Smokeless tobacco: Never Used     Tobacco comment: used 1/2-1 ppd for 4 years, quit , now daily e cig use.    Substance and Sexual Activity     Alcohol use: No     Alcohol/week: 0.0 standard drinks     Drug use: Yes     Types: Marijuana     Comment: 3 joints per day     Sexual activity: Not Currently     Partners: Male     Birth control/protection: I.U.D.     Comment: Mirena 15   Other Topics Concern     Parent/sibling w/ CABG, MI or angioplasty before 65F 55M? Not Asked   Social History Narrative     Not on file     Social Determinants of Health     Financial Resource Strain:      Difficulty of Paying Living Expenses:    Food Insecurity:      Worried About  Running Out of Food in the Last Year:      Ran Out of Food in the Last Year:    Transportation Needs:      Lack of Transportation (Medical):      Lack of Transportation (Non-Medical):    Physical Activity:      Days of Exercise per Week:      Minutes of Exercise per Session:    Stress:      Feeling of Stress :    Social Connections:      Frequency of Communication with Friends and Family:      Frequency of Social Gatherings with Friends and Family:      Attends Taoist Services:      Active Member of Clubs or Organizations:      Attends Club or Organization Meetings:      Marital Status:    Intimate Partner Violence:      Fear of Current or Ex-Partner:      Emotionally Abused:      Physically Abused:      Sexually Abused:        Family History   Problem Relation Age of Onset     Lung Cancer Maternal Grandfather      Hypertension No family hx of      Diabetes No family hx of        ROS:14 point ROS neg other than the symptoms noted above in the HPI.    Allergies   Allergen Reactions     Succinylcholine      Spinal cord injury 12/18/12, patient at risk for extrajunctional receptors and hyperkalemia       Current Outpatient Medications   Medication     acetaminophen (TYLENOL) 325 MG tablet     albuterol (PROVENTIL) (2.5 MG/3ML) 0.083% neb solution     baclofen (LIORESAL) 10 MG tablet     BISACODYL     Coloplast barrier cream CREA     Cranberry 450 MG TABS     Docusate Sodium (COLACE PO)     gabapentin (NEURONTIN) 300 MG capsule     GABAPENTIN PO     hydrOXYzine (VISTARIL) 25 MG capsule     hyoscyamine (ANASPAZ/LEVSIN) 0.125 MG tablet     ibuprofen (ADVIL/MOTRIN) 600 MG tablet     methenamine (HIPREX) 1 g tablet     midodrine (PROAMATINE) 5 MG tablet     Multiple Vitamin (DAILY MULTIVITAMIN PO)     mupirocin (BACTROBAN) 2 % external ointment     ondansetron (ZOFRAN-ODT) 4 MG ODT tab     order for DME     order for DME     order for DME     order for DME     order for DME     oxybutynin ER (DITROPAN-XL) 5 MG 24 hr  tablet     povidone-iodine 10 % swab     senna-docusate (SENOKOT-S/PERICOLACE) 8.6-50 MG tablet     senna-docusate (SENOKOT-S;PERICOLACE) 8.6-50 MG per tablet     sertraline (ZOLOFT) 100 MG tablet     sodium chloride (NEBUSAL) 3 % neb solution     spacer ([facility] JAIDA) holding chamber     No current facility-administered medications for this visit.     Facility-Administered Medications Ordered in Other Visits   Medication     levonorgestrel (MIRENA) 20 MCG/24HR IUD         PEx:     PSYCH: NAD        A/P: Dianna Cottrell is a 26 year old female with Chronic indwelling Winston, rUTIs  -Methenamine and Vit C x 90 days  -Cranberry pill daily  -Check UC if symptoms  -Renal US  -Discussed consideration of suprapubic tube placement.  Would need to establish with female urology to discuss further. Dharmesh Pablo PA-C  [facility]      16 minutes spent on the date of the encounter doing chart review, review of outside records, review of test results, interpretation of tests, patient visit and documentation   [id number]}

## 2021-09-08 ENCOUNTER — MEDICAL CORRESPONDENCE (OUTPATIENT)
Dept: HEALTH INFORMATION MANAGEMENT | Facility: CLINIC | Age: 26
End: 2021-09-08

## 2021-09-09 ENCOUNTER — TELEPHONE (OUTPATIENT)
Dept: FAMILY MEDICINE | Facility: CLINIC | Age: 26
End: 2021-09-09

## 2021-09-09 NOTE — CONFIDENTIAL NOTE
What type of form? Wound treatment  What day did you drop off your forms? Faxed 9.9.2021  Is there a due date? asap (7-10 business days to compete forms)   How would you like to receive these forms? Please fax to [phone]    What is the best number to contact you? Work [phone]  What time works best to contact you with in 4 hrs? any  Is it okay to leave a message? Yes    Ava Roy (Auto signs name of person logged into Epic)

## 2021-09-09 NOTE — TELEPHONE ENCOUNTER
Forms completed and placed in TC in basket.    Dariel Ugalde MD  [facility]

## 2021-09-14 NOTE — PROGRESS NOTES
Assessment & Plan     Current moderate episode of major depressive disorder, unspecified whether recurrent (H)  АНДРЕЙ (generalized anxiety disorder)  Continue on sertraline 150mg daily  Continue w/therapist and support groups  Refilled hydroxyzine for prn use for difficulty initiating sleep.  - hydrOXYzine (VISTARIL) 25 MG capsule; Take 1 capsule (25 mg) by mouth 3 times daily as needed for anxiety (or at bedtime for sleep.)    Need for prophylactic vaccination and inoculation against influenza  She is agreeable to flu shot , given    High priority for 2019-nCoV vaccine  Counseled on and advised she get the covid vaccine. She declines. Discussed risks if she were to contract covid, she states she understands and will not get the vaccine.   0956}     Tobacco Cessation:   reports that she has been smoking cigarettes and vaping device. She has been smoking about 0.50 packs per day. She has never used smokeless tobacco.  Tobacco Cessation Action Plan: Information offered: Patient not interested at this time    Follow Up: The patient was instructed to contact clinic for worsening symptoms, non-improvement in time frame as expected/discussed, and for questions regarding medications or treatment plan. For virtual visits, the patient was advised to be seen for in person evaluation if symptoms or condition are worsening or non-improvement as expected.       Return in about 6 months (around 3/15/2022).    Suzan Willis PA-C  [facility]    Kenisha     Dianna Cottrell is a 26 year old female who presents to clinic today for the following health issues accompanied by her mother:    History of Present Illness       Mental Health Follow-up:  Patient presents to follow-up on Depression & Anxiety.Patient's depression since last visit has been:  Medium  The patient is not having other symptoms associated with depression.  Patient's anxiety since last visit has been:  Medium  The patient is not having  "other symptoms associated with anxiety.  Any significant life events: grief or loss  Patient is feeling anxious or having panic attacks.  Patient has no concerns about alcohol or drug use.     Social History  Tobacco Use    Smoking status: Current Every Day Smoker      Packs/day: 0.50      Types: Cigarettes, Vaping Device    Smokeless tobacco: Never Used    Tobacco comment: used 1/2-1 ppd for 4 years, quit 2012, now daily e cig use.   Vaping Use    Vaping Use: Every day      Substances: Nicotine, THC, Flavoring      Devices: Pre-filled pod  Alcohol use: No    Alcohol/week: 0.0 standard drinks  Drug use: Yes    Types: Marijuana    Comment: 3 joints per day      Today's PHQ-9         PHQ-9 Total Score:     (P) 3   PHQ-9 Q9 Thoughts of better off dead/self-harm past 2 weeks :   (P) Not at all   Thoughts of suicide or self harm:      Self-harm Plan:        Self-harm Action:          Safety concerns for self or others:           She eats 2-3 servings of fruits and vegetables daily.She consumes 4 sweetened beverage(s) daily.She exercises with enough effort to increase her heart rate 9 or less minutes per day.  She exercises with enough effort to increase her heart rate 3 or less days per week.   She is taking medications regularly.       Medication Followup of Sertraline    Taking Medication as prescribed: yes    Side Effects:  None    Medication Helping Symptoms:  yes     Mood- \"ok\" - on sertraline  Last few months have been up and down.   Son's father passed away (murdered) recently. Son is age 4 and does not understand. They were not together but processing. Has had long relationship with her therapist and still meeting consistently. Is also in grief support group.  Times more irritable  Some times trouble falling asleep.    Passed her public speaking class. Had a final exam. She is pulling together a presentation and hopes to be speaking with middle school aged kids and high school.     Wound is closed now. Still " monitoring with wound care/specialty    Seeing Urology.  Last visit w/urology 09/01/2021.  Just finished treating UTI w/levaquin, not sure went away fully- finished 1 week ago.   Still odor to urine/cloudiness/sediment.       PHQ 1/8/2020 9/3/2020 9/15/2021   PHQ-9 Total Score 2 2 3   Q9: Thoughts of better off dead/self-harm past 2 weeks Not at all Not at all Not at all     АНДРЕЙ-7 SCORE 1/8/2020 9/3/2020 9/15/2021   Total Score 3 (minimal anxiety) - 3 (minimal anxiety)   Total Score 3 3 3           Review of Systems   Constitutional, HEENT, cardiovascular, pulmonary, gi and gu systems are negative, except as otherwise noted.      Objective    BP 98/68   Pulse 81   Temp 97.3  F (36.3  C) (Temporal)   Resp 16   SpO2 97%   There is no height or weight on file to calculate BMI.  Physical Exam   GENERAL: thin, healthy, alert and no distress, sitting in power hair  NECK: no adenopathy, no asymmetry, masses, or scars and thyroid normal to palpation  RESP: lungs clear to auscultation - no rales, rhonchi or wheezes  CV: regular rate and rhythm, normal S1 S2, no S3 or S4, no murmur, click or rub, no peripheral edema and peripheral pulses strong  ABDOMEN: soft, nontender, no masses and bowel sounds normal  MS: atrophic LE, no edema          Answers for HPI/ROS submitted by the patient on 9/15/2021  If you checked off any problems, how difficult have these problems made it for you to do your work, take care of things at home, or get along with other people?: Not difficult at all  PHQ9 TOTAL SCORE: 3  АНДРЕЙ 7 TOTAL SCORE: 3

## 2021-09-15 ENCOUNTER — OFFICE VISIT (OUTPATIENT)
Dept: FAMILY MEDICINE | Facility: CLINIC | Age: 26
End: 2021-09-15
Payer: MEDICARE

## 2021-09-15 ENCOUNTER — HOSPITAL ENCOUNTER (OUTPATIENT)
Dept: WOUND CARE | Facility: CLINIC | Age: 26
End: 2021-09-15
Attending: PHYSICIAN ASSISTANT
Payer: MEDICARE

## 2021-09-15 VITALS
DIASTOLIC BLOOD PRESSURE: 68 MMHG | OXYGEN SATURATION: 97 % | HEART RATE: 81 BPM | SYSTOLIC BLOOD PRESSURE: 98 MMHG | TEMPERATURE: 97.3 F | RESPIRATION RATE: 16 BRPM

## 2021-09-15 DIAGNOSIS — F41.1 GAD (GENERALIZED ANXIETY DISORDER): ICD-10-CM

## 2021-09-15 DIAGNOSIS — E44.0 MODERATE PROTEIN-CALORIE MALNUTRITION (H): ICD-10-CM

## 2021-09-15 DIAGNOSIS — L89.314 DECUBITUS ULCER OF RIGHT ISCHIUM, STAGE 4 (H): ICD-10-CM

## 2021-09-15 DIAGNOSIS — Z23 HIGH PRIORITY FOR 2019-NCOV VACCINE: ICD-10-CM

## 2021-09-15 DIAGNOSIS — Z23 NEED FOR PROPHYLACTIC VACCINATION AND INOCULATION AGAINST INFLUENZA: ICD-10-CM

## 2021-09-15 DIAGNOSIS — F32.1 CURRENT MODERATE EPISODE OF MAJOR DEPRESSIVE DISORDER, UNSPECIFIED WHETHER RECURRENT (H): Primary | ICD-10-CM

## 2021-09-15 PROCEDURE — 99214 OFFICE O/P EST MOD 30 MIN: CPT | Mod: 25 | Performed by: PHYSICIAN ASSISTANT

## 2021-09-15 PROCEDURE — 90686 IIV4 VACC NO PRSV 0.5 ML IM: CPT | Performed by: PHYSICIAN ASSISTANT

## 2021-09-15 PROCEDURE — 99441 PR PHYSICIAN TELEPHONE EVALUATION 5-10 MIN: CPT | Performed by: PHYSICIAN ASSISTANT

## 2021-09-15 PROCEDURE — G0008 ADMIN INFLUENZA VIRUS VAC: HCPCS | Performed by: PHYSICIAN ASSISTANT

## 2021-09-15 RX ORDER — HYDROXYZINE PAMOATE 25 MG/1
25 CAPSULE ORAL 3 TIMES DAILY PRN
Qty: 20 CAPSULE | Refills: 1 | Status: SHIPPED | OUTPATIENT
Start: 2021-09-15 | End: 2023-05-01

## 2021-09-15 ASSESSMENT — ANXIETY QUESTIONNAIRES
6. BECOMING EASILY ANNOYED OR IRRITABLE: NOT AT ALL
2. NOT BEING ABLE TO STOP OR CONTROL WORRYING: SEVERAL DAYS
4. TROUBLE RELAXING: NOT AT ALL
7. FEELING AFRAID AS IF SOMETHING AWFUL MIGHT HAPPEN: NOT AT ALL
7. FEELING AFRAID AS IF SOMETHING AWFUL MIGHT HAPPEN: NOT AT ALL
GAD7 TOTAL SCORE: 3
3. WORRYING TOO MUCH ABOUT DIFFERENT THINGS: SEVERAL DAYS
8. IF YOU CHECKED OFF ANY PROBLEMS, HOW DIFFICULT HAVE THESE MADE IT FOR YOU TO DO YOUR WORK, TAKE CARE OF THINGS AT HOME, OR GET ALONG WITH OTHER PEOPLE?: SOMEWHAT DIFFICULT
GAD7 TOTAL SCORE: 3
1. FEELING NERVOUS, ANXIOUS, OR ON EDGE: SEVERAL DAYS
GAD7 TOTAL SCORE: 3
5. BEING SO RESTLESS THAT IT IS HARD TO SIT STILL: NOT AT ALL

## 2021-09-15 ASSESSMENT — PATIENT HEALTH QUESTIONNAIRE - PHQ9
SUM OF ALL RESPONSES TO PHQ QUESTIONS 1-9: 3
SUM OF ALL RESPONSES TO PHQ QUESTIONS 1-9: 3
10. IF YOU CHECKED OFF ANY PROBLEMS, HOW DIFFICULT HAVE THESE PROBLEMS MADE IT FOR YOU TO DO YOUR WORK, TAKE CARE OF THINGS AT HOME, OR GET ALONG WITH OTHER PEOPLE: NOT DIFFICULT AT ALL

## 2021-09-15 ASSESSMENT — PAIN SCALES - GENERAL: PAINLEVEL: NO PAIN (0)

## 2021-09-15 NOTE — DISCHARGE INSTRUCTIONS
[facility]  [address] [place] [id number] [address]    Call us at [phone] if you have any questions about your wounds, have redness or swelling around your wound, have a fever of 101 or greater or if you have any other problems or concerns. We answer the phone Monday through Friday 8 am to 4 pm, please leave a message as we check the voicemail frequently throughout the day.     Dianna Cottrell      1995    Wound Dressing Change: Right Ischium  Clean with soap and water daily.  Apply Sween and gently massage daily and as needed     Toyin Yi PA-C. September 15, 2021    Wound Clinic follow up as needed with any wound care needs in the future.    If you had a positive experience please indicate that on your patient satisfaction survey form that [facility] will be sending you.    It was a pleasure meeting with you today.  Thank you for allowing me and my team the privilege of caring for you today.  YOU are the reason we are here, and I truly hope we provided you with the excellent service you deserve.  Please let us know if there is anything else we can do for you so that we can be sure you are leaving completely satisfied with your care experience.      If you have any billing related questions please call the [facility] Business office at [phone]. The clinic staff does not handle billing related matters.

## 2021-09-15 NOTE — PROGRESS NOTES
Patient states that she just notice some redness to her suprapubic tubing.  Urine in bag is yellow tubing appears to have some red sediment that breaks up with movement.  She did state that she had her cath and bag changed earlier today. No redness was seen prior to change over. No fever today.  Did have recent UTI.  Huddled with .  Advised to monitor for now.  If worsening needs to call urology.  Urine appears to be flowing normally.      Meño Lin, TINGN, RN, PHN  [facility] ~ Registered Nurse  Clinic Triage ~ [place] [facility] [place]  September 15, 2021

## 2021-09-15 NOTE — PROGRESS NOTES
[facility]    ASSESSMENT:   1. Healed stage 4 pressure ulcer of R IT      PLAN:   1. Can leave MARY or protect if desires. Can use Sween to help keep skin soft and healthy.  2. Will need to baby this area life long but dani the next two years, continue to maintain offloading cushion and chair, frequent repositioning, moisture management etc  3. Dianna will continue to need a group 2, low air loss mattress due to a stage 4 pressure ulceration on the patient's pelvis that has failed to improve on a normal mattress despite regular wound cares and repositioning. She has a long history of large stage 3 and stage 4 pressure ulcerations and it is medically necessary for her to be on a low air loss mattress to continue treating her current wound as well as preventing new ulcerations. Patient has impaired sensation due to quadriplegia and is unable to reposition independently.       FOLLOW-UP: PRN      HISTORY OF PRESENT ILLNESS: Ms. Dianna Cottrell is a 26-year-old quadriplegic woman who returns for a R IT ulcer. Course of this wound in brief as follows:    12/3/19: MRI indicative of osteomyelitis.  9/18/20:  I&D with VAC placement. Was originally anticipating flap procedure in March but this was converted in an effort to keep her out of a facility during the pandemic  11/4/20: Seating assessment at [facility] - a new seating system has been recommended and ordered, this includes a new high profile Roho cushion  11/11/20: Returns for follow-up. Continues on Cefazolin per ID, they recommend reculture if wound stalls/worsens. Wound appears to be improving today, undermined pocket is very tight and I suspect VAC sponge is keeping it open.  11/23/20: IV antibiotics discontinued, PICC line removed.   12/17/20: Follow-up with Dr. Zacarias. Debrided in clinic with Avel. Held VAC and started on AMD due to concern of possible infection and periwound irritation.   1/6/21: VAC restarted  1/20/21: Returns for  "follow-up. Still on VAC, instructed to back off on how much sponge nursing is using. Vit D drawn and came back low at 28ug/L. Started on 50kIU x 6 wks.  2/18/21: Visited with Dr. Zacarias. NPWT was discontinued, started on alternating Endoform AM and AMD.   03/16/21: Returns for follow-up. Wound is more granular and viable appearing than previous visits. Still has tunnel but seems like it is narrowing. Continued alternating Endoform AM and AMD.  4/15/21: Follow-up with Dr. Zacarias. Wound was improving. Continued alternating Endoform AM and AMD.  5/12/21: Returns for follow-up.  Wound is nearly closed. Base is granular. Still having issues trying to obtain low air loss mattress.   6/11/21: Returns for follow-up. Wound is nearly closed. The base is macerated but very shallow. She is interested in swimming with her kids and inquires about PRN waterproof bandage use.   7/20/21: Returns for follow-up. Wound is just a very small slit with granular base. Treated with silver nitrate today. Has been using Triad paste and gauze cover dressing, will continue this.   8/24/21: Returns for follow-up. Wound appears closed but there is still a small defect. Possibly partial thickness ulcer at base but too small to really visualize.   09/15/21: Returns for phone follow-up. Wound is now closed. Denies drainage. Has been protecting with a gauze.     DATE WOUND ACQUIRED: 6/2018     OFFLOADING: AP mattress, chair with Roho cushion, last assessed 11/4/20 and was recommended to get a new chair      SOCIAL HISTORY: has PCA help, has two kids, hoping to do public speaking in the future     Reviewed submitted photo:      The patient has been notified of following:     \"This telephone visit will be conducted via a call between you and your physician/provider. We have found that certain health care needs can be provided without the need for a physical exam.  This service lets us provide the care you need with a short phone conversation.  If a " "prescription is necessary we can send it directly to your pharmacy.  If lab work is needed we can place an order for that and you can then stop by our lab to have the test done at a later time.    If during the course of the call the physician/provider feels a telephone visit is not appropriate, you will not be charged for this service.\"     Patient has given verbal consent for Telephone visit?  Yes  DURATION OF CALL: 5 minutes      VANIA AGUILAR PA-C    "

## 2021-09-15 NOTE — PATIENT INSTRUCTIONS
Continue on there sertraline same dose    Hydroxyzine at bedtime feeling anxious or having a hard time sleeping    Start your breathing treatments    Flu shot    Please consider the covid vaccine

## 2021-09-15 NOTE — PROGRESS NOTES
Patient called for a telephone visit. Certified Wound Care Nurse time spent evaluating patient record, completed a full evaluation and documented wound(s) & adonay-wound skin; provided recommendation based on treatment plan. Reviewed discharge instructions, patient education, and discussed plan of care with appropriate medical team staff members and patient and/or family members.

## 2021-09-16 ASSESSMENT — ANXIETY QUESTIONNAIRES: GAD7 TOTAL SCORE: 3

## 2021-09-16 ASSESSMENT — PATIENT HEALTH QUESTIONNAIRE - PHQ9: SUM OF ALL RESPONSES TO PHQ QUESTIONS 1-9: 3

## 2021-09-20 ENCOUNTER — TELEPHONE (OUTPATIENT)
Dept: FAMILY MEDICINE | Facility: CLINIC | Age: 26
End: 2021-09-20

## 2021-09-20 NOTE — CONFIDENTIAL NOTE
What type of form? Home Health MD Orders  What day did you drop off your forms? Faxed 9.20.2021  Is there a due date? asap (7-10 business days to compete forms)   How would you like to receive these forms? Please fax to [phone]    What is the best number to contact you? Work [phone]   What time works best to contact you with in 4 hrs? anytime  Is it okay to leave a message? Yes    Ava Roy (Auto signs name of person logged into Epic)

## 2021-09-21 DIAGNOSIS — Z53.9 DIAGNOSIS NOT YET DEFINED: Primary | ICD-10-CM

## 2021-09-21 PROCEDURE — G0179 MD RECERTIFICATION HHA PT: HCPCS | Performed by: PHYSICIAN ASSISTANT

## 2021-09-22 ENCOUNTER — MEDICAL CORRESPONDENCE (OUTPATIENT)
Dept: HEALTH INFORMATION MANAGEMENT | Facility: CLINIC | Age: 26
End: 2021-09-22

## 2021-10-11 ENCOUNTER — TELEPHONE (OUTPATIENT)
Dept: FAMILY MEDICINE | Facility: CLINIC | Age: 26
End: 2021-10-11

## 2021-10-11 NOTE — TELEPHONE ENCOUNTER
Home Care Nurse, Salud, calling for verbal order  Catheter change was due last week and not able to be done.   She is looking for a verbal order to be able to do the cath change today.   Please call her if able.   Salud [phone]    Kristina Bradley, RN, BSN

## 2021-10-28 DIAGNOSIS — R31.0 GROSS HEMATURIA: Primary | ICD-10-CM

## 2021-11-08 ENCOUNTER — OFFICE VISIT (OUTPATIENT)
Dept: OBGYN | Facility: CLINIC | Age: 26
End: 2021-11-08
Attending: OBSTETRICS & GYNECOLOGY
Payer: MEDICARE

## 2021-11-08 DIAGNOSIS — D06.9 CIN III WITH SEVERE DYSPLASIA: Primary | ICD-10-CM

## 2021-11-08 PROCEDURE — 57505 ENDOCERVICAL CURETTAGE: CPT

## 2021-11-08 PROCEDURE — 88305 TISSUE EXAM BY PATHOLOGIST: CPT | Mod: TC | Performed by: OBSTETRICS & GYNECOLOGY

## 2021-11-08 PROCEDURE — G0463 HOSPITAL OUTPT CLINIC VISIT: HCPCS

## 2021-11-08 PROCEDURE — 99213 OFFICE O/P EST LOW 20 MIN: CPT | Performed by: OBSTETRICS & GYNECOLOGY

## 2021-11-08 ASSESSMENT — ANXIETY QUESTIONNAIRES
3. WORRYING TOO MUCH ABOUT DIFFERENT THINGS: SEVERAL DAYS
6. BECOMING EASILY ANNOYED OR IRRITABLE: NOT AT ALL
2. NOT BEING ABLE TO STOP OR CONTROL WORRYING: NOT AT ALL
5. BEING SO RESTLESS THAT IT IS HARD TO SIT STILL: NOT AT ALL
1. FEELING NERVOUS, ANXIOUS, OR ON EDGE: NOT AT ALL
GAD7 TOTAL SCORE: 2
8. IF YOU CHECKED OFF ANY PROBLEMS, HOW DIFFICULT HAVE THESE MADE IT FOR YOU TO DO YOUR WORK, TAKE CARE OF THINGS AT HOME, OR GET ALONG WITH OTHER PEOPLE?: NOT DIFFICULT AT ALL
GAD7 TOTAL SCORE: 2
GAD7 TOTAL SCORE: 2
7. FEELING AFRAID AS IF SOMETHING AWFUL MIGHT HAPPEN: NOT AT ALL
4. TROUBLE RELAXING: SEVERAL DAYS
7. FEELING AFRAID AS IF SOMETHING AWFUL MIGHT HAPPEN: NOT AT ALL

## 2021-11-08 ASSESSMENT — ENCOUNTER SYMPTOMS
DEPRESSION: 1
NERVOUS/ANXIOUS: 1
PANIC: 0
FLANK PAIN: 0
DIFFICULTY URINATING: 0
INSOMNIA: 1
HEMATURIA: 1
DYSURIA: 0
DECREASED CONCENTRATION: 0

## 2021-11-08 NOTE — LETTER
"11/8/2021       RE: Dianna Cottrell  [address] [place] [address]     Dear Colleague,    Thank you for referring your patient, Dianna Cottrell, to the [facility] [place] [facility]. Please see a copy of my visit note below.    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 Negative margins were attained at that time. She is feeling well. She wonders if her indwelling catheter is clogged as she has a very bad headache when lying down.  This is a typical response when her catheter is malfunctioning.       Today we are completing Pap/HPV and ECC for routine LEEP f/u.     First her rogers catheter was removed and replaced using sterile technique with good return of urine.     GYN exam was completed with normal external genitalia, BUS wnl.  Urethra mildly dilated d/t h/o indwelling catheter.   Speculum inserted and cervix visualized. IUD strings 2 cm from os.  Pap collected.        Time Out - \"Pause for the Cause\"  Just before the procedure begins, through verbal and active participation of team members, verify:    Initials   Patient Name    smd   Patient Date of Birth smd   Procedure to be performed  smd   Site, laterality, level or multiples, noting patient position   smd   Relevant images or diagnostics available/displayed   smd   Implants, special equipment or special requirements        smd     Consent:  Verbal consent obtained from patient. , Risks, benefits of treatment, and no treatment were discussed.  Patient's questions were elicited and answered.  and Patient received and verbalized understanding of discharge instructions    Preoperative Diagnosis: h/o CIN2/3, sp LEEP >6 mo ago.   Postoperative Diagnosis:  samd    Skin Preparation:  Betadine  Anesthesia: none  Technique:  Curette was used to obtain ECC   EBL:  2 mL  Complications:  none  Total Time:  5 min  Pathology:  Endocervix " curettage and Pap/HPV  Tolerance of Procedure: Patient did tolerate the procedure well.    Carlotta Lock MD, FACOG  (she/her/hers)    Department of Ob/Gyn/Women's Health  [facility]  [place] [facility]  [address] [place] [id number]  [email or web address]  ryan. [phone]  f. [phone]  11/9/2021  11:38 AM

## 2021-11-09 ENCOUNTER — TELEPHONE (OUTPATIENT)
Dept: OBGYN | Facility: CLINIC | Age: 26
End: 2021-11-09
Payer: MEDICARE

## 2021-11-09 DIAGNOSIS — D06.9 CIN III WITH SEVERE DYSPLASIA: ICD-10-CM

## 2021-11-09 ASSESSMENT — ANXIETY QUESTIONNAIRES: GAD7 TOTAL SCORE: 2

## 2021-11-09 NOTE — PROGRESS NOTES
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 Negative margins were attained at that time. She is feeling well. She wonders if her indwelling catheter is clogged as she has a very bad headache when lying down.  This is a typical response when her catheter is malfunctioning.       Today we are completing Pap/HPV and ECC for routine LEEP f/u.     First her rogers catheter was removed and replaced using sterile technique with good return of urine.     GYN exam was completed with normal external genitalia, BUS wnl.  Urethra mildly dilated d/t h/o indwelling catheter.   Speculum inserted and cervix visualized. IUD strings 2 cm from os.  Pap collected.        Time Out - \"Pause for the Cause\"  Just before the procedure begins, through verbal and active participation of team members, verify:    Initials   Patient Name    smd   Patient Date of Birth smd   Procedure to be performed  smd   Site, laterality, level or multiples, noting patient position   smd   Relevant images or diagnostics available/displayed   smd   Implants, special equipment or special requirements        smd     Consent:  Verbal consent obtained from patient. , Risks, benefits of treatment, and no treatment were discussed.  Patient's questions were elicited and answered.  and Patient received and verbalized understanding of discharge instructions    Preoperative Diagnosis: h/o CIN2/3, sp LEEP >6 mo ago.   Postoperative Diagnosis:  samd    Skin Preparation:  Betadine  Anesthesia: none  Technique:  Curette was used to obtain ECC   EBL:  2 mL  Complications:  none  Total Time:  5 min  Pathology:  Endocervix curettage and Pap/HPV  Tolerance of Procedure: Patient did tolerate the procedure well.    Carlotta Lock MD, FACOG  (she/her/hers)    Department of Ob/Gyn/Women's Health  [facility]  [facility]  [address] [place] [address]  [email or web address] "  p. [phone]  f. [phone]  11/9/2021  11:38 AM

## 2021-11-09 NOTE — TELEPHONE ENCOUNTER
[facility] Call Center    Phone Message    May a detailed message be left on voicemail: yes     Reason for Call: Other: Pathology would like to speak with you regarding lab orders.      Action Taken: Other: whs    Travel Screening: Not Applicable

## 2021-11-09 NOTE — TELEPHONE ENCOUNTER
Called and spoke with Paty in pathology, confirmed with rooming staff that only one specimen was collected and sent.

## 2021-11-10 ENCOUNTER — TELEPHONE (OUTPATIENT)
Dept: FAMILY MEDICINE | Facility: CLINIC | Age: 26
End: 2021-11-10
Payer: MEDICARE

## 2021-11-10 NOTE — CONFIDENTIAL NOTE
Reason for Call:  Form, our goal is to have forms completed with 72 hours, however, some forms may require a visit or additional information.    Type of letter, form or note:  Home Health Certification    Who is the form from?: Home care    Where did the form come from: form was faxed in    What clinic location was the form placed at?: [facility] [phone]    Where the form was placed: [facility]    What number is listed as a contact on the form?: [phone]       Additional comments: Fax back with signature to:  [phone]    Call taken on 11/10/2021 at 2:28 PM by Ava Roy

## 2021-11-11 LAB
BKR LAB AP GYN ADEQUACY: NORMAL
BKR LAB AP GYN INTERPRETATION: NORMAL
BKR LAB AP HPV REFLEX: NORMAL
BKR LAB AP PREVIOUS ABNL DX: NORMAL
BKR LAB AP PREVIOUS ABNORMAL: NORMAL
PATH REPORT.COMMENTS IMP SPEC: NORMAL
PATH REPORT.COMMENTS IMP SPEC: NORMAL
PATH REPORT.RELEVANT HX SPEC: NORMAL

## 2021-11-12 LAB
HUMAN PAPILLOMA VIRUS 16 DNA: POSITIVE
HUMAN PAPILLOMA VIRUS 18 DNA: NEGATIVE
HUMAN PAPILLOMA VIRUS FINAL DIAGNOSIS: ABNORMAL
HUMAN PAPILLOMA VIRUS OTHER HR: POSITIVE

## 2021-11-15 ENCOUNTER — LAB REQUISITION (OUTPATIENT)
Dept: LAB | Facility: CLINIC | Age: 26
End: 2021-11-15
Payer: MEDICARE

## 2021-11-15 DIAGNOSIS — Z53.9 DIAGNOSIS NOT YET DEFINED: Primary | ICD-10-CM

## 2021-11-15 DIAGNOSIS — N39.0 URINARY TRACT INFECTION, SITE NOT SPECIFIED: ICD-10-CM

## 2021-11-15 LAB
ALBUMIN UR-MCNC: 50 MG/DL
APPEARANCE UR: ABNORMAL
BACTERIA #/AREA URNS HPF: ABNORMAL /HPF
BILIRUB UR QL STRIP: NEGATIVE
COLOR UR AUTO: ABNORMAL
GLUCOSE UR STRIP-MCNC: NEGATIVE MG/DL
HGB UR QL STRIP: ABNORMAL
KETONES UR STRIP-MCNC: NEGATIVE MG/DL
LEUKOCYTE ESTERASE UR QL STRIP: ABNORMAL
MUCOUS THREADS #/AREA URNS LPF: PRESENT /LPF
NITRATE UR QL: NEGATIVE
PH UR STRIP: 7 [PH] (ref 5–7)
RBC URINE: 26 /HPF
SP GR UR STRIP: 1.01 (ref 1–1.03)
UROBILINOGEN UR STRIP-MCNC: NORMAL MG/DL
WBC URINE: 29 /HPF

## 2021-11-15 PROCEDURE — G0179 MD RECERTIFICATION HHA PT: HCPCS | Performed by: PHYSICIAN ASSISTANT

## 2021-11-15 PROCEDURE — 87086 URINE CULTURE/COLONY COUNT: CPT | Performed by: PHYSICIAN ASSISTANT

## 2021-11-15 PROCEDURE — 81001 URINALYSIS AUTO W/SCOPE: CPT | Performed by: PHYSICIAN ASSISTANT

## 2021-11-16 LAB
BACTERIA UR CULT: NORMAL
PATH REPORT.COMMENTS IMP SPEC: NORMAL
PATH REPORT.COMMENTS IMP SPEC: NORMAL
PATH REPORT.FINAL DX SPEC: NORMAL
PATH REPORT.GROSS SPEC: NORMAL
PATH REPORT.MICROSCOPIC SPEC OTHER STN: NORMAL
PATH REPORT.RELEVANT HX SPEC: NORMAL
PHOTO IMAGE: NORMAL

## 2021-11-16 PROCEDURE — 88305 TISSUE EXAM BY PATHOLOGIST: CPT | Mod: 26 | Performed by: PATHOLOGY

## 2021-11-17 ENCOUNTER — MEDICAL CORRESPONDENCE (OUTPATIENT)
Dept: HEALTH INFORMATION MANAGEMENT | Facility: CLINIC | Age: 26
End: 2021-11-17
Payer: MEDICARE

## 2021-11-18 DIAGNOSIS — R33.9 URINARY RETENTION: ICD-10-CM

## 2021-11-18 DIAGNOSIS — R82.90 CLOUDY URINE: ICD-10-CM

## 2021-11-18 DIAGNOSIS — R39.89 SUSPECTED UTI: Primary | ICD-10-CM

## 2021-11-19 ENCOUNTER — VIRTUAL VISIT (OUTPATIENT)
Dept: INFECTIOUS DISEASES | Facility: CLINIC | Age: 26
End: 2021-11-19
Attending: INTERNAL MEDICINE
Payer: MEDICARE

## 2021-11-19 DIAGNOSIS — N31.9 NEUROGENIC BLADDER: ICD-10-CM

## 2021-11-19 DIAGNOSIS — L89.319 DECUBITUS ULCER OF RIGHT ISCHIAL TUBEROSITY REGION: ICD-10-CM

## 2021-11-19 DIAGNOSIS — M86.651 CHRONIC OSTEOMYELITIS OF PELVIS, RIGHT (H): ICD-10-CM

## 2021-11-19 DIAGNOSIS — R82.90 MALODOROUS URINE: Primary | ICD-10-CM

## 2021-11-19 DIAGNOSIS — Z97.8 CHRONIC INDWELLING FOLEY CATHETER: ICD-10-CM

## 2021-11-19 PROCEDURE — 99214 OFFICE O/P EST MOD 30 MIN: CPT | Mod: 95 | Performed by: INTERNAL MEDICINE

## 2021-11-19 PROCEDURE — G0463 HOSPITAL OUTPT CLINIC VISIT: HCPCS | Mod: PN,RTG | Performed by: INTERNAL MEDICINE
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
## 2021-11-22 DIAGNOSIS — R82.90 CLOUDY URINE: ICD-10-CM

## 2021-11-22 DIAGNOSIS — R39.89 SUSPECTED UTI: ICD-10-CM

## 2021-11-22 DIAGNOSIS — N31.9 NEUROGENIC BLADDER: ICD-10-CM

## 2021-11-22 DIAGNOSIS — R33.9 URINARY RETENTION: ICD-10-CM

## 2021-11-22 DIAGNOSIS — R82.90 MALODOROUS URINE: ICD-10-CM

## 2021-11-22 DIAGNOSIS — Z97.8 CHRONIC INDWELLING FOLEY CATHETER: ICD-10-CM

## 2021-11-22 LAB
ALBUMIN UR-MCNC: 10 MG/DL
APPEARANCE UR: ABNORMAL
BACTERIA #/AREA URNS HPF: ABNORMAL /HPF
BILIRUB UR QL STRIP: NEGATIVE
COLOR UR AUTO: ABNORMAL
GLUCOSE UR STRIP-MCNC: NEGATIVE MG/DL
HGB UR QL STRIP: ABNORMAL
KETONES UR STRIP-MCNC: NEGATIVE MG/DL
LEUKOCYTE ESTERASE UR QL STRIP: ABNORMAL
MUCOUS THREADS #/AREA URNS LPF: PRESENT /LPF
NITRATE UR QL: NEGATIVE
PH UR STRIP: 6.5 [PH] (ref 5–7)
RBC URINE: 2 /HPF
SP GR UR STRIP: 1.01 (ref 1–1.03)
SQUAMOUS EPITHELIAL: 1 /HPF
UROBILINOGEN UR STRIP-MCNC: NORMAL MG/DL
WBC CLUMPS #/AREA URNS HPF: PRESENT /HPF
WBC URINE: 76 /HPF

## 2021-11-22 PROCEDURE — 87086 URINE CULTURE/COLONY COUNT: CPT | Performed by: PHYSICIAN ASSISTANT

## 2021-11-23 LAB — BACTERIA UR CULT: NORMAL

## 2021-11-24 ENCOUNTER — TELEPHONE (OUTPATIENT)
Dept: FAMILY MEDICINE | Facility: CLINIC | Age: 26
End: 2021-11-24
Payer: MEDICARE

## 2021-11-24 NOTE — CONFIDENTIAL NOTE
What type of form? HOME HEALTH  What day did you drop off your forms? Faxed 11.24.2021  Is there a due date? asap (7-10 business days to compete forms)   How would you like to receive these forms? Please fax to [phone]    What is the best number to contact you? Work [phone]  What time works best to contact you with in 4 hrs? any  Is it okay to leave a message? Yes    Ava Roy (Auto signs name of person logged into Epic)

## 2021-11-29 ENCOUNTER — MEDICAL CORRESPONDENCE (OUTPATIENT)
Dept: HEALTH INFORMATION MANAGEMENT | Facility: CLINIC | Age: 26
End: 2021-11-29
Payer: MEDICARE

## 2021-12-16 ENCOUNTER — TELEPHONE (OUTPATIENT)
Dept: FAMILY MEDICINE | Facility: CLINIC | Age: 26
End: 2021-12-16
Payer: MEDICARE

## 2021-12-16 NOTE — CONFIDENTIAL NOTE
Reason for Call:  Form, our goal is to have forms completed with 72 hours, however, some forms may require a visit or additional information.    Type of letter, form or note:  Home Health Certification    Who is the form from?: Home care    Where did the form come from: form was faxed in    What clinic location was the form placed at?: [facility] [phone]    Where the form was placed: TC Box/Folder    What number is listed as a contact on the form?: [phone]       Additional comments: Fax signature to:  [phone]    Call taken on 12/16/2021 at 11:49 AM by Ava Roy

## 2021-12-22 ENCOUNTER — MEDICAL CORRESPONDENCE (OUTPATIENT)
Dept: HEALTH INFORMATION MANAGEMENT | Facility: CLINIC | Age: 26
End: 2021-12-22
Payer: MEDICARE

## 2021-12-30 ENCOUNTER — MYC REFILL (OUTPATIENT)
Dept: FAMILY MEDICINE | Facility: CLINIC | Age: 26
End: 2021-12-30
Payer: MEDICARE

## 2021-12-30 DIAGNOSIS — F32.4 MAJOR DEPRESSIVE DISORDER WITH SINGLE EPISODE, IN PARTIAL REMISSION (H): ICD-10-CM

## 2021-12-30 DIAGNOSIS — F41.1 GAD (GENERALIZED ANXIETY DISORDER): ICD-10-CM

## 2021-12-30 RX ORDER — SERTRALINE HYDROCHLORIDE 100 MG/1
150 TABLET, FILM COATED ORAL DAILY
Qty: 135 TABLET | Refills: 0 | Status: ON HOLD | OUTPATIENT
Start: 2021-12-30 | End: 2022-04-11

## 2022-01-11 ENCOUNTER — MYC MEDICAL ADVICE (OUTPATIENT)
Dept: UROLOGY | Facility: CLINIC | Age: 27
End: 2022-01-11
Payer: MEDICARE

## 2022-01-11 DIAGNOSIS — R33.9 URINARY RETENTION: Primary | ICD-10-CM

## 2022-01-20 ENCOUNTER — HOSPITAL ENCOUNTER (OUTPATIENT)
Dept: ULTRASOUND IMAGING | Facility: CLINIC | Age: 27
Discharge: HOME | End: 2022-01-20
Attending: PHYSICIAN ASSISTANT | Admitting: PHYSICIAN ASSISTANT
Payer: MEDICARE

## 2022-01-20 DIAGNOSIS — R33.9 URINARY RETENTION: ICD-10-CM

## 2022-01-20 PROCEDURE — 76770 US EXAM ABDO BACK WALL COMP: CPT

## 2022-01-24 DIAGNOSIS — R31.0 GROSS HEMATURIA: Primary | ICD-10-CM

## 2022-01-25 ENCOUNTER — VIRTUAL VISIT (OUTPATIENT)
Dept: UROLOGY | Facility: CLINIC | Age: 27
End: 2022-01-25
Payer: MEDICARE

## 2022-01-25 VITALS — BODY MASS INDEX: 20.09 KG/M2 | HEIGHT: 66 IN | WEIGHT: 125 LBS

## 2022-01-25 DIAGNOSIS — N31.9 NEUROGENIC BLADDER: Primary | ICD-10-CM

## 2022-01-25 DIAGNOSIS — G82.20 PARAPLEGIA (H): ICD-10-CM

## 2022-01-25 DIAGNOSIS — R33.9 URINARY RETENTION: ICD-10-CM

## 2022-01-25 PROCEDURE — 99214 OFFICE O/P EST MOD 30 MIN: CPT | Mod: 95 | Performed by: UROLOGY

## 2022-01-25 ASSESSMENT — MIFFLIN-ST. JEOR: SCORE: 1313.75

## 2022-01-25 ASSESSMENT — PAIN SCALES - GENERAL: PAINLEVEL: NO PAIN (0)

## 2022-01-25 NOTE — PATIENT INSTRUCTIONS
We will get you set up to see either Dr Mata Bacon or Dr Patricio House    Please make sure that you do your CT scan    It was a pleasure meeting with you today.  Thank you for allowing me and my team the privilege of caring for you today.  YOU are the reason we are here, and I truly hope we provided you with the excellent service you deserve.  Please let us know if there is anything else we can do for you so that we can be sure you are leaving completely satisfied with your care experience.
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
## 2022-01-25 NOTE — Clinical Note
Hi patient needs to get set up with SPE or ROSANNE to discuss possible augment/mitrofanoff in the near future please

## 2022-01-25 NOTE — Clinical Note
Hi.  I asked for her to be scheduled with LAURA or ROSANNE to discuss karsten giordano so I am sending this to you.  Thanks!
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
## 2022-01-26 ENCOUNTER — HOSPITAL ENCOUNTER (OUTPATIENT)
Dept: CT IMAGING | Facility: CLINIC | Age: 27
Discharge: HOME | End: 2022-01-26
Attending: PHYSICIAN ASSISTANT | Admitting: PHYSICIAN ASSISTANT
Payer: MEDICARE

## 2022-01-26 DIAGNOSIS — R31.0 GROSS HEMATURIA: ICD-10-CM

## 2022-01-26 PROCEDURE — 74176 CT ABD & PELVIS W/O CONTRAST: CPT

## 2022-01-27 ENCOUNTER — TELEPHONE (OUTPATIENT)
Dept: UROLOGY | Facility: CLINIC | Age: 27
End: 2022-01-27
Payer: MEDICARE

## 2022-01-27 NOTE — TELEPHONE ENCOUNTER
----- Message from Renetta Bullock LPN sent at 1/27/2022 12:35 PM CST -----  Please arrange . I sent her a my chart message  telling her she needs to have a visit with Dr Schofield   for her large renal stones   ----- Message -----  From: Ashia Pablo PA-C  Sent: 1/27/2022  10:41 AM CST  To: Renetta Bullock LPN    She has big renal stones. Did appt with Emma get cancelled? I would like her to see Dr. Schofield virtually to discuss management of stone with Emmett.

## 2022-01-28 ENCOUNTER — TELEPHONE (OUTPATIENT)
Dept: FAMILY MEDICINE | Facility: CLINIC | Age: 27
End: 2022-01-28
Payer: MEDICARE

## 2022-01-28 NOTE — TELEPHONE ENCOUNTER
Reason for Call:  Form, our goal is to have forms completed with 72 hours, however, some forms may require a visit or additional information.    Type of letter, form or note:  Home Health Certification    Who is the form from?: Home care    Where did the form come from: form was faxed in    What clinic location was the form placed at?: [facility] [phone]    Where the form was placed: TC Box/Folder    What number is listed as a contact on the form?: [phone]       Additional comments:     Call taken on 1/28/2022 at 8:32 AM by Batool Saucedo

## 2022-01-31 ENCOUNTER — VIRTUAL VISIT (OUTPATIENT)
Dept: UROLOGY | Facility: CLINIC | Age: 27
End: 2022-01-31
Payer: MEDICARE

## 2022-01-31 VITALS — WEIGHT: 125 LBS | BODY MASS INDEX: 20.09 KG/M2 | HEIGHT: 66 IN

## 2022-01-31 DIAGNOSIS — N20.0 BILATERAL NEPHROLITHIASIS: Primary | ICD-10-CM

## 2022-01-31 PROCEDURE — 99214 OFFICE O/P EST MOD 30 MIN: CPT | Mod: GT | Performed by: UROLOGY

## 2022-01-31 ASSESSMENT — PAIN SCALES - GENERAL: PAINLEVEL: NO PAIN (0)

## 2022-01-31 ASSESSMENT — MIFFLIN-ST. JEOR: SCORE: 1313.75

## 2022-01-31 NOTE — LETTER
1/31/2022       RE: Dianna Cottrell  [address] [address]     Dear Colleague,    Thank you for referring your patient, Dianna Cottrell, to the [facility] UROLOGY [facility] at [facility]. Please see a copy of my visit note below.    Dianna is a 26 year old who is being evaluated via a billable video visit.      How would you like to obtain your AVS? MyChart  If the video visit is dropped, the invitation should be resent by: Text to cell phone: [phone]  Will anyone else be joining your video visit? No      Video Start Time: 8:41 AM  Video-Visit Details    Type of service:  Video Visit    Video End Time:9:00 AM    Originating Location (pt. Location): Home    Distant Location (provider location):  [facility] UROLOGY CLINIC [place]     Platform used for Video Visit: ACE    Name: Dianna Cottrell   MRN: [id number]  YOB: 1995    Assessment and Plan:  26 year old female with bilateral nephrolithiasis and UTIs.     1. Bilateral nephrolithiasis  - Case Request: Cystoscopy right retrograde pyelogram, ureteroscopy with laser lithotripsy and stone basket extraction, stent placement, NEPHROLITHOTOMY, PERCUTANEOUS    We discussed observation, shock wave lithotripsy, ureteroscopy and percutaneous nephrolithotomy as the main surgical approaches to upper urinary tract stones.  We reviewed risks and benefits including but not limited to the following: bleeding, infection, damage to adjacent organs, ureteral stricture, need for staged treatment, incomplete stone removal.    Ultimately the patient has elected to proceed with right ureteroscopy and left PCNL    Postoperatively, will plan on imaging (renal US) 6 weeks postoperatively and complete metabolic work-up with a 24-hr urine.      We will plan for surgery in mid-late April to ensure her wound has healed before stone surgery    Plan:  - 3.5 hr right URS and left PCNL in  mid April (after wound healed) in April  - preoperative urine culture 2 weeks prior to surgery  - Preoperative evaluation      Parveen Schofield MD  January 31, 2022         Chief Complaint: bilateral nephrolithiasis    History of Present Illness:  Dianna Cottrell is a 26 year old female seen in consultation Dr. Carter for discussion of bilateral nephrolithiasis.      The current episode was first found due to screnning imaging for hematuria.  CT urogram performed on 1/26/2022 (images personally reviewed) demonstrated:  Right Kidney: 13 mm upper pole stone, HU <1000  Left Kidney: 16 mm renal pelvis stone, 8 mm upper and lower pole stones, total burden >2 cm    Currently, they are experiencing no flank pain, no nausea, no vomiting, no hematuria, or no dysuria.  They have not experienced recent stone passage.     Has history of UTIs with symptoms of increasing spasticity and foul smelling urine.  Currently managed with indwelling catheter due to wound but was doing CIC prior to that.  Wound is under care of Dr. Zacarias and it is still healing.    Pertinent stone history:  -- Personal stone history  -- Prior stone procedure  -- Prior stone analysis  -- Prior metabolic evaluation  -- Family stone history    Pertinent stone medical history  -- Diabetes  + Neurologic disease limiting mobility: Spinal cord injury with quadriplegia  -- Osteoporosis  -- Gout  -- Gastric bypass surgery  -- Sarcoidosis  -- Inflammatory bowel disease  -- History of non-stone  surgery     Pertinent medications:  -- Antiplatelet  -- Anticoagulant  -- Topiramate   -- Thiazaide  -- Potassium supplement      I reviewed internal labs, of which pertinent ones include:   Hemoglobin   Date Value Ref Range Status   02/02/2021 13.9 11.7 - 15.7 g/dL Final     Potassium   Date Value Ref Range Status   02/06/2018 3.4 (L) 3.5 - 5.1 mmol/L Final     Creatinine   Date Value Ref Range Status   11/16/2020 0.39 (L) 0.52 - 1.04 mg/dL Final     pH Urine   Date Value  Ref Range Status   2021 6.5 5.0 - 7.0 Final   2016 7.0 5.0 - 7.0 pH Final       I reviewed internal records which in summarized above.         Past Medical History:  Past Medical History:   Diagnosis Date     Anemia     with pregnancy     c5 burst fracture 2012    C5-C7 fracture with cord injury     Compression fracture of L1 lumbar vertebra (H) 2012    L1 superior endplate compression fracture     Depressive disorder      Encounter for insertion of mirena IUD 2017     Fracture of thoracic spine without spinal cord lesion (H) 2012    T3-T8 spinous process fractures     History of spinal cord injury      History of thrombophlebitis      Hypertension 2016     Urinary tract infection      Vocal cord dysfunction     Left vocal cord weakness noted by ENT post extubation 2012            Past Surgical History:  Past Surgical History:   Procedure Laterality Date     BLADDER SURGERY       C4-C7 interbody fusion with anterior screw and plate fixation and posterior erna and pedicle screw fixation with interspace bone graft and C5 and C6 partial corpectomies  2012      SECTION  2013    Procedure:  SECTION;   Section ;  Surgeon: Ricki Nelson MD;  Location: [facility] L[facility]      SECTION N/A 2016    Procedure:  SECTION;  Surgeon: Floridalma Kiran MD;  Location: [facility] L+D     CONIZATION LEEP N/A 2021    Procedure: CONE BIOPSY, CERVIX, USING LOOP ELECTROSURGICAL EXCISION PROCEDURE (LEEP) and ECC;  Surgeon: Carlotta Lock MD;  Location: [facility] OR     INSERT INTRAUTERINE DEVICE N/A 2021    Procedure: INSERTION, INTRAUTERINE DEVICE , replacement of Mirena Intrauterine device;  Surgeon: Carlotta Lock MD;  Location: [facility] OR     IR IVC FILTER PLACEMENT  2012     IRRIGATION AND DEBRIDEMENT BUTTOCKS Right 2020    Procedure: Sharp excisional debridement of right ischial tuberosity decubitus,   Bone biopsies for  cultures and path,  VAC Via placement.;  Surgeon: Vira Zacarias MD;  Location: [facility] OR     LUMBAR DRAIN  12/18/2012            Social History:  Social History     Tobacco Use     Smoking status: Current Every Day Smoker     Packs/day: 0.50     Years: 0.00     Pack years: 0.00     Types: Other     Smokeless tobacco: Current User     Tobacco comment: used 1/2-1 ppd for 4 years, quit 2012, now daily e cig use.    Vaping Use     Vaping Use: Every day     Substances: Nicotine, THC, Flavoring     Devices: Pre-filled pod   Substance Use Topics     Alcohol use: No     Alcohol/week: 0.0 standard drinks     Drug use: Yes     Types: Marijuana     Comment: 3 joints per day            Family History:  Family History   Problem Relation Age of Onset     Lung Cancer Maternal Grandfather      Hypertension No family hx of      Diabetes No family hx of             Allergies:  Allergies   Allergen Reactions     Succinylcholine      Spinal cord injury 12/18/12, patient at risk for extrajunctional receptors and hyperkalemia            Medications:  Current Outpatient Medications   Medication Sig     acetaminophen (TYLENOL) 325 MG tablet Take 325-650 mg by mouth every 6 hours as needed.     albuterol (PROVENTIL) (2.5 MG/3ML) 0.083% neb solution Take 1 vial (2.5 mg) by nebulization every 4 hours as needed for shortness of breath / dyspnea or wheezing     baclofen (LIORESAL) 10 MG tablet Take 10 mg by mouth 3 times daily.     BISACODYL 1 suppository daily      Coloplast barrier cream CREA Apply a thin layer of cream to affected area twice daily.     Cranberry 450 MG TABS Take 450 mg by mouth daily.     Docusate Sodium (COLACE PO) Take by mouth daily      gabapentin (NEURONTIN) 300 MG capsule Take 300 mg by mouth daily      GABAPENTIN PO      hydrOXYzine (VISTARIL) 25 MG capsule Take 1 capsule (25 mg) by mouth 3 times daily as needed for anxiety (or at bedtime for sleep.)     hyoscyamine (ANASPAZ/LEVSIN) 0.125 MG tablet TAKE 1 TABLET  "BY MOUTH THREE TIMES A DAY.     ibuprofen (ADVIL/MOTRIN) 600 MG tablet Take 1 tablet (600 mg) by mouth every 6 hours as needed for other (mild and/or inflammatory pain)     methenamine (HIPREX) 1 g tablet TAKE 1 TABLET BY MOUTH 2 TIMES DAILY     midodrine (PROAMATINE) 5 MG tablet Take 10 mg by mouth 2 times daily      Multiple Vitamin (DAILY MULTIVITAMIN PO) Take 2 tablets by mouth daily     mupirocin (BACTROBAN) 2 % external ointment Apply topically 3 times daily To corner of mouth     ondansetron (ZOFRAN-ODT) 4 MG ODT tab Take 1-2 tablets (4-8 mg) by mouth every 8 hours as needed for nausea     order for DME [facility] Phone [phone] Fax [phone]  Maria Fernanda Price to pressure map bed     order for DME Equipment being ordered: [facility]   p: [phone] f: [phone]  EMAIL to [email or web address]  [email or web address]    Request for group 2 air mattress & semi-electric hospital bed    Ht:5'8\"  Wt:110 ;bs  Length of need: lifetime    Pt. is wheelchair bound & has been in a comprehensive ulcer treatment program for >2 months. We will follow monthly until wound closure    To obtain medical records:  p: [phone] f: [phone]     order for DME Equipment being ordered: [facility] Fax [phone]    Wheelchair seating eval and mapping of current cushion     order for DME Equipment being ordered: Pressure Mapping of wheelchair at [facility] Phone [phone] Fax [phone]     order for DME Equipment being ordered: Wheelchair-  \"Patient continues to need and use daily her power wheelchair and the wheelchair will continue to need repairs for the next 12 months.\"     oxybutynin ER (DITROPAN-XL) 5 MG 24 hr tablet Take 5 mg by mouth daily     povidone-iodine 10 % swab      senna-docusate (SENOKOT-S;PERICOLACE) 8.6-50 MG per tablet Take 1-2 tablets by mouth 2 times daily as needed for constipation     sertraline (ZOLOFT) 100 MG tablet Take 1.5 tablets (150 mg) " "by mouth daily     sodium chloride (NEBUSAL) 3 % neb solution Take 3 mLs by nebulization every 6 hours as needed for wheezing or other (sputum clearance difficulty due to quadridplegia.)     spacer (OPTICHAMBER JAIDA) holding chamber To be used with inhaler     No current facility-administered medications for this visit.     Facility-Administered Medications Ordered in Other Visits   Medication     levonorgestrel (MIRENA) 20 MCG/24HR IUD       Review of Systems:   ROS: 10 point ROS neg other than the symptoms noted above in the HPI.    Physical Exam:  B/P: Data Unavailable, T: Data Unavailable, P: Data Unavailable, R: Data Unavailable  Estimated body mass index is 20.18 kg/m  as calculated from the following:    Height as of this encounter: 1.676 m (5' 6\").    Weight as of this encounter: 56.7 kg (125 lb).  General Appearance Adult: Alert, no acute distress, oriented  Lungs: no respiratory distress, or pursed lip breathing  Neuro: Alert, oriented, speech and mentation normal  Psych: affect and mood normal    Outside records:   I spent 0 minutes reviewing outside records.      "

## 2022-01-31 NOTE — PROGRESS NOTES
Dianna is a 26 year old who is being evaluated via a billable video visit.      How would you like to obtain your AVS? MyChart  If the video visit is dropped, the invitation should be resent by: Text to cell phone: [phone]  Will anyone else be joining your video visit? No      Video Start Time: 8:41 AM  Video-Visit Details    Type of service:  Video Visit    Video End Time:9:00 AM    Originating Location (pt. Location): Home    Distant Location (provider location):  [facility]OLOG[facility]     Platform used for Video Visit: [employer]    Name: Dianna Cottrell   MRN: [id number]  YOB: 1995    Assessment and Plan:  26 year old female with bilateral nephrolithiasis and UTIs.     1. Bilateral nephrolithiasis  - Case Request: Cystoscopy right retrograde pyelogram, ureteroscopy with laser lithotripsy and stone basket extraction, stent placement, NEPHROLITHOTOMY, PERCUTANEOUS    We discussed observation, shock wave lithotripsy, ureteroscopy and percutaneous nephrolithotomy as the main surgical approaches to upper urinary tract stones.  We reviewed risks and benefits including but not limited to the following: bleeding, infection, damage to adjacent organs, ureteral stricture, need for staged treatment, incomplete stone removal.    Ultimately the patient has elected to proceed with right ureteroscopy and left PCNL    Postoperatively, will plan on imaging (renal US) 6 weeks postoperatively and complete metabolic work-up with a 24-hr urine.      We will plan for surgery in mid-late April to ensure her wound has healed before stone surgery    Plan:  - 3.5 hr right URS and left PCNL in mid April (after wound healed) in April  - preoperative urine culture 2 weeks prior to surgery  - Preoperative evaluation      Parveen Schofield MD  January 31, 2022         Chief Complaint: bilateral nephrolithiasis    History of Present Illness:  Dianna Cottrell is a 26 year old female seen in consultation   Emma for discussion of bilateral nephrolithiasis.      The current episode was first found due to screnning imaging for hematuria.  CT urogram performed on 1/26/2022 (images personally reviewed) demonstrated:  Right Kidney: 13 mm upper pole stone, HU <1000  Left Kidney: 16 mm renal pelvis stone, 8 mm upper and lower pole stones, total burden >2 cm    Currently, they are experiencing no flank pain, no nausea, no vomiting, no hematuria, or no dysuria.  They have not experienced recent stone passage.     Has history of UTIs with symptoms of increasing spasticity and foul smelling urine.  Currently managed with indwelling catheter due to wound but was doing CIC prior to that.  Wound is under care of Dr. Zacarias and it is still healing.    Pertinent stone history:  -- Personal stone history  -- Prior stone procedure  -- Prior stone analysis  -- Prior metabolic evaluation  -- Family stone history    Pertinent stone medical history  -- Diabetes  + Neurologic disease limiting mobility: Spinal cord injury with quadriplegia  -- Osteoporosis  -- Gout  -- Gastric bypass surgery  -- Sarcoidosis  -- Inflammatory bowel disease  -- History of non-stone  surgery     Pertinent medications:  -- Antiplatelet  -- Anticoagulant  -- Topiramate   -- Thiazaide  -- Potassium supplement      I reviewed internal labs, of which pertinent ones include:   Hemoglobin   Date Value Ref Range Status   02/02/2021 13.9 11.7 - 15.7 g/dL Final     Potassium   Date Value Ref Range Status   02/06/2018 3.4 (L) 3.5 - 5.1 mmol/L Final     Creatinine   Date Value Ref Range Status   11/16/2020 0.39 (L) 0.52 - 1.04 mg/dL Final     pH Urine   Date Value Ref Range Status   11/22/2021 6.5 5.0 - 7.0 Final   12/07/2016 7.0 5.0 - 7.0 pH Final       I reviewed internal records which in summarized above.         Past Medical History:  Past Medical History:   Diagnosis Date     Anemia     with pregnancy     c5 burst fracture 12/18/2012    C5-C7 fracture with cord injury      Compression fracture of L1 lumbar vertebra (H) 2012    L1 superior endplate compression fracture     Depressive disorder      Encounter for insertion of mirena IUD 2017     Fracture of thoracic spine without spinal cord lesion (H) 2012    T3-T8 spinous process fractures     History of spinal cord injury      History of thrombophlebitis      Hypertension 2016     Urinary tract infection      Vocal cord dysfunction     Left vocal cord weakness noted by ENT post extubation 2012            Past Surgical History:  Past Surgical History:   Procedure Laterality Date     BLADDER SURGERY       C4-C7 interbody fusion with anterior screw and plate fixation and posterior erna and pedicle screw fixation with interspace bone graft and C5 and C6 partial corpectomies  2012      SECTION  2013    Procedure:  SECTION;   Section ;  Surgeon: Ricki Nelson MD;  [facility]      SECTION N/A 2016    Procedure:  SECTION;  Surgeon: Floridalma Kiran MD;  [facility]: [facility] L[facility]     CONIZATION LEEP N/A 2021    Procedure: CONE BIOPSY, CERVIX, USING LOOP ELECTROSURGICAL EXCISION PROCEDURE (LEEP) and ECC;  Surgeon: Carlotta Lock MD;  Location: [facility] OR     INSERT INTRAUTERINE DEVICE N/A 2021    Procedure: INSERTION, INTRAUTERINE DEVICE , replacement of Mirena Intrauterine device;  Surgeon: Carlotta Lock MD;  Location: [facility] OR     IR IVC FILTER PLACEMENT  2012     IRRIGATION AND DEBRIDEMENT BUTTOCKS Right 2020    Procedure: Sharp excisional debridement of right ischial tuberosity decubitus,   Bone biopsies for cultures and path,  VAC Via placement.;  Surgeon: Vira Zacarias MD;  Location: [facility] OR     LUMBAR DRAIN  2012            Social History:  Social History     Tobacco Use     Smoking status: Current Every Day Smoker     Packs/day: 0.50     Years: 0.00     Pack years: 0.00     Types: Other     Smokeless  tobacco: Current User     Tobacco comment: used 1/2-1 ppd for 4 years, quit 2012, now daily e cig use.    Vaping Use     Vaping Use: Every day     Substances: Nicotine, THC, Flavoring     Devices: Pre-filled pod   Substance Use Topics     Alcohol use: No     Alcohol/week: 0.0 standard drinks     Drug use: Yes     Types: Marijuana     Comment: 3 joints per day            Family History:  Family History   Problem Relation Age of Onset     Lung Cancer Maternal Grandfather      Hypertension No family hx of      Diabetes No family hx of             Allergies:  Allergies   Allergen Reactions     Succinylcholine      Spinal cord injury 12/18/12, patient at risk for extrajunctional receptors and hyperkalemia            Medications:  Current Outpatient Medications   Medication Sig     acetaminophen (TYLENOL) 325 MG tablet Take 325-650 mg by mouth every 6 hours as needed.     albuterol (PROVENTIL) (2.5 MG/3ML) 0.083% neb solution Take 1 vial (2.5 mg) by nebulization every 4 hours as needed for shortness of breath / dyspnea or wheezing     baclofen (LIORESAL) 10 MG tablet Take 10 mg by mouth 3 times daily.     BISACODYL 1 suppository daily      Coloplast barrier cream CREA Apply a thin layer of cream to affected area twice daily.     Cranberry 450 MG TABS Take 450 mg by mouth daily.     Docusate Sodium (COLACE PO) Take by mouth daily      gabapentin (NEURONTIN) 300 MG capsule Take 300 mg by mouth daily      GABAPENTIN PO      hydrOXYzine (VISTARIL) 25 MG capsule Take 1 capsule (25 mg) by mouth 3 times daily as needed for anxiety (or at bedtime for sleep.)     hyoscyamine (ANASPAZ/LEVSIN) 0.125 MG tablet TAKE 1 TABLET BY MOUTH THREE TIMES A DAY.     ibuprofen (ADVIL/MOTRIN) 600 MG tablet Take 1 tablet (600 mg) by mouth every 6 hours as needed for other (mild and/or inflammatory pain)     methenamine (HIPREX) 1 g tablet TAKE 1 TABLET BY MOUTH 2 TIMES DAILY     midodrine (PROAMATINE) 5 MG tablet Take 10 mg by mouth 2 times daily   "    Multiple Vitamin (DAILY MULTIVITAMIN PO) Take 2 tablets by mouth daily     mupirocin (BACTROBAN) 2 % external ointment Apply topically 3 times daily To corner of mouth     ondansetron (ZOFRAN-ODT) 4 MG ODT tab Take 1-2 tablets (4-8 mg) by mouth every 8 hours as needed for nausea     order for DME [facility] Phone [phone] Fax [phone]  Maria Fernandacholo Price to pressure map bed     order for DME Equipment being ordered: [facility]   p: [phone] f: [phone]  EMAIL to [email or web address]    Request for group 2 air mattress & semi-electric hospital bed    Ht:5'8\"  Wt:110 ;bs  Length of need: lifetime    Pt. is wheelchair bound & has been in a comprehensive ulcer treatment program for >2 months. We will follow monthly until wound closure    To obtain medical records:  p: [phone] f: [phone]     order for DME Equipment being ordered: [facility] Fax [phone]    Wheelchair seating eval and mapping of current cushion     order for DME Equipment being ordered: Pressure Mapping of wheelchair at [facility] Phone [phone] Fax [phone]     order for DME Equipment being ordered: Wheelchair-  \"Patient continues to need and use daily her power wheelchair and the wheelchair will continue to need repairs for the next 12 months.\"     oxybutynin ER (DITROPAN-XL) 5 MG 24 hr tablet Take 5 mg by mouth daily     povidone-iodine 10 % swab      senna-docusate (SENOKOT-S;PERICOLACE) 8.6-50 MG per tablet Take 1-2 tablets by mouth 2 times daily as needed for constipation     sertraline (ZOLOFT) 100 MG tablet Take 1.5 tablets (150 mg) by mouth daily     sodium chloride (NEBUSAL) 3 % neb solution Take 3 mLs by nebulization every 6 hours as needed for wheezing or other (sputum clearance difficulty due to quadridplegia.)     spacer (OPTICHAMBER JAIDA) holding chamber To be used with inhaler     No current facility-administered medications for this " "visit.     Facility-Administered Medications Ordered in Other Visits   Medication     levonorgestrel (MIRENA) 20 MCG/24HR IUD       Review of Systems:   ROS: 10 point ROS neg other than the symptoms noted above in the HPI.    Physical Exam:  B/P: Data Unavailable, T: Data Unavailable, P: Data Unavailable, R: Data Unavailable  Estimated body mass index is 20.18 kg/m  as calculated from the following:    Height as of this encounter: 1.676 m (5' 6\").    Weight as of this encounter: 56.7 kg (125 lb).  General Appearance Adult: Alert, no acute distress, oriented  Lungs: no respiratory distress, or pursed lip breathing  Neuro: Alert, oriented, speech and mentation normal  Psych: affect and mood normal    Outside records:   I spent 0 minutes reviewing outside records.    "

## 2022-02-02 ENCOUNTER — MEDICAL CORRESPONDENCE (OUTPATIENT)
Dept: HEALTH INFORMATION MANAGEMENT | Facility: CLINIC | Age: 27
End: 2022-02-02

## 2022-02-02 ENCOUNTER — PATIENT OUTREACH (OUTPATIENT)
Dept: UROLOGY | Facility: CLINIC | Age: 27
End: 2022-02-02
Payer: MEDICARE

## 2022-02-02 DIAGNOSIS — Z53.9 DIAGNOSIS NOT YET DEFINED: Primary | ICD-10-CM

## 2022-02-02 PROCEDURE — G0179 MD RECERTIFICATION HHA PT: HCPCS | Performed by: PHYSICIAN ASSISTANT

## 2022-02-04 ENCOUNTER — PATIENT OUTREACH (OUTPATIENT)
Dept: UROLOGY | Facility: CLINIC | Age: 27
End: 2022-02-04
Payer: MEDICARE

## 2022-02-04 NOTE — TELEPHONE ENCOUNTER
Pt phoned with response from provider about prophylactic abx. Provider states with indwelling rogers catheter, placing on abx is likely to create abx resistance. With symptoms, we would do a culture and treat based on that. Pt left information in detailed voicemail with encouragement to call back with any additional questions.    AVELINO Maher  Care Coordinator  [phone]

## 2022-02-08 ENCOUNTER — TELEPHONE (OUTPATIENT)
Dept: UROLOGY | Facility: CLINIC | Age: 27
End: 2022-02-08
Payer: MEDICARE

## 2022-02-08 NOTE — TELEPHONE ENCOUNTER
ILIAM and callback # for patient to schedule Josuéff consult with Dr. Noriega.  Can be virtual or in-person depending on patient's preference.

## 2022-02-14 ENCOUNTER — PRE VISIT (OUTPATIENT)
Dept: UROLOGY | Facility: CLINIC | Age: 27
End: 2022-02-14
Payer: MEDICARE

## 2022-02-14 NOTE — TELEPHONE ENCOUNTER
Reason for visit: Mitrofanoff consult     Relevant information:Neurogenic bladder due to a history of Spinal cord injury, history of kidney stones    Records/imaging/labs/orders: all records available    Pt called: no need for a call

## 2022-02-16 ENCOUNTER — OFFICE VISIT (OUTPATIENT)
Dept: OBGYN | Facility: CLINIC | Age: 27
End: 2022-02-16
Attending: OBSTETRICS & GYNECOLOGY
Payer: MEDICARE

## 2022-02-16 DIAGNOSIS — D06.9 CIN III WITH SEVERE DYSPLASIA: Primary | ICD-10-CM

## 2022-02-16 PROCEDURE — 88305 TISSUE EXAM BY PATHOLOGIST: CPT | Mod: TC | Performed by: OBSTETRICS & GYNECOLOGY

## 2022-02-16 PROCEDURE — 57454 BX/CURETT OF CERVIX W/SCOPE: CPT

## 2022-02-16 PROCEDURE — G0463 HOSPITAL OUTPT CLINIC VISIT: HCPCS

## 2022-02-16 PROCEDURE — 57456 ENDOCERV CURETTAGE W/SCOPE: CPT | Performed by: OBSTETRICS & GYNECOLOGY

## 2022-02-16 NOTE — LETTER
2022       RE: Dianna Cottrell  [address] [id number]     Dear Colleague,    Thank you for referring your patient, Dianna Cottrell, to the [facility] [place] [facility]. Please see a copy of my visit note below.    Colposcopy Visit/Procedure Note:    Dianna Cottrell is an 26 year old, , who comes in for diagnosis of CIN3 and s/p LEEP and peristent HPV infection.       Menstrual History:  Menstrual History 2018 5/10/2018 2020   LAST MENSTRUAL PERIOD 2/10/2018 2018 2020       Lab Results   Component Value Date    PAP NIL 2020    PAP NIL 2016    PAP NIL 2013       Last   Lab Results   Component Value Date    HPV16 Positive 2021    HPV16 Positive 2020     Last   Lab Results   Component Value Date    HPV18 Negative 2021    HPV18 Negative 2020     Last   Lab Results   Component Value Date    HRHPV Positive 2021    HRHPV Positive 2020       Dates/results of previous cervical pathology: 2021        Dates of previous cervical pathology treatment: as above     History   Smoking Status     Current Every Day Smoker     Packs/day: 0.50     Years: 0.00     Types: Other   Smokeless Tobacco     Current User     Comment: used 1/2-1 ppd for 4 years, quit , now daily e cig use.        Allergies as of 2022 - Reviewed 2022   Allergen Reaction Noted     Succinylcholine  2013        Colposcopy Procedure:    Consent:  Details of the colposcopic procedure were reviewed. Risks, benefits of treatment, and alternate forms of evaluation were discussed.  Patient's questions were elicited and answered.   Written consent was obtained and scanned into medical record.     Verification of Procedure  Just before the procedure begins, through verbal and active participation of team members, I verified:   Initials   Patient Name smd   Patient   smd   Procedure to be performed smd       OBJECTIVE: There were no vitals taken for this visit.    Pelvic Exam:  EG/BUS: Normal genital architecture without lesions, erythema or abnormal secretions. Bartholin's, Urethra, [place]'s glands are normal.  There is some mild erosion in the labia from indwelling catheter.    Vagina: moist, pink, rugae with creamy, white and odorless secretions  Cervix: Multiparous, and IUD strings extend 2.5 cm from external os.  Rectum: anus normal    PROCEDURE:    Acetic acid and/or Lugol's solution applied to cervix.  Colposcopic exam of the cervix and apex of the vagina was conducted in the usual fashion.     Findings:  SCJ was NOT seen entirely and the exam Unsatisfactory.    There were no visible lesions    ECC: was obtained and placed in labeled Formalin Jar.    Assessment: Unsatisfactory exam.  Persistent HPV.     Plan:   F/u path    Carlotta Lock MD, FACOG  (she/her/hers)    Department of Ob/Gyn/Women's Health  [employer]  [facility]  [address] [place] [address]. [place] [address]  [email or web address]  p. [phone]  f. [phone]

## 2022-02-16 NOTE — PROGRESS NOTES
Colposcopy Visit/Procedure Note:    Dianna Cottrell is an 26 year old, , who comes in for diagnosis of CIN3 and s/p LEEP and peristent HPV infection.       Menstrual History:  Menstrual History 2018 5/10/2018 2020   LAST MENSTRUAL PERIOD 2/10/2018 2018 2020       Lab Results   Component Value Date    PAP NIL 2020    PAP NIL 2016    PAP NIL 2013       Last   Lab Results   Component Value Date    HPV16 Positive 2021    HPV16 Positive 2020     Last   Lab Results   Component Value Date    HPV18 Negative 2021    HPV18 Negative 2020     Last   Lab Results   Component Value Date    HRHPV Positive 2021    HRHPV Positive 2020       Dates/results of previous cervical pathology: 2021        Dates of previous cervical pathology treatment: as above     History   Smoking Status     Current Every Day Smoker     Packs/day: 0.50     Years: 0.00     Types: Other   Smokeless Tobacco     Current User     Comment: used 1/2-1 ppd for 4 years, quit , now daily e cig use.        Allergies as of 2022 - Reviewed 2022   Allergen Reaction Noted     Succinylcholine  2013        Colposcopy Procedure:    Consent:  Details of the colposcopic procedure were reviewed. Risks, benefits of treatment, and alternate forms of evaluation were discussed.  Patient's questions were elicited and answered.   Written consent was obtained and scanned into medical record.     Verification of Procedure  Just before the procedure begins, through verbal and active participation of team members, I verified:   Initials   Patient Name smd   Patient  smd   Procedure to be performed smd       OBJECTIVE: There were no vitals taken for this visit.    Pelvic Exam:  EG/BUS: Normal genital architecture without lesions, erythema or abnormal secretions. Bartholin's, Urethra, [place]'s glands are normal.  There is some mild erosion in the labia from indwelling catheter.     Vagina: moist, pink, rugae with creamy, white and odorless secretions  Cervix: Multiparous, and IUD strings extend 2.5 cm from external os.  Rectum: anus normal    PROCEDURE:    Acetic acid and/or Lugol's solution applied to cervix.  Colposcopic exam of the cervix and apex of the vagina was conducted in the usual fashion.     Findings:  SCJ was NOT seen entirely and the exam Unsatisfactory.    There were no visible lesions    ECC: was obtained and placed in labeled Formalin Jar.    Assessment: Unsatisfactory exam.  Persistent HPV.     Plan:   F/u path    Carlotta Lock MD, FACOG  (she/her/hers)    Department of Ob/Gyn/Women's Health  [employer]  [facility]  [address] [place], [place] [address]. [place] [address]  [email or web address]  p. [phone]  f. [phone]

## 2022-02-18 LAB
PATH REPORT.COMMENTS IMP SPEC: NORMAL
PATH REPORT.COMMENTS IMP SPEC: NORMAL
PATH REPORT.FINAL DX SPEC: NORMAL
PATH REPORT.GROSS SPEC: NORMAL
PATH REPORT.MICROSCOPIC SPEC OTHER STN: NORMAL
PATH REPORT.RELEVANT HX SPEC: NORMAL
PHOTO IMAGE: NORMAL

## 2022-02-18 PROCEDURE — 88305 TISSUE EXAM BY PATHOLOGIST: CPT | Mod: 26 | Performed by: PATHOLOGY

## 2022-02-24 ENCOUNTER — TELEPHONE (OUTPATIENT)
Dept: UROLOGY | Facility: CLINIC | Age: 27
End: 2022-02-24
Payer: MEDICARE

## 2022-02-24 ENCOUNTER — PRE VISIT (OUTPATIENT)
Dept: UROLOGY | Facility: CLINIC | Age: 27
End: 2022-02-24
Payer: MEDICARE

## 2022-02-24 DIAGNOSIS — N31.9 NEUROGENIC BLADDER: Primary | ICD-10-CM

## 2022-02-24 NOTE — TELEPHONE ENCOUNTER
PRINTED DME.  WILL HAVE NORA SIGN AND FAX TO [email or web address] NEXT WEEK.  NICOL Ruiz CMA      ===============================  [facility] Call Center    Phone Message    May a detailed message be left on voicemail: yes     Reason for Call: Other: Philomena is calling to see if HEATH Michelle could write Dianna's prescriptions for her catheter supplies for night bags and insertion trays and send it to [facility]. Please call Philomena back with any questions, thank you.     Action Taken: Message routed to:  Other: [facility]    Travel Screening: Not Applicable

## 2022-02-26 DIAGNOSIS — Z11.59 ENCOUNTER FOR SCREENING FOR OTHER VIRAL DISEASES: Primary | ICD-10-CM

## 2022-03-07 ENCOUNTER — VIRTUAL VISIT (OUTPATIENT)
Dept: UROLOGY | Facility: CLINIC | Age: 27
End: 2022-03-07
Payer: MEDICARE

## 2022-03-07 DIAGNOSIS — S14.109S CERVICAL SPINAL CORD INJURY, SEQUELA (H): ICD-10-CM

## 2022-03-07 DIAGNOSIS — N31.9 NEUROGENIC BLADDER: Primary | ICD-10-CM

## 2022-03-07 PROCEDURE — 99214 OFFICE O/P EST MOD 30 MIN: CPT | Mod: 95 | Performed by: UROLOGY
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
## 2022-03-07 NOTE — PATIENT INSTRUCTIONS
Preparing for Your Surgery  Getting started  A nurse will call you to review your health history and instructions. They will give you an arrival time based on your scheduled surgery time. Please be ready to share:    Your doctor's clinic name and phone number    Your medical, surgical and anesthesia history    A list of allergies and sensitivities    A list of medicines, including herbal treatments and over-the-counter drugs    Whether the patient has a legal guardian (ask how to send us the papers in advance)  Please tell us if you're pregnant--or if there's any chance you might be pregnant. Some surgeries may injure a fetus (unborn baby), so they require a pregnancy test. Surgeries that are safe for a fetus don't always need a test, and you can choose whether to have one.   If you have a child who's having surgery, please ask for a copy of Preparing for Your Child's Surgery.    Preparing for surgery    Within 30 days of surgery: Have a pre-op exam (sometimes called an H&P, or History and Physical). This can be done at a clinic or pre-operative center.  ? If you're having a , you may not need this exam. Talk to your care team.    At your pre-op exam, talk to your care team about all medicines you take. If you need to stop any medicines before surgery, ask when to start taking them again.  ? We do this for your safety. Many medicines can make you bleed too much during surgery. Some change how well surgery (anesthesia) drugs work.    Call your insurance company to let them know you're having surgery. (If you don't have insurance, call [phone].)    Call your clinic if there's any change in your health. This includes signs of a cold or flu (sore throat, runny nose, cough, rash, fever). It also includes a scrape or scratch near the surgery site.    If you have questions on the day of surgery, call your hospital or surgery center.  COVID testing  You may need to be tested for COVID-19 before having  surgery. If so, your surgical team will give you instructions for scheduling this test, separate from your preoperative history and physical.  Eating and drinking guidelines  For your safety: Unless your surgeon tells you otherwise, follow the guidelines below.    Eat and drink as usual until 8 hours before surgery. After that, no food or milk.    Drink clear liquids until 2 hours before surgery. These are liquids you can see through, like water, Gatorade and Propel Water. You may also have black coffee and tea (no cream or milk).    Nothing by mouth within 2 hours of surgery. This includes gum, candy and breath mints.    If you drink alcohol: Stop drinking it the night before surgery.    If your care team tells you to take medicine on the morning of surgery, it's okay to take it with a sip of water.  Preventing infection    Shower or bathe the night before and morning of your surgery. Follow the instructions your clinic gave you. (If no instructions, use regular soap.)    Don't shave or clip hair near your surgery site. We'll remove the hair if needed.    Don't smoke or vape the morning of surgery. You may chew nicotine gum up to 2 hours before surgery. A nicotine patch is okay.  ? Note: Some surgeries require you to completely quit smoking and nicotine. Check with your surgeon.    Your care team will make every effort to keep you safe from infection. We will:  ? Clean our hands often with soap and water (or an alcohol-based hand rub).  ? Clean the skin at your surgery site with a special soap that kills germs.  ? Give you a special gown to keep you warm. (Cold raises the risk of infection.)  ? Wear special hair covers, masks, gowns and gloves during surgery.  ? Give antibiotic medicine, if prescribed. Not all surgeries need antibiotics.  What to bring on the day of surgery    Photo ID and insurance card    Copy of your health care directive, if you have one    Glasses and hearing aides (bring cases)  ? You can't  wear contacts during surgery    Inhaler and eye drops, if you use them (tell us about these when you arrive)    CPAP machine or breathing device, if you use them    A few personal items, if spending the night    If you have . . .  ? A pacemaker, ICD (cardiac defibrillator) or other implant: Bring the ID card.  ? An implanted stimulator: Bring the remote control.  ? A legal guardian: Bring a copy of the certified (court-stamped) guardianship papers.  Please remove any jewelry, including body piercings. Leave jewelry and other valuables at home.  If you're going home the day of surgery    You must have a responsible adult drive you home. They should stay with you overnight as well.    If you don't have someone to stay with you, and you aren't safe to go home alone, we may keep you overnight. Insurance often won't pay for this.  After surgery  If it's hard to control your pain or you need more pain medicine, please call your surgeon's office.  Questions?   If you have any questions for your care team, list them here: _________________________________________________________________________________________________________________________________________________________________________ ____________________________________ ____________________________________ ____________________________________  For informational purposes only. Not to replace the advice of your health care provider. Copyright   2003, 2019 [facility]. All rights reserved. Clinically reviewed by Shannon Clark MD. [employer] [id number] - REV 07/21.
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
## 2022-03-08 NOTE — PATIENT INSTRUCTIONS
Please follow up for the next available urodynamics study and then a cystoscopy next available.     It was a pleasure meeting with you today.  Thank you for allowing me and my team the privilege of caring for you today.  YOU are the reason we are here, and I truly hope we provided you with the excellent service you deserve.  Please let us know if there is anything else we can do for you so that we can be sure you are leaving completely satisfied with your care experience.

## 2022-03-10 ENCOUNTER — TELEPHONE (OUTPATIENT)
Dept: FAMILY MEDICINE | Facility: CLINIC | Age: 27
End: 2022-03-10
Payer: MEDICARE

## 2022-03-10 ENCOUNTER — MEDICAL CORRESPONDENCE (OUTPATIENT)
Dept: HEALTH INFORMATION MANAGEMENT | Facility: CLINIC | Age: 27
End: 2022-03-10
Payer: MEDICARE

## 2022-03-10 ENCOUNTER — TELEPHONE (OUTPATIENT)
Dept: UROLOGY | Facility: CLINIC | Age: 27
End: 2022-03-10
Payer: MEDICARE

## 2022-03-10 NOTE — CONFIDENTIAL NOTE
Reason for Call:  Form, our goal is to have forms completed with 72 hours, however, some forms may require a visit or additional information.    Type of letter, form or note:  Home Health Certification    Who is the form from?: Home care    Where did the form come from: form was mailed in    What clinic location was the form placed at?: [facility] [phone]    Where the form was placed:  Box/Folder    What number is listed as a contact on the form?: [phone]       Additional comments: Fax Plan of Care to:  [phone]    Call taken on 3/10/2022 at 11:41 AM by Ava Roy

## 2022-03-10 NOTE — TELEPHONE ENCOUNTER
LVM and callback # for patient to schedule next available urodynamics study and then a cystoscopy next available with Hari (to be done after urodynamic)

## 2022-03-14 ENCOUNTER — MEDICAL CORRESPONDENCE (OUTPATIENT)
Dept: HEALTH INFORMATION MANAGEMENT | Facility: CLINIC | Age: 27
End: 2022-03-14
Payer: MEDICARE

## 2022-03-14 NOTE — TELEPHONE ENCOUNTER
Completed forms faxed to [phone] as requested, originals placed in scanning bin to be scanned.

## 2022-03-17 NOTE — TELEPHONE ENCOUNTER
"Requested Prescriptions   Pending Prescriptions Disp Refills     hydrOXYzine (VISTARIL) 25 MG capsule 20 capsule 0     Sig: Take 1 capsule (25 mg) by mouth 3 times daily as needed for itching or anxiety       Antihistamines Protocol Passed - 4/22/2019 10:19 AM        Passed - Recent (12 mo) or future (30 days) visit within the authorizing provider's specialty     Patient had office visit in the last 12 months or has a visit in the next 30 days with authorizing provider or within the authorizing provider's specialty.  See \"Patient Info\" tab in inbasket, or \"Choose Columns\" in Meds & Orders section of the refill encounter.              Passed - Patient is age 3 or older     Apply age and/or weight-based dosing for peds patients age 3 and older.    Forward request to provider for patients under the age of 3.          Passed - Medication is active on med list        Last seen 02/11/2019  Last filled 05/10/2018    Routing refill request to provider for review/approval because:  A break in medication        "
Reason for Call:  Medication or medication refill:    Do you use a [facility]?  Name of the pharmacy and phone number for the current request:  [facility] [place] [facility]     Name of the medication requested: hydrOXYzine (VISTARIL) 25 MG capsule    Other request: Please call when this is done.    Can we leave a detailed message on this number? YES    Phone number patient can be reached at: Cell number on file:    Telephone Information:   Mobile [phone]       Best Time: anytime    Call taken on 4/22/2019 at 9:41 AM by Vasquez Mcmillna      
4

## 2022-03-18 ENCOUNTER — TELEPHONE (OUTPATIENT)
Dept: FAMILY MEDICINE | Facility: CLINIC | Age: 27
End: 2022-03-18
Payer: MEDICARE

## 2022-03-18 NOTE — CONFIDENTIAL NOTE
Reason for Call:  Form, our goal is to have forms completed with 72 hours, however, some forms may require a visit or additional information.     Type of letter, form or note:  Home Health Certification     Who is the form from?: Home care     Where did the form come from: form was mailed in     What clinic location was the form placed at?: [facility] [phone]     Where the form was placed: [facility] Box/Folder     What number is listed as a contact on the form?: [phone]       Additional comments: Fax Plan of Care to:  [phone]

## 2022-03-23 ENCOUNTER — OFFICE VISIT (OUTPATIENT)
Dept: FAMILY MEDICINE | Facility: CLINIC | Age: 27
End: 2022-03-23
Payer: MEDICARE

## 2022-03-23 VITALS
OXYGEN SATURATION: 96 % | HEART RATE: 76 BPM | TEMPERATURE: 97.5 F | RESPIRATION RATE: 16 BRPM | DIASTOLIC BLOOD PRESSURE: 60 MMHG | SYSTOLIC BLOOD PRESSURE: 100 MMHG

## 2022-03-23 DIAGNOSIS — R94.2 IMPAIRED LUNG FUNCTION: ICD-10-CM

## 2022-03-23 DIAGNOSIS — S14.109S QUADRIPLEGIA, POST-TRAUMATIC (H): ICD-10-CM

## 2022-03-23 DIAGNOSIS — Z86.718 PERSONAL HISTORY OF DVT (DEEP VEIN THROMBOSIS): ICD-10-CM

## 2022-03-23 DIAGNOSIS — Z01.818 PREOP GENERAL PHYSICAL EXAM: Primary | ICD-10-CM

## 2022-03-23 DIAGNOSIS — L89.324 DECUBITUS ULCER OF ISCHIAL AREA, LEFT, STAGE IV (H): ICD-10-CM

## 2022-03-23 DIAGNOSIS — F32.4 MAJOR DEPRESSIVE DISORDER WITH SINGLE EPISODE, IN PARTIAL REMISSION (H): ICD-10-CM

## 2022-03-23 DIAGNOSIS — M86.651 CHRONIC OSTEOMYELITIS OF PELVIS, RIGHT (H): ICD-10-CM

## 2022-03-23 DIAGNOSIS — G82.50 QUADRIPLEGIA, POST-TRAUMATIC (H): ICD-10-CM

## 2022-03-23 DIAGNOSIS — Z97.5 IUD (INTRAUTERINE DEVICE) IN PLACE: ICD-10-CM

## 2022-03-23 DIAGNOSIS — N20.0 KIDNEY STONE: ICD-10-CM

## 2022-03-23 DIAGNOSIS — E44.0 MODERATE PROTEIN-CALORIE MALNUTRITION (H): ICD-10-CM

## 2022-03-23 DIAGNOSIS — Z72.89 ENGAGES IN VAPING: ICD-10-CM

## 2022-03-23 DIAGNOSIS — N31.9 NEUROGENIC BLADDER: ICD-10-CM

## 2022-03-23 PROCEDURE — 99214 OFFICE O/P EST MOD 30 MIN: CPT | Performed by: PHYSICIAN ASSISTANT

## 2022-03-23 ASSESSMENT — ANXIETY QUESTIONNAIRES
7. FEELING AFRAID AS IF SOMETHING AWFUL MIGHT HAPPEN: SEVERAL DAYS
7. FEELING AFRAID AS IF SOMETHING AWFUL MIGHT HAPPEN: SEVERAL DAYS
6. BECOMING EASILY ANNOYED OR IRRITABLE: SEVERAL DAYS
3. WORRYING TOO MUCH ABOUT DIFFERENT THINGS: SEVERAL DAYS
GAD7 TOTAL SCORE: 7
5. BEING SO RESTLESS THAT IT IS HARD TO SIT STILL: SEVERAL DAYS
2. NOT BEING ABLE TO STOP OR CONTROL WORRYING: SEVERAL DAYS
GAD7 TOTAL SCORE: 7
4. TROUBLE RELAXING: SEVERAL DAYS
GAD7 TOTAL SCORE: 7
1. FEELING NERVOUS, ANXIOUS, OR ON EDGE: SEVERAL DAYS

## 2022-03-23 ASSESSMENT — PATIENT HEALTH QUESTIONNAIRE - PHQ9
SUM OF ALL RESPONSES TO PHQ QUESTIONS 1-9: 5
10. IF YOU CHECKED OFF ANY PROBLEMS, HOW DIFFICULT HAVE THESE PROBLEMS MADE IT FOR YOU TO DO YOUR WORK, TAKE CARE OF THINGS AT HOME, OR GET ALONG WITH OTHER PEOPLE: SOMEWHAT DIFFICULT
SUM OF ALL RESPONSES TO PHQ QUESTIONS 1-9: 5

## 2022-03-24 ENCOUNTER — MEDICAL CORRESPONDENCE (OUTPATIENT)
Dept: HEALTH INFORMATION MANAGEMENT | Facility: CLINIC | Age: 27
End: 2022-03-24
Payer: MEDICARE

## 2022-03-24 ASSESSMENT — PATIENT HEALTH QUESTIONNAIRE - PHQ9: SUM OF ALL RESPONSES TO PHQ QUESTIONS 1-9: 5

## 2022-03-24 ASSESSMENT — ANXIETY QUESTIONNAIRES: GAD7 TOTAL SCORE: 7

## 2022-03-31 ENCOUNTER — PATIENT OUTREACH (OUTPATIENT)
Dept: UROLOGY | Facility: CLINIC | Age: 27
End: 2022-03-31
Payer: MEDICARE

## 2022-03-31 NOTE — TELEPHONE ENCOUNTER
Pt phoned and detailed voicemail left requesting urine culture prior to upcoming procedure. Requested pt call back to coordinate at   AVELINO Maher  Care Coordinator  [phone]

## 2022-04-01 ENCOUNTER — TELEPHONE (OUTPATIENT)
Dept: FAMILY MEDICINE | Facility: CLINIC | Age: 27
End: 2022-04-01
Payer: MEDICARE

## 2022-04-01 NOTE — TELEPHONE ENCOUNTER
Reason for Call:  Form, our goal is to have forms completed with 72 hours, however, some forms may require a visit or additional information.    Type of letter, form or note:  Home Health Certification    Who is the form from?: Home care    Where did the form come from: form was faxed in    What clinic location was the form placed at?: [facility] [phone]    Where the form was placed: [facility] Box/Folder    What number is listed as a contact on the form?: [phone]       Additional comments:     Call taken on 4/1/2022 at 8:57 AM by Batool Saucedo

## 2022-04-04 ENCOUNTER — LAB REQUISITION (OUTPATIENT)
Dept: LAB | Facility: CLINIC | Age: 27
End: 2022-04-04
Payer: MEDICARE

## 2022-04-04 DIAGNOSIS — N39.0 URINARY TRACT INFECTION, SITE NOT SPECIFIED: ICD-10-CM

## 2022-04-04 LAB
ALBUMIN UR-MCNC: 30 MG/DL
APPEARANCE UR: ABNORMAL
BILIRUB UR QL STRIP: NEGATIVE
COLOR UR AUTO: ABNORMAL
GLUCOSE UR STRIP-MCNC: NEGATIVE MG/DL
HGB UR QL STRIP: ABNORMAL
KETONES UR STRIP-MCNC: NEGATIVE MG/DL
LEUKOCYTE ESTERASE UR QL STRIP: ABNORMAL
MUCOUS THREADS #/AREA URNS LPF: PRESENT /LPF
NITRATE UR QL: NEGATIVE
PH UR STRIP: 7 [PH] (ref 5–7)
RBC URINE: 169 /HPF
SP GR UR STRIP: 1.01 (ref 1–1.03)
SQUAMOUS EPITHELIAL: 1 /HPF
UROBILINOGEN UR STRIP-MCNC: NORMAL MG/DL
WBC CLUMPS #/AREA URNS HPF: PRESENT /HPF
WBC URINE: 128 /HPF

## 2022-04-04 PROCEDURE — 87088 URINE BACTERIA CULTURE: CPT | Mod: ORL,59 | Performed by: UROLOGY

## 2022-04-04 PROCEDURE — 81001 URINALYSIS AUTO W/SCOPE: CPT | Mod: ORL | Performed by: UROLOGY

## 2022-04-07 ENCOUNTER — PATIENT OUTREACH (OUTPATIENT)
Dept: UROLOGY | Facility: CLINIC | Age: 27
End: 2022-04-07
Payer: MEDICARE

## 2022-04-07 DIAGNOSIS — N39.0 URINARY TRACT INFECTION: Primary | ICD-10-CM

## 2022-04-07 RX ORDER — NITROFURANTOIN 25; 75 MG/1; MG/1
100 CAPSULE ORAL 2 TIMES DAILY
Qty: 10 CAPSULE | Refills: 0 | Status: ON HOLD | OUTPATIENT
Start: 2022-04-07 | End: 2022-04-12

## 2022-04-07 NOTE — TELEPHONE ENCOUNTER
Per Dr. Schofield, Have her start nitrofurantoin 100 mg PO BID until surgery. Lab will start speciating urine sample to provide additional information. Pt agreeable to plan

## 2022-04-08 DIAGNOSIS — F32.4 MAJOR DEPRESSIVE DISORDER WITH SINGLE EPISODE, IN PARTIAL REMISSION (H): ICD-10-CM

## 2022-04-08 DIAGNOSIS — F41.1 GAD (GENERALIZED ANXIETY DISORDER): ICD-10-CM

## 2022-04-08 RX ORDER — HYDROPHILIC CREAM
PASTE (GRAM) TOPICAL DAILY PRN
Status: ON HOLD | COMMUNITY
End: 2023-03-20

## 2022-04-08 RX ORDER — BISACODYL 10 MG
10 SUPPOSITORY, RECTAL RECTAL AT BEDTIME
Status: ON HOLD | COMMUNITY
End: 2023-03-20

## 2022-04-08 RX ORDER — CHLORHEXIDINE GLUCONATE 4 %
1 LIQUID (ML) TOPICAL DAILY
COMMUNITY

## 2022-04-08 NOTE — TELEPHONE ENCOUNTER
"Pending Prescriptions:                       Disp   Refills    sertraline (ZOLOFT) 100 MG tablet [Pharmac*135 ta*0        Sig: TAKE 1.5 TABLETS BY MOUTH DAILY    Routing refill request to provider for review/approval because:  Requested Prescriptions   Pending Prescriptions Disp Refills    sertraline (ZOLOFT) 100 MG tablet [Pharmacy Med Name: SERTRALINE  MG TABLET] 135 tablet 0     Sig: TAKE 1.5 TABLETS BY MOUTH DAILY        SSRIs Protocol Failed - 4/8/2022  1:15 PM        Failed - PHQ-9 score less than 5 in past 6 months     Please review last PHQ-9 score.   PHQ-9 score:    PHQ 3/23/2022   PHQ-9 Total Score 5   Q9: Thoughts of better off dead/self-harm past 2 weeks Not at all             Passed - Medication is active on med list        Passed - Patient is age 18 or older        Passed - No active pregnancy on record        Passed - No positive pregnancy test in last 12 months        Passed - Recent (6 mo) or future (30 days) visit within the authorizing provider's specialty     Patient had office visit in the last 6 months or has a visit in the next 30 days with authorizing provider or within the authorizing provider's specialty.  See \"Patient Info\" tab in inbasket, or \"Choose Columns\" in Meds & Orders section of the refill encounter.                sertraline (ZOLOFT) 100 MG tablet 135 tablet 0 12/30/2021  No   Sig - Route: Take 1.5 tablets (150 mg) by mouth daily - Oral   Sent to pharmacy as: Sertraline HCl 100 MG Oral Tablet (ZOLOFT)   Class: E-Prescribe   Order: [id number]   E-Prescribing Status: Receipt confirmed by pharmacy (12/30/2021 11:34 AM CST)     Toyin Forte RN on 4/8/2022 at 4:46 PM        "

## 2022-04-08 NOTE — PROGRESS NOTES
PTA medications updated by Medication Scribe prior to surgery via phone call with patient (last doses completed by Nurse)     Medication history sources: Patient, Surescripts, H&P and Patient's home med list  In the past week, patient estimated taking medication this percent of the time: Greater than 90%  Adherence assessment: N/A Not Observed    Significant changes made to the medication list:  None      Additional medication history information:   None    Medication reconciliation completed by provider prior to medication history? No    Time spent in this activity: 45 MINUTES    The information provided in this note is only as accurate as the sources available at the time of update(s)      Prior to Admission medications    Medication Sig Last Dose Taking? Auth Provider   acetaminophen (TYLENOL) 325 MG tablet Take 325-650 mg by mouth every 6 hours as needed.  at PRN Yes Reported, Patient   albuterol (PROVENTIL) (2.5 MG/3ML) 0.083% neb solution Take 1 vial (2.5 mg) by nebulization every 4 hours as needed for shortness of breath / dyspnea or wheezing  at AM Yes Rajesh Bahena MD   baclofen (LIORESAL) 10 MG tablet Take 30 mg by mouth 2 times daily (10MG X 3 = 30MG)  at AM Yes Reported, Patient   bisacodyl (DULCOLAX) 10 MG suppository Place 10 mg rectally daily as needed for constipation  at PM Yes Reported, Patient   Cranberry 600 MG TABS Take 600 mg by mouth daily   at AM Yes Reported, Patient   docusate sodium (COLACE) 100 MG capsule Take 100-200 mg by mouth daily as needed for constipation   at AM Yes Reported, Patient   gabapentin (NEURONTIN) 300 MG capsule Take 300 mg by mouth At Bedtime   at PM Yes Reported, Patient   hydrOXYzine (VISTARIL) 25 MG capsule Take 1 capsule (25 mg) by mouth 3 times daily as needed for anxiety (or at bedtime for sleep.)  at PRN Yes Suzan Willis PA-C   methenamine (HIPREX) 1 g tablet TAKE 1 TABLET BY MOUTH 2 TIMES DAILY  at AM Yes Ashia Pablo PA-C   midluigirine  "(PROAMATINE) 5 MG tablet Take 10 mg by mouth 2 times daily   at AM Yes Suzan Willis PA-C   Multiple Vitamins-Minerals (WOMENS MULTIVITAMIN) TABS Take 2 tablets by mouth daily  at AM Yes Reported, Patient   nitroFURantoin macrocrystal-monohydrate (MACROBID) 100 MG capsule Take 1 capsule (100 mg) by mouth 2 times daily for 5 days  at AM Yes Parveen Schofield MD   oxybutynin ER (DITROPAN-XL) 5 MG 24 hr tablet Take 5 mg by mouth daily  at AM Yes Reported, Patient   povidone-iodine 10 % swab Apply topically daily as needed for wound care   Yes Reported, Patient   sertraline (ZOLOFT) 100 MG tablet Take 1.5 tablets (150 mg) by mouth daily  at PM Yes Suzan Willis PA-C   sodium chloride (NEBUSAL) 3 % neb solution Take 3 mLs by nebulization every 6 hours as needed for wheezing or other (sputum clearance difficulty due to quadridplegia.)  at AM Yes Rajesh Bahena MD   Vitamin D3 (CHOLECALCIFEROL) 125 MCG (5000 UT) tablet Take 1 tablet by mouth every other day  at PM Yes Reported, Patient   Wound Dressings (TRIAD HYDROPHILIC WOUND DRESSI) PSTE Externally apply topically daily as needed  at PRN Yes Reported, Patient   order for DME [facility] Phone [phone] Fax [phone]  Maria Fernanda Price to pressure map bed   Vira Zacarias MD   order for DME Equipment being ordered: [facility]   p: [phone] f: [phone]  EMAIL to [email or web address]  [email or web address]    Request for group 2 air mattress & semi-electric hospital bed    Ht:5'8\"  Wt:110 ;bs  Length of need: lifetime    Pt. is wheelchair bound & has been in a comprehensive ulcer treatment program for >2 months. We will follow monthly until wound closure    To obtain medical records:  p: [phone] f: [phone]   Toyin Yi PA-C   order for DME Equipment being ordered: [facility] Fax [phone]    Wheelchair seating eval and mapping of current cushion   Toyin Yi" "MIO Oden   order for DME Equipment being ordered: Pressure Mapping of wheelchair at [facility] Phone [phone] Fax [phone]   Toyin Yi PA-C   order for DME Equipment being ordered: Wheelchair-  \"Patient continues to need and use daily her power wheelchair and the wheelchair will continue to need repairs for the next 12 months.\"   Suzan Willis PA-C   spacer (OPTICHAMBER JAIDA) holding chamber To be used with inhaler   Suzan Willis PA-C       "

## 2022-04-10 LAB
BACTERIA UR CULT: ABNORMAL

## 2022-04-11 ENCOUNTER — HOSPITAL ENCOUNTER (OUTPATIENT)
Facility: CLINIC | Age: 27
Discharge: HOME | End: 2022-04-12
Admitting: UROLOGY
Payer: MEDICARE

## 2022-04-11 ENCOUNTER — ANESTHESIA EVENT (OUTPATIENT)
Dept: SURGERY | Facility: CLINIC | Age: 27
End: 2022-04-11
Payer: MEDICARE

## 2022-04-11 ENCOUNTER — ANESTHESIA (OUTPATIENT)
Dept: SURGERY | Facility: CLINIC | Age: 27
End: 2022-04-11
Payer: MEDICARE

## 2022-04-11 ENCOUNTER — APPOINTMENT (OUTPATIENT)
Dept: GENERAL RADIOLOGY | Facility: CLINIC | Age: 27
End: 2022-04-11
Attending: UROLOGY
Payer: MEDICARE

## 2022-04-11 DIAGNOSIS — N39.0 URINARY TRACT INFECTION: ICD-10-CM

## 2022-04-11 DIAGNOSIS — N20.0 NEPHROLITHIASIS: Primary | ICD-10-CM

## 2022-04-11 LAB — SARS-COV-2 RNA RESP QL NAA+PROBE: NEGATIVE

## 2022-04-11 PROCEDURE — C1887 CATHETER, GUIDING: HCPCS | Performed by: UROLOGY

## 2022-04-11 PROCEDURE — C1769 GUIDE WIRE: HCPCS | Performed by: UROLOGY

## 2022-04-11 PROCEDURE — 710N000009 HC RECOVERY PHASE 1, LEVEL 1, PER MIN: Performed by: UROLOGY

## 2022-04-11 PROCEDURE — 50081 PERQ NL/PL LITHOTRP CPLX>2CM: CPT | Mod: LT | Performed by: UROLOGY

## 2022-04-11 PROCEDURE — 250N000011 HC RX IP 250 OP 636: Performed by: NURSE ANESTHETIST, CERTIFIED REGISTERED

## 2022-04-11 PROCEDURE — C1726 CATH, BAL DIL, NON-VASCULAR: HCPCS | Performed by: UROLOGY

## 2022-04-11 PROCEDURE — 999N000179 XR SURGERY CARM FLUORO LESS THAN 5 MIN W STILLS

## 2022-04-11 PROCEDURE — 71045 X-RAY EXAM CHEST 1 VIEW: CPT

## 2022-04-11 PROCEDURE — 82365 CALCULUS SPECTROSCOPY: CPT | Performed by: UROLOGY

## 2022-04-11 PROCEDURE — 258N000003 HC RX IP 258 OP 636: Performed by: ANESTHESIOLOGY

## 2022-04-11 PROCEDURE — 999N000141 HC STATISTIC PRE-PROCEDURE NURSING ASSESSMENT: Performed by: UROLOGY

## 2022-04-11 PROCEDURE — 250N000011 HC RX IP 250 OP 636: Performed by: UROLOGY

## 2022-04-11 PROCEDURE — 250N000009 HC RX 250: Performed by: NURSE ANESTHETIST, CERTIFIED REGISTERED

## 2022-04-11 PROCEDURE — 74420 UROGRAPHY RTRGR +-KUB: CPT | Mod: 26 | Performed by: UROLOGY

## 2022-04-11 PROCEDURE — C1894 INTRO/SHEATH, NON-LASER: HCPCS | Performed by: UROLOGY

## 2022-04-11 PROCEDURE — 250N000013 HC RX MED GY IP 250 OP 250 PS 637: Performed by: UROLOGY

## 2022-04-11 PROCEDURE — 52332 CYSTOSCOPY AND TREATMENT: CPT | Mod: XS | Performed by: UROLOGY

## 2022-04-11 PROCEDURE — 360N000084 HC SURGERY LEVEL 4 W/ FLUORO, PER MIN: Performed by: UROLOGY

## 2022-04-11 PROCEDURE — 87070 CULTURE OTHR SPECIMN AEROBIC: CPT | Performed by: UROLOGY

## 2022-04-11 PROCEDURE — 250N000009 HC RX 250: Performed by: UROLOGY

## 2022-04-11 PROCEDURE — 370N000017 HC ANESTHESIA TECHNICAL FEE, PER MIN: Performed by: UROLOGY

## 2022-04-11 PROCEDURE — 272N000001 HC OR GENERAL SUPPLY STERILE: Performed by: UROLOGY

## 2022-04-11 PROCEDURE — 250N000025 HC SEVOFLURANE, PER MIN: Performed by: UROLOGY

## 2022-04-11 PROCEDURE — U0005 INFEC AGEN DETEC AMPLI PROBE: HCPCS | Performed by: UROLOGY

## 2022-04-11 PROCEDURE — 258N000003 HC RX IP 258 OP 636: Performed by: NURSE ANESTHETIST, CERTIFIED REGISTERED

## 2022-04-11 PROCEDURE — C2617 STENT, NON-COR, TEM W/O DEL: HCPCS | Performed by: UROLOGY

## 2022-04-11 PROCEDURE — 258N000003 HC RX IP 258 OP 636: Performed by: UROLOGY

## 2022-04-11 PROCEDURE — 52356 CYSTO/URETERO W/LITHOTRIPSY: CPT | Mod: XS | Performed by: UROLOGY

## 2022-04-11 DEVICE — STENT URETERAL POLARIS ULTRA 6FRX24CM M0061921320: Type: IMPLANTABLE DEVICE | Site: URETHRA | Status: FUNCTIONAL

## 2022-04-11 DEVICE — STENT URETERAL POLARIS ULTRA 6FRX22CM M0061921310: Type: IMPLANTABLE DEVICE | Site: URETHRA | Status: FUNCTIONAL

## 2022-04-11 RX ORDER — HYDROMORPHONE HYDROCHLORIDE 1 MG/ML
INJECTION, SOLUTION INTRAMUSCULAR; INTRAVENOUS; SUBCUTANEOUS PRN
Status: DISCONTINUED | OUTPATIENT
Start: 2022-04-11 | End: 2022-04-11

## 2022-04-11 RX ORDER — DOCUSATE SODIUM 100 MG/1
100-200 CAPSULE, LIQUID FILLED ORAL DAILY PRN
Status: DISCONTINUED | OUTPATIENT
Start: 2022-04-11 | End: 2022-04-12 | Stop reason: HOSPADM

## 2022-04-11 RX ORDER — HYDROMORPHONE HCL IN WATER/PF 6 MG/30 ML
0.2 PATIENT CONTROLLED ANALGESIA SYRINGE INTRAVENOUS
Status: DISCONTINUED | OUTPATIENT
Start: 2022-04-11 | End: 2022-04-12 | Stop reason: HOSPADM

## 2022-04-11 RX ORDER — ONDANSETRON 2 MG/ML
4 INJECTION INTRAMUSCULAR; INTRAVENOUS EVERY 30 MIN PRN
Status: DISCONTINUED | OUTPATIENT
Start: 2022-04-11 | End: 2022-04-11 | Stop reason: HOSPADM

## 2022-04-11 RX ORDER — AMPICILLIN 1 G/1
1 INJECTION, POWDER, FOR SOLUTION INTRAMUSCULAR; INTRAVENOUS EVERY 8 HOURS
Status: DISCONTINUED | OUTPATIENT
Start: 2022-04-11 | End: 2022-04-12 | Stop reason: HOSPADM

## 2022-04-11 RX ORDER — NALOXONE HYDROCHLORIDE 0.4 MG/ML
0.2 INJECTION, SOLUTION INTRAMUSCULAR; INTRAVENOUS; SUBCUTANEOUS
Status: DISCONTINUED | OUTPATIENT
Start: 2022-04-11 | End: 2022-04-12 | Stop reason: HOSPADM

## 2022-04-11 RX ORDER — GABAPENTIN 300 MG/1
300 CAPSULE ORAL AT BEDTIME
Status: DISCONTINUED | OUTPATIENT
Start: 2022-04-11 | End: 2022-04-12 | Stop reason: HOSPADM

## 2022-04-11 RX ORDER — ONDANSETRON 2 MG/ML
4 INJECTION INTRAMUSCULAR; INTRAVENOUS EVERY 6 HOURS PRN
Status: DISCONTINUED | OUTPATIENT
Start: 2022-04-11 | End: 2022-04-12 | Stop reason: HOSPADM

## 2022-04-11 RX ORDER — BUPIVACAINE HYDROCHLORIDE 5 MG/ML
INJECTION, SOLUTION PERINEURAL PRN
Status: DISCONTINUED | OUTPATIENT
Start: 2022-04-11 | End: 2022-04-11 | Stop reason: HOSPADM

## 2022-04-11 RX ORDER — SODIUM CHLORIDE, SODIUM LACTATE, POTASSIUM CHLORIDE, CALCIUM CHLORIDE 600; 310; 30; 20 MG/100ML; MG/100ML; MG/100ML; MG/100ML
INJECTION, SOLUTION INTRAVENOUS CONTINUOUS
Status: DISCONTINUED | OUTPATIENT
Start: 2022-04-11 | End: 2022-04-11 | Stop reason: HOSPADM

## 2022-04-11 RX ORDER — SERTRALINE HYDROCHLORIDE 100 MG/1
TABLET, FILM COATED ORAL
Qty: 135 TABLET | Refills: 0 | Status: SHIPPED | OUTPATIENT
Start: 2022-04-11 | End: 2022-07-13

## 2022-04-11 RX ORDER — DEXAMETHASONE SODIUM PHOSPHATE 4 MG/ML
INJECTION, SOLUTION INTRA-ARTICULAR; INTRALESIONAL; INTRAMUSCULAR; INTRAVENOUS; SOFT TISSUE PRN
Status: DISCONTINUED | OUTPATIENT
Start: 2022-04-11 | End: 2022-04-11

## 2022-04-11 RX ORDER — LIDOCAINE 40 MG/G
CREAM TOPICAL
Status: DISCONTINUED | OUTPATIENT
Start: 2022-04-11 | End: 2022-04-11 | Stop reason: HOSPADM

## 2022-04-11 RX ORDER — LIDOCAINE HYDROCHLORIDE 20 MG/ML
INJECTION, SOLUTION INFILTRATION; PERINEURAL PRN
Status: DISCONTINUED | OUTPATIENT
Start: 2022-04-11 | End: 2022-04-11

## 2022-04-11 RX ORDER — ONDANSETRON 4 MG/1
4 TABLET, ORALLY DISINTEGRATING ORAL EVERY 6 HOURS PRN
Status: DISCONTINUED | OUTPATIENT
Start: 2022-04-11 | End: 2022-04-12 | Stop reason: HOSPADM

## 2022-04-11 RX ORDER — ATROPA BELLADONNA AND OPIUM 16.2; 3 MG/1; MG/1
SUPPOSITORY RECTAL PRN
Status: DISCONTINUED | OUTPATIENT
Start: 2022-04-11 | End: 2022-04-11 | Stop reason: HOSPADM

## 2022-04-11 RX ORDER — ACETAMINOPHEN 325 MG/1
975 TABLET ORAL EVERY 8 HOURS
Status: DISCONTINUED | OUTPATIENT
Start: 2022-04-11 | End: 2022-04-12 | Stop reason: HOSPADM

## 2022-04-11 RX ORDER — LABETALOL HYDROCHLORIDE 5 MG/ML
10 INJECTION, SOLUTION INTRAVENOUS
Status: DISCONTINUED | OUTPATIENT
Start: 2022-04-11 | End: 2022-04-11 | Stop reason: HOSPADM

## 2022-04-11 RX ORDER — LIDOCAINE 40 MG/G
CREAM TOPICAL
Status: DISCONTINUED | OUTPATIENT
Start: 2022-04-11 | End: 2022-04-12 | Stop reason: HOSPADM

## 2022-04-11 RX ORDER — HYDROMORPHONE HCL IN WATER/PF 6 MG/30 ML
0.2 PATIENT CONTROLLED ANALGESIA SYRINGE INTRAVENOUS EVERY 5 MIN PRN
Status: DISCONTINUED | OUTPATIENT
Start: 2022-04-11 | End: 2022-04-11 | Stop reason: HOSPADM

## 2022-04-11 RX ORDER — ONDANSETRON 4 MG/1
4 TABLET, ORALLY DISINTEGRATING ORAL EVERY 30 MIN PRN
Status: DISCONTINUED | OUTPATIENT
Start: 2022-04-11 | End: 2022-04-11 | Stop reason: HOSPADM

## 2022-04-11 RX ORDER — SODIUM CHLORIDE FOR INHALATION 3 %
3 VIAL, NEBULIZER (ML) INHALATION EVERY 6 HOURS PRN
Status: DISCONTINUED | OUTPATIENT
Start: 2022-04-11 | End: 2022-04-12 | Stop reason: HOSPADM

## 2022-04-11 RX ORDER — NALOXONE HYDROCHLORIDE 0.4 MG/ML
0.4 INJECTION, SOLUTION INTRAMUSCULAR; INTRAVENOUS; SUBCUTANEOUS
Status: DISCONTINUED | OUTPATIENT
Start: 2022-04-11 | End: 2022-04-12 | Stop reason: HOSPADM

## 2022-04-11 RX ORDER — BACLOFEN 10 MG/1
30 TABLET ORAL 2 TIMES DAILY
Status: DISCONTINUED | OUTPATIENT
Start: 2022-04-11 | End: 2022-04-12 | Stop reason: HOSPADM

## 2022-04-11 RX ORDER — OXYCODONE HYDROCHLORIDE 5 MG/1
5 TABLET ORAL EVERY 4 HOURS PRN
Status: DISCONTINUED | OUTPATIENT
Start: 2022-04-11 | End: 2022-04-12 | Stop reason: HOSPADM

## 2022-04-11 RX ORDER — AMPICILLIN 2 G/1
2 INJECTION, POWDER, FOR SOLUTION INTRAVENOUS ONCE
Status: COMPLETED | OUTPATIENT
Start: 2022-04-11 | End: 2022-04-11

## 2022-04-11 RX ORDER — BISACODYL 10 MG
10 SUPPOSITORY, RECTAL RECTAL DAILY PRN
Status: DISCONTINUED | OUTPATIENT
Start: 2022-04-11 | End: 2022-04-12 | Stop reason: HOSPADM

## 2022-04-11 RX ORDER — ACETAMINOPHEN 325 MG/1
650 TABLET ORAL EVERY 4 HOURS PRN
Status: DISCONTINUED | OUTPATIENT
Start: 2022-04-14 | End: 2022-04-12 | Stop reason: HOSPADM

## 2022-04-11 RX ORDER — OXYCODONE HYDROCHLORIDE 5 MG/1
10 TABLET ORAL EVERY 4 HOURS PRN
Status: DISCONTINUED | OUTPATIENT
Start: 2022-04-11 | End: 2022-04-12 | Stop reason: HOSPADM

## 2022-04-11 RX ORDER — ONDANSETRON 2 MG/ML
INJECTION INTRAMUSCULAR; INTRAVENOUS PRN
Status: DISCONTINUED | OUTPATIENT
Start: 2022-04-11 | End: 2022-04-11

## 2022-04-11 RX ORDER — HYDROMORPHONE HCL IN WATER/PF 6 MG/30 ML
0.4 PATIENT CONTROLLED ANALGESIA SYRINGE INTRAVENOUS
Status: DISCONTINUED | OUTPATIENT
Start: 2022-04-11 | End: 2022-04-12 | Stop reason: HOSPADM

## 2022-04-11 RX ORDER — OXYCODONE HYDROCHLORIDE 5 MG/1
5 TABLET ORAL EVERY 4 HOURS PRN
Status: DISCONTINUED | OUTPATIENT
Start: 2022-04-11 | End: 2022-04-11 | Stop reason: HOSPADM

## 2022-04-11 RX ORDER — FENTANYL CITRATE 0.05 MG/ML
25 INJECTION, SOLUTION INTRAMUSCULAR; INTRAVENOUS EVERY 5 MIN PRN
Status: DISCONTINUED | OUTPATIENT
Start: 2022-04-11 | End: 2022-04-11 | Stop reason: HOSPADM

## 2022-04-11 RX ORDER — ALBUTEROL SULFATE 0.83 MG/ML
2.5 SOLUTION RESPIRATORY (INHALATION) EVERY 4 HOURS PRN
Status: DISCONTINUED | OUTPATIENT
Start: 2022-04-11 | End: 2022-04-12 | Stop reason: HOSPADM

## 2022-04-11 RX ORDER — FENTANYL CITRATE 50 UG/ML
INJECTION, SOLUTION INTRAMUSCULAR; INTRAVENOUS PRN
Status: DISCONTINUED | OUTPATIENT
Start: 2022-04-11 | End: 2022-04-11

## 2022-04-11 RX ORDER — FENTANYL CITRATE 0.05 MG/ML
25 INJECTION, SOLUTION INTRAMUSCULAR; INTRAVENOUS
Status: DISCONTINUED | OUTPATIENT
Start: 2022-04-11 | End: 2022-04-11 | Stop reason: HOSPADM

## 2022-04-11 RX ORDER — AMOXICILLIN 250 MG
1 CAPSULE ORAL 2 TIMES DAILY
Status: DISCONTINUED | OUTPATIENT
Start: 2022-04-11 | End: 2022-04-12 | Stop reason: HOSPADM

## 2022-04-11 RX ORDER — PROCHLORPERAZINE MALEATE 10 MG
10 TABLET ORAL EVERY 6 HOURS PRN
Status: DISCONTINUED | OUTPATIENT
Start: 2022-04-11 | End: 2022-04-12 | Stop reason: HOSPADM

## 2022-04-11 RX ORDER — POLYETHYLENE GLYCOL 3350 17 G/17G
17 POWDER, FOR SOLUTION ORAL DAILY
Status: DISCONTINUED | OUTPATIENT
Start: 2022-04-12 | End: 2022-04-12 | Stop reason: HOSPADM

## 2022-04-11 RX ORDER — OXYBUTYNIN CHLORIDE 5 MG/1
5 TABLET, EXTENDED RELEASE ORAL DAILY
Status: DISCONTINUED | OUTPATIENT
Start: 2022-04-12 | End: 2022-04-12 | Stop reason: HOSPADM

## 2022-04-11 RX ORDER — PROPOFOL 10 MG/ML
INJECTION, EMULSION INTRAVENOUS PRN
Status: DISCONTINUED | OUTPATIENT
Start: 2022-04-11 | End: 2022-04-11

## 2022-04-11 RX ADMIN — MIDAZOLAM 2 MG: 1 INJECTION INTRAMUSCULAR; INTRAVENOUS at 09:25

## 2022-04-11 RX ADMIN — ACETAMINOPHEN 975 MG: 325 TABLET ORAL at 16:36

## 2022-04-11 RX ADMIN — ACETAMINOPHEN 975 MG: 325 TABLET ORAL at 23:38

## 2022-04-11 RX ADMIN — ONDANSETRON 4 MG: 2 INJECTION INTRAMUSCULAR; INTRAVENOUS at 12:45

## 2022-04-11 RX ADMIN — GABAPENTIN 300 MG: 300 CAPSULE ORAL at 21:42

## 2022-04-11 RX ADMIN — FENTANYL CITRATE 25 MCG: 50 INJECTION, SOLUTION INTRAMUSCULAR; INTRAVENOUS at 10:37

## 2022-04-11 RX ADMIN — PROPOFOL 20 MG: 10 INJECTION, EMULSION INTRAVENOUS at 10:37

## 2022-04-11 RX ADMIN — PROPOFOL 160 MG: 10 INJECTION, EMULSION INTRAVENOUS at 09:29

## 2022-04-11 RX ADMIN — AMPICILLIN SODIUM 2 G: 2 INJECTION, POWDER, FOR SOLUTION INTRAMUSCULAR; INTRAVENOUS at 09:25

## 2022-04-11 RX ADMIN — GENTAMICIN SULFATE 280 MG: 40 INJECTION, SOLUTION INTRAMUSCULAR; INTRAVENOUS at 08:47

## 2022-04-11 RX ADMIN — BACLOFEN 30 MG: 10 TABLET ORAL at 21:42

## 2022-04-11 RX ADMIN — AMPICILLIN SODIUM 2 G: 2 INJECTION, POWDER, FOR SOLUTION INTRAMUSCULAR; INTRAVENOUS at 11:25

## 2022-04-11 RX ADMIN — LIDOCAINE HYDROCHLORIDE 60 MG: 20 INJECTION, SOLUTION INFILTRATION; PERINEURAL at 09:29

## 2022-04-11 RX ADMIN — PROPOFOL 20 MG: 10 INJECTION, EMULSION INTRAVENOUS at 10:41

## 2022-04-11 RX ADMIN — PHENYLEPHRINE HYDROCHLORIDE 75 MCG: 10 INJECTION INTRAVENOUS at 10:27

## 2022-04-11 RX ADMIN — SODIUM CHLORIDE, POTASSIUM CHLORIDE, SODIUM LACTATE AND CALCIUM CHLORIDE: 600; 310; 30; 20 INJECTION, SOLUTION INTRAVENOUS at 08:47

## 2022-04-11 RX ADMIN — PHENYLEPHRINE HYDROCHLORIDE 50 MCG: 10 INJECTION INTRAVENOUS at 10:15

## 2022-04-11 RX ADMIN — DEXAMETHASONE SODIUM PHOSPHATE 4 MG: 4 INJECTION, SOLUTION INTRA-ARTICULAR; INTRALESIONAL; INTRAMUSCULAR; INTRAVENOUS; SOFT TISSUE at 09:54

## 2022-04-11 RX ADMIN — FENTANYL CITRATE 50 MCG: 50 INJECTION, SOLUTION INTRAMUSCULAR; INTRAVENOUS at 09:29

## 2022-04-11 RX ADMIN — SUGAMMADEX 200 MG: 100 INJECTION, SOLUTION INTRAVENOUS at 13:04

## 2022-04-11 RX ADMIN — FENTANYL CITRATE 25 MCG: 50 INJECTION, SOLUTION INTRAMUSCULAR; INTRAVENOUS at 10:40

## 2022-04-11 RX ADMIN — PHENYLEPHRINE HYDROCHLORIDE 50 MCG: 10 INJECTION INTRAVENOUS at 09:48

## 2022-04-11 RX ADMIN — AMPICILLIN SODIUM 1 G: 1 INJECTION, POWDER, FOR SOLUTION INTRAMUSCULAR; INTRAVENOUS at 19:53

## 2022-04-11 RX ADMIN — PROPOFOL 30 MCG/KG/MIN: 10 INJECTION, EMULSION INTRAVENOUS at 09:35

## 2022-04-11 RX ADMIN — PHENYLEPHRINE HYDROCHLORIDE 50 MCG: 10 INJECTION INTRAVENOUS at 10:03

## 2022-04-11 RX ADMIN — ROCURONIUM BROMIDE 50 MG: 50 INJECTION, SOLUTION INTRAVENOUS at 09:29

## 2022-04-11 RX ADMIN — HYDROMORPHONE HYDROCHLORIDE 0.5 MG: 1 INJECTION, SOLUTION INTRAMUSCULAR; INTRAVENOUS; SUBCUTANEOUS at 11:25

## 2022-04-11 RX ADMIN — SODIUM CHLORIDE, POTASSIUM CHLORIDE, SODIUM LACTATE AND CALCIUM CHLORIDE: 600; 310; 30; 20 INJECTION, SOLUTION INTRAVENOUS at 12:22

## 2022-04-11 NOTE — ANESTHESIA CARE TRANSFER NOTE
Patient: Dianna Cottrell    Procedure: Procedure(s):   CYSTOSCOPY, BILATERAL  PYELOGRAM, BILATERAL URETEROSCOPY WITH  RIGHT HOLMIUM LITHOTRIPSY, BILATERAL STONE BASKET EXTRACTION, BILATERAL URETERAL STENT PLACEMENT.  LEFT PERCUTANEOUS NEPHROLITHOTOMY  NEPHROLITHOTOMY, PERCUTANEOUS       Diagnosis: Bilateral nephrolithiasis [N20.0]  Diagnosis Additional Information: No value filed.    Anesthesia Type:   General     Note:    Oropharynx: oropharynx clear of all foreign objects and spontaneously breathing  Level of Consciousness: awake and drowsy  Oxygen Supplementation: face mask  Level of Supplemental Oxygen (L/min / FiO2): 6  Independent Airway: airway patency satisfactory and stable  Dentition: dentition unchanged  Vital Signs Stable: post-procedure vital signs reviewed and stable  Report to RN Given: handoff report given  Patient transferred to: PACU    Handoff Report: Identifed the Patient, Identified the Reponsible Provider, Reviewed the pertinent medical history, Discussed the surgical course, Reviewed Intra-OP anesthesia mangement and issues during anesthesia, Set expectations for post-procedure period and Allowed opportunity for questions and acknowledgement of understanding      Vitals:  Vitals Value Taken Time   /80 04/11/22 1322   Temp     Pulse 76 04/11/22 1325   Resp 15 04/11/22 1325   SpO2 99 % 04/11/22 1325   Vitals shown include unvalidated device data.    Electronically Signed By: ODILIA Ferrer CRNA  April 11, 2022  1:26 PM

## 2022-04-11 NOTE — BRIEF OP NOTE
[facility] Brief Operative Note    Pre-operative diagnosis: Bilateral nephrolithiasis [N20.0]   Post-operative diagnosis bilateral nephrolithiasis   Procedure: Procedure(s):   CYSTOSCOPY, BILATERAL  PYELOGRAM, BILATERAL URETEROSCOPY WITH  RIGHT HOLMIUM LITHOTRIPSY, BILATERAL STONE BASKET EXTRACTION, BILATERAL URETERAL STENT PLACEMENT.  LEFT PERCUTANEOUS NEPHROLITHOTOMY  NEPHROLITHOTOMY, PERCUTANEOUS   Surgeon: Parveen Schofield MD   Assistants(s): none   Estimated blood loss: 50 ml    Specimens: Left stone for analysis  Right stone for analyis  Left stone for bacterial culture   Findings: Normal bladder with catheter changes  Left lower pole access on 3rd attempt  >2 cm stone cleared from left  Right ureteroscopy cleared >1 cm of stone  Right UPJ Traxer grade 3 injury  Left 6 fr x 24 cm stent off dangle  Right 6 fr x 22 cm stent off dangle

## 2022-04-11 NOTE — ANESTHESIA PREPROCEDURE EVALUATION
Anesthesia Pre-Procedure Evaluation    Patient: Dianna Cottrell   MRN: [id number] : 1995        Procedure : Procedure(s):  Cystoscopy right retrograde pyelogram, ureteroscopy with laser lithotripsy and stone basket extraction, stent placement  NEPHROLITHOTOMY, PERCUTANEOUS          Past Medical History:   Diagnosis Date     Anemia     with pregnancy     c5 burst fracture 2012    C5-C7 fracture with cord injury     Compression fracture of L1 lumbar vertebra (H) 2012    L1 superior endplate compression fracture     Decubitus ulcer     OF RIGHT ISCHIUM     Depressive disorder      Encounter for insertion of mirena IUD 2017     Fracture of thoracic spine without spinal cord lesion (H) 2012    T3-T8 spinous process fractures     History of spinal cord injury      History of thrombophlebitis      Hypertension 2016     Impaired lung function      Neurogenic bladder      Neurogenic bowel      Quadriplegia (H)      Thrombosis      Urinary tract infection      Vocal cord dysfunction     Left vocal cord weakness noted by ENT post extubation 2012      Past Surgical History:   Procedure Laterality Date     BIOPSY       BLADDER SURGERY       C4-C7 interbody fusion with anterior screw and plate fixation and posterior erna and pedicle screw fixation with interspace bone graft and C5 and C6 partial corpectomies  2012      SECTION  2013    Procedure:  SECTION;   Section ;  Surgeon: Ricki Nelson MD;  [facility]      SECTION N/A 2016    Procedure:  SECTION;  Surgeon: Floridalma Kiran MD;  [facility]     CONIZATION LEEP N/A 2021    Procedure: CONE BIOPSY, CERVIX, USING LOOP ELECTROSURGICAL EXCISION PROCEDURE (LEEP) and ECC;  Surgeon: Carlotta Lock MD;  [facility] OR     HEAD & NECK SURGERY       INSERT INTRAUTERINE DEVICE N/A 2021    Procedure: INSERTION, INTRAUTERINE DEVICE , replacement of Mirena  Intrauterine device;  Surgeon: Carlotta Lock MD;  [facility] OR     IR IVC FILTER PLACEMENT  12/19/2012     IRRIGATION AND DEBRIDEMENT BUTTOCKS Right 9/18/2020    Procedure: Sharp excisional debridement of right ischial tuberosity decubitus,   Bone biopsies for cultures and path,  VAC Via placement.;  Surgeon: Vira Zacarias MD;  [facility]     LUMBAR DRAIN  12/18/2012      Allergies   Allergen Reactions     Succinylcholine      Spinal cord injury 12/18/12, patient at risk for extrajunctional receptors and hyperkalemia      Social History     Tobacco Use     Smoking status: Current Every Day Smoker     Packs/day: 0.50     Years: 0.00     Pack years: 0.00     Types: Other     Smokeless tobacco: Current User     Tobacco comment: used 1/2-1 ppd for 4 years, quit 2012, now daily e cig use.    Substance Use Topics     Alcohol use: No     Alcohol/week: 0.0 standard drinks      Wt Readings from Last 1 Encounters:   01/31/22 56.7 kg (125 lb)        Anesthesia Evaluation            ROS/MED HX  ENT/Pulmonary: Comment: Restrictive lung disease;     (+) vocal cord abnormalities -   (-) sleep apnea   Neurologic: Comment: Quadraplegic - C5 burst fracture; limited upper extremity use; Hx autonomic dysreflexia;       Cardiovascular:       METS/Exercise Tolerance:     Hematologic:     (+) History of blood clots,     Musculoskeletal:       GI/Hepatic:    (-) GERD   Renal/Genitourinary: Comment: Neurogenic bladder;       Endo:       Psychiatric/Substance Use:     (+) psychiatric history anxiety and depression     Infectious Disease:       Malignancy:       Other:            Physical Exam    Airway        Mallampati: II   TM distance: > 3 FB   Neck ROM: full   Mouth opening: > 3 cm    Respiratory Devices and Support         Dental  no notable dental history         Cardiovascular   cardiovascular exam normal          Pulmonary   pulmonary exam normal                OUTSIDE LABS:  CBC:   Lab Results   Component  Value Date    WBC 10.4 11/16/2020    WBC 10.1 11/11/2020    HGB 13.9 02/02/2021    HGB 12.9 11/16/2020    HCT 39.6 11/16/2020    HCT 36.4 11/11/2020     11/16/2020     11/11/2020     BMP:   Lab Results   Component Value Date     12/08/2016     12/07/2016    POTASSIUM 3.4 (L) 02/06/2018    POTASSIUM 3.8 12/08/2016    CHLORIDE 103 12/08/2016    CHLORIDE 103 12/07/2016    CO2 28 12/08/2016    CO2 30 12/07/2016    BUN 6 (L) 11/16/2020    BUN 6 (L) 11/11/2020    CR 0.39 (L) 11/16/2020    CR 0.45 (L) 11/11/2020     (H) 02/06/2018    GLC 88 12/08/2016     COAGS: No results found for: PTT, INR, FIBR  POC:   Lab Results   Component Value Date     (H) 02/02/2021    HCG Negative 12/12/2019    HCGS Negative 05/31/2019     HEPATIC:   Lab Results   Component Value Date    ALBUMIN 3.6 06/22/2016    ALT 25 12/06/2016    AST 20 11/16/2020     OTHER:   Lab Results   Component Value Date    LACT 0.9 12/08/2016    LOR 8.6 12/08/2016    TSH 1.01 12/07/2016    T4 1.17 09/29/2016    CRP 9.7 (H) 04/15/2021    SED 19 04/15/2021       Anesthesia Plan    ASA Status:  3   NPO Status:  NPO Appropriate    Anesthesia Type: General.     - Airway: ETT   Induction: Intravenous.   Maintenance: Balanced.        Consents    Anesthesia Plan(s) and associated risks, benefits, and realistic alternatives discussed. Questions answered and patient/representative(s) expressed understanding.    - Discussed:     - Discussed with:  Patient         Postoperative Care    Pain management: IV analgesics.   PONV prophylaxis: Ondansetron (or other 5HT-3), Dexamethasone or Solumedrol     Comments:                STEPHEN JUAREZ MD

## 2022-04-11 NOTE — H&P
I reviewed the following information from my consultation on 1/31/2022 with the patient and her mother.  Her prior wound has since healed.  Discussed the nature of the procedure (left PCNL and right ureteroscopy).  Also discussed risks, including but not limited to, bleeding, infection, injury to surrounding organs (lungs, spleen, colon, intestines), need for stent/stent related symptoms, injury to ureter, need for second procedure.  Will admit overnight and postop CT to assess residual stone burden.      Note below copied from 1/31/2022.  Assessment and Plan:  26 year old female with bilateral nephrolithiasis and UTIs.      1. Bilateral nephrolithiasis  - Case Request: Cystoscopy right retrograde pyelogram, ureteroscopy with laser lithotripsy and stone basket extraction, stent placement, NEPHROLITHOTOMY, PERCUTANEOUS     We discussed observation, shock wave lithotripsy, ureteroscopy and percutaneous nephrolithotomy as the main surgical approaches to upper urinary tract stones.  We reviewed risks and benefits including but not limited to the following: bleeding, infection, damage to adjacent organs, ureteral stricture, need for staged treatment, incomplete stone removal.     Ultimately the patient has elected to proceed with right ureteroscopy and left PCNL     Postoperatively, will plan on imaging (renal US) 6 weeks postoperatively and complete metabolic work-up with a 24-hr urine.       We will plan for surgery in mid-late April to ensure her wound has healed before stone surgery     Plan:  - 3.5 hr right URS and left PCNL in mid April (after wound healed) in April  - preoperative urine culture 2 weeks prior to surgery  - Preoperative evaluation        Parveen Schofield MD  January 31, 2022            Chief Complaint: bilateral nephrolithiasis     History of Present Illness:  Dianna Cottrell is a 26 year old female seen in consultation Dr. Carter for discussion of bilateral nephrolithiasis.       The current episode  was first found due to screnning imaging for hematuria.  CT urogram performed on 1/26/2022 (images personally reviewed) demonstrated:  Right Kidney: 13 mm upper pole stone, HU <1000  Left Kidney: 16 mm renal pelvis stone, 8 mm upper and lower pole stones, total burden >2 cm     Currently, they are experiencing no flank pain, no nausea, no vomiting, no hematuria, or no dysuria.  They have not experienced recent stone passage.      Has history of UTIs with symptoms of increasing spasticity and foul smelling urine.  Currently managed with indwelling catheter due to wound but was doing CIC prior to that.  Wound is under care of Dr. Zacarias and it is still healing.     Pertinent stone history:  -- Personal stone history  -- Prior stone procedure  -- Prior stone analysis  -- Prior metabolic evaluation  -- Family stone history     Pertinent stone medical history  -- Diabetes  + Neurologic disease limiting mobility: Spinal cord injury with quadriplegia  -- Osteoporosis  -- Gout  -- Gastric bypass surgery  -- Sarcoidosis  -- Inflammatory bowel disease  -- History of non-stone  surgery      Pertinent medications:  -- Antiplatelet  -- Anticoagulant  -- Topiramate   -- Thiazaide  -- Potassium supplement        I reviewed internal labs, of which pertinent ones include:   Hemoglobin   Date Value Ref Range Status   02/02/2021 13.9 11.7 - 15.7 g/dL Final            Potassium   Date Value Ref Range Status   02/06/2018 3.4 (L) 3.5 - 5.1 mmol/L Final            Creatinine   Date Value Ref Range Status   11/16/2020 0.39 (L) 0.52 - 1.04 mg/dL Final            pH Urine   Date Value Ref Range Status   11/22/2021 6.5 5.0 - 7.0 Final   12/07/2016 7.0 5.0 - 7.0 pH Final         I reviewed internal records which in summarized above.          Past Medical History:  Past Medical History        Past Medical History:   Diagnosis Date     Anemia       with pregnancy     c5 burst fracture 12/18/2012     C5-C7 fracture with cord injury      Compression fracture of L1 lumbar vertebra (H) 2012     L1 superior endplate compression fracture     Depressive disorder       Encounter for insertion of mirena IUD 2017     Fracture of thoracic spine without spinal cord lesion (H) 2012     T3-T8 spinous process fractures     History of spinal cord injury       History of thrombophlebitis       Hypertension 2016     Urinary tract infection       Vocal cord dysfunction       Left vocal cord weakness noted by ENT post extubation 2012                 Past Surgical History:  Past Surgical History         Past Surgical History:   Procedure Laterality Date     BLADDER SURGERY         C4-C7 interbody fusion with anterior screw and plate fixation and posterior erna and pedicle screw fixation with interspace bone graft and C5 and C6 partial corpectomies   2012      SECTION   2013     Procedure:  SECTION;   Section ;  Surgeon: Ricki Nelson MD;  [facility]      SECTION N/A 2016     Procedure:  SECTION;  Surgeon: Floridalma Kiran MD;  [facility]     CONIZATION LEEP N/A 2021     Procedure: CONE BIOPSY, CERVIX, USING LOOP ELECTROSURGICAL EXCISION PROCEDURE (LEEP) and ECC;  Surgeon: Carlotta Lock MD;  [facility]     INSERT INTRAUTERINE DEVICE N/A 2021     Procedure: INSERTION, INTRAUTERINE DEVICE , replacement of Mirena Intrauterine device;  Surgeon: Carlotta Lock MD;  [facility]     IR IVC FILTER PLACEMENT   2012     IRRIGATION AND DEBRIDEMENT BUTTOCKS Right 2020     Procedure: Sharp excisional debridement of right ischial tuberosity decubitus,   Bone biopsies for cultures and path,  VAC Via placement.;  Surgeon: Vira Zacarias MD;  [facility]     LUMBAR DRAIN   2012                 Social History:  Social History            Tobacco Use     Smoking status: Current Every Day Smoker       Packs/day: 0.50        Years: 0.00       Pack years: 0.00       Types: Other     Smokeless tobacco: Current User     Tobacco comment: used 1/2-1 ppd for 4 years, quit 2012, now daily e cig use.    Vaping Use     Vaping Use: Every day     Substances: Nicotine, THC, Flavoring     Devices: Pre-filled pod   Substance Use Topics     Alcohol use: No       Alcohol/week: 0.0 standard drinks     Drug use: Yes       Types: Marijuana       Comment: 3 joints per day              Family History:  Family History         Family History   Problem Relation Age of Onset     Lung Cancer Maternal Grandfather       Hypertension No family hx of       Diabetes No family hx of                   Allergies:        Allergies   Allergen Reactions     Succinylcholine         Spinal cord injury 12/18/12, patient at risk for extrajunctional receptors and hyperkalemia              Medications:       Current Outpatient Medications   Medication Sig     acetaminophen (TYLENOL) 325 MG tablet Take 325-650 mg by mouth every 6 hours as needed.     albuterol (PROVENTIL) (2.5 MG/3ML) 0.083% neb solution Take 1 vial (2.5 mg) by nebulization every 4 hours as needed for shortness of breath / dyspnea or wheezing     baclofen (LIORESAL) 10 MG tablet Take 10 mg by mouth 3 times daily.     BISACODYL 1 suppository daily      Coloplast barrier cream CREA Apply a thin layer of cream to affected area twice daily.     Cranberry 450 MG TABS Take 450 mg by mouth daily.     Docusate Sodium (COLACE PO) Take by mouth daily      gabapentin (NEURONTIN) 300 MG capsule Take 300 mg by mouth daily      GABAPENTIN PO       hydrOXYzine (VISTARIL) 25 MG capsule Take 1 capsule (25 mg) by mouth 3 times daily as needed for anxiety (or at bedtime for sleep.)     hyoscyamine (ANASPAZ/LEVSIN) 0.125 MG tablet TAKE 1 TABLET BY MOUTH THREE TIMES A DAY.     ibuprofen (ADVIL/MOTRIN) 600 MG tablet Take 1 tablet (600 mg) by mouth every 6 hours as needed for other (mild and/or inflammatory pain)     methenamine  "(HIPREX) 1 g tablet TAKE 1 TABLET BY MOUTH 2 TIMES DAILY     midodrine (PROAMATINE) 5 MG tablet Take 10 mg by mouth 2 times daily      Multiple Vitamin (DAILY MULTIVITAMIN PO) Take 2 tablets by mouth daily     mupirocin (BACTROBAN) 2 % external ointment Apply topically 3 times daily To corner of mouth     ondansetron (ZOFRAN-ODT) 4 MG ODT tab Take 1-2 tablets (4-8 mg) by mouth every 8 hours as needed for nausea     order for DME [facility] Phone [phone] Fax [phone]  Maria Fernandacholo Price to pressure map bed     order for DME Equipment being ordered: [facility]   p: [phone] f: [phone]  EMAIL to [email or web address]  [email or web address]     Request for group 2 air mattress & semi-electric hospital bed     Ht:5'8\"  Wt:110 ;bs  Length of need: lifetime     Pt. is wheelchair bound & has been in a comprehensive ulcer treatment program for >2 months. We will follow monthly until wound closure     To obtain medical records:  p: [phone] f: [phone]     order for DME Equipment being ordered: [facility] Fax [phone]     Wheelchair seating eval and mapping of current cushion     order for DME Equipment being ordered: Pressure Mapping of wheelchair at [facility] Phone [phone] Fax [phone]     order for DME Equipment being ordered: Wheelchair-  \"Patient continues to need and use daily her power wheelchair and the wheelchair will continue to need repairs for the next 12 months.\"     oxybutynin ER (DITROPAN-XL) 5 MG 24 hr tablet Take 5 mg by mouth daily     povidone-iodine 10 % swab       senna-docusate (SENOKOT-S;PERICOLACE) 8.6-50 MG per tablet Take 1-2 tablets by mouth 2 times daily as needed for constipation     sertraline (ZOLOFT) 100 MG tablet Take 1.5 tablets (150 mg) by mouth daily     sodium chloride (NEBUSAL) 3 % neb solution Take 3 mLs by nebulization every 6 hours as needed for wheezing or other (sputum clearance difficulty due to " "quadridplegia.)     spacer (OPTICHAMBER JAIDA) holding chamber To be used with inhaler      No current facility-administered medications for this visit.          Facility-Administered Medications Ordered in Other Visits   Medication     levonorgestrel (MIRENA) 20 MCG/24HR IUD         Review of Systems:   ROS: 10 point ROS neg other than the symptoms noted above in the HPI.     Physical Exam:  B/P: Data Unavailable, T: Data Unavailable, P: Data Unavailable, R: Data Unavailable  Estimated body mass index is 20.18 kg/m  as calculated from the following:    Height as of this encounter: 1.676 m (5' 6\").    Weight as of this encounter: 56.7 kg (125 lb).  General Appearance Adult: Alert, no acute distress, oriented  Lungs: no respiratory distress, or pursed lip breathing  Neuro: Alert, oriented, speech and mentation normal  Psych: affect and mood normal     Outside records:         I spent 0 minutes reviewing outside records.    "

## 2022-04-11 NOTE — PLAN OF CARE
Goal Outcome Evaluation:    Pt to room 2214 vai cart from PACU at approximately 1530. A&OX4, VSS.  Pt quad, unable to feel anything from feet to chest, however does have some movement of upper extremities; able to use call light and feed self.  Winston catheter patent with blood-tinged urine.  Right ischial area healed wound/concaved area; mepilex applied.  Mepilex applied to sacral area for prevention.  Skin intact.  Repo with heels floating off bed.

## 2022-04-11 NOTE — ANESTHESIA POSTPROCEDURE EVALUATION
Patient: Dianna Cottrell    Procedure: Procedure(s):   CYSTOSCOPY, BILATERAL  PYELOGRAM, BILATERAL URETEROSCOPY WITH  RIGHT HOLMIUM LITHOTRIPSY, BILATERAL STONE BASKET EXTRACTION, BILATERAL URETERAL STENT PLACEMENT.  LEFT PERCUTANEOUS NEPHROLITHOTOMY  NEPHROLITHOTOMY, PERCUTANEOUS       Anesthesia Type:  General    Note:     Postop Pain Control: Uneventful            Sign Out: Well controlled pain   PONV: No   Neuro/Psych: Uneventful            Sign Out: Acceptable/Baseline neuro status   Airway/Respiratory: Uneventful            Sign Out: Acceptable/Baseline resp. status   CV/Hemodynamics: Uneventful            Sign Out: Acceptable CV status; No obvious hypovolemia; No obvious fluid overload   Other NRE: NONE   DID A NON-ROUTINE EVENT OCCUR? No           Last vitals:  Vitals Value Taken Time   /77 04/11/22 1459   Temp 36.2  C (97.1  F) 04/11/22 1459   Pulse 71 04/11/22 1500   Resp 17 04/11/22 1500   SpO2 95 % 04/11/22 1500   Vitals shown include unvalidated device data.    Electronically Signed By: Israel Fajardo MD  April 11, 2022  4:04 PM
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
## 2022-04-11 NOTE — ANESTHESIA PROCEDURE NOTES
Airway       Patient location: [facility] - Operating Room or Procedural Area.       Procedure Start/Stop Times: 4/11/2022 9:31 AM  Staff -        Anesthesiologist:  Lester Valentin MD       CRNA: Lilo Krueger APRN CRNA       Performed By: CRNA  Consent for Airway        Urgency: elective  Indications and Patient Condition       Indications for airway management: adonay-procedural and airway protection       Induction type:intravenous       Mask difficulty assessment: 1 - vent by mask    Final Airway Details       Final airway type: endotracheal airway       Successful airway: ETT - single  Endotracheal Airway Details        ETT size (mm): 7.0       Cuffed: yes       Cuff volume (mL): 7       Successful intubation technique: video laryngoscopy       VL Blade Size: Glidescope 3       Grade View of Cords: 1       Adjucts: stylet       Position: Right       Measured from: lips       Secured at (cm): 22       Bite block used: None    Post intubation assessment        Placement verified by: capnometry, equal breath sounds and chest rise        Number of attempts at approach: 1       Number of other approaches attempted: 0       Secured with: pink tape       Ease of procedure: easy       Dentition: Intact and Unchanged    Medication(s) Administered   Medication Administration Time: 4/11/2022 9:31 AM

## 2022-04-12 ENCOUNTER — APPOINTMENT (OUTPATIENT)
Dept: CT IMAGING | Facility: CLINIC | Age: 27
End: 2022-04-12
Attending: UROLOGY
Payer: MEDICARE

## 2022-04-12 ENCOUNTER — TELEPHONE (OUTPATIENT)
Dept: UROLOGY | Facility: CLINIC | Age: 27
End: 2022-04-12
Payer: MEDICARE

## 2022-04-12 VITALS
TEMPERATURE: 98 F | HEIGHT: 66 IN | RESPIRATION RATE: 16 BRPM | WEIGHT: 135.36 LBS | BODY MASS INDEX: 21.75 KG/M2 | SYSTOLIC BLOOD PRESSURE: 110 MMHG | DIASTOLIC BLOOD PRESSURE: 70 MMHG | OXYGEN SATURATION: 91 % | HEART RATE: 72 BPM

## 2022-04-12 DIAGNOSIS — N20.0 BILATERAL NEPHROLITHIASIS: Primary | ICD-10-CM

## 2022-04-12 LAB
ANION GAP SERPL CALCULATED.3IONS-SCNC: 2 MMOL/L (ref 3–14)
BASOPHILS # BLD AUTO: 0.1 10E3/UL (ref 0–0.2)
BASOPHILS NFR BLD AUTO: 0 %
BUN SERPL-MCNC: 6 MG/DL (ref 7–30)
CALCIUM SERPL-MCNC: 8.6 MG/DL (ref 8.5–10.1)
CHLORIDE BLD-SCNC: 111 MMOL/L (ref 94–109)
CO2 SERPL-SCNC: 28 MMOL/L (ref 20–32)
CREAT SERPL-MCNC: 0.61 MG/DL (ref 0.52–1.04)
EOSINOPHIL # BLD AUTO: 0.1 10E3/UL (ref 0–0.7)
EOSINOPHIL NFR BLD AUTO: 1 %
ERYTHROCYTE [DISTWIDTH] IN BLOOD BY AUTOMATED COUNT: 13.5 % (ref 10–15)
GFR SERPL CREATININE-BSD FRML MDRD: >90 ML/MIN/1.73M2
GLUCOSE BLD-MCNC: 96 MG/DL (ref 70–99)
HCT VFR BLD AUTO: 39.6 % (ref 35–47)
HGB BLD-MCNC: 12.9 G/DL (ref 11.7–15.7)
IMM GRANULOCYTES # BLD: 0.1 10E3/UL
IMM GRANULOCYTES NFR BLD: 0 %
LYMPHOCYTES # BLD AUTO: 3 10E3/UL (ref 0.8–5.3)
LYMPHOCYTES NFR BLD AUTO: 18 %
MCH RBC QN AUTO: 28.9 PG (ref 26.5–33)
MCHC RBC AUTO-ENTMCNC: 32.6 G/DL (ref 31.5–36.5)
MCV RBC AUTO: 89 FL (ref 78–100)
MONOCYTES # BLD AUTO: 1.8 10E3/UL (ref 0–1.3)
MONOCYTES NFR BLD AUTO: 11 %
NEUTROPHILS # BLD AUTO: 11.2 10E3/UL (ref 1.6–8.3)
NEUTROPHILS NFR BLD AUTO: 70 %
NRBC # BLD AUTO: 0 10E3/UL
NRBC BLD AUTO-RTO: 0 /100
PLATELET # BLD AUTO: 314 10E3/UL (ref 150–450)
POTASSIUM BLD-SCNC: 3.5 MMOL/L (ref 3.4–5.3)
RBC # BLD AUTO: 4.47 10E6/UL (ref 3.8–5.2)
SODIUM SERPL-SCNC: 141 MMOL/L (ref 133–144)
WBC # BLD AUTO: 16.2 10E3/UL (ref 4–11)

## 2022-04-12 PROCEDURE — 36415 COLL VENOUS BLD VENIPUNCTURE: CPT | Performed by: UROLOGY

## 2022-04-12 PROCEDURE — G1004 CDSM NDSC: HCPCS

## 2022-04-12 PROCEDURE — 250N000013 HC RX MED GY IP 250 OP 250 PS 637: Performed by: UROLOGY

## 2022-04-12 PROCEDURE — 85025 COMPLETE CBC W/AUTO DIFF WBC: CPT | Performed by: UROLOGY

## 2022-04-12 PROCEDURE — 250N000011 HC RX IP 250 OP 636: Performed by: UROLOGY

## 2022-04-12 PROCEDURE — 258N000003 HC RX IP 258 OP 636: Performed by: UROLOGY

## 2022-04-12 PROCEDURE — 80048 BASIC METABOLIC PNL TOTAL CA: CPT | Performed by: UROLOGY

## 2022-04-12 RX ORDER — OXYCODONE HYDROCHLORIDE 5 MG/1
5 TABLET ORAL EVERY 4 HOURS PRN
Qty: 6 TABLET | Refills: 0 | Status: SHIPPED | OUTPATIENT
Start: 2022-04-12 | End: 2022-06-16

## 2022-04-12 RX ORDER — NITROFURANTOIN 25; 75 MG/1; MG/1
100 CAPSULE ORAL 2 TIMES DAILY
Qty: 42 CAPSULE | Refills: 0 | Status: SHIPPED | OUTPATIENT
Start: 2022-04-12 | End: 2022-05-03

## 2022-04-12 RX ORDER — AMOXICILLIN 250 MG
1 CAPSULE ORAL 2 TIMES DAILY PRN
Qty: 20 TABLET | Refills: 0 | Status: SHIPPED | OUTPATIENT
Start: 2022-04-12 | End: 2022-09-30

## 2022-04-12 RX ADMIN — OXYBUTYNIN CHLORIDE 5 MG: 5 TABLET, EXTENDED RELEASE ORAL at 08:48

## 2022-04-12 RX ADMIN — AMPICILLIN SODIUM 1 G: 1 INJECTION, POWDER, FOR SOLUTION INTRAMUSCULAR; INTRAVENOUS at 03:42

## 2022-04-12 RX ADMIN — ACETAMINOPHEN 975 MG: 325 TABLET ORAL at 08:48

## 2022-04-12 RX ADMIN — SENNOSIDES AND DOCUSATE SODIUM 1 TABLET: 50; 8.6 TABLET ORAL at 08:48

## 2022-04-12 RX ADMIN — AMPICILLIN SODIUM 1 G: 1 INJECTION, POWDER, FOR SOLUTION INTRAMUSCULAR; INTRAVENOUS at 11:59

## 2022-04-12 RX ADMIN — BACLOFEN 30 MG: 10 TABLET ORAL at 08:48

## 2022-04-12 RX ADMIN — GENTAMICIN SULFATE 300 MG: 40 INJECTION, SOLUTION INTRAMUSCULAR; INTRAVENOUS at 10:25

## 2022-04-12 RX ADMIN — SERTRALINE HYDROCHLORIDE 150 MG: 50 TABLET ORAL at 08:48

## 2022-04-12 NOTE — PLAN OF CARE
[id number]. A&Ox4, VSS on RA. Patient does not feel any pain, patient is a quad and is unable to feel anything from feet to chest but has some movement of upper extremities. denies nausea. Patients rogers is draining well, pink output. PTA healed right ischial wound has mepliex; sacrum also has mepilex for prevention. Turn and repo, patient requests every 3 hours instead of 2 hours, patient also refuses repositioning at times. Continue to monitor.

## 2022-04-12 NOTE — PROGRESS NOTES
Patient discharged from gen surg with family and via motorized wc.  Orientation:x4  Respirations status: WNL  Ambulation status: wc/bed bound  Pain status: denies  VS: WNL  PIV: removed and dressed  Discharge outcome: After visit summary provided and explained, patient verbalized understanding. Left the unit with all of her belongings and medications.

## 2022-04-12 NOTE — DISCHARGE SUMMARY
Urology   [facility] Discharge Summary    Dianna Cottrell MRN# [id number]   Age: 27 year old YOB: 1995     Date of Admission:  4/11/2022  Date of Discharge::  4/12/2022  Admitting Physician:  Parveen Schofield MD  Discharge Physician:  Ashia Pablo PA-C, MIO           Admission Diagnoses:   Nephrolithiasis   rUTIs       Discharge Diagnosis:     Same          Procedures:   Cystoscopy  Bilateral retrograde pyelograms  Left percutaneous nephrolithotomy (greater than 2 cm of stone burden)  Left ureteroscopic stone basket extraction  Right ureteroscopic laser lithotripsy and stone basket extraction  Bilateral retrograde ureteral stent placement          Medications Prior to Admission:     Medications Prior to Admission   Medication Sig Dispense Refill Last Dose     acetaminophen (TYLENOL) 325 MG tablet Take 325-650 mg by mouth every 6 hours as needed.   More than a month at PRN     albuterol (PROVENTIL) (2.5 MG/3ML) 0.083% neb solution Take 1 vial (2.5 mg) by nebulization every 4 hours as needed for shortness of breath / dyspnea or wheezing 100 mL 3 More than a month at AM     baclofen (LIORESAL) 10 MG tablet Take 30 mg by mouth 2 times daily (10MG X 3 = 30MG)   4/11/2022 at AM     bisacodyl (DULCOLAX) 10 MG suppository Place 10 mg rectally daily as needed for constipation   4/10/2022 at PM     Cranberry 600 MG TABS Take 600 mg by mouth daily    4/10/2022 at AM     docusate sodium (COLACE) 100 MG capsule Take 100-200 mg by mouth daily as needed for constipation    4/10/2022 at AM     gabapentin (NEURONTIN) 300 MG capsule Take 300 mg by mouth At Bedtime    4/10/2022 at PM     hydrOXYzine (VISTARIL) 25 MG capsule Take 1 capsule (25 mg) by mouth 3 times daily as needed for anxiety (or at bedtime for sleep.) 20 capsule 1 More than a month at PRN     methenamine (HIPREX) 1 g tablet TAKE 1 TABLET BY MOUTH 2 TIMES DAILY 180 tablet 2 4/11/2022 at AM     midodrine (PROAMATINE) 5 MG tablet Take 10 mg  "by mouth 2 times daily  6 tablet 0 4/11/2022 at AM     Multiple Vitamins-Minerals (WOMENS MULTIVITAMIN) TABS Take 2 tablets by mouth daily   4/10/2022 at AM     nitroFURantoin macrocrystal-monohydrate (MACROBID) 100 MG capsule Take 1 capsule (100 mg) by mouth 2 times daily for 5 days 10 capsule 0 4/11/2022 at AM     oxybutynin ER (DITROPAN-XL) 5 MG 24 hr tablet Take 5 mg by mouth daily   4/11/2022 at AM     povidone-iodine 10 % swab Apply topically daily as needed for wound care         sodium chloride (NEBUSAL) 3 % neb solution Take 3 mLs by nebulization every 6 hours as needed for wheezing or other (sputum clearance difficulty due to quadridplegia.) 300 mL 1 More than a month at AM     Vitamin D3 (CHOLECALCIFEROL) 125 MCG (5000 UT) tablet Take 1 tablet by mouth every other day   4/10/2022 at PM     Wound Dressings (TRIAD HYDROPHILIC WOUND DRESSI) PSTE Externally apply topically daily as needed   Past Month at PRN     order for DME [facility] Phone [phone] Fax [phone]  Maria Fernanda Price to pressure map bed 1 Units 0      order for DME Equipment being ordered: [facility]   p: [phone] f: [phone]  EMAIL to [email or web address]    Request for group 2 air mattress & semi-electric hospital bed    Ht:5'8\"  Wt:110 ;bs  Length of need: lifetime    Pt. is wheelchair bound & has been in a comprehensive ulcer treatment program for >2 months. We will follow monthly until wound closure    To obtain medical records:  p: [phone] f: [phone] 1 Device 0      order for DME Equipment being ordered: [facility] Fax [phone]    Wheelchair seating eval and mapping of current cushion 30 days 0      order for DME Equipment being ordered: Pressure Mapping of wheelchair at [facility] Phone [phone] Fax [phone] 1 Device 0      order for DME Equipment being ordered: Wheelchair-  \"Patient continues to need and use daily her power wheelchair " "and the wheelchair will continue to need repairs for the next 12 months.\" 1 each 0      spacer (OPTICHAMBER JAIDA) holding chamber To be used with inhaler 1 each 0              Discharge Medications:     Current Discharge Medication List      START taking these medications    Details   oxyCODONE (ROXICODONE) 5 MG tablet Take 1 tablet (5 mg) by mouth every 4 hours as needed for moderate to severe pain  Qty: 6 tablet, Refills: 0    Associated Diagnoses: Nephrolithiasis      senna-docusate (SENOKOT-S/PERICOLACE) 8.6-50 MG tablet Take 1 tablet by mouth 2 times daily as needed for constipation  Qty: 20 tablet, Refills: 0    Associated Diagnoses: Nephrolithiasis         CONTINUE these medications which have NOT CHANGED    Details   acetaminophen (TYLENOL) 325 MG tablet Take 325-650 mg by mouth every 6 hours as needed.      albuterol (PROVENTIL) (2.5 MG/3ML) 0.083% neb solution Take 1 vial (2.5 mg) by nebulization every 4 hours as needed for shortness of breath / dyspnea or wheezing  Qty: 100 mL, Refills: 3    Associated Diagnoses: Frequent episodes of pneumonia; Quadriplegia, C5-C7 complete (H); Airway clearance impairment      baclofen (LIORESAL) 10 MG tablet Take 30 mg by mouth 2 times daily (10MG X 3 = 30MG)      bisacodyl (DULCOLAX) 10 MG suppository Place 10 mg rectally daily as needed for constipation      Cranberry 600 MG TABS Take 600 mg by mouth daily       docusate sodium (COLACE) 100 MG capsule Take 100-200 mg by mouth daily as needed for constipation       gabapentin (NEURONTIN) 300 MG capsule Take 300 mg by mouth At Bedtime       hydrOXYzine (VISTARIL) 25 MG capsule Take 1 capsule (25 mg) by mouth 3 times daily as needed for anxiety (or at bedtime for sleep.)  Qty: 20 capsule, Refills: 1    Associated Diagnoses: АНДРЕЙ (generalized anxiety disorder)      methenamine (HIPREX) 1 g tablet TAKE 1 TABLET BY MOUTH 2 TIMES DAILY  Qty: 180 tablet, Refills: 2    Associated Diagnoses: Urinary tract infection associated " "with indwelling urethral catheter, initial encounter (H)      midodrine (PROAMATINE) 5 MG tablet Take 10 mg by mouth 2 times daily   Qty: 6 tablet, Refills: 0      Multiple Vitamins-Minerals (WOMENS MULTIVITAMIN) TABS Take 2 tablets by mouth daily      nitroFURantoin macrocrystal-monohydrate (MACROBID) 100 MG capsule Take 1 capsule (100 mg) by mouth 2 times daily for 5 days  Qty: 10 capsule, Refills: 0    Associated Diagnoses: Urinary tract infection      oxybutynin ER (DITROPAN-XL) 5 MG 24 hr tablet Take 5 mg by mouth daily      povidone-iodine 10 % swab Apply topically daily as needed for wound care       sodium chloride (NEBUSAL) 3 % neb solution Take 3 mLs by nebulization every 6 hours as needed for wheezing or other (sputum clearance difficulty due to quadridplegia.)  Qty: 300 mL, Refills: 1    Associated Diagnoses: Frequent episodes of pneumonia; Quadriplegia, C5-C7 complete (H); Airway clearance impairment      Vitamin D3 (CHOLECALCIFEROL) 125 MCG (5000 UT) tablet Take 1 tablet by mouth every other day      Wound Dressings (TRIAD HYDROPHILIC WOUND DRESSI) PSTE Externally apply topically daily as needed      !! order for DME [facility] Phone [phone] Fax [phone]  Maria Fernanda Price to pressure map bed  Qty: 1 Units, Refills: 0    Associated Diagnoses: Decubitus ulcer of right ischium, stage 4 (H)      !! order for DME Equipment being ordered: [facility]   p: [phone] f: [phone]  EMAIL to [email or web address]    Request for group 2 air mattress & semi-electric hospital bed    Ht:5'8\"  Wt:110 ;bs  Length of need: lifetime    Pt. is wheelchair bound & has been in a comprehensive ulcer treatment program for >2 months. We will follow monthly until wound closure    To obtain medical records:  p: [phone] f: [phone]  Qty: 1 Device, Refills: 0    Associated Diagnoses: Pressure injury of right ischium, stage 3 (H); Paraplegia (H)      !! " "order for DME Equipment being ordered: [facility] Fax [phone]    Wheelchair seating eval and mapping of current cushion  Qty: 30 days, Refills: 0    Associated Diagnoses: Decubitus ulcer of right ischium, stage 3 (H); Paraplegia (H)      !! order for DME Equipment being ordered: Pressure Mapping of wheelchair at [facility] Phone [phone] Fax [phone]  Qty: 1 Device, Refills: 0    Associated Diagnoses: Decubitus ulcer of sacral region, stage 3 (H)      !! order for DME Equipment being ordered: Wheelchair-  \"Patient continues to need and use daily her power wheelchair and the wheelchair will continue to need repairs for the next 12 months.\"  Qty: 1 each, Refills: 0    Associated Diagnoses: Paraplegia following spinal cord injury (H)      sertraline (ZOLOFT) 100 MG tablet TAKE 1.5 TABLETS BY MOUTH DAILY  Qty: 135 tablet, Refills: 0    Comments: DX Code Needed  .  Associated Diagnoses: АНДРЕЙ (generalized anxiety disorder); Major depressive disorder with single episode, in partial remission (H)      spacer (OPTICHAMBER JAIDA) holding chamber To be used with inhaler  Qty: 1 each, Refills: 0    Comments: With mask if available.  Associated Diagnoses: History of pneumonia; Wheeze       !! - Potential duplicate medications found. Please discuss with provider.                Consultations:     None          Hospital Course:     The patient underwent the above procedure and tolerated this well. Uncomplicated post operative course. Had adequate pain control, ambulating and tolerating regular diet at the time of discharge.            Discharge Instructions and Follow-Up:     Discharge diet: Regular   Discharge activity: As tolerated   Discharge follow-up: Stent out 3 wks   Wound care: Ice to area for comfort  Keep wound clean and dry           Discharge Disposition:     Discharged to home        Ashia Pablo PA-C  [facility]                                "

## 2022-04-12 NOTE — TELEPHONE ENCOUNTER
----- Message from Ashia Pablo PA-C sent at 4/12/2022 10:14 AM CDT -----  Regarding: additional appts  [facility], renal US in 6 wks (nursing order under DA)  [facility] in 6 wks and visit in 3 months with DA (nursing order under DA)

## 2022-04-12 NOTE — PROGRESS NOTES
[facility]rology Progress Note          Assessment and Plan:   Active Problems:    Nephrolithiasis    Assessment: POD 1 Cystoscopy  Bilateral retrograde pyelograms  Left percutaneous nephrolithotomy (greater than 2 cm of stone burden)  Left ureteroscopic stone basket extraction  Right ureteroscopic laser lithotripsy and stone basket extraction  Bilateral retrograde ureteral stent placement    Plan: CT this AM to assess for residual stone burden.   Maintain chronic Winston catheter to drainage.   Will arrange follow-up with Dr. Schofield.   Dispo: suspect able to discharge after CT.    Ashia Pablo PA-C  [facility]  [phone]               Interval History:   Awaiting CT. Feeling well. Urine pale watermelon.              Review of Systems:   The 5 point Review of Systems is negative other than noted in the HPI             Medications:     Current Facility-Administered Medications Ordered in Epic   Medication Dose Route Frequency Last Rate Last Admin     [START ON 4/14/2022] acetaminophen (TYLENOL) tablet 650 mg  650 mg Oral Q4H PRN         acetaminophen (TYLENOL) tablet 975 mg  975 mg Oral Q8H   975 mg at 04/11/22 2338     albuterol (PROVENTIL) neb solution 2.5 mg  2.5 mg Nebulization Q4H PRN         ampicillin (OMNIPEN) 1 g vial to attach to  ml bag for ADULTS or 50 ml bag for PEDS  1 g Intravenous Q8H 400 mL/hr at 04/12/22 0342 1 g at 04/12/22 0342     baclofen (LIORESAL) tablet 30 mg  30 mg Oral BID   30 mg at 04/11/22 2142     bisacodyl (DULCOLAX) Suppository 10 mg  10 mg Rectal Daily PRN         bisacodyl (DULCOLAX) Suppository 10 mg  10 mg Rectal Daily PRN         docusate sodium (COLACE) capsule 100-200 mg  100-200 mg Oral Daily PRN         gabapentin (NEURONTIN) capsule 300 mg  300 mg Oral At Bedtime   300 mg at 04/11/22 2142     gentamicin (GARAMYCIN) 300 mg in sodium chloride 0.9 % 50 mL intermittent infusion  5 mg/kg (Adjusted) Intravenous Q24H         HYDROmorphone  (DILAUDID) injection 0.2 mg  0.2 mg Intravenous Q2H PRN        Or     HYDROmorphone (DILAUDID) injection 0.4 mg  0.4 mg Intravenous Q2H PRN         levonorgestrel (MIRENA) 20 MCG/24HR IUD    PRN   1 each at 02/02/21 1103     lidocaine (LMX4) cream   Topical Q1H PRN         lidocaine 1 % 0.1-1 mL  0.1-1 mL Other Q1H PRN         magnesium hydroxide (MILK OF MAGNESIA) suspension 30 mL  30 mL Oral Daily PRN         naloxone (NARCAN) injection 0.2 mg  0.2 mg Intravenous Q2 Min PRN         naloxone (NARCAN) injection 0.2 mg  0.2 mg Intramuscular Q2 Min PRN         naloxone (NARCAN) injection 0.4 mg  0.4 mg Intravenous Q2 Min PRN         naloxone (NARCAN) injection 0.4 mg  0.4 mg Intramuscular Q2 Min PRN         ondansetron (ZOFRAN-ODT) ODT tab 4 mg  4 mg Oral Q6H PRN        Or     ondansetron (ZOFRAN) injection 4 mg  4 mg Intravenous Q6H PRN         oxybutynin ER (DITROPAN-XL) 24 hr tablet 5 mg  5 mg Oral Daily         oxyCODONE (ROXICODONE) tablet 5 mg  5 mg Oral Q4H PRN        Or     oxyCODONE (ROXICODONE) tablet 10 mg  10 mg Oral Q4H PRN         polyethylene glycol (MIRALAX) Packet 17 g  17 g Oral Daily         prochlorperazine (COMPAZINE) injection 10 mg  10 mg Intravenous Q6H PRN        Or     prochlorperazine (COMPAZINE) tablet 10 mg  10 mg Oral Q6H PRN         senna-docusate (SENOKOT-S/PERICOLACE) 8.6-50 MG per tablet 1 tablet  1 tablet Oral BID         sertraline (ZOLOFT) tablet 150 mg  150 mg Oral Daily         sodium chloride (NEBUSAL) 3 % neb solution 3 mL  3 mL Nebulization Q6H PRN         sodium chloride (PF) 0.9% PF flush 3 mL  3 mL Intracatheter Q8H   3 mL at 04/11/22 2339     sodium chloride (PF) 0.9% PF flush 3 mL  3 mL Intracatheter q1 min prn         Current Outpatient Medications Ordered in Epic   Medication     sertraline (ZOLOFT) 100 MG tablet                  Physical Exam:   Vitals were reviewed  Patient Vitals for the past 8 hrs:   BP Temp Temp src Pulse Resp SpO2   04/12/22 0740 109/66 98.7  F  (37.1  C) Oral 69 16 93 %   04/12/22 0401 104/59 98.2  F (36.8  C) Oral 75 16 96 %     GEN: NAD, lying in bed  EYES: EOMI  MOUTH: MMM  NECK: Supple  : pale watermelon, lighter in prox tubing           Data:     Lab Results   Component Value Date    NTBNPI 270 12/07/2016     Lab Results   Component Value Date    WBC 16.2 (H) 04/12/2022    WBC 10.4 11/16/2020    WBC 10.1 11/11/2020    HGB 12.9 04/12/2022    HGB 13.9 02/02/2021    HGB 12.9 11/16/2020    HCT 39.6 04/12/2022    HCT 39.6 11/16/2020    HCT 36.4 11/11/2020    MCV 89 04/12/2022    MCV 89 11/16/2020    MCV 89 11/11/2020     04/12/2022     11/16/2020     11/11/2020     No results found for: INR

## 2022-04-12 NOTE — CONSULTS
Care Management Initial Consult    General Information  Assessment completed with: Patient, VM-chart review,    Type of CM/SW Visit: CM Role Introduction    Primary Care Provider verified and updated as needed: Yes   Readmission within the last 30 days: no previous admission in last 30 days      Reason for Consult: discharge planning  Advance Care Planning:            Communication Assessment  Patient's communication style: spoken language (English or Bilingual)    Hearing Difficulty or Deaf: no   Wear Glasses or Blind: yes    Cognitive  Cognitive/Neuro/Behavioral: WDL  Level of Consciousness: alert  Arousal Level: opens eyes spontaneously  Orientation: oriented x 4        Speech: clear    Living Environment:   People in home: parent(s)     Current living Arrangements:        Able to return to prior arrangements:         Family/Social Support:  Care provided by: parent(s)  Provides care for:    Marital Status: Single             Description of Support System:           Current Resources:   Patient receiving home care services:       Community Resources:    Equipment currently used at home: wheelchair, power  Supplies currently used at home:      Employment/Financial:  Employment Status:          Financial Concerns:             Lifestyle & Psychosocial Needs:  Social Determinants of Health     Tobacco Use: High Risk     Smoking Tobacco Use: Current Every Day Smoker     Smokeless Tobacco Use: Current User   Alcohol Use: Not on file   Financial Resource Strain: Not on file   Food Insecurity: Not on file   Transportation Needs: Not on file   Physical Activity: Not on file   Stress: Not on file   Social Connections: Not on file   Intimate Partner Violence: Not on file   Depression: Not at risk     PHQ-2 Score: 2   Housing Stability: Not on file       Functional Status:  Prior to admission patient needed assistance:              Mental Health Status:          Chemical Dependency Status:                 Values/Beliefs:  Spiritual, Cultural Beliefs, Christian Practices, Values that affect care:                 Additional Information:    Home today, mother is transporting patient home. Patient uses [facility] and updated them patient discharging today and they requested operative note be faxed to them. Requested documents faxed to [facility] at [phone].       Renetta Prabhakar RN   [facility]   Phone [phone]

## 2022-04-13 ENCOUNTER — PATIENT OUTREACH (OUTPATIENT)
Dept: UROLOGY | Facility: CLINIC | Age: 27
End: 2022-04-13
Payer: MEDICARE

## 2022-04-13 DIAGNOSIS — N20.0 BILATERAL NEPHROLITHIASIS: Primary | ICD-10-CM

## 2022-04-13 RX ORDER — OXYBUTYNIN CHLORIDE 15 MG/1
15 TABLET, EXTENDED RELEASE ORAL DAILY
Qty: 20 TABLET | Refills: 0 | Status: SHIPPED | OUTPATIENT
Start: 2022-04-13 | End: 2022-05-03

## 2022-04-13 NOTE — TELEPHONE ENCOUNTER
Pt phoned in reference to her [employer] message about cathter feeling like she is plugged. Pt is responded to on [employer] as well. It would be ideal to have her in clinic for assessment/irigation/cath change. Will wait to hear back from patient. Nurse left detailed voicemail in addition with request for return call.

## 2022-04-13 NOTE — TELEPHONE ENCOUNTER
"Pt called back into clinic stating she woke about 430 this morning with a sensation of bladder fullness, almost like her catheter was clogged and not draining- although it was draining well. She also experienced clammy sensation, and became sweaty- abdomen became shaky, autonomic dysreflexia responses. Pt states these episodes would last about a minute or so- and then return to baseline. Pt states she has experienced these \"a handful\" of times today, and is concerned. She is not concerned about a clot or plug, states catheter is draining well, and color is light red, it is sensation more than anything.     Per Dr. Schofield, first try increasing ditropan from 5mg extended release to 15mg extended release, with an increase of meds to move boels for concern of constipation. Pt should return call to [facility] with symptom update. If still occurring, Dr. Schofield may have to remove one stent. Try medication therapy first.   "

## 2022-04-14 ENCOUNTER — PATIENT OUTREACH (OUTPATIENT)
Dept: UROLOGY | Facility: CLINIC | Age: 27
End: 2022-04-14
Payer: MEDICARE

## 2022-04-14 LAB
APPEARANCE STONE: NORMAL
APPEARANCE STONE: NORMAL
COMPN STONE: NORMAL
COMPN STONE: NORMAL
SPECIMEN WT: 113 MG
SPECIMEN WT: 549 MG

## 2022-04-14 NOTE — TELEPHONE ENCOUNTER
[facility] calling for symptom update. Voicemail left requesting call back    AVELINO Maher  Care Coordinator  [phone]

## 2022-04-19 LAB — BACTERIA SPEC CULT: ABNORMAL

## 2022-04-25 ENCOUNTER — TELEPHONE (OUTPATIENT)
Dept: FAMILY MEDICINE | Facility: CLINIC | Age: 27
End: 2022-04-25

## 2022-04-25 ENCOUNTER — OFFICE VISIT (OUTPATIENT)
Dept: PULMONOLOGY | Facility: CLINIC | Age: 27
End: 2022-04-25
Attending: INTERNAL MEDICINE
Payer: MEDICARE

## 2022-04-25 VITALS — HEART RATE: 69 BPM | OXYGEN SATURATION: 95 % | DIASTOLIC BLOOD PRESSURE: 97 MMHG | SYSTOLIC BLOOD PRESSURE: 138 MMHG

## 2022-04-25 DIAGNOSIS — S14.109S QUADRIPLEGIA, POST-TRAUMATIC (H): ICD-10-CM

## 2022-04-25 DIAGNOSIS — Z78.9 ELECTRONIC CIGARETTE USE: ICD-10-CM

## 2022-04-25 DIAGNOSIS — Z72.0 CURRENT TOBACCO USE: ICD-10-CM

## 2022-04-25 DIAGNOSIS — G82.50 QUADRIPLEGIA, POST-TRAUMATIC (H): ICD-10-CM

## 2022-04-25 DIAGNOSIS — R06.89 AIRWAY CLEARANCE IMPAIRMENT: ICD-10-CM

## 2022-04-25 DIAGNOSIS — J98.4 RESTRICTIVE LUNG DISEASE: Primary | ICD-10-CM

## 2022-04-25 DIAGNOSIS — J98.4 RESTRICTIVE LUNG DISEASE: ICD-10-CM

## 2022-04-25 DIAGNOSIS — J18.9 FREQUENT EPISODES OF PNEUMONIA: ICD-10-CM

## 2022-04-25 DIAGNOSIS — R06.89 AIRWAY CLEARANCE IMPAIRMENT: Primary | ICD-10-CM

## 2022-04-25 PROCEDURE — 99204 OFFICE O/P NEW MOD 45 MIN: CPT | Mod: 25 | Performed by: INTERNAL MEDICINE

## 2022-04-25 PROCEDURE — G0463 HOSPITAL OUTPT CLINIC VISIT: HCPCS | Mod: 25

## 2022-04-25 PROCEDURE — 94729 DIFFUSING CAPACITY: CPT | Performed by: INTERNAL MEDICINE

## 2022-04-25 PROCEDURE — 94375 RESPIRATORY FLOW VOLUME LOOP: CPT | Performed by: INTERNAL MEDICINE

## 2022-04-25 RX ORDER — ARFORMOTEROL TARTRATE 15 UG/2ML
15 SOLUTION RESPIRATORY (INHALATION) 2 TIMES DAILY
Qty: 360 ML | Refills: 3 | Status: SHIPPED | OUTPATIENT
Start: 2022-04-25 | End: 2022-04-25

## 2022-04-25 RX ORDER — ARFORMOTEROL TARTRATE 15 UG/2ML
15 SOLUTION RESPIRATORY (INHALATION) 2 TIMES DAILY
Qty: 360 ML | Refills: 3 | Status: SHIPPED | OUTPATIENT
Start: 2022-04-25 | End: 2022-05-05
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
## 2022-04-26 ENCOUNTER — MEDICAL CORRESPONDENCE (OUTPATIENT)
Dept: HEALTH INFORMATION MANAGEMENT | Facility: CLINIC | Age: 27
End: 2022-04-26
Payer: MEDICARE

## 2022-04-27 ENCOUNTER — TELEPHONE (OUTPATIENT)
Dept: PULMONOLOGY | Facility: CLINIC | Age: 27
End: 2022-04-27
Payer: MEDICARE

## 2022-04-27 DIAGNOSIS — J99 NEUROMUSCULAR RESPIRATORY WEAKNESS (H): Primary | ICD-10-CM

## 2022-04-27 DIAGNOSIS — J47.9 BRONCHIECTASIS (H): ICD-10-CM

## 2022-04-27 DIAGNOSIS — J98.4 RESTRICTIVE LUNG DISEASE: ICD-10-CM

## 2022-04-27 DIAGNOSIS — R06.89 AIRWAY CLEARANCE IMPAIRMENT: ICD-10-CM

## 2022-04-27 DIAGNOSIS — G70.9 NEUROMUSCULAR RESPIRATORY WEAKNESS (H): Primary | ICD-10-CM

## 2022-04-27 NOTE — CONFIDENTIAL NOTE
Prior Authorization Retail Medication Request    Medication/Dose: Arformoterol 15MCG/ 2ML Soln  ICD code (if different than what is on RX):    Previously Tried and Failed:    Rationale:      Insurance Name:    Insurance ID:        Pharmacy Information (if different than what is on RX)  Name:    Phone:

## 2022-05-02 ENCOUNTER — OFFICE VISIT (OUTPATIENT)
Dept: UROLOGY | Facility: CLINIC | Age: 27
End: 2022-05-02
Payer: MEDICARE

## 2022-05-02 VITALS
HEIGHT: 66 IN | BODY MASS INDEX: 21.69 KG/M2 | SYSTOLIC BLOOD PRESSURE: 90 MMHG | WEIGHT: 135 LBS | OXYGEN SATURATION: 98 % | HEART RATE: 81 BPM | DIASTOLIC BLOOD PRESSURE: 62 MMHG

## 2022-05-02 DIAGNOSIS — N20.0 NEPHROLITHIASIS: ICD-10-CM

## 2022-05-02 DIAGNOSIS — N20.0 BILATERAL NEPHROLITHIASIS: Primary | ICD-10-CM

## 2022-05-02 LAB
DLCOCOR-%PRED-PRE: 90 %
DLCOCOR-PRE: 21.59 ML/MIN/MMHG
DLCOUNC-%PRED-PRE: 88 %
DLCOUNC-PRE: 21.26 ML/MIN/MMHG
DLCOUNC-PRED: 23.97 ML/MIN/MMHG
ERV-%PRED-PRE: 12 %
ERV-PRE: 0.18 L
ERV-PRED: 1.41 L
EXPTIME-PRE: 5.53 SEC
FEF2575-%PRED-PRE: 69 %
FEF2575-PRE: 2.65 L/SEC
FEF2575-PRED: 3.78 L/SEC
FEFMAX-%PRED-PRE: 59 %
FEFMAX-PRE: 4.35 L/SEC
FEFMAX-PRED: 7.3 L/SEC
FEV1-%PRED-PRE: 50 %
FEV1-PRE: 1.72 L
FEV1FEV6-PRE: 91 %
FEV1FEV6-PRED: 86 %
FEV1FVC-PRE: 93 %
FEV1FVC-PRED: 85 %
FEV1SVC-PRE: 93 %
FEV1SVC-PRED: 82 %
FIFMAX-PRE: 3.6 L/SEC
FVC-%PRED-PRE: 45 %
FVC-PRE: 1.85 L
FVC-PRED: 4.08 L
IC-%PRED-PRE: 60 %
IC-PRE: 1.68 L
IC-PRED: 2.77 L
VA-%PRED-PRE: 67 %
VA-PRE: 3.55 L
VC-%PRED-PRE: 44 %
VC-PRE: 1.85 L
VC-PRED: 4.18 L

## 2022-05-02 PROCEDURE — 52310 CYSTOSCOPY AND TREATMENT: CPT | Mod: 58 | Performed by: UROLOGY

## 2022-05-02 RX ORDER — LIDOCAINE HYDROCHLORIDE 20 MG/ML
JELLY TOPICAL ONCE
Status: COMPLETED | OUTPATIENT
Start: 2022-05-02 | End: 2022-05-02

## 2022-05-02 RX ADMIN — LIDOCAINE HYDROCHLORIDE 5 ML: 20 JELLY TOPICAL at 12:35

## 2022-05-02 ASSESSMENT — PAIN SCALES - GENERAL: PAINLEVEL: NO PAIN (0)

## 2022-05-02 NOTE — PATIENT INSTRUCTIONS
"AFTER YOUR CYSTOSCOPY  ?  ?  You have just completed a cystoscopy, or \"cysto\", which allowed your physician to learn more about your bladder (or to remove a stent placed after surgery). We suggest that you continue to avoid caffeine, fruit juice, and alcohol for the next 24 hours, however, you are encouraged to return to your normal activities.  ?  ?  A few things that are considered normal after your cystoscopy:  ?  * small amount of bleeding (or spotting) that clears within the next 24 hours  ?  * slight burning sensation with urination  ?  * sensation of needing to void (urinate) more frequently  ?  * the feeling of \"air\" in your urine  ?  * mild discomfort that is relieved with Tylenol    * bladder spasms  ?  ?  ?  Please contact our office promptly if you:  ?  * develop a fever above 101 degrees  ?  * are unable to urinate  ?  * develop bright red blood that does not stop  ?  * experience severe pain or swelling  ?  ?  ?  And of course, please contact our office with any concerns or questions [phone]  ?   AFTER YOUR CYSTOSCOPY        You have just completed a cystoscopy, or \"cysto\", which allowed your physician to learn more about your bladder (or to remove a stent placed after surgery). We suggest that you continue to avoid caffeine, fruit juice, and alcohol for the next 24 hours, however, you are encouraged to return to your normal activities.         A few things that are considered normal after your cystoscopy:     * Small amount of bleeding (or spotting) that clears within the next 24 hours     * Slight burning sensation with urination     * Sensation to of needing to avoid more frequently     * The feeling of \"air\" in your urine     * Mild discomfort that is relieved with Tylenol        Please contact our office promptly if you:     * Develop a fever above 101 degrees     * Are unable to urinate     * Develop bright red blood that does not stop     * Severe pain or swelling         Please contact our " office with any concerns or questions @[facility].

## 2022-05-02 NOTE — PROGRESS NOTES
PRE-PROCEDURE DIAGNOSIS: Bilateral ureteral stents after right ureteroscopy and left PCNL PCNL  POST-PROCEDURE DIAGNOSIS: Same  PROCEDURE: Cystoscopy  HISTORY: Ms. Cottrell is a 27 year old female with a history of bilateral nephrolithiasis status post right ureteroscopy and left PCNL in April 11, 2022.  She had no significant residual stone burden however we left the stents in to allow her right proximal ureter to heal.  She had some initial issues with autonomic dysreflexia from the stents, however this has significantly improved.  REVIEW OF OFFICE STUDIES (e.g., uroflow or PVR): Not  DESCRIPTION OF PROCEDURE: After informed consent was obtained, the patient was brought to the procedure room where he was placed in the lithotomy position with all pressure points well padded.  The penis and scrotum were prepped and draped in a sterile fashion. A flexible cystoscope was introduced through a well-lubricated urethra. Anterior urethra strictures were absent.  The bilateral ureteral stents were visible in orthotopic position and distal curls were grasped and externalized one by one.  These were removed without difficulty.    ASSESSMENT AND PLAN: 27 year old female status post stone removal whose bilateral stents removed today.    Renal ultrasound in 6 to 8 weeks  [facility] will be sent  We will follow-up these results

## 2022-05-02 NOTE — LETTER
5/2/2022       RE: Dianna Cottrell  [address] [id number]     Dear Colleague,    Thank you for referring your patient, Dianna Cottrell, to the [facility] [place] at [facility]. Please see a copy of my visit note below.    PRE-PROCEDURE DIAGNOSIS: Bilateral ureteral stents after right ureteroscopy and left PCNL PCNL  POST-PROCEDURE DIAGNOSIS: Same  PROCEDURE: Cystoscopy  HISTORY: Ms. Cottrell is a 27 year old female with a history of bilateral nephrolithiasis status post right ureteroscopy and left PCNL in April 11, 2022.  She had no significant residual stone burden however we left the stents in to allow her right proximal ureter to heal.  She had some initial issues with autonomic dysreflexia from the stents, however this has significantly improved.  REVIEW OF OFFICE STUDIES (e.g., uroflow or PVR): Not  DESCRIPTION OF PROCEDURE: After informed consent was obtained, the patient was brought to the procedure room where he was placed in the lithotomy position with all pressure points well padded.  The penis and scrotum were prepped and draped in a sterile fashion. A flexible cystoscope was introduced through a well-lubricated urethra. Anterior urethra strictures were absent.  The bilateral ureteral stents were visible in orthotopic position and distal curls were grasped and externalized one by one.  These were removed without difficulty.    ASSESSMENT AND PLAN: 27 year old female status post stone removal whose bilateral stents removed today.    Renal ultrasound in 6 to 8 weeks  [facility] will be sent  We will follow-up these results    Sincerely,    Parveen Schofield MD

## 2022-05-02 NOTE — NURSING NOTE
Chief Complaint   Patient presents with     stones     In office stent removal   Prior to the procedure patient was hoyered on to the exam table. Staff had to hold patients legs in order for exam to be done  Catheter removal documentation on 5/2/2022:    Dianna Cottrell presents to the clinic for catheter removal.  Reason for removal: cystoscopy-stent out  Order has been verified. yes  Catheter successfully removed at 10:30 AM without immediate complication.  100 cc's of urine present in the catheter bag.  Urethral meatus is free of secretions and encrustation.  The patient is afebrile.  The patient tolerated the procedure and was instructed to prep for stent out.      Prior to the start of the procedure and with procedural staff participation, I verbally confirmed the patient s identity using two indicators, relevant allergies, that the procedure was appropriate and matched the consent or emergent situation, and that the correct equipment/implants were available. I have wiped the patient off with the povidone-Iodine solution,used lidocaine jelly,  draped them,   and instilled sterile water into the bladder. (The Joint Commission universal protocol was followed.)  Yes    Sedation (Moderate or Deep): None  5mL 2% lidocaine hydrochloride Urojet instilled into urethra.    NDC# [address]-5  Lot #: [id number]  Expiration Date:  10-23  Following procedure the provider inserted a rogers catheter back in.    Catheter insertion documentation on 5/2/2022:    Dianna Cottrell presents to the clinic for catheter insertion.  Reason for insertion: replacement  Order has been verified. yes  Catheter successfully inserted into the urethral meatus in the usual sterile fashion without immediate complication.  Type of catheter placed: 16 Greenlandic indwelling catheter  Urine is yellow in color.  25 cc's of urine output returned.  Balloon was filled with 8cc of normal saline.  Securement device placed for the catheter.  The patient  tolerated the procedure and was instructed to monitor for catheter dysfunction      Renetta Taylor LPN

## 2022-05-04 NOTE — TELEPHONE ENCOUNTER
Pharmacy advised that neither dx attached with current order are covered for [facility] under Part B Medicare guidelines.  Will message Dr. Sutherland for alternative dx or medication.

## 2022-05-04 NOTE — TELEPHONE ENCOUNTER
New Rx was sent 4/25 with dx Restrictive lung disease - J98.4 and Airway Clearance Impairment - R06.89, writer will follow up with pharmacy to see whether medication now approved.

## 2022-05-04 NOTE — TELEPHONE ENCOUNTER
[facility] pharm calling again with a request for another diagnosis code for Brovana other then restrictive lung disease and airway clearance impairment. Message sent to provider earlier today.

## 2022-05-05 RX ORDER — ARFORMOTEROL TARTRATE 15 UG/2ML
15 SOLUTION RESPIRATORY (INHALATION) 2 TIMES DAILY
Qty: 360 ML | Refills: 3 | Status: SHIPPED | OUTPATIENT
Start: 2022-05-05 | End: 2023-03-08

## 2022-05-11 ENCOUNTER — TELEPHONE (OUTPATIENT)
Dept: FAMILY MEDICINE | Facility: CLINIC | Age: 27
End: 2022-05-11
Payer: MEDICARE

## 2022-05-11 NOTE — CONFIDENTIAL NOTE
Reason for Call:  Form, our goal is to have forms completed with 72 hours, however, some forms may require a visit or additional information.    Type of letter, form or note:  Home Health Certification    Who is the form from?: Home care    Where did the form come from: form was faxed in    What clinic location was the form placed at?: [facility] [phone]    Where the form was placed: TC Box/Folder    What number is listed as a contact on the form?: [phone]       Additional comments: Fax signature to:  [phone] [facility]    Call taken on 5/11/2022 at 9:34 AM by Ava Roy

## 2022-05-13 NOTE — CONFIDENTIAL NOTE
Reason for Call:  Form, our goal is to have forms completed with 72 hours, however, some forms may require a visit or additional information.     Type of letter, form or note:  Home Health Certification     Who is the form from?: Home care     Where did the form come from: form was faxed in     What clinic location was the form placed at?: [facility] [phone]     Where the form was placed: [facility] Box/Folder     What number is listed as a contact on the form?: [phone]       Additional comments: Fax signature to:  [phone] [facility]

## 2022-05-14 ENCOUNTER — HEALTH MAINTENANCE LETTER (OUTPATIENT)
Age: 27
End: 2022-05-14

## 2022-05-16 ENCOUNTER — MEDICAL CORRESPONDENCE (OUTPATIENT)
Dept: HEALTH INFORMATION MANAGEMENT | Facility: CLINIC | Age: 27
End: 2022-05-16

## 2022-05-16 DIAGNOSIS — Z53.9 DIAGNOSIS NOT YET DEFINED: Primary | ICD-10-CM

## 2022-05-16 PROCEDURE — G0180 MD CERTIFICATION HHA PATIENT: HCPCS | Performed by: PHYSICIAN ASSISTANT

## 2022-05-22 ENCOUNTER — MYC MEDICAL ADVICE (OUTPATIENT)
Dept: UROLOGY | Facility: CLINIC | Age: 27
End: 2022-05-22
Payer: MEDICARE

## 2022-05-23 DIAGNOSIS — R39.89 SUSPECTED UTI: Primary | ICD-10-CM

## 2022-05-23 NOTE — TELEPHONE ENCOUNTER
FUTURE [facility]AUC ORDERS PLACED.  PT NOTIFIED ORDERS ARE IN [facility]  NICOL Maynard CMA

## 2022-05-31 ENCOUNTER — TELEPHONE (OUTPATIENT)
Dept: FAMILY MEDICINE | Facility: CLINIC | Age: 27
End: 2022-05-31
Payer: MEDICARE

## 2022-05-31 NOTE — TELEPHONE ENCOUNTER
Reason for Call:  Form, our goal is to have forms completed with 72 hours, however, some forms may require a visit or additional information.    Type of letter, form or note:  Home Health Certification    Who is the form from?: Home care    Where did the form come from: form was faxed in    What clinic location was the form placed at?: [facility] [phone]    Where the form was placed: Provider Box/Folder    What number is listed as a contact on the form?: Na       Additional comments: PT FYI    Call taken on 5/31/2022 at 2:17 PM by Dianne Uribe

## 2022-06-06 ENCOUNTER — PRE VISIT (OUTPATIENT)
Dept: UROLOGY | Facility: CLINIC | Age: 27
End: 2022-06-06
Payer: MEDICARE

## 2022-06-06 ENCOUNTER — TRANSFERRED RECORDS (OUTPATIENT)
Dept: HEALTH INFORMATION MANAGEMENT | Facility: CLINIC | Age: 27
End: 2022-06-06
Payer: MEDICARE

## 2022-06-07 ENCOUNTER — TELEPHONE (OUTPATIENT)
Dept: UROLOGY | Facility: CLINIC | Age: 27
End: 2022-06-07
Payer: MEDICARE

## 2022-06-07 ENCOUNTER — LAB (OUTPATIENT)
Dept: LAB | Facility: CLINIC | Age: 27
End: 2022-06-07
Payer: MEDICARE

## 2022-06-07 DIAGNOSIS — R39.89 SUSPECTED UTI: ICD-10-CM

## 2022-06-07 DIAGNOSIS — N31.9 NEUROGENIC BLADDER: Primary | ICD-10-CM

## 2022-06-07 LAB
ALBUMIN UR-MCNC: 30 MG/DL
ALBUMIN UR-MCNC: 30 MG/DL
APPEARANCE UR: ABNORMAL
APPEARANCE UR: ABNORMAL
BACTERIA #/AREA URNS HPF: ABNORMAL /HPF
BILIRUB UR QL STRIP: NEGATIVE
BILIRUB UR QL STRIP: NEGATIVE
COLOR UR AUTO: YELLOW
COLOR UR AUTO: YELLOW
GLUCOSE UR STRIP-MCNC: NEGATIVE MG/DL
GLUCOSE UR STRIP-MCNC: NEGATIVE MG/DL
HGB UR QL STRIP: ABNORMAL
HGB UR QL STRIP: ABNORMAL
KETONES UR STRIP-MCNC: NEGATIVE MG/DL
KETONES UR STRIP-MCNC: NEGATIVE MG/DL
LEUKOCYTE ESTERASE UR QL STRIP: ABNORMAL
LEUKOCYTE ESTERASE UR QL STRIP: ABNORMAL
MUCOUS THREADS #/AREA URNS LPF: PRESENT /LPF
NITRATE UR QL: NEGATIVE
NITRATE UR QL: NEGATIVE
PH UR STRIP: 7 [PH] (ref 5–7)
PH UR STRIP: 7 [PH] (ref 5–7)
RBC URINE: 8 /HPF
SP GR UR STRIP: 1.01 (ref 1–1.03)
SP GR UR STRIP: 1.01 (ref 1–1.03)
UROBILINOGEN UR STRIP-MCNC: NORMAL MG/DL
UROBILINOGEN UR STRIP-MCNC: NORMAL MG/DL
WBC CLUMPS #/AREA URNS HPF: PRESENT /HPF
WBC URINE: 65 /HPF

## 2022-06-07 PROCEDURE — 81001 URINALYSIS AUTO W/SCOPE: CPT

## 2022-06-07 PROCEDURE — 81003 URINALYSIS AUTO W/O SCOPE: CPT | Mod: QW

## 2022-06-07 PROCEDURE — 87086 URINE CULTURE/COLONY COUNT: CPT

## 2022-06-07 NOTE — TELEPHONE ENCOUNTER
Detailed voicemail left for patient today.  Per Kelly Diggs PA-C, patient will need to complete a UA/UC prior to her Urodynamics testing on 6/16 to rule out UTI.  Orders placed.  I mentioned to patient she can have this done at her Primary clinic ([facility]) as long as it is an [facility] facility so we have access to the results.  I asked that she have this done by the end of the week, at the very latest.  She is to call back with any questions or concerns.  Will watch for results.    Anne Cabrera, CMA

## 2022-06-09 ENCOUNTER — MYC MEDICAL ADVICE (OUTPATIENT)
Dept: UROLOGY | Facility: CLINIC | Age: 27
End: 2022-06-09
Payer: MEDICARE

## 2022-06-09 DIAGNOSIS — N39.0 URINARY TRACT INFECTION: Primary | ICD-10-CM

## 2022-06-09 LAB — BACTERIA UR CULT: NORMAL

## 2022-06-09 RX ORDER — SULFAMETHOXAZOLE/TRIMETHOPRIM 800-160 MG
TABLET ORAL
Qty: 6 TABLET | Refills: 0 | Status: SHIPPED | OUTPATIENT
Start: 2022-06-09 | End: 2022-09-30

## 2022-06-09 NOTE — TELEPHONE ENCOUNTER
----- Message from Zo Bustillo sent at 2/7/2017  3:35 PM EST -----   Pt needs to get an order for a sleeve sent to [facility] [address]        [phone]   Writer combining messages to one encounter. Regarding same test.

## 2022-06-09 NOTE — PROGRESS NOTES
Detailed voicemail left for patient today.  Per Kelly Diggs PA-C, OK to Rx Bactrim DS BID x 3 days, starting on 6/14/22.  Orders placed and sent to [facility] in [place].  Patient is to call back with any questions or concerns.  Ok to proceed with Urodynamics testing on 6/16, as long is patient is taking the prescribed antibiotics.    Anne Cabrera, CMA

## 2022-06-13 ENCOUNTER — PRE VISIT (OUTPATIENT)
Dept: UROLOGY | Facility: CLINIC | Age: 27
End: 2022-06-13
Payer: MEDICARE

## 2022-06-13 NOTE — TELEPHONE ENCOUNTER
Reason for visit: cystoscopy and review UDS     Relevant information: potential [place] planning    Records/imaging/labs/orders: all appointments scheduled    Pt called: no need for a call    At Rooming: christina Solis CMA  6/13/2022  11:00 AM

## 2022-06-16 ENCOUNTER — ALLIED HEALTH/NURSE VISIT (OUTPATIENT)
Dept: UROLOGY | Facility: CLINIC | Age: 27
End: 2022-06-16
Payer: MEDICARE

## 2022-06-16 VITALS
HEIGHT: 66 IN | HEART RATE: 57 BPM | DIASTOLIC BLOOD PRESSURE: 72 MMHG | BODY MASS INDEX: 21.69 KG/M2 | WEIGHT: 135 LBS | SYSTOLIC BLOOD PRESSURE: 104 MMHG

## 2022-06-16 DIAGNOSIS — N31.9 NEUROGENIC BLADDER: Primary | ICD-10-CM

## 2022-06-16 DIAGNOSIS — S14.109S CERVICAL SPINAL CORD INJURY, SEQUELA (H): ICD-10-CM

## 2022-06-16 PROCEDURE — 99207 PR NO CHARGE INJECTABLE MED/DRUG: CPT | Performed by: PHYSICIAN ASSISTANT

## 2022-06-16 PROCEDURE — 51784 ANAL/URINARY MUSCLE STUDY: CPT | Mod: 79 | Performed by: PHYSICIAN ASSISTANT

## 2022-06-16 PROCEDURE — 51600 INJECTION FOR BLADDER X-RAY: CPT | Mod: 79 | Performed by: PHYSICIAN ASSISTANT

## 2022-06-16 PROCEDURE — 51726 COMPLEX CYSTOMETROGRAM: CPT | Mod: 79 | Performed by: PHYSICIAN ASSISTANT

## 2022-06-16 PROCEDURE — 74430 CONTRAST X-RAY BLADDER: CPT | Performed by: PHYSICIAN ASSISTANT

## 2022-06-16 ASSESSMENT — PAIN SCALES - GENERAL: PAINLEVEL: NO PAIN (0)

## 2022-06-16 NOTE — PROGRESS NOTES
PREPROCEDURE DIAGNOSES:    1. Neurogenic bladder secondary to C5 SCI  2. Urinary retention  3. Urinary incontinence  4. Autonomic dysreflexia    POSTPROCEDURE DIAGNOSES:  -Maximum cystometric capacity ~100 mL.  -When filling is started with just Pves catheter in the urethra, she has instantaneous incontinence at resting detrusor and abdominal pressures. Unable to document a specific ALPP since patient was freely incontinent without provocation as soon as bladder filling was initiated.   -No evidence for detrusor overactivity when the bladder neck is unoccluded. Winston catheter was then placed and clamped close to the urethral meatus. Winston balloon was inflated and gentle traction was applied in order to occlude the bladder neck so as to more accurately evaluate bladder compliance and capacity. Immediately after filling is resumed with the bladder neck occluded, she experiences DO and reduced bladder compliance. There is associated small volume incontinence despite attempts to occlude the bladder outlet. DLPP not accurate as we were providing artifactual increased outlet resistance with attempts to occlude bladder neck with Winston balloon. Patient starts to experience autonomic dysreflexia symptoms soon after and BP spikes to 175/108. Filling is stopped and traction released and she is incontinent of a larger volume. A.D. symptoms persist so Winston catheter is unclamped, allowing the bladder to drain completely. BP improves to 157/102 but A.D. symptoms persist so the study is terminated.   -There appears to be some increased EMG activity in conjunction with DO, suggestive for possible DSD.   -Fluoroscopy reveals a severely trabeculated bladder wall with some cellules but no vesicoureteral reflux. The bladder neck appears open throughout.     PROCEDURE:    1. Complex filling cystometrogram with measurement of bladder and rectal pressures.  2. Electromyography of the pelvic floor during urodynamics.  3. Fluoroscopic  imaging of the bladder during urodynamics, at least 3 views.    4. Interpretation of urodynamics and flouroscopic imaging.      INDICATIONS FOR PROCEDURE:  Ms. Dianna Cottrell is a pleasant 27 year old female with neurogenic bladder secondary to C5 SCI with resulting urinary retention. She previously performed CIC per urethra but had frequent urinary incontinence and now has a urethral Winston catheter to allow decubitus wounds to heal. She is interested in a Mitrofanoff +/- bladder augment. Baseline video urodynamic assessment is requested today by Dr. Bacon to better characterize Ms. Dianna Cottrell's bladder pressures.     VOIDING DIARY:  Did not complete.     DESCRIPTION OF PROCEDURE:  Risks, benefits, and alternatives to urodynamics were discussed with the patient and she wished to proceed.  Urodynamics are planned to better assess the primary etiology for Ms. Cottrell's urologic dysfunction. After informed consent was obtained, the patient was taken to the procedure room where uroflowmetry was performed. Findings below.     PRE-STUDY UROFLOWMETRY:  Not performed as patient does not void volitionally.   Pretest urine dipstick was not performed per clinician discretion as patient is currently taking adonay-procedural antibiotics for UTI prophylaxis and she denies any UTI symptoms today. Additionally, she had a negative urine culture on 6/7/22. With her chronic indwelling Winston catheter, the urinary tract is likely to be colonized with bacteria so there is little utility in performing urine dipstick analysis in the absence of UTI symptoms.     Patient's Winston catheter was removed uneventfully. Next a 7F double-lumen urodynamics catheter was inserted into the bladder under sterile technique via urethra.  A 7F abdominal manometry catheter was placed in the vagina.  EMG pads were placed on both sides of the anal verge.  The bladder was filled with 65 mL of Isovue-250 at 30 mL/minute and serial pressures were  recorded.  With coughing there was an appropriate rise in vesical and abdominal pressures with no change in detrusor pressure, confirming good study catheter placement.    DURING THE FILLING PHASE:  None reported. Patient starts to experience autonomic dysreflexia symptoms quickly after the bladder neck is occluded.  Maximum Capacity: ~100 mL.    Uninhibited detrusor contractions: none with bladder neck unoccluded, allowing for continuous urinary incontinence. Once bladder neck was occluded with traction from Winston catheter balloon, she has almost instantaneous DO and reduced compliance. There is incontinence associated with DO despite attempts to fully occlude the bladder neck/urethra.  Compliance: good when bladder neck unoccluded. Poor once bladder neck is occluded.  Continence: as above. Unable to document ALPP since patient was freely incontinent as soon as bladder filling was initiated. She leaks at resting pressures without provocation. DOI as above. DLPP not accurate as we were providing artifactual increased outlet resistance with attempts to occlude bladder neck with Winston balloon.  EMG: some increased activity occurring in conjunction with DO    DURING THE VOIDING PHASE:  No volitional voiding phase.  Patient leaks ~100 mL with final residual by catheter ~10 mL.     FLUOROSCOPIC IMAGING OF THE BLADDER DURING URODYNAMICS:  Please note, image numbers on UDS tracings correlate with iSite series numbers on PACS images. Fluoroscopy during today's procedure demonstrated a severely trabeculated bladder wall with multiple cellules.  No vesicoureteral reflux was observed.  The bladder neck was somewhat open during filling. At the conclusion of today's study, all catheters were removed, and Winston catheter was left in place and connected to leg bag.    ASSESSMENT/PLAN:  Ms. Dianna Cottrell is a pleasant 27 year old female with neurogenic bladder who demonstrated the following findings today on urodynamic  evaluation:    -Maximum cystometric capacity ~100 mL.  -When filling is started with just Pves catheter in the urethra, she has instantaneous incontinence at resting detrusor and abdominal pressures. Unable to document a specific ALPP since patient was freely incontinent without provocation as soon as bladder filling was initiated.   -No evidence for detrusor overactivity when the bladder neck is unoccluded. Winston catheter was then placed and clamped close to the urethral meatus. Winston balloon was inflated and gentle traction was applied in order to occlude the bladder neck so as to more accurately evaluate bladder compliance and capacity. Immediately after filling is resumed with the bladder neck occluded, she experiences DO and reduced bladder compliance. There is associated small volume incontinence despite attempts to occlude the bladder outlet. DLPP not accurate as we were providing artifactual increased outlet resistance with attempts to occlude bladder neck with Winston balloon. Patient starts to experience autonomic dysreflexia symptoms soon after and BP spikes to 175/108. Filling is stopped and traction released and she is incontinent of a larger volume. A.D. symptoms persist so Winston catheter is unclamped, allowing the bladder to drain completely. BP improves to 157/102 but A.D. symptoms persist so the study is terminated.   -There appears to be some increased EMG activity in conjunction with DO, suggestive for possible DSD.   -Fluoroscopy reveals a severely trabeculated bladder wall with some cellules but no vesicoureteral reflux. The bladder neck appears open throughout.      The patient will follow up as scheduled with Dr. Bacon for cystoscopy to further discuss today's study results and make plans for how best to proceed.      - The patient is currently taking Bactrim as adonay-procedural UTI prophylaxis; therefore, additional antibiotic prophylaxis was not administered today.  The risk of UTI with VUDS  is low at ~2.5-3%.      Thank you for allowing me to participate in the care of Ms. Dianna Cottrell and please don't hesitate to contact me with any questions or concerns.      Kelly Diggs PA-C  Urology Physician Assistant

## 2022-06-16 NOTE — NURSING NOTE
Chief Complaint   Patient presents with     Urodynamics Study     Neurogenic bladder           Patient Active Problem List   Diagnosis     c5 burst fracture     Lesions of vulva     IUD (intrauterine device) in place- placed 12/2017     H/O: pneumonia     АНДРЕЙ (generalized anxiety disorder)     Quadriplegia, post-traumatic (H)- incomplete quad, limited use upper extremities     Major depressive disorder with single episode, in partial remission (H)     Autonomic dysreflexia     Leukocytosis     Decubitus ulcer of right ischium, stage 4 (H)     Personal history of DVT (deep vein thrombosis)     Other chronic osteomyelitis, unspecified site (H)     Neurogenic bladder     Impaired lung function     Encounter for insertion of mirena IUD     YONI III with severe dysplasia     Moderate protein-calorie malnutrition (H)     Major depression     Neurogenic bowel     Spasticity     Spinal cord injury, C5-C7 (H)     Urinary incontinence     Nephrolithiasis       Allergies   Allergen Reactions     Succinylcholine      Spinal cord injury 12/18/12, patient at risk for extrajunctional receptors and hyperkalemia       Current Outpatient Medications   Medication Sig Dispense Refill     acetaminophen (TYLENOL) 325 MG tablet Take 325-650 mg by mouth every 6 hours as needed.       albuterol (PROVENTIL) (2.5 MG/3ML) 0.083% neb solution Take 1 vial (2.5 mg) by nebulization every 4 hours as needed for shortness of breath / dyspnea or wheezing 100 mL 3     arformoterol (BROVANA) 15 MCG/2ML NEBU neb solution Take 2 mLs (15 mcg) by nebulization 2 times daily 360 mL 3     baclofen (LIORESAL) 10 MG tablet Take 30 mg by mouth 2 times daily (10MG X 3 = 30MG)       bisacodyl (DULCOLAX) 10 MG suppository Place 10 mg rectally daily as needed for constipation       Cranberry 600 MG TABS Take 600 mg by mouth daily        docusate sodium (COLACE) 100 MG capsule Take 100-200 mg by mouth daily as needed for constipation        gabapentin (NEURONTIN) 300  "MG capsule Take 300 mg by mouth At Bedtime        hydrOXYzine (VISTARIL) 25 MG capsule Take 1 capsule (25 mg) by mouth 3 times daily as needed for anxiety (or at bedtime for sleep.) 20 capsule 1     methenamine (HIPREX) 1 g tablet TAKE 1 TABLET BY MOUTH 2 TIMES DAILY 180 tablet 2     midodrine (PROAMATINE) 5 MG tablet Take 10 mg by mouth 2 times daily  6 tablet 0     Multiple Vitamins-Minerals (WOMENS MULTIVITAMIN) TABS Take 2 tablets by mouth daily       order for DME [facility] Phone [phone] Fax [phone]  Maria Fernanda Price to pressure map bed 1 Units 0     order for DME Equipment being ordered: [facility]   p: [phone] f: [phone]  EMAIL to [email or web address]    Request for group 2 air mattress & semi-electric hospital bed    Ht:5'8\"  Wt:110 ;bs  Length of need: lifetime    Pt. is wheelchair bound & has been in a comprehensive ulcer treatment program for >2 months. We will follow monthly until wound closure    To obtain medical records:  p: [phone] f: [phone] 1 Device 0     order for DME Equipment being ordered: [facility] Fax [phone]    Wheelchair seating eval and mapping of current cushion 30 days 0     order for DME Equipment being ordered: Pressure Mapping of wheelchair at [facility] Phone [phone] Fax [phone] 1 Device 0     order for DME Equipment being ordered: Wheelchair-  \"Patient continues to need and use daily her power wheelchair and the wheelchair will continue to need repairs for the next 12 months.\" 1 each 0     oxybutynin ER (DITROPAN-XL) 5 MG 24 hr tablet Take 5 mg by mouth daily       povidone-iodine 10 % swab Apply topically daily as needed for wound care        senna-docusate (SENOKOT-S/PERICOLACE) 8.6-50 MG tablet Take 1 tablet by mouth 2 times daily as needed for constipation 20 tablet 0     sertraline (ZOLOFT) 100 MG tablet TAKE 1.5 TABLETS BY MOUTH DAILY 135 tablet 0     sodium chloride " (NEBUSAL) 3 % neb solution Take 3 mLs by nebulization every 6 hours as needed for wheezing or other (sputum clearance difficulty due to quadridplegia.) 300 mL 1     spacer (OPTICHAMBER JAIDA) holding chamber To be used with inhaler 1 each 0     sulfamethoxazole-trimethoprim (BACTRIM DS) 800-160 MG tablet Take one tablet twice a day for three days, starting on 22 6 tablet 0     Vitamin D3 (CHOLECALCIFEROL) 125 MCG (5000 UT) tablet Take 1 tablet by mouth every other day       Wound Dressings (TRIAD HYDROPHILIC WOUND DRESSI) PSTE Externally apply topically daily as needed         Social History     Tobacco Use     Smoking status: Current Every Day Smoker     Packs/day: 0.50     Years: 0.00     Pack years: 0.00     Types: Other     Smokeless tobacco: Current User     Tobacco comment: used 1/2-1 ppd for 4 years, quit , now daily e cig use.    Vaping Use     Vaping Use: Every day     Substances: Nicotine, THC, Flavoring     Devices: Pre-filled pod   Substance Use Topics     Alcohol use: No     Alcohol/week: 0.0 standard drinks     Drug use: Yes     Types: Marijuana     Comment: 3 joints per day       Invasive Procedure Safety Checklist:    Procedure: Urodynamics    Action: Complete sections and checkboxes as appropriate.  Pre-procedure:  1. Patient ID Verified with 2 identifiers (Yamilet and  or MRN) : YES    2. Procedure and site verified with patient/designee (when able) : YES    3. Accurate consent documentation in medical record : YES    4. H&P (or appropriate assessment) documented in medical record : N/A  H&P must be up to 30 days prior to procedure an updated within 24 hours of Procedure as applicable.     5. Relevant diagnostic and radiology test results appropriately labeled and displayed as applicable : YES    6. Blood products, implants, devices, and/or special equipment available for the procedure as applicable : YES    7. Procedure site(s) marked with provider initials [Exclusions: none] : NO    8.  Marking not required. Reason : Yes  Procedure does not require site marking    Time Out:     Time-Out performed immediately prior to starting procedure, including verbal and active participation of all team members addressing: YES    1. Correct patient identity.  2. Confirmed that the correct side and site are marked.  3. An accurate procedure to be done.  4. Agreement on the procedure to be done.  5. Correct patient position.  6. Relevant images and results are properly labeled and appropriately displayed.  7. The need to administer antibiotics or fluids for irrigation purposes during the procedure as applicable.  8. Safety precautions based on patient history or medication use.    During Procedure: Verification of correct person, site, and procedure occurs any time the responsibility for care of the patient is transferred to another member of the care team.          The following medication was given:     MEDICATION:  Isovue-250  ROUTE: Provider Administered  SITE: Provider Administered via catheter  DOSE: 200mL  LOT #: [id number]  : Rolando  EXPIRATION DATE: 4/30/2024  NDC#: 9073-5791-57   Was there drug waste? No        Removal:    16 Fr straight tipped latex rogers catheter removed from urethral meatus without difficulty after removing 10 mL of fluid from the balloon.    Insertion:    16 Fr straight tipped latex straight catheter inserted into urethral meatus in the usual sterile fashion without difficulty.  Received 150 ml yellow urine output.     Patient did tolerate procedure well.    Patient instructed as to where to call or go for pain, fever, leakage, or decreased urine flow.         Anne Cabrera CMA  6/16/2022  11:41 AM       RAINER CHEEMA ; 04/19/2022 ; NPP [facility] [address]   RAINER CHEEMA ; 04/22/2022 ; NPP [facility] [address] [facility]

## 2022-06-20 ENCOUNTER — OFFICE VISIT (OUTPATIENT)
Dept: UROLOGY | Facility: CLINIC | Age: 27
End: 2022-06-20
Payer: MEDICARE

## 2022-06-20 VITALS
SYSTOLIC BLOOD PRESSURE: 78 MMHG | HEIGHT: 66 IN | BODY MASS INDEX: 20.89 KG/M2 | WEIGHT: 130 LBS | DIASTOLIC BLOOD PRESSURE: 49 MMHG | HEART RATE: 80 BPM

## 2022-06-20 DIAGNOSIS — N31.9 NEUROGENIC BLADDER: Primary | ICD-10-CM

## 2022-06-20 PROCEDURE — 99214 OFFICE O/P EST MOD 30 MIN: CPT | Mod: 24 | Performed by: UROLOGY

## 2022-06-20 PROCEDURE — 52000 CYSTOURETHROSCOPY: CPT | Performed by: UROLOGY

## 2022-06-20 ASSESSMENT — PAIN SCALES - GENERAL: PAINLEVEL: NO PAIN (0)

## 2022-06-20 NOTE — LETTER
6/20/2022       RE: Dianna Cottrell  [address] [id number]     Dear Colleague,    Thank you for referring your patient, Dianna Cottrell, to the [facility] [place] at [facility]. Please see a copy of my visit note below.    CYSTOSCOPY PROCEDURE    Pre-Procedure Diagnosis: neurogenic bladder  Post-Procedure Diagnosis: same   Procedure: Cystoscopy    Surgeon: Mata Bacon      Indications:  Ms. Dianna Cottrell is a 27 year old-year-old woman who is seen for NGB with incontinence around indwelling urethral. catheter.     Description of Procedure:  After informed consent was obtained, the patient was brought to the procedure room and placed in the low lithotomy position.  The perineum was prepped and draped in a sterile fashion.     External genital exam was significant for urethral erosion. I could place a finger into the bladder. There was some erosion of the labia minora.  There was leakage with straining.     A flex cystoscope was introduced through a well lubricated urethra and into the urinary bladder. The urethra showed  evidence of dorsal erosion. No bone visible. Bladder small capacity without tumors or stones. No AUTONOMIC DYSREFLEXIA during the study.       The cystoscope was removed and the findings were explained to the patient.    Evaluation and Management visit:  After completion of the csytoscopy the patient was brought to a consult room for extended discussion about urinary diversion options.   She has neurogenic bladder problems with indwelling urethral catheter including incontinence.  Discussed Mitrofanoff and augment and BN closure vs. [place] pouch vs. Ileal conduit.   She will consider her options and talk with a patient liaison and get back to me. She is leaning toward a continent diversion. Even though she is not able to cath herself she has her mother to help her and says she will move in with  her kids when her mom is no longer able to help.     Given that, I favor [place] pouch and leave bladder in place. Low risk of pyocystis given the large urethra.     Time spent:  30 minutes spent on the date of the encounter doing chart review, history and exam, documentation and further activities per the note outside of the time for the cysto and the outside of the time for the documentation of the cysto.         Mata Bacon MD  June 27, 2022

## 2022-06-20 NOTE — NURSING NOTE
"Chief Complaint   Patient presents with     Cystoscopy       Blood pressure (!) 78/49, pulse 80, height 1.676 m (5' 6\"), weight 59 kg (130 lb), not currently breastfeeding. Body mass index is 20.98 kg/m .    Patient Active Problem List   Diagnosis     c5 burst fracture     Lesions of vulva     IUD (intrauterine device) in place- placed 12/2017     H/O: pneumonia     АНДРЕЙ (generalized anxiety disorder)     Quadriplegia, post-traumatic (H)- incomplete quad, limited use upper extremities     Major depressive disorder with single episode, in partial remission (H)     Autonomic dysreflexia     Leukocytosis     Decubitus ulcer of right ischium, stage 4 (H)     Personal history of DVT (deep vein thrombosis)     Other chronic osteomyelitis, unspecified site (H)     Neurogenic bladder     Impaired lung function     Encounter for insertion of mirena IUD     YONI III with severe dysplasia     Moderate protein-calorie malnutrition (H)     Major depression     Neurogenic bowel     Spasticity     Spinal cord injury, C5-C7 (H)     Urinary incontinence     Nephrolithiasis       Allergies   Allergen Reactions     Succinylcholine      Spinal cord injury 12/18/12, patient at risk for extrajunctional receptors and hyperkalemia       Current Outpatient Medications   Medication Sig Dispense Refill     acetaminophen (TYLENOL) 325 MG tablet Take 325-650 mg by mouth every 6 hours as needed.       albuterol (PROVENTIL) (2.5 MG/3ML) 0.083% neb solution Take 1 vial (2.5 mg) by nebulization every 4 hours as needed for shortness of breath / dyspnea or wheezing 100 mL 3     arformoterol (BROVANA) 15 MCG/2ML NEBU neb solution Take 2 mLs (15 mcg) by nebulization 2 times daily 360 mL 3     baclofen (LIORESAL) 10 MG tablet Take 30 mg by mouth 2 times daily (10MG X 3 = 30MG)       bisacodyl (DULCOLAX) 10 MG suppository Place 10 mg rectally daily as needed for constipation       Cranberry 600 MG TABS Take 600 mg by mouth daily        docusate sodium " "(COLACE) 100 MG capsule Take 100-200 mg by mouth daily as needed for constipation        gabapentin (NEURONTIN) 300 MG capsule Take 300 mg by mouth At Bedtime        hydrOXYzine (VISTARIL) 25 MG capsule Take 1 capsule (25 mg) by mouth 3 times daily as needed for anxiety (or at bedtime for sleep.) 20 capsule 1     methenamine (HIPREX) 1 g tablet TAKE 1 TABLET BY MOUTH 2 TIMES DAILY 180 tablet 2     midodrine (PROAMATINE) 5 MG tablet Take 10 mg by mouth 2 times daily  6 tablet 0     Multiple Vitamins-Minerals (WOMENS MULTIVITAMIN) TABS Take 2 tablets by mouth daily       order for DME [facility] Phone [phone] Fax [phone]  Maria Fernanda Price to pressure map bed 1 Units 0     order for DME Equipment being ordered: [facility]   p: [phone] f: [phone]  EMAIL to [email or web address]  [email or web address]    Request for group 2 air mattress & semi-electric hospital bed    Ht:5'8\"  Wt:110 ;bs  Length of need: lifetime    Pt. is wheelchair bound & has been in a comprehensive ulcer treatment program for >2 months. We will follow monthly until wound closure    To obtain medical records:  p: [phone] f: [phone] 1 Device 0     order for DME Equipment being ordered: [facility] Fax [phone]    Wheelchair seating eval and mapping of current cushion 30 days 0     order for DME Equipment being ordered: Pressure Mapping of wheelchair at [facility] Phone [phone] Fax [phone] 1 Device 0     order for DME Equipment being ordered: Wheelchair-  \"Patient continues to need and use daily her power wheelchair and the wheelchair will continue to need repairs for the next 12 months.\" 1 each 0     oxybutynin ER (DITROPAN-XL) 5 MG 24 hr tablet Take 5 mg by mouth daily       povidone-iodine 10 % swab Apply topically daily as needed for wound care        senna-docusate (SENOKOT-S/PERICOLACE) 8.6-50 MG tablet Take 1 tablet by mouth 2 times daily as needed for " constipation 20 tablet 0     sertraline (ZOLOFT) 100 MG tablet TAKE 1.5 TABLETS BY MOUTH DAILY 135 tablet 0     sodium chloride (NEBUSAL) 3 % neb solution Take 3 mLs by nebulization every 6 hours as needed for wheezing or other (sputum clearance difficulty due to quadridplegia.) 300 mL 1     spacer (OPTICHAMBER JAIDA) holding chamber To be used with inhaler 1 each 0     sulfamethoxazole-trimethoprim (BACTRIM DS) 800-160 MG tablet Take one tablet twice a day for three days, starting on 22 6 tablet 0     Vitamin D3 (CHOLECALCIFEROL) 125 MCG (5000 UT) tablet Take 1 tablet by mouth every other day       Wound Dressings (TRIAD HYDROPHILIC WOUND DRESSI) PSTE Externally apply topically daily as needed         Social History     Tobacco Use     Smoking status: Current Every Day Smoker     Packs/day: 0.50     Years: 0.00     Pack years: 0.00     Types: Other     Smokeless tobacco: Current User     Tobacco comment: used 1/2-1 ppd for 4 years, quit , now daily e cig use.    Vaping Use     Vaping Use: Every day     Substances: Nicotine, THC, Flavoring     Devices: Pre-filled pod   Substance Use Topics     Alcohol use: No     Alcohol/week: 0.0 standard drinks     Drug use: Yes     Types: Marijuana     Comment: 3 joints per day       Invasive Procedure Safety Checklist:    Procedure: Cystoscopy    Action: Complete sections and checkboxes as appropriate.    Pre-procedure:  1. Patient ID Verified with 2 identifiers (Yamilet and  or MRN) : YES    2. Procedure and site verified with patient/designee (when able) : YES    3. Accurate consent documentation in medical record : YES    4. H&P (or appropriate assessment) documented in medical record : N/A  H&P must be up to 30 days prior to procedure an updated within 24 hours of                 Procedure as applicable.     5. Relevant diagnostic and radiology test results appropriately labeled and displayed as applicable : YES    6. Blood products, implants, devices, and/or  special equipment available for the procedure as applicable : YES    7. Procedure site(s) marked with provider initials [Exclusions: none] : NO    8. Marking not required. Reason : Yes  Procedure does not require site marking    Time Out:     Time-Out performed immediately prior to starting procedure, including verbal and active participation of all team members addressing: YES    1. Correct patient identity.  2. Confirmed that the correct side and site are marked.  3. An accurate procedure to be done.  4. Agreement on the procedure to be done.  5. Correct patient position.  6. Relevant images and results are properly labeled and appropriately displayed.  7. The need to administer antibiotics or fluids for irrigation purposes during the procedure as applicable.  8. Safety precautions based on patient history or medication use.    During Procedure: Verification of correct person, site, and procedure occurs any time the responsibility for care of the patient is transferred to another member of the care team.        Anjana Sorto, EMT  6/20/2022  3:07 PM

## 2022-06-20 NOTE — PATIENT INSTRUCTIONS
"Use catheter as directed.     Please schedule surgery.    Dr. Bacon's RN care coordinator is Katty Danielson RN. She can be reached at [phone].    It was a pleasure meeting with you today.  Thank you for allowing me and my team the privilege of caring for you today.  YOU are the reason we are here, and I truly hope we provided you with the excellent service you deserve.  Please let us know if there is anything else we can do for you so that we can be sure you are leaving completely satisfied with your care experience.      Comfort Solis CMA   AFTER YOUR CYSTOSCOPY        You have just completed a cystoscopy, or \"cysto\", which allowed your physician to learn more about your bladder (or to remove a stent placed after surgery). We suggest that you continue to avoid caffeine, fruit juice, and alcohol for the next 24 hours, however, you are encouraged to return to your normal activities.         A few things that are considered normal after your cystoscopy:     * Small amount of bleeding (or spotting) that clears within the next 24 hours     * Slight burning sensation with urination     * Sensation to of needing to avoid more frequently     * The feeling of \"air\" in your urine     * Mild discomfort that is relieved with Tylenol        Please contact our office promptly if you:     * Develop a fever above 101 degrees     * Are unable to urinate     * Develop bright red blood that does not stop     * Severe pain or swelling         Please contact our office with any concerns or questions @[email or web address].  "

## 2022-06-29 ENCOUNTER — TELEPHONE (OUTPATIENT)
Dept: UROLOGY | Facility: CLINIC | Age: 27
End: 2022-06-29

## 2022-06-29 NOTE — TELEPHONE ENCOUNTER
Pt called, message left notifying pt that we are working on the [id number].    06/29/22  2:09 PM      ----- Message from Mata Bacon MD sent at 6/24/2022 10:16 AM CDT -----  Regarding: RE: Lizbeth  Could you see if ----------------- would be willing to be a [id number] liaison for Mitrofanoff procedure going forward and also have her connect with Dianna?    Also, Jasmine talked to a liaison before she did her Mitrofanoff -- could you ask her who that was and we can get them officially entered as a liason?    ----- Message -----  From: Comfort Solis CMA  Sent: 6/21/2022   8:00 AM CDT  To: Mata Bacon MD  Subject: Mitrofshayy Bustamante needs to peer to peer Lizbeth consult.    Comfort

## 2022-07-01 ENCOUNTER — TELEPHONE (OUTPATIENT)
Dept: UROLOGY | Facility: CLINIC | Age: 27
End: 2022-07-01

## 2022-07-01 NOTE — TELEPHONE ENCOUNTER
----- Message from Mata Bacon MD sent at 6/24/2022 10:16 AM CDT -----  Regarding: RE: Lizbeth  Could you see if Jasmine ---------- would be willing to be a kpvl-uw-cpnn liaison for Mitleigh procedure going forward and also have her connect with Dianna?    Also, Jasmine talked to a liaison before she did her Mitrofanoff -- could you ask her who that was and we can get them officially entered as a liason?    ----- Message -----  From: Comfort Solis CMA  Sent: 6/21/2022   8:00 AM CDT  To: Mata Bacon MD  Subject: Lizbeth Bustamante needs to peer to peer Lizbeth consult.    Comfort

## 2022-07-01 NOTE — TELEPHONE ENCOUNTER
Detailed message left notifying pt of [id number]-ao-psiv liaison name and phone number.    Comfort Solis CMA  07/01/22  4:08 PM

## 2022-07-05 ENCOUNTER — MEDICAL CORRESPONDENCE (OUTPATIENT)
Dept: HEALTH INFORMATION MANAGEMENT | Facility: CLINIC | Age: 27
End: 2022-07-05

## 2022-07-07 ENCOUNTER — TELEPHONE (OUTPATIENT)
Dept: FAMILY MEDICINE | Facility: CLINIC | Age: 27
End: 2022-07-07

## 2022-07-07 NOTE — TELEPHONE ENCOUNTER
Reason for Call:  Form, our goal is to have forms completed with 72 hours, however, some forms may require a visit or additional information.    Type of letter, form or note:  Home Health Certification    Who is the form from?: Home care    Where did the form come from: form was faxed in    What clinic location was the form placed at?: [facility] [phone]    Where the form was placed:     What number is listed as a contact on the form?: [phone]       Additional comments: Please fax back signed forms to [phone]    Call taken on 7/7/2022 at 5:04 PM by Nika Cervantes

## 2022-07-08 ENCOUNTER — TRANSFERRED RECORDS (OUTPATIENT)
Dept: HEALTH INFORMATION MANAGEMENT | Facility: CLINIC | Age: 27
End: 2022-07-08

## 2022-07-08 DIAGNOSIS — F32.4 MAJOR DEPRESSIVE DISORDER WITH SINGLE EPISODE, IN PARTIAL REMISSION (H): ICD-10-CM

## 2022-07-08 DIAGNOSIS — F41.1 GAD (GENERALIZED ANXIETY DISORDER): ICD-10-CM

## 2022-07-11 DIAGNOSIS — Z53.9 DIAGNOSIS NOT YET DEFINED: Primary | ICD-10-CM

## 2022-07-11 PROCEDURE — G0179 MD RECERTIFICATION HHA PT: HCPCS | Performed by: PHYSICIAN ASSISTANT

## 2022-07-12 NOTE — TELEPHONE ENCOUNTER
"Pending Prescriptions:                       Disp   Refills    sertraline (ZOLOFT) 100 MG tablet [Pharmac*135 ta*0        Sig: TAKE 1 AND 1/2 TABLETS BY MOUTH EVERY DAY    Routing refill request to provider for review/approval because:  PHQ-9 score:    PHQ 3/23/2022   PHQ-9 Total Score 5   Q9: Thoughts of better off dead/self-harm past 2 weeks Not at all     Requested Prescriptions   Pending Prescriptions Disp Refills    sertraline (ZOLOFT) 100 MG tablet [Pharmacy Med Name: SERTRALINE  MG TABLET] 135 tablet 0     Sig: TAKE 1 AND 1/2 TABLETS BY MOUTH EVERY DAY        SSRIs Protocol Failed - 7/8/2022  6:44 PM        Failed - PHQ-9 score less than 5 in past 6 months     Please review last PHQ-9 score.           Passed - Medication is active on med list        Passed - Patient is age 18 or older        Passed - No active pregnancy on record        Passed - No positive pregnancy test in last 12 months        Passed - Recent (6 mo) or future (30 days) visit within the authorizing provider's specialty     Patient had office visit in the last 6 months or has a visit in the next 30 days with authorizing provider or within the authorizing provider's specialty.  See \"Patient Info\" tab in inbasket, or \"Choose Columns\" in Meds & Orders section of the refill encounter.                  "

## 2022-07-13 ENCOUNTER — TELEPHONE (OUTPATIENT)
Dept: UROLOGY | Facility: CLINIC | Age: 27
End: 2022-07-13

## 2022-07-13 RX ORDER — SERTRALINE HYDROCHLORIDE 100 MG/1
TABLET, FILM COATED ORAL
Qty: 135 TABLET | Refills: 0 | Status: SHIPPED | OUTPATIENT
Start: 2022-07-13 | End: 2022-10-12

## 2022-07-22 ENCOUNTER — TELEPHONE (OUTPATIENT)
Dept: FAMILY MEDICINE | Facility: CLINIC | Age: 27
End: 2022-07-22

## 2022-07-22 DIAGNOSIS — Z53.9 DIAGNOSIS NOT YET DEFINED: Primary | ICD-10-CM

## 2022-07-22 NOTE — TELEPHONE ENCOUNTER
Reason for Call:  Form, our goal is to have forms completed with 72 hours, however, some forms may require a visit or additional information.    Type of letter, form or note:  Home Health Certification    Who is the form from?: Home care    Where did the form come from: form was faxed in    What clinic location was the form placed at?: [facility] [phone]    Where the form was placed:     What number is listed as a contact on the form?: [phone]        Additional comments: Please fax back signed forms to [phone]    Call taken on 7/22/2022 at 9:18 AM by Nika Cervantes

## 2022-07-22 NOTE — TELEPHONE ENCOUNTER
Forms completed and placed in TC in basket.    Dariel Ugalde MD  [facility]

## 2022-08-02 RX ORDER — IOPAMIDOL 510 MG/ML
200 INJECTION, SOLUTION INTRAVASCULAR ONCE
Status: DISCONTINUED | OUTPATIENT
Start: 2022-08-02 | End: 2022-08-02

## 2022-08-02 RX ORDER — IOPAMIDOL 510 MG/ML
200 INJECTION, SOLUTION INTRAVASCULAR ONCE
Status: DISCONTINUED | OUTPATIENT
Start: 2022-06-16 | End: 2022-09-30

## 2022-09-03 ENCOUNTER — MEDICAL CORRESPONDENCE (OUTPATIENT)
Dept: HEALTH INFORMATION MANAGEMENT | Facility: CLINIC | Age: 27
End: 2022-09-03

## 2022-09-03 ENCOUNTER — HEALTH MAINTENANCE LETTER (OUTPATIENT)
Age: 27
End: 2022-09-03

## 2022-09-04 ENCOUNTER — MEDICAL CORRESPONDENCE (OUTPATIENT)
Dept: HEALTH INFORMATION MANAGEMENT | Facility: CLINIC | Age: 27
End: 2022-09-04

## 2022-09-06 ENCOUNTER — TELEPHONE (OUTPATIENT)
Dept: FAMILY MEDICINE | Facility: CLINIC | Age: 27
End: 2022-09-06

## 2022-09-06 NOTE — TELEPHONE ENCOUNTER
Reason for Call:  Form, our goal is to have forms completed with 72 hours, however, some forms may require a visit or additional information.    Type of letter, form or note:  Home Health Certification    Who is the form from?: Home care    Where did the form come from: form was faxed in    What clinic location was the form placed at?: [facility] [phone]    Where the form was placed:     What number is listed as a contact on the form?: [phone]       Additional comments: Please sign, date and fax back to [phone]    Call taken on 9/6/2022 at 4:38 PM by Nika Cervantes

## 2022-09-07 DIAGNOSIS — Z53.9 DIAGNOSIS NOT YET DEFINED: Primary | ICD-10-CM

## 2022-09-07 PROCEDURE — G0179 MD RECERTIFICATION HHA PT: HCPCS | Performed by: PHYSICIAN ASSISTANT

## 2022-09-21 ENCOUNTER — MEDICAL CORRESPONDENCE (OUTPATIENT)
Dept: HEALTH INFORMATION MANAGEMENT | Facility: CLINIC | Age: 27
End: 2022-09-21

## 2022-09-21 ENCOUNTER — TELEPHONE (OUTPATIENT)
Dept: FAMILY MEDICINE | Facility: CLINIC | Age: 27
End: 2022-09-21

## 2022-09-21 NOTE — TELEPHONE ENCOUNTER
Reason for Call:  Form, our goal is to have forms completed with 72 hours, however, some forms may require a visit or additional information.    Type of letter, form or note:  Home Health Certification    Who is the form from?: Home care    Where did the form come from: form was faxed in    What clinic location was the form placed at?: [facility] [phone]    Where the form was placed:     What number is listed as a contact on the form?: [phone]        Additional comments: Please sign, date and fax back to [phone]    Call taken on 9/21/2022 at 4:30 PM by Nika Cervantes

## 2022-09-27 ENCOUNTER — MYC MEDICAL ADVICE (OUTPATIENT)
Dept: FAMILY MEDICINE | Facility: CLINIC | Age: 27
End: 2022-09-27

## 2022-09-27 DIAGNOSIS — J99 NEUROMUSCULAR RESPIRATORY WEAKNESS (H): ICD-10-CM

## 2022-09-27 DIAGNOSIS — G82.53 QUADRIPLEGIA, C5-C7 COMPLETE (H): ICD-10-CM

## 2022-09-27 DIAGNOSIS — J47.9 BRONCHIECTASIS (H): ICD-10-CM

## 2022-09-27 DIAGNOSIS — R06.89 AIRWAY CLEARANCE IMPAIRMENT: ICD-10-CM

## 2022-09-27 DIAGNOSIS — J18.9 FREQUENT EPISODES OF PNEUMONIA: ICD-10-CM

## 2022-09-27 DIAGNOSIS — G70.9 NEUROMUSCULAR RESPIRATORY WEAKNESS (H): ICD-10-CM

## 2022-09-27 NOTE — TELEPHONE ENCOUNTER
Pending Prescriptions:                       Disp   Refills    albuterol (PROVENTIL) (2.5 MG/3ML) 0.083%*100 mL 3            Sig: Take 1 vial (2.5 mg) by nebulization every 4           hours as needed for shortness of breath / dyspnea           or wheezing    Routing refill request to provider for review/approval because:  A break in medication    No flowsheet data found. for ACT

## 2022-09-28 RX ORDER — ALBUTEROL SULFATE 0.83 MG/ML
2.5 SOLUTION RESPIRATORY (INHALATION) EVERY 4 HOURS PRN
Qty: 100 ML | Refills: 3 | Status: SHIPPED | OUTPATIENT
Start: 2022-09-28 | End: 2022-09-30

## 2022-09-29 ENCOUNTER — TELEPHONE (OUTPATIENT)
Dept: FAMILY MEDICINE | Facility: CLINIC | Age: 27
End: 2022-09-29

## 2022-09-29 NOTE — TELEPHONE ENCOUNTER
Reason for Call:  Form, our goal is to have forms completed with 72 hours, however, some forms may require a visit or additional information.    Type of letter, form or note:  Home Health Certification    Who is the form from?: Home care    Where did the form come from: form was faxed in    What clinic location was the form placed at?: [facility] [phone]    Where the form was placed:     What number is listed as a contact on the form?: [phone]       Additional comments: Please sign, date and return to [phone]    Call taken on 9/29/2022 at 5:24 PM by Nika Cervantes

## 2022-09-30 ENCOUNTER — APPOINTMENT (OUTPATIENT)
Dept: CT IMAGING | Facility: CLINIC | Age: 27
DRG: 871 | End: 2022-09-30
Attending: EMERGENCY MEDICINE
Payer: MEDICARE

## 2022-09-30 ENCOUNTER — HOSPITAL ENCOUNTER (INPATIENT)
Facility: CLINIC | Age: 27
LOS: 5 days | Discharge: HOME | DRG: 871 | End: 2022-10-05
Attending: EMERGENCY MEDICINE | Admitting: HOSPITALIST
Payer: MEDICARE

## 2022-09-30 DIAGNOSIS — R06.89 AIRWAY CLEARANCE IMPAIRMENT: ICD-10-CM

## 2022-09-30 DIAGNOSIS — J18.9 FREQUENT EPISODES OF PNEUMONIA: ICD-10-CM

## 2022-09-30 DIAGNOSIS — R09.02 HYPOXIA: ICD-10-CM

## 2022-09-30 DIAGNOSIS — Z87.01 H/O: PNEUMONIA: Primary | Chronic | ICD-10-CM

## 2022-09-30 DIAGNOSIS — G82.53 QUADRIPLEGIA, C5-C7 COMPLETE (H): ICD-10-CM

## 2022-09-30 LAB
ANION GAP SERPL CALCULATED.3IONS-SCNC: 15 MMOL/L (ref 3–14)
ATRIAL RATE - MUSE: 92 BPM
BASOPHILS # BLD AUTO: 0.1 10E3/UL (ref 0–0.2)
BASOPHILS NFR BLD AUTO: 1 %
BUN SERPL-MCNC: 10 MG/DL (ref 7–30)
CALCIUM SERPL-MCNC: 9.9 MG/DL (ref 8.5–10.1)
CHLORIDE BLD-SCNC: 100 MMOL/L (ref 94–109)
CO2 SERPL-SCNC: 22 MMOL/L (ref 20–32)
CREAT SERPL-MCNC: 0.46 MG/DL (ref 0.52–1.04)
D DIMER PPP FEU-MCNC: 0.59 UG/ML FEU (ref 0–0.5)
DIASTOLIC BLOOD PRESSURE - MUSE: NORMAL MMHG
EOSINOPHIL # BLD AUTO: 0.1 10E3/UL (ref 0–0.7)
EOSINOPHIL NFR BLD AUTO: 0 %
ERYTHROCYTE [DISTWIDTH] IN BLOOD BY AUTOMATED COUNT: 13.2 % (ref 10–15)
FLUAV RNA SPEC QL NAA+PROBE: NEGATIVE
FLUBV RNA RESP QL NAA+PROBE: NEGATIVE
GFR SERPL CREATININE-BSD FRML MDRD: >90 ML/MIN/1.73M2
GLUCOSE BLD-MCNC: 88 MG/DL (ref 70–99)
HCO3 BLDV-SCNC: 25 MMOL/L (ref 21–28)
HCT VFR BLD AUTO: 38.4 % (ref 35–47)
HGB BLD-MCNC: 12.7 G/DL (ref 11.7–15.7)
IMM GRANULOCYTES # BLD: 0.5 10E3/UL
IMM GRANULOCYTES NFR BLD: 2 %
INTERPRETATION ECG - MUSE: NORMAL
LACTATE BLD-SCNC: 1.7 MMOL/L
LYMPHOCYTES # BLD AUTO: 1.6 10E3/UL (ref 0.8–5.3)
LYMPHOCYTES NFR BLD AUTO: 7 %
MCH RBC QN AUTO: 28.3 PG (ref 26.5–33)
MCHC RBC AUTO-ENTMCNC: 33.1 G/DL (ref 31.5–36.5)
MCV RBC AUTO: 86 FL (ref 78–100)
MONOCYTES # BLD AUTO: 2.2 10E3/UL (ref 0–1.3)
MONOCYTES NFR BLD AUTO: 10 %
NEUTROPHILS # BLD AUTO: 18.1 10E3/UL (ref 1.6–8.3)
NEUTROPHILS NFR BLD AUTO: 80 %
NRBC # BLD AUTO: 0 10E3/UL
NRBC BLD AUTO-RTO: 0 /100
NT-PROBNP SERPL-MCNC: 113 PG/ML (ref 0–450)
P AXIS - MUSE: 65 DEGREES
PCO2 BLDV: 39 MM HG (ref 40–50)
PH BLDV: 7.42 [PH] (ref 7.32–7.43)
PLATELET # BLD AUTO: 565 10E3/UL (ref 150–450)
PO2 BLDV: 36 MM HG (ref 25–47)
POTASSIUM BLD-SCNC: 3.3 MMOL/L (ref 3.4–5.3)
PR INTERVAL - MUSE: 150 MS
QRS DURATION - MUSE: 80 MS
QT - MUSE: 384 MS
QTC - MUSE: 474 MS
R AXIS - MUSE: 87 DEGREES
RBC # BLD AUTO: 4.49 10E6/UL (ref 3.8–5.2)
RSV RNA SPEC NAA+PROBE: NEGATIVE
SAO2 % BLDV: 70 % (ref 94–100)
SARS-COV-2 RNA RESP QL NAA+PROBE: NEGATIVE
SODIUM SERPL-SCNC: 137 MMOL/L (ref 133–144)
SYSTOLIC BLOOD PRESSURE - MUSE: NORMAL MMHG
T AXIS - MUSE: 61 DEGREES
TROPONIN I SERPL HS-MCNC: 3 NG/L
VENTRICULAR RATE- MUSE: 92 BPM
WBC # BLD AUTO: 22.7 10E3/UL (ref 4–11)

## 2022-09-30 PROCEDURE — 258N000003 HC RX IP 258 OP 636: Performed by: EMERGENCY MEDICINE

## 2022-09-30 PROCEDURE — C9803 HOPD COVID-19 SPEC COLLECT: HCPCS

## 2022-09-30 PROCEDURE — 94640 AIRWAY INHALATION TREATMENT: CPT

## 2022-09-30 PROCEDURE — 85379 FIBRIN DEGRADATION QUANT: CPT | Performed by: EMERGENCY MEDICINE

## 2022-09-30 PROCEDURE — 99223 1ST HOSP IP/OBS HIGH 75: CPT | Mod: AI | Performed by: HOSPITALIST

## 2022-09-30 PROCEDURE — 87637 SARSCOV2&INF A&B&RSV AMP PRB: CPT | Performed by: EMERGENCY MEDICINE

## 2022-09-30 PROCEDURE — 250N000011 HC RX IP 250 OP 636: Performed by: EMERGENCY MEDICINE

## 2022-09-30 PROCEDURE — 84484 ASSAY OF TROPONIN QUANT: CPT | Performed by: EMERGENCY MEDICINE

## 2022-09-30 PROCEDURE — 87040 BLOOD CULTURE FOR BACTERIA: CPT | Performed by: EMERGENCY MEDICINE

## 2022-09-30 PROCEDURE — 96365 THER/PROPH/DIAG IV INF INIT: CPT | Mod: 59

## 2022-09-30 PROCEDURE — 36415 COLL VENOUS BLD VENIPUNCTURE: CPT | Performed by: EMERGENCY MEDICINE

## 2022-09-30 PROCEDURE — G1010 CDSM STANSON: HCPCS

## 2022-09-30 PROCEDURE — 250N000013 HC RX MED GY IP 250 OP 250 PS 637: Performed by: EMERGENCY MEDICINE

## 2022-09-30 PROCEDURE — 99285 EMERGENCY DEPT VISIT HI MDM: CPT | Mod: 25

## 2022-09-30 PROCEDURE — 120N000001 HC R&B MED SURG/OB

## 2022-09-30 PROCEDURE — 250N000009 HC RX 250: Performed by: EMERGENCY MEDICINE

## 2022-09-30 PROCEDURE — 83880 ASSAY OF NATRIURETIC PEPTIDE: CPT | Performed by: EMERGENCY MEDICINE

## 2022-09-30 PROCEDURE — 82803 BLOOD GASES ANY COMBINATION: CPT

## 2022-09-30 PROCEDURE — 83605 ASSAY OF LACTIC ACID: CPT

## 2022-09-30 PROCEDURE — 93005 ELECTROCARDIOGRAM TRACING: CPT

## 2022-09-30 PROCEDURE — 85025 COMPLETE CBC W/AUTO DIFF WBC: CPT | Performed by: EMERGENCY MEDICINE

## 2022-09-30 PROCEDURE — 96361 HYDRATE IV INFUSION ADD-ON: CPT

## 2022-09-30 PROCEDURE — 80048 BASIC METABOLIC PNL TOTAL CA: CPT | Performed by: EMERGENCY MEDICINE

## 2022-09-30 RX ORDER — IOPAMIDOL 755 MG/ML
56 INJECTION, SOLUTION INTRAVASCULAR ONCE
Status: COMPLETED | OUTPATIENT
Start: 2022-09-30 | End: 2022-09-30

## 2022-09-30 RX ORDER — BACLOFEN 10 MG/1
30 TABLET ORAL 3 TIMES DAILY
Status: DISCONTINUED | OUTPATIENT
Start: 2022-09-30 | End: 2022-10-05 | Stop reason: HOSPADM

## 2022-09-30 RX ORDER — HYDROXYZINE PAMOATE 25 MG/1
25 CAPSULE ORAL 3 TIMES DAILY PRN
Status: DISCONTINUED | OUTPATIENT
Start: 2022-09-30 | End: 2022-10-05 | Stop reason: HOSPADM

## 2022-09-30 RX ORDER — ALBUTEROL SULFATE 0.83 MG/ML
2.5 SOLUTION RESPIRATORY (INHALATION) EVERY 4 HOURS PRN
Qty: 100 ML | Refills: 3 | Status: ON HOLD | OUTPATIENT
Start: 2022-09-30 | End: 2022-10-05

## 2022-09-30 RX ORDER — GABAPENTIN 300 MG/1
300 CAPSULE ORAL AT BEDTIME
Status: DISCONTINUED | OUTPATIENT
Start: 2022-09-30 | End: 2022-10-05 | Stop reason: HOSPADM

## 2022-09-30 RX ORDER — IPRATROPIUM BROMIDE AND ALBUTEROL SULFATE 2.5; .5 MG/3ML; MG/3ML
3 SOLUTION RESPIRATORY (INHALATION) ONCE
Status: COMPLETED | OUTPATIENT
Start: 2022-09-30 | End: 2022-09-30

## 2022-09-30 RX ORDER — PIPERACILLIN SODIUM, TAZOBACTAM SODIUM 4; .5 G/20ML; G/20ML
4.5 INJECTION, POWDER, LYOPHILIZED, FOR SOLUTION INTRAVENOUS EVERY 6 HOURS
Status: DISCONTINUED | OUTPATIENT
Start: 2022-10-01 | End: 2022-10-04

## 2022-09-30 RX ORDER — POTASSIUM CHLORIDE 1500 MG/1
40 TABLET, EXTENDED RELEASE ORAL ONCE
Status: COMPLETED | OUTPATIENT
Start: 2022-09-30 | End: 2022-09-30

## 2022-09-30 RX ORDER — SODIUM CHLORIDE 9 MG/ML
INJECTION, SOLUTION INTRAVENOUS CONTINUOUS
Status: DISCONTINUED | OUTPATIENT
Start: 2022-09-30 | End: 2022-10-02

## 2022-09-30 RX ORDER — PIPERACILLIN SODIUM, TAZOBACTAM SODIUM 4; .5 G/20ML; G/20ML
4.5 INJECTION, POWDER, LYOPHILIZED, FOR SOLUTION INTRAVENOUS ONCE
Status: COMPLETED | OUTPATIENT
Start: 2022-09-30 | End: 2022-09-30

## 2022-09-30 RX ADMIN — SODIUM CHLORIDE, POTASSIUM CHLORIDE, SODIUM LACTATE AND CALCIUM CHLORIDE 1000 ML: 600; 310; 30; 20 INJECTION, SOLUTION INTRAVENOUS at 20:49

## 2022-09-30 RX ADMIN — PIPERACILLIN AND TAZOBACTAM 4.5 G: 4; .5 INJECTION, POWDER, FOR SOLUTION INTRAVENOUS at 21:55

## 2022-09-30 RX ADMIN — POTASSIUM CHLORIDE 40 MEQ: 1500 TABLET, EXTENDED RELEASE ORAL at 20:50

## 2022-09-30 RX ADMIN — IOPAMIDOL 56 ML: 755 INJECTION, SOLUTION INTRAVENOUS at 21:39

## 2022-09-30 RX ADMIN — SODIUM CHLORIDE 83 ML: 9 INJECTION, SOLUTION INTRAVENOUS at 21:39

## 2022-09-30 RX ADMIN — SODIUM CHLORIDE, POTASSIUM CHLORIDE, SODIUM LACTATE AND CALCIUM CHLORIDE 1000 ML: 600; 310; 30; 20 INJECTION, SOLUTION INTRAVENOUS at 23:07

## 2022-09-30 RX ADMIN — IPRATROPIUM BROMIDE AND ALBUTEROL SULFATE 3 ML: .5; 3 SOLUTION RESPIRATORY (INHALATION) at 20:42

## 2022-09-30 ASSESSMENT — ACTIVITIES OF DAILY LIVING (ADL)
ADLS_ACUITY_SCORE: 35
ADLS_ACUITY_SCORE: 35

## 2022-09-30 ASSESSMENT — ENCOUNTER SYMPTOMS
COUGH: 1
NAUSEA: 1
DIFFICULTY URINATING: 0
SHORTNESS OF BREATH: 1
FEVER: 0
CONSTIPATION: 0
DIARRHEA: 0

## 2022-09-30 NOTE — TELEPHONE ENCOUNTER
Appears this was sent 2 days ago 09-28-22 to [facility]. Please verify it was received and pt was able to get it.   Suzan Willis PA-C

## 2022-09-30 NOTE — TELEPHONE ENCOUNTER
"Pending Prescriptions:                       Disp   Refills    arformoterol (BROVANA) 15 MCG/2ML NEBU neb*360 mL 3        Sig: Take 2 mLs (15 mcg) by nebulization 2 times daily    Signed Prescriptions:                        Disp   Refills    albuterol (PROVENTIL) (2.5 MG/3ML) 0.083% *100 mL 3        Sig: Take 1 vial (2.5 mg) by nebulization every 4 hours as           needed for shortness of breath / dyspnea or           wheezing  Authorizing Provider: AMANUEL QUEVEDO  Ordering User: OMAR KRISHNAN refill request to provider for review/approval because:  Drug not on the [facility] refill protocol     Requested Prescriptions   Pending Prescriptions Disp Refills    arformoterol (BROVANA) 15 MCG/2ML NEBU neb solution 360 mL 3     Sig: Take 2 mLs (15 mcg) by nebulization 2 times daily        There is no refill protocol information for this order       Signed Prescriptions Disp Refills    albuterol (PROVENTIL) (2.5 MG/3ML) 0.083% neb solution 100 mL 3     Sig: Take 1 vial (2.5 mg) by nebulization every 4 hours as needed for shortness of breath / dyspnea or wheezing        Asthma Maintenance Inhalers - Anticholinergics Passed - 9/27/2022  4:24 PM        Passed - Patient is age 12 years or older        Passed - Recent (12 mo) or future (30 days) visit within the authorizing provider's specialty     Patient has had an office visit with the authorizing provider or a provider within the authorizing providers department within the previous 12 mos or has a future within next 30 days. See \"Patient Info\" tab in inbasket, or \"Choose Columns\" in Meds & Orders section of the refill encounter.              Passed - Medication is active on med list       Short-Acting Beta Agonist Inhalers Protocol  Passed - 9/27/2022  4:24 PM        Passed - Patient is age 12 or older        Passed - Recent (12 mo) or future (30 days) visit within the authorizing provider's specialty     Patient has had an office visit with the authorizing " "provider or a provider within the authorizing providers department within the previous 12 mos or has a future within next 30 days. See \"Patient Info\" tab in inbasket, or \"Choose Columns\" in Meds & Orders section of the refill encounter.              Passed - Medication is active on med list           albuterol (PROVENTIL) (2.5 MG/3ML) 0.083% neb solution 100 mL 3     Sig: Take 1 vial (2.5 mg) by nebulization every 4 hours as needed for shortness of breath / dyspnea or wheezing        Asthma Maintenance Inhalers - Anticholinergics Passed - 9/30/2022  3:29 PM        Passed - Patient is age 12 years or older        Passed - Recent (12 mo) or future (30 days) visit within the authorizing provider's specialty     Patient has had an office visit with the authorizing provider or a provider within the authorizing providers department within the previous 12 mos or has a future within next 30 days. See \"Patient Info\" tab in inbasket, or \"Choose Columns\" in Meds & Orders section of the refill encounter.              Passed - Medication is active on med list       Short-Acting Beta Agonist Inhalers Protocol  Passed - 9/30/2022  3:29 PM        Passed - Patient is age 12 or older        Passed - Recent (12 mo) or future (30 days) visit within the authorizing provider's specialty     Patient has had an office visit with the authorizing provider or a provider within the authorizing providers department within the previous 12 mos or has a future within next 30 days. See \"Patient Info\" tab in inbasket, or \"Choose Columns\" in Meds & Orders section of the refill encounter.              Passed - Medication is active on med list              "

## 2022-10-01 LAB
ANION GAP SERPL CALCULATED.3IONS-SCNC: 10 MMOL/L (ref 3–14)
BUN SERPL-MCNC: 4 MG/DL (ref 7–30)
CALCIUM SERPL-MCNC: 8.4 MG/DL (ref 8.5–10.1)
CHLORIDE BLD-SCNC: 107 MMOL/L (ref 94–109)
CO2 SERPL-SCNC: 22 MMOL/L (ref 20–32)
CREAT SERPL-MCNC: 0.46 MG/DL (ref 0.52–1.04)
ERYTHROCYTE [DISTWIDTH] IN BLOOD BY AUTOMATED COUNT: 13.4 % (ref 10–15)
GFR SERPL CREATININE-BSD FRML MDRD: >90 ML/MIN/1.73M2
GLUCOSE BLD-MCNC: 95 MG/DL (ref 70–99)
HCT VFR BLD AUTO: 30.7 % (ref 35–47)
HGB BLD-MCNC: 10.2 G/DL (ref 11.7–15.7)
MCH RBC QN AUTO: 28.4 PG (ref 26.5–33)
MCHC RBC AUTO-ENTMCNC: 33.2 G/DL (ref 31.5–36.5)
MCV RBC AUTO: 86 FL (ref 78–100)
MRSA DNA SPEC QL NAA+PROBE: NEGATIVE
PLATELET # BLD AUTO: 418 10E3/UL (ref 150–450)
POTASSIUM BLD-SCNC: 3.7 MMOL/L (ref 3.4–5.3)
RBC # BLD AUTO: 3.59 10E6/UL (ref 3.8–5.2)
S PNEUM AG SPEC QL: NEGATIVE
SA TARGET DNA: NEGATIVE
SODIUM SERPL-SCNC: 139 MMOL/L (ref 133–144)
WBC # BLD AUTO: 16.4 10E3/UL (ref 4–11)

## 2022-10-01 PROCEDURE — 87641 MR-STAPH DNA AMP PROBE: CPT | Performed by: HOSPITALIST

## 2022-10-01 PROCEDURE — 96361 HYDRATE IV INFUSION ADD-ON: CPT

## 2022-10-01 PROCEDURE — 94640 AIRWAY INHALATION TREATMENT: CPT

## 2022-10-01 PROCEDURE — 96367 TX/PROPH/DG ADDL SEQ IV INF: CPT

## 2022-10-01 PROCEDURE — 99232 SBSQ HOSP IP/OBS MODERATE 35: CPT | Performed by: STUDENT IN AN ORGANIZED HEALTH CARE EDUCATION/TRAINING PROGRAM

## 2022-10-01 PROCEDURE — 258N000003 HC RX IP 258 OP 636: Performed by: EMERGENCY MEDICINE

## 2022-10-01 PROCEDURE — 82310 ASSAY OF CALCIUM: CPT | Performed by: HOSPITALIST

## 2022-10-01 PROCEDURE — 999N000157 HC STATISTIC RCP TIME EA 10 MIN

## 2022-10-01 PROCEDURE — 94640 AIRWAY INHALATION TREATMENT: CPT | Mod: 76

## 2022-10-01 PROCEDURE — 87899 AGENT NOS ASSAY W/OPTIC: CPT | Performed by: HOSPITALIST

## 2022-10-01 PROCEDURE — 250N000011 HC RX IP 250 OP 636: Performed by: HOSPITALIST

## 2022-10-01 PROCEDURE — 120N000001 HC R&B MED SURG/OB

## 2022-10-01 PROCEDURE — 250N000009 HC RX 250: Performed by: HOSPITALIST

## 2022-10-01 PROCEDURE — 250N000011 HC RX IP 250 OP 636: Performed by: EMERGENCY MEDICINE

## 2022-10-01 PROCEDURE — 94669 MECHANICAL CHEST WALL OSCILL: CPT

## 2022-10-01 PROCEDURE — 250N000013 HC RX MED GY IP 250 OP 250 PS 637: Performed by: HOSPITALIST

## 2022-10-01 PROCEDURE — 96366 THER/PROPH/DIAG IV INF ADDON: CPT

## 2022-10-01 PROCEDURE — 85027 COMPLETE CBC AUTOMATED: CPT | Performed by: HOSPITALIST

## 2022-10-01 PROCEDURE — 36415 COLL VENOUS BLD VENIPUNCTURE: CPT | Performed by: HOSPITALIST

## 2022-10-01 PROCEDURE — 96375 TX/PRO/DX INJ NEW DRUG ADDON: CPT

## 2022-10-01 PROCEDURE — 258N000003 HC RX IP 258 OP 636: Performed by: HOSPITALIST

## 2022-10-01 RX ORDER — ENOXAPARIN SODIUM 100 MG/ML
40 INJECTION SUBCUTANEOUS EVERY 24 HOURS
Status: DISCONTINUED | OUTPATIENT
Start: 2022-10-01 | End: 2022-10-05 | Stop reason: HOSPADM

## 2022-10-01 RX ORDER — DOCUSATE SODIUM 100 MG/1
100-200 CAPSULE, LIQUID FILLED ORAL DAILY
Status: DISCONTINUED | OUTPATIENT
Start: 2022-10-01 | End: 2022-10-05 | Stop reason: HOSPADM

## 2022-10-01 RX ORDER — OXYBUTYNIN CHLORIDE 5 MG/1
5 TABLET, EXTENDED RELEASE ORAL DAILY
Status: DISCONTINUED | OUTPATIENT
Start: 2022-10-01 | End: 2022-10-05 | Stop reason: HOSPADM

## 2022-10-01 RX ORDER — METHENAMINE HIPPURATE 1000 MG/1
1 TABLET ORAL 2 TIMES DAILY
Status: DISCONTINUED | OUTPATIENT
Start: 2022-10-01 | End: 2022-10-05 | Stop reason: HOSPADM

## 2022-10-01 RX ORDER — ONDANSETRON 2 MG/ML
4 INJECTION INTRAMUSCULAR; INTRAVENOUS EVERY 6 HOURS PRN
Status: DISCONTINUED | OUTPATIENT
Start: 2022-10-01 | End: 2022-10-05 | Stop reason: HOSPADM

## 2022-10-01 RX ORDER — LANOLIN ALCOHOL/MO/W.PET/CERES
3 CREAM (GRAM) TOPICAL
Status: DISCONTINUED | OUTPATIENT
Start: 2022-10-01 | End: 2022-10-05 | Stop reason: HOSPADM

## 2022-10-01 RX ORDER — ACETAMINOPHEN 325 MG/1
650 TABLET ORAL EVERY 6 HOURS PRN
Status: DISCONTINUED | OUTPATIENT
Start: 2022-10-01 | End: 2022-10-05 | Stop reason: HOSPADM

## 2022-10-01 RX ORDER — BISACODYL 10 MG
10 SUPPOSITORY, RECTAL RECTAL AT BEDTIME
Status: DISCONTINUED | OUTPATIENT
Start: 2022-10-01 | End: 2022-10-05 | Stop reason: HOSPADM

## 2022-10-01 RX ORDER — POLYETHYLENE GLYCOL 3350 17 G/17G
17 POWDER, FOR SOLUTION ORAL DAILY PRN
Status: DISCONTINUED | OUTPATIENT
Start: 2022-10-01 | End: 2022-10-05 | Stop reason: HOSPADM

## 2022-10-01 RX ORDER — ALBUTEROL SULFATE 0.83 MG/ML
2.5 SOLUTION RESPIRATORY (INHALATION)
Status: DISCONTINUED | OUTPATIENT
Start: 2022-10-01 | End: 2022-10-05 | Stop reason: HOSPADM

## 2022-10-01 RX ORDER — ACETAMINOPHEN 650 MG/1
650 SUPPOSITORY RECTAL EVERY 6 HOURS PRN
Status: DISCONTINUED | OUTPATIENT
Start: 2022-10-01 | End: 2022-10-05 | Stop reason: HOSPADM

## 2022-10-01 RX ORDER — BISACODYL 10 MG
10 SUPPOSITORY, RECTAL RECTAL DAILY PRN
Status: DISCONTINUED | OUTPATIENT
Start: 2022-10-01 | End: 2022-10-05 | Stop reason: HOSPADM

## 2022-10-01 RX ORDER — VANCOMYCIN HYDROCHLORIDE 1 G/200ML
1000 INJECTION, SOLUTION INTRAVENOUS EVERY 8 HOURS
Status: DISCONTINUED | OUTPATIENT
Start: 2022-10-01 | End: 2022-10-01

## 2022-10-01 RX ORDER — ONDANSETRON 4 MG/1
4 TABLET, ORALLY DISINTEGRATING ORAL EVERY 6 HOURS PRN
Status: DISCONTINUED | OUTPATIENT
Start: 2022-10-01 | End: 2022-10-05 | Stop reason: HOSPADM

## 2022-10-01 RX ORDER — MIDODRINE HYDROCHLORIDE 5 MG/1
10 TABLET ORAL 2 TIMES DAILY
Status: DISCONTINUED | OUTPATIENT
Start: 2022-10-01 | End: 2022-10-05 | Stop reason: HOSPADM

## 2022-10-01 RX ORDER — LIDOCAINE 40 MG/G
CREAM TOPICAL
Status: DISCONTINUED | OUTPATIENT
Start: 2022-10-01 | End: 2022-10-05 | Stop reason: HOSPADM

## 2022-10-01 RX ADMIN — PIPERACILLIN AND TAZOBACTAM 4.5 G: 4; .5 INJECTION, POWDER, FOR SOLUTION INTRAVENOUS at 09:30

## 2022-10-01 RX ADMIN — SODIUM CHLORIDE: 9 INJECTION, SOLUTION INTRAVENOUS at 13:38

## 2022-10-01 RX ADMIN — BACLOFEN 30 MG: 20 TABLET ORAL at 00:02

## 2022-10-01 RX ADMIN — ENOXAPARIN SODIUM 40 MG: 40 INJECTION SUBCUTANEOUS at 09:26

## 2022-10-01 RX ADMIN — METHENAMINE HIPPURATE 1 G: 1 TABLET ORAL at 20:39

## 2022-10-01 RX ADMIN — PIPERACILLIN AND TAZOBACTAM 4.5 G: 4; .5 INJECTION, POWDER, FOR SOLUTION INTRAVENOUS at 17:34

## 2022-10-01 RX ADMIN — PIPERACILLIN AND TAZOBACTAM 4.5 G: 4; .5 INJECTION, POWDER, FOR SOLUTION INTRAVENOUS at 03:51

## 2022-10-01 RX ADMIN — DOCUSATE SODIUM 100 MG: 100 CAPSULE, LIQUID FILLED ORAL at 09:26

## 2022-10-01 RX ADMIN — SERTRALINE HYDROCHLORIDE 150 MG: 100 TABLET ORAL at 20:39

## 2022-10-01 RX ADMIN — METHENAMINE HIPPURATE 1 G: 1 TABLET ORAL at 10:34

## 2022-10-01 RX ADMIN — SERTRALINE HYDROCHLORIDE 150 MG: 100 TABLET ORAL at 00:01

## 2022-10-01 RX ADMIN — BACLOFEN 30 MG: 20 TABLET ORAL at 21:14

## 2022-10-01 RX ADMIN — VANCOMYCIN HYDROCHLORIDE 1500 MG: 10 INJECTION, POWDER, LYOPHILIZED, FOR SOLUTION INTRAVENOUS at 00:01

## 2022-10-01 RX ADMIN — BACLOFEN 30 MG: 20 TABLET ORAL at 09:26

## 2022-10-01 RX ADMIN — GABAPENTIN 300 MG: 300 CAPSULE ORAL at 00:02

## 2022-10-01 RX ADMIN — BISACODYL 10 MG: 10 SUPPOSITORY RECTAL at 22:42

## 2022-10-01 RX ADMIN — SODIUM CHLORIDE: 9 INJECTION, SOLUTION INTRAVENOUS at 01:58

## 2022-10-01 RX ADMIN — PIPERACILLIN AND TAZOBACTAM 4.5 G: 4; .5 INJECTION, POWDER, FOR SOLUTION INTRAVENOUS at 22:49

## 2022-10-01 RX ADMIN — MIDODRINE HYDROCHLORIDE 10 MG: 5 TABLET ORAL at 20:39

## 2022-10-01 RX ADMIN — UMECLIDINIUM 1 PUFF: 62.5 AEROSOL, POWDER ORAL at 09:28

## 2022-10-01 RX ADMIN — VANCOMYCIN HYDROCHLORIDE 1000 MG: 1 INJECTION, SOLUTION INTRAVENOUS at 08:13

## 2022-10-01 RX ADMIN — OXYBUTYNIN CHLORIDE 5 MG: 5 TABLET, EXTENDED RELEASE ORAL at 09:27

## 2022-10-01 RX ADMIN — BACLOFEN 30 MG: 20 TABLET ORAL at 13:42

## 2022-10-01 RX ADMIN — ALBUTEROL SULFATE 2.5 MG: 2.5 SOLUTION RESPIRATORY (INHALATION) at 15:30

## 2022-10-01 RX ADMIN — GABAPENTIN 300 MG: 300 CAPSULE ORAL at 22:35

## 2022-10-01 RX ADMIN — MIDODRINE HYDROCHLORIDE 10 MG: 5 TABLET ORAL at 09:27

## 2022-10-01 RX ADMIN — SODIUM CHLORIDE: 9 INJECTION, SOLUTION INTRAVENOUS at 22:51

## 2022-10-01 RX ADMIN — ALBUTEROL SULFATE 2.5 MG: 2.5 SOLUTION RESPIRATORY (INHALATION) at 20:45

## 2022-10-01 ASSESSMENT — ACTIVITIES OF DAILY LIVING (ADL)
ADLS_ACUITY_SCORE: 35
ADLS_ACUITY_SCORE: 39
ADLS_ACUITY_SCORE: 35
ADLS_ACUITY_SCORE: 35
ADLS_ACUITY_SCORE: 37
ADLS_ACUITY_SCORE: 35
ADLS_ACUITY_SCORE: 37
ADLS_ACUITY_SCORE: 39
ADLS_ACUITY_SCORE: 37
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
## 2022-10-01 NOTE — PHARMACY-VANCOMYCIN DOSING SERVICE
"Pharmacy Vancomycin Initial Note  Date of Service 2022  Patient's  1995  27 year old, female    Indication: [facility]ated Pneumonia    Current estimated CrCl = Estimated Creatinine Clearance: 161.5 mL/min (A) (based on SCr of 0.46 mg/dL (L)).    Creatinine for last 3 days  2022:  8:21 PM Creatinine 0.46 mg/dL    Recent Vancomycin Level(s) for last 3 days  No results found for requested labs within last 72 hours.      Vancomycin IV Administrations (past 72 hours)                   vancomycin 1500 mg in 0.9% NaCl 250 ml intermittent infusion 1,500 mg (mg) 1,500 mg New Bag 10/01/22 0001                Nephrotoxins and other renal medications (From now, onward)    Start     Dose/Rate Route Frequency Ordered Stop    10/01/22 0800  vancomycin (VANCOCIN) 1000 mg in dextrose 5% 200 mL PREMIX         1,000 mg  200 mL/hr over 1 Hours Intravenous EVERY 8 HOURS 10/01/22 0038      10/01/22 0400  piperacillin-tazobactam (ZOSYN) 4.5 g vial to attach to  mL bag        Note to Pharmacy: For [facility], [facility] and [facility]: For Zosyn-naive patients, use the \"Zosyn initial dose + extended infusion\" order panel.    4.5 g  over 30 Minutes Intravenous EVERY 6 HOURS 22 2335 10/08/22 0359    22 2225  vancomycin 1500 mg in 0.9% NaCl 250 ml intermittent infusion 1,500 mg         1,500 mg  over 90 Minutes Intravenous ONCE 22 2224            Contrast Orders - past 72 hours (72h ago, onward)    Start     Dose/Rate Route Frequency Stop    22 213  iopamidol (ISOVUE-370) solution 56 mL         56 mL Intravenous ONCE 22        [employer] Prediction of Planned Initial Vancomycin Regimen  Loading dose: 1500 mg IV x 1  Regimen: 1000 mg IV every 8 hours.  Start time: 01:28 on 10/01/2022  Exposure target: AUC24 (range)400-600 mg/L.hr   AUC24,ss: 557 mg/L.hr  Probability of AUC24 > 400: 80 %  Ctrough,ss: 16.3 mg/L  Probability of Ctrough,ss > 20: 36 %  Probability of nephrotoxicity (Lodise URBAN " 2009): 12 %        Plan:  1. Start vancomycin  1000 mg IV q8h.   2. Vancomycin monitoring method: AUC  3. Vancomycin therapeutic monitoring goal: 400-600 mg*h/L  4. Pharmacy will check vancomycin levels as appropriate in 1-3 Days.    5. Serum creatinine levels will be ordered daily for the first week of therapy and at least twice weekly for subsequent weeks.      Yulia Abreu, PharmD, BCPS

## 2022-10-01 NOTE — PHARMACY-ADMISSION MEDICATION HISTORY
Pharmacy Medication History  Admission medication history interview status for the 9/30/2022  admission is complete. See EPIC admission navigator for prior to admission medications     Location of Interview: Patient room  Medication history sources: Patient, patient's mother, fill history     Significant changes made to the medication list:    Added incruse ellipta  Changed docusate, bisacodyl, cranberry  Removed bactrim      In the past week, patient estimated taking medication this percent of the time: greater than 90%    Additional medication history information:     Medication history completed per patients report with the mother helping. She states she completed Levaquin 5 days course for pneumonia today.     Medication reconciliation completed by provider prior to medication history? No    Time spent in this activity: 20 minutes     Prior to Admission medications    Medication Sig Last Dose Taking? Auth Provider Long Term End Date   acetaminophen (TYLENOL) 325 MG tablet Take 325-650 mg by mouth every 6 hours as needed. Past Week at PRN Yes Reported, Patient Yes    albuterol (PROVENTIL) (2.5 MG/3ML) 0.083% neb solution Take 1 vial (2.5 mg) by nebulization every 4 hours as needed for shortness of breath / dyspnea or wheezing  at PRN Yes Suzan Willis PA-C Yes    baclofen (LIORESAL) 10 MG tablet Take 30 mg by mouth 3 times daily (10MG X 3 = 30MG) 9/30/2022 at 3 pm Yes Reported, Patient Yes    bisacodyl (DULCOLAX) 10 MG suppository Place 10 mg rectally At Bedtime 9/29/2022 at pm Yes Reported, Patient No    Cranberry 500 MG TABS Take 500 mg by mouth daily 9/30/2022 at am Yes Reported, Patient Yes    docusate sodium (COLACE) 100 MG capsule Take 100-200 mg by mouth daily 9/27/2022 Yes Reported, Patient     gabapentin (NEURONTIN) 300 MG capsule Take 300 mg by mouth At Bedtime  9/29/2022 at pm Yes Reported, Patient Yes    hydrOXYzine (VISTARIL) 25 MG capsule Take 1 capsule (25 mg) by mouth 3 times daily as  "needed for anxiety (or at bedtime for sleep.)  at PRN Yes Suzan Willis PA-C     methenamine (HIPREX) 1 g tablet TAKE 1 TABLET BY MOUTH 2 TIMES DAILY 9/30/2022 at Unknown time Yes Ashia Pablo PA-C     midodrine (PROAMATINE) 5 MG tablet Take 10 mg by mouth 2 times daily  9/30/2022 at 2 pm Yes Suzan Willis PA-C Yes    Multiple Vitamins-Minerals (WOMENS MULTIVITAMIN) TABS Take 2 tablets by mouth daily 9/30/2022 at am Yes Reported, Patient     oxybutynin ER (DITROPAN-XL) 5 MG 24 hr tablet Take 5 mg by mouth daily 9/29/2022 at pm Yes Reported, Patient     sertraline (ZOLOFT) 100 MG tablet TAKE 1 AND 1/2 TABLETS BY MOUTH EVERY DAY 9/29/2022 at pm Yes Suzan Willis PA-C Yes    umeclidinium (INCRUSE ELLIPTA) 62.5 MCG/INH inhaler Inhale 1 puff into the lungs daily 9/30/2022 at Unknown time Yes Unknown, Entered By History     Vitamin D3 (CHOLECALCIFEROL) 125 MCG (5000 UT) tablet Take 1 tablet by mouth Every Tuesday, Thursday, Saturday, and Sunday 9/29/2022 Yes Reported, Patient     arformoterol (BROVANA) 15 MCG/2ML NEBU neb solution Take 2 mLs (15 mcg) by nebulization 2 times daily  Patient not taking: No sig reported Not Taking at Unknown time  Jayjay Sutherland MD Yes    order for DME [facility] Phone [phone] Fax [phone]  Maria Fernanda Albert to pressure map bed   Vira Zacarias MD     order for DME Equipment being ordered: [facility]   p: [phone] f: [phone]  EMAIL to [email or web address]    Request for group 2 air mattress & semi-electric hospital bed    Ht:5'8\"  Wt:110 ;bs  Length of need: lifetime    Pt. is wheelchair bound & has been in a comprehensive ulcer treatment program for >2 months. We will follow monthly until wound closure    To obtain medical records:  p: [phone] f: [phone]   Toyin Yi PA-C     order for DME Equipment being ordered: [facility] Fax [phone]    Wheelchair " "seating eval and mapping of current cushion   Toyin Yi PA-C     order for DME Equipment being ordered: Pressure Mapping of wheelchair at [facility] Phone [phone] Fax [phone]   Toyin Yi PA-C     order for DME Equipment being ordered: Wheelchair-  \"Patient continues to need and use daily her power wheelchair and the wheelchair will continue to need repairs for the next 12 months.\"   Suzan Willis PA-C     povidone-iodine 10 % swab Apply topically daily as needed for wound care    Reported, Patient     sodium chloride (NEBUSAL) 3 % neb solution Take 3 mLs by nebulization every 6 hours as needed for wheezing or other (sputum clearance difficulty due to quadridplegia.)  Patient not taking: Reported on 9/30/2022 Not Taking at Unknown time  Rajesh Bahena MD     spacer (OPTICHAMBER JAIDA) holding chamber To be used with inhaler   Suzan Willis PA-C     Wound Dressings (TRIAD HYDROPHILIC WOUND DRESSI) PSTE Externally apply topically daily as needed   Reported, Patient         The information provided in this note is only as accurate as the sources available at the time of update(s)   Marjorie Drake, PharmD    "

## 2022-10-01 NOTE — ED TRIAGE NOTES
Dx with pneumonia last Tuesday and admitted to [facility] until this past Sunday. Finished abx today and still has cough. Denies fevers. Reports home health nurse had O2 sats low 90s.
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
## 2022-10-01 NOTE — ED NOTES
[facility]  ED Nurse Handoff Report    ED Chief complaint: Shortness of Breath (PT was dx with pneumonia last Tuesday and admitted to [facility] and discharged on Sunday. Pt finished abx today and continues to have thick secretions and feel SOB.)      ED Diagnosis:   Final diagnoses:   Hypoxia       Code Status: Full Code    Allergies:   Allergies   Allergen Reactions     Succinylcholine      Spinal cord injury 12/18/12, patient at risk for extrajunctional receptors and hyperkalemia       Patient Story:  Patient was recently admitted to [facility] for pneumonia and was discharged on Sunday. Patient has home health nurse that checks on her once a week and stated that her sats were low and her lungs sounded junky and she needed to go back in to the ER. She states that her breathing feels shallower.     Focused Assessment:    Patient is A&Ox4. Patient is a quadriplegic with limited mobility in her arms. Has indwelling catheter from home. Breathing rate and rhythm are WDL. Sats in the high 80s in RA, 94 on 3L NC. HR tachy in 120s. BP WDL. States breathing feels shallower. Cough upon assessment.     Labs Ordered and Resulted from Time of ED Arrival to Time of ED Departure   BASIC METABOLIC PANEL - Abnormal       Result Value    Sodium 137      Potassium 3.3 (*)     Chloride 100      Carbon Dioxide (CO2) 22      Anion Gap 15 (*)     Urea Nitrogen 10      Creatinine 0.46 (*)     Calcium 9.9      Glucose 88      GFR Estimate >90     D DIMER QUANTITATIVE - Abnormal    D-Dimer Quantitative 0.59 (*)    CBC WITH PLATELETS AND DIFFERENTIAL - Abnormal    WBC Count 22.7 (*)     RBC Count 4.49      Hemoglobin 12.7      Hematocrit 38.4      MCV 86      MCH 28.3      MCHC 33.1      RDW 13.2      Platelet Count 565 (*)     % Neutrophils 80      % Lymphocytes 7      % Monocytes 10      % Eosinophils 0      % Basophils 1      % Immature Granulocytes 2      NRBCs per 100 WBC 0      Absolute Neutrophils 18.1  "(*)     Absolute Lymphocytes 1.6      Absolute Monocytes 2.2 (*)     Absolute Eosinophils 0.1      Absolute Basophils 0.1      Absolute Immature Granulocytes 0.5 (*)     Absolute NRBCs 0.0     ISTAT GASES LACTATE VENOUS POCT - Abnormal    Lactic Acid POCT 1.7      Bicarbonate Venous POCT 25      O2 Sat, Venous POCT 70 (*)     pCO2V Venous POCT 39 (*)     pH Venous POCT 7.42      pO2 Venous POCT 36     TROPONIN I - Normal    Troponin I High Sensitivity 3     NT PROBNP INPATIENT - Normal    N terminal Pro BNP Inpatient 113     INFLUENZA A/B & SARS-COV2 PCR MULTIPLEX   BLOOD CULTURE   BLOOD CULTURE       No orders to display         Treatments and/or interventions provided:  Medications   ipratropium - albuterol 0.5 mg/2.5 mg/3 mL (DUONEB) neb solution 3 mL (3 mLs Nebulization Given 9/30/22 2042)   lactated ringers BOLUS 1,000 mL (1,000 mLs Intravenous New Bag 9/30/22 2049)   potassium chloride ER (KLOR-CON M) CR tablet 40 mEq (40 mEq Oral Given 9/30/22 2050)       Patient's response to treatments and/or interventions:  Patient breathing improved with duoneb. Patient remains stable.     To be done/followed up on inpatient unit:   See any in-patient orders. Monitor breathing and sats.     Does this patient have any cognitive concerns?: N/A    Activity level - Baseline/Home:    Total Care    Activity Level - Current:    Total Care    Patient's Preferred language: English     Needed?: No    Isolation: None  Infection: Not Applicable  Patient tested for COVID 19 prior to admission: YES    Bariatric?: No    Vital Signs:   Vitals:    09/30/22 1951 09/30/22 2018   BP: (!) 91/6 113/77   Pulse: 102    Resp: 18    Temp: 98.6  F (37  C)    TempSrc: Oral    SpO2: (!) 87%    Weight:  56.7 kg (125 lb)   Height:  1.676 m (5' 6\")       Cardiac Rhythm:     Was the PSS-3 completed:   Yes  What interventions are required if any?                 Family Comments: Mother at bedside.     OBS brochure/video discussed/provided to " patient/family: N/A              Name of person given brochure if not patient: N/A              Relationship to patient: N/A    For the majority of the shift this patient's behavior was Green.  Behavioral interventions performed were N/A.    ED NURSE PHONE NUMBER: [phone]

## 2022-10-01 NOTE — ED NOTES
RECEIVING UNIT ED HANDOFF REVIEW    ED Nurse Handoff Report was reviewed by: Yumi Neumann RN on October 1, 2022 at 7:50 AM

## 2022-10-01 NOTE — ED PROVIDER NOTES
"  History   Chief Complaint:  Shortness of Breath (PT was dx with pneumonia last Tuesday and admitted to [facility] and discharged on . Pt finished abx today and continues to have thick secretions and feel SOB.)       The history is provided by the patient and a parent.      Dianna Cottrell is a 27 year old female with history of DVT and hypertension who presents with increased shortness of breath with associated cough and nausea today. She was admitted to [facility] 11 days ago for a pneumonia diagnosis and was discharged 5 days ago. She was told they could not send her home with oxygen because she is not a Covid patient despite her asking if they could. She was placed on Levaquin. She states her secretions \"feel thicker\" and her mom states that she does not appear to be improving. She denies fever, leg swelling, or changes in urination or bowel movements.    Review of Systems   Constitutional: Negative for fever.   Respiratory: Positive for cough and shortness of breath.    Cardiovascular: Negative for leg swelling.   Gastrointestinal: Positive for nausea. Negative for constipation and diarrhea.   Genitourinary: Negative for difficulty urinating.   All other systems reviewed and are negative.        Allergies:  Succinylcholine    Medications:  Baclofen  Gabapentin  Hydroxyzine  Methenamine  Midodrine  Sertraline  Albuterol   Arformoterol     Past Medical History:     Anxiety  Quadriplegia  Depression  Leukocytosis  DVT  Nephrolithiasis   Hypertension  Thrombosis  Spinal cord injury  C5-C7 fracture     Past Surgical History:    Biopsy  Bladder surgery  Spine fusion   section x2  Conization LEEP  Insert IUD  Cystogram  IVC filter placement  Irrigation and debridement   Lithotripsy ureter  Insert stent  Lumbar drain  Percutaneous nephrolithotomy   Kathleen teeth extraction    Family History:    Lung cancer  Hypertension     Social History:  The patient presents with her mother.  The patient " "presents in a private vehicle.  PCP: Suzan Willis PA-C    Physical Exam     Patient Vitals for the past 24 hrs:   BP Temp Temp src Pulse Resp SpO2 Height Weight   09/30/22 2018 113/77 -- -- -- -- -- 1.676 m (5' 6\") 56.7 kg (125 lb)   09/30/22 1951 (!) 91/6 98.6  F (37  C) Oral 102 18 (!) 87 % -- --       Physical Exam  Constitutional: Well developed, somewhat forlorn, nontox appearance  Head: Atraumatic.   Mouth/Throat: Oropharynx is clear and moist.   Neck:  no stridor  Eyes: no scleral icterus  Cardiovascular: Regular tachycardia, 2+ bilat radial pulses  Pulmonary/Chest: nml resp effort, Rales and rhonchi bilaterally  Abdominal: ND, soft, NT, no rebound or guarding   Ext: Warm, well perfused, no edema  Neurological: A&O, symmetric facies, baseline cerebral palsy  Skin: Skin is warm and dry.   Psychiatric: Behavior is normal. Thought content normal.   Nursing note and vitals reviewed.    Emergency Department Course   ECG  ECG taken at 2040, ECG read at 2044  Normal sinus rhythm.  Biatrial enlargement.  Abnormal ECG  No significant change as compared to prior EKG  Rate 92 bpm. MT interval 150 ms. QRS duration 80 ms. QT/QTc 384/474 ms. P-R-T axis 65 87 61.     Imaging:  CT Chest Pulmonary Embolism w Contrast    (Results Pending)     Report per radiology    Laboratory:  Labs Ordered and Resulted from Time of ED Arrival to Time of ED Departure   BASIC METABOLIC PANEL - Abnormal       Result Value    Sodium 137      Potassium 3.3 (*)     Chloride 100      Carbon Dioxide (CO2) 22      Anion Gap 15 (*)     Urea Nitrogen 10      Creatinine 0.46 (*)     Calcium 9.9      Glucose 88      GFR Estimate >90     D DIMER QUANTITATIVE - Abnormal    D-Dimer Quantitative 0.59 (*)    CBC WITH PLATELETS AND DIFFERENTIAL - Abnormal    WBC Count 22.7 (*)     RBC Count 4.49      Hemoglobin 12.7      Hematocrit 38.4      MCV 86      MCH 28.3      MCHC 33.1      RDW 13.2      Platelet Count 565 (*)     % Neutrophils 80      % " Lymphocytes 7      % Monocytes 10      % Eosinophils 0      % Basophils 1      % Immature Granulocytes 2      NRBCs per 100 WBC 0      Absolute Neutrophils 18.1 (*)     Absolute Lymphocytes 1.6      Absolute Monocytes 2.2 (*)     Absolute Eosinophils 0.1      Absolute Basophils 0.1      Absolute Immature Granulocytes 0.5 (*)     Absolute NRBCs 0.0     ISTAT GASES LACTATE VENOUS POCT - Abnormal    Lactic Acid POCT 1.7      Bicarbonate Venous POCT 25      O2 Sat, Venous POCT 70 (*)     pCO2V Venous POCT 39 (*)     pH Venous POCT 7.42      pO2 Venous POCT 36     TROPONIN I - Normal    Troponin I High Sensitivity 3     NT PROBNP INPATIENT - Normal    N terminal Pro BNP Inpatient 113     INFLUENZA A/B & SARS-COV2 PCR MULTIPLEX   BLOOD CULTURE   BLOOD CULTURE        Emergency Department Course:  Mental Health Risk Assessment      PSS-3    Date and Time Over the past 2 weeks have you felt down, depressed, or hopeless? Over the past 2 weeks have you had thoughts of killing yourself? Have you ever attempted to kill yourself? When did this last happen? User   22 1950 no no yes more than 6 months ago RK                  Reviewed:  I reviewed nursing notes, vitals, past medical history and Care Everywhere    Assessments:   I obtained history and examined the patient as noted above.    I rechecked the patient.    I rechecked the patient.    Consults:   I consulted with Dr. Valverde, hospitalist, regarding the patient's history and presentation here in the emergency department who accepted the patient for admission.    Interventions:   Duoneb 3 mL nebulization   NS 1 L IV    Potassium chloride 40 mEq PO    Disposition:  The patient was admitted to the hospital under the care of Dr. Valverde.     Impression & Plan     Medical Decision Makin-year-old female presenting with hypoxia, shortness of breath and cough     Differential diagnosis includes community acquired pneumonia,  hospital-acquired pneumonia, PE, sepsis, severe sepsis, electrolyte abnormality, CHF and ACS although less likely given no cardiac history in context of symptomatology.  EKG interpretation as noted above.  Labs significant for leukocytosis, elevated D-dimer and normal lactic acid level.  CT chest ordered for further evaluation and demonstrated findings consistent with multifocal pneumonia.  Antibiotics given as noted above.  Given the patient's hypoxia, plan to admit the patient to the hospitalist service for further evaluation management.  Patient was admitted to the hospital service for further evaluation and management.  She and her mother were counseled on the results, diagnosis and disposition prior to admission.  They are understanding and agreeable to plan.    Diagnosis:    ICD-10-CM    1. Hypoxia  R09.02        Discharge Medications:  New Prescriptions    No medications on file       Scribe Disclosure:  Evelyn CLEMENTS, am serving as a scribe at 8:09 PM on 9/30/2022 to document services personally performed by Amos Morse MD based on my observations and the provider's statements to me.          Amos Morse MD  09/30/22 [id number]

## 2022-10-01 NOTE — PROGRESS NOTES
Shift Summary [id number]    Admitting Diagnosis: Hypoxia. SOB at times,  infrequently with nonproductive cough.. A&Ox4.VSS. On 3L sating 95%,continuossse pulse oximetry. Patient is quadriplegia. Turn/repo Q2hrs. Denied pain. MRSA Precaution maintained,  lab collected awaiting result, UA collected awaiting results. PIV infusing NS @ 100 mL/hrs. Winston cath in place patent. Patient refusing self cloths changed, Pt was asked if underwear can be changed because it is wet, but she indicated that will let nurses know if she wanting to be changes. Up w/ lift. Will continue to monitor.
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
## 2022-10-01 NOTE — PROGRESS NOTES
[id number]    Patient requesting PRN Neb Q4; LS adventitious w/ coarse crackles; 92-95% on 3L O2 NC; awaiting results of MRSA swab; in isolation for contact, for time being. T&R Q2.

## 2022-10-02 LAB
ANION GAP SERPL CALCULATED.3IONS-SCNC: 8 MMOL/L (ref 3–14)
BUN SERPL-MCNC: 2 MG/DL (ref 7–30)
CALCIUM SERPL-MCNC: 8.2 MG/DL (ref 8.5–10.1)
CHLORIDE BLD-SCNC: 106 MMOL/L (ref 94–109)
CO2 SERPL-SCNC: 24 MMOL/L (ref 20–32)
CREAT SERPL-MCNC: 0.44 MG/DL (ref 0.52–1.04)
ERYTHROCYTE [DISTWIDTH] IN BLOOD BY AUTOMATED COUNT: 14 % (ref 10–15)
GFR SERPL CREATININE-BSD FRML MDRD: >90 ML/MIN/1.73M2
GLUCOSE BLD-MCNC: 86 MG/DL (ref 70–99)
HCT VFR BLD AUTO: 30.2 % (ref 35–47)
HGB BLD-MCNC: 10 G/DL (ref 11.7–15.7)
MCH RBC QN AUTO: 28.3 PG (ref 26.5–33)
MCHC RBC AUTO-ENTMCNC: 33.1 G/DL (ref 31.5–36.5)
MCV RBC AUTO: 86 FL (ref 78–100)
PLATELET # BLD AUTO: 410 10E3/UL (ref 150–450)
POTASSIUM BLD-SCNC: 3.1 MMOL/L (ref 3.4–5.3)
POTASSIUM BLD-SCNC: 3.5 MMOL/L (ref 3.4–5.3)
RBC # BLD AUTO: 3.53 10E6/UL (ref 3.8–5.2)
SODIUM SERPL-SCNC: 138 MMOL/L (ref 133–144)
WBC # BLD AUTO: 20 10E3/UL (ref 4–11)

## 2022-10-02 PROCEDURE — 94640 AIRWAY INHALATION TREATMENT: CPT | Mod: 76

## 2022-10-02 PROCEDURE — 85014 HEMATOCRIT: CPT | Performed by: STUDENT IN AN ORGANIZED HEALTH CARE EDUCATION/TRAINING PROGRAM

## 2022-10-02 PROCEDURE — 36415 COLL VENOUS BLD VENIPUNCTURE: CPT | Performed by: STUDENT IN AN ORGANIZED HEALTH CARE EDUCATION/TRAINING PROGRAM

## 2022-10-02 PROCEDURE — 94640 AIRWAY INHALATION TREATMENT: CPT

## 2022-10-02 PROCEDURE — 120N000001 HC R&B MED SURG/OB

## 2022-10-02 PROCEDURE — 82310 ASSAY OF CALCIUM: CPT | Performed by: STUDENT IN AN ORGANIZED HEALTH CARE EDUCATION/TRAINING PROGRAM

## 2022-10-02 PROCEDURE — 999N000157 HC STATISTIC RCP TIME EA 10 MIN

## 2022-10-02 PROCEDURE — 99232 SBSQ HOSP IP/OBS MODERATE 35: CPT | Performed by: STUDENT IN AN ORGANIZED HEALTH CARE EDUCATION/TRAINING PROGRAM

## 2022-10-02 PROCEDURE — 250N000011 HC RX IP 250 OP 636: Performed by: HOSPITALIST

## 2022-10-02 PROCEDURE — 94669 MECHANICAL CHEST WALL OSCILL: CPT

## 2022-10-02 PROCEDURE — 250N000009 HC RX 250: Performed by: HOSPITALIST

## 2022-10-02 PROCEDURE — 250N000013 HC RX MED GY IP 250 OP 250 PS 637: Performed by: STUDENT IN AN ORGANIZED HEALTH CARE EDUCATION/TRAINING PROGRAM

## 2022-10-02 PROCEDURE — 258N000003 HC RX IP 258 OP 636: Performed by: HOSPITALIST

## 2022-10-02 PROCEDURE — 84132 ASSAY OF SERUM POTASSIUM: CPT | Performed by: STUDENT IN AN ORGANIZED HEALTH CARE EDUCATION/TRAINING PROGRAM

## 2022-10-02 PROCEDURE — 87205 SMEAR GRAM STAIN: CPT | Performed by: HOSPITALIST

## 2022-10-02 PROCEDURE — 250N000013 HC RX MED GY IP 250 OP 250 PS 637: Performed by: HOSPITALIST

## 2022-10-02 PROCEDURE — 82565 ASSAY OF CREATININE: CPT | Performed by: HOSPITALIST

## 2022-10-02 RX ORDER — GUAIFENESIN 600 MG/1
600 TABLET, EXTENDED RELEASE ORAL 2 TIMES DAILY
Status: DISCONTINUED | OUTPATIENT
Start: 2022-10-02 | End: 2022-10-05 | Stop reason: HOSPADM

## 2022-10-02 RX ORDER — POTASSIUM CHLORIDE 1500 MG/1
40 TABLET, EXTENDED RELEASE ORAL ONCE
Status: COMPLETED | OUTPATIENT
Start: 2022-10-02 | End: 2022-10-02

## 2022-10-02 RX ADMIN — DOCUSATE SODIUM 100 MG: 100 CAPSULE, LIQUID FILLED ORAL at 08:52

## 2022-10-02 RX ADMIN — GUAIFENESIN 600 MG: 600 TABLET, EXTENDED RELEASE ORAL at 20:45

## 2022-10-02 RX ADMIN — GABAPENTIN 300 MG: 300 CAPSULE ORAL at 22:28

## 2022-10-02 RX ADMIN — BACLOFEN 30 MG: 20 TABLET ORAL at 14:00

## 2022-10-02 RX ADMIN — BACLOFEN 30 MG: 20 TABLET ORAL at 20:46

## 2022-10-02 RX ADMIN — POTASSIUM CHLORIDE 40 MEQ: 1500 TABLET, EXTENDED RELEASE ORAL at 11:41

## 2022-10-02 RX ADMIN — OXYBUTYNIN CHLORIDE 5 MG: 5 TABLET, EXTENDED RELEASE ORAL at 08:52

## 2022-10-02 RX ADMIN — PIPERACILLIN AND TAZOBACTAM 4.5 G: 4; .5 INJECTION, POWDER, FOR SOLUTION INTRAVENOUS at 04:00

## 2022-10-02 RX ADMIN — PIPERACILLIN AND TAZOBACTAM 4.5 G: 4; .5 INJECTION, POWDER, FOR SOLUTION INTRAVENOUS at 09:06

## 2022-10-02 RX ADMIN — ALBUTEROL SULFATE 2.5 MG: 2.5 SOLUTION RESPIRATORY (INHALATION) at 09:46

## 2022-10-02 RX ADMIN — SODIUM CHLORIDE: 9 INJECTION, SOLUTION INTRAVENOUS at 10:26

## 2022-10-02 RX ADMIN — ALBUTEROL SULFATE 2.5 MG: 2.5 SOLUTION RESPIRATORY (INHALATION) at 21:00

## 2022-10-02 RX ADMIN — GUAIFENESIN 600 MG: 600 TABLET, EXTENDED RELEASE ORAL at 17:04

## 2022-10-02 RX ADMIN — ALBUTEROL SULFATE 2.5 MG: 2.5 SOLUTION RESPIRATORY (INHALATION) at 03:17

## 2022-10-02 RX ADMIN — PIPERACILLIN AND TAZOBACTAM 4.5 G: 4; .5 INJECTION, POWDER, FOR SOLUTION INTRAVENOUS at 15:58

## 2022-10-02 RX ADMIN — PIPERACILLIN AND TAZOBACTAM 4.5 G: 4; .5 INJECTION, POWDER, FOR SOLUTION INTRAVENOUS at 22:39

## 2022-10-02 RX ADMIN — MIDODRINE HYDROCHLORIDE 10 MG: 5 TABLET ORAL at 08:52

## 2022-10-02 RX ADMIN — SERTRALINE HYDROCHLORIDE 150 MG: 100 TABLET ORAL at 20:47

## 2022-10-02 RX ADMIN — MIDODRINE HYDROCHLORIDE 10 MG: 5 TABLET ORAL at 20:46

## 2022-10-02 RX ADMIN — UMECLIDINIUM 1 PUFF: 62.5 AEROSOL, POWDER ORAL at 08:53

## 2022-10-02 RX ADMIN — METHENAMINE HIPPURATE 1 G: 1 TABLET ORAL at 20:47

## 2022-10-02 RX ADMIN — METHENAMINE HIPPURATE 1 G: 1 TABLET ORAL at 08:52

## 2022-10-02 RX ADMIN — BACLOFEN 30 MG: 20 TABLET ORAL at 08:52

## 2022-10-02 RX ADMIN — ENOXAPARIN SODIUM 40 MG: 40 INJECTION SUBCUTANEOUS at 08:51

## 2022-10-02 ASSESSMENT — ACTIVITIES OF DAILY LIVING (ADL)
ADLS_ACUITY_SCORE: 37
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
## 2022-10-02 NOTE — PROGRESS NOTES
Shift Summary [id number]    Admitting Diagnosis: Hypoxia. SOB at times. Infrequent with productive cough. A&Ox4.VSS. 3L O2 sating 95 %. Denied pain. Neb treatment x2 on day shift, Respitary therapy called awaiting second Neb treatment, RT indicated they are busy but will come @ around 7PM. Rednesss on the right buttock, mepile in placed. Total care, turn/repo @2hrs, on Pulsate low air loss mattress. Regular diet w/ setup, self feeding. PIV SL. Winston catheter in placed and patent w/ adequate output.  Pending discharge plans. Will continue to monitor

## 2022-10-02 NOTE — CARE PLAN
Admitting Diagnosis: Hypoxia [R09.02]     Pt alert & oriented x4. VSS on 3L NC. Turn/repo q2hrs. MRSA results Negative.  Regular diet.Total care. Winston catheter  in place and draining well. NS infusing at 100ml/hr. Sputum culture collected. No bowel movement. Denies pain. Neb treatment x2 NOC. Discharge pending.

## 2022-10-03 ENCOUNTER — MEDICAL CORRESPONDENCE (OUTPATIENT)
Dept: HEALTH INFORMATION MANAGEMENT | Facility: CLINIC | Age: 27
End: 2022-10-03

## 2022-10-03 LAB
CREAT SERPL-MCNC: 0.39 MG/DL (ref 0.52–1.04)
CREAT SERPL-MCNC: 0.44 MG/DL (ref 0.52–1.04)
ERYTHROCYTE [DISTWIDTH] IN BLOOD BY AUTOMATED COUNT: 14 % (ref 10–15)
GFR SERPL CREATININE-BSD FRML MDRD: >90 ML/MIN/1.73M2
GFR SERPL CREATININE-BSD FRML MDRD: >90 ML/MIN/1.73M2
HCT VFR BLD AUTO: 32.3 % (ref 35–47)
HGB BLD-MCNC: 10.7 G/DL (ref 11.7–15.7)
MCH RBC QN AUTO: 28.3 PG (ref 26.5–33)
MCHC RBC AUTO-ENTMCNC: 33.1 G/DL (ref 31.5–36.5)
MCV RBC AUTO: 85 FL (ref 78–100)
PLATELET # BLD AUTO: 419 10E3/UL (ref 150–450)
PLATELET # BLD AUTO: 419 10E3/UL (ref 150–450)
POTASSIUM BLD-SCNC: 3.4 MMOL/L (ref 3.4–5.3)
POTASSIUM BLD-SCNC: 4.1 MMOL/L (ref 3.4–5.3)
RBC # BLD AUTO: 3.78 10E6/UL (ref 3.8–5.2)
WBC # BLD AUTO: 17.7 10E3/UL (ref 4–11)

## 2022-10-03 PROCEDURE — 120N000001 HC R&B MED SURG/OB

## 2022-10-03 PROCEDURE — 250N000009 HC RX 250: Performed by: INTERNAL MEDICINE

## 2022-10-03 PROCEDURE — 94640 AIRWAY INHALATION TREATMENT: CPT | Mod: 76

## 2022-10-03 PROCEDURE — 99232 SBSQ HOSP IP/OBS MODERATE 35: CPT | Performed by: STUDENT IN AN ORGANIZED HEALTH CARE EDUCATION/TRAINING PROGRAM

## 2022-10-03 PROCEDURE — 85027 COMPLETE CBC AUTOMATED: CPT | Performed by: STUDENT IN AN ORGANIZED HEALTH CARE EDUCATION/TRAINING PROGRAM

## 2022-10-03 PROCEDURE — 36415 COLL VENOUS BLD VENIPUNCTURE: CPT | Performed by: STUDENT IN AN ORGANIZED HEALTH CARE EDUCATION/TRAINING PROGRAM

## 2022-10-03 PROCEDURE — 36415 COLL VENOUS BLD VENIPUNCTURE: CPT | Performed by: HOSPITALIST

## 2022-10-03 PROCEDURE — 94640 AIRWAY INHALATION TREATMENT: CPT

## 2022-10-03 PROCEDURE — 94669 MECHANICAL CHEST WALL OSCILL: CPT

## 2022-10-03 PROCEDURE — 999N000157 HC STATISTIC RCP TIME EA 10 MIN

## 2022-10-03 PROCEDURE — 82565 ASSAY OF CREATININE: CPT | Performed by: HOSPITALIST

## 2022-10-03 PROCEDURE — 84132 ASSAY OF SERUM POTASSIUM: CPT | Performed by: STUDENT IN AN ORGANIZED HEALTH CARE EDUCATION/TRAINING PROGRAM

## 2022-10-03 PROCEDURE — 250N000011 HC RX IP 250 OP 636: Performed by: HOSPITALIST

## 2022-10-03 PROCEDURE — 84132 ASSAY OF SERUM POTASSIUM: CPT | Performed by: HOSPITALIST

## 2022-10-03 PROCEDURE — 250N000013 HC RX MED GY IP 250 OP 250 PS 637: Performed by: STUDENT IN AN ORGANIZED HEALTH CARE EDUCATION/TRAINING PROGRAM

## 2022-10-03 PROCEDURE — 250N000013 HC RX MED GY IP 250 OP 250 PS 637: Performed by: HOSPITALIST

## 2022-10-03 PROCEDURE — 250N000009 HC RX 250: Performed by: HOSPITALIST

## 2022-10-03 RX ORDER — ACETYLCYSTEINE 200 MG/ML
2 SOLUTION ORAL; RESPIRATORY (INHALATION) EVERY 4 HOURS
Status: DISCONTINUED | OUTPATIENT
Start: 2022-10-03 | End: 2022-10-05 | Stop reason: HOSPADM

## 2022-10-03 RX ORDER — POTASSIUM CHLORIDE 1500 MG/1
40 TABLET, EXTENDED RELEASE ORAL ONCE
Status: COMPLETED | OUTPATIENT
Start: 2022-10-03 | End: 2022-10-03

## 2022-10-03 RX ORDER — ARFORMOTEROL TARTRATE 15 UG/2ML
15 SOLUTION RESPIRATORY (INHALATION) 2 TIMES DAILY
Qty: 360 ML | Refills: 3 | OUTPATIENT
Start: 2022-10-03

## 2022-10-03 RX ADMIN — ACETYLCYSTEINE 2 ML: 200 SOLUTION ORAL; RESPIRATORY (INHALATION) at 07:54

## 2022-10-03 RX ADMIN — PIPERACILLIN AND TAZOBACTAM 4.5 G: 4; .5 INJECTION, POWDER, FOR SOLUTION INTRAVENOUS at 16:24

## 2022-10-03 RX ADMIN — BISACODYL 10 MG: 10 SUPPOSITORY RECTAL at 21:58

## 2022-10-03 RX ADMIN — ACETYLCYSTEINE 2 ML: 200 SOLUTION ORAL; RESPIRATORY (INHALATION) at 19:33

## 2022-10-03 RX ADMIN — PIPERACILLIN AND TAZOBACTAM 4.5 G: 4; .5 INJECTION, POWDER, FOR SOLUTION INTRAVENOUS at 04:19

## 2022-10-03 RX ADMIN — GUAIFENESIN 600 MG: 600 TABLET, EXTENDED RELEASE ORAL at 21:14

## 2022-10-03 RX ADMIN — METHENAMINE HIPPURATE 1 G: 1 TABLET ORAL at 08:53

## 2022-10-03 RX ADMIN — BACLOFEN 30 MG: 20 TABLET ORAL at 13:45

## 2022-10-03 RX ADMIN — ALBUTEROL SULFATE 2.5 MG: 2.5 SOLUTION RESPIRATORY (INHALATION) at 04:20

## 2022-10-03 RX ADMIN — ALBUTEROL SULFATE 2.5 MG: 2.5 SOLUTION RESPIRATORY (INHALATION) at 14:38

## 2022-10-03 RX ADMIN — METHENAMINE HIPPURATE 1 G: 1 TABLET ORAL at 21:15

## 2022-10-03 RX ADMIN — PIPERACILLIN AND TAZOBACTAM 4.5 G: 4; .5 INJECTION, POWDER, FOR SOLUTION INTRAVENOUS at 21:58

## 2022-10-03 RX ADMIN — BACLOFEN 30 MG: 20 TABLET ORAL at 08:53

## 2022-10-03 RX ADMIN — UMECLIDINIUM 1 PUFF: 62.5 AEROSOL, POWDER ORAL at 08:52

## 2022-10-03 RX ADMIN — SERTRALINE HYDROCHLORIDE 150 MG: 100 TABLET ORAL at 21:14

## 2022-10-03 RX ADMIN — ACETYLCYSTEINE 2 ML: 200 SOLUTION ORAL; RESPIRATORY (INHALATION) at 14:38

## 2022-10-03 RX ADMIN — GABAPENTIN 300 MG: 300 CAPSULE ORAL at 21:15

## 2022-10-03 RX ADMIN — BACLOFEN 30 MG: 20 TABLET ORAL at 21:15

## 2022-10-03 RX ADMIN — ACETAMINOPHEN 650 MG: 325 TABLET, FILM COATED ORAL at 16:35

## 2022-10-03 RX ADMIN — POTASSIUM CHLORIDE 40 MEQ: 1500 TABLET, EXTENDED RELEASE ORAL at 08:53

## 2022-10-03 RX ADMIN — DOCUSATE SODIUM 100 MG: 100 CAPSULE, LIQUID FILLED ORAL at 08:53

## 2022-10-03 RX ADMIN — OXYBUTYNIN CHLORIDE 5 MG: 5 TABLET, EXTENDED RELEASE ORAL at 08:53

## 2022-10-03 RX ADMIN — ALBUTEROL SULFATE 2.5 MG: 2.5 SOLUTION RESPIRATORY (INHALATION) at 07:54

## 2022-10-03 RX ADMIN — ENOXAPARIN SODIUM 40 MG: 40 INJECTION SUBCUTANEOUS at 08:52

## 2022-10-03 RX ADMIN — MIDODRINE HYDROCHLORIDE 10 MG: 5 TABLET ORAL at 08:53

## 2022-10-03 RX ADMIN — MIDODRINE HYDROCHLORIDE 10 MG: 5 TABLET ORAL at 21:14

## 2022-10-03 RX ADMIN — GUAIFENESIN 600 MG: 600 TABLET, EXTENDED RELEASE ORAL at 08:53

## 2022-10-03 RX ADMIN — PIPERACILLIN AND TAZOBACTAM 4.5 G: 4; .5 INJECTION, POWDER, FOR SOLUTION INTRAVENOUS at 09:04

## 2022-10-03 RX ADMIN — ALBUTEROL SULFATE 2.5 MG: 2.5 SOLUTION RESPIRATORY (INHALATION) at 19:33

## 2022-10-03 ASSESSMENT — ACTIVITIES OF DAILY LIVING (ADL)
ADLS_ACUITY_SCORE: 37

## 2022-10-03 NOTE — PROGRESS NOTES
Notified provider about indwelling rogers catheter discussed removal or continued need.    Did provider choose to remove indwelling rogers catheter? Yes    Provider's rogers indication for keeping indwelling rogers catheter: Neurogenic Bladder.    Is there an order for indwelling rogers catheter? Yes    *If there is a plan to keep rogers catheter in place at discharge daily notification with provider is not necessary.

## 2022-10-03 NOTE — PROGRESS NOTES
Called to assess pt for desaturation/SOB. Pt on 5 lpm NC. SpO2 85% on arrival, poor waveform on monitor. Placed ear probe on pt, SpO2 93%. RN assisted with quad cough, was productive. Pt states breathing feels ok at this time. Will page MD for cough assist and poss. Scheduled mucomyst. RN in agreement. Will continue to follow.     SANGEETHA Nogueira, RRT

## 2022-10-03 NOTE — PROVIDER NOTIFICATION
MD Notification    Notified Person: MD    Notified Person Name: Ramiro Chiang    Notification Date/Time: 0612    Notification Interaction: paged    Purpose of Notification: 0442 Dianna Cottrell   RT assessed for SOB, can we add cough assist machine and Scheduled Albuterol with Mucomyst please.         Orders Received:    Comments:

## 2022-10-03 NOTE — CARE PLAN
Admitting Diagnosis: Hypoxia [R09.02]     Pt reported increased SOB this AM, de-sated into upper 70s, RTwas paged, assessed pt and suggested to page MD for cough assist machine and Scheduled Albuterol with Mucomyst to help with increased secretions. Sats are now in the 90s. Frequent and productive cough neb treatment x2 NOC.    Pt alert & oriented x4. VSS on 5L NC. Turn/repo q2hrs. Regular diet. Winston catheter in place and draining well. Refused suppository. Saline locked. No bowel movement. Denies pain. Will continue to monitor.

## 2022-10-03 NOTE — PROGRESS NOTES
Shift Summary [id number]    Admitting Diagnosis: Hypoxia.    Patient A&OX4. VSS ex soft BP. Frequent,productive cough Neb treatment Q 4 hrs by RT, given at 0754, 11 dose was  reschedule by RT to be given at 1500. Was assisted mechanically by RN to easy  Breathing. Assist of x2 lift,Pt declined Getting OOB. Mepilex in place on the coccyx area, turn/repo Q2hrs. New PIV placed  On the L arm SL.  Will continue to monitor.
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
## 2022-10-04 LAB
BACTERIA SPT CULT: NORMAL
CREAT SERPL-MCNC: 0.44 MG/DL (ref 0.52–1.04)
ERYTHROCYTE [DISTWIDTH] IN BLOOD BY AUTOMATED COUNT: 14.2 % (ref 10–15)
GFR SERPL CREATININE-BSD FRML MDRD: >90 ML/MIN/1.73M2
GRAM STAIN RESULT: NORMAL
HCT VFR BLD AUTO: 35.3 % (ref 35–47)
HGB BLD-MCNC: 11.2 G/DL (ref 11.7–15.7)
MCH RBC QN AUTO: 28.1 PG (ref 26.5–33)
MCHC RBC AUTO-ENTMCNC: 31.7 G/DL (ref 31.5–36.5)
MCV RBC AUTO: 89 FL (ref 78–100)
PLATELET # BLD AUTO: 461 10E3/UL (ref 150–450)
POTASSIUM BLD-SCNC: 3.7 MMOL/L (ref 3.4–5.3)
RBC # BLD AUTO: 3.99 10E6/UL (ref 3.8–5.2)
WBC # BLD AUTO: 12.7 10E3/UL (ref 4–11)

## 2022-10-04 PROCEDURE — 84132 ASSAY OF SERUM POTASSIUM: CPT | Performed by: STUDENT IN AN ORGANIZED HEALTH CARE EDUCATION/TRAINING PROGRAM

## 2022-10-04 PROCEDURE — 250N000013 HC RX MED GY IP 250 OP 250 PS 637: Performed by: HOSPITALIST

## 2022-10-04 PROCEDURE — 999N000157 HC STATISTIC RCP TIME EA 10 MIN

## 2022-10-04 PROCEDURE — 85014 HEMATOCRIT: CPT | Performed by: STUDENT IN AN ORGANIZED HEALTH CARE EDUCATION/TRAINING PROGRAM

## 2022-10-04 PROCEDURE — 250N000013 HC RX MED GY IP 250 OP 250 PS 637: Performed by: STUDENT IN AN ORGANIZED HEALTH CARE EDUCATION/TRAINING PROGRAM

## 2022-10-04 PROCEDURE — 99232 SBSQ HOSP IP/OBS MODERATE 35: CPT | Performed by: STUDENT IN AN ORGANIZED HEALTH CARE EDUCATION/TRAINING PROGRAM

## 2022-10-04 PROCEDURE — 82565 ASSAY OF CREATININE: CPT | Performed by: HOSPITALIST

## 2022-10-04 PROCEDURE — 120N000001 HC R&B MED SURG/OB

## 2022-10-04 PROCEDURE — 94640 AIRWAY INHALATION TREATMENT: CPT | Mod: 76

## 2022-10-04 PROCEDURE — 250N000009 HC RX 250: Performed by: HOSPITALIST

## 2022-10-04 PROCEDURE — 36415 COLL VENOUS BLD VENIPUNCTURE: CPT | Performed by: STUDENT IN AN ORGANIZED HEALTH CARE EDUCATION/TRAINING PROGRAM

## 2022-10-04 PROCEDURE — 94640 AIRWAY INHALATION TREATMENT: CPT

## 2022-10-04 PROCEDURE — 250N000011 HC RX IP 250 OP 636: Performed by: HOSPITALIST

## 2022-10-04 PROCEDURE — 250N000009 HC RX 250: Performed by: INTERNAL MEDICINE

## 2022-10-04 RX ORDER — LACTOBACILLUS RHAMNOSUS GG 10B CELL
1 CAPSULE ORAL 2 TIMES DAILY
Status: DISCONTINUED | OUTPATIENT
Start: 2022-10-04 | End: 2022-10-05 | Stop reason: HOSPADM

## 2022-10-04 RX ORDER — MICONAZOLE NITRATE 20 MG/G
CREAM TOPICAL 2 TIMES DAILY
Status: DISCONTINUED | OUTPATIENT
Start: 2022-10-04 | End: 2022-10-05 | Stop reason: HOSPADM

## 2022-10-04 RX ADMIN — BACLOFEN 30 MG: 20 TABLET ORAL at 10:16

## 2022-10-04 RX ADMIN — METHENAMINE HIPPURATE 1 G: 1 TABLET ORAL at 19:34

## 2022-10-04 RX ADMIN — BACLOFEN 30 MG: 20 TABLET ORAL at 19:34

## 2022-10-04 RX ADMIN — AMOXICILLIN AND CLAVULANATE POTASSIUM 1 TABLET: 875; 125 TABLET, FILM COATED ORAL at 19:33

## 2022-10-04 RX ADMIN — GABAPENTIN 300 MG: 300 CAPSULE ORAL at 21:31

## 2022-10-04 RX ADMIN — Medication 1 CAPSULE: at 14:30

## 2022-10-04 RX ADMIN — MICONAZOLE NITRATE: 20 CREAM TOPICAL at 14:31

## 2022-10-04 RX ADMIN — ALBUTEROL SULFATE 2.5 MG: 2.5 SOLUTION RESPIRATORY (INHALATION) at 23:39

## 2022-10-04 RX ADMIN — ALBUTEROL SULFATE 2.5 MG: 2.5 SOLUTION RESPIRATORY (INHALATION) at 16:15

## 2022-10-04 RX ADMIN — ACETYLCYSTEINE 2 ML: 200 SOLUTION ORAL; RESPIRATORY (INHALATION) at 19:12

## 2022-10-04 RX ADMIN — GUAIFENESIN 600 MG: 600 TABLET, EXTENDED RELEASE ORAL at 10:16

## 2022-10-04 RX ADMIN — MIDODRINE HYDROCHLORIDE 10 MG: 5 TABLET ORAL at 10:17

## 2022-10-04 RX ADMIN — ALBUTEROL SULFATE 2.5 MG: 2.5 SOLUTION RESPIRATORY (INHALATION) at 11:41

## 2022-10-04 RX ADMIN — ACETYLCYSTEINE 2 ML: 200 SOLUTION ORAL; RESPIRATORY (INHALATION) at 16:15

## 2022-10-04 RX ADMIN — ENOXAPARIN SODIUM 40 MG: 40 INJECTION SUBCUTANEOUS at 10:19

## 2022-10-04 RX ADMIN — PIPERACILLIN AND TAZOBACTAM 4.5 G: 4; .5 INJECTION, POWDER, FOR SOLUTION INTRAVENOUS at 10:17

## 2022-10-04 RX ADMIN — BACLOFEN 30 MG: 20 TABLET ORAL at 14:30

## 2022-10-04 RX ADMIN — BISACODYL 10 MG: 10 SUPPOSITORY RECTAL at 21:32

## 2022-10-04 RX ADMIN — GUAIFENESIN 600 MG: 600 TABLET, EXTENDED RELEASE ORAL at 21:31

## 2022-10-04 RX ADMIN — METHENAMINE HIPPURATE 1 G: 1 TABLET ORAL at 10:15

## 2022-10-04 RX ADMIN — SERTRALINE HYDROCHLORIDE 150 MG: 100 TABLET ORAL at 19:35

## 2022-10-04 RX ADMIN — Medication 1 CAPSULE: at 21:31

## 2022-10-04 RX ADMIN — ALBUTEROL SULFATE 2.5 MG: 2.5 SOLUTION RESPIRATORY (INHALATION) at 19:12

## 2022-10-04 RX ADMIN — MIDODRINE HYDROCHLORIDE 10 MG: 5 TABLET ORAL at 15:37

## 2022-10-04 RX ADMIN — UMECLIDINIUM 1 PUFF: 62.5 AEROSOL, POWDER ORAL at 10:18

## 2022-10-04 RX ADMIN — ACETYLCYSTEINE 2 ML: 200 SOLUTION ORAL; RESPIRATORY (INHALATION) at 11:41

## 2022-10-04 RX ADMIN — ACETYLCYSTEINE 2 ML: 200 SOLUTION ORAL; RESPIRATORY (INHALATION) at 23:39

## 2022-10-04 RX ADMIN — ACETYLCYSTEINE 2 ML: 200 SOLUTION ORAL; RESPIRATORY (INHALATION) at 07:49

## 2022-10-04 RX ADMIN — ALBUTEROL SULFATE 2.5 MG: 2.5 SOLUTION RESPIRATORY (INHALATION) at 07:49

## 2022-10-04 RX ADMIN — PIPERACILLIN AND TAZOBACTAM 4.5 G: 4; .5 INJECTION, POWDER, FOR SOLUTION INTRAVENOUS at 04:15

## 2022-10-04 RX ADMIN — OXYBUTYNIN CHLORIDE 5 MG: 5 TABLET, EXTENDED RELEASE ORAL at 10:16

## 2022-10-04 ASSESSMENT — ACTIVITIES OF DAILY LIVING (ADL)
FALL_HISTORY_WITHIN_LAST_SIX_MONTHS: NO
TOILETING_ISSUES: YES
HEARING_DIFFICULTY_OR_DEAF: NO
DIFFICULTY_COMMUNICATING: NO
ADLS_ACUITY_SCORE: 48
DOING_ERRANDS_INDEPENDENTLY_DIFFICULTY: YES
ADLS_ACUITY_SCORE: 48
ADLS_ACUITY_SCORE: 47
TOILETING_MANAGEMENT: CHRONIC FOLEY
EQUIPMENT_CURRENTLY_USED_AT_HOME: WHEELCHAIR, POWER
WALKING_OR_CLIMBING_STAIRS: TRANSFERRING DIFFICULTY, DEPENDENT
ADLS_ACUITY_SCORE: 47
ADLS_ACUITY_SCORE: 48
TOILETING_ASSISTANCE: TOILETING DIFFICULTY, DEPENDENT
CHANGE_IN_FUNCTIONAL_STATUS_SINCE_ONSET_OF_CURRENT_ILLNESS/INJURY: NO
WEAR_GLASSES_OR_BLIND: YES
ADLS_ACUITY_SCORE: 52
CONCENTRATING,_REMEMBERING_OR_MAKING_DECISIONS_DIFFICULTY: NO
ADLS_ACUITY_SCORE: 47
WALKING_OR_CLIMBING_STAIRS_DIFFICULTY: YES
DIFFICULTY_EATING/SWALLOWING: NO
ADLS_ACUITY_SCORE: 47
ADLS_ACUITY_SCORE: 48
ADLS_ACUITY_SCORE: 47
DRESSING/BATHING: DRESSING DIFFICULTY, DEPENDENT
ADLS_ACUITY_SCORE: 48
VISION_MANAGEMENT: GLASSES
DRESSING/BATHING_DIFFICULTY: YES
ADLS_ACUITY_SCORE: 47

## 2022-10-04 NOTE — PROGRESS NOTES
Patient refused the vest and the cough assist. She is on 5L NC and sats mid 90s. The patient stated that she is doing fine with the mucomyst and the albuterol and not willing to use the vest and cough assist machine. Will continue to monitor the night.    Kathy Fernando, RT  10/3/2022

## 2022-10-04 NOTE — PROVIDER NOTIFICATION
"MD Notification    Notified Person: MD    Notified Person Name: Gail     Notification Date/Time: 10/4/22     Notification Interaction: [employer] Messaging     Purpose of Notification: \"Can you order Miconazole cream or powder for groin? Also, patient requesting a probiotic for diarrhea\"     Orders Received: Culturell and Miconazole cream ordered.     Comments:       "

## 2022-10-04 NOTE — PROGRESS NOTES
Pt A/Ox4, VSS on 1L O2, 2 watery mucusy BM's, catheter with adequate UOP, turn & repo per pt request.

## 2022-10-04 NOTE — PROGRESS NOTES
A/O x4. VSs on 5L NC. T/R. Dulcolax supp given. BM x1. Incont B/B. PRN Tylenol given for upper back pain.
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
## 2022-10-05 ENCOUNTER — TELEPHONE (OUTPATIENT)
Dept: FAMILY MEDICINE | Facility: CLINIC | Age: 27
End: 2022-10-05

## 2022-10-05 VITALS
DIASTOLIC BLOOD PRESSURE: 65 MMHG | WEIGHT: 124.56 LBS | HEART RATE: 119 BPM | RESPIRATION RATE: 16 BRPM | OXYGEN SATURATION: 94 % | SYSTOLIC BLOOD PRESSURE: 97 MMHG | BODY MASS INDEX: 20.02 KG/M2 | TEMPERATURE: 98 F | HEIGHT: 66 IN

## 2022-10-05 LAB
CREAT SERPL-MCNC: 0.46 MG/DL (ref 0.52–1.04)
GFR SERPL CREATININE-BSD FRML MDRD: >90 ML/MIN/1.73M2
POTASSIUM BLD-SCNC: 3.6 MMOL/L (ref 3.4–5.3)

## 2022-10-05 PROCEDURE — 84132 ASSAY OF SERUM POTASSIUM: CPT | Performed by: HOSPITALIST

## 2022-10-05 PROCEDURE — 99239 HOSP IP/OBS DSCHRG MGMT >30: CPT | Performed by: STUDENT IN AN ORGANIZED HEALTH CARE EDUCATION/TRAINING PROGRAM

## 2022-10-05 PROCEDURE — 94640 AIRWAY INHALATION TREATMENT: CPT | Mod: 76

## 2022-10-05 PROCEDURE — 250N000011 HC RX IP 250 OP 636: Performed by: HOSPITALIST

## 2022-10-05 PROCEDURE — 250N000013 HC RX MED GY IP 250 OP 250 PS 637: Performed by: STUDENT IN AN ORGANIZED HEALTH CARE EDUCATION/TRAINING PROGRAM

## 2022-10-05 PROCEDURE — 250N000009 HC RX 250: Performed by: INTERNAL MEDICINE

## 2022-10-05 PROCEDURE — 94640 AIRWAY INHALATION TREATMENT: CPT

## 2022-10-05 PROCEDURE — 250N000009 HC RX 250: Performed by: HOSPITALIST

## 2022-10-05 PROCEDURE — 250N000013 HC RX MED GY IP 250 OP 250 PS 637: Performed by: HOSPITALIST

## 2022-10-05 PROCEDURE — 999N000157 HC STATISTIC RCP TIME EA 10 MIN

## 2022-10-05 PROCEDURE — 82565 ASSAY OF CREATININE: CPT | Performed by: HOSPITALIST

## 2022-10-05 PROCEDURE — 36415 COLL VENOUS BLD VENIPUNCTURE: CPT | Performed by: HOSPITALIST

## 2022-10-05 RX ORDER — ALBUTEROL SULFATE 0.83 MG/ML
2.5 SOLUTION RESPIRATORY (INHALATION) EVERY 4 HOURS PRN
Qty: 100 ML | Refills: 1 | Status: SHIPPED | OUTPATIENT
Start: 2022-10-05 | End: 2023-11-07

## 2022-10-05 RX ADMIN — OXYBUTYNIN CHLORIDE 5 MG: 5 TABLET, EXTENDED RELEASE ORAL at 09:12

## 2022-10-05 RX ADMIN — AMOXICILLIN AND CLAVULANATE POTASSIUM 1 TABLET: 875; 125 TABLET, FILM COATED ORAL at 09:12

## 2022-10-05 RX ADMIN — ACETYLCYSTEINE 2 ML: 200 SOLUTION ORAL; RESPIRATORY (INHALATION) at 10:59

## 2022-10-05 RX ADMIN — ACETYLCYSTEINE 2 ML: 200 SOLUTION ORAL; RESPIRATORY (INHALATION) at 07:26

## 2022-10-05 RX ADMIN — Medication 1 CAPSULE: at 09:12

## 2022-10-05 RX ADMIN — ALBUTEROL SULFATE 2.5 MG: 2.5 SOLUTION RESPIRATORY (INHALATION) at 07:26

## 2022-10-05 RX ADMIN — MIDODRINE HYDROCHLORIDE 10 MG: 5 TABLET ORAL at 09:12

## 2022-10-05 RX ADMIN — MICONAZOLE NITRATE: 20 CREAM TOPICAL at 09:28

## 2022-10-05 RX ADMIN — ALBUTEROL SULFATE 2.5 MG: 2.5 SOLUTION RESPIRATORY (INHALATION) at 10:59

## 2022-10-05 RX ADMIN — METHENAMINE HIPPURATE 1 G: 1 TABLET ORAL at 09:21

## 2022-10-05 RX ADMIN — ACETYLCYSTEINE 2 ML: 200 SOLUTION ORAL; RESPIRATORY (INHALATION) at 03:32

## 2022-10-05 RX ADMIN — UMECLIDINIUM 1 PUFF: 62.5 AEROSOL, POWDER ORAL at 09:27

## 2022-10-05 RX ADMIN — MICONAZOLE NITRATE: 20 CREAM TOPICAL at 00:02

## 2022-10-05 RX ADMIN — GUAIFENESIN 600 MG: 600 TABLET, EXTENDED RELEASE ORAL at 09:12

## 2022-10-05 RX ADMIN — ALBUTEROL SULFATE 2.5 MG: 2.5 SOLUTION RESPIRATORY (INHALATION) at 03:32

## 2022-10-05 RX ADMIN — ENOXAPARIN SODIUM 40 MG: 40 INJECTION SUBCUTANEOUS at 09:19

## 2022-10-05 RX ADMIN — BACLOFEN 30 MG: 20 TABLET ORAL at 09:11

## 2022-10-05 ASSESSMENT — ACTIVITIES OF DAILY LIVING (ADL)
ADLS_ACUITY_SCORE: 48

## 2022-10-05 NOTE — CARE PLAN
Admitting Diagnosis: Hypoxia [R09.02]     Pt alert & oriented x4. VSS on RA. Turn/repo q2hrs. Regular diet. Winston catheter in place and draining well. Saline locked. X1 soft bowel movement.Denies pain. Slept well during this shift.Possible discharging today.

## 2022-10-05 NOTE — PROGRESS NOTES
Pt A&O x4, VSS, RA, Up x2 with lift. Pt's Mom transferred pt to w/c by herself, stated she had been taking care of pt by herself at home. Pt denied pain. Denied SOB. Has occasional cough, but getting better. Winston patent with adequate output. AVS and med reviewed with pt and pt's Mom. All questions answered. Pt discharges home this afternoon.
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
## 2022-10-05 NOTE — PLAN OF CARE
Pt A&Ox4, VSS on room air. Pt has productive cough. Denies pain. Assist x 2 with lift. Wheel chair bound at baseline. Tolerating regular diet. Chronic rogers in place with adequate output. No BM this shift, scheduled probiotic given for previous diarrhea. IV saline locked. Rash in groin area treated with antifungal. Discharge likely tomorrow to home.

## 2022-10-05 NOTE — TELEPHONE ENCOUNTER
Reason for Call:  Form, our goal is to have forms completed with 72 hours, however, some forms may require a visit or additional information.    Type of letter, form or note:  Home Health Certification    Who is the form from?: Home care    Where did the form come from: form was faxed in    What clinic location was the form placed at?: [facility] [phone]    Where the form was placed:     What number is listed as a contact on the form?: [phone]       Additional comments: Please sign, date and fax back to [phone]    Call taken on 10/5/2022 at 4:30 PM by Nika Cervantes

## 2022-10-06 ENCOUNTER — TRANSFERRED RECORDS (OUTPATIENT)
Dept: HEALTH INFORMATION MANAGEMENT | Facility: CLINIC | Age: 27
End: 2022-10-06

## 2022-10-06 ENCOUNTER — MEDICAL CORRESPONDENCE (OUTPATIENT)
Dept: HEALTH INFORMATION MANAGEMENT | Facility: CLINIC | Age: 27
End: 2022-10-06

## 2022-10-06 LAB
BACTERIA BLD CULT: NO GROWTH
BACTERIA BLD CULT: NO GROWTH

## 2022-10-07 ENCOUNTER — PATIENT OUTREACH (OUTPATIENT)
Dept: CARE COORDINATION | Facility: CLINIC | Age: 27
End: 2022-10-07

## 2022-10-07 NOTE — PROGRESS NOTES
Clinic Care Coordination Contact  [facility]/Voicemail       Clinical Data: Care Coordinator Outreach  Outreach attempted x 2.  Left message on patient's voicemail with call back information and requested return call.  Plan: Care Coordinator will do no further outreaches at this time.    KENRICK Naqvi  [phone]  [facility]

## 2022-10-14 ENCOUNTER — TELEPHONE (OUTPATIENT)
Dept: FAMILY MEDICINE | Facility: CLINIC | Age: 27
End: 2022-10-14

## 2022-10-14 NOTE — TELEPHONE ENCOUNTER
Reason for Call:  Form, our goal is to have forms completed with 72 hours, however, some forms may require a visit or additional information.     Type of letter, form or note:  Home Health Certification     Who is the form from?: Home care     Where did the form come from: form was faxed in     What clinic location was the form placed at?: [facility] [phone]     Where the form was placed:      What number is listed as a contact on the form?: [phone]       Additional comments: Please sign, date and fax back to [phone]

## 2022-10-17 ENCOUNTER — TELEPHONE (OUTPATIENT)
Dept: FAMILY MEDICINE | Facility: CLINIC | Age: 27
End: 2022-10-17

## 2022-10-17 ENCOUNTER — MEDICAL CORRESPONDENCE (OUTPATIENT)
Dept: HEALTH INFORMATION MANAGEMENT | Facility: CLINIC | Age: 27
End: 2022-10-17

## 2022-10-17 NOTE — TELEPHONE ENCOUNTER
Reason for Call:  Form, our goal is to have forms completed with 72 hours, however, some forms may require a visit or additional information.    Type of letter, form or note:  Home Health Certification    Who is the form from?: Home care    Where did the form come from: form was faxed in    What clinic location was the form placed at?: [facility] [phone]    Where the form was placed: TC Box/Folder    What number is listed as a contact on the form?: Fax [phone]       Additional comments: Sign and fax back    Call taken on 10/17/2022 at 11:50 AM by Dianne Uribe

## 2022-10-19 ENCOUNTER — OFFICE VISIT (OUTPATIENT)
Dept: FAMILY MEDICINE | Facility: CLINIC | Age: 27
End: 2022-10-19
Payer: MEDICARE

## 2022-10-19 VITALS
SYSTOLIC BLOOD PRESSURE: 111 MMHG | HEIGHT: 66 IN | OXYGEN SATURATION: 96 % | WEIGHT: 125 LBS | HEART RATE: 77 BPM | TEMPERATURE: 97.8 F | DIASTOLIC BLOOD PRESSURE: 79 MMHG | BODY MASS INDEX: 20.09 KG/M2

## 2022-10-19 DIAGNOSIS — G82.50 QUADRIPLEGIA, POST-TRAUMATIC (H): Chronic | ICD-10-CM

## 2022-10-19 DIAGNOSIS — J99 NEUROMUSCULAR RESPIRATORY WEAKNESS (H): ICD-10-CM

## 2022-10-19 DIAGNOSIS — G70.9 NEUROMUSCULAR RESPIRATORY WEAKNESS (H): ICD-10-CM

## 2022-10-19 DIAGNOSIS — R94.2 IMPAIRED LUNG FUNCTION: Chronic | ICD-10-CM

## 2022-10-19 DIAGNOSIS — S14.109S QUADRIPLEGIA, POST-TRAUMATIC (H): Chronic | ICD-10-CM

## 2022-10-19 DIAGNOSIS — J18.9 PNEUMONIA OF BOTH LUNGS DUE TO INFECTIOUS ORGANISM, UNSPECIFIED PART OF LUNG: ICD-10-CM

## 2022-10-19 DIAGNOSIS — Z09 HOSPITAL DISCHARGE FOLLOW-UP: Primary | ICD-10-CM

## 2022-10-19 DIAGNOSIS — Z23 NEED FOR INFLUENZA VACCINATION: ICD-10-CM

## 2022-10-19 DIAGNOSIS — Z71.6 ENCOUNTER FOR TOBACCO USE CESSATION COUNSELING: ICD-10-CM

## 2022-10-19 PROCEDURE — G0008 ADMIN INFLUENZA VIRUS VAC: HCPCS | Performed by: PHYSICIAN ASSISTANT

## 2022-10-19 PROCEDURE — 90686 IIV4 VACC NO PRSV 0.5 ML IM: CPT | Performed by: PHYSICIAN ASSISTANT

## 2022-10-19 PROCEDURE — 99214 OFFICE O/P EST MOD 30 MIN: CPT | Mod: 25 | Performed by: PHYSICIAN ASSISTANT

## 2022-10-19 ASSESSMENT — PATIENT HEALTH QUESTIONNAIRE - PHQ9
SUM OF ALL RESPONSES TO PHQ QUESTIONS 1-9: 4
10. IF YOU CHECKED OFF ANY PROBLEMS, HOW DIFFICULT HAVE THESE PROBLEMS MADE IT FOR YOU TO DO YOUR WORK, TAKE CARE OF THINGS AT HOME, OR GET ALONG WITH OTHER PEOPLE: NOT DIFFICULT AT ALL
SUM OF ALL RESPONSES TO PHQ QUESTIONS 1-9: 4

## 2022-10-19 ASSESSMENT — PAIN SCALES - GENERAL: PAINLEVEL: NO PAIN (0)

## 2022-10-19 NOTE — PATIENT INSTRUCTIONS
Start Breo once daily- this has an inhaled steroid and a long acting form of albuterol.   Will help reduce mucus and inflammation.  This is prevention    Stop all smoking, vaping, inhaling of substances other than air     See pulmonology - Dr.Kevin Taylor at [facility] follow up

## 2022-10-19 NOTE — PROGRESS NOTES
Assessment & Plan     Hospital discharge follow-up  Pneumonia of both lungs due to infectious organism, unspecified part of lung  Quadriplegia, post-traumatic (H)- incomplete quad, limited use upper extremities  Reviewed notes from hospitalization, pulm consult and discharge summary.  She is done w/antibiotics  Feeling like breathing is back to baseline  Probiotic for loose stools following antibiotics from 2 hospitalizations. Stools soft formed and 3x daily - not likely loose enough for c.diff testing. If become watery they will reach out to test.   Flu shot given  She declines covid vaccines   Strong emphasized and urged importance to quitting vaping and marijuana smoking/vaping.   - INFLUENZA VACCINE IM > 6 MONTHS VALENT IIV4 (AFLURIA/FLUZONE)  - REVIEW OF HEALTH MAINTENANCE PROTOCOL ORDERS    Impaired lung function  Neuromuscular respiratory weakness (H)  Discrepancy between recommendations from pulm in consult notes (pulmonology Dr.Kevin Taylor) and prescribed medications at discharge:  Breo advised in pulm consult  Incruse ellipta and Brovana nebs in discharge summary.  Pt has the Breo at home. (not using)  She reports she is not likely to use a neb.   Will start Breo  Needs to see pulm post-hospital- did like  and will try to schedule with him.  Tobacco cessation- see below   Vaccines as above   - fluticasone-vilanterol (BREO ELLIPTA) 100-25 MCG/INH inhaler; Inhale 1 puff into the lungs daily    Encounter for tobacco use cessation counseling  Strongly urged cessation.   She has some motivation to work on cessation at this time- more than in the past.   Rx for nicotine gum.   - nicotine (NICORETTE) 2 MG gum; Place 1 each (2 mg) inside cheek every hour as needed for smoking cessation    Need for influenza vaccination  - INFLUENZA VACCINE IM > 6 MONTHS VALENT IIV4 (AFLURIA/FLUZONE)       Nicotine/Tobacco Cessation:  She reports that she has been smoking other. She uses smokeless  tobacco.  Nicotine/Tobacco Cessation Plan:   Pharmacotherapies : Nicotine gum      Follow Up: The patient was instructed to contact clinic for worsening symptoms, non-improvement in time frame as expected/discussed, and for questions regarding medications or treatment plan. For virtual visits, the patient was advised to be seen for in person evaluation if symptoms or condition are worsening or non-improvement as expected.       No follow-ups on file.    Suzan Willis PA-C  [facility] GIANNI Bustamante is a 27 year old accompanied by her mother, presenting for the following health issues:  Hospital F/U      History of Present Illness       Reason for visit:  Follow up from pneumonia    She eats 0-1 servings of fruits and vegetables daily.She consumes 1 sweetened beverage(s) daily.She exercises with enough effort to increase her heart rate 9 or less minutes per day.  She exercises with enough effort to increase her heart rate 3 or less days per week.   She is taking medications regularly.    Today's PHQ-9         PHQ-9 Total Score: 4    PHQ-9 Q9 Thoughts of better off dead/self-harm past 2 weeks :   Not at all    How difficult have these problems made it for you to do your work, take care of things at home, or get along with other people: Not difficult at all         Hospital Follow-up Visit:    Hospital/Nursing Home/IP Rehab Facility: [facility]  Date of Admission: 9/30/22  Date of Discharge: 10/5/22  Reason(s) for Admission: pneumonia     Was your hospitalization related to COVID-19? No   Problems taking medications regularly:  None  Medication changes since discharge: None  Problems adhering to non-medication therapy:  None    Summary of hospitalization:  [facility] discharge summary reviewed  Diagnostic Tests/Treatments reviewed.  Follow up needed: pulmonology   Other Healthcare Providers Involved in Patient s Care:         Specialist  "appointment - pulm  Update since discharge: improved.         Post Medication Reconciliation Status:        Plan of care communicated with patient and family           Was hospitalized 09/20/22- 09/25/22 at [place] for hypoxia, left pneumonia. Later, on day 3 xray showing bilateral infiltrates. Did have bronchoscopy on day 4. Discharged home on day6 on levaquin but sx were not getting better.     Then returned to ER and hospitalized 09/30/22-10/05/22 at [facility] for hypoxia, sepsis from pneumonia. Used the vest and different nebs mucomyst w/albuterol. Discharged on augmentin. Diff nebs and inhalers. Had pulm consult.     Has been home 2 weeks. Doing well. Better. Would say breathing is back to baseline.   No lingering cough, chest pain, SOB, or increased efforts.   Has not been using inhalers.     She vapes nicotine frequently during the day. She is more motivated now to consider quitting than in the past.  Was smoking marijuana or using bong 3x daily prior to hospitalization. Now using 1-2x per day. States was told to consider gummies.         Review of Systems   Constitutional, HEENT, cardiovascular, pulmonary, gi and gu systems are negative, except as otherwise noted.      Objective    /79 (BP Location: Left arm, Patient Position: Chair, Cuff Size: Adult Regular)   Pulse 77   Temp 97.8  F (36.6  C) (Temporal)   Ht 1.676 m (5' 6\")   Wt 56.7 kg (125 lb)   LMP  (Exact Date)   SpO2 96%   Breastfeeding No   BMI 20.18 kg/m    There is no height or weight on file to calculate BMI.  Physical Exam   GENERAL: healthy, alert and no distress  EYES: Eyes grossly normal to inspection, PERRL and conjunctivae and sclerae normal  HENT: ear canals and TM's normal, nose and mouth without ulcers or lesions  NECK: no adenopathy, no asymmetry, masses, or scars and thyroid normal to palpation  RESP: lungs clear to auscultation - no rales, rhonchi or wheezes  CV: regular rate and rhythm, normal S1 S2, no S3 or " S4, no murmur, click or rub, no peripheral edema and peripheral pulses strong  ABDOMEN: soft, nontender, no hepatosplenomegaly, no masses and bowel sounds normal  MS: no gross musculoskeletal defects noted, no edema  PSYCH: mentation appears normal, affect normal/bright                Answers for HPI/ROS submitted by the patient on 10/19/2022  If you checked off any problems, how difficult have these problems made it for you to do your work, take care of things at home, or get along with other people?: Not difficult at all  PHQ9 TOTAL SCORE: 4

## 2022-10-26 ENCOUNTER — TELEPHONE (OUTPATIENT)
Dept: FAMILY MEDICINE | Facility: CLINIC | Age: 27
End: 2022-10-26

## 2022-10-26 NOTE — TELEPHONE ENCOUNTER
Forms/Letter Request    Type of form/letter: [facility]    Have you been seen for this request: Yes     Do we have the form/letter: Yes:     When is form/letter needed by: ASAP       How would you like the form/letter returned: Fax    Patient Notified form requests are processed in 3-5 business days:No    Could we send this information to you in [facility] or would you prefer to receive a phone call?:   No preference   Okay to leave a detailed message?: No at Other phone number:  Please sign and FAX, [phone]

## 2022-10-31 ENCOUNTER — MEDICAL CORRESPONDENCE (OUTPATIENT)
Dept: HEALTH INFORMATION MANAGEMENT | Facility: CLINIC | Age: 27
End: 2022-10-31

## 2022-11-03 ENCOUNTER — MEDICAL CORRESPONDENCE (OUTPATIENT)
Dept: HEALTH INFORMATION MANAGEMENT | Facility: CLINIC | Age: 27
End: 2022-11-03

## 2022-11-08 ENCOUNTER — TELEPHONE (OUTPATIENT)
Dept: FAMILY MEDICINE | Facility: CLINIC | Age: 27
End: 2022-11-08

## 2022-11-08 NOTE — TELEPHONE ENCOUNTER
Forms/Letter Request     Type of form/letter: [facility]     Have you been seen for this request: Yes      Do we have the form/letter: Yes:      When is form/letter needed by: ASAP                                     How would you like the form/letter returned: Fax Please sign and FAX TO:  [facility] @ [phone]     Patient Notified form requests are processed in 3-5 business days:No     Could we [facility] this information to you [facility] or would you prefer to receive a phone call?:   No preference   Okay to leave a detailed message?:  Y

## 2022-11-10 DIAGNOSIS — Z53.9 DIAGNOSIS NOT YET DEFINED: Primary | ICD-10-CM

## 2022-11-10 PROCEDURE — G0179 MD RECERTIFICATION HHA PT: HCPCS | Performed by: PHYSICIAN ASSISTANT

## 2022-12-07 ENCOUNTER — PRE VISIT (OUTPATIENT)
Dept: UROLOGY | Facility: CLINIC | Age: 27
End: 2022-12-07

## 2022-12-08 NOTE — TELEPHONE ENCOUNTER
Reason for visit: discuss surgical plan     Relevant information: Lizbeth with bladder augment    Records/imaging/labs/orders: all records available    Pt called: no need for a call      Comfort Solis CMA  12/7/2022  8:21 PM

## 2022-12-12 ENCOUNTER — VIRTUAL VISIT (OUTPATIENT)
Dept: UROLOGY | Facility: CLINIC | Age: 27
End: 2022-12-12
Payer: MEDICARE

## 2022-12-12 DIAGNOSIS — N31.9 NEUROGENIC BLADDER: Primary | ICD-10-CM

## 2022-12-12 PROCEDURE — 99215 OFFICE O/P EST HI 40 MIN: CPT | Mod: 95 | Performed by: UROLOGY

## 2022-12-12 RX ORDER — HEPARIN SODIUM 5000 [USP'U]/.5ML
5000 INJECTION, SOLUTION INTRAVENOUS; SUBCUTANEOUS
Status: CANCELLED | OUTPATIENT
Start: 2022-12-12

## 2022-12-12 NOTE — LETTER
12/12/2022       RE: Dianna Cottrell  [address] [address]     Dear Colleague,    Thank you for referring your patient, Dianna Cottrell, to the [facility] [place] [facility]. Please see a copy of my visit note below.    Patricio is a 27-year-old with quadriplegic spinal cord injury who is managing with an indwelling urethral catheter.  The catheter falls out with the balloon inflated.  She is leaking around the catheter.  I am able to place a finger through the urethra into the bladder.    She desires urinary diversion to correct her severe urinary incontinence.  We have discussed this during her previous visit.  Please refer to that note for details.  I again explained the options to her as follows:    1.  Ileal conduit urinary diversion.  I think we can leave the bladder in situ because her risk of pyocystis is very low given her urethral erosion.  2.  Bladder augmentation and catheterizable channel creation with bladder neck closure or bladder neck sling  3.  [place] pouch urinary diversion.  Again, I think we can leave the bladder in situ.    The risks and benefits of each of these options were explained to her.  In particular, we discussed the elevated risk of ureteral stricture with the ileal conduit or the [place] pouch.  We discussed the elevated risk of urinary incontinence per urethra with the bladder augmentation.      After full review of all of the procedures she elects for bladder augmentation, catheterizable channel creation and bladder neck closure.  She understands that bladder neck closure involves division of her urethra and suturing the bladder neck closed.  She however is willing to accept the small risk of a urinary fistula to the urethra rather than the risk of ureterointestinal anastomotic stricture with the other surgical options.    The augmentation and catheterizable channel creation will  most likely be accomplished through a Pfannenstiel incision followed by appendicovesicostomy and ileal augment or a CCIC.  The long-term indwelling urethral Winston may necessitate the CCIC.    She is unable to catheterize herself and so we spent a long time explaining that she would be completely dependent on other people for catheterization and that this would need to be performed every few hours in order to avoid the risk of bladder rupture.  Her mother assists with all her cares and she insist that her mother will continue to be able to do so.  When her mother ages out from being able to care for her then her young children will have grown up and be able to help care for her.    We will plan for a bowel prep with 4 L of GoLytely spread over 2 days before the procedure.      Additional Coding Information:    Time spent:  40 minutes spent on the date of the encounter doing chart review, history and exam, documentation and further activities per the note      Video-Visit Details    Type of service:  Video Visit    Video Start Time (time video started): 8:15    Video End Time (time video stopped): 8:45    Originating Location (pt. Location): Home    Distant Location (provider location):  Off-site    Mode of Communication:  Video Conference via [facility]    Physician has received verbal consent for a Video Visit from the patient? Yes      Kristina Bacon MD

## 2022-12-12 NOTE — NURSING NOTE
Dianna Cottrell  is being evaluated via a billable video visit.      How would you like to obtain your AVS? [employer]  For the video visit, send the invitation by: Text to cell phone: [phone]  Will anyone else be joining your video visit? No

## 2022-12-12 NOTE — PROGRESS NOTES
Patricio is a 27-year-old with quadriplegic spinal cord injury who is managing with an indwelling urethral catheter.  The catheter falls out with the balloon inflated.  She is leaking around the catheter.  I am able to place a finger through the urethra into the bladder.    She desires urinary diversion to correct her severe urinary incontinence.  We have discussed this during her previous visit.  Please refer to that note for details.  I again explained the options to her as follows:    1.  Ileal conduit urinary diversion.  I think we can leave the bladder in situ because her risk of pyocystis is very low given her urethral erosion.  2.  Bladder augmentation and catheterizable channel creation with bladder neck closure or bladder neck sling  3.  [place] pouch urinary diversion.  Again, I think we can leave the bladder in situ.    The risks and benefits of each of these options were explained to her.  In particular, we discussed the elevated risk of ureteral stricture with the ileal conduit or the [place] pouch.  We discussed the elevated risk of urinary incontinence per urethra with the bladder augmentation.      After full review of all of the procedures she elects for bladder augmentation, catheterizable channel creation and bladder neck closure.  She understands that bladder neck closure involves division of her urethra and suturing the bladder neck closed.  She however is willing to accept the small risk of a urinary fistula to the urethra rather than the risk of ureterointestinal anastomotic stricture with the other surgical options.    The augmentation and catheterizable channel creation will most likely be accomplished through a Pfannenstiel incision followed by appendicovesicostomy and ileal augment or a CCIC.  The long-term indwelling urethral Winston may necessitate the CCIC.    She is unable to catheterize herself and so we spent a long time explaining that she would be completely dependent on other people  for catheterization and that this would need to be performed every few hours in order to avoid the risk of bladder rupture.  Her mother assists with all her cares and she insist that her mother will continue to be able to do so.  When her mother ages out from being able to care for her then her young children will have grown up and be able to help care for her.    We will plan for a bowel prep with 4 L of GoLytely spread over 2 days before the procedure.      Additional Coding Information:    Time spent:  40 minutes spent on the date of the encounter doing chart review, history and exam, documentation and further activities per the note              Video-Visit Details    Type of service:  Video Visit    Video Start Time (time video started): 8:15    Video End Time (time video stopped): 8:45    Originating Location (pt. Location): Home    Distant Location (provider location):  Off-site    Mode of Communication:  Video Conference via [employer]    Physician has received verbal consent for a Video Visit from the patient? Yes      Kristina Mora

## 2022-12-20 ENCOUNTER — TELEPHONE (OUTPATIENT)
Dept: UROLOGY | Facility: CLINIC | Age: 27
End: 2022-12-20

## 2022-12-20 NOTE — TELEPHONE ENCOUNTER
Patient is scheduled for surgery with Dr. Bacon     Spoke with: Patient via phone     Date of Surgery: Wednesday March 22, 2023      [facility]      Informed patient they will need an adult : Yes     Pre-op: Yes      H&P: Patient to schedule    Additional imaging/appointments: Cystogram prior to post op     Post-op: Monday April 17, 2022    Additional comments:      Surgery packet: Sent via mail 12/20/22     Patient is aware that surgery time is tentative to change and to expect a call 3-1 business days from Pre Admission Nursing for instructions and arrival time

## 2023-01-04 ENCOUNTER — TELEPHONE (OUTPATIENT)
Dept: FAMILY MEDICINE | Facility: CLINIC | Age: 28
End: 2023-01-04

## 2023-01-04 NOTE — TELEPHONE ENCOUNTER
Forms/Letter Request     Type of form/letter: [facility]   Order Number: [id number]     Have you been seen for this request: Yes      Do we have the form/letter: Yes:      When is form/letter needed by: ASAP                                     How would you like the form/letter returned: Fax Please sign and FAX TO:  [facility] @ [phone]     Patient Notified form requests are processed in 3-5 business days:No     Could we send this information to you in [email or web address] or would you prefer to receive a phone call?:   No preference   Okay to leave a detailed message?:  Y

## 2023-01-14 DIAGNOSIS — F41.1 GAD (GENERALIZED ANXIETY DISORDER): ICD-10-CM

## 2023-01-14 DIAGNOSIS — F32.4 MAJOR DEPRESSIVE DISORDER WITH SINGLE EPISODE, IN PARTIAL REMISSION (H): ICD-10-CM

## 2023-01-16 RX ORDER — SERTRALINE HYDROCHLORIDE 100 MG/1
TABLET, FILM COATED ORAL
Qty: 135 TABLET | Refills: 0 | Status: SHIPPED | OUTPATIENT
Start: 2023-01-16 | End: 2023-04-26

## 2023-02-02 ENCOUNTER — TELEPHONE (OUTPATIENT)
Dept: FAMILY MEDICINE | Facility: CLINIC | Age: 28
End: 2023-02-02
Payer: MEDICARE

## 2023-02-02 NOTE — TELEPHONE ENCOUNTER
Form requires provider signature only. Form placed in provider bin in her office.    Crsita Hassan CMA ([facility])

## 2023-02-02 NOTE — TELEPHONE ENCOUNTER
Forms/Letter Request    Type of form/letter: Home Health    Have you been seen for this request: Yes     Do we have the form/letter: Yes:     When is form/letter needed by: ASAP    How would you like the form/letter returned: Fax    Please sign, date and fax back to [phone]

## 2023-02-06 ENCOUNTER — MEDICAL CORRESPONDENCE (OUTPATIENT)
Dept: HEALTH INFORMATION MANAGEMENT | Facility: CLINIC | Age: 28
End: 2023-02-06

## 2023-02-09 ENCOUNTER — TELEPHONE (OUTPATIENT)
Dept: FAMILY MEDICINE | Facility: CLINIC | Age: 28
End: 2023-02-09
Payer: MEDICARE

## 2023-02-09 NOTE — TELEPHONE ENCOUNTER
Forms/Letter Request    Type of form/letter: [facility]    Have you been seen for this request: Yes     Do we have the form/letter: Yes:     When is form/letter needed by: ASAP    How would you like the form/letter returned: Fax    Patient Notified form requests are processed in 3-5 business days:No    Could we send this information to you in [employer] or would you prefer to receive a phone call?:   No preference   Okay to leave a detailed message?: No at Other phone number:  Please FAX signed form to, [phone]

## 2023-02-09 NOTE — TELEPHONE ENCOUNTER
I will get it done, first thing tomorrow morning.  Kapil Justice MD on 2/9/2023 at 3:53 PM      Form completed and placed in  Bin  Kapil Justice MD on 2/10/2023 at 10:36 AM

## 2023-02-09 NOTE — TELEPHONE ENCOUNTER
Is there anything Urgent on the form for ASAP. Do I have to sign tomorrow 2/9 or it can Wait for PCP 2/13?.  Kapil Justice MD on 2/9/2023 at 3:44 PM

## 2023-02-10 ENCOUNTER — MEDICAL CORRESPONDENCE (OUTPATIENT)
Dept: HEALTH INFORMATION MANAGEMENT | Facility: CLINIC | Age: 28
End: 2023-02-10

## 2023-02-13 ENCOUNTER — TELEPHONE (OUTPATIENT)
Dept: FAMILY MEDICINE | Facility: CLINIC | Age: 28
End: 2023-02-13
Payer: MEDICARE

## 2023-02-13 NOTE — TELEPHONE ENCOUNTER
Forms/Letter Request    Type of form/letter: Home Health    Have you been seen for this request: Yes     Do we have the form/letter: Yes:     When is form/letter needed by: ASAP    How would you like the form/letter returned: Fax    Please sign, date and fax back to: [phone]

## 2023-03-03 ENCOUNTER — TELEPHONE (OUTPATIENT)
Dept: FAMILY MEDICINE | Facility: CLINIC | Age: 28
End: 2023-03-03
Payer: MEDICARE

## 2023-03-03 ENCOUNTER — MEDICAL CORRESPONDENCE (OUTPATIENT)
Dept: HEALTH INFORMATION MANAGEMENT | Facility: CLINIC | Age: 28
End: 2023-03-03

## 2023-03-03 NOTE — TELEPHONE ENCOUNTER
Forms/Letter Request     Type of form/letter: [facility] Order # [id number]     Have you been seen for this request: Yes      Do we have the form/letter: Yes      When is form/letter needed by:  As time permits     How would you like the form/letter returned: Fax to [facility] @  Please sign, date and fax back to [phone]

## 2023-03-08 ENCOUNTER — MEDICAL CORRESPONDENCE (OUTPATIENT)
Dept: HEALTH INFORMATION MANAGEMENT | Facility: CLINIC | Age: 28
End: 2023-03-08

## 2023-03-08 ENCOUNTER — OFFICE VISIT (OUTPATIENT)
Dept: FAMILY MEDICINE | Facility: CLINIC | Age: 28
End: 2023-03-08
Payer: MEDICARE

## 2023-03-08 ENCOUNTER — TELEPHONE (OUTPATIENT)
Dept: UROLOGY | Facility: CLINIC | Age: 28
End: 2023-03-08

## 2023-03-08 VITALS
HEART RATE: 95 BPM | SYSTOLIC BLOOD PRESSURE: 88 MMHG | OXYGEN SATURATION: 95 % | TEMPERATURE: 97.5 F | DIASTOLIC BLOOD PRESSURE: 62 MMHG

## 2023-03-08 DIAGNOSIS — Z01.818 PREOP GENERAL PHYSICAL EXAM: Primary | ICD-10-CM

## 2023-03-08 DIAGNOSIS — E44.0 MODERATE PROTEIN-CALORIE MALNUTRITION (H): ICD-10-CM

## 2023-03-08 DIAGNOSIS — Z86.718 PERSONAL HISTORY OF DVT (DEEP VEIN THROMBOSIS): ICD-10-CM

## 2023-03-08 DIAGNOSIS — Z53.9 DIAGNOSIS NOT YET DEFINED: Primary | ICD-10-CM

## 2023-03-08 DIAGNOSIS — Z97.5 IUD (INTRAUTERINE DEVICE) IN PLACE: Chronic | ICD-10-CM

## 2023-03-08 DIAGNOSIS — J99 NEUROMUSCULAR RESPIRATORY WEAKNESS (H): ICD-10-CM

## 2023-03-08 DIAGNOSIS — N31.9 NEUROGENIC BLADDER: ICD-10-CM

## 2023-03-08 DIAGNOSIS — G70.9 NEUROMUSCULAR RESPIRATORY WEAKNESS (H): ICD-10-CM

## 2023-03-08 DIAGNOSIS — R82.90 ABNORMAL URINE ODOR: ICD-10-CM

## 2023-03-08 DIAGNOSIS — N31.9 NEUROGENIC BLADDER: Primary | ICD-10-CM

## 2023-03-08 DIAGNOSIS — Z72.89 ENGAGES IN VAPING: ICD-10-CM

## 2023-03-08 DIAGNOSIS — G82.54 QUADRIPLEGIA, C5-C7 INCOMPLETE (H): ICD-10-CM

## 2023-03-08 DIAGNOSIS — F32.4 MAJOR DEPRESSIVE DISORDER WITH SINGLE EPISODE, IN PARTIAL REMISSION (H): ICD-10-CM

## 2023-03-08 DIAGNOSIS — R33.9 URINARY RETENTION: ICD-10-CM

## 2023-03-08 DIAGNOSIS — R94.2 IMPAIRED LUNG FUNCTION: Chronic | ICD-10-CM

## 2023-03-08 PROBLEM — L89.314 DECUBITUS ULCER OF RIGHT ISCHIUM, STAGE 4 (H): Chronic | Status: RESOLVED | Noted: 2020-02-05 | Resolved: 2023-03-08

## 2023-03-08 PROBLEM — M86.60: Chronic | Status: RESOLVED | Noted: 2020-09-02 | Resolved: 2023-03-08

## 2023-03-08 LAB
ANION GAP SERPL CALCULATED.3IONS-SCNC: 3 MMOL/L (ref 3–14)
BUN SERPL-MCNC: 10 MG/DL (ref 7–30)
CALCIUM SERPL-MCNC: 9.7 MG/DL (ref 8.5–10.1)
CHLORIDE BLD-SCNC: 108 MMOL/L (ref 94–109)
CO2 SERPL-SCNC: 27 MMOL/L (ref 20–32)
CREAT SERPL-MCNC: 0.44 MG/DL (ref 0.52–1.04)
ERYTHROCYTE [DISTWIDTH] IN BLOOD BY AUTOMATED COUNT: 13.4 % (ref 10–15)
GFR SERPL CREATININE-BSD FRML MDRD: >90 ML/MIN/1.73M2
GLUCOSE BLD-MCNC: 100 MG/DL (ref 70–99)
HCT VFR BLD AUTO: 44 % (ref 35–47)
HGB BLD-MCNC: 14.4 G/DL (ref 11.7–15.7)
MCH RBC QN AUTO: 28.5 PG (ref 26.5–33)
MCHC RBC AUTO-ENTMCNC: 32.7 G/DL (ref 31.5–36.5)
MCV RBC AUTO: 87 FL (ref 78–100)
PLATELET # BLD AUTO: 353 10E3/UL (ref 150–450)
POTASSIUM BLD-SCNC: 3.8 MMOL/L (ref 3.4–5.3)
RBC # BLD AUTO: 5.05 10E6/UL (ref 3.8–5.2)
SODIUM SERPL-SCNC: 138 MMOL/L (ref 133–144)
WBC # BLD AUTO: 14.6 10E3/UL (ref 4–11)

## 2023-03-08 PROCEDURE — 36415 COLL VENOUS BLD VENIPUNCTURE: CPT | Performed by: PHYSICIAN ASSISTANT

## 2023-03-08 PROCEDURE — 80048 BASIC METABOLIC PNL TOTAL CA: CPT | Performed by: PHYSICIAN ASSISTANT

## 2023-03-08 PROCEDURE — 99214 OFFICE O/P EST MOD 30 MIN: CPT | Performed by: PHYSICIAN ASSISTANT

## 2023-03-08 PROCEDURE — 85027 COMPLETE CBC AUTOMATED: CPT | Performed by: PHYSICIAN ASSISTANT

## 2023-03-08 PROCEDURE — G0180 MD CERTIFICATION HHA PATIENT: HCPCS | Performed by: PHYSICIAN ASSISTANT

## 2023-03-08 RX ORDER — GABAPENTIN 300 MG/1
300 CAPSULE ORAL
COMMUNITY
Start: 2022-11-04 | End: 2023-03-08

## 2023-03-08 RX ORDER — BACLOFEN 10 MG/1
30 TABLET ORAL
COMMUNITY
Start: 2021-07-08 | End: 2023-03-08

## 2023-03-08 ASSESSMENT — PAIN SCALES - GENERAL: PAINLEVEL: NO PAIN (0)

## 2023-03-08 NOTE — PATIENT INSTRUCTIONS
For informational purposes only. Not to replace the advice of your health care provider. Copyright   2003,  [employer] [facility]. All rights reserved. Clinically reviewed by Shannon Clark MD. [email or web address] [id number] [phone] REV .  Preparing for Your Surgery  Getting started  A nurse will call you to review your health history and instructions. They will give you an arrival time based on your scheduled surgery time. Please be ready to share:    Your doctor's clinic name and phone number    Your medical, surgical, and anesthesia history    A list of allergies and sensitivities    A list of medicines, including herbal treatments and over-the-counter drugs    Whether the patient has a legal guardian (ask how to send us the papers in advance)  Please tell us if you're pregnant--or if there's any chance you might be pregnant. Some surgeries may injure a fetus (unborn baby), so they require a pregnancy test. Surgeries that are safe for a fetus don't always need a test, and you can choose whether to have one.   If you have a child who's having surgery, please ask for a copy of Preparing for Your Child's Surgery.    Preparing for surgery    Within 10 to 30 days of surgery: Have a pre-op exam (sometimes called an H&P, or History and Physical). This can be done at a clinic or pre-operative center.  ? If you're having a , you may not need this exam. Talk to your care team.    At your pre-op exam, talk to your care team about all medicines you take. If you need to stop any medicines before surgery, ask when to start taking them again.  ? We do this for your safety. Many medicines can make you bleed too much during surgery. Some change how well surgery (anesthesia) drugs work.    Call your insurance company to let them know you're having surgery. (If you don't have insurance, call [phone].)    Call your clinic if there's any change in your health. This includes signs of a cold or flu (sore throat, runny nose,  cough, rash, fever). It also includes a scrape or scratch near the surgery site.    If you have questions on the day of surgery, call your hospital or surgery center.  Eating and drinking guidelines  For your safety: Unless your surgeon tells you otherwise, follow the guidelines below.    Eat and drink as usual until 8 hours before you arrive for surgery. After that, no food or milk.    Drink clear liquids until 2 hours before you arrive. These are liquids you can see through, like water, Gatorade, and Propel Water. They also include plain black coffee and tea (no cream or milk), candy, and breath mints. You can spit out gum when you arrive.    If you drink alcohol: Stop drinking it the night before surgery.    If your care team tells you to take medicine on the morning of surgery, it's okay to take it with a sip of water.  Preventing infection    Shower or bathe the night before and morning of your surgery. Follow the instructions your clinic gave you. (If no instructions, use regular soap.)    Don't shave or clip hair near your surgery site. We'll remove the hair if needed.    Don't smoke or vape the morning of surgery. You may chew nicotine gum up to 2 hours before surgery. A nicotine patch is okay.  ? Note: Some surgeries require you to completely quit smoking and nicotine. Check with your surgeon.    Your care team will make every effort to keep you safe from infection. We will:  ? Clean our hands often with soap and water (or an alcohol-based hand rub).  ? Clean the skin at your surgery site with a special soap that kills germs.  ? Give you a special gown to keep you warm. (Cold raises the risk of infection.)  ? Wear special hair covers, masks, gowns and gloves during surgery.  ? Give antibiotic medicine, if prescribed. Not all surgeries need antibiotics.  What to bring on the day of surgery    Photo ID and insurance card    Copy of your health care directive, if you have one    Glasses and hearing aids (bring  cases)  ? You can't wear contacts during surgery    Inhaler and eye drops, if you use them (tell us about these when you arrive)    CPAP machine or breathing device, if you use them    A few personal items, if spending the night    If you have . . .  ? A pacemaker, ICD (cardiac defibrillator) or other implant: Bring the ID card.  ? An implanted stimulator: Bring the remote control.  ? A legal guardian: Bring a copy of the certified (court-stamped) guardianship papers.  Please remove any jewelry, including body piercings. Leave jewelry and other valuables at home.  If you're going home the day of surgery    You must have a responsible adult drive you home. They should stay with you overnight as well.    If you don't have someone to stay with you, and you aren't safe to go home alone, we may keep you overnight. Insurance often won't pay for this.  After surgery  If it's hard to control your pain or you need more pain medicine, please call your surgeon's office.  Questions?   If you have any questions for your care team, list them here: _________________________________________________________________________________________________________________________________________________________________________ ____________________________________ ____________________________________ ____________________________________

## 2023-03-08 NOTE — TELEPHONE ENCOUNTER
Procedure: Bladder augment, Mitrofanoff, bladder neck closure    Date: 3/17/23  Location: [facility]  Provider: Dr. Mata Bacon     Post op appt: 4/17/23    H&P: 3/8/23  UA/UC: 03/10/23     Medications: reviewed  Soap: reviewed  Reviewed when to start clear liquids and when to start NPO: Bowel prep reviewed - pt does bowel regime daily  : reviewed  24 hour observation: pt will be in-patient    Pt or family member expressed understanding: yes    Comfort Solis CMA  3/8/2023  12:58 PM
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
## 2023-03-10 ENCOUNTER — TELEPHONE (OUTPATIENT)
Dept: UROLOGY | Facility: CLINIC | Age: 28
End: 2023-03-10
Payer: MEDICARE

## 2023-03-10 ENCOUNTER — LAB (OUTPATIENT)
Dept: LAB | Facility: CLINIC | Age: 28
End: 2023-03-10
Payer: MEDICARE

## 2023-03-10 DIAGNOSIS — N31.9 NEUROGENIC BLADDER: ICD-10-CM

## 2023-03-10 DIAGNOSIS — R33.9 URINARY RETENTION: ICD-10-CM

## 2023-03-10 PROCEDURE — 87086 URINE CULTURE/COLONY COUNT: CPT | Performed by: INTERNAL MEDICINE

## 2023-03-10 NOTE — TELEPHONE ENCOUNTER
UC order faxed to [facility] at [phone].    Comfort Solis CMA  03/10/23  10:58 AM

## 2023-03-10 NOTE — TELEPHONE ENCOUNTER
[facility] Call Center    Phone Message    May a detailed message be left on voicemail: yes     Reason for Call: [facility] called and asked for orders to be faxed to [phone]? Please call back if necessary. Thank you.    Action Taken: Other: [facility]    Travel Screening: Not Applicable

## 2023-03-12 LAB — BACTERIA UR CULT: NORMAL

## 2023-03-13 ENCOUNTER — ANESTHESIA EVENT (OUTPATIENT)
Dept: SURGERY | Facility: CLINIC | Age: 28
DRG: 653 | End: 2023-03-13
Payer: MEDICARE

## 2023-03-15 ASSESSMENT — LIFESTYLE VARIABLES: TOBACCO_USE: 1
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
## 2023-03-16 ENCOUNTER — APPOINTMENT (OUTPATIENT)
Dept: GENERAL RADIOLOGY | Facility: CLINIC | Age: 28
DRG: 653 | End: 2023-03-16
Attending: PHYSICIAN ASSISTANT
Payer: MEDICARE

## 2023-03-16 ENCOUNTER — ANESTHESIA (OUTPATIENT)
Dept: SURGERY | Facility: CLINIC | Age: 28
DRG: 653 | End: 2023-03-16
Payer: MEDICARE

## 2023-03-16 ENCOUNTER — HOSPITAL ENCOUNTER (INPATIENT)
Facility: CLINIC | Age: 28
LOS: 7 days | Discharge: HOME | DRG: 653 | End: 2023-03-23
Attending: UROLOGY | Admitting: UROLOGY
Payer: MEDICARE

## 2023-03-16 ENCOUNTER — PRE VISIT (OUTPATIENT)
Dept: UROLOGY | Facility: CLINIC | Age: 28
End: 2023-03-16
Payer: MEDICARE

## 2023-03-16 DIAGNOSIS — N31.9 NEUROGENIC BLADDER: ICD-10-CM

## 2023-03-16 LAB
ANION GAP SERPL CALCULATED.3IONS-SCNC: 17 MMOL/L (ref 7–15)
BUN SERPL-MCNC: 5.6 MG/DL (ref 6–20)
BUN SERPL-MCNC: 6.1 MG/DL (ref 6–20)
BUN SERPL-MCNC: 6.1 MG/DL (ref 6–20)
CA-I BLD-MCNC: 4.5 MG/DL (ref 4.4–5.2)
CALCIUM SERPL-MCNC: 8.5 MG/DL (ref 8.6–10)
CALCIUM SERPL-MCNC: 8.7 MG/DL (ref 8.6–10)
CALCIUM SERPL-MCNC: 8.8 MG/DL (ref 8.6–10)
CHLORIDE SERPL-SCNC: 107 MMOL/L (ref 98–107)
CREAT SERPL-MCNC: 0.47 MG/DL (ref 0.51–0.95)
CREAT SERPL-MCNC: 0.5 MG/DL (ref 0.51–0.95)
CREAT SERPL-MCNC: 0.52 MG/DL (ref 0.51–0.95)
DEPRECATED HCO3 PLAS-SCNC: 15 MMOL/L (ref 22–29)
DEPRECATED HCO3 PLAS-SCNC: 17 MMOL/L (ref 22–29)
DEPRECATED HCO3 PLAS-SCNC: 18 MMOL/L (ref 22–29)
ERYTHROCYTE [DISTWIDTH] IN BLOOD BY AUTOMATED COUNT: 13.9 % (ref 10–15)
GFR SERPL CREATININE-BSD FRML MDRD: >90 ML/MIN/1.73M2
GLUCOSE BLDC GLUCOMTR-MCNC: 93 MG/DL (ref 70–99)
GLUCOSE SERPL-MCNC: 100 MG/DL (ref 70–99)
GLUCOSE SERPL-MCNC: 107 MG/DL (ref 70–99)
GLUCOSE SERPL-MCNC: 98 MG/DL (ref 70–99)
HCT VFR BLD AUTO: 43.3 % (ref 35–47)
HGB BLD-MCNC: 13.8 G/DL (ref 11.7–15.7)
HGB BLD-MCNC: 14.1 G/DL (ref 11.7–15.7)
LACTATE SERPL-SCNC: 2.9 MMOL/L (ref 0.7–2)
MAGNESIUM SERPL-MCNC: 1.9 MG/DL (ref 1.7–2.3)
MCH RBC QN AUTO: 28.9 PG (ref 26.5–33)
MCHC RBC AUTO-ENTMCNC: 31.9 G/DL (ref 31.5–36.5)
MCV RBC AUTO: 91 FL (ref 78–100)
PHOSPHATE SERPL-MCNC: 3.7 MG/DL (ref 2.5–4.5)
PLATELET # BLD AUTO: 345 10E3/UL (ref 150–450)
POTASSIUM SERPL-SCNC: 4.1 MMOL/L (ref 3.4–5.3)
POTASSIUM SERPL-SCNC: 4.6 MMOL/L (ref 3.4–5.3)
POTASSIUM SERPL-SCNC: 4.6 MMOL/L (ref 3.4–5.3)
RBC # BLD AUTO: 4.77 10E6/UL (ref 3.8–5.2)
SODIUM SERPL-SCNC: 139 MMOL/L (ref 136–145)
SODIUM SERPL-SCNC: 141 MMOL/L (ref 136–145)
SODIUM SERPL-SCNC: 142 MMOL/L (ref 136–145)
WBC # BLD AUTO: 27.9 10E3/UL (ref 4–11)

## 2023-03-16 PROCEDURE — 250N000009 HC RX 250: Performed by: ANESTHESIOLOGY

## 2023-03-16 PROCEDURE — 120N000002 HC R&B MED SURG/OB UMMC

## 2023-03-16 PROCEDURE — 250N000013 HC RX MED GY IP 250 OP 250 PS 637: Performed by: PHYSICIAN ASSISTANT

## 2023-03-16 PROCEDURE — 83605 ASSAY OF LACTIC ACID: CPT | Performed by: PHYSICIAN ASSISTANT

## 2023-03-16 PROCEDURE — 93005 ELECTROCARDIOGRAM TRACING: CPT

## 2023-03-16 PROCEDURE — 36415 COLL VENOUS BLD VENIPUNCTURE: CPT | Performed by: STUDENT IN AN ORGANIZED HEALTH CARE EDUCATION/TRAINING PROGRAM

## 2023-03-16 PROCEDURE — 51960 REVISION OF BLADDER & BOWEL: CPT | Mod: GC | Performed by: UROLOGY

## 2023-03-16 PROCEDURE — 250N000025 HC SEVOFLURANE, PER MIN: Performed by: UROLOGY

## 2023-03-16 PROCEDURE — 250N000009 HC RX 250: Performed by: STUDENT IN AN ORGANIZED HEALTH CARE EDUCATION/TRAINING PROGRAM

## 2023-03-16 PROCEDURE — 258N000003 HC RX IP 258 OP 636: Performed by: ANESTHESIOLOGY

## 2023-03-16 PROCEDURE — 999N000141 HC STATISTIC PRE-PROCEDURE NURSING ASSESSMENT: Performed by: UROLOGY

## 2023-03-16 PROCEDURE — 250N000011 HC RX IP 250 OP 636: Performed by: STUDENT IN AN ORGANIZED HEALTH CARE EDUCATION/TRAINING PROGRAM

## 2023-03-16 PROCEDURE — 88302 TISSUE EXAM BY PATHOLOGIST: CPT | Mod: 26 | Performed by: STUDENT IN AN ORGANIZED HEALTH CARE EDUCATION/TRAINING PROGRAM

## 2023-03-16 PROCEDURE — 0T1B0ZC BYPASS BLADDER TO ILEOCUTANEOUS, OPEN APPROACH: ICD-10-PCS | Performed by: UROLOGY

## 2023-03-16 PROCEDURE — 85018 HEMOGLOBIN: CPT | Performed by: PHYSICIAN ASSISTANT

## 2023-03-16 PROCEDURE — 88302 TISSUE EXAM BY PATHOLOGIST: CPT | Mod: TC | Performed by: UROLOGY

## 2023-03-16 PROCEDURE — 85018 HEMOGLOBIN: CPT | Performed by: STUDENT IN AN ORGANIZED HEALTH CARE EDUCATION/TRAINING PROGRAM

## 2023-03-16 PROCEDURE — 71045 X-RAY EXAM CHEST 1 VIEW: CPT | Mod: 26 | Performed by: RADIOLOGY

## 2023-03-16 PROCEDURE — 250N000011 HC RX IP 250 OP 636: Performed by: ANESTHESIOLOGY

## 2023-03-16 PROCEDURE — 250N000011 HC RX IP 250 OP 636: Performed by: PHYSICIAN ASSISTANT

## 2023-03-16 PROCEDURE — 710N000010 HC RECOVERY PHASE 1, LEVEL 2, PER MIN: Performed by: UROLOGY

## 2023-03-16 PROCEDURE — 80048 BASIC METABOLIC PNL TOTAL CA: CPT | Performed by: STUDENT IN AN ORGANIZED HEALTH CARE EDUCATION/TRAINING PROGRAM

## 2023-03-16 PROCEDURE — 36415 COLL VENOUS BLD VENIPUNCTURE: CPT | Performed by: PHYSICIAN ASSISTANT

## 2023-03-16 PROCEDURE — 0DTJ0ZZ RESECTION OF APPENDIX, OPEN APPROACH: ICD-10-PCS | Performed by: UROLOGY

## 2023-03-16 PROCEDURE — C9290 INJ, BUPIVACAINE LIPOSOME: HCPCS

## 2023-03-16 PROCEDURE — 250N000011 HC RX IP 250 OP 636

## 2023-03-16 PROCEDURE — 93010 ELECTROCARDIOGRAM REPORT: CPT | Performed by: INTERNAL MEDICINE

## 2023-03-16 PROCEDURE — 84100 ASSAY OF PHOSPHORUS: CPT | Performed by: PHYSICIAN ASSISTANT

## 2023-03-16 PROCEDURE — 250N000013 HC RX MED GY IP 250 OP 250 PS 637: Performed by: STUDENT IN AN ORGANIZED HEALTH CARE EDUCATION/TRAINING PROGRAM

## 2023-03-16 PROCEDURE — 250N000011 HC RX IP 250 OP 636: Performed by: UROLOGY

## 2023-03-16 PROCEDURE — 0TRB07Z REPLACEMENT OF BLADDER WITH AUTOLOGOUS TISSUE SUBSTITUTE, OPEN APPROACH: ICD-10-PCS | Performed by: UROLOGY

## 2023-03-16 PROCEDURE — 51800 REVISION OF BLADDER/URETHRA: CPT | Mod: GC | Performed by: UROLOGY

## 2023-03-16 PROCEDURE — 82310 ASSAY OF CALCIUM: CPT | Performed by: STUDENT IN AN ORGANIZED HEALTH CARE EDUCATION/TRAINING PROGRAM

## 2023-03-16 PROCEDURE — 272N000001 HC OR GENERAL SUPPLY STERILE: Performed by: UROLOGY

## 2023-03-16 PROCEDURE — 370N000017 HC ANESTHESIA TECHNICAL FEE, PER MIN: Performed by: UROLOGY

## 2023-03-16 PROCEDURE — 49905 OMENTAL FLAP INTRA-ABDOM: CPT | Mod: GC | Performed by: UROLOGY

## 2023-03-16 PROCEDURE — 82330 ASSAY OF CALCIUM: CPT | Performed by: PHYSICIAN ASSISTANT

## 2023-03-16 PROCEDURE — 83735 ASSAY OF MAGNESIUM: CPT | Performed by: PHYSICIAN ASSISTANT

## 2023-03-16 PROCEDURE — 82310 ASSAY OF CALCIUM: CPT | Performed by: PHYSICIAN ASSISTANT

## 2023-03-16 PROCEDURE — 360N000077 HC SURGERY LEVEL 4, PER MIN: Performed by: UROLOGY

## 2023-03-16 PROCEDURE — 99221 1ST HOSP IP/OBS SF/LOW 40: CPT | Mod: AI | Performed by: PHYSICIAN ASSISTANT

## 2023-03-16 PROCEDURE — 0T9B30Z DRAINAGE OF BLADDER WITH DRAINAGE DEVICE, PERCUTANEOUS APPROACH: ICD-10-PCS | Performed by: UROLOGY

## 2023-03-16 PROCEDURE — 71045 X-RAY EXAM CHEST 1 VIEW: CPT

## 2023-03-16 PROCEDURE — 50845 APPENDICO-VESICOSTOMY: CPT | Mod: 22 | Performed by: UROLOGY

## 2023-03-16 PROCEDURE — 258N000003 HC RX IP 258 OP 636: Performed by: STUDENT IN AN ORGANIZED HEALTH CARE EDUCATION/TRAINING PROGRAM

## 2023-03-16 RX ORDER — ONDANSETRON 4 MG/1
4 TABLET, ORALLY DISINTEGRATING ORAL EVERY 30 MIN PRN
Status: DISCONTINUED | OUTPATIENT
Start: 2023-03-16 | End: 2023-03-16 | Stop reason: HOSPADM

## 2023-03-16 RX ORDER — HYDROMORPHONE HCL IN WATER/PF 6 MG/30 ML
.2-.4 PATIENT CONTROLLED ANALGESIA SYRINGE INTRAVENOUS
Status: DISCONTINUED | OUTPATIENT
Start: 2023-03-16 | End: 2023-03-23 | Stop reason: HOSPADM

## 2023-03-16 RX ORDER — NALOXONE HYDROCHLORIDE 0.4 MG/ML
0.2 INJECTION, SOLUTION INTRAMUSCULAR; INTRAVENOUS; SUBCUTANEOUS
Status: DISCONTINUED | OUTPATIENT
Start: 2023-03-16 | End: 2023-03-23 | Stop reason: HOSPADM

## 2023-03-16 RX ORDER — SODIUM CHLORIDE, SODIUM LACTATE, POTASSIUM CHLORIDE, CALCIUM CHLORIDE 600; 310; 30; 20 MG/100ML; MG/100ML; MG/100ML; MG/100ML
INJECTION, SOLUTION INTRAVENOUS CONTINUOUS
Status: DISCONTINUED | OUTPATIENT
Start: 2023-03-16 | End: 2023-03-16 | Stop reason: HOSPADM

## 2023-03-16 RX ORDER — HYDROMORPHONE HCL IN WATER/PF 6 MG/30 ML
0.4 PATIENT CONTROLLED ANALGESIA SYRINGE INTRAVENOUS EVERY 5 MIN PRN
Status: DISCONTINUED | OUTPATIENT
Start: 2023-03-16 | End: 2023-03-16 | Stop reason: HOSPADM

## 2023-03-16 RX ORDER — PROPOFOL 10 MG/ML
INJECTION, EMULSION INTRAVENOUS PRN
Status: DISCONTINUED | OUTPATIENT
Start: 2023-03-16 | End: 2023-03-16

## 2023-03-16 RX ORDER — ALBUTEROL SULFATE 0.83 MG/ML
2.5 SOLUTION RESPIRATORY (INHALATION) EVERY 4 HOURS PRN
Status: DISCONTINUED | OUTPATIENT
Start: 2023-03-16 | End: 2023-03-16

## 2023-03-16 RX ORDER — HYDROMORPHONE HCL IN WATER/PF 6 MG/30 ML
0.2 PATIENT CONTROLLED ANALGESIA SYRINGE INTRAVENOUS ONCE
Status: COMPLETED | OUTPATIENT
Start: 2023-03-16 | End: 2023-03-16

## 2023-03-16 RX ORDER — HYDROXYZINE HYDROCHLORIDE 25 MG/1
25 TABLET, FILM COATED ORAL 3 TIMES DAILY PRN
Status: DISCONTINUED | OUTPATIENT
Start: 2023-03-16 | End: 2023-03-23 | Stop reason: HOSPADM

## 2023-03-16 RX ORDER — HYDROMORPHONE HCL IN WATER/PF 6 MG/30 ML
0.2 PATIENT CONTROLLED ANALGESIA SYRINGE INTRAVENOUS EVERY 5 MIN PRN
Status: DISCONTINUED | OUTPATIENT
Start: 2023-03-16 | End: 2023-03-16 | Stop reason: HOSPADM

## 2023-03-16 RX ORDER — OXYCODONE HYDROCHLORIDE 10 MG/1
10 TABLET ORAL EVERY 4 HOURS PRN
Status: DISCONTINUED | OUTPATIENT
Start: 2023-03-16 | End: 2023-03-23 | Stop reason: HOSPADM

## 2023-03-16 RX ORDER — HEPARIN SODIUM 5000 [USP'U]/.5ML
5000 INJECTION, SOLUTION INTRAVENOUS; SUBCUTANEOUS
Status: COMPLETED | OUTPATIENT
Start: 2023-03-16 | End: 2023-03-16

## 2023-03-16 RX ORDER — METHOCARBAMOL 500 MG/1
500 TABLET, FILM COATED ORAL 4 TIMES DAILY PRN
Status: DISCONTINUED | OUTPATIENT
Start: 2023-03-16 | End: 2023-03-23 | Stop reason: HOSPADM

## 2023-03-16 RX ORDER — HEPARIN SODIUM 5000 [USP'U]/.5ML
5000 INJECTION, SOLUTION INTRAVENOUS; SUBCUTANEOUS EVERY 8 HOURS
Status: DISCONTINUED | OUTPATIENT
Start: 2023-03-16 | End: 2023-03-23 | Stop reason: HOSPADM

## 2023-03-16 RX ORDER — HYDROMORPHONE HCL IN WATER/PF 6 MG/30 ML
0.2 PATIENT CONTROLLED ANALGESIA SYRINGE INTRAVENOUS
Status: DISCONTINUED | OUTPATIENT
Start: 2023-03-16 | End: 2023-03-16

## 2023-03-16 RX ORDER — DEXMEDETOMIDINE HYDROCHLORIDE 4 UG/ML
INJECTION, SOLUTION INTRAVENOUS PRN
Status: DISCONTINUED | OUTPATIENT
Start: 2023-03-16 | End: 2023-03-16

## 2023-03-16 RX ORDER — NALOXONE HYDROCHLORIDE 0.4 MG/ML
0.4 INJECTION, SOLUTION INTRAMUSCULAR; INTRAVENOUS; SUBCUTANEOUS
Status: DISCONTINUED | OUTPATIENT
Start: 2023-03-16 | End: 2023-03-23 | Stop reason: HOSPADM

## 2023-03-16 RX ORDER — ERTAPENEM 1 G/1
1 INJECTION, POWDER, LYOPHILIZED, FOR SOLUTION INTRAMUSCULAR; INTRAVENOUS EVERY 24 HOURS
Status: DISCONTINUED | OUTPATIENT
Start: 2023-03-16 | End: 2023-03-16 | Stop reason: HOSPADM

## 2023-03-16 RX ORDER — FENTANYL CITRATE 50 UG/ML
25 INJECTION, SOLUTION INTRAMUSCULAR; INTRAVENOUS EVERY 5 MIN PRN
Status: DISCONTINUED | OUTPATIENT
Start: 2023-03-16 | End: 2023-03-16 | Stop reason: HOSPADM

## 2023-03-16 RX ORDER — CALCIUM CARBONATE 500 MG/1
500 TABLET, CHEWABLE ORAL DAILY PRN
Status: DISCONTINUED | OUTPATIENT
Start: 2023-03-16 | End: 2023-03-23 | Stop reason: HOSPADM

## 2023-03-16 RX ORDER — ONDANSETRON 4 MG/1
4 TABLET, ORALLY DISINTEGRATING ORAL EVERY 6 HOURS PRN
Status: DISCONTINUED | OUTPATIENT
Start: 2023-03-16 | End: 2023-03-23 | Stop reason: HOSPADM

## 2023-03-16 RX ORDER — MIDODRINE HYDROCHLORIDE 5 MG/1
10 TABLET ORAL 2 TIMES DAILY
Status: DISCONTINUED | OUTPATIENT
Start: 2023-03-16 | End: 2023-03-23 | Stop reason: HOSPADM

## 2023-03-16 RX ORDER — LIDOCAINE HYDROCHLORIDE 20 MG/ML
INJECTION, SOLUTION INFILTRATION; PERINEURAL PRN
Status: DISCONTINUED | OUTPATIENT
Start: 2023-03-16 | End: 2023-03-16

## 2023-03-16 RX ORDER — FENTANYL CITRATE 50 UG/ML
25-50 INJECTION, SOLUTION INTRAMUSCULAR; INTRAVENOUS
Status: DISCONTINUED | OUTPATIENT
Start: 2023-03-16 | End: 2023-03-16 | Stop reason: HOSPADM

## 2023-03-16 RX ORDER — SODIUM CHLORIDE, SODIUM LACTATE, POTASSIUM CHLORIDE, CALCIUM CHLORIDE 600; 310; 30; 20 MG/100ML; MG/100ML; MG/100ML; MG/100ML
INJECTION, SOLUTION INTRAVENOUS CONTINUOUS PRN
Status: DISCONTINUED | OUTPATIENT
Start: 2023-03-16 | End: 2023-03-16

## 2023-03-16 RX ORDER — OXYBUTYNIN CHLORIDE 5 MG/1
5 TABLET, EXTENDED RELEASE ORAL DAILY
Status: DISCONTINUED | OUTPATIENT
Start: 2023-03-17 | End: 2023-03-23 | Stop reason: HOSPADM

## 2023-03-16 RX ORDER — AMOXICILLIN 250 MG
1 CAPSULE ORAL 2 TIMES DAILY
Status: DISCONTINUED | OUTPATIENT
Start: 2023-03-17 | End: 2023-03-23 | Stop reason: HOSPADM

## 2023-03-16 RX ORDER — NALOXONE HYDROCHLORIDE 0.4 MG/ML
0.4 INJECTION, SOLUTION INTRAMUSCULAR; INTRAVENOUS; SUBCUTANEOUS
Status: DISCONTINUED | OUTPATIENT
Start: 2023-03-16 | End: 2023-03-16 | Stop reason: HOSPADM

## 2023-03-16 RX ORDER — FENTANYL CITRATE 50 UG/ML
50 INJECTION, SOLUTION INTRAMUSCULAR; INTRAVENOUS EVERY 5 MIN PRN
Status: DISCONTINUED | OUTPATIENT
Start: 2023-03-16 | End: 2023-03-16 | Stop reason: HOSPADM

## 2023-03-16 RX ORDER — LABETALOL HYDROCHLORIDE 5 MG/ML
10 INJECTION, SOLUTION INTRAVENOUS
Status: DISCONTINUED | OUTPATIENT
Start: 2023-03-16 | End: 2023-03-16 | Stop reason: HOSPADM

## 2023-03-16 RX ORDER — SODIUM CHLORIDE 9 MG/ML
INJECTION, SOLUTION INTRAVENOUS CONTINUOUS
Status: DISCONTINUED | OUTPATIENT
Start: 2023-03-16 | End: 2023-03-17 | Stop reason: ALTCHOICE

## 2023-03-16 RX ORDER — HYDRALAZINE HYDROCHLORIDE 20 MG/ML
2.5-5 INJECTION INTRAMUSCULAR; INTRAVENOUS EVERY 10 MIN PRN
Status: DISCONTINUED | OUTPATIENT
Start: 2023-03-16 | End: 2023-03-16 | Stop reason: HOSPADM

## 2023-03-16 RX ORDER — DEXAMETHASONE SODIUM PHOSPHATE 4 MG/ML
INJECTION, SOLUTION INTRA-ARTICULAR; INTRALESIONAL; INTRAMUSCULAR; INTRAVENOUS; SOFT TISSUE PRN
Status: DISCONTINUED | OUTPATIENT
Start: 2023-03-16 | End: 2023-03-16

## 2023-03-16 RX ORDER — LIDOCAINE 4 G/G
1 PATCH TOPICAL
Status: DISCONTINUED | OUTPATIENT
Start: 2023-03-16 | End: 2023-03-23 | Stop reason: HOSPADM

## 2023-03-16 RX ORDER — NALOXONE HYDROCHLORIDE 0.4 MG/ML
0.2 INJECTION, SOLUTION INTRAMUSCULAR; INTRAVENOUS; SUBCUTANEOUS
Status: DISCONTINUED | OUTPATIENT
Start: 2023-03-16 | End: 2023-03-16 | Stop reason: HOSPADM

## 2023-03-16 RX ORDER — BUPIVACAINE HYDROCHLORIDE 2.5 MG/ML
INJECTION, SOLUTION EPIDURAL; INFILTRATION; INTRACAUDAL
Status: COMPLETED | OUTPATIENT
Start: 2023-03-16 | End: 2023-03-16

## 2023-03-16 RX ORDER — CYCLOBENZAPRINE HCL 5 MG
5 TABLET ORAL ONCE
Status: COMPLETED | OUTPATIENT
Start: 2023-03-16 | End: 2023-03-17

## 2023-03-16 RX ORDER — DOCUSATE SODIUM 100 MG/1
100-200 CAPSULE, LIQUID FILLED ORAL DAILY
Status: DISCONTINUED | OUTPATIENT
Start: 2023-03-17 | End: 2023-03-23 | Stop reason: HOSPADM

## 2023-03-16 RX ORDER — GABAPENTIN 300 MG/1
300 CAPSULE ORAL AT BEDTIME
Status: DISCONTINUED | OUTPATIENT
Start: 2023-03-16 | End: 2023-03-23 | Stop reason: HOSPADM

## 2023-03-16 RX ORDER — LIDOCAINE 40 MG/G
CREAM TOPICAL
Status: DISCONTINUED | OUTPATIENT
Start: 2023-03-16 | End: 2023-03-23 | Stop reason: HOSPADM

## 2023-03-16 RX ORDER — ESMOLOL HYDROCHLORIDE 10 MG/ML
INJECTION INTRAVENOUS PRN
Status: DISCONTINUED | OUTPATIENT
Start: 2023-03-16 | End: 2023-03-16

## 2023-03-16 RX ORDER — FLUMAZENIL 0.1 MG/ML
0.2 INJECTION, SOLUTION INTRAVENOUS
Status: DISCONTINUED | OUTPATIENT
Start: 2023-03-16 | End: 2023-03-16 | Stop reason: HOSPADM

## 2023-03-16 RX ORDER — SIMETHICONE 80 MG
80 TABLET,CHEWABLE ORAL EVERY 6 HOURS PRN
Status: DISCONTINUED | OUTPATIENT
Start: 2023-03-16 | End: 2023-03-23 | Stop reason: HOSPADM

## 2023-03-16 RX ORDER — ACETAMINOPHEN 325 MG/1
975 TABLET ORAL EVERY 8 HOURS
Status: COMPLETED | OUTPATIENT
Start: 2023-03-16 | End: 2023-03-19

## 2023-03-16 RX ORDER — OXYCODONE HYDROCHLORIDE 5 MG/1
5 TABLET ORAL EVERY 4 HOURS PRN
Status: DISCONTINUED | OUTPATIENT
Start: 2023-03-16 | End: 2023-03-23 | Stop reason: HOSPADM

## 2023-03-16 RX ORDER — LEVALBUTEROL INHALATION SOLUTION 1.25 MG/3ML
1.25 SOLUTION RESPIRATORY (INHALATION) EVERY 6 HOURS PRN
Status: DISCONTINUED | OUTPATIENT
Start: 2023-03-16 | End: 2023-03-23 | Stop reason: HOSPADM

## 2023-03-16 RX ORDER — ONDANSETRON 2 MG/ML
4 INJECTION INTRAMUSCULAR; INTRAVENOUS EVERY 30 MIN PRN
Status: DISCONTINUED | OUTPATIENT
Start: 2023-03-16 | End: 2023-03-16 | Stop reason: HOSPADM

## 2023-03-16 RX ORDER — ONDANSETRON 2 MG/ML
4 INJECTION INTRAMUSCULAR; INTRAVENOUS EVERY 6 HOURS PRN
Status: DISCONTINUED | OUTPATIENT
Start: 2023-03-16 | End: 2023-03-23 | Stop reason: HOSPADM

## 2023-03-16 RX ORDER — SODIUM CHLORIDE FOR INHALATION 3 %
3 VIAL, NEBULIZER (ML) INHALATION EVERY 6 HOURS PRN
Status: DISCONTINUED | OUTPATIENT
Start: 2023-03-16 | End: 2023-03-23 | Stop reason: HOSPADM

## 2023-03-16 RX ORDER — ONDANSETRON 2 MG/ML
INJECTION INTRAMUSCULAR; INTRAVENOUS PRN
Status: DISCONTINUED | OUTPATIENT
Start: 2023-03-16 | End: 2023-03-16

## 2023-03-16 RX ADMIN — GABAPENTIN 300 MG: 300 CAPSULE ORAL at 22:29

## 2023-03-16 RX ADMIN — HYDROMORPHONE HYDROCHLORIDE 0.5 MG: 1 INJECTION, SOLUTION INTRAMUSCULAR; INTRAVENOUS; SUBCUTANEOUS at 14:13

## 2023-03-16 RX ADMIN — SODIUM CHLORIDE, POTASSIUM CHLORIDE, SODIUM LACTATE AND CALCIUM CHLORIDE: 600; 310; 30; 20 INJECTION, SOLUTION INTRAVENOUS at 11:24

## 2023-03-16 RX ADMIN — ESMOLOL HYDROCHLORIDE 20 MG: 10 INJECTION, SOLUTION INTRAVENOUS at 13:08

## 2023-03-16 RX ADMIN — FENTANYL CITRATE 50 MCG: 50 INJECTION, SOLUTION INTRAMUSCULAR; INTRAVENOUS at 12:35

## 2023-03-16 RX ADMIN — Medication 8 MCG: at 12:45

## 2023-03-16 RX ADMIN — Medication 12 MCG: at 16:10

## 2023-03-16 RX ADMIN — Medication 20 MG: at 12:24

## 2023-03-16 RX ADMIN — PHENYLEPHRINE HYDROCHLORIDE 100 MCG: 10 INJECTION INTRAVENOUS at 14:26

## 2023-03-16 RX ADMIN — LIDOCAINE HYDROCHLORIDE 100 MG: 20 INJECTION, SOLUTION INFILTRATION; PERINEURAL at 11:06

## 2023-03-16 RX ADMIN — Medication 30 MG: at 11:06

## 2023-03-16 RX ADMIN — BACLOFEN 30 MG: 20 TABLET ORAL at 21:12

## 2023-03-16 RX ADMIN — Medication 10 MG: at 14:01

## 2023-03-16 RX ADMIN — DEXAMETHASONE SODIUM PHOSPHATE 8 MG: 4 INJECTION, SOLUTION INTRA-ARTICULAR; INTRALESIONAL; INTRAMUSCULAR; INTRAVENOUS; SOFT TISSUE at 11:06

## 2023-03-16 RX ADMIN — SODIUM CHLORIDE, PRESERVATIVE FREE: 5 INJECTION INTRAVENOUS at 21:45

## 2023-03-16 RX ADMIN — LIDOCAINE PATCH 4% 1 PATCH: 40 PATCH TOPICAL at 23:56

## 2023-03-16 RX ADMIN — MIDAZOLAM 2 MG: 1 INJECTION INTRAMUSCULAR; INTRAVENOUS at 11:06

## 2023-03-16 RX ADMIN — ONDANSETRON HYDROCHLORIDE 4 MG: 2 INJECTION, SOLUTION INTRAMUSCULAR; INTRAVENOUS at 20:13

## 2023-03-16 RX ADMIN — SODIUM CHLORIDE, POTASSIUM CHLORIDE, SODIUM LACTATE AND CALCIUM CHLORIDE: 600; 310; 30; 20 INJECTION, SOLUTION INTRAVENOUS at 13:17

## 2023-03-16 RX ADMIN — HYDROMORPHONE HYDROCHLORIDE 0.5 MG: 1 INJECTION, SOLUTION INTRAMUSCULAR; INTRAVENOUS; SUBCUTANEOUS at 12:16

## 2023-03-16 RX ADMIN — Medication 10 MG: at 15:27

## 2023-03-16 RX ADMIN — PHENYLEPHRINE HYDROCHLORIDE 100 MCG: 10 INJECTION INTRAVENOUS at 15:44

## 2023-03-16 RX ADMIN — ESMOLOL HYDROCHLORIDE 10 MG: 10 INJECTION, SOLUTION INTRAVENOUS at 12:30

## 2023-03-16 RX ADMIN — ERTAPENEM SODIUM 1 G: 1 INJECTION, POWDER, LYOPHILIZED, FOR SOLUTION INTRAMUSCULAR; INTRAVENOUS at 11:20

## 2023-03-16 RX ADMIN — HEPARIN SODIUM 5000 UNITS: 5000 INJECTION, SOLUTION INTRAVENOUS; SUBCUTANEOUS at 22:28

## 2023-03-16 RX ADMIN — HYDROMORPHONE HYDROCHLORIDE 0.2 MG: 0.2 INJECTION, SOLUTION INTRAMUSCULAR; INTRAVENOUS; SUBCUTANEOUS at 22:52

## 2023-03-16 RX ADMIN — FENTANYL CITRATE 50 MCG: 50 INJECTION, SOLUTION INTRAMUSCULAR; INTRAVENOUS at 14:05

## 2023-03-16 RX ADMIN — OXYCODONE HYDROCHLORIDE 10 MG: 10 TABLET ORAL at 23:56

## 2023-03-16 RX ADMIN — Medication 20 MG: at 11:38

## 2023-03-16 RX ADMIN — ESMOLOL HYDROCHLORIDE 20 MG: 10 INJECTION, SOLUTION INTRAVENOUS at 12:34

## 2023-03-16 RX ADMIN — Medication 10 MG: at 13:26

## 2023-03-16 RX ADMIN — SODIUM CHLORIDE, SODIUM LACTATE, POTASSIUM CHLORIDE, CALCIUM CHLORIDE: 600; 310; 30; 20 INJECTION, SOLUTION INTRAVENOUS at 11:00

## 2023-03-16 RX ADMIN — ESMOLOL HYDROCHLORIDE 20 MG: 10 INJECTION, SOLUTION INTRAVENOUS at 14:18

## 2023-03-16 RX ADMIN — BUPIVACAINE HYDROCHLORIDE 10 ML: 2.5 INJECTION, SOLUTION EPIDURAL; INFILTRATION; INTRACAUDAL at 10:05

## 2023-03-16 RX ADMIN — FENTANYL CITRATE 100 MCG: 50 INJECTION, SOLUTION INTRAMUSCULAR; INTRAVENOUS at 11:06

## 2023-03-16 RX ADMIN — HYDROMORPHONE HYDROCHLORIDE 0.2 MG: 0.2 INJECTION, SOLUTION INTRAMUSCULAR; INTRAVENOUS; SUBCUTANEOUS at 23:06

## 2023-03-16 RX ADMIN — ACETAMINOPHEN 975 MG: 325 TABLET ORAL at 20:25

## 2023-03-16 RX ADMIN — PHENYLEPHRINE HYDROCHLORIDE 100 MCG: 10 INJECTION INTRAVENOUS at 16:19

## 2023-03-16 RX ADMIN — PHENYLEPHRINE HYDROCHLORIDE 200 MCG: 10 INJECTION INTRAVENOUS at 11:06

## 2023-03-16 RX ADMIN — PROPOFOL 120 MG: 10 INJECTION, EMULSION INTRAVENOUS at 11:06

## 2023-03-16 RX ADMIN — ONDANSETRON 4 MG: 2 INJECTION INTRAMUSCULAR; INTRAVENOUS at 16:09

## 2023-03-16 RX ADMIN — ESMOLOL HYDROCHLORIDE 20 MG: 10 INJECTION, SOLUTION INTRAVENOUS at 12:37

## 2023-03-16 RX ADMIN — PHENYLEPHRINE HYDROCHLORIDE 100 MCG: 10 INJECTION INTRAVENOUS at 15:12

## 2023-03-16 RX ADMIN — SUGAMMADEX 200 MG: 100 INJECTION, SOLUTION INTRAVENOUS at 16:27

## 2023-03-16 RX ADMIN — ESMOLOL HYDROCHLORIDE 10 MG: 10 INJECTION, SOLUTION INTRAVENOUS at 12:32

## 2023-03-16 RX ADMIN — MIDODRINE HYDROCHLORIDE 10 MG: 5 TABLET ORAL at 20:30

## 2023-03-16 RX ADMIN — HEPARIN SODIUM 5000 UNITS: 5000 INJECTION, SOLUTION INTRAVENOUS; SUBCUTANEOUS at 11:29

## 2023-03-16 RX ADMIN — BUPIVACAINE 15 ML: 13.3 INJECTION, SUSPENSION, LIPOSOMAL INFILTRATION at 10:05

## 2023-03-16 ASSESSMENT — ACTIVITIES OF DAILY LIVING (ADL)
ADLS_ACUITY_SCORE: 35
ADLS_ACUITY_SCORE: 38
ADLS_ACUITY_SCORE: 35
ADLS_ACUITY_SCORE: 34

## 2023-03-16 NOTE — ANESTHESIA PROCEDURE NOTES
TAP Procedure Note    Pre-Procedure   Staff -        Anesthesiologist:  Jacque De Luna MD       Resident/Fellow: Victor Manuel Diaz MD       Performed By: resident and with residents       Procedure performed by resident/fellow/CRNA in presence of a teaching physician.         Location: pre-op       Procedure Start/Stop Times: 3/16/2023 10:05 AM and 3/16/2023 10:15 AM       Pre-Anesthestic Checklist: patient identified, IV checked, site marked, risks and benefits discussed, informed consent, monitors and equipment checked, pre-op evaluation, at physician/surgeon's request and post-op pain management  Timeout:       Correct Patient: Yes        Correct Procedure: Yes        Correct Site: Yes        Correct Position: Yes        Correct Laterality: Yes        Site Marked: Yes  Procedure Documentation  Procedure: TAP       Diagnosis: POST OPERATIVE PAIN       Laterality: bilateral       Patient Position: supine       Patient Prep/Sterile Barriers: sterile gloves, mask       Skin prep: Chloraprep       Needle Type: short bevel       Needle Gauge: 21.        Needle Length (millimeters): 110        Ultrasound guided       1. Ultrasound was used to identify targeted nerve, plexus, vascular marker, or fascial plane and place a needle adjacent to it in real-time.       2. Ultrasound was used to visualize the spread of anesthetic in close proximity to the above referenced structure.       3. A permanent image is entered into the patient's record.    Assessment/Narrative         The placement was negative for: blood aspirated, painful injection and site bleeding       Paresthesias: No.       Bolus given via needle..        Secured via.        Insertion/Infusion Method: Single Shot       Complications: none       Injection made incrementally with aspirations every 5 mL.    Medication(s) Administered   Bupivacaine 0.25% PF (Infiltration) - Infiltration   10 mL - 3/16/2023 10:05:00 AM  Bupivacaine liposome (Exparel) 1.3%  "LA inj susp (Infiltration) - Infiltration   15 mL - 3/16/2023 10:05:00 AM  Medication Administration Time: 3/16/2023 10:05 AM     Comments:  Discussed risks of nerve block, including nerve injury, bleeding, infection. Discussed utility of block given patient's history of autonomic dysreflexia. Discussed that this provides incomplete coverage, but should help with this. Discussed alternative of not performing a nerve block. Ensured understanding, invited questions and all questions were answered. Patient wishes to proceed.    Informed consent was obtained.   Patient tolerated well. Incremental aspiration every 5 mL. No paresthesia, no heme. Needle tip visualized throughout with appropriate spread of local anesthetic in fascial planes.   Block was placed at the surgeon & anesthesiologist's request for post operative management.          FOR [facility] ([facility]) ONLY:   Pain Team Contact information: please page the Pain Team Via [employer]. Search \"Pain\". During daytime hours, please page the attending first. At night please page the resident first.    "

## 2023-03-16 NOTE — OP NOTE
OPERATIVE NOTE  03/16/2023    PREOPERATIVE DIAGNOSIS: Neurogenic Bladder due to SCI, patulous urethra  POSTOPERATIVE DIAGNOSIS: Same    PROCEDURE:   1. Hand-assisted laparoscopic mobilization of the right colon  2. Open bladder augmentation with creation of catheterizable channel -- ileal channel cecocystoplasty (CPT 71617, 53999)  3. Appendectomy  4. Suprapubic catheter placement  5. Bladder neck closure (CPT 36935)  6. Omental flap harvest (CPT 75010)  7. Bilateral ureteral catheterization (CPT 46130)    MODIFIER 22: because there was no appendix of adequate length and because the bladder was very small, we were unable to use the appendix and instead had to add 45 minutes to the procedure and increased difficulty by laparoscopically mobilizing the right colon for an alternative approach.     SURGEON: Mata Bacon MD  RESIDENT SURGEON: Hal Bailey MD  FELLOW: Amos Pham MD        ESTIMATED BLOOD LOSS: 150 mL.     INTRAVENOUS FLUIDS: Please see anesthesia record.     DRAINS AND TUBES: A 20-Libyan suprapubic Rogers catheter, a 16-Libyan Rogers catheter through the  catheterizable channel, 19F Murray drain    SPECIMENS OBTAINED: none    COMPLICATIONS: None.     DISPOSITION: PACU.     INDICATIONS: Dianna Cottrell is a 28 year old female with history of neurogenic bladder due to SCI. She has had long term indwelling rogers with now a patulous urethra and leaks. Additionally she has a small bladder. The risks, benefits and alternatives of management were discussed. Patient understands the risks to include bleeding, infection, intestinal leak or obstruction possibly requiring reoperation, abdominal wall hernia, parastomal hernia, stenosis of the stoma requiring revision, urinary incontinence, hydronephrosis, and pyelonephritis. The patient would like to proceed.     DESCRIPTION OF PROCEDURE: Informed consent was obtained from the patient. The patient was then brought to the operating theater and general  anesthesia was induced. A timeout was then performed, verifying the correct patient's site and procedure to be performed. The patient was placed supine on pink foam and secured with Velcro straps to prevent slippage and was prepped and draped in the usual sterile fashion. A 16 Chilean urethral catheter was placed on the field.  A 10cm Pfannenstiel incision was made and the fascia opened transversely. The fascia was lifted on the rectus muscle in all directions, including cephalad to the umbilicus and caudally to the pubic bone. The rectus muscles were split in the midline and we entered the peritoneal space and divided the urachus and medial umbilical ligaments with LigaSure. Of note, the midline did appear to have a diastasis and the fascia around the umbilicus was not robust. We then proceeded to extend our dissection down to the bladder neck at the level of the pubic symphysis and out laterally in the space of Retzius to free up the bladder. Using hand-held retraction we began the mobilization of the cecum along the line of Toldt. We next identified the cecum and ileocecal valve. Appendix was present and was not long enough to reach from the bladder to the umbilicus, especially because the bladder was small and the appendix was only 8cm long. This meant we would have to do a CCIC -- involving laparoscopic mobilization of the right colon, adding 45 min to the procedure and that we would have to do a large bowel anastomosis, rather than small bowel, increasing the technical difficulty.    The appendix was excised with bovie and the stump was tied shut and then imbricated with 3-0 Vicryl. A gel hand port was placed. The umbilicus was excised and trochar was place for the camera and on the left side an assistant trochar was placed. The abdomen was insufflated.  We next identified the cecum and ileocecal valve laparoscopically. The right colon was mobilized along the line of Toldt to all the way around the hepatic  flexure, dividing attachments to the right colon with cautery. Kocher maneuver was continued until the duodenum were well exposed.The laparoscopic instruments were removed and the Bookwalter retractor was placed in the Pfannenstiel incision.   From the ileocecal valve we measured out 10cm of cecum that would serve as our augment and 10cm of ileum that would serve as the catheterizable channel.The segment was based on the ileocecal artery. We next proceeded to make a mesenteric window just below our proposed sites. We then incised through the mesentery using a LigaSure. Ileum and colon were divided with ROSARIO 100 mm stapler and then a stapled side to side anastomosis was performed in the standard fashion. The staple line was buried with 3-0 Vicryl sutures.   We next irrigated then opened our cecum along the anti-mesenteric side to form a square patch. Degree of stool present in the cecum was none. The ileal segment was tapered over a 14 Greek catheter using 1 100mm ROSARIO staple loads from the end to just proximal to the ileocecal valve at the antimesenteric border. The terminal 3-5 cm of ileum was narrowed with 3 Lembert sutures of 3-0 PDS.  A sagittal cystotomy was then made with Bovie cautery from the trigonal ridge to the anterior bladder neck. We placed 5F ureteral catheters in both ureters for identification. These were passed all the way to the kidney. This was to avoid injury during bladder neck closure.   We performed a bladder neck closure. The space of retzius was developed as was the vesico-uterine space. We then divided the dorsal vein. The periurethral tissue was incised and we divided the urethra at the bladder neck. A spongestick was placed in the vagina to delineate it's location.  Then the posterior bladder was developed as it was retrogradely dissected off the vagina for a total of 3cm. We closed the bladder neck in 2 layers. The mucosal layer was closed in vertical fashion with 3-0 PDS and the  muscular layer in horizontal fashion.   We then proceeded to anastomose our cecal patch to the bladder using 3-0 PDS in a running fashion.   Prior to completing the full anastomosis, a 20-Nigerien Winston catheter was inserted through the left rectus and fascia and another separate small cystotomy made to insert our suprapubic tube coming out the left lower qaudrant taking care to avoid the inferior epigastric vessels. 10 mL of sterile water were injected in the balloon.   We harvested an omental flap off of the T colon using the Lig-a-sure. The flap was secured to the vagina and the pelvic floor at the urethra and the bladder using 3-0 Vicryl. This helped preserve the 3cm separation we had created between the urethral stump and the bladder neck closure.   We next tested our catheterizable channel which catheterized easily with a 14-Nigerien straight catheter. We next proceeded to perform our maturation of our catheterizable stoma. The umbilicus did not have robust fascia around it and we thought there was risk of herniation in the future so we went to the RLQ. A V-shape was incised and we dissected through tissue and poked through fascia with a tonsil. We then brought he channel through this. We did not remove  excess ileum from our ileal channel at the stoma end . The  mucosal edges were pexed to the skin with a 4-0 Vicryl.  We did rosebud the stoma. We did not test the bladder for leak and stomal continence. We did not pex the ileal channel to the underside of the fascia with 3-0 PDS. We did not pex the anterior bladder wall up to the anterior abdominal wall. We then again tested our channel with a 14-Nigerien catheter and this catheterized easily. We finally left a 16 Nigerien Winston catheter in the catheterizable channel with 10cc in the balloon.  Next, we inserted a 19-Nigerien Murray drain through the umbilicus port site and stitched this to the skin with a 4-0 nylon.   We then checked for laps in all 4 quadrants, and our  lap count was correct without evidence of retained laps. We irrigated the abdomen. Hemostasis appeared excellent. We then reapproximated the rectus muscle with interrupted 3-0 Vicryl and closed the fascia with running 0 PDS.   The subcutaneous tissue was closed with 3-0 Vicryl.  We then closed the skin with suture. The Mitrofanoff catheter was previously inflated with 10 mL in the ballooned and this was secured under Tegaderm and capped. The suprapubic/urethral catheter was placed to gravity drainage and our ROSANNE to bulb suction. The patient was then awoken from anesthesia and transferred to the PACU for further monitoring.      As the attending surgeon I, Mata Bacon, was present and scrubbed throughout the procedure.

## 2023-03-16 NOTE — BRIEF OP NOTE
[facility]    Brief Operative Note    Pre-operative diagnosis: Neurogenic bladder [N31.9]  Post-operative diagnosis Same as pre-operative diagnosis    Procedure: Procedure(s):  HAND-ASSISTED LAPAROSCOPIC BLADDER AUGMENTATION WITH CATHETERIZABLE CHANNEL; BLADDER NECK CLOSURE  Surgeon: Surgeon(s) and Role:     * Mata Bacon MD - Primary     * Amos Pham MD - Assisting     * Hal Bailey MD - Resident - Assisting  Anesthesia: General   Estimated Blood Loss: 150 mL from 3/16/2023 11:00 AM to 3/16/2023  4:41 PM      Drains:   Siddharth-Roblero  SP rogers  Cath channel rogers    Specimens:   ID Type Source Tests Collected by Time Destination   1 : Appendix Tissue Appendix SURGICAL PATHOLOGY EXAM Mata Bacon MD 3/16/2023  1:07 PM      Findings:   Uncomplicated CCIC with bladder neck closure.  Complications: None.  Implants: * No implants in log *    -CLD POD 1  -Post op labs, HSQ tonight if stable  -Discharge on anticoagulation for 1 month  -Daily xeroform with gauze dressing by nursing over cath channel stoma  -Irrigations POD 2  -Nothing per vagina for 8 weeks

## 2023-03-16 NOTE — ANESTHESIA POSTPROCEDURE EVALUATION
Patient: Dianna Cottrell    Procedure: Procedure(s):  HAND-ASSISTED LAPAROSCOPIC BLADDER AUGMENTATION WITH CATHETERIZABLE CHANNEL; BLADDER NECK CLOSURE       Anesthesia Type:  General    Note:  Disposition: Inpatient   Postop Pain Control: Uneventful            Sign Out: Well controlled pain   PONV: No   Neuro/Psych: Uneventful            Sign Out: Acceptable/Baseline neuro status   Airway/Respiratory: Uneventful            Sign Out: Acceptable/Baseline resp. status   CV/Hemodynamics: Uneventful            Sign Out: Acceptable CV status; No obvious hypovolemia; No obvious fluid overload   Other NRE: NONE   DID A NON-ROUTINE EVENT OCCUR? No           Last vitals:  Vitals Value Taken Time   /66 03/16/23 1800   Temp 36.9  C (98.5  F) 03/16/23 1645   Pulse 93 03/16/23 1810   Resp 16 03/16/23 1800   SpO2 96 % 03/16/23 1810   Vitals shown include unvalidated device data.    Electronically Signed By: Raphael Bean MD  March 16, 2023  6:11 PM

## 2023-03-16 NOTE — OR NURSING
block performed without complications.  VSS.  Pt tolerated well.  Will continue to monitor. Tap block bilate

## 2023-03-16 NOTE — ANESTHESIA PROCEDURE NOTES
Airway       Patient location during procedure: OR       Procedure Start/Stop Times: 3/16/2023 11:13 AM  Staff -        CRNA: Hadley Willis APRN CRNA       Other Anesthesia Staff: Alphonso Potts       Performed By: SRNAIndications and Patient Condition       Indications for airway management: adonay-procedural       Induction type:intravenous       Mask difficulty assessment: 1 - vent by mask    Final Airway Details       Final airway type: endotracheal airway       Successful airway: ETT - single  Endotracheal Airway Details        ETT size (mm): 6.5       Cuffed: yes       Successful intubation technique: video laryngoscopy       VL Blade Size: Glidescope 3       Grade View of Cords: 1       Adjucts: stylet       Position: Right       Measured from: lips       Secured at (cm): 22       Bite block used: None    Post intubation assessment        Placement verified by: capnometry and equal breath sounds        Number of attempts at approach: 1       Number of other approaches attempted: 0       Secured with: pink tape       Ease of procedure: easy       Dentition: Intact    Medication(s) Administered   Medication Administration Time: 3/16/2023 11:13 AM

## 2023-03-16 NOTE — TELEPHONE ENCOUNTER
Reason for visit: Post-op s/p Bladder augmentation and Mitrofanoff creation 03/16/23    Dx/Hx/Sx: Neurogenic bladder    Records/imaging/labs/orders: In EPIC    At Rooming: MICHAEL Wood  03/16/23  11:55 AM

## 2023-03-16 NOTE — ANESTHESIA CARE TRANSFER NOTE
Patient: Dianna Cottrell    Procedure: Procedure(s):  HAND-ASSISTED LAPAROSCOPIC BLADDER AUGMENTATION WITH CATHETERIZABLE CHANNEL; BLADDER NECK CLOSURE       Diagnosis: Neurogenic bladder [N31.9]  Diagnosis Additional Information: No value filed.    Anesthesia Type:   General     Note:    Oropharynx: oropharynx clear of all foreign objects and spontaneously breathing  Level of Consciousness: drowsy  Oxygen Supplementation: nasal cannula    Independent Airway: airway patency satisfactory and stable  Dentition: dentition unchanged  Vital Signs Stable: post-procedure vital signs reviewed and stable  Report to RN Given: handoff report given  Patient transferred to: PACU  Comments: Patient transferred to PACU in stable condition,breathing spontaneously on NC, VSS.  Report given to RN.  Handoff Report: Identifed the Patient, Identified the Reponsible Provider, Reviewed the pertinent medical history, Discussed the surgical course, Reviewed Intra-OP anesthesia mangement and issues during anesthesia, Set expectations for post-procedure period and Allowed opportunity for questions and acknowledgement of understanding      Vitals:  Vitals Value Taken Time   /64 03/16/23 1645   Temp     Pulse 92 03/16/23 1648   Resp     SpO2 99 % 03/16/23 1648   Vitals shown include unvalidated device data.    Electronically Signed By: ODILIA Lund CRNA  March 16, 2023  4:50 PM

## 2023-03-17 LAB
ANION GAP SERPL CALCULATED.3IONS-SCNC: 15 MMOL/L (ref 7–15)
BUN SERPL-MCNC: 5.6 MG/DL (ref 6–20)
CALCIUM SERPL-MCNC: 8 MG/DL (ref 8.6–10)
CHLORIDE SERPL-SCNC: 107 MMOL/L (ref 98–107)
CREAT SERPL-MCNC: 0.43 MG/DL (ref 0.51–0.95)
DEPRECATED HCO3 PLAS-SCNC: 15 MMOL/L (ref 22–29)
ERYTHROCYTE [DISTWIDTH] IN BLOOD BY AUTOMATED COUNT: 14 % (ref 10–15)
GFR SERPL CREATININE-BSD FRML MDRD: >90 ML/MIN/1.73M2
GLUCOSE BLDC GLUCOMTR-MCNC: 83 MG/DL (ref 70–99)
GLUCOSE SERPL-MCNC: 69 MG/DL (ref 70–99)
HCT VFR BLD AUTO: 39.1 % (ref 35–47)
HGB BLD-MCNC: 12.1 G/DL (ref 11.7–15.7)
MCH RBC QN AUTO: 28.6 PG (ref 26.5–33)
MCHC RBC AUTO-ENTMCNC: 30.9 G/DL (ref 31.5–36.5)
MCV RBC AUTO: 92 FL (ref 78–100)
PLATELET # BLD AUTO: 292 10E3/UL (ref 150–450)
POTASSIUM SERPL-SCNC: 3.6 MMOL/L (ref 3.4–5.3)
RBC # BLD AUTO: 4.23 10E6/UL (ref 3.8–5.2)
SODIUM SERPL-SCNC: 137 MMOL/L (ref 136–145)
WBC # BLD AUTO: 19.1 10E3/UL (ref 4–11)

## 2023-03-17 PROCEDURE — 82310 ASSAY OF CALCIUM: CPT | Performed by: PHYSICIAN ASSISTANT

## 2023-03-17 PROCEDURE — 250N000013 HC RX MED GY IP 250 OP 250 PS 637: Performed by: STUDENT IN AN ORGANIZED HEALTH CARE EDUCATION/TRAINING PROGRAM

## 2023-03-17 PROCEDURE — 36415 COLL VENOUS BLD VENIPUNCTURE: CPT | Performed by: STUDENT IN AN ORGANIZED HEALTH CARE EDUCATION/TRAINING PROGRAM

## 2023-03-17 PROCEDURE — 120N000002 HC R&B MED SURG/OB UMMC

## 2023-03-17 PROCEDURE — 36415 COLL VENOUS BLD VENIPUNCTURE: CPT | Performed by: PHYSICIAN ASSISTANT

## 2023-03-17 PROCEDURE — 250N000011 HC RX IP 250 OP 636: Performed by: STUDENT IN AN ORGANIZED HEALTH CARE EDUCATION/TRAINING PROGRAM

## 2023-03-17 PROCEDURE — 999N000147 HC STATISTIC PT IP EVAL DEFER

## 2023-03-17 PROCEDURE — 85027 COMPLETE CBC AUTOMATED: CPT | Performed by: STUDENT IN AN ORGANIZED HEALTH CARE EDUCATION/TRAINING PROGRAM

## 2023-03-17 PROCEDURE — 250N000013 HC RX MED GY IP 250 OP 250 PS 637: Performed by: PHYSICIAN ASSISTANT

## 2023-03-17 PROCEDURE — 258N000003 HC RX IP 258 OP 636: Performed by: PHYSICIAN ASSISTANT

## 2023-03-17 RX ORDER — SODIUM CHLORIDE, SODIUM LACTATE, POTASSIUM CHLORIDE, CALCIUM CHLORIDE 600; 310; 30; 20 MG/100ML; MG/100ML; MG/100ML; MG/100ML
INJECTION, SOLUTION INTRAVENOUS CONTINUOUS
Status: DISCONTINUED | OUTPATIENT
Start: 2023-03-17 | End: 2023-03-22

## 2023-03-17 RX ORDER — DEXTROSE MONOHYDRATE 25 G/50ML
25-50 INJECTION, SOLUTION INTRAVENOUS
Status: DISCONTINUED | OUTPATIENT
Start: 2023-03-17 | End: 2023-03-23 | Stop reason: HOSPADM

## 2023-03-17 RX ORDER — NICOTINE POLACRILEX 4 MG
15-30 LOZENGE BUCCAL
Status: DISCONTINUED | OUTPATIENT
Start: 2023-03-17 | End: 2023-03-23 | Stop reason: HOSPADM

## 2023-03-17 RX ADMIN — ACETAMINOPHEN 975 MG: 325 TABLET ORAL at 20:09

## 2023-03-17 RX ADMIN — HEPARIN SODIUM 5000 UNITS: 5000 INJECTION, SOLUTION INTRAVENOUS; SUBCUTANEOUS at 05:35

## 2023-03-17 RX ADMIN — HEPARIN SODIUM 5000 UNITS: 5000 INJECTION, SOLUTION INTRAVENOUS; SUBCUTANEOUS at 21:14

## 2023-03-17 RX ADMIN — CYCLOBENZAPRINE HYDROCHLORIDE 5 MG: 5 TABLET, FILM COATED ORAL at 01:11

## 2023-03-17 RX ADMIN — LIDOCAINE PATCH 4% 1 PATCH: 40 PATCH TOPICAL at 20:09

## 2023-03-17 RX ADMIN — DOCUSATE SODIUM 200 MG: 100 CAPSULE, LIQUID FILLED ORAL at 08:44

## 2023-03-17 RX ADMIN — HYDROXYZINE HYDROCHLORIDE 25 MG: 25 TABLET, FILM COATED ORAL at 16:44

## 2023-03-17 RX ADMIN — ACETAMINOPHEN 975 MG: 325 TABLET ORAL at 05:28

## 2023-03-17 RX ADMIN — OXYCODONE HYDROCHLORIDE 10 MG: 10 TABLET ORAL at 20:09

## 2023-03-17 RX ADMIN — HEPARIN SODIUM 5000 UNITS: 5000 INJECTION, SOLUTION INTRAVENOUS; SUBCUTANEOUS at 13:42

## 2023-03-17 RX ADMIN — ONDANSETRON HYDROCHLORIDE 4 MG: 2 INJECTION, SOLUTION INTRAMUSCULAR; INTRAVENOUS at 17:33

## 2023-03-17 RX ADMIN — BACLOFEN 30 MG: 20 TABLET ORAL at 20:09

## 2023-03-17 RX ADMIN — SODIUM CHLORIDE, POTASSIUM CHLORIDE, SODIUM LACTATE AND CALCIUM CHLORIDE: 600; 310; 30; 20 INJECTION, SOLUTION INTRAVENOUS at 14:44

## 2023-03-17 RX ADMIN — SENNOSIDES AND DOCUSATE SODIUM 1 TABLET: 8.6; 5 TABLET ORAL at 20:09

## 2023-03-17 RX ADMIN — OXYCODONE HYDROCHLORIDE 5 MG: 5 TABLET ORAL at 11:31

## 2023-03-17 RX ADMIN — GABAPENTIN 300 MG: 300 CAPSULE ORAL at 21:14

## 2023-03-17 RX ADMIN — METHOCARBAMOL 500 MG: 500 TABLET ORAL at 11:31

## 2023-03-17 RX ADMIN — MIDODRINE HYDROCHLORIDE 10 MG: 5 TABLET ORAL at 20:09

## 2023-03-17 RX ADMIN — METHOCARBAMOL 500 MG: 500 TABLET ORAL at 05:28

## 2023-03-17 RX ADMIN — OXYBUTYNIN CHLORIDE 5 MG: 5 TABLET, EXTENDED RELEASE ORAL at 08:45

## 2023-03-17 RX ADMIN — BACLOFEN 30 MG: 20 TABLET ORAL at 08:44

## 2023-03-17 RX ADMIN — METHOCARBAMOL 500 MG: 500 TABLET ORAL at 16:44

## 2023-03-17 RX ADMIN — SERTRALINE HYDROCHLORIDE 150 MG: 100 TABLET ORAL at 08:44

## 2023-03-17 RX ADMIN — ACETAMINOPHEN 975 MG: 325 TABLET ORAL at 13:42

## 2023-03-17 RX ADMIN — MIDODRINE HYDROCHLORIDE 10 MG: 5 TABLET ORAL at 08:44

## 2023-03-17 RX ADMIN — BACLOFEN 30 MG: 20 TABLET ORAL at 13:42

## 2023-03-17 RX ADMIN — SENNOSIDES AND DOCUSATE SODIUM 1 TABLET: 8.6; 5 TABLET ORAL at 08:44

## 2023-03-17 RX ADMIN — OXYCODONE HYDROCHLORIDE 5 MG: 5 TABLET ORAL at 06:48

## 2023-03-17 ASSESSMENT — ACTIVITIES OF DAILY LIVING (ADL)
ADLS_ACUITY_SCORE: 38
ADLS_ACUITY_SCORE: 46

## 2023-03-17 NOTE — PROVIDER NOTIFICATION
Notified Sujit Willis, Resident at 11:03 PM regarding change in condition.      Comments: RRT called on pt. Pt is having sharp pain on her R shoulder that is radiating down her RUE. HR is in 140-150's.    EKG and chest xray ordered

## 2023-03-17 NOTE — PLAN OF CARE
Admitted onto unit at 18:55. Used hover mat to transfer patient over to patient bed. Oriented to room, callbell. Boyfriend at bedside. Drowsy, VSS. Continue POC,to give handoff to oncoming RN.

## 2023-03-17 NOTE — CODE/RAPID RESPONSE
Rapid Response Team Note    Assessment   In assessment a rapid response was called on Dianna Cottrell due to tachycardia and Right shoulder pain. This presentation is likely due to     Plan   -  ECG, CXR  - cbc, LA, BMP, Ca, phos, mag  - if patient becomes hypoxic consider CT PE study  - if febrile repeat LA and add cultures  -  The Urology primary team was able to be reached and they are in agreement with the above plan.  -  Disposition: The patient will remain on the current unit. We will continue to monitor this patient closely.  -  Reassessment and plan follow-up will be performed by the primary team    FLORY MARSH PA-C  [facility] RRT [facility] Job Code Contact #[phone]  [facility] Paging/Directory    Hospital Course   Brief Summary of events leading to rapid response:   28 y.o. female with a pmh of neurogenic bladder, quadriplegia, history of PE, and depressive disorder s/p ileal channel cecocystoplasty, appendectomy, and suprapubic catherater placement.  Upon my arrival , /67, afebrile, and hgb was 14.1 postoperatively.  Patient is has significant shoulder pain and spasms which is causing some pain with breathing.  ECG revels sinus tachycardia and we have given an additional dose of IV dilaudid,  CXR, LA, CBC, bmp, Mg, Phosp, and calcium order. LA currently 2.9 but likely multifactorial given muscle spasm and surgery today.  We will redraw another one with cultures if she becomes febrile.      Admission Diagnosis:   Neurogenic bladder [N31.9]    Physical Exam   Temp: 97.9  F (36.6  C) Temp  Min: 97.1  F (36.2  C)  Max: 98.5  F (36.9  C)  Resp: (!) 32 (nurse notified rapid called) Resp  Min: 16  Max: 32  SpO2: 98 % SpO2  Min: 95 %  Max: 100 %  Pulse: (!) 139 Pulse  Min: 54  Max: 144    No data recorded  BP: 126/67 Systolic (24hrs), Av , Min:93 , Max:143   Diastolic (24hrs), Av, Min:59, Max:85     I/Os: I/O last 3 completed shifts:  In: 2200 [I.V.:2200]  Out: 790 [Urine:550; Drains:90;  Blood:150]     Exam:   General: well nourished and in pain  Mental Status: baseline mental status.  HEENT: no scleral icterus, moist mucous membranes  Cardiac: tachycardia, regular rhythm without murmurs or rubs  Pulm: CTAB with no adventitious breath sounds  Abd: drain in place, no distention, + BS, no tenderness to palpation  Extremities: 2+ radial and dorsalis pedis pulses bilaterally, no BLE edema  Neuro: CN 2-12 intact, Moves BUE to command, paraplegia in lower extremities  Psych: appropriate affect    Significant Results and Procedures   Lactic Acid:   Recent Labs   Lab Test 09/30/22  2039 12/08/16  0705 12/07/16  1842   LACT 1.7 0.9 2.8*     CBC:   Recent Labs   Lab Test 03/16/23  1823 03/08/23  1614 10/04/22  0651 10/03/22  0549   WBC  --  14.6* 12.7* 17.7*   HGB 14.1 14.4 11.2* 10.7*   HCT  --  44.0 35.3 32.3*   PLT  --  353 461* 419  419        Sepsis Evaluation   The patient is not known to have an infection.  NO EVIDENCE OF SEPSIS at this time.  Vital sign, physical exam, and lab findings are due to  tachycardia and Right shoulder pain.

## 2023-03-17 NOTE — PROGRESS NOTES
Brief note    Paged about rapid called for pain and tachycardia. Reviewed EKG, with sinus tachycardia, no afib/flutter, no S1Q3T3 identified or ST elevations. CXR reviewed without visible pneumothorax or significant chest pathology. No respiratory distress and only on 1-2L NC (since post-op), BP WNL. Labs consistent with procedure earlier today, expected elevation in WBC and lactate. Hgb stable and now on SQH. Patient endorsing symptoms of muscle spasms at right shoulder and RUE and associated pain that correlated with the vital sign change. Agree with rapid response team that this is likely pain related. Rapid team ordering another dose of dilaudid, muscle relaxer and lidocaine patch. Will continue to monitor for now.

## 2023-03-17 NOTE — PROGRESS NOTES
Admission/Transfer from: PACU  2 RN skin assessment completed. YES  Significant findings include: Sacral mepilex in place for preventative measures  WOC Nurse Consult Ordered? NO

## 2023-03-17 NOTE — PROGRESS NOTES
Urology  Progress Note    Rapid o/n for shoulder pain, EKG wnl, CXR wnl  AFVNS  Still having some shoulder pain this AM   Some nausea immediately post-op but has since resolved, no vomiting   Feeling hungry this AM     Exam  /57 (BP Location: Right arm)   Pulse 97   Temp 97.6  F (36.4  C) (Temporal)   Resp 19   SpO2 94%   No acute distress  Unlabored breathing on room air   Abdomen soft, appropriately tender, nondistended. Incisions covered with dressing covered with primipore.   ROSANNE serosanguinous  SP and mitrofanoff with clear urine in tubing    /680  ROSANNE 90/45    Labs  Recent Labs   Lab Test 03/16/23  2312 03/16/23  1940 03/16/23  1823 03/08/23  1614 10/05/22  0739 10/04/22  0651   WBC 27.9*  --   --  14.6*  --  12.7*   HGB 13.8  --  14.1 14.4  --  11.2*   CR 0.50* 0.52 0.47* 0.44*   < > 0.44*    < > = values in this interval not displayed.      AM labs pending    Assessment/Plan  28 year old y/o female POD#1 s/p bladder augment with catheterizable channel for NGB and SP tube placement     Neuro: APAP, oxy, IV HM for pain control. Home baclofen, gabapentin.   CV: RAFA  Pulm: incentive spirometry while awake  FEN/GI: Can advance to clears, MIVF @ 75/hr  Endo: SSI  : Indwelling catheters in channel and SP tube to remain for 6 weeks   Heme/ID: na  Activity: May transition back to baseline activity   PPx: SCDs, SQH    Seen and examined with the chief resident. Will discuss with Dr. Bacon.     Contacting the Urology Team     Please use the following job codes to reach the Urology Team. Note that you must use an in house phone and that job codes cannot receive text pages.     On weekdays, dial [phone] (or star-star-star 777 on the new [employer] telephones) then [phone] to reach the Adult Urology resident or PA on call    On weekdays, dial [phone] (or star-star-star 777 on the new [employer] telephones) then 0818 to reach the Pediatric Urology resident    On weeknights and weekends, dial [phone] (or star-star-star 777 on the  new [employer] telephones) then [phone] to reach the Urology resident on call (for both Adult and Pediatrics)

## 2023-03-17 NOTE — PROVIDER NOTIFICATION
03/16/23 2345   Call Information   Date of Call 03/16/23   Time of Call 2252   Name of person requesting the team Refugio ANTOINE   Title of person requesting team RN   RRT Arrival time 2300   Time RRT ended 2330   Reason for call   Type of RRT Adult   Primary reason for call Cardiovascular;Nurse is concerned/worried   Cardiovascular Other (describe)  (+ sustained)   Was patient transferred from the ED, ICU, or PACU within last 24 hours prior to RRT call? No   SBAR   Situation RRT called for sustained HR in 130's-140's, 10/10 pain.   Background Per provider note,  pmh of neurogenic bladder, quadriplegia, history of PE, and depressive disorder s/p ileal channel cecocystoplasty, appendectomy, and suprapubic catherater placement.  Upon my arrival , /67, afebrile, and hgb was 14.1 postoperatively.  Patient is has significant shoulder pain and spasms which is causing some pain with breathing.   Notable History/Conditions Recent surgery  (Baseline quadriplegia)   Assessment Pt shivering in severe pain with frequent spasms in shoulder, worse when exhaling but relived with mild pressure to site. 's-140's but other VSS.   Interventions Labs;Meds;CXR;ECG   Patient Outcome   Patient Outcome Stabilized on unit   RRT Team   Attending/Primary/Covering Physician Urology   Date Attending Physician notified 03/16/23   Time Attending Physician notified 2300   Physician(s) Dayday CAMACHO PA-C   Lead RN Max H   RN Rose GARDNER   RT N/A   Post RRT Intervention Assessment   Post RRT Assessment Stable/Improved   Date Follow Up Done 03/17/23   Time Follow Up Done 0115   Comments Improvement in pain control per bedside RN. VSS. No concerns from nursing at this time.

## 2023-03-17 NOTE — PLAN OF CARE
VSS on RA. Right shoulder/back pain well managed with PO Oxycodone, Robaxin, Tylenol and back massage. BG 69, PA aware; IVF switched to LR. Tolerating some clear liquids w/o nausea. Denies passing flatus, abdomen somewhat distended. Winston bags to channel (right side) and SP catheter. ROSANNE w/ serosanguinous output Repositioned w/ assist of 2. Switch to pulsate mattress once bed available. Dressing change to right cath needed ~7 PM, per POC. K+ protocol needed once able to get weight. Continue w/ POC.

## 2023-03-17 NOTE — PLAN OF CARE
Goal Outcome Evaluation:         Pt A/Ox4, VSS on RA. Baseline quadriplegia. Pt has some use of BUE's, no use of BLE's. Last turn at 22:00, pulsate mattress ordered. NPO ON, CLD to start tomorrow. Midline abd urinary line draining to rogers bag with no output. RUQ ROSANNE drain with bright red blood output, to bulb suction. RLQ urinary line with dark output. L forearm PIV infusing NS at 75mL/hr. L hand PIV SL. PCD's in use. Prevalon boots obtained but not in use yet. Pain controlled with scheduled meds, pt endorses some R neck soreness (heat packs in use). Sacral mepilex in place for preventative measures. Daily xeroform dressing changes around suprapubic catheter to start tomorrow. Bladder irrigations to begin POD2. Urology primary team

## 2023-03-17 NOTE — PLAN OF CARE
Physical Therapy: Orders received. Chart reviewed and discussed with care team.? Physical Therapy not indicated due to pt quadriplegic at baseline, per conversation with pt she receives assist with ADLs from mother, transfers either with hunter lift vs dependent squat pivot with mother. Pt educated on abdominal precautions avoiding excess pushing/pulling/lifting however pt does not do this at baseline, uses powered wc not manual wc.? Defer discharge recommendations to MD.? Will complete orders.

## 2023-03-17 NOTE — PLAN OF CARE
2330-9216 Abdominal lap sites intact. No flatus or BM. Denies nausea, NPO except sips with PO medications. Right upper back/shoulder pain greatly improved after one time dose of Flexeril around 0110; pain otherwise somewhat managed with Tylenol, Robaxin, oxycodone, lidocaine patch, and massage. Adequate urine output from suprapubic catheter. Winston catheter in mitrofanoff, minimal urine output. ROSANNE with small amount of sanguinous output. Not out of bed, pt repositioned as pt allowed. Awaiting delivery of pulsate mattress. Intermittently tachycardic and tachypneic, OVSS on room air. CAPNO on.

## 2023-03-18 LAB
ANION GAP SERPL CALCULATED.3IONS-SCNC: 14 MMOL/L (ref 7–15)
BUN SERPL-MCNC: 3.9 MG/DL (ref 6–20)
CALCIUM SERPL-MCNC: 8.2 MG/DL (ref 8.6–10)
CHLORIDE SERPL-SCNC: 107 MMOL/L (ref 98–107)
CREAT SERPL-MCNC: 0.4 MG/DL (ref 0.51–0.95)
DEPRECATED HCO3 PLAS-SCNC: 15 MMOL/L (ref 22–29)
ERYTHROCYTE [DISTWIDTH] IN BLOOD BY AUTOMATED COUNT: 13.8 % (ref 10–15)
GFR SERPL CREATININE-BSD FRML MDRD: >90 ML/MIN/1.73M2
GLUCOSE BLDC GLUCOMTR-MCNC: 118 MG/DL (ref 70–99)
GLUCOSE BLDC GLUCOMTR-MCNC: 79 MG/DL (ref 70–99)
GLUCOSE BLDC GLUCOMTR-MCNC: 89 MG/DL (ref 70–99)
GLUCOSE BLDC GLUCOMTR-MCNC: 99 MG/DL (ref 70–99)
GLUCOSE SERPL-MCNC: 77 MG/DL (ref 70–99)
HCT VFR BLD AUTO: 35.1 % (ref 35–47)
HGB BLD-MCNC: 10.8 G/DL (ref 11.7–15.7)
MCH RBC QN AUTO: 28.5 PG (ref 26.5–33)
MCHC RBC AUTO-ENTMCNC: 30.8 G/DL (ref 31.5–36.5)
MCV RBC AUTO: 93 FL (ref 78–100)
PLATELET # BLD AUTO: 249 10E3/UL (ref 150–450)
POTASSIUM SERPL-SCNC: 3.4 MMOL/L (ref 3.4–5.3)
POTASSIUM SERPL-SCNC: 3.5 MMOL/L (ref 3.4–5.3)
RBC # BLD AUTO: 3.79 10E6/UL (ref 3.8–5.2)
SODIUM SERPL-SCNC: 136 MMOL/L (ref 136–145)
WBC # BLD AUTO: 19.5 10E3/UL (ref 4–11)

## 2023-03-18 PROCEDURE — 36415 COLL VENOUS BLD VENIPUNCTURE: CPT | Performed by: PHYSICIAN ASSISTANT

## 2023-03-18 PROCEDURE — 85027 COMPLETE CBC AUTOMATED: CPT | Performed by: STUDENT IN AN ORGANIZED HEALTH CARE EDUCATION/TRAINING PROGRAM

## 2023-03-18 PROCEDURE — 250N000013 HC RX MED GY IP 250 OP 250 PS 637: Performed by: UROLOGY

## 2023-03-18 PROCEDURE — 36415 COLL VENOUS BLD VENIPUNCTURE: CPT | Performed by: UROLOGY

## 2023-03-18 PROCEDURE — 258N000003 HC RX IP 258 OP 636: Performed by: PHYSICIAN ASSISTANT

## 2023-03-18 PROCEDURE — 250N000013 HC RX MED GY IP 250 OP 250 PS 637: Performed by: STUDENT IN AN ORGANIZED HEALTH CARE EDUCATION/TRAINING PROGRAM

## 2023-03-18 PROCEDURE — 80048 BASIC METABOLIC PNL TOTAL CA: CPT | Performed by: PHYSICIAN ASSISTANT

## 2023-03-18 PROCEDURE — 250N000011 HC RX IP 250 OP 636: Performed by: STUDENT IN AN ORGANIZED HEALTH CARE EDUCATION/TRAINING PROGRAM

## 2023-03-18 PROCEDURE — 120N000002 HC R&B MED SURG/OB UMMC

## 2023-03-18 PROCEDURE — 250N000013 HC RX MED GY IP 250 OP 250 PS 637: Performed by: PHYSICIAN ASSISTANT

## 2023-03-18 PROCEDURE — 84132 ASSAY OF SERUM POTASSIUM: CPT | Performed by: UROLOGY

## 2023-03-18 RX ORDER — BISACODYL 10 MG
10 SUPPOSITORY, RECTAL RECTAL DAILY
Status: DISCONTINUED | OUTPATIENT
Start: 2023-03-18 | End: 2023-03-21

## 2023-03-18 RX ORDER — SODIUM BICARBONATE 650 MG/1
1300 TABLET ORAL 2 TIMES DAILY
Status: DISCONTINUED | OUTPATIENT
Start: 2023-03-18 | End: 2023-03-19

## 2023-03-18 RX ORDER — POTASSIUM CHLORIDE 750 MG/1
40 TABLET, EXTENDED RELEASE ORAL ONCE
Status: COMPLETED | OUTPATIENT
Start: 2023-03-18 | End: 2023-03-18

## 2023-03-18 RX ADMIN — BACLOFEN 30 MG: 20 TABLET ORAL at 08:54

## 2023-03-18 RX ADMIN — HEPARIN SODIUM 5000 UNITS: 5000 INJECTION, SOLUTION INTRAVENOUS; SUBCUTANEOUS at 05:16

## 2023-03-18 RX ADMIN — POTASSIUM CHLORIDE 40 MEQ: 750 TABLET, EXTENDED RELEASE ORAL at 11:05

## 2023-03-18 RX ADMIN — BACLOFEN 30 MG: 20 TABLET ORAL at 14:22

## 2023-03-18 RX ADMIN — BACLOFEN 30 MG: 20 TABLET ORAL at 19:21

## 2023-03-18 RX ADMIN — DOCUSATE SODIUM 200 MG: 100 CAPSULE, LIQUID FILLED ORAL at 08:54

## 2023-03-18 RX ADMIN — ACETAMINOPHEN 975 MG: 325 TABLET ORAL at 21:24

## 2023-03-18 RX ADMIN — SODIUM CHLORIDE, POTASSIUM CHLORIDE, SODIUM LACTATE AND CALCIUM CHLORIDE: 600; 310; 30; 20 INJECTION, SOLUTION INTRAVENOUS at 03:54

## 2023-03-18 RX ADMIN — SENNOSIDES AND DOCUSATE SODIUM 1 TABLET: 8.6; 5 TABLET ORAL at 19:21

## 2023-03-18 RX ADMIN — SENNOSIDES AND DOCUSATE SODIUM 1 TABLET: 8.6; 5 TABLET ORAL at 08:54

## 2023-03-18 RX ADMIN — HEPARIN SODIUM 5000 UNITS: 5000 INJECTION, SOLUTION INTRAVENOUS; SUBCUTANEOUS at 21:24

## 2023-03-18 RX ADMIN — MIDODRINE HYDROCHLORIDE 10 MG: 5 TABLET ORAL at 19:21

## 2023-03-18 RX ADMIN — BISACODYL 10 MG RECTAL SUPPOSITORY 10 MG: at 11:56

## 2023-03-18 RX ADMIN — OXYBUTYNIN CHLORIDE 5 MG: 5 TABLET, EXTENDED RELEASE ORAL at 08:54

## 2023-03-18 RX ADMIN — SODIUM CHLORIDE, POTASSIUM CHLORIDE, SODIUM LACTATE AND CALCIUM CHLORIDE: 600; 310; 30; 20 INJECTION, SOLUTION INTRAVENOUS at 17:03

## 2023-03-18 RX ADMIN — ACETAMINOPHEN 975 MG: 325 TABLET ORAL at 14:22

## 2023-03-18 RX ADMIN — METHOCARBAMOL 500 MG: 500 TABLET ORAL at 09:14

## 2023-03-18 RX ADMIN — OXYCODONE HYDROCHLORIDE 10 MG: 10 TABLET ORAL at 11:04

## 2023-03-18 RX ADMIN — LIDOCAINE PATCH 4% 1 PATCH: 40 PATCH TOPICAL at 19:22

## 2023-03-18 RX ADMIN — GABAPENTIN 300 MG: 300 CAPSULE ORAL at 21:24

## 2023-03-18 RX ADMIN — SODIUM BICARBONATE 1300 MG: 650 TABLET ORAL at 19:21

## 2023-03-18 RX ADMIN — SERTRALINE HYDROCHLORIDE 150 MG: 100 TABLET ORAL at 08:54

## 2023-03-18 RX ADMIN — HEPARIN SODIUM 5000 UNITS: 5000 INJECTION, SOLUTION INTRAVENOUS; SUBCUTANEOUS at 14:22

## 2023-03-18 RX ADMIN — MIDODRINE HYDROCHLORIDE 10 MG: 5 TABLET ORAL at 08:54

## 2023-03-18 RX ADMIN — ACETAMINOPHEN 975 MG: 325 TABLET ORAL at 05:16

## 2023-03-18 ASSESSMENT — ACTIVITIES OF DAILY LIVING (ADL)
ADLS_ACUITY_SCORE: 46
ADLS_ACUITY_SCORE: 46
ADLS_ACUITY_SCORE: 42
ADLS_ACUITY_SCORE: 42
ADLS_ACUITY_SCORE: 46
ADLS_ACUITY_SCORE: 42
ADLS_ACUITY_SCORE: 46
ADLS_ACUITY_SCORE: 42

## 2023-03-18 NOTE — PLAN OF CARE
[id number]    Status: POD2 Bladder augmentation with catheterizable channel, bladder neck closure  Vitals:/73 (BP Location: Left arm, Patient Position: Right side)   Pulse 111   Temp 98.3  F (36.8  C) (Temporal)   Resp 16   Wt 58.3 kg (128 lb 8.5 oz)   SpO2 94%   BMI 20.74 kg/m     Hypertensive but not within notifying parameter.RA   Neuros: A&O X4, able to make needs known,   IV: x1 L PIV- infusing LR@75ml/hr, x1 PIV-SL. lidocaine patch on R shoulder  Labs/Electrolytes: reviewed, BG 89  Resp/trach:On RA, no report of SOB  Diet: NPO except for some ice and medications.  /GI: Suprapubic catheter and GI/ conduit in place with adequate output. Some burping but no gas or BM at this time. Some abdominal distention   Skin: WDL except, ROSANNE,mitrofinoff, suprapubic cath in place  Pain: denies pain, lidocaine patch on R shoulder  Activity:  On bedrest this shift.,Q2hr turns  Plan: No change to patient's status this shift.Continue to monitor for ROBF and continue POC. bladder irrigation begins POD2(today)

## 2023-03-18 NOTE — PROGRESS NOTES
Urology  Progress Note    NPO last night due to distension/nausea  Now no nausea, a little bit hungry    Exam  /73 (BP Location: Left arm, Patient Position: Right side)   Pulse 111   Temp 98.3  F (36.8  C) (Temporal)   Resp 16   Wt 58.3 kg (128 lb 8.5 oz)   SpO2 94%   BMI 20.74 kg/m    No acute distress  Unlabored breathing on room air   Abdomen moderately distended, appropriately tender. Incisions c/d/i.  ROSANNE serosanguinous  SP and mitrofanoff with clear urine in tubing    SP 1510/525  Cath 470/15  ROSANNE 85/10    Labs  Recent Labs   Lab Test 03/16/23  2312 03/16/23  1940 03/16/23  1823 03/08/23  1614 10/05/22  0739 10/04/22  0651   WBC 27.9*  --   --  14.6*  --  12.7*   HGB 13.8  --  14.1 14.4  --  11.2*   CR 0.50* 0.52 0.47* 0.44*   < > 0.44*    < > = values in this interval not displayed.      AM labs pending    Assessment/Plan  28 female quadriplegic, neurogenic bladder secondary to SCI, with hx of provoked DVT (second pregnancy), POD#2 s/p CCIC, bladder neck closure.    Neuro: APAP, oxy, IV HM for pain control. Home baclofen, gabapentin.   CV: RAFA, home midodrine.   Pulm: incentive spirometry while awake  FEN/GI: Will advance to CLD this AM but instructed her to take it slow, MIVF @ 75/hr  Endo: SSI  : Indwelling catheters in channel and SP tube to remain for 6 weeks. Begin irrigations today- Urology in the AM, nursing in PM.  Heme/ID: na  Activity: May transition back to baseline activity   PPx: SCDs, SQH    Seen and examined with the chief resident and Dr. House.     Contacting the Urology Team     Please use the following job codes to reach the Urology Team. Note that you must use an in house phone and that job codes cannot receive text pages.     On weekdays, dial [phone] (or star-star-star 777 on the new [employer]) then [phone] to reach the Adult Urology resident or PA on call    On weekdays, dial [phone] (or star-star-star 777 on the new [employer] telephones) then [phone] to reach the Pediatric Urology  resident    On weeknights and weekends, dial [phone] (or star-star-star 777 on the new [employer] telephones) then [phone] to reach the Urology resident on call (for both Adult and Pediatrics)

## 2023-03-18 NOTE — PLAN OF CARE
Goal Outcome Evaluation:  POD1 Bladder augmentation with catheterizable channel, bladder neck closure  VS: BP (!) 147/96 (BP Location: Left arm)   Pulse 93   Temp 99.8  F (37.7  C) (Temporal)   Resp 18   Wt 58.3 kg (128 lb 8.5 oz)   SpO2 94%   BMI 20.74 kg/m  . Hypertensive but not within notifying parameter.   Neuro: A&O X4, able to make needs known  Respiratory: On RA, no report of SOB  GI/: Suprapubic catheter and GI/ conduit in place with output. Some burping but no gas or BM at this time. Some abdominal distention and intermittent nausea manage with IV zofran  Diet/appetite: NPO except for some ice and medications.   Activity: On bedrest this shift. Offered to be  Up in the chair but declined. Electric W/C in coat room.  Pain: R shoulder and abdominal pain managed with tylenol, robaxin, oxycodone, gabapentin, and lidocaine patch.  Skin/drains: ROSANNE, SP catheter and GI/ conduit sites C/D/I. Dressing change to rig ht catheter completed.  Lines: L piv X2.   No change to patient's status this shift.Continue to monitor for ROBF and continue POC.

## 2023-03-19 LAB
ANION GAP SERPL CALCULATED.3IONS-SCNC: 14 MMOL/L (ref 7–15)
ATRIAL RATE - MUSE: 130 BPM
BUN SERPL-MCNC: 3.3 MG/DL (ref 6–20)
CALCIUM SERPL-MCNC: 8.1 MG/DL (ref 8.6–10)
CHLORIDE SERPL-SCNC: 104 MMOL/L (ref 98–107)
CREAT SERPL-MCNC: 0.31 MG/DL (ref 0.51–0.95)
DEPRECATED HCO3 PLAS-SCNC: 20 MMOL/L (ref 22–29)
DIASTOLIC BLOOD PRESSURE - MUSE: NORMAL MMHG
ERYTHROCYTE [DISTWIDTH] IN BLOOD BY AUTOMATED COUNT: 13.8 % (ref 10–15)
GFR SERPL CREATININE-BSD FRML MDRD: >90 ML/MIN/1.73M2
GLUCOSE BLDC GLUCOMTR-MCNC: 76 MG/DL (ref 70–99)
GLUCOSE BLDC GLUCOMTR-MCNC: 88 MG/DL (ref 70–99)
GLUCOSE BLDC GLUCOMTR-MCNC: 89 MG/DL (ref 70–99)
GLUCOSE BLDC GLUCOMTR-MCNC: 89 MG/DL (ref 70–99)
GLUCOSE BLDC GLUCOMTR-MCNC: 95 MG/DL (ref 70–99)
GLUCOSE SERPL-MCNC: 124 MG/DL (ref 70–99)
HCT VFR BLD AUTO: 34.8 % (ref 35–47)
HGB BLD-MCNC: 11.2 G/DL (ref 11.7–15.7)
INTERPRETATION ECG - MUSE: NORMAL
MCH RBC QN AUTO: 28.5 PG (ref 26.5–33)
MCHC RBC AUTO-ENTMCNC: 32.2 G/DL (ref 31.5–36.5)
MCV RBC AUTO: 89 FL (ref 78–100)
P AXIS - MUSE: 67 DEGREES
PLATELET # BLD AUTO: 284 10E3/UL (ref 150–450)
POTASSIUM SERPL-SCNC: 3.1 MMOL/L (ref 3.4–5.3)
POTASSIUM SERPL-SCNC: 3.8 MMOL/L (ref 3.4–5.3)
PR INTERVAL - MUSE: 134 MS
QRS DURATION - MUSE: 68 MS
QT - MUSE: 310 MS
QTC - MUSE: 456 MS
R AXIS - MUSE: 69 DEGREES
RBC # BLD AUTO: 3.93 10E6/UL (ref 3.8–5.2)
SODIUM SERPL-SCNC: 138 MMOL/L (ref 136–145)
SYSTOLIC BLOOD PRESSURE - MUSE: NORMAL MMHG
T AXIS - MUSE: 75 DEGREES
VENTRICULAR RATE- MUSE: 130 BPM
WBC # BLD AUTO: 16 10E3/UL (ref 4–11)

## 2023-03-19 PROCEDURE — 84132 ASSAY OF SERUM POTASSIUM: CPT | Performed by: UROLOGY

## 2023-03-19 PROCEDURE — 80048 BASIC METABOLIC PNL TOTAL CA: CPT | Performed by: PHYSICIAN ASSISTANT

## 2023-03-19 PROCEDURE — 250N000011 HC RX IP 250 OP 636: Performed by: STUDENT IN AN ORGANIZED HEALTH CARE EDUCATION/TRAINING PROGRAM

## 2023-03-19 PROCEDURE — 250N000011 HC RX IP 250 OP 636: Performed by: UROLOGY

## 2023-03-19 PROCEDURE — 999N000111 HC STATISTIC OT IP EVAL DEFER

## 2023-03-19 PROCEDURE — 120N000002 HC R&B MED SURG/OB UMMC

## 2023-03-19 PROCEDURE — 36415 COLL VENOUS BLD VENIPUNCTURE: CPT | Performed by: UROLOGY

## 2023-03-19 PROCEDURE — 85014 HEMATOCRIT: CPT | Performed by: STUDENT IN AN ORGANIZED HEALTH CARE EDUCATION/TRAINING PROGRAM

## 2023-03-19 PROCEDURE — 250N000013 HC RX MED GY IP 250 OP 250 PS 637: Performed by: STUDENT IN AN ORGANIZED HEALTH CARE EDUCATION/TRAINING PROGRAM

## 2023-03-19 PROCEDURE — 36415 COLL VENOUS BLD VENIPUNCTURE: CPT | Performed by: PHYSICIAN ASSISTANT

## 2023-03-19 PROCEDURE — G0463 HOSPITAL OUTPT CLINIC VISIT: HCPCS

## 2023-03-19 PROCEDURE — 258N000003 HC RX IP 258 OP 636: Performed by: PHYSICIAN ASSISTANT

## 2023-03-19 RX ORDER — POTASSIUM CHLORIDE 7.45 MG/ML
10 INJECTION INTRAVENOUS
Status: COMPLETED | OUTPATIENT
Start: 2023-03-19 | End: 2023-03-19

## 2023-03-19 RX ORDER — PROCHLORPERAZINE MALEATE 5 MG
10 TABLET ORAL EVERY 6 HOURS PRN
Status: DISCONTINUED | OUTPATIENT
Start: 2023-03-19 | End: 2023-03-23 | Stop reason: HOSPADM

## 2023-03-19 RX ORDER — PROCHLORPERAZINE 25 MG
25 SUPPOSITORY, RECTAL RECTAL EVERY 12 HOURS PRN
Status: DISCONTINUED | OUTPATIENT
Start: 2023-03-19 | End: 2023-03-23 | Stop reason: HOSPADM

## 2023-03-19 RX ADMIN — SERTRALINE HYDROCHLORIDE 150 MG: 100 TABLET ORAL at 09:36

## 2023-03-19 RX ADMIN — BACLOFEN 30 MG: 20 TABLET ORAL at 20:14

## 2023-03-19 RX ADMIN — POTASSIUM CHLORIDE 10 MEQ: 7.46 INJECTION, SOLUTION INTRAVENOUS at 14:10

## 2023-03-19 RX ADMIN — BACLOFEN 30 MG: 20 TABLET ORAL at 09:34

## 2023-03-19 RX ADMIN — PROCHLORPERAZINE EDISYLATE 10 MG: 5 INJECTION INTRAMUSCULAR; INTRAVENOUS at 21:56

## 2023-03-19 RX ADMIN — OXYBUTYNIN CHLORIDE 5 MG: 5 TABLET, EXTENDED RELEASE ORAL at 09:33

## 2023-03-19 RX ADMIN — SODIUM CHLORIDE, POTASSIUM CHLORIDE, SODIUM LACTATE AND CALCIUM CHLORIDE: 600; 310; 30; 20 INJECTION, SOLUTION INTRAVENOUS at 18:38

## 2023-03-19 RX ADMIN — SENNOSIDES AND DOCUSATE SODIUM 1 TABLET: 8.6; 5 TABLET ORAL at 20:19

## 2023-03-19 RX ADMIN — HEPARIN SODIUM 5000 UNITS: 5000 INJECTION, SOLUTION INTRAVENOUS; SUBCUTANEOUS at 22:00

## 2023-03-19 RX ADMIN — POTASSIUM CHLORIDE 10 MEQ: 7.46 INJECTION, SOLUTION INTRAVENOUS at 21:12

## 2023-03-19 RX ADMIN — POTASSIUM CHLORIDE 10 MEQ: 7.46 INJECTION, SOLUTION INTRAVENOUS at 18:37

## 2023-03-19 RX ADMIN — GABAPENTIN 300 MG: 300 CAPSULE ORAL at 22:00

## 2023-03-19 RX ADMIN — BISACODYL 10 MG RECTAL SUPPOSITORY 10 MG: at 17:59

## 2023-03-19 RX ADMIN — SENNOSIDES AND DOCUSATE SODIUM 1 TABLET: 8.6; 5 TABLET ORAL at 09:36

## 2023-03-19 RX ADMIN — ACETAMINOPHEN 975 MG: 325 TABLET ORAL at 05:44

## 2023-03-19 RX ADMIN — ONDANSETRON HYDROCHLORIDE 4 MG: 2 INJECTION, SOLUTION INTRAMUSCULAR; INTRAVENOUS at 04:11

## 2023-03-19 RX ADMIN — HEPARIN SODIUM 5000 UNITS: 5000 INJECTION, SOLUTION INTRAVENOUS; SUBCUTANEOUS at 14:19

## 2023-03-19 RX ADMIN — SODIUM CHLORIDE, POTASSIUM CHLORIDE, SODIUM LACTATE AND CALCIUM CHLORIDE: 600; 310; 30; 20 INJECTION, SOLUTION INTRAVENOUS at 05:51

## 2023-03-19 RX ADMIN — DOCUSATE SODIUM 200 MG: 100 CAPSULE, LIQUID FILLED ORAL at 09:36

## 2023-03-19 RX ADMIN — ONDANSETRON HYDROCHLORIDE 4 MG: 2 INJECTION, SOLUTION INTRAMUSCULAR; INTRAVENOUS at 20:15

## 2023-03-19 RX ADMIN — HEPARIN SODIUM 5000 UNITS: 5000 INJECTION, SOLUTION INTRAVENOUS; SUBCUTANEOUS at 05:44

## 2023-03-19 RX ADMIN — MIDODRINE HYDROCHLORIDE 10 MG: 5 TABLET ORAL at 20:19

## 2023-03-19 RX ADMIN — BACLOFEN 30 MG: 20 TABLET ORAL at 16:03

## 2023-03-19 RX ADMIN — MIDODRINE HYDROCHLORIDE 10 MG: 5 TABLET ORAL at 09:34

## 2023-03-19 RX ADMIN — POTASSIUM CHLORIDE 10 MEQ: 7.46 INJECTION, SOLUTION INTRAVENOUS at 16:02

## 2023-03-19 ASSESSMENT — ACTIVITIES OF DAILY LIVING (ADL)
ADLS_ACUITY_SCORE: 50
ADLS_ACUITY_SCORE: 46
ADLS_ACUITY_SCORE: 50
ADLS_ACUITY_SCORE: 50
ADLS_ACUITY_SCORE: 46
ADLS_ACUITY_SCORE: 50
ADLS_ACUITY_SCORE: 50
ADLS_ACUITY_SCORE: 46

## 2023-03-19 NOTE — PLAN OF CARE
POD2 Bladder augmentation with catheterizable channel, bladder neck closure  VS: /61 (BP Location: Right arm)   Pulse 87   Temp 99.1  F (37.3  C) (Oral)   Resp 16   Wt 58.3 kg (128 lb 8.5 oz)   SpO2 94%   BMI 20.74 kg/m    Neuro: A&O X4, able to make needs known  Respiratory: On RA, no report of SOB  GI/: Suprapubic catheter and GI/ conduit in place with output. Passing gas but BM at this time, suppository given during AM shift but no result yet. Abdomen is still distended, no nausea or vomit this shift.  Diet/appetite: Clear liquid diet with good appetite and tolerating well. Paged team to let them know patient is tolerating clear liquids.   Activity: Not OOB this shift. Turn q2hs or per patient's request.   Pain: R shoulder and abdominal pain managed with tylenol, robaxin, oxycodone, gabapentin, and lidocaine patch.  Skin/drains: ROSANNE, SP catheter and GI/ conduit sites C/D/I. Intact lower abdominal incision with skin glue. Dressing change to right catheter completed.  Line: L piv x2.   No change to patient's status this shift.Continue to monitor for ROBF and continue POC

## 2023-03-19 NOTE — PLAN OF CARE
POD 3 Bladder augmentation with catheterizable channel, bladder neck closure  VS: /73 (BP Location: Right arm)   Pulse 88   Temp 98.5  F (36.9  C) (Oral)   Resp 18   Wt 58.3 kg (128 lb 8.5 oz)   SpO2 93%   BMI 20.74 kg/m    Neuro: A&O X4, able to make needs known  Respiratory: On RA, no report of SOB  GI/: Suprapubic catheter and GI/ conduit in place with output. Passing gas, BS active and 1 loose stool this shift.  Diet/appetite: Still tolerating clear liquid diet. IV zofran given for report of nausea  Activity: Not OOB this shift. Turn q2hs or per patient's request.   Pain: No report of pain this shift. Lidocaine patch on R shoulder.   Skin/drains: ROSANNE, SP catheter and GI/ conduit sites C/D/I. Intact lower abdominal incision with skin glue.   Line: L piv x2.   Redness noted in between the buttock cheeks, barrier applied. Pressure injury on left inner thigh, dressing placed and WOC consult have been ordered.  Abdomen is still distended and patient now reports abdominal numbness. Also no output from SP cath this shift. David Sosa MD notified.   Continue POC.

## 2023-03-19 NOTE — CONSULTS
[facility]  WOC Nurse Inpatient Assessment     Consulted for: L) thigh wound    Patient History (according to provider note(s):      28 female quadriplegic, neurogenic bladder secondary to SCI, with hx of provoked DVT (second pregnancy), POD#3 s/p CCIC, bladder neck closure.    Areas Assessed:      Areas visualized during today's visit: Inner thighs    Wound location: L) inner thigh        Last photo: 3/19  Wound due to: Friction and Trauma  Wound history/plan of care: Per pt she was lifted by sling on 3/18 and might have caught her skin on the sling boarder. Not consistent with pressure injury.  Wound base: 100 % dermis,      Palpation of the wound bed: normal      Drainage: small     Description of drainage: serosanguinous     Measurements (length x width x depth, in cm): 0.5  x 1.1  x  0.1 cm      Tunneling: N/A     Undermining: N/A  Periwound skin: Intact      Color: normal and consistent with surrounding tissue      Temperature: normal   Odor: none  Pain: insensate, none  Pain interventions prior to dressing change: N/A  Treatment goal: Heal   STATUS: initial assessment  Supplies ordered: gathered, at bedside and supplies stored on unit     Treatment Plan:     L) inner thigh wound(s): Every 3 days     Cleanse the area with NS and pat dry.    Apply No sting film barrier to periwound skin.    Cover wound with Mepilex     Change dressing Q 3 days.    Turn and reposition Q 2hrs side to side only.    Ensure pt has Dion-cushion while sitting up in the chair.  FYI- If pt has constant incontinent loose stools needing dressing changes Q shift please discontinue the Mepilex dressing and apply criticaid barrier paste BID and PRN.     Orders: Written    RECOMMEND PRIMARY TEAM ORDER: None, at this time  Education provided: plan of care and wound progress  Discussed plan of care with: Patient  WOC nurse follow-up plan: weekly  Notify WOC if wound(s) deteriorate.  Nursing to notify  the Provider(s) and re-consult the [facility] Nurse if new skin concern.    DATA:     Current support surface: Standard  Low air loss (ISRAEL pump, Isol[employer]rium, Pulsate, skin guard, etc)  Containment of urine/stool: Incontinence Protocol  BMI: Body mass index is 20.74 kg/m .   Active diet order: Orders Placed This Encounter      Clear Liquid Diet     Output: I/O last 3 completed shifts:  In: 943 [P.O.:240; I.V.:603; Other:100]  Out: 2882.5 [Urine:2875; Drains:7.5]     Labs: Recent Labs   Lab 03/19/23  0651   HGB 11.2*   WBC 16.0*     Pressure injury risk assessment:   Sensory Perception: 3-->slightly limited  Moisture: 3-->occasionally moist  Activity: 2-->chairfast  Mobility: 2-->very limited  Nutrition: 3-->adequate  Friction and Shear: 2-->potential problem  Carlos Score: 15    Delfina Pang RN   Pager no longer is use, please contact through [employer] group: [facility] Nurse  Dept. Office Number: [phone]

## 2023-03-19 NOTE — PLAN OF CARE
Goal Outcome Evaluation:      Plan of Care Reviewed With: patient    Overall Patient Progress: improvingOverall Patient Progress: improving    POD# 3 Hand assisted laparoscopic-bladder augmentation with catheterizable channel; bladder neck closure.     Alert and oriented x 4. No report of pain and nausea when asked.   Turned and repositioned to bed. On pulsate mattress.  Mitrofanoff dressing changed. Suprapubic dressing changed. ROSANNE dressing changed.  Urine volume is greater in Mitrofanoff than suprapubic catheter. Primary team aware and ok with that. Provider flushed the drains this morning. Per provider to clamp the other drain when irrigating the other. ROSANNE with scant amount of serosanguinous drainage.   Abdomen slightly distended, bowel sounds audible, hypoactive. Lower abdominal incision with dermabond, CDI/MARY.  No report of SOB, on RA. Lung sounds audible and clear.  Heart with regular rate and rhythm, No report of CP.  On clear liquid diet-poor fluid intake, encouragement needed. Pt verbalized no appetite when asked. MIVF at 75 ml/hr via PIV. Another PIV left FA-SL.  BG this afternoon was 76, apple juice given. Recheck was 89. Updated urology provider on call Dr. Hal Bailey regarding this BG. Agreed to continue to encourage patient to increase fluid intake  OVSS, afebrile. K+ 3.1, replacement on going. Patient's c/o burning at the IV site that is why it is being run very slow. K+ recheck 1-2 hours after the last K+ bag.   WOCN nurse saw patient today for left inner thigh PI.     PLAN: Continue with the post-op care. Turning and repositioning. Drain management.

## 2023-03-19 NOTE — PROGRESS NOTES
Urology  Progress Note    Some nausea early this morning after taking sodium bicarb, now resolved  CLD went well yesterday  Had a smear BM after suppository    Exam  /73 (BP Location: Right arm)   Pulse 88   Temp 98.5  F (36.9  C) (Oral)   Resp 18   Wt 58.3 kg (128 lb 8.5 oz)   SpO2 93%   BMI 20.74 kg/m    No acute distress  Unlabored breathing on room air   Abdomen moderately distended, appropriately tender. Incisions c/d/i.  ROSANNE serosanguinous  SP and mitrofanoff with clear urine in tubing- irrigated on rounds with a small amount of mucous return    SP 1300/0  Cath 905/1200  ROSANNE 17.5/0    Labs  Recent Labs   Lab Test 03/16/23  2312 03/16/23  1940 03/16/23  1823 03/08/23  1614 10/05/22  0739 10/04/22  0651   WBC 27.9*  --   --  14.6*  --  12.7*   HGB 13.8  --  14.1 14.4  --  11.2*   CR 0.50* 0.52 0.47* 0.44*   < > 0.44*    < > = values in this interval not displayed.      AM labs pending    Assessment/Plan  28 female quadriplegic, neurogenic bladder secondary to SCI, with hx of provoked DVT (second pregnancy), POD#3 s/p CCIC, bladder neck closure.    Neuro: APAP, oxy, IV HM for pain control. Home baclofen, gabapentin.   CV: RAFA, home midodrine.   Pulm: incentive spirometry while awake  FEN/GI: Continue CLD- may advance this evening if she is hungry or has a BM, MIVF @ 75/hr, daily suppository (home bowel regimen)  Endo: SSI  : Indwelling catheters in channel and SP tube to remain for 6 weeks. Irrigations BID. Urology in the AM, nursing in PM.  Heme/ID: na  Activity: May transition back to baseline activity   PPx: SCDs, SQH    Seen and examined with the chief resident and Dr. House.     Contacting the Urology Team     Please use the following job codes to reach the Urology Team. Note that you must use an in house phone and that job codes cannot receive text pages.     On weekdays, dial 893 (or star-star-star 777 on the new [employer] telephones) then 0817 to reach the Adult Urology resident or PA on  call    On weekdays, dial 893 (or star-star-star 777 on the new [employer] telephones) then 0818 to reach the Pediatric Urology resident    On weeknights and weekends, dial 893 (or star-star-star 777 on the new [employer] telephones) then [phone] to reach the Urology resident on call (for both Adult and Pediatrics)

## 2023-03-19 NOTE — PLAN OF CARE
Occupational Therapy: Orders received. Chart reviewed and discussed with patient and care team.? Occupational Therapy not indicated due to pt appears to be near functional baseline. Pt reported no OT needs, does lift transfers at baseline and has assist for ADL and IADL at baseline. Checked in with pt regarding any wheelchair seating and positioning needs, pt reported none. Defer discharge recommendations to medical team.? Will complete orders.

## 2023-03-20 ENCOUNTER — APPOINTMENT (OUTPATIENT)
Dept: GENERAL RADIOLOGY | Facility: CLINIC | Age: 28
DRG: 653 | End: 2023-03-20
Attending: UROLOGY
Payer: MEDICARE

## 2023-03-20 LAB
ANION GAP SERPL CALCULATED.3IONS-SCNC: 22 MMOL/L (ref 7–15)
BUN SERPL-MCNC: 3.2 MG/DL (ref 6–20)
CALCIUM SERPL-MCNC: 8.6 MG/DL (ref 8.6–10)
CHLORIDE SERPL-SCNC: 101 MMOL/L (ref 98–107)
CREAT SERPL-MCNC: 0.4 MG/DL (ref 0.51–0.95)
DEPRECATED HCO3 PLAS-SCNC: 13 MMOL/L (ref 22–29)
ERYTHROCYTE [DISTWIDTH] IN BLOOD BY AUTOMATED COUNT: 14.3 % (ref 10–15)
GFR SERPL CREATININE-BSD FRML MDRD: >90 ML/MIN/1.73M2
GLUCOSE BLDC GLUCOMTR-MCNC: 85 MG/DL (ref 70–99)
GLUCOSE BLDC GLUCOMTR-MCNC: 97 MG/DL (ref 70–99)
GLUCOSE SERPL-MCNC: 74 MG/DL (ref 70–99)
HCT VFR BLD AUTO: 37.2 % (ref 35–47)
HGB BLD-MCNC: 11.7 G/DL (ref 11.7–15.7)
MAGNESIUM SERPL-MCNC: 1.5 MG/DL (ref 1.7–2.3)
MAGNESIUM SERPL-MCNC: 2.6 MG/DL (ref 1.7–2.3)
MCH RBC QN AUTO: 28.5 PG (ref 26.5–33)
MCHC RBC AUTO-ENTMCNC: 31.5 G/DL (ref 31.5–36.5)
MCV RBC AUTO: 91 FL (ref 78–100)
PHOSPHATE SERPL-MCNC: 2.6 MG/DL (ref 2.5–4.5)
PLATELET # BLD AUTO: 378 10E3/UL (ref 150–450)
POTASSIUM SERPL-SCNC: 3.8 MMOL/L (ref 3.4–5.3)
RBC # BLD AUTO: 4.11 10E6/UL (ref 3.8–5.2)
SODIUM SERPL-SCNC: 136 MMOL/L (ref 136–145)
WBC # BLD AUTO: 19.6 10E3/UL (ref 4–11)

## 2023-03-20 PROCEDURE — 83735 ASSAY OF MAGNESIUM: CPT | Performed by: UROLOGY

## 2023-03-20 PROCEDURE — 258N000003 HC RX IP 258 OP 636: Performed by: PHYSICIAN ASSISTANT

## 2023-03-20 PROCEDURE — 250N000011 HC RX IP 250 OP 636: Performed by: STUDENT IN AN ORGANIZED HEALTH CARE EDUCATION/TRAINING PROGRAM

## 2023-03-20 PROCEDURE — 85027 COMPLETE CBC AUTOMATED: CPT | Performed by: STUDENT IN AN ORGANIZED HEALTH CARE EDUCATION/TRAINING PROGRAM

## 2023-03-20 PROCEDURE — 83735 ASSAY OF MAGNESIUM: CPT | Performed by: PHYSICIAN ASSISTANT

## 2023-03-20 PROCEDURE — 250N000013 HC RX MED GY IP 250 OP 250 PS 637: Performed by: STUDENT IN AN ORGANIZED HEALTH CARE EDUCATION/TRAINING PROGRAM

## 2023-03-20 PROCEDURE — 84100 ASSAY OF PHOSPHORUS: CPT | Performed by: PHYSICIAN ASSISTANT

## 2023-03-20 PROCEDURE — 74018 RADEX ABDOMEN 1 VIEW: CPT

## 2023-03-20 PROCEDURE — G0463 HOSPITAL OUTPT CLINIC VISIT: HCPCS

## 2023-03-20 PROCEDURE — 36415 COLL VENOUS BLD VENIPUNCTURE: CPT | Performed by: UROLOGY

## 2023-03-20 PROCEDURE — 74018 RADEX ABDOMEN 1 VIEW: CPT | Mod: 26 | Performed by: RADIOLOGY

## 2023-03-20 PROCEDURE — 36415 COLL VENOUS BLD VENIPUNCTURE: CPT | Performed by: PHYSICIAN ASSISTANT

## 2023-03-20 PROCEDURE — 250N000013 HC RX MED GY IP 250 OP 250 PS 637: Performed by: UROLOGY

## 2023-03-20 PROCEDURE — 82435 ASSAY OF BLOOD CHLORIDE: CPT | Performed by: PHYSICIAN ASSISTANT

## 2023-03-20 PROCEDURE — 120N000002 HC R&B MED SURG/OB UMMC

## 2023-03-20 PROCEDURE — 250N000011 HC RX IP 250 OP 636: Performed by: UROLOGY

## 2023-03-20 RX ORDER — MAGNESIUM SULFATE HEPTAHYDRATE 40 MG/ML
4 INJECTION, SOLUTION INTRAVENOUS ONCE
Status: COMPLETED | OUTPATIENT
Start: 2023-03-20 | End: 2023-03-20

## 2023-03-20 RX ORDER — POTASSIUM CHLORIDE 750 MG/1
20 TABLET, EXTENDED RELEASE ORAL ONCE
Status: COMPLETED | OUTPATIENT
Start: 2023-03-20 | End: 2023-03-20

## 2023-03-20 RX ADMIN — GABAPENTIN 300 MG: 300 CAPSULE ORAL at 22:01

## 2023-03-20 RX ADMIN — MIDODRINE HYDROCHLORIDE 10 MG: 5 TABLET ORAL at 19:38

## 2023-03-20 RX ADMIN — SODIUM CHLORIDE, POTASSIUM CHLORIDE, SODIUM LACTATE AND CALCIUM CHLORIDE: 600; 310; 30; 20 INJECTION, SOLUTION INTRAVENOUS at 06:48

## 2023-03-20 RX ADMIN — SERTRALINE HYDROCHLORIDE 150 MG: 100 TABLET ORAL at 09:10

## 2023-03-20 RX ADMIN — HEPARIN SODIUM 5000 UNITS: 5000 INJECTION, SOLUTION INTRAVENOUS; SUBCUTANEOUS at 13:58

## 2023-03-20 RX ADMIN — BACLOFEN 30 MG: 20 TABLET ORAL at 09:15

## 2023-03-20 RX ADMIN — OXYBUTYNIN CHLORIDE 5 MG: 5 TABLET, EXTENDED RELEASE ORAL at 09:10

## 2023-03-20 RX ADMIN — DOCUSATE SODIUM 200 MG: 100 CAPSULE, LIQUID FILLED ORAL at 09:10

## 2023-03-20 RX ADMIN — MAGNESIUM SULFATE HEPTAHYDRATE 4 G: 40 INJECTION, SOLUTION INTRAVENOUS at 11:16

## 2023-03-20 RX ADMIN — HEPARIN SODIUM 5000 UNITS: 5000 INJECTION, SOLUTION INTRAVENOUS; SUBCUTANEOUS at 22:01

## 2023-03-20 RX ADMIN — MIDODRINE HYDROCHLORIDE 10 MG: 5 TABLET ORAL at 09:10

## 2023-03-20 RX ADMIN — SENNOSIDES AND DOCUSATE SODIUM 1 TABLET: 8.6; 5 TABLET ORAL at 19:38

## 2023-03-20 RX ADMIN — POTASSIUM CHLORIDE 20 MEQ: 750 TABLET, EXTENDED RELEASE ORAL at 11:18

## 2023-03-20 RX ADMIN — HEPARIN SODIUM 5000 UNITS: 5000 INJECTION, SOLUTION INTRAVENOUS; SUBCUTANEOUS at 06:07

## 2023-03-20 RX ADMIN — SENNOSIDES AND DOCUSATE SODIUM 1 TABLET: 8.6; 5 TABLET ORAL at 09:10

## 2023-03-20 RX ADMIN — BISACODYL 10 MG RECTAL SUPPOSITORY 10 MG: at 09:10

## 2023-03-20 RX ADMIN — SODIUM CHLORIDE, POTASSIUM CHLORIDE, SODIUM LACTATE AND CALCIUM CHLORIDE: 600; 310; 30; 20 INJECTION, SOLUTION INTRAVENOUS at 22:29

## 2023-03-20 RX ADMIN — BACLOFEN 30 MG: 20 TABLET ORAL at 19:38

## 2023-03-20 RX ADMIN — BACLOFEN 30 MG: 20 TABLET ORAL at 13:57

## 2023-03-20 ASSESSMENT — ACTIVITIES OF DAILY LIVING (ADL)
ADLS_ACUITY_SCORE: 50
ADLS_ACUITY_SCORE: 46
ADLS_ACUITY_SCORE: 50
ADLS_ACUITY_SCORE: 46
ADLS_ACUITY_SCORE: 50
ADLS_ACUITY_SCORE: 46
ADLS_ACUITY_SCORE: 46
ADLS_ACUITY_SCORE: 50

## 2023-03-20 NOTE — PLAN OF CARE
Goal Outcome Evaluation:    POD# 4 Hand assisted laparoscopic-bladder augmentation with catheterizable channel; bladder neck closure.     Alert and oriented x 4. No report of pain and nausea when asked.   Turned and repositioned to bed q 2-3 hours. On pulsate mattress.  Mitrofanoff dressing changed. ROSANNE dressing changed d/t leaking.  Urine volume is greater in Mitrofanoff than suprapubic catheter. Primary team aware and ok with that. Provider flushed the drains this morning. Per provider to clamp the other drain when irrigating the other. Nursing to irrigate in the evening. ROSANNE with increasing amount of serosanguinous drainage.   Abdomen slightly distended, bowel sounds audible, hypoactive. Lower abdominal incision with dermabond, CDI/MARY. Abdominal xray completed this morning.  No report of SOB, on RA. Lung sounds audible and clear.  Heart with regular rate and rhythm, No report of CP.  On clear liquid diet-poor fluid intake, encouragement needed. Pt verbalized no appetite when asked. MIVF at 75 ml/hr via PIV. Another PIV left FA-SL.  BG this afternoon was 74, apple juice given and strawberry kiwi given. Continue to encourage patient to increase fluid intake. BG this afternoon is 97  OVSS, afebrile. K+ 3.8, Mg+ 1.5 replacement completed. Mg recheck at around 1530H.    WOCN nurse consult for new friction skin noted this morning right inner thigh. WOCN have not seen patient yet.    PLAN: Continue with the post-op care. Turning and repositioning. Drain management

## 2023-03-20 NOTE — CONSULTS
Care Management Initial Consult    General Information  Assessment completed with: Patient,    Type of CM/SW Visit: Initial Assessment    Primary Care Provider verified and updated as needed: Yes   Readmission within the last 30 days: no previous admission in last 30 days      Reason for Consult: discharge planning  Advance Care Planning: Advance Care Planning Reviewed: no concerns identified          Communication Assessment  Patient's communication style: spoken language (English or Bilingual)    Hearing Difficulty or Deaf: no   Wear Glasses or Blind: yes    Cognitive  Cognitive/Neuro/Behavioral: WDL                      Living Environment:   People in home: parent(s), child(campos), dependent  mom and 2 kids: 9&7 y/o  Current living Arrangements: house      Able to return to prior arrangements: yes       Family/Social Support:  Care provided by: parent(s), homecare agency  Provides care for: no one, unable/limited ability to care for self  Marital Status: Single  Parent(s), Children          Description of Support System: Supportive, Involved    Support Assessment: Adequate family and caregiver support, Adequate social supports    Current Resources:   Patient receiving home care services: Yes  Skilled Home Care Services: Home Health Aid  Community Resources: [facility] Programs, DME, Financial/Insurance, Home Care, PCA (EBT, Cadi Waiver, PCA: mom, Home Helth aid services: 6.5 hours/week, MFIP (cash assistance), MF5P, new cathetor supplies)  Equipment currently used at home: shower chair, commode chair, lift device, wheelchair, power ([place] at home)  Supplies currently used at home:      Employment/Financial:  Employment Status: disabled, unemployed        Financial Concerns: No concerns identified   Referral to Financial Worker: No     Functional Status:  Prior to admission patient needed assistance:   Dependent ADLs:: Wheelchair-with assist, Toileting, Transfers, Positioning, Dressing, Bathing  Dependent IADLs::  "Transportation, Meal Preparation, Medication Management, Cleaning, Cooking, Laundry, Shopping       Mental Health Status:      No current concerns- actively seeing therapist 1x/month and going to grief group 1x/month.    Chemical Dependency Status:      No current concerns    Values/Beliefs:  Spiritual, Cultural Beliefs, Lutheran Practices, Values that affect care: yes           Spiritual with no concerns    Additional Information:  \"28 y.o. female with a pmh of neurogenic bladder, quadriplegia, history of PE, and depressive disorder s/p ileal channel cecocystoplasty, appendectomy, and suprapubic catherater placement.  Upon my arrival , /67, afebrile, and hgb was 14.1 postoperatively.  Patient is has significant shoulder pain and spasms which is causing some pain with breathing.  ECG revels sinus tachycardia and we have given an additional dose of IV dilaudid,  CXR, LA, CBC, bmp, Mg, Phosp, and calcium order. LA currently 2.9 but likely multifactorial given muscle spasm and surgery today.  We will redraw another one with cultures if she becomes febrile. \" per Dayday Pate PA-C note from 3/16.     11:00 Patient was asleep, SW will come back in the afternoon.    3:10 SW met with patient at bedside and confirmed PCP, also stating she had no prior admits within 30 days and no ACP concerns. SW discussed discharge planning including her preference to have mom (PCA and main caretaker) to learn new Super-pubic catheter flushing by nurse upon discharge when mom comes to pick her up. Provider was in and endorsed her potential discharge to most likely be on 3/22 and that bedside nurses can show mom how to flush new cath as mom has already been emptying her bag with nothing new there. Patient states that Mom is her main caretaker and is hired as her PCA, and drives her to all her appointments. Dianna states she is well taken care of and has equipment like a hunter lift, shower chair, powered wheel chair, comode " chair and other supplies to help her at home. Her mom is able to take care of her dependent ADL's and needs full assist for dressing, but is able to brush teeth and eat if everything is set up for her.  Patient also has 6.5 hours total (3x a week) of home health aides who assist with bathing and grooming as well as physical therapy routine. Other community resources she utilizes (but doesn't have ) is EBT, Cadi waiver, SNAP, MFIP, and MF5P. Dianna lives with her two kids aged 9 and 6 in her mom's house. The bio-dad (to her kids) was killed in dismemberment in 2021 with potential to have been connected to his alcoholism: hence her promise to never drink again since 2021. Dianna feels like her support group as well as the grief retreat she attended with [facility] have really helped her cope with this loss as well as her ongoing mental health. Mental health is not a concern as she sees a therapist 1x/month and used to see him 2x/month but doesn't need to see him as frequently anymore. She considers herself spiritual but has no concerns or anything else that should be notes about this. [facility] is already setup to assist patient and Mom (PCA) with new catheter flushing and any additional's. Dianna had no further questions and SW will be around should there be further needs for discharge.      SHAHEEN Patton, LGSW  [place]   Covering 7C SW  Ph: [phone]

## 2023-03-20 NOTE — PLAN OF CARE
Goal Outcome Evaluation:  [id number]  /78 (BP Location: Right arm)   Pulse 92   Temp 97.1  F (36.2  C) (Temporal)   Resp 16   Wt 58.3 kg (128 lb 8.5 oz)   SpO2 96%   BMI 20.74 kg/m      Pt. A&O x4, stable on RA, afebrile. Denies pain, SOB, chest pain. Up with lift assist x2, q2h turning and reposition. No BM this shift, incontinent to stool.Tolerating clears with intermittently nauseous, gave PRN Zofran and Champazine x1 for relieved. PIV infusing LR @ 75 ml/hr. X1 ROSANNE with minimal output. Suprapubic catheter in place with no output, Mitrofanoff with AUOP. No BM this shift. Lower abd incision with skin glued MARY  Plan: No acute change this shift. Continue w/poc

## 2023-03-20 NOTE — CONSULTS
[facility]  WO Nurse Inpatient Assessment     Consulted for: L) thigh wound  3/20: NEW R) thigh wound  Patient History (according to provider note(s):      28 female quadriplegic, neurogenic bladder secondary to SCI, with hx of provoked DVT (second pregnancy), POD#3 s/p CCIC, bladder neck closure.    Areas Assessed:      Areas visualized during today's visit: Inner thighs    Wound location: Right medial thigh      Last photo: 3/20/23  Wound due to: Friction and Trauma  Wound history/plan of care: : Per pt she was lifted by sling on 3/18 and might have caught her skin on the sling boarder. Not consistent with pressure injury. Wound is a mirror image to the Left thigh wound below  Wound base: 100 % dermis,      Palpation of the wound bed: normal      Drainage: small     Description of drainage: serous     Measurements (length x width x depth, in cm): 5  x 2  x  0.2 cm      Periwound skin: peeling epithelium      Color: normal and consistent with surrounding tissue      Temperature: normal   Odor: none  Pain: insensate,   Pain interventions prior to dressing change: N/A  Treatment goal: Heal  and Protection  STATUS: initial assessment  Supplies ordered: at bedside and supplies stored on unit     Below from 3/19  Wound location: L) inner thigh         Last photo: 3/19  Wound due to: Friction and Trauma  Wound history/plan of care: Per pt she was lifted by sling on 3/18 and might have caught her skin on the sling boarder. Not consistent with pressure injury.  Wound base: 100 % dermis,      Palpation of the wound bed: normal      Drainage: small     Description of drainage: serosanguinous     Measurements (length x width x depth, in cm): 0.5  x 1.1  x  0.1 cm      Tunneling: N/A     Undermining: N/A  Periwound skin: Intact      Color: normal and consistent with surrounding tissue      Temperature: normal   Odor: none  Pain: insensate, none  Pain interventions prior to  dressing change: N/A  Treatment goal: Heal   STATUS: initial assessment  Supplies ordered: gathered, at bedside and supplies stored on unit     Treatment Plan:     B) inner thigh wound(s): Every 3 days     Cleanse the area with NS and pat dry.    Apply No sting film barrier to periwound skin.    Cover wound with Mepilex     Change dressing Q 3 days.    Turn and reposition Q 2hrs side to side only.    Ensure pt has Dion-cushion while sitting up in the chair.  FYI- If pt has constant incontinent loose stools needing dressing changes Q shift please discontinue the Mepilex dressing and apply criticaid barrier paste BID and PRN.     Orders: Reviewed and Updated    RECOMMEND PRIMARY TEAM ORDER: None, at this time  Education provided: plan of care and wound progress  Discussed plan of care with: Patient  WO nurse follow-up plan: weekly  Notify WOC if wound(s) deteriorate.  Nursing to notify the Provider(s) and re-consult the WO Nurse if new skin concern.    DATA:     Current support surface: Standard  Low air loss (ISRAEL pump, Isol[employer]rium, Pulsate, skin guard, etc)  Containment of urine/stool: Incontinence Protocol  BMI: Body mass index is 20.74 kg/m .   Active diet order: Orders Placed This Encounter      Clear Liquid Diet     Output: I/O last 3 completed shifts:  In: 2575 [P.O.:1440; I.V.:1085; Other:50]  Out: 2755 [Urine:2575; Drains:180]     Labs:   Recent Labs   Lab 03/20/23  0803   HGB 11.7   WBC 19.6*     Pressure injury risk assessment:   Sensory Perception: 3-->slightly limited  Moisture: 3-->occasionally moist  Activity: 1-->bedfast  Mobility: 3-->slightly limited  Nutrition: 2-->probably inadequate  Friction and Shear: 1-->problem  Carlos Score: 13    Pager no longer is use, please contact through [email or web address] group: [facility] Nurse  Dept. Office Number: [phone]

## 2023-03-20 NOTE — PROGRESS NOTES
Urology  Progress Note    NAEO  Tolerating CLD; felt nauseous yesterday, but no vomiting  Had a smear BM after suppository;   Cath channel and SPT irrigated with minimal mucus   No flatus       Exam  /78 (BP Location: Right arm)   Pulse 92   Temp 97.1  F (36.2  C) (Temporal)   Resp 16   Wt 58.3 kg (128 lb 8.5 oz)   SpO2 96%   BMI 20.74 kg/m    No acute distress  Unlabored breathing on room air   Abdomen moderately distended, a little more than yesterday, appropriately tender. Incisions c/d/i.  ROSANNE serosanguinous  SP and mitrofanoff with clear urine in tubing- irrigated on rounds with a small amount of mucous return      Cath 2925  ROSANNE 10/20    Labs  Recent Labs   Lab Test 03/19/23  0651 03/18/23  0745 03/17/23  1206 03/17/23  0728   WBC 16.0* 19.5*  --  19.1*   HGB 11.2* 10.8*  --  12.1   CR 0.31* 0.40* 0.43*  --           AM labs pending    Assessment/Plan  28 female quadriplegic, neurogenic bladder secondary to SCI, with hx of provoked DVT (second pregnancy), POD#4 s/p CCIC, bladder neck closure.    Changes today:  - KUB for abdominal distension     Neuro: APAP, oxy, IV HM for pain control. Home baclofen, gabapentin.   CV: RAFA, home midodrine.   Pulm: incentive spirometry while awake  FEN/GI: Continue CLD- may advance this evening if she is hungry or has a BM, MIVF @ 75/hr, daily suppository (home bowel regimen)  Endo: SSI  : Indwelling catheters in channel and SP tube to remain for 6 weeks. Irrigations BID. Urology in the AM, nursing in PM.  Heme/ID: na  Activity: May transition back to baseline activity   PPx: SCDs, SQH  Dispo: med/surg; awaiting ROBF     Seen and examined with the chief resident. Will discuss with Dr. Bacon     Contacting the Urology Team     Please use the following job codes to reach the Urology Team. Note that you must use an in house phone and that job codes cannot receive text pages.     On weekdays, dial 893 (or star-star-star 777 on the new [employer] telephones) then  0817 to reach the Adult Urology resident or PA on call    On weekdays, dial 893 (or star-star-star 777 on the new [employer] telephones) then 0818 to reach the Pediatric Urology resident    On weeknights and weekends, dial 893 (or star-star-star 777 on the new [employer] telephones) then 0039 to reach the Urology resident on call (for both Adult and Pediatrics)

## 2023-03-20 NOTE — PHARMACY-ADMISSION MEDICATION HISTORY
Admission Medication History Completed by Pharmacy    See [facility] Admission Navigator for allergy information, preferred outpatient pharmacy, prior to admission medications and immunization status.     Medication History Sources:     Care Everywhere    Sure scripts    Patient    Changes made to PTA medication list (reason):    Added: None    Deleted: None    Changed: None    Additional Information:    None    Prior to Admission medications    Medication Sig Last Dose Taking? Auth Provider Long Term End Date   acetaminophen (TYLENOL) 325 MG tablet Take 325-650 mg by mouth every 6 hours as needed.   Reported, Patient Yes    albuterol (PROVENTIL) (2.5 MG/3ML) 0.083% neb solution Take 1 vial (2.5 mg) by nebulization every 4 hours as needed for shortness of breath / dyspnea or wheezing   Isidro Reynolds MD Yes    baclofen (LIORESAL) 10 MG tablet Take 30 mg by mouth 3 times daily (10MG X 3 = 30MG)   Reported, Patient Yes    Cranberry 500 MG TABS Take 500 mg by mouth daily   Reported, Patient Yes    docusate sodium (COLACE) 100 MG capsule Take 100-200 mg by mouth daily   Reported, Patient     fluticasone-vilanterol (BREO ELLIPTA) 100-25 MCG/INH inhaler Inhale 1 puff into the lungs daily   Suzan Willis PA-C     gabapentin (NEURONTIN) 300 MG capsule Take 300 mg by mouth At Bedtime    Reported, Patient Yes    hydrOXYzine (VISTARIL) 25 MG capsule Take 1 capsule (25 mg) by mouth 3 times daily as needed for anxiety (or at bedtime for sleep.)   Suzan Willis PA-C     midodrine (PROAMATINE) 5 MG tablet Take 10 mg by mouth 2 times daily    Suzan Willis PA-C Yes    Multiple Vitamins-Minerals (WOMENS MULTIVITAMIN) TABS Take 2 tablets by mouth daily   Reported, Patient     nicotine (NICORETTE) 2 MG gum Place 1 each (2 mg) inside cheek every hour as needed for smoking cessation   Suzan Willis PA-C     oxybutynin ER (DITROPAN-XL) 5 MG 24 hr tablet Take 5 mg by mouth daily   Reported, Patient      povidone-iodine 10 % swab Apply topically daily as needed for wound care    Reported, Patient     sertraline (ZOLOFT) 100 MG tablet TAKE 1 AND 1/2 TABLETS BY MOUTH EVERY DAY   Suzan Willis PA-C Yes    sodium chloride (NEBUSAL) 3 % neb solution Take 3 mLs by nebulization every 6 hours as needed for wheezing or other (sputum clearance difficulty due to quadridplegia.)   Rajesh Bahena MD     Vitamin D3 (CHOLECALCIFEROL) 125 MCG (5000 UT) tablet Take 1 tablet by mouth Every Tuesday, Thursday, Saturday, and Sunday   Reported, Patient         Date completed: 03/20/23    Medication history completed by: Hector Bowman RP

## 2023-03-21 ENCOUNTER — TELEPHONE (OUTPATIENT)
Dept: FAMILY MEDICINE | Facility: CLINIC | Age: 28
End: 2023-03-21
Payer: MEDICARE

## 2023-03-21 LAB
ANION GAP SERPL CALCULATED.3IONS-SCNC: 15 MMOL/L (ref 7–15)
BUN SERPL-MCNC: 2 MG/DL (ref 6–20)
CALCIUM SERPL-MCNC: 8.1 MG/DL (ref 8.6–10)
CHLORIDE SERPL-SCNC: 105 MMOL/L (ref 98–107)
CREAT SERPL-MCNC: 0.36 MG/DL (ref 0.51–0.95)
DEPRECATED HCO3 PLAS-SCNC: 19 MMOL/L (ref 22–29)
ERYTHROCYTE [DISTWIDTH] IN BLOOD BY AUTOMATED COUNT: 14.6 % (ref 10–15)
GFR SERPL CREATININE-BSD FRML MDRD: >90 ML/MIN/1.73M2
GLUCOSE SERPL-MCNC: 81 MG/DL (ref 70–99)
HCT VFR BLD AUTO: 31.9 % (ref 35–47)
HGB BLD-MCNC: 10.3 G/DL (ref 11.7–15.7)
MAGNESIUM SERPL-MCNC: 2.2 MG/DL (ref 1.7–2.3)
MCH RBC QN AUTO: 28.7 PG (ref 26.5–33)
MCHC RBC AUTO-ENTMCNC: 32.3 G/DL (ref 31.5–36.5)
MCV RBC AUTO: 89 FL (ref 78–100)
PLATELET # BLD AUTO: 393 10E3/UL (ref 150–450)
POTASSIUM SERPL-SCNC: 3.6 MMOL/L (ref 3.4–5.3)
RBC # BLD AUTO: 3.59 10E6/UL (ref 3.8–5.2)
SODIUM SERPL-SCNC: 139 MMOL/L (ref 136–145)
WBC # BLD AUTO: 12.6 10E3/UL (ref 4–11)

## 2023-03-21 PROCEDURE — 250N000013 HC RX MED GY IP 250 OP 250 PS 637: Performed by: STUDENT IN AN ORGANIZED HEALTH CARE EDUCATION/TRAINING PROGRAM

## 2023-03-21 PROCEDURE — 85014 HEMATOCRIT: CPT | Performed by: STUDENT IN AN ORGANIZED HEALTH CARE EDUCATION/TRAINING PROGRAM

## 2023-03-21 PROCEDURE — 250N000011 HC RX IP 250 OP 636: Performed by: STUDENT IN AN ORGANIZED HEALTH CARE EDUCATION/TRAINING PROGRAM

## 2023-03-21 PROCEDURE — 80048 BASIC METABOLIC PNL TOTAL CA: CPT | Performed by: PHYSICIAN ASSISTANT

## 2023-03-21 PROCEDURE — 250N000009 HC RX 250

## 2023-03-21 PROCEDURE — 120N000002 HC R&B MED SURG/OB UMMC

## 2023-03-21 PROCEDURE — 83735 ASSAY OF MAGNESIUM: CPT | Performed by: UROLOGY

## 2023-03-21 PROCEDURE — 36415 COLL VENOUS BLD VENIPUNCTURE: CPT | Performed by: PHYSICIAN ASSISTANT

## 2023-03-21 PROCEDURE — 250N000013 HC RX MED GY IP 250 OP 250 PS 637: Performed by: UROLOGY

## 2023-03-21 RX ORDER — MAGNESIUM HYDROXIDE 1200 MG/15ML
LIQUID ORAL
Status: COMPLETED
Start: 2023-03-21 | End: 2023-03-21

## 2023-03-21 RX ORDER — BISACODYL 10 MG
10 SUPPOSITORY, RECTAL RECTAL 2 TIMES DAILY
Status: DISCONTINUED | OUTPATIENT
Start: 2023-03-21 | End: 2023-03-23 | Stop reason: HOSPADM

## 2023-03-21 RX ORDER — POTASSIUM CHLORIDE 1.5 G/1.58G
20 POWDER, FOR SOLUTION ORAL ONCE
Status: COMPLETED | OUTPATIENT
Start: 2023-03-21 | End: 2023-03-21

## 2023-03-21 RX ADMIN — SENNOSIDES AND DOCUSATE SODIUM 1 TABLET: 8.6; 5 TABLET ORAL at 08:48

## 2023-03-21 RX ADMIN — SERTRALINE HYDROCHLORIDE 150 MG: 100 TABLET ORAL at 08:44

## 2023-03-21 RX ADMIN — BACLOFEN 30 MG: 20 TABLET ORAL at 20:42

## 2023-03-21 RX ADMIN — SENNOSIDES AND DOCUSATE SODIUM 1 TABLET: 8.6; 5 TABLET ORAL at 20:46

## 2023-03-21 RX ADMIN — SODIUM CHLORIDE 500 ML: 900 IRRIGANT IRRIGATION at 08:54

## 2023-03-21 RX ADMIN — BACLOFEN 30 MG: 20 TABLET ORAL at 13:33

## 2023-03-21 RX ADMIN — MIDODRINE HYDROCHLORIDE 10 MG: 5 TABLET ORAL at 20:42

## 2023-03-21 RX ADMIN — GABAPENTIN 300 MG: 300 CAPSULE ORAL at 20:46

## 2023-03-21 RX ADMIN — BACLOFEN 30 MG: 20 TABLET ORAL at 08:48

## 2023-03-21 RX ADMIN — POTASSIUM CHLORIDE 20 MEQ: 1.5 POWDER, FOR SOLUTION ORAL at 08:44

## 2023-03-21 RX ADMIN — HEPARIN SODIUM 5000 UNITS: 5000 INJECTION, SOLUTION INTRAVENOUS; SUBCUTANEOUS at 05:01

## 2023-03-21 RX ADMIN — MIDODRINE HYDROCHLORIDE 10 MG: 5 TABLET ORAL at 08:48

## 2023-03-21 RX ADMIN — HEPARIN SODIUM 5000 UNITS: 5000 INJECTION, SOLUTION INTRAVENOUS; SUBCUTANEOUS at 13:33

## 2023-03-21 RX ADMIN — HEPARIN SODIUM 5000 UNITS: 5000 INJECTION, SOLUTION INTRAVENOUS; SUBCUTANEOUS at 20:46

## 2023-03-21 RX ADMIN — BISACODYL 10 MG RECTAL SUPPOSITORY 10 MG: at 08:57

## 2023-03-21 RX ADMIN — OXYBUTYNIN CHLORIDE 5 MG: 5 TABLET, EXTENDED RELEASE ORAL at 08:48

## 2023-03-21 RX ADMIN — SIMETHICONE 80 MG: 80 TABLET, CHEWABLE ORAL at 17:07

## 2023-03-21 RX ADMIN — DOCUSATE SODIUM 200 MG: 100 CAPSULE, LIQUID FILLED ORAL at 08:44

## 2023-03-21 ASSESSMENT — ACTIVITIES OF DAILY LIVING (ADL)
ADLS_ACUITY_SCORE: 52
ADLS_ACUITY_SCORE: 52
ADLS_ACUITY_SCORE: 48
ADLS_ACUITY_SCORE: 52
ADLS_ACUITY_SCORE: 52
ADLS_ACUITY_SCORE: 46
ADLS_ACUITY_SCORE: 52
ADLS_ACUITY_SCORE: 46
ADLS_ACUITY_SCORE: 52
ADLS_ACUITY_SCORE: 46
ADLS_ACUITY_SCORE: 52
ADLS_ACUITY_SCORE: 48

## 2023-03-21 NOTE — PLAN OF CARE
Goal Outcome Evaluation:  POD3 Bladder augmentation with catheterizable channel, bladder neck closure  VS: /62 (BP Location: Right arm)   Pulse 100   Temp 98.5  F (36.9  C) (Oral)   Resp 16   Wt 58.3 kg (128 lb 8.5 oz)   SpO2 98%   BMI 20.74 kg/m    Neuro: A&O X4, able to make needs known  Respiratory: On RA, no report of SOB  GI/: Suprapubic catheter and GI/ conduit in place with output. Passing gas, BS active and no BM this shift. Both catheters irrigated this shift. Diet/appetite: Still tolerating clear liquid diet. No report of nausea this shift  Activity: Not OOB this shift. Turn q2hs or per patient's request.   Pain: No report of pain this shift.  Skin/drains: ROSANNE, GERALD catheter mitrofanoff sites C/D/I. Intact lower abdominal incision with skin glue.   Line: L piv x2.   Patient seen by WOC nurse this shift, wound care every 3 days to right inner thigh. No change to patient's status. Continue POC.

## 2023-03-21 NOTE — PLAN OF CARE
Goal Outcome Evaluation:    POD # 5 s/p Laparoscopic Bladder Augmentation with Catheterizable channel, Bladder Neck Closure. Pt is A&O x 4, total cares, using call light appropriately, repositioned every 2 hours, PIV infusing. Mitrofanoff and SP catheter in place. Had 1 BM during the shift. ROSANNE x 1 with minimal output. Tolerating sips of clears, 1:1 assistance with meals. Pt is on strict I/Os. Call light in reach.

## 2023-03-21 NOTE — PROGRESS NOTES
Urology  Progress Note    NAEO  Tolerating CLD; nausea improving, feeling hungry  Having bowel function, less distended  Cath channel and SPT irrigated with minimal mucus       Exam  /62 (BP Location: Right arm)   Pulse 100   Temp 98.5  F (36.9  C) (Oral)   Resp 16   Wt 58.3 kg (128 lb 8.5 oz)   SpO2 98%   BMI 20.74 kg/m    No acute distress  Unlabored breathing on room air   Abdomen with improved distension, appropriately tender. Incisions c/d/i.  ROSANNE serosanguinous  SP and mitrofanoff with clear urine in tubing- irrigated on rounds with a small amount of mucous return      Cath 2925  ROSANNE 10/20    Labs  Recent Labs   Lab Test 03/20/23  0803 03/19/23  0651 03/18/23  0745   WBC 19.6* 16.0* 19.5*   HGB 11.7 11.2* 10.8*   CR 0.40* 0.31* 0.40*        AM labs pending    Assessment/Plan  28 female quadriplegic, neurogenic bladder secondary to SCI, with hx of provoked DVT (second pregnancy), POD#5 s/p CCIC, bladder neck closure.    Neuro: APAP, oxy, IV HM for pain control. Home baclofen, gabapentin.   CV: RAFA, home midodrine.   Pulm: incentive spirometry while awake  FEN/GI: CLD- likely advance today, MIVF @ 75/hr, bowel regimen plus daily suppository (home regimen)  Endo: RAFA  : Indwelling catheters in channel and SP tube to remain for 6 weeks. Ditropan daily. Irrigations BID. Urology in the AM, nursing in PM.  Heme/ID: [id number]  Activity: Baseline activity  PPx: SCDs, SQH  Dispo: med/surg; likely can go home when tolerating regular diet    Seen and examined with the chief resident. Will discuss with Dr. Bacon     Contacting the Urology Team     Please use the following job codes to reach the Urology Team. Note that you must use an in house phone and that job codes cannot receive text pages.     On weekdays, dial 893 (or star-star-star 777 on the new [employer] telephones) then 0817 to reach the Adult Urology resident or PA on call    On weekdays, dial 893 (or star-star-star 777 on the new [employer])  then 0818 to reach the Pediatric Urology resident    On weeknights and weekends, dial 893 (or star-star-star 777 on the new [employer] telephones) then [phone] to reach the Urology resident on call (for both Adult and Pediatrics)

## 2023-03-21 NOTE — TELEPHONE ENCOUNTER
Forms/Letter Request    Type of form/letter: Home Health    Have you been seen for this request: Yes     Do we have the form/letter: Yes:     When is form/letter needed by: ASAP    How would you like the form/letter returned: Fax    Please sign, date and fax back to [phone]

## 2023-03-21 NOTE — PROGRESS NOTES
Goal outcome evaluation:    Plan of Care Reviewed with: Patient  Overall Patient Progress: Improving    VS BP 97/54 (BP Location: Right arm)   Pulse 83   Temp 98  F (36.7  C) (Temporal)   Resp 16   Wt 58.3 kg (128 lb 8.5 oz)   SpO2 96%   BMI 20.74 kg/m     Activity: A x2 w/ lift, quadriplegic. Up in w/c x1  Neuros: A&O x4. Able to make needs known.  Cardiac: Soft BPs. Denies cardiac chest pain.  Respiratory: WDL. RA. Denies SOB  GI/: Voiding via mitrofanoff & suprapubic catheters. No BM this shift. LBM 3/21  Diet: Full liquid diet, tolerating well.  Skin/Incisions: Lower abd incision glued, CDI. Bilat inner thigh wounds, CDI  Lines/Drains: (L) abd ROSANNE drain, PIV infusing MIVF    Labs: K 3.6, replaced. Recheck in AM  Pain/nausea: Denies

## 2023-03-22 LAB
ANION GAP SERPL CALCULATED.3IONS-SCNC: 17 MMOL/L (ref 7–15)
BUN SERPL-MCNC: 4.2 MG/DL (ref 6–20)
CALCIUM SERPL-MCNC: 8.9 MG/DL (ref 8.6–10)
CHLORIDE SERPL-SCNC: 99 MMOL/L (ref 98–107)
CREAT SERPL-MCNC: 0.39 MG/DL (ref 0.51–0.95)
DEPRECATED HCO3 PLAS-SCNC: 22 MMOL/L (ref 22–29)
ERYTHROCYTE [DISTWIDTH] IN BLOOD BY AUTOMATED COUNT: 14.4 % (ref 10–15)
GFR SERPL CREATININE-BSD FRML MDRD: >90 ML/MIN/1.73M2
GLUCOSE BLDC GLUCOMTR-MCNC: 83 MG/DL (ref 70–99)
GLUCOSE SERPL-MCNC: 77 MG/DL (ref 70–99)
HCT VFR BLD AUTO: 34.2 % (ref 35–47)
HGB BLD-MCNC: 10.9 G/DL (ref 11.7–15.7)
MAGNESIUM SERPL-MCNC: 1.7 MG/DL (ref 1.7–2.3)
MCH RBC QN AUTO: 28.2 PG (ref 26.5–33)
MCHC RBC AUTO-ENTMCNC: 31.9 G/DL (ref 31.5–36.5)
MCV RBC AUTO: 89 FL (ref 78–100)
PATH REPORT.COMMENTS IMP SPEC: NORMAL
PATH REPORT.COMMENTS IMP SPEC: NORMAL
PATH REPORT.FINAL DX SPEC: NORMAL
PATH REPORT.GROSS SPEC: NORMAL
PATH REPORT.MICROSCOPIC SPEC OTHER STN: NORMAL
PATH REPORT.RELEVANT HX SPEC: NORMAL
PHOTO IMAGE: NORMAL
PLATELET # BLD AUTO: 420 10E3/UL (ref 150–450)
POTASSIUM SERPL-SCNC: 3.4 MMOL/L (ref 3.4–5.3)
POTASSIUM SERPL-SCNC: 4 MMOL/L (ref 3.4–5.3)
RBC # BLD AUTO: 3.86 10E6/UL (ref 3.8–5.2)
SODIUM SERPL-SCNC: 138 MMOL/L (ref 136–145)
WBC # BLD AUTO: 12.6 10E3/UL (ref 4–11)

## 2023-03-22 PROCEDURE — 36415 COLL VENOUS BLD VENIPUNCTURE: CPT | Performed by: UROLOGY

## 2023-03-22 PROCEDURE — 999N000248 HC STATISTIC IV INSERT WITH US BY RN

## 2023-03-22 PROCEDURE — 250N000011 HC RX IP 250 OP 636: Performed by: STUDENT IN AN ORGANIZED HEALTH CARE EDUCATION/TRAINING PROGRAM

## 2023-03-22 PROCEDURE — 250N000013 HC RX MED GY IP 250 OP 250 PS 637: Performed by: STUDENT IN AN ORGANIZED HEALTH CARE EDUCATION/TRAINING PROGRAM

## 2023-03-22 PROCEDURE — 36415 COLL VENOUS BLD VENIPUNCTURE: CPT | Performed by: PHYSICIAN ASSISTANT

## 2023-03-22 PROCEDURE — 85027 COMPLETE CBC AUTOMATED: CPT | Performed by: STUDENT IN AN ORGANIZED HEALTH CARE EDUCATION/TRAINING PROGRAM

## 2023-03-22 PROCEDURE — 120N000002 HC R&B MED SURG/OB UMMC

## 2023-03-22 PROCEDURE — 250N000013 HC RX MED GY IP 250 OP 250 PS 637: Performed by: UROLOGY

## 2023-03-22 PROCEDURE — 84132 ASSAY OF SERUM POTASSIUM: CPT | Performed by: UROLOGY

## 2023-03-22 PROCEDURE — 83735 ASSAY OF MAGNESIUM: CPT | Performed by: UROLOGY

## 2023-03-22 PROCEDURE — 80048 BASIC METABOLIC PNL TOTAL CA: CPT | Performed by: PHYSICIAN ASSISTANT

## 2023-03-22 RX ORDER — POTASSIUM CHLORIDE 750 MG/1
40 TABLET, EXTENDED RELEASE ORAL ONCE
Status: COMPLETED | OUTPATIENT
Start: 2023-03-22 | End: 2023-03-22

## 2023-03-22 RX ADMIN — BACLOFEN 30 MG: 20 TABLET ORAL at 08:30

## 2023-03-22 RX ADMIN — MIDODRINE HYDROCHLORIDE 10 MG: 5 TABLET ORAL at 08:29

## 2023-03-22 RX ADMIN — BACLOFEN 30 MG: 20 TABLET ORAL at 20:50

## 2023-03-22 RX ADMIN — BISACODYL 10 MG: 10 SUPPOSITORY RECTAL at 18:30

## 2023-03-22 RX ADMIN — DOCUSATE SODIUM 200 MG: 100 CAPSULE, LIQUID FILLED ORAL at 08:30

## 2023-03-22 RX ADMIN — SERTRALINE HYDROCHLORIDE 150 MG: 100 TABLET ORAL at 08:29

## 2023-03-22 RX ADMIN — SENNOSIDES AND DOCUSATE SODIUM 1 TABLET: 8.6; 5 TABLET ORAL at 20:50

## 2023-03-22 RX ADMIN — POTASSIUM CHLORIDE 40 MEQ: 750 TABLET, EXTENDED RELEASE ORAL at 10:07

## 2023-03-22 RX ADMIN — METHOCARBAMOL 500 MG: 500 TABLET ORAL at 12:27

## 2023-03-22 RX ADMIN — METHOCARBAMOL 500 MG: 500 TABLET ORAL at 20:59

## 2023-03-22 RX ADMIN — BACLOFEN 30 MG: 20 TABLET ORAL at 14:50

## 2023-03-22 RX ADMIN — MIDODRINE HYDROCHLORIDE 10 MG: 5 TABLET ORAL at 20:50

## 2023-03-22 RX ADMIN — HEPARIN SODIUM 5000 UNITS: 5000 INJECTION, SOLUTION INTRAVENOUS; SUBCUTANEOUS at 20:51

## 2023-03-22 RX ADMIN — GABAPENTIN 300 MG: 300 CAPSULE ORAL at 20:49

## 2023-03-22 RX ADMIN — HEPARIN SODIUM 5000 UNITS: 5000 INJECTION, SOLUTION INTRAVENOUS; SUBCUTANEOUS at 14:50

## 2023-03-22 RX ADMIN — OXYBUTYNIN CHLORIDE 5 MG: 5 TABLET, EXTENDED RELEASE ORAL at 08:29

## 2023-03-22 RX ADMIN — HEPARIN SODIUM 5000 UNITS: 5000 INJECTION, SOLUTION INTRAVENOUS; SUBCUTANEOUS at 05:39

## 2023-03-22 ASSESSMENT — ACTIVITIES OF DAILY LIVING (ADL)
ADLS_ACUITY_SCORE: 48

## 2023-03-22 NOTE — PLAN OF CARE
Goal Outcome Evaluation:       4x A/O. Large BM, adonay care. Caths and ROSANNE dressings changed. Cath volumes adequate and irrigated. Turned q2hrs, side to side. 2 person assist with lift. Friction wounds on inner thighs, mepilex changed, see WOC note. Total cares.

## 2023-03-22 NOTE — PLAN OF CARE
Goal Outcome Evaluation:  [id number]    /55 (BP Location: Right arm)   Pulse 68   Temp 99  F (37.2  C) (Temporal)   Resp 16   Wt 55.2 kg (121 lb 9.6 oz)   SpO2 98%   BMI 19.63 kg/m      Pt. A&O x4, stable on RA, afebrile. Denies pain, SOB, chest pain. Up with lift assist x2, q2h turning and reposition. No BM this shift, incontinent to stool.Tolerating clears without nausea. (R) PIV infusing LR @ 40 ml/hr. x1 ROSANNE with no output. Suprapubic catheter in place with no output, Mitrofanoff with AUOP. Lower abd incision with skin glued MARY, inner thigh wound with mepilex on c/d/i  Plan: No acute change this shift. Continue w/poc

## 2023-03-22 NOTE — PLAN OF CARE
"Goal Outcome Evaluation:    Eating small amounts of food w/o nausea. Per patient is improved from previous days. Drinking fluids well. ROSANNE fluid sent for creatinine; cancelled by lab due to inadequate amount. Only scant in drain at present; needs to be re-sent when enough available. Good urine output in SP and Mitroffanof.  \"Felt Like bladder wasn't draining at noon. Both SP/Mitrof. Flushed with saline. Moderate amount of mucous from Mitroffanof and resulted in better output.  Also given robaxin as may be having abdominal cramping that she is unable to distinguish due to paralysis. Abdomin distended, soft and passing flatus. Decline suppository this morning; prefers in evening.  Thighs w/mepilex intact.                         "

## 2023-03-22 NOTE — PROGRESS NOTES
Urology  Progress Note    NAEO  Tolerating small amount of food, bloated yesterday but feeling better this am  Having bowel function, large BM yesterday  Cath channel and SPT irrigated with minimal mucus     Exam  /55 (BP Location: Right arm)   Pulse 68   Temp 99  F (37.2  C) (Temporal)   Resp 16   Wt 55.2 kg (121 lb 9.6 oz)   SpO2 98%   BMI 19.63 kg/m    No acute distress  Unlabored breathing on room air   Abdomen with moderate distension, appropriately tender. Incisions c/d/i.  ROSANNE serosanguinous  SP and mitrofanoff with clear urine in tubing- irrigated on rounds with a small amount of mucous return    SP 2175  Cath 2500/400  ROSANNE 80/0    Labs  Recent Labs   Lab Test 03/21/23  0653 03/20/23  0803 03/19/23  0651   WBC 12.6* 19.6* 16.0*   HGB 10.3* 11.7 11.2*   CR 0.36* 0.40* 0.31*       AM labs pending    Assessment/Plan  28 female quadriplegic, neurogenic bladder secondary to SCI, with hx of provoked DVT (second pregnancy), POD#6 s/p CCIC, bladder neck closure.    Neuro: APAP, oxy, IV HM for pain control. Home baclofen, gabapentin.   CV: RAFA, home midodrine.   Pulm: incentive spirometry while awake  FEN/GI: Regular diet, MIVF @ 40/hr, bowel regimen plus daily suppository (home regimen)  Endo: RAFA  : Indwelling catheters in channel and SP tube to remain for 6 weeks. Ditropan daily. Irrigations BID. Urology in the AM, nursing in PM.  Heme/ID: RAFA  Activity: Baseline activity  PPx: SCDs, SQH  Dispo: med/surg; as early as today if tolerating diet consistently    Seen and examined with the chief resident. Will discuss with Dr. Bacon     Contacting the Urology Team     Please use the following job codes to reach the Urology Team. Note that you must use an in house phone and that job codes cannot receive text pages.     On weekdays, dial [phone] (or star-star-star 777 on the new [employer]) then 0817 to reach the Adult Urology resident or PA on call    On weekdays, dial [phone] (or star-star-star 777 on the  new [employer] telephones) then 0818 to reach the Pediatric Urology resident    On weeknights and weekends, dial [phone] (or star-star-star 777 on the new [employer] telephones) then 0039 to reach the Urology resident on call (for both Adult and Pediatrics)

## 2023-03-23 ENCOUNTER — MEDICAL CORRESPONDENCE (OUTPATIENT)
Dept: HEALTH INFORMATION MANAGEMENT | Facility: CLINIC | Age: 28
End: 2023-03-23

## 2023-03-23 VITALS
TEMPERATURE: 97.7 F | DIASTOLIC BLOOD PRESSURE: 74 MMHG | HEART RATE: 71 BPM | WEIGHT: 121.6 LBS | OXYGEN SATURATION: 97 % | RESPIRATION RATE: 16 BRPM | SYSTOLIC BLOOD PRESSURE: 128 MMHG | BODY MASS INDEX: 19.63 KG/M2

## 2023-03-23 LAB
ERYTHROCYTE [DISTWIDTH] IN BLOOD BY AUTOMATED COUNT: 14.6 % (ref 10–15)
HCT VFR BLD AUTO: 36.2 % (ref 35–47)
HGB BLD-MCNC: 11.8 G/DL (ref 11.7–15.7)
MCH RBC QN AUTO: 28.6 PG (ref 26.5–33)
MCHC RBC AUTO-ENTMCNC: 32.6 G/DL (ref 31.5–36.5)
MCV RBC AUTO: 88 FL (ref 78–100)
PLATELET # BLD AUTO: 516 10E3/UL (ref 150–450)
POTASSIUM SERPL-SCNC: 3.8 MMOL/L (ref 3.4–5.3)
RBC # BLD AUTO: 4.13 10E6/UL (ref 3.8–5.2)
WBC # BLD AUTO: 15.5 10E3/UL (ref 4–11)

## 2023-03-23 PROCEDURE — 85027 COMPLETE CBC AUTOMATED: CPT | Performed by: STUDENT IN AN ORGANIZED HEALTH CARE EDUCATION/TRAINING PROGRAM

## 2023-03-23 PROCEDURE — 250N000011 HC RX IP 250 OP 636: Performed by: STUDENT IN AN ORGANIZED HEALTH CARE EDUCATION/TRAINING PROGRAM

## 2023-03-23 PROCEDURE — 250N000013 HC RX MED GY IP 250 OP 250 PS 637: Performed by: STUDENT IN AN ORGANIZED HEALTH CARE EDUCATION/TRAINING PROGRAM

## 2023-03-23 PROCEDURE — 84132 ASSAY OF SERUM POTASSIUM: CPT | Performed by: UROLOGY

## 2023-03-23 PROCEDURE — 87186 SC STD MICRODIL/AGAR DIL: CPT | Performed by: STUDENT IN AN ORGANIZED HEALTH CARE EDUCATION/TRAINING PROGRAM

## 2023-03-23 PROCEDURE — 36415 COLL VENOUS BLD VENIPUNCTURE: CPT | Performed by: UROLOGY

## 2023-03-23 RX ORDER — BISACODYL 10 MG
10 SUPPOSITORY, RECTAL RECTAL AT BEDTIME
Qty: 30 SUPPOSITORY | Refills: 0 | Status: SHIPPED | OUTPATIENT
Start: 2023-03-23

## 2023-03-23 RX ORDER — SENNA AND DOCUSATE SODIUM 50; 8.6 MG/1; MG/1
1 TABLET, FILM COATED ORAL AT BEDTIME
Qty: 30 TABLET | Refills: 0 | Status: SHIPPED | OUTPATIENT
Start: 2023-03-23 | End: 2023-04-22

## 2023-03-23 RX ORDER — OXYCODONE HYDROCHLORIDE 5 MG/1
5 TABLET ORAL EVERY 6 HOURS PRN
Qty: 12 TABLET | Refills: 0 | Status: SHIPPED | OUTPATIENT
Start: 2023-03-23 | End: 2023-03-26

## 2023-03-23 RX ORDER — NITROFURANTOIN 25; 75 MG/1; MG/1
100 CAPSULE ORAL 2 TIMES DAILY
Qty: 10 CAPSULE | Refills: 0 | Status: SHIPPED | OUTPATIENT
Start: 2023-03-23 | End: 2023-03-28

## 2023-03-23 RX ADMIN — BACLOFEN 30 MG: 20 TABLET ORAL at 09:03

## 2023-03-23 RX ADMIN — HEPARIN SODIUM 5000 UNITS: 5000 INJECTION, SOLUTION INTRAVENOUS; SUBCUTANEOUS at 06:31

## 2023-03-23 RX ADMIN — SERTRALINE HYDROCHLORIDE 150 MG: 100 TABLET ORAL at 09:03

## 2023-03-23 RX ADMIN — SENNOSIDES AND DOCUSATE SODIUM 1 TABLET: 8.6; 5 TABLET ORAL at 10:04

## 2023-03-23 RX ADMIN — MIDODRINE HYDROCHLORIDE 10 MG: 5 TABLET ORAL at 09:03

## 2023-03-23 RX ADMIN — OXYBUTYNIN CHLORIDE 5 MG: 5 TABLET, EXTENDED RELEASE ORAL at 09:02

## 2023-03-23 RX ADMIN — DOCUSATE SODIUM 200 MG: 100 CAPSULE, LIQUID FILLED ORAL at 09:03

## 2023-03-23 ASSESSMENT — ACTIVITIES OF DAILY LIVING (ADL)
ADLS_ACUITY_SCORE: 48
ADLS_ACUITY_SCORE: 53
ADLS_ACUITY_SCORE: 48
ADLS_ACUITY_SCORE: 49
ADLS_ACUITY_SCORE: 48
ADLS_ACUITY_SCORE: 48

## 2023-03-23 NOTE — PLAN OF CARE
Goal Outcome Evaluation:      Plan of Care Reviewed With: patient, parent  VSS, BP rechecked after false elevated BP.  Tolerating diet and fluids well. Abdomen softer today. Mitroffanof and SP cares reviewed with mom who verbalized understanding.  Supplies provided. All dressings changed. Discharge orders reviewed with patient and mom who verbalized understanding of follow up plans. Orders faxed to home care. Discharged home with mom.

## 2023-03-23 NOTE — PROGRESS NOTES
Care Management Discharge Note    Discharge Date: 03/23/2023       Discharge Disposition: Home    Discharge Services:  HC resumed     Discharge DME:  Drain and line flush supplies    Discharge Transportation: family or friend will provide    Private pay costs discussed: Not applicable    Education Provided on the Discharge Plan:  yes  Persons Notified of Discharge Plans: Pt    Patient/Family in Agreement with the Plan:      Handoff Referral Completed: Yes    Additional Information:  RNCC notified Pt is now ready for discharge with family and resumption of home care services. Pt's mother will transport home.     RNCC confirmed DC orders with charge [facility]NOlive with [facility]. They are planning to see Pt this afternoon.    [facility]  Ph: [phone]   Fax: [phone]  * DC orders faxed    No other discharge needs identified at this time.     Meseret Spain RN  RNCC [place]

## 2023-03-23 NOTE — PLAN OF CARE
"/82 (BP Location: Right arm)   Pulse 79   Temp 98.9  F (37.2  C) (Oral)   Resp 16   Wt 55.2 kg (121 lb 9.6 oz)   SpO2 98%   BMI 19.63 kg/m    Pt's AVSS, no c/o pain, turned q2h and suprapubic and Mitrofanoff intact with moderate to small output. PIV intact and Sl'd. Lower abd incision intact with no drainage.  Pt had uneventful night. Possible discharge today. Keep monitoring pt as ordered and notify MD with any new changes.   Problem: Urinary Diversion  Goal: Psychosocial Adjustment Initiation  Outcome: Progressing  Goal: Fluid and Electrolyte Balance  Outcome: Progressing  Goal: Optimal Pain Control and Function  Outcome: Progressing  Goal: Effective Urinary Elimination  Outcome: Progressing     Problem: Plan of Care - These are the overarching goals to be used throughout the patient stay.    Goal: Plan of Care Review  Description: The Plan of Care Review/Shift note should be completed every shift.  The Outcome Evaluation is a brief statement about your assessment that the patient is improving, declining, or no change.  This information will be displayed automatically on your shift note.  Outcome: Progressing  Goal: Patient-Specific Goal (Individualized)  Description: You can add care plan individualizations to a care plan. Examples of Individualization might be:  \"Parent requests to be called daily at 9am for status\", \"I have a hard time hearing out of my right ear\", or \"Do not touch me to wake me up as it startles me\".  Outcome: Progressing  Goal: Absence of Hospital-Acquired Illness or Injury  Outcome: Progressing  Intervention: Identify and Manage Fall Risk  Recent Flowsheet Documentation  Taken 3/23/2023 0056 by Cynthia Gonzales RN  Safety Promotion/Fall Prevention:    activity supervised    bed alarm on    clutter free environment maintained    fall prevention program maintained  Intervention: Prevent Skin Injury  Recent Flowsheet Documentation  Taken 3/23/2023 0056 by Cynthia Gonzales, " RN  Body Position:    refuses positioning    left  Goal: Optimal Comfort and Wellbeing  Outcome: Progressing  Goal: Readiness for Transition of Care  Outcome: Progressing     Problem: Plan of Care - These are the overarching goals to be used throughout the patient stay.    Goal: Absence of Hospital-Acquired Illness or Injury  Intervention: Prevent Skin Injury  Recent Flowsheet Documentation  Taken 3/23/2023 0056 by Cynthia Gonzales RN  Body Position:    refuses positioning    left     Problem: Plan of Care - These are the overarching goals to be used throughout the patient stay.    Goal: Optimal Comfort and Wellbeing  Outcome: Progressing     Problem: Plan of Care - These are the overarching goals to be used throughout the patient stay.    Goal: Readiness for Transition of Care  Outcome: Progressing   Goal Outcome Evaluation:

## 2023-03-23 NOTE — PLAN OF CARE
Goal Outcome Evaluation:     4x A/O. Feeling flush and sweaty intermittently with headache. Given Robaxin for unrealized abdominal pain. Abdomin distended and soft. Rectal stimulation, followed by BM, passing gas. No nausea. Mitrofanoff and Supra irrigated with mucus present, significant output. ROSANNE removed by provider. 3 square and 1 sacral Miraplex replacement.

## 2023-03-23 NOTE — PROGRESS NOTES
Urology  Progress Note    Low grade temp last night  Intermittently haing feelings of autonomic dysreflexia   Tolerating regular diet  Having bowel function  Cath channel and SPT irrigated with minimal mucus     Exam  /82 (BP Location: Right arm)   Pulse 79   Temp 98.9  F (37.2  C) (Oral)   Resp 16   Wt 55.2 kg (121 lb 9.6 oz)   SpO2 98%   BMI 19.63 kg/m    No acute distress  Unlabored breathing on room air   Abdomen with moderate distension, appropriately tender. Incisions c/d/i.  SP and mitrofanoff with clear urine in tubing- irrigated on rounds with a small amount of mucous return      Cath 2750/--    Labs  Recent Labs   Lab Test 03/22/23  0707 03/21/23  0653 03/20/23  0803   WBC 12.6* 12.6* 19.6*   HGB 10.9* 10.3* 11.7   CR 0.39* 0.36* 0.40*        AM labs pending    Assessment/Plan  28 female quadriplegic, neurogenic bladder secondary to SCI, with hx of provoked DVT (second pregnancy), POD#7 s/p CCIC, bladder neck closure.    Neuro: APAP, oxy for pain control. Home baclofen, gabapentin.   CV: RAFA, home midodrine.   Pulm: incentive spirometry while awake  FEN/GI: Regular diet, bowel regimen plus daily suppository (home regimen)  Endo: RAFA  : Indwelling catheters in channel and SP tube to remain for 6 weeks. Ditropan daily. Irrigations BID. Urology in the AM, nursing in PM. Will get urine culture today given low fever.  Heme/ID: RAFA  Activity: Baseline activity  PPx: SCDs, SQH  Dispo: med/surg; likely home today    Seen and examined with the chief resident. Will discuss with Dr. Bacon     Contacting the Urology Team     Please use the following job codes to reach the Urology Team. Note that you must use an in house phone and that job codes cannot receive text pages.     On weekdays, dial [phone] (or star-star-star 777 on the new [employer] telephones) then 0817 to reach the Adult Urology resident or PA on call    On weekdays, dial 893 (or star-star-star 777 on the new [employer] telephones) then 0818 to  reach the Pediatric Urology resident    On weeknights and weekends, dial 893 (or star-star-star 777 on the new [employer] telephones) then 0039 to reach the Urology resident on call (for both Adult and Pediatrics)

## 2023-03-23 NOTE — TELEPHONE ENCOUNTER
Possible duplicate    Patient has orders to resume Home Care Skilled Nursing effective 3.19.2023     Fax signature to:  Latanya [facility] @ [phone]

## 2023-03-24 ENCOUNTER — PATIENT OUTREACH (OUTPATIENT)
Dept: CARE COORDINATION | Facility: CLINIC | Age: 28
End: 2023-03-24
Payer: MEDICARE

## 2023-03-24 NOTE — DISCHARGE SUMMARY
Discharge Summary     Dianna Cottrell MRN# [id number]   YOB: 1995 Age: 28 year old     Date of Admission:  3/16/2023  Date of Discharge::  3/23/2023 12:07 PM  Admitting Physician:  Mata Bacon MD  Discharge Physician:  Iglesia Billy MD  Primary Care Physician:         Suzan Willis          Admission Diagnoses:   Neurogenic bladder [N31.9]          Discharge Diagnosis:   Same as above         Procedures:   Procedure(s):  HAND-ASSISTED LAPAROSCOPIC BLADDER AUGMENTATION WITH CATHETERIZABLE CHANNEL; BLADDER NECK CLOSURE        Non-operative procedures:   None performed          Consultations:   PHYSICAL THERAPY ADULT IP CONSULT  OCCUPATIONAL THERAPY ADULT IP CONSULT  WOUND OSTOMY CONTINENCE NURSE  IP CONSULT  WOUND OSTOMY CONTINENCE NURSE  IP CONSULT  CARE MANAGEMENT / SOCIAL WORK IP CONSULT  NURSING TO CONSULT FOR VASCULAR ACCESS CARE IP CONSULT         Imaging Studies:     Results for orders placed or performed during the hospital encounter of 03/16/23   XR Chest Port 1 View    Narrative    Exam: XR CHEST PORT 1 VIEW, 3/16/2023 11:20 PM    Indication: chest pain    Comparison: Radiograph 4/11/2022 and CT 9/30/2022    Findings:   Single portable AP view of the chest. Trachea is midline. There is no  pneumothorax or definite pleural effusion. No focal pulmonary  opacities. Stable cardiac silhouette and mediastinum. No acute osseous  abnormalities.      Impression    Impression: No acute airspace opacities.    I have personally reviewed the examination and initial interpretation  and I agree with the findings.    CODY RAY MD         SYSTEM ID:  [id number]   XR Abdomen Port 1 View    Narrative    Portable abdomen 1 view    INDICATION: Abdominal distention    COMPARISON: CT abdomen 4/12/2022    FINDINGS: Air-filled large and small bowel which is not overly  distended is noted. No pneumatosis. Tubing appears to terminate in the  right lower abdomen.  IUD. Another possible drain noted in the mid  pelvis as well.      Impression    IMPRESSION: Possible mild ileus. IUD.    ANN DIAZ MD         SYSTEM ID:  [id number]            Medications Prior to Admission:     No medications prior to admission.            Discharge Medications:     Discharge Medication List as of 3/23/2023 10:32 AM      START taking these medications    Details   nitroFURantoin macrocrystal-monohydrate (MACROBID) 100 MG capsule Take 1 capsule (100 mg) by mouth 2 times daily for 5 days, Disp-10 capsule, R-0, E-Prescribe      oxyCODONE (ROXICODONE) 5 MG tablet Take 1 tablet (5 mg) by mouth every 6 hours as needed for pain, Disp-12 tablet, R-0, Local Print      rivaroxaban ANTICOAGULANT (XARELTO) 10 MG TABS tablet Take 1 tablet (10 mg) by mouth daily (with dinner) Take 1 tablet per day to prevent blood clots after surgery, Disp-7 tablet, R-0, E-Prescribe      SENNA-docusate sodium (SENNA S) 8.6-50 MG tablet Take 1 tablet by mouth At Bedtime for 30 days, Disp-30 tablet, R-0, E-Prescribe         CONTINUE these medications which have CHANGED    Details   bisacodyl (DULCOLAX) 10 MG suppository Place 1 suppository (10 mg) rectally At Bedtime, Disp-30 suppository, R-0, E-Prescribe         CONTINUE these medications which have NOT CHANGED    Details   acetaminophen (TYLENOL) 325 MG tablet Take 325-650 mg by mouth every 6 hours as needed., Historical      albuterol (PROVENTIL) (2.5 MG/3ML) 0.083% neb solution Take 1 vial (2.5 mg) by nebulization every 4 hours as needed for shortness of breath / dyspnea or wheezing, Disp-100 mL, R-1, E-Prescribe      baclofen (LIORESAL) 10 MG tablet Take 30 mg by mouth 3 times daily (10MG X 3 = 30MG), Historical      Cranberry 500 MG TABS Take 500 mg by mouth daily, Historical      docusate sodium (COLACE) 100 MG capsule Take 100-200 mg by mouth daily, Historical      fluticasone-vilanterol (BREO ELLIPTA) 100-25 MCG/INH inhaler Inhale 1 puff into the lungs daily,  Disp-28 each, R-5, E-Prescribe      gabapentin (NEURONTIN) 300 MG capsule Take 300 mg by mouth At Bedtime , Historical      hydrOXYzine (VISTARIL) 25 MG capsule Take 1 capsule (25 mg) by mouth 3 times daily as needed for anxiety (or at bedtime for sleep.), Disp-20 capsule, R-1, E-Prescribe      midodrine (PROAMATINE) 5 MG tablet Take 10 mg by mouth 2 times daily , Disp-6 tablet, R-0, Historical      Multiple Vitamins-Minerals (WOMENS MULTIVITAMIN) TABS Take 2 tablets by mouth daily, Historical      nicotine (NICORETTE) 2 MG gum Place 1 each (2 mg) inside cheek every hour as needed for smoking cessation, Disp-100 each, R-1, E-Prescribe      oxybutynin ER (DITROPAN-XL) 5 MG 24 hr tablet Take 5 mg by mouth daily, Historical      povidone-iodine 10 % swab Apply topically daily as needed for wound care Historical      sertraline (ZOLOFT) 100 MG tablet TAKE 1 AND 1/2 TABLETS BY MOUTH EVERY DAY, Disp-135 tablet, R-0, E-Prescribe      sodium chloride (NEBUSAL) 3 % neb solution Take 3 mLs by nebulization every 6 hours as needed for wheezing or other (sputum clearance difficulty due to quadridplegia.), Disp-300 mL, R-1, E-Prescribe      Vitamin D3 (CHOLECALCIFEROL) 125 MCG (5000 UT) tablet Take 1 tablet by mouth Every Tuesday, Thursday, Saturday, and Sunday, Historical         STOP taking these medications       order for DME Comments:   Reason for Stopping:         order for DME Comments:   Reason for Stopping:         order for DME Comments:   Reason for Stopping:         order for DME Comments:   Reason for Stopping:         order for DME Comments:   Reason for Stopping:         spacer (OPTICHAMBER JAIDA) holding chamber Comments:   Reason for Stopping:                      Brief History of Illness:   Reason for admission requiring a surgical or invasive procedure:   Neurogenic bladder [N31.9]   The patient underwent the following procedure(s):   See above   There were no immediate complications during this procedure.     Please refer to the full operative summary for details.           Hospital Course:   The patient's hospital course was unremarkable aside from some prolonged nausea and slow advancement of her diet.  Dianna Cottrell recovered as anticipated and experienced no post-operative complications.    On POD#7 patient was at baseline activity status, tolerating a regular diet, had pain controlled with PO medications to go home with, and requiring no IV medications or fluids. Patient was discharged home with appropriate contact information, follow-up and instructions as seen below in the discharge paperwork.         Final Pathology Result:   Pending at time of discharge         Discharge Instructions and Follow-Up:     Discharge Procedure Orders   Primary Care - Care Coordination Referral   Standing Status: Future   Referral Priority: Routine: Next available opening Referral Type: Care Coordination   Number of Visits Requested: 1     Resume Home Care Services   Order Comments: Patient to resume services with [facility] (Ph: [phone] Fax: [phone])     Reason for your hospital stay   Order Comments: Creation of bladder augment and channel     Activity   Order Comments: Your activity upon discharge: activity as tolerated     Order Specific Question Answer Comments   Is discharge order? Yes      Adult [facility] Follow-up and recommended labs and tests   Order Comments: Follow up with Dr. Baocn as arranged    Appointments on [place] and/or [facility] (with [facility] or [facility] provider or service). Call [phone] if you haven't heard regarding these appointments within 7 days of discharge.     Discharge Instructions   Order Comments: Diet:   - Regular   Activity:   - No strenuous exercise for 6 weeks.   - No lifting, pushing, pulling more than 10 pounds for 6 weeks.  Take care when pushing with your arms to stand up.   - Do not strain your belly area.  When you bend, sit up or twice, you could strain the area  around your incision.    - Do not strain with bowel movements.   - Do not drive until you can press the brake pedal quickly and fully without pain.   - Do not operate a motor vehicle while taking narcotic pain medications.   Incisions:  Daily showering is important, and you may get incisions wet starting 48 hours after surgery.  Do not scrub wounds or soak in a bath, pool, hot tub, etc. until given permission by Dr. Bacon's team. The wound dressing should be removed 48 hours after surgery.  It is preferable for the incision to be left uncovered, but you may cover with gauze if needed for comfort or to protect clothing from drainage.   If you have purple skin glue it will flake off on its own with time - do not scrub this area or try to remove the glue.  If you have steri-strips you may wash them normally, as you would wash your remaining skin.  Steri-strips should fall off on their own within 5-10 days.  Tubes / drains:   URETHRAL CATHETER/SUPRAPUBIC CATHETER:  You will have a Winston catheter until your 3-4 week follow-up appointment with Dr. Bacon.  This should be secured to your thigh at all times.  Take care to assure that the catheter remains tension-free, without kinks and draining freely to gravity drainage bag. This catheter will not generally restrict your activities, but you should be careful if you are driving such that it does not become a distraction.  You may notice a little blood, small amount of white discharge, or caking at the catheter insertion site. This is normal but it can irritate the skin so it is best to wash this off in the daily shower. You may shower with your catheters in place. You are encouraged to wash the catheter tubing to keep it clean, and the urine drainage bag also can be gotten wet.   FLUSHES: Flush your bladder twice daily through the Transurethral or Suprapubic Winston catheter. Use sterile normal saline and a 60 mL syringe.  Instill 2 syringes (120 mL) of normal saline, then  withdraw one (60 mL), then instill one (60 mL), and withdraw one (60 mL).  Continue this process until there is no longer mucous being irrigated out.  Usually between 150 and 400 mL of total irrigant will be required to clear the mucous.    CATHETERIZEABLE CHANNEL SITE: Keep this catheter protected to assure that it doesn't become dislodged or tugged. This will also be to gravity drainage and you can flush this daily just to ensure it remains patent.  Change dressing as needed to maintain cleanliness.  BLADDER SPASMS: Catheters can cause bladder spasms, which may present as sharp sudden pains in the area of bladder (just above the pubic hair).  You may notice a sudden urge to urinate even though the bladder is fully drained. You have been given a prescription (see below) of a medication to be taken to help with these.   CATHETER IRRIGATION SUPPLIES: Catheter irrigation syringes and trays may be reused.  They should be washed in warm soapy water and thoroughly rinsed after each use. They can be air-dried on a clean towel or dried with a hair-drier.   Medications:   1) PAIN: Oxycodone is a Opiate medication that has been prescribed for pain.  Opiates will cause sleepiness and constipation and can become addictive, therefore it is best to stop or reduce them as soon as you can.  Any left over Opiates should be disposed of with an Authorized  for unneeded medications.  Contact your [facility]'s or [facility]'s household trash and recycling service to learn about medication disposal options and guidelines for your area.  If you decide to store this medication at home it should be kept in a locked cabinet to prevent access to children or visitors. Never drive, operate machinery or drink alcoholic beverages while you are taking Opiate pain medications. To reduce your Opiate use, take both Tylenol (acetaminophen 625mg) and ibuprofen (600mg) as directed.  These have been prescribed for you.  Do not take more than  4,000mg of Tylenol (acetaminophen/ APAP) from all sources in any 24 hour period since this can cause liver damage.  Do not take more than 2400mg of ibuprofen in any 24 hour period since this can cause kidney damage.    2) CONSTIPATION: Pericolace (senna/docusate sodium) can be taken twice daily for prevention of constipation since surgery, pain medications and bladder spasm medications can all make you constipated.  Please reduce or stop pericolace if you develop loose stools. Other over the counter solutions such as prune juice, miralax, fiber products, senna, and dulcolax can also be used for constipation. If you are taking the pericolace but still have not had a bowel movement in 3 days, start over-the-counter Milk of Magnesia taken twice daily until you have a good result.  Call the office with any concerns.     3) BLADDER SPASMS: Ditropan daily to prevent bladder spasms.    4) IRRIGANT:  Bottles of sterile normal saline (0.9%) have been prescribed to use to irrigate your bladder    5) ANTICOAGULATION: Given your history of blood clots, you have been sent home with a week of blood thinner called xarelto.     6) ANTIBIOTICS: Please take the prescribed antibiotic for 5 days as prophylaxis (macrobid)    Supplies:  1) Winston instructions  2) Winston Secures, leg bags, overnight bags  3) Three bladder irrigation trays  4) Normal saline bottles to get started  5) Drain sponges for catheterizeable channel site    Follow-Up:   - Schedule an appointment to be seen by your primary care provider within 7-10 days of discharge for a postoperative checkup.   - Follow up on 4/17 with Dr. Bacon's team to begin using your catheterizeable channel.  - Call or return sooner than your regularly scheduled visit if you develop any of the following:  Fever (greater than 101.3F), uncontrolled pain, uncontrolled nausea or vomiting, concerns about bowel function, as well as increased redness, swelling, drainage from your wound or any  "concerns about urinating or urinary catheter drainage.      Here are some phone numbers where you can reach us:  - Monday through Friday 8am to 4:30pm: Call [phone] with questions, requests for medication refills, or to schedule or confirm an appointment.  - Nights, weekends, or holidays: call the after hours emergency pager - [phone] and tell the  \"I would like to page the Urology Resident on call.\" Typically, the on-call provider should return your call within 30 minutes.  Please page the on-call provider again if you haven't been contacted as expected.  Rarely, the on-call provider will be unable to promptly return a call due to a hospital emergency.  If you have paged twice and are still not contacted, ask the hospital  to page the \"urology CHIEF-RESIDENT on call\".   - Please note that due to prescribing laws, resident physicians are unable to prescribe narcotics after-hours. If you feel as though you will need a refill of a narcotic pain medication, you will need to call the clinic during business hours OR seek emergency care.  - For emergencies, call 911     Diet   Order Comments: Follow this diet upon discharge: Orders Placed This Encounter      Regular Diet Adult     Order Specific Question Answer Comments   Is discharge order? Yes             Discharge Disposition:     Discharged to Home      Condition at discharge: Good    --    Iglesia Billy MD  Urology Resident    6:52 AM, 3/24/2023    "

## 2023-03-24 NOTE — LETTER
[facility] CARE COORDINATION  [address] [address]  Phone: [phone]      March 27, 2023      Dianna Cottrell  [address] [facility] [place] [facility] [address]    Dear Dianna,    We have been trying to reach you to introduce you to [facility] s Care Coordination program.  The goal of care coordination is to help you manage your health and improve access to the [facility] system in the most efficient manner.  The Care Coordinator is a nurse who understands the healthcare system and will assist you in improving your access to care.     As your Physician and Care Coordinator we partner to help you achieve your health care goals.     We will continue to reach out; however, if you are able to call your Care Coordinator at [phone], that would be appreciated.  We at [facility] are focused on providing you with the highest-quality healthcare experience possible.      It is a pleasure to partner with you as we work towards achieving your optimal state of wellness.        Sincerely,      AVELINO Watts Katie Rae  None   [address] / [place] [address]

## 2023-03-24 NOTE — PROGRESS NOTES
Clinic Care Coordination Contact  [facility]/Voicemail       Clinical Data: Care Coordinator Outreach  Outreach attempted x 1.  Left message on patient's voicemail with call back information and requested return call.  Plan: Care Coordinator will try to reach patient again in 1-2 business days.    Julia Cooney RN Care Coordination   [facility]  Email: [email or web address]  Phone: [phone]

## 2023-03-25 LAB
BACTERIA UR CULT: ABNORMAL
BACTERIA UR CULT: ABNORMAL

## 2023-03-27 NOTE — PROGRESS NOTES
Clinic Care Coordination Contact  [facility]/Voicemail    Referral Source: IP Handoff  Clinical Data: Care Coordinator Outreach  Outreach attempted x 2.  Left message on patient's voicemail with call back information and requested return call.  Plan: Care Coordinator will send unable to contact letter with care coordinator contact information via [employer]. Care Coordinator will do no further outreaches at this time.    Julia Cooney RN Care Coordination   [facility]  Email: [email or web address]  Phone: [phone]

## 2023-03-29 ENCOUNTER — TELEPHONE (OUTPATIENT)
Dept: FAMILY MEDICINE | Facility: CLINIC | Age: 28
End: 2023-03-29
Payer: MEDICARE

## 2023-03-29 DIAGNOSIS — N31.9 NEUROGENIC BLADDER: Primary | ICD-10-CM

## 2023-03-29 RX ORDER — MAGNESIUM HYDROXIDE 1200 MG/15ML
250 LIQUID ORAL 2 TIMES DAILY
Qty: 15000 ML | Refills: 11 | Status: SHIPPED | OUTPATIENT
Start: 2023-03-29 | End: 2024-03-29

## 2023-03-29 NOTE — TELEPHONE ENCOUNTER
Reason for Call:  Form, our goal is to have forms completed with 72 hours, however, some forms may require a visit or additional information.    Type of letter, form or note:  medical    Who is the form from?: Home care    Where did the form come from: form was faxed in    What clinic location was the form placed at?: [facility] [phone]    Where the form was placed: TC Box/Folder    What number is listed as a contact on the form?: Sign and fax back to [phone]       Additional comments:    Call taken on 3/29/2023 at 4:16 PM by Dianne Uribe

## 2023-03-30 ENCOUNTER — MEDICAL CORRESPONDENCE (OUTPATIENT)
Dept: HEALTH INFORMATION MANAGEMENT | Facility: CLINIC | Age: 28
End: 2023-03-30

## 2023-04-05 ENCOUNTER — TELEPHONE (OUTPATIENT)
Dept: FAMILY MEDICINE | Facility: CLINIC | Age: 28
End: 2023-04-05
Payer: MEDICARE

## 2023-04-05 ENCOUNTER — MEDICAL CORRESPONDENCE (OUTPATIENT)
Dept: HEALTH INFORMATION MANAGEMENT | Facility: CLINIC | Age: 28
End: 2023-04-05
Payer: MEDICARE

## 2023-04-05 NOTE — TELEPHONE ENCOUNTER
Forms/Letter Request    Type of form/letter: Cath supplies order    Have you been seen for this request: N/A    Do we have the form/letter: Yes:      Who is the form from? Home care    Where did/will the form come from? form was faxed in    When is form/letter needed by: as soon as time permits    How would you like the form/letter returned: Fax : to:  [facility] @ [phone]    Patient Notified form requests are processed in 3-5 business days:No    Could we send this information to you in [employer] or would you prefer to receive a phone call?:   Patient would like to be contacted via [employer]

## 2023-04-06 ENCOUNTER — TELEPHONE (OUTPATIENT)
Dept: FAMILY MEDICINE | Facility: CLINIC | Age: 28
End: 2023-04-06
Payer: MEDICARE

## 2023-04-06 NOTE — TELEPHONE ENCOUNTER
Forms/Letter Request     Type of form/letter: Skilled Nursing   4.9.2023 4WK2, 1W1      Have you been seen for this request: N/A     Do we have the form/letter: Yes:       Who is the form from? Home care     Where did/will the form come from? form was faxed in     When is form/letter needed by: as soon as time permits     How would you like the form/letter returned: Fax : to:  [facility] @ [phone]   Patient Notified form requests are processed in 3-5 business days:No     Could we send this information to you in [employer] or would you prefer to receive a phone call?:   Patient would like to be contacted via [employer]

## 2023-04-07 ENCOUNTER — MEDICAL CORRESPONDENCE (OUTPATIENT)
Dept: HEALTH INFORMATION MANAGEMENT | Facility: CLINIC | Age: 28
End: 2023-04-07

## 2023-04-07 NOTE — PROGRESS NOTES
"POSTOP UROLOGY NOTE    HPI:  Dianna Cottrell is a 28 year old female being seen for a postoperative visit after undergoing a CCIC with bladder neck closure on 3/16/23    Overall, doing well. Pain is improving, tolerating diet.  Appetite is not quite back yet  She has a buttock wound that his now open, seeing wound care next month. Home care nursing has been managing this     Exam:  BP 92/70 (BP Location: Left arm, Patient Position: Sitting, Cuff Size: Adult Regular)   Pulse 82   Ht 1.676 m (5' 6\")   Wt 55.2 kg (121 lb 9.6 oz)   BMI 19.63 kg/m    GENERAL: Healthy, alert and no distress  EYES: Eyes grossly normal to inspection.  No discharge or erythema  RESP: No audible wheeze, cough, or visible cyanosis.  NEURO: Alert and oriented x3  PSYCH: Mentation appears normal, judgement and insight intact, normal speech   ABD: pfannensteil incision healing well  : channel and SPT foleys removed. Channel with red mucosa    14fr straight cath goes easily into channel       Assessment & Plan   S/p CCIC with bladder neck closure on 3/16/23  Healing well    Taught to CIC per channel.   CIC q3h during the daytime and once per night   Daily irrigation     Follow up in 6 weeks with renal US    It is medically necessary for her to receive 8 catheters per day for CIC q3 hours. Due to her SCI, she requires a closed bag system as she has frequent UTI's and poor hand dexterity. She also requires one 14fr straight catheter per day indefinitely for irrigation of her bladder augment.       Amos Pham MD  [facility] [place]      "

## 2023-04-10 ENCOUNTER — TELEPHONE (OUTPATIENT)
Dept: FAMILY MEDICINE | Facility: CLINIC | Age: 28
End: 2023-04-10
Payer: MEDICARE

## 2023-04-10 ENCOUNTER — MEDICAL CORRESPONDENCE (OUTPATIENT)
Dept: HEALTH INFORMATION MANAGEMENT | Facility: CLINIC | Age: 28
End: 2023-04-10
Payer: MEDICARE

## 2023-04-10 NOTE — TELEPHONE ENCOUNTER
Forms/Letter Request    Type of form/letter: Supplies    Have you been seen for this request: Yes     Do we have the form/letter: Yes:     Who is the form from? Insurance comp    Where did/will the form come from? form was faxed in    When is form/letter needed by: ASAP    How would you like the form/letter returned: Fax : [phone]    Please sign, date and fax back

## 2023-04-17 ENCOUNTER — OFFICE VISIT (OUTPATIENT)
Dept: UROLOGY | Facility: CLINIC | Age: 28
End: 2023-04-17
Payer: MEDICARE

## 2023-04-17 VITALS
DIASTOLIC BLOOD PRESSURE: 70 MMHG | HEIGHT: 66 IN | WEIGHT: 121.6 LBS | HEART RATE: 82 BPM | SYSTOLIC BLOOD PRESSURE: 92 MMHG | BODY MASS INDEX: 19.54 KG/M2

## 2023-04-17 DIAGNOSIS — N31.9 NEUROGENIC BLADDER: Primary | ICD-10-CM

## 2023-04-17 DIAGNOSIS — R33.9 URINARY RETENTION: ICD-10-CM

## 2023-04-17 PROCEDURE — 99024 POSTOP FOLLOW-UP VISIT: CPT | Performed by: STUDENT IN AN ORGANIZED HEALTH CARE EDUCATION/TRAINING PROGRAM

## 2023-04-17 RX ORDER — MAGNESIUM HYDROXIDE 1200 MG/15ML
250 LIQUID ORAL DAILY
Qty: 7500 ML | Refills: 11 | Status: ON HOLD | OUTPATIENT
Start: 2023-04-17 | End: 2023-09-21

## 2023-04-17 ASSESSMENT — PAIN SCALES - GENERAL: PAINLEVEL: NO PAIN (0)

## 2023-04-17 NOTE — PATIENT INSTRUCTIONS
Please schedule a 6-week follow up with a Renal Ultrasound done prior.    Irrigate your bladder daily using sterile saline, tap water, or distilled water. If using tap water in needs to be safe to drink    Instill 120 mL and pull back 60 mL = 120 mL  Instill 60 mL and pull back 60 mL = 180 mL  Instill 60 mL and pull back 60 mL = 240 mL    Repeat until urine is clear using a minimum of 240 mL of saline or tap water.     It was a pleasure meeting with you today.  Thank you for allowing me and my team the privilege of caring for you today.  YOU are the reason we are here, and I truly hope we provided you with the excellent service you deserve.  Please let us know if there is anything else we can do for you so that we can be sure you are leaving completely satisfied with your care experience.

## 2023-04-17 NOTE — NURSING NOTE
"Chief Complaint   Patient presents with     Post-op Visit     s/p Bladder augmentation and Mitrofanoff creation 03/16/23       Blood pressure 92/70, pulse 82, height 1.676 m (5' 6\"), weight 55.2 kg (121 lb 9.6 oz), not currently breastfeeding. Body mass index is 19.63 kg/m .    Patient Active Problem List   Diagnosis     c5 burst fracture     Lesions of vulva     IUD (intrauterine device) in place- placed 12/2017     H/O: pneumonia     АНДРЕЙ (generalized anxiety disorder)     Quadriplegia, post-traumatic (H)- incomplete quad, limited use upper extremities     Major depressive disorder with single episode, in partial remission (H)     Autonomic dysreflexia     Leukocytosis     Personal history of DVT (deep vein thrombosis)     Neurogenic bladder     Impaired lung function     Encounter for insertion of mirena IUD     YONI III with severe dysplasia     Moderate protein-calorie malnutrition (H)     Major depression     Neurogenic bowel     Spasticity     Spinal cord injury, C5-C7 (H)     Urinary incontinence     Nephrolithiasis     Hypoxia     Neuromuscular respiratory weakness (H)       Allergies   Allergen Reactions     Succinylcholine Other (See Comments)     Spinal cord injury 12/18/12, patient at risk for extrajunctional receptors and hyperkalemia  Severity: Unknown; Notes: spinal cord injury 2012. at risk for extrajunctional receptors and hyperkalemia.; Type: Drug Allergy;   Spinal cord injury 12/18/12, patient at risk for extrajunctional receptors and hyperkalemia  Spinal cord injury 12/18/12, patient at risk for extrajunctional receptors and hyperkalemia       Current Outpatient Medications   Medication Sig Dispense Refill     acetaminophen (TYLENOL) 325 MG tablet Take 325-650 mg by mouth every 6 hours as needed.       albuterol (PROVENTIL) (2.5 MG/3ML) 0.083% neb solution Take 1 vial (2.5 mg) by nebulization every 4 hours as needed for shortness of breath / dyspnea or wheezing 100 mL 1     baclofen (LIORESAL) 10 " MG tablet Take 30 mg by mouth 3 times daily (10MG X 3 = 30MG)       bisacodyl (DULCOLAX) 10 MG suppository Place 1 suppository (10 mg) rectally At Bedtime 30 suppository 0     Cranberry 500 MG TABS Take 500 mg by mouth daily       docusate sodium (COLACE) 100 MG capsule Take 100-200 mg by mouth daily       fluticasone-vilanterol (BREO ELLIPTA) 100-25 MCG/INH inhaler Inhale 1 puff into the lungs daily 28 each 5     gabapentin (NEURONTIN) 300 MG capsule Take 300 mg by mouth At Bedtime        hydrOXYzine (VISTARIL) 25 MG capsule Take 1 capsule (25 mg) by mouth 3 times daily as needed for anxiety (or at bedtime for sleep.) 20 capsule 1     midodrine (PROAMATINE) 5 MG tablet Take 10 mg by mouth 2 times daily  6 tablet 0     Multiple Vitamins-Minerals (WOMENS MULTIVITAMIN) TABS Take 2 tablets by mouth daily       nicotine (NICORETTE) 2 MG gum Place 1 each (2 mg) inside cheek every hour as needed for smoking cessation 100 each 1     oxybutynin ER (DITROPAN-XL) 5 MG 24 hr tablet Take 5 mg by mouth daily       povidone-iodine 10 % swab Apply topically daily as needed for wound care        rivaroxaban ANTICOAGULANT (XARELTO) 10 MG TABS tablet Take 1 tablet (10 mg) by mouth daily (with dinner) Take 1 tablet per day to prevent blood clots after surgery 7 tablet 0     SENNA-docusate sodium (SENNA S) 8.6-50 MG tablet Take 1 tablet by mouth At Bedtime for 30 days 30 tablet 0     sertraline (ZOLOFT) 100 MG tablet TAKE 1 AND 1/2 TABLETS BY MOUTH EVERY  tablet 0     sodium chloride (NEBUSAL) 3 % neb solution Take 3 mLs by nebulization every 6 hours as needed for wheezing or other (sputum clearance difficulty due to quadridplegia.) 300 mL 1     sodium chloride 0.9%, bottle, (CURITY STERILE SALINE) 0.9 % irrigation 250 mLs by Intracatheter route 2 times daily 64769 mL 11     Vitamin D3 (CHOLECALCIFEROL) 125 MCG (5000 UT) tablet Take 1 tablet by mouth Every Tuesday, Thursday, Saturday, and Sunday         Social History      Tobacco Use     Smoking status: Every Day     Types: Other     Smokeless tobacco: Current     Tobacco comments:     used 1/2-1 ppd for 4 years, quit 2012, now daily e cig use.    Vaping Use     Vaping status: Every Day     Substances: Nicotine, THC, Flavoring     Devices: Disposable, Pre-filled pod   Substance Use Topics     Alcohol use: No     Alcohol/week: 0.0 standard drinks of alcohol     Drug use: Yes     Types: Marijuana     Comment: 3 joints per day       Bubba Lobo, EMT  4/17/2023  12:44 PM

## 2023-04-17 NOTE — LETTER
"4/17/2023       RE: Dianna Cottrell  [address] [facility] [address]     Dear Colleague,    Thank you for referring your patient, Dianna Cottrell, to the [facility] [place] at [facility]. Please see a copy of my visit note below.    POSTOP UROLOGY NOTE    HPI:  Dianna Cottrell is a 28 year old female being seen for a postoperative visit after undergoing a CCIC with bladder neck closure on 3/16/23    Overall, doing well. Pain is improving, tolerating diet.  Appetite is not quite back yet  She has a buttock wound that his now open, seeing wound care next month. Home care nursing has been managing this     Exam:  BP 92/70 (BP Location: Left arm, Patient Position: Sitting, Cuff Size: Adult Regular)   Pulse 82   Ht 1.676 m (5' 6\")   Wt 55.2 kg (121 lb 9.6 oz)   BMI 19.63 kg/m    GENERAL: Healthy, alert and no distress  EYES: Eyes grossly normal to inspection.  No discharge or erythema  RESP: No audible wheeze, cough, or visible cyanosis.  NEURO: Alert and oriented x3  PSYCH: Mentation appears normal, judgement and insight intact, normal speech   ABD: pfannensteil incision healing well  : channel and SPT foleys removed. Channel with red mucosa    14fr straight cath goes easily into channel       Assessment & Plan   S/p CCIC with bladder neck closure on 3/16/23  Healing well    Taught to CIC per channel.   CIC q3h during the daytime and once per night   Daily irrigation     Follow up in 6 weeks with renal US    It is medically necessary for her to receive 8 catheters per day for CIC q3 hours. Due to her SCI, she requires a closed bag system as she has frequent UTI's and poor hand dexterity. She also requires one 14fr straight catheter per day indefinitely for irrigation of her bladder augment.       Amos Pham MD  [facility] [place]      "

## 2023-04-24 ENCOUNTER — TELEPHONE (OUTPATIENT)
Dept: FAMILY MEDICINE | Facility: CLINIC | Age: 28
End: 2023-04-24

## 2023-04-24 NOTE — TELEPHONE ENCOUNTER
Forms/Letter Request    Type of form/letter: Home Health Care    Have you been seen for this request: Yes     Do we have the form/letter: Yes:     Who is the form from? Home care    Where did/will the form come from? form was faxed in    When is form/letter needed by: in 5-7 days     How would you like the form/letter returned: fax [phone]    Patient Notified form requests are processed in 3-5 business days:No    Could we send this information to you in [facility] or would you prefer to receive a phone call?:   Form was faxed in by home care    Crista Hassan CMA ([facility])

## 2023-04-26 ENCOUNTER — MEDICAL CORRESPONDENCE (OUTPATIENT)
Dept: HEALTH INFORMATION MANAGEMENT | Facility: CLINIC | Age: 28
End: 2023-04-26
Payer: MEDICARE

## 2023-04-27 ENCOUNTER — TELEPHONE (OUTPATIENT)
Dept: FAMILY MEDICINE | Facility: CLINIC | Age: 28
End: 2023-04-27
Payer: MEDICARE

## 2023-04-27 DIAGNOSIS — F41.1 GAD (GENERALIZED ANXIETY DISORDER): ICD-10-CM

## 2023-04-27 NOTE — TELEPHONE ENCOUNTER
Forms/Letter Request    Type of form/letter: supplies Order ID [id number]    Have you been seen for this request: N/A    Do we have the form/letter: Yes:      Who is the form from? Home care    Where did/will the form come from? form was faxed in    When is form/letter needed by: as soon as time permits    How would you like the form/letter returned: Fax : to:   [facility] @ [phone]    Patient Notified form requests are processed in 3-5 business days:No    Could we send this information to you in [employer] or would you prefer to receive a phone call?:   Patient would like to be contacted via [employer]

## 2023-04-28 NOTE — TELEPHONE ENCOUNTER
Pending Prescriptions:                       Disp   Refills    hydrOXYzine (VISTARIL) 25 MG capsule       20 cap*1        Sig: Take 1 capsule (25 mg) by mouth 3 times daily as           needed for anxiety (or at bedtime for sleep.)        Routing refill request to provider for review/approval because:  Last filled 2012.

## 2023-05-01 ENCOUNTER — TELEPHONE (OUTPATIENT)
Dept: FAMILY MEDICINE | Facility: CLINIC | Age: 28
End: 2023-05-01
Payer: MEDICARE

## 2023-05-01 RX ORDER — HYDROXYZINE PAMOATE 25 MG/1
25 CAPSULE ORAL 3 TIMES DAILY PRN
Qty: 30 CAPSULE | Refills: 1 | Status: SHIPPED | OUTPATIENT
Start: 2023-05-01 | End: 2023-12-07

## 2023-05-01 NOTE — TELEPHONE ENCOUNTER
Forms/Letter Request    Type of form/letter: HOME CARE    Have you been seen for this request: N/A    Do we have the form/letter: Yes:      Who is the form from? Home care    Where did/will the form come from? form was mailed in    When is form/letter needed by: May 8    How would you like the form/letter returned: Fax : to:  [facility] @ [phone]    Patient Notified form requests are processed in 3-5 business days:No    Could we send this information to you in [employer] or would you prefer to receive a phone call?:   Patient would like to be contacted via [employer]

## 2023-05-02 ENCOUNTER — MEDICAL CORRESPONDENCE (OUTPATIENT)
Dept: HEALTH INFORMATION MANAGEMENT | Facility: CLINIC | Age: 28
End: 2023-05-02

## 2023-05-04 ENCOUNTER — TELEPHONE (OUTPATIENT)
Dept: FAMILY MEDICINE | Facility: CLINIC | Age: 28
End: 2023-05-04

## 2023-05-04 NOTE — TELEPHONE ENCOUNTER
Forms/Letter Request    Type of form/letter: homecare    Have you been seen for this request: N/A    Do we have the form/letter: Yes:      Who is the form from? Home care    Where did/will the form come from? form was faxed in    When is form/letter needed by: when time permits    How would you like the form/letter returned: Fax : to:  [facility] @ [phone]    Patient Notified form requests are processed in 3-5 business days:No    Could we send this information to you in [employer] or would you prefer to receive a phone call?:   Patient would like to be contacted via [employer]

## 2023-05-05 ENCOUNTER — TELEPHONE (OUTPATIENT)
Dept: FAMILY MEDICINE | Facility: CLINIC | Age: 28
End: 2023-05-05
Payer: MEDICARE

## 2023-05-05 NOTE — TELEPHONE ENCOUNTER
Delisa from [facility] asking to have the order faxed for the order on 5/4/23 for the catheter.  She is saying she has not received anything yet.    Delisa  Fax number [phone]    Jenny Stafford RN  [facility] ~ Registered Nurse  Clinic Triage ~ [facility]  May 5, 2023

## 2023-05-08 ENCOUNTER — TELEPHONE (OUTPATIENT)
Dept: FAMILY MEDICINE | Facility: CLINIC | Age: 28
End: 2023-05-08

## 2023-05-08 ENCOUNTER — MEDICAL CORRESPONDENCE (OUTPATIENT)
Dept: HEALTH INFORMATION MANAGEMENT | Facility: CLINIC | Age: 28
End: 2023-05-08
Payer: MEDICARE

## 2023-05-08 NOTE — TELEPHONE ENCOUNTER
Forms/Letter Request    Type of form/letter: WOUND CARE INSTRUCTIONS    Have you been seen for this request: Yes      Do we have the form/letter: Yes:      Who is the form from? Home care    Where did/will the form come from? form was faxed in    When is form/letter needed by: May 15  How would you like the form/letter returned: Fax : to: [facility] @ [phone]    Patient Notified form requests are processed in 3-5 business days:No    Could we send this information to you in [email or web address] or would you prefer to receive a phone call?:   Patient would like to be contacted via [email or web address]

## 2023-05-09 ENCOUNTER — MEDICAL CORRESPONDENCE (OUTPATIENT)
Dept: HEALTH INFORMATION MANAGEMENT | Facility: CLINIC | Age: 28
End: 2023-05-09

## 2023-05-09 ENCOUNTER — TELEPHONE (OUTPATIENT)
Dept: FAMILY MEDICINE | Facility: CLINIC | Age: 28
End: 2023-05-09
Payer: MEDICARE

## 2023-05-09 ENCOUNTER — HOSPITAL ENCOUNTER (OUTPATIENT)
Dept: WOUND CARE | Facility: CLINIC | Age: 28
Discharge: HOME | End: 2023-05-09
Attending: PHYSICIAN ASSISTANT | Admitting: PHYSICIAN ASSISTANT
Payer: MEDICARE

## 2023-05-09 VITALS — SYSTOLIC BLOOD PRESSURE: 76 MMHG | DIASTOLIC BLOOD PRESSURE: 46 MMHG | HEART RATE: 81 BPM | TEMPERATURE: 97.3 F

## 2023-05-09 DIAGNOSIS — L89.314 PRESSURE INJURY OF RIGHT ISCHIUM, STAGE 4 (H): Primary | ICD-10-CM

## 2023-05-09 PROCEDURE — 99214 OFFICE O/P EST MOD 30 MIN: CPT | Mod: 25 | Performed by: PHYSICIAN ASSISTANT

## 2023-05-09 PROCEDURE — 17250 CHEM CAUT OF GRANLTJ TISSUE: CPT | Performed by: PHYSICIAN ASSISTANT

## 2023-05-09 NOTE — DISCHARGE INSTRUCTIONS
Dianna Cottrell      1995  A DME order was not completed because the supplies are ordered by home care or at a care facility  [facility] Phone: [phone] Fax: [phone] 3x/wk    Wound Dressing Change: Right Ischial tuberosity  - Wash your hands with soap and water before you begin your dressing change and prepare a clean surface for dressings.  After cleansing with mild unscented soap (such as Cetaphil, Cerave or Dove) and water, Apply small amount of VASHE on gauze, lay into wound bed, let sit for 5-10 minutes, remove gauze (do not rinse) then apply dressing:  Cut and tuck Mesalt Packing ribbon strip down into wound base  Cover with Absorbant silicone border 5x5 Zetuvit; or Cutimed Sorbian with border  Change daily and as needed for soilage    Repositioning:  Bed: Patient will need group 2, low air loss mattress due to large, stage 4 ulceration on the patient's pelvis that has failed to improve on a normal mattress despite regular wound cares and repositioning. A group 1 mattress will not be adequate to offload these severe ulcerations. Patient has impaired sensation and is unable to reposition independently.  Reposition MINIMALLY every 1-2 hours in bed to relieve pressure and promote perfusion to tissue.  Chair: Less than 1 hour at a time; 3 times a day for meals; When up to the chair, do not sit for longer than one hour total before returning to bed to relieve pressure and promote perfusion to the tissue.  Completely recline/tilt for 15 minutes each hour.  Sit on a chair cushion when up to the chair.        Toyin Yi PA-C May 9, 2023    Call us at [phone] if you have any questions about your wounds, have redness or swelling around your wound, have a fever of 101 or greater or if you have any other problems or concerns. We answer the phone Monday through Friday 8 am to 4 pm, please leave a message as we check the voicemail frequently throughout the day.     If you had a  positive experience please indicate that on your patient satisfaction survey form that [facility] will be sending you.  It was a pleasure meeting with you today.  Thank you for allowing me and my team the privilege of caring for you today.  YOU are the reason we are here, and I truly hope we provided you with the excellent service you deserve.  Please let us know if there is anything else we can do for you so that we can be sure you are leaving completely satisfied with your care experience.      If you have any billing related questions please call the [facility] Business office at [phone]. The clinic staff does not handle billing related matters.  If you are scheduled to have a follow up appointment, you will receive a reminder call the day before your visit. On the appointment day please arrive 15 minutes prior to your appointment time. If you are unable to keep that appointment, please call the clinic to cancel or reschedule. If you are more than 10 minutes late or greater for your appointment, the clinic policy is that you may be asked to reschedule.

## 2023-05-09 NOTE — PROGRESS NOTES
Patient arrived for wound care visit. Certified Wound Care Nurse time spent evaluating patient record, completed a full evaluation and documented wound(s) & adonay-wound skin; provided recommendation based on treatment plan. Applied dressing, reviewed discharge instructions, patient education, and discussed plan of care with appropriate medical team staff members and patient and/or family members.

## 2023-05-09 NOTE — TELEPHONE ENCOUNTER
Forms/Letter Request    Type of form/letter: Plan of care    Have you been seen for this request: Yes     Do we have the form/letter: Yes:     Who is the form from? Home care    Where did/will the form come from? form was faxed in    When is form/letter needed by: ASAP    How would you like the form/letter returned: Fax : [phone]    Please sign, date and fax back

## 2023-05-10 ENCOUNTER — MEDICAL CORRESPONDENCE (OUTPATIENT)
Dept: HEALTH INFORMATION MANAGEMENT | Facility: CLINIC | Age: 28
End: 2023-05-10

## 2023-05-10 DIAGNOSIS — Z53.9 DIAGNOSIS NOT YET DEFINED: Primary | ICD-10-CM

## 2023-05-10 PROCEDURE — G0179 MD RECERTIFICATION HHA PT: HCPCS | Performed by: PHYSICIAN ASSISTANT

## 2023-05-11 ENCOUNTER — PRE VISIT (OUTPATIENT)
Dept: UROLOGY | Facility: CLINIC | Age: 28
End: 2023-05-11
Payer: MEDICARE

## 2023-05-11 NOTE — TELEPHONE ENCOUNTER
Reason for visit: Follow up     Dx/Hx/Sx: Neurogenic bladder; Urinary Retention    Records/imaging/labs/orders: Renal US pending    At Rooming: MICHAEL Wood  05/11/23  12:10 PM

## 2023-05-12 ENCOUNTER — TELEPHONE (OUTPATIENT)
Dept: FAMILY MEDICINE | Facility: CLINIC | Age: 28
End: 2023-05-12
Payer: MEDICARE

## 2023-05-12 NOTE — TELEPHONE ENCOUNTER
Forms/Letter Request    Type of form/letter: Orders    Have you been seen for this request: Yes     Do we have the form/letter: Yes:     Who is the form from: [facility] (if other please explain)    Where did/will the form come from? form was faxed in    When is form/letter needed by: ASAP    How would you like the form/letter returned: Fax : [phone]    Please sign, date and fax back

## 2023-05-12 NOTE — PROGRESS NOTES
[facility]    ASSESSMENT:   1. (L89.314) Pressure injury of right ischium, stage 4 (H)  (primary encounter diagnosis)    PLAN/DISCUSSION:   1. Wound care plan: Sonya duque. See bottom of note for detailed wound care and patient instructions  2. Aggressive offloading   3. Dietary recommendations discussed, see AVS     HISTORY OF PRESENT ILLNESS:   Dianna Cottrell is a 28 year old female with quadriplegia who presents today for a recurrence of a stage 4 R IT ulceration. Dianna is well known to me and this clinic for similar ulcerations. She has hx of treated osteomyelitis in this area. She thinks this area broke down while she was sick with pneumonia and prophylactic bandages were being used in the hospital. She continues to have home care and her mother's help with bandage changes.      TREATMENT COURSE:  5/9/2023 : Presents for initial visit for this episode at our clinic.     MATTRESS:  Group 2 mattress  WHEELCHAIR CUSHION: Roho cushion    VITALS: BP (!) 76/46 (BP Location: Right arm)   Pulse 81   Temp 97.3  F (36.3  C) (Temporal)      PHYSICAL EXAM:  GENERAL: Patient is alert and oriented and in no acute distress  INTEGUMENTARY:   Wound Thigh Friction injury (Active)       Wound Thigh Friction injury (Active)       Wound (used by OP [facility] only) 10/14/19 1407 Right pressure injury (Active)       Wound (used by OP [facility] only) 05/09/23 1512 Right ischial tuberosity pressure injury (Active)   Thickness/Stage Stage 4 05/09/23 1500   Base granulating;exposed structure 05/09/23 1500   Periwound intact 05/09/23 1500   Periwound Temperature warm 05/09/23 1500   Periwound Skin Turgor soft 05/09/23 1500   Edges open 05/09/23 1500   Length (cm) 1.5 05/09/23 1500   Width (cm) 0.5 05/09/23 1500   Depth (cm) 3 05/09/23 1500   Wound (cm^2) 0.75 cm^2 05/09/23 1500   Wound Volume (cm^3) 2.25 cm^3 05/09/23 1500   Undermining [Depth (cm)/Location] 12-12 o' / 2 cm 05/09/23 1500   Drainage Characteristics/Odor  serosanguineous 05/09/23 1500   Drainage Amount moderate 05/09/23 1500   Care, Wound debrided;chemical cautery applied 05/09/23 1500       Incision/Surgical Site 04/11/22 Left Back (Active)       Incision/Surgical Site 03/16/23 Right Abdomen (Active)               PROCEDURES: Silver nitrate was applied to the wound.  MDM: 39 minutes were spent on the date of the visit reviewing previous chart notes, evaluating patient and developing the treatment plan, this excludes any time spent on procedures.       PATIENT INSTRUCTIONS      Further instructions from your care team       Dianna Cottrell      1995  A DME order was not completed because the supplies are ordered by home care or at a care facility  [facility] Phone: [phone] Fax: [phone] 3x/wk    Wound Dressing Change: Right Ischial tuberosity  - Wash your hands with soap and water before you begin your dressing change and prepare a clean surface for dressings.  After cleansing with mild unscented soap (such as Cetaphil, Cerave or Dove) and water, Apply small amount of VASHE on gauze, lay into wound bed, let sit for 5-10 minutes, remove gauze (do not rinse) then apply dressing:  Cut and tuck Mesalt Packing ribbon strip down into wound base  Cover with Absorbant silicone border 5x5 Zetuvit; or Cutimed Sorbian with border  Change daily and as needed for soilage    Repositioning:    Bed: Patient will need group 2, low air loss mattress due to large, stage 4 ulceration on the patient's pelvis that has failed to improve on a normal mattress despite regular wound cares and repositioning. A group 1 mattress will not be adequate to offload these severe ulcerations. Patient has impaired sensation and is unable to reposition independently.    Reposition MINIMALLY every 1-2 hours in bed to relieve pressure and promote perfusion to tissue.    Chair: Less than 1 hour at a time; 3 times a day for meals; When up to the chair, do not sit for longer  than one hour total before returning to bed to relieve pressure and promote perfusion to the tissue.  Completely recline/tilt for 15 minutes each hour.  o Sit on a chair cushion when up to the chair.        Toyin Yi PA-C May 9, 2023    Call us at [phone] if you have any questions about your wounds, have redness or swelling around your wound, have a fever of 101 or greater or if you have any other problems or concerns. We answer the phone Monday through Friday 8 am to 4 pm, please leave a message as we check the voicemail frequently throughout the day.     If you had a positive experience please indicate that on your patient satisfaction survey form that [facility] will be sending you.  It was a pleasure meeting with you today.  Thank you for allowing me and my team the privilege of caring for you today.  YOU are the reason we are here, and I truly hope we provided you with the excellent service you deserve.  Please let us know if there is anything else we can do for you so that we can be sure you are leaving completely satisfied with your care experience.      If you have any billing related questions please call the [facility] Business office at [phone]. The clinic staff does not handle billing related matters.  If you are scheduled to have a follow up appointment, you will receive a reminder call the day before your visit. On the appointment day please arrive 15 minutes prior to your appointment time. If you are unable to keep that appointment, please call the clinic to cancel or reschedule. If you are more than 10 minutes late or greater for your appointment, the clinic policy is that you may be asked to reschedule.          Electronically signed by Toyin Yi PA-C on May 12, 2023

## 2023-05-13 ENCOUNTER — MEDICAL CORRESPONDENCE (OUTPATIENT)
Dept: HEALTH INFORMATION MANAGEMENT | Facility: CLINIC | Age: 28
End: 2023-05-13
Payer: MEDICARE

## 2023-05-17 ENCOUNTER — MEDICAL CORRESPONDENCE (OUTPATIENT)
Dept: HEALTH INFORMATION MANAGEMENT | Facility: CLINIC | Age: 28
End: 2023-05-17
Payer: MEDICARE

## 2023-05-18 ENCOUNTER — TELEPHONE (OUTPATIENT)
Dept: FAMILY MEDICINE | Facility: CLINIC | Age: 28
End: 2023-05-18
Payer: MEDICARE

## 2023-05-18 NOTE — TELEPHONE ENCOUNTER
Forms/Letter Request     Type of form/letter: Catheter Orders     Have you been seen for this request: Yes      Do we have the form/letter: Yes:      Who is the form from: [facility] (if other please explain)     Where did/will the form come from? form was faxed in     When is form/letter needed by:  as soon as time permits   How would you like the form/letter returned: Fax to:  [facility] @ [phone]

## 2023-05-22 ENCOUNTER — MEDICAL CORRESPONDENCE (OUTPATIENT)
Dept: HEALTH INFORMATION MANAGEMENT | Facility: CLINIC | Age: 28
End: 2023-05-22
Payer: MEDICARE

## 2023-05-24 ENCOUNTER — MEDICAL CORRESPONDENCE (OUTPATIENT)
Dept: HEALTH INFORMATION MANAGEMENT | Facility: CLINIC | Age: 28
End: 2023-05-24
Payer: MEDICARE

## 2023-05-24 ENCOUNTER — TELEPHONE (OUTPATIENT)
Dept: FAMILY MEDICINE | Facility: CLINIC | Age: 28
End: 2023-05-24
Payer: MEDICARE

## 2023-05-24 NOTE — TELEPHONE ENCOUNTER
Forms/Letter Request    Type of form/letter: Missed Visit Notification    Have you been seen for this request: N/A    Do we have the form/letter: Yes:     Who is the form from? [facility]    Where did/will the form come from? form was faxed in    When is form/letter needed by: Not needed    How would you like the form/letter returned: N/A    Patient Notified form requests are processed in 3-5 business days:No    Could we send this information to you in [employer] [place] or would you prefer to receive a phone call?:   No preference   Okay to leave a detailed message?: No at Other phone number:  Not needed

## 2023-05-30 ENCOUNTER — MEDICAL CORRESPONDENCE (OUTPATIENT)
Dept: HEALTH INFORMATION MANAGEMENT | Facility: CLINIC | Age: 28
End: 2023-05-30
Payer: MEDICARE

## 2023-05-31 ENCOUNTER — TELEPHONE (OUTPATIENT)
Dept: FAMILY MEDICINE | Facility: CLINIC | Age: 28
End: 2023-05-31
Payer: MEDICARE

## 2023-05-31 NOTE — TELEPHONE ENCOUNTER
Forms/Letter Request    Type of form/letter: Addendum to chart notes    Have you been seen for this request: Yes     Do we have the form/letter: Yes:     Who is the form from? Home care    Where did/will the form come from? form was faxed in    When is form/letter needed by: ASAP    How would you like the form/letter returned: Fax : [phone]

## 2023-06-01 NOTE — TELEPHONE ENCOUNTER
Forms/Letter Request    Type of form/letter: Medications updated    Have you been seen for this request: Yes      Do we have the form/letter: Yes:      Who is the form from? Home care fax to:  [facility] @ [phone]    Where did/will the form come from? form was faxed in    When is form/letter needed by: June 9  How would you like the form/letter returned: [employer]    Patient Notified form requests are processed in 3-5 business days:Yes    Could we send this information to you in [employer] or would you prefer to receive a phone call?:   Patient would like to be contacted via [employer]

## 2023-06-02 ENCOUNTER — TELEPHONE (OUTPATIENT)
Dept: FAMILY MEDICINE | Facility: CLINIC | Age: 28
End: 2023-06-02
Payer: MEDICARE

## 2023-06-02 NOTE — TELEPHONE ENCOUNTER
"Patient is calling regarding forms that were faxed to clinic for Suzan Willis PA-C to address.  She states the the form dated 5/26/23 only reflects a 5 day qty.  Needs to be a 30 day qty.  A form/order request was sent again (see media tab 5/30 and today).  Needing all the areas that state \"missing\" completed by provider.  Provider needs to sign/date.  Needing to know dx for the catheter kits  will also need imaging, labwork pertaining to this as well as recent provider visit    Ivy ESPARZA RN  "

## 2023-06-06 NOTE — TELEPHONE ENCOUNTER
Call back from [facility] to follow up on form request sent on 5/31/23.   Explained to caller it was noted by PCP that this form needs to be completed by urology.     Encounter was routed to urology team this morning and faxed as well.     Call back number to [facility] [phone]  Fax: [phone]    Coty SIMS, RN  [facility]

## 2023-06-07 ENCOUNTER — DOCUMENTATION ONLY (OUTPATIENT)
Dept: UROLOGY | Facility: CLINIC | Age: 28
End: 2023-06-07
Payer: MEDICARE

## 2023-06-07 NOTE — PROGRESS NOTES
"POSTOP UROLOGY NOTE    HPI:  Dianna Cottrell is a 28 year old female being seen for a postoperative visit after undergoing a CCIC with bladder neck closure on 3/16/23     Home care nursing has been managing buttock wound  Renal US has not been obtained yet     cathing is going well, no difficulty. They cath at 10pm and again at 2am  There is some leakage when she goes too long without cathing. Leakage can be high volume sometimes when she wakes up in the morning       Exam:  BP (!) 69/44 (BP Location: Right arm, Patient Position: Sitting, Cuff Size: Adult Small)   Pulse 74   Ht 1.676 m (5' 6\")   Wt 55.3 kg (121 lb 14.4 oz)   BMI 19.68 kg/m    GENERAL: Healthy, alert and no distress  EYES: Eyes grossly normal to inspection.  No discharge or erythema  RESP: No audible wheeze, cough, or visible cyanosis.  NEURO: Alert and oriented x3  PSYCH: Mentation appears normal, judgement and insight intact, normal speech   ABD: pfannensteil incision healing well  : Channel with red healthy mucosa    14fr straight cath goes easily into channel   Irrigated with mild level of mucus       Assessment & Plan   S/p CCIC with bladder neck closure on 3/16/23. Overall doing well    Can space out CIC via channel q3-4h, and stop at nighttime  Daily irrigation   Leakage should continue to improve as the augment stretches out    Will obtain renal US  Supplies re-ordered   Follow up in 9-12 months with renal US      Amos Pham MD  [facility] [place]      "

## 2023-06-07 NOTE — PROGRESS NOTES
Type of Form Received: Order (catheter) and request for paperwork    Form Received (Date) 6/7/23   Form Filled out Yes, date 6/7/23   Placed in provider folder Yes       Received Completed forms Yes   Faxed Forms Faxed To: [facility]  Fax Number: [phone]   Sent to [facility] (Date) 6/13/23       Were Records Sent? Yes   If Yes: Where: [facility]  Fax Number: [phone]  Date: 6/13/23

## 2023-06-08 ENCOUNTER — MEDICAL CORRESPONDENCE (OUTPATIENT)
Dept: HEALTH INFORMATION MANAGEMENT | Facility: CLINIC | Age: 28
End: 2023-06-08

## 2023-06-08 ENCOUNTER — TELEPHONE (OUTPATIENT)
Dept: UROLOGY | Facility: CLINIC | Age: 28
End: 2023-06-08
Payer: MEDICARE

## 2023-06-08 ENCOUNTER — TELEPHONE (OUTPATIENT)
Dept: FAMILY MEDICINE | Facility: CLINIC | Age: 28
End: 2023-06-08
Payer: MEDICARE

## 2023-06-08 NOTE — TELEPHONE ENCOUNTER
[facility] Call Center    Phone Message    May a detailed message be left on voicemail: yes     Reason for Call: Patient needs catheter prescription to [facility]. Thank you.    Action Taken: Message routed to:  [facility] Urology    Travel Screening: Not Applicable

## 2023-06-08 NOTE — TELEPHONE ENCOUNTER
Forms/Letter Request    Type of form/letter: Med list updated    Have you been seen for this request: Yes     Do we have the form/letter: Yes:     Who is the form from? Home care    Where did/will the form come from? form was faxed in    When is form/letter needed by: ASAP    How would you like the form/letter returned: Fax : [phone]    Second Request

## 2023-06-08 NOTE — TELEPHONE ENCOUNTER
Forms/Letter Request    Type of form/letter: Home Care    Have you been seen for this request: N/A    Do we have the form/letter: Yes:      Who is the form from? Home care    Where did/will the form come from? form was faxed in    When is form/letter needed by: as soon as time permits    How would you like the form/letter returned: Fax : to:  Pura Harris @ [phone]    Patient Notified form requests are processed in 3-5 business days:No    Could we send this information to you in [employer] or would you prefer to receive a phone call?:   Patient would like to be contacted via [employer]     Provider Procedure Text (A): After obtaining clear surgical margins the defect was repaired by another provider.

## 2023-06-09 NOTE — TELEPHONE ENCOUNTER
Comfort Solis CMA  You 2 hours ago (11:12 AM)     BITA  Will you let Dianna Bacon will sign the order on Monday?      Kristi Corbett Merry, CMA 5 hours ago (8:38 AM)     KT  We have the order from [facility] for the catheters. It's waiting for Dr. Bacon's signature.     Thanks,   Comfort Grady CMA [place], Karly 21 hours ago (4:28 PM)     BITA Berry, could you fax the order already in her chart from [facility] to [facility]?      Writer called pt and left VM.

## 2023-06-12 ENCOUNTER — MEDICAL CORRESPONDENCE (OUTPATIENT)
Dept: HEALTH INFORMATION MANAGEMENT | Facility: CLINIC | Age: 28
End: 2023-06-12

## 2023-06-12 ENCOUNTER — OFFICE VISIT (OUTPATIENT)
Dept: UROLOGY | Facility: CLINIC | Age: 28
End: 2023-06-12
Payer: MEDICARE

## 2023-06-12 VITALS
WEIGHT: 121.9 LBS | HEIGHT: 66 IN | BODY MASS INDEX: 19.59 KG/M2 | HEART RATE: 74 BPM | SYSTOLIC BLOOD PRESSURE: 69 MMHG | DIASTOLIC BLOOD PRESSURE: 44 MMHG

## 2023-06-12 DIAGNOSIS — N31.9 NEUROGENIC BLADDER: Primary | ICD-10-CM

## 2023-06-12 PROCEDURE — 99024 POSTOP FOLLOW-UP VISIT: CPT | Performed by: STUDENT IN AN ORGANIZED HEALTH CARE EDUCATION/TRAINING PROGRAM

## 2023-06-12 ASSESSMENT — PAIN SCALES - GENERAL: PAINLEVEL: NO PAIN (0)

## 2023-06-12 NOTE — NURSING NOTE
"Chief Complaint   Patient presents with     Follow Up     Neurogenic bladder;       Blood pressure (!) 69/44, pulse 74, height 1.676 m (5' 6\"), weight 55.3 kg (121 lb 14.4 oz), not currently breastfeeding. Body mass index is 19.68 kg/m .    Patient Active Problem List   Diagnosis     c5 burst fracture     Lesions of vulva     IUD (intrauterine device) in place- placed 12/2017     H/O: pneumonia     АНДРЕЙ (generalized anxiety disorder)     Quadriplegia, post-traumatic (H)- incomplete quad, limited use upper extremities     Major depressive disorder with single episode, in partial remission (H)     Autonomic dysreflexia     Leukocytosis     Pressure injury of right ischium, stage 4 (H)     Personal history of DVT (deep vein thrombosis)     Neurogenic bladder     Impaired lung function     Encounter for insertion of mirena IUD     YONI III with severe dysplasia     Moderate protein-calorie malnutrition (H)     Major depression     Neurogenic bowel     Spasticity     Spinal cord injury, C5-C7 (H)     Urinary incontinence     Nephrolithiasis     Hypoxia     Neuromuscular respiratory weakness (H)     Urinary retention       Allergies   Allergen Reactions     Succinylcholine Other (See Comments)     Spinal cord injury 12/18/12, patient at risk for extrajunctional receptors and hyperkalemia  Severity: Unknown; Notes: spinal cord injury 2012. at risk for extrajunctional receptors and hyperkalemia.; Type: Drug Allergy;   Spinal cord injury 12/18/12, patient at risk for extrajunctional receptors and hyperkalemia  Spinal cord injury 12/18/12, patient at risk for extrajunctional receptors and hyperkalemia       Current Outpatient Medications   Medication Sig Dispense Refill     acetaminophen (TYLENOL) 325 MG tablet Take 325-650 mg by mouth every 6 hours as needed.       albuterol (PROVENTIL) (2.5 MG/3ML) 0.083% neb solution Take 1 vial (2.5 mg) by nebulization every 4 hours as needed for shortness of breath / dyspnea or wheezing " 100 mL 1     baclofen (LIORESAL) 10 MG tablet Take 30 mg by mouth 3 times daily (10MG X 3 = 30MG)       bisacodyl (DULCOLAX) 10 MG suppository Place 1 suppository (10 mg) rectally At Bedtime 30 suppository 0     Cranberry 500 MG TABS Take 500 mg by mouth daily       docusate sodium (COLACE) 100 MG capsule Take 100-200 mg by mouth daily       fluticasone-vilanterol (BREO ELLIPTA) 100-25 MCG/INH inhaler Inhale 1 puff into the lungs daily 28 each 5     gabapentin (NEURONTIN) 300 MG capsule Take 300 mg by mouth At Bedtime        hydrOXYzine (VISTARIL) 25 MG capsule Take 1 capsule (25 mg) by mouth 3 times daily as needed for anxiety (or at bedtime for sleep.) 30 capsule 1     midodrine (PROAMATINE) 5 MG tablet Take 10 mg by mouth 2 times daily  6 tablet 0     Multiple Vitamins-Minerals (WOMENS MULTIVITAMIN) TABS Take 2 tablets by mouth daily       nicotine (NICORETTE) 2 MG gum Place 1 each (2 mg) inside cheek every hour as needed for smoking cessation 100 each 1     oxybutynin ER (DITROPAN-XL) 5 MG 24 hr tablet Take 5 mg by mouth daily       povidone-iodine 10 % swab Apply topically daily as needed for wound care        rivaroxaban ANTICOAGULANT (XARELTO) 10 MG TABS tablet Take 1 tablet (10 mg) by mouth daily (with dinner) Take 1 tablet per day to prevent blood clots after surgery 7 tablet 0     sertraline (ZOLOFT) 100 MG tablet TAKE 1.5 TABLETS BY MOUTH EVERY  tablet 1     sodium chloride (NEBUSAL) 3 % neb solution Take 3 mLs by nebulization every 6 hours as needed for wheezing or other (sputum clearance difficulty due to quadridplegia.) 300 mL 1     sodium chloride 0.9%, bottle, (CURITY STERILE SALINE) 0.9 % irrigation 250 mLs by Intracatheter route daily 7500 mL 11     sodium chloride 0.9%, bottle, (CURITY STERILE SALINE) 0.9 % irrigation 250 mLs by Intracatheter route 2 times daily 22218 mL 11     Vitamin D3 (CHOLECALCIFEROL) 125 MCG (5000 UT) tablet Take 1 tablet by mouth Every Tuesday, Thursday, Saturday,  and Sunday         Social History     Tobacco Use     Smoking status: Every Day     Types: Other     Smokeless tobacco: Current     Tobacco comments:     used 1/2-1 ppd for 4 years, quit 2012, now daily e cig use.    Vaping Use     Vaping status: Every Day     Substances: Nicotine, THC, Flavoring     Devices: Disposable, Pre-filled pod   Substance Use Topics     Alcohol use: No     Alcohol/week: 0.0 standard drinks of alcohol     Drug use: Yes     Types: Marijuana     Comment: 3 joints per day       Bubba Lobo, EMT  6/12/2023  2:26 PM

## 2023-06-12 NOTE — LETTER
"6/12/2023       RE: Dianna Cottrell  [address] [address]     Dear Colleague,    Thank you for referring your patient, Dianna Cottrell, to the [facility] UROLOGY [facility] [place] at [facility]. Please see a copy of my visit note below.    POSTOP UROLOGY NOTE    HPI:  Dianna Cottrell is a 28 year old female being seen for a postoperative visit after undergoing a CCIC with bladder neck closure on 3/16/23     Home care nursing has been managing buttock wound  Renal US has not been obtained yet     cathing is going well, no difficulty. They cath at 10pm and again at 2am  There is some leakage when she goes too long without cathing. Leakage can be high volume sometimes when she wakes up in the morning       Exam:  BP (!) 69/44 (BP Location: Right arm, Patient Position: Sitting, Cuff Size: Adult Small)   Pulse 74   Ht 1.676 m (5' 6\")   Wt 55.3 kg (121 lb 14.4 oz)   BMI 19.68 kg/m    GENERAL: Healthy, alert and no distress  EYES: Eyes grossly normal to inspection.  No discharge or erythema  RESP: No audible wheeze, cough, or visible cyanosis.  NEURO: Alert and oriented x3  PSYCH: Mentation appears normal, judgement and insight intact, normal speech   ABD: pfannensteil incision healing well  : Channel with red healthy mucosa    14fr straight cath goes easily into channel   Irrigated with mild level of mucus       Assessment & Plan   S/p CCIC with bladder neck closure on 3/16/23. Overall doing well    Can space out CIC via channel q3-4h, and stop at nighttime  Daily irrigation   Leakage should continue to improve as the augment stretches out    Will obtain renal US  Supplies re-ordered   Follow up in 9-12 months with renal US      Amos Pham MD  [facility]OLOG[facility] [place]    "

## 2023-06-12 NOTE — PATIENT INSTRUCTIONS
- Please schedule two imaging appointments for a Renal Ultrasound. One for now and one in 1-year.    It was a pleasure meeting with you today.  Thank you for allowing me and my team the privilege of caring for you today.  YOU are the reason we are here, and I truly hope we provided you with the excellent service you deserve.  Please let us know if there is anything else we can do for you so that we can be sure you are leaving completely satisfied with your care experience.

## 2023-06-14 ENCOUNTER — HOSPITAL ENCOUNTER (OUTPATIENT)
Dept: WOUND CARE | Facility: CLINIC | Age: 28
Discharge: HOME | End: 2023-06-14
Attending: PHYSICIAN ASSISTANT | Admitting: PHYSICIAN ASSISTANT
Payer: MEDICARE

## 2023-06-14 VITALS
HEART RATE: 61 BPM | TEMPERATURE: 98 F | DIASTOLIC BLOOD PRESSURE: 83 MMHG | RESPIRATION RATE: 18 BRPM | SYSTOLIC BLOOD PRESSURE: 127 MMHG

## 2023-06-14 DIAGNOSIS — L89.314 PRESSURE INJURY OF RIGHT ISCHIUM, STAGE 4 (H): ICD-10-CM

## 2023-06-14 PROCEDURE — 17250 CHEM CAUT OF GRANLTJ TISSUE: CPT | Performed by: PHYSICIAN ASSISTANT

## 2023-06-14 NOTE — PROGRESS NOTES
[facility]    ASSESSMENT:   1. (L89.314) Pressure injury of right ischium, stage 4 (H)      PLAN/DISCUSSION:   1. Wound care plan: MeSalt ribbon. See bottom of note for detailed wound care and patient instructions  2. Aggressive offloading   3. Advised her to get chair mapped and discuss possible padded footplate  4. Dietary recommendations discussed, see AVS     INTERVAL Hx:  06/14/23: Returns to clinic. Deeper tunnel noted at about 6 o'clock of 2.5 cm. Wound somewhat friable but new epithelium noted.     HISTORY OF PRESENT ILLNESS:   Dianna Cottrell is a 28 year old female with quadriplegia who presents today for a recurrence of a stage 4 R IT ulceration. Dianna is well known to me and this clinic for similar ulcerations. She has hx of treated osteomyelitis in this area. She thinks this area broke down while she was sick with pneumonia and prophylactic bandages were being used in the hospital. She continues to have home care and her mother's help with bandage changes.      TREATMENT COURSE:  5/9/2023 : Presents for initial visit for this episode at our clinic. Initiated MeSalt ribbon packing.     MATTRESS:  Group 2 mattress  WHEELCHAIR CUSHION: Roho cushion    VITALS: /83 (BP Location: Right arm, Patient Position: Sitting, Cuff Size: Adult Regular)   Pulse 61   Temp 98  F (36.7  C) (Temporal)   Resp 18      PHYSICAL EXAM:  GENERAL: Patient is alert and oriented and in no acute distress  INTEGUMENTARY:   Wound Thigh Friction injury (Active)       Wound Thigh Friction injury (Active)       Wound (used by OP [facility] only) 10/14/19 1407 Right pressure injury (Active)       Wound (used by OP [facility] only) 05/09/23 1512 Right ischial tuberosity pressure injury (Active)   Thickness/Stage Stage 4 06/14/23 1145   Base granulating;exposed structure 06/14/23 1145   Periwound intact 06/14/23 1145   Periwound Temperature warm 06/14/23 1145   Periwound Skin Turgor soft 06/14/23 1145   Edges open  06/14/23 1145   Length (cm) 1.1 06/14/23 1145   Width (cm) 0.4 06/14/23 1145   Depth (cm) 0.8 06/14/23 1145   Wound (cm^2) 0.44 cm^2 06/14/23 1145   Wound Volume (cm^3) 0.35 cm^3 06/14/23 1145   Wound healing % 41.33 06/14/23 1145   Tunneling [Depth (cm)/Location] 6o'/ 2.5cm 06/14/23 1145   Undermining [Depth (cm)/Location] 12-12o'/ 1.6cm 06/14/23 1145   Drainage Characteristics/Odor serosanguineous 06/14/23 1145   Drainage Amount moderate 06/14/23 1145   Care, Wound chemical cautery applied 06/14/23 1145       Incision/Surgical Site 04/11/22 Left Back (Active)       Incision/Surgical Site 03/16/23 Right Abdomen (Active)             PROCEDURES: Silver nitrate was applied to the wound.    PATIENT INSTRUCTIONS        Further instructions from your care team       Dianna Cottrell      1995  A DME order was not completed because the supplies are ordered by home care or at a care facility  [facility] Phone: [phone] Fax: [phone] 3x/wk     Wound Dressing Change: Right Ischial tuberosity  - Wash your hands with soap and water before you begin your dressing change and prepare a clean surface for dressings.  After cleansing with mild unscented soap (such as Cetaphil, Cerave or Dove) and water,   Apply small amount of VASHE on gauze to wound for 5-10 min, remove;   Cut and tuck Mesalt Packing ribbon strip into 6 o'clock area, and fill undermine/ wound base  Cover with Absorbant silicone border 5x5 Zetuvit; or Cutimed Sorbian with border  Change daily and as needed for soilage     Repositioning:    Bed: Patient will need Group 2, low air loss mattress due to large, stage 4 ulceration on the patient's pelvis that has failed to improve on a normal mattress despite regular wound cares and repositioning. A group 1 mattress will not be adequate to offload these severe ulcerations. Patient has impaired sensation and is unable to reposition independently.    Reposition MINIMALLY every 1-2 hours in bed  to relieve pressure and promote perfusion to tissue.    Chair: Less than 1 hour at a time; 3 times a day for meals; When up to the chair, do not sit for longer than one hour total before returning to bed to relieve pressure and promote perfusion to the tissue.  Completely recline/tilt for 15 minutes each hour.  ? Sit on a chair cushion when up to the chair; Make appointment for pressure mapping of wheel chair      Toyin Yi PA-C June 14, 2023    Call us at [phone] if you have any questions about your wounds, have redness or swelling around your wound, have a fever of 101 degrees Fahrenheit or greater or if you have any other problems or concerns. We answer the phone Monday through Friday 8 am to 4 pm, please leave a message as we check the voicemail frequently throughout the day.     If you had a positive experience please indicate that on your patient satisfaction survey form that [facility] will be sending you. It was a pleasure meeting with you today.  Thank you for allowing me and my team the privilege of caring for you today.  YOU are the reason we are here, and I truly hope we provided you with the excellent service you deserve.  Please let us know if there is anything else we can do for you so that we can be sure you are leaving completely satisfied with your care experience.      If you have any billing related questions please call the [facility] Business office at [phone]. The clinic staff does not handle billing related matters. If you are scheduled to have a follow up appointment, you will receive a reminder call the day before your visit. On the appointment day please arrive 15 minutes prior to your appointment time. If you are unable to keep that appointment, please call the clinic to cancel or reschedule. If you are more than 10 minutes late or greater for your scheduled appointment time, the clinic policy is that you may be asked to reschedule.

## 2023-06-14 NOTE — DISCHARGE INSTRUCTIONS
Dianna Cottrell      1995  A DME order was not completed because the supplies are ordered by home care or at a care facility  [facility] Phone: [phone] Fax: [phone] 3x/wk     Wound Dressing Change: Right Ischial tuberosity  - Wash your hands with soap and water before you begin your dressing change and prepare a clean surface for dressings.  After cleansing with mild unscented soap (such as Cetaphil, Cerave or Dove) and water,   Apply small amount of VASHE on gauze to wound for 5-10 min, remove;   Cut and tuck Mesalt Packing ribbon strip into 6 o'clock area, and fill undermine/ wound base  Cover with Absorbant silicone border 5x5 Zetuvit; or Cutimed Sorbian with border  Change daily and as needed for soilage     Repositioning:  Bed: Patient will need Group 2, low air loss mattress due to large, stage 4 ulceration on the patient's pelvis that has failed to improve on a normal mattress despite regular wound cares and repositioning. A group 1 mattress will not be adequate to offload these severe ulcerations. Patient has impaired sensation and is unable to reposition independently.  Reposition MINIMALLY every 1-2 hours in bed to relieve pressure and promote perfusion to tissue.  Chair: Less than 1 hour at a time; 3 times a day for meals; When up to the chair, do not sit for longer than one hour total before returning to bed to relieve pressure and promote perfusion to the tissue.  Completely recline/tilt for 15 minutes each hour.  Sit on a chair cushion when up to the chair; Make appointment for pressure mapping of wheel chair      Toyin Yi PA-C June 14, 2023    Call us at [phone] if you have any questions about your wounds, have redness or swelling around your wound, have a fever of 101 degrees Fahrenheit or greater or if you have any other problems or concerns. We answer the phone Monday through Friday 8 am to 4 pm, please leave a message as we check the voicemail  frequently throughout the day.     If you had a positive experience please indicate that on your patient satisfaction survey form that [facility] will be sending you. It was a pleasure meeting with you today.  Thank you for allowing me and my team the privilege of caring for you today.  YOU are the reason we are here, and I truly hope we provided you with the excellent service you deserve.  Please let us know if there is anything else we can do for you so that we can be sure you are leaving completely satisfied with your care experience.      If you have any billing related questions please call the [facility] Business office at [phone]. The clinic staff does not handle billing related matters. If you are scheduled to have a follow up appointment, you will receive a reminder call the day before your visit. On the appointment day please arrive 15 minutes prior to your appointment time. If you are unable to keep that appointment, please call the clinic to cancel or reschedule. If you are more than 10 minutes late or greater for your scheduled appointment time, the clinic policy is that you may be asked to reschedule.

## 2023-06-16 ENCOUNTER — TELEPHONE (OUTPATIENT)
Dept: FAMILY MEDICINE | Facility: CLINIC | Age: 28
End: 2023-06-16
Payer: MEDICARE

## 2023-06-16 NOTE — TELEPHONE ENCOUNTER
Forms/Letter Request     Type of form/letter: Wound Care supplies documentation   Have you been seen for this request: N/A     Do we have the form/letter: Yes:       Who is the form from? [facility] Submit chart notes within 30 days prior to June 8 2023 that support patients wound dressings     Where did/will the form come from? form was faxed in     When is form/letter needed by: as soon as time permits     How would you like the form/letter returned: Fax : to:  Pura [facility] @ [phone]   Patient Notified form requests are processed in 3-5 business days:No     Could we send this information to you in [employer] or would you prefer to receive a phone call?:   Patient would like to be contacted via [employer]

## 2023-06-18 ENCOUNTER — MEDICAL CORRESPONDENCE (OUTPATIENT)
Dept: HEALTH INFORMATION MANAGEMENT | Facility: CLINIC | Age: 28
End: 2023-06-18

## 2023-06-19 NOTE — TELEPHONE ENCOUNTER
Form for catheter orders placed in provider's bin for signature only.   Wound records requested sent to [facility].     Crista Hassan CMA (AAMA)

## 2023-06-19 NOTE — TELEPHONE ENCOUNTER
Intermit urin cath w/insertion 3 changes per day    Fax to:  [facility] @ [phone]

## 2023-06-26 PROCEDURE — 87076 CULTURE ANAEROBE IDENT EACH: CPT | Performed by: STUDENT IN AN ORGANIZED HEALTH CARE EDUCATION/TRAINING PROGRAM

## 2023-06-28 ENCOUNTER — APPOINTMENT (OUTPATIENT)
Dept: CT IMAGING | Facility: CLINIC | Age: 28
DRG: 870 | End: 2023-06-28
Attending: CLINICAL NURSE SPECIALIST
Payer: MEDICARE

## 2023-06-28 ENCOUNTER — APPOINTMENT (OUTPATIENT)
Dept: GENERAL RADIOLOGY | Facility: CLINIC | Age: 28
DRG: 870 | End: 2023-06-28
Attending: CLINICAL NURSE SPECIALIST
Payer: MEDICARE

## 2023-06-28 ENCOUNTER — HOSPITAL ENCOUNTER (INPATIENT)
Facility: CLINIC | Age: 28
LOS: 15 days | Discharge: HOME HEALTH | DRG: 870 | End: 2023-07-13
Attending: ANESTHESIOLOGY | Admitting: ANESTHESIOLOGY
Payer: MEDICARE

## 2023-06-28 ENCOUNTER — MEDICAL CORRESPONDENCE (OUTPATIENT)
Dept: HEALTH INFORMATION MANAGEMENT | Facility: CLINIC | Age: 28
End: 2023-06-28

## 2023-06-28 DIAGNOSIS — S14.105A: ICD-10-CM

## 2023-06-28 DIAGNOSIS — S14.109S QUADRIPLEGIA, POST-TRAUMATIC (H): Chronic | ICD-10-CM

## 2023-06-28 DIAGNOSIS — L89.314 PRESSURE INJURY OF RIGHT ISCHIUM, STAGE 4 (H): ICD-10-CM

## 2023-06-28 DIAGNOSIS — A41.9 SEPSIS (H): Primary | ICD-10-CM

## 2023-06-28 DIAGNOSIS — G82.50 QUADRIPLEGIA, POST-TRAUMATIC (H): Chronic | ICD-10-CM

## 2023-06-28 LAB
ALBUMIN SERPL BCG-MCNC: 2.1 G/DL (ref 3.5–5.2)
ALBUMIN UR-MCNC: NEGATIVE MG/DL
ALLEN'S TEST: ABNORMAL
ALP SERPL-CCNC: 130 U/L (ref 35–104)
ALT SERPL W P-5'-P-CCNC: 69 U/L (ref 0–50)
AMMONIA PLAS-SCNC: 54 UMOL/L (ref 11–51)
AMYLASE SERPL-CCNC: 13 U/L (ref 28–100)
ANION GAP SERPL CALCULATED.3IONS-SCNC: 14 MMOL/L (ref 7–15)
APPEARANCE UR: CLEAR
APTT PPP: 35 SECONDS (ref 22–38)
AST SERPL W P-5'-P-CCNC: 220 U/L (ref 0–45)
BACTERIA #/AREA URNS HPF: ABNORMAL /HPF
BASE EXCESS BLDA CALC-SCNC: -7.3 MMOL/L (ref -9–1.8)
BILIRUB SERPL-MCNC: 0.4 MG/DL
BILIRUB UR QL STRIP: NEGATIVE
BUN SERPL-MCNC: 8.7 MG/DL (ref 6–20)
CA-I BLD-MCNC: 4.4 MG/DL (ref 4.4–5.2)
CALCIUM SERPL-MCNC: 7.5 MG/DL (ref 8.6–10)
CHLORIDE SERPL-SCNC: 105 MMOL/L (ref 98–107)
CK SERPL-CCNC: 2864 U/L (ref 26–192)
CK SERPL-CCNC: 3954 U/L (ref 26–192)
COLOR UR AUTO: ABNORMAL
CREAT SERPL-MCNC: 0.65 MG/DL (ref 0.51–0.95)
CRP SERPL-MCNC: 318.21 MG/L
DEPRECATED HCO3 PLAS-SCNC: 16 MMOL/L (ref 22–29)
ERYTHROCYTE [DISTWIDTH] IN BLOOD BY AUTOMATED COUNT: 15.9 % (ref 10–15)
FRAGMENTS BLD QL SMEAR: SLIGHT
GFR SERPL CREATININE-BSD FRML MDRD: >90 ML/MIN/1.73M2
GLUCOSE BLDC GLUCOMTR-MCNC: 106 MG/DL (ref 70–99)
GLUCOSE BLDC GLUCOMTR-MCNC: 121 MG/DL (ref 70–99)
GLUCOSE SERPL-MCNC: 129 MG/DL (ref 70–99)
GLUCOSE UR STRIP-MCNC: NEGATIVE MG/DL
HCO3 BLD-SCNC: 18 MMOL/L (ref 21–28)
HCT VFR BLD AUTO: 23.7 % (ref 35–47)
HGB BLD-MCNC: 8 G/DL (ref 11.7–15.7)
HGB UR QL STRIP: ABNORMAL
INR PPP: 2.11 (ref 0.85–1.15)
INR PPP: 2.19 (ref 0.85–1.15)
KETONES UR STRIP-MCNC: ABNORMAL MG/DL
LACTATE SERPL-SCNC: 1.6 MMOL/L (ref 0.7–2)
LACTATE SERPL-SCNC: 1.7 MMOL/L (ref 0.7–2)
LEUKOCYTE ESTERASE UR QL STRIP: ABNORMAL
LIPASE SERPL-CCNC: 11 U/L (ref 13–60)
MAGNESIUM SERPL-MCNC: 1 MG/DL (ref 1.7–2.3)
MCH RBC QN AUTO: 27.5 PG (ref 26.5–33)
MCHC RBC AUTO-ENTMCNC: 33.8 G/DL (ref 31.5–36.5)
MCV RBC AUTO: 81 FL (ref 78–100)
MRSA DNA SPEC QL NAA+PROBE: NEGATIVE
NITRATE UR QL: NEGATIVE
O2/TOTAL GAS SETTING VFR VENT: 28 %
OXYHGB MFR BLD: 98 % (ref 92–100)
PCO2 BLD: 32 MM HG (ref 35–45)
PH BLD: 7.35 [PH] (ref 7.35–7.45)
PH UR STRIP: 5.5 [PH] (ref 5–7)
PHOSPHATE SERPL-MCNC: 2 MG/DL (ref 2.5–4.5)
PLAT MORPH BLD: ABNORMAL
PLATELET # BLD AUTO: 465 10E3/UL (ref 150–450)
PO2 BLD: 100 MM HG (ref 80–105)
POTASSIUM SERPL-SCNC: 3.2 MMOL/L (ref 3.4–5.3)
PROCALCITONIN SERPL IA-MCNC: 161.6 NG/ML
PROT SERPL-MCNC: 5.5 G/DL (ref 6.4–8.3)
RBC # BLD AUTO: 2.91 10E6/UL (ref 3.8–5.2)
RBC MORPH BLD: ABNORMAL
RBC URINE: 7 /HPF
SA TARGET DNA: POSITIVE
SODIUM SERPL-SCNC: 135 MMOL/L (ref 136–145)
SP GR UR STRIP: 1 (ref 1–1.03)
TROPONIN T SERPL HS-MCNC: 126 NG/L
UROBILINOGEN UR STRIP-MCNC: NORMAL MG/DL
WBC # BLD AUTO: 55.8 10E3/UL (ref 4–11)
WBC URINE: 18 /HPF

## 2023-06-28 PROCEDURE — 250N000011 HC RX IP 250 OP 636: Mod: JZ

## 2023-06-28 PROCEDURE — 36620 INSERTION CATHETER ARTERY: CPT | Performed by: NURSE PRACTITIONER

## 2023-06-28 PROCEDURE — 250N000013 HC RX MED GY IP 250 OP 250 PS 637: Performed by: NURSE PRACTITIONER

## 2023-06-28 PROCEDURE — 85730 THROMBOPLASTIN TIME PARTIAL: CPT | Performed by: NURSE PRACTITIONER

## 2023-06-28 PROCEDURE — 84100 ASSAY OF PHOSPHORUS: CPT | Performed by: CLINICAL NURSE SPECIALIST

## 2023-06-28 PROCEDURE — 85027 COMPLETE CBC AUTOMATED: CPT | Performed by: CLINICAL NURSE SPECIALIST

## 2023-06-28 PROCEDURE — 87086 URINE CULTURE/COLONY COUNT: CPT | Performed by: NURSE PRACTITIONER

## 2023-06-28 PROCEDURE — 200N000002 HC R&B ICU UMMC

## 2023-06-28 PROCEDURE — 82805 BLOOD GASES W/O2 SATURATION: CPT | Performed by: CLINICAL NURSE SPECIALIST

## 2023-06-28 PROCEDURE — 87641 MR-STAPH DNA AMP PROBE: CPT | Performed by: CLINICAL NURSE SPECIALIST

## 2023-06-28 PROCEDURE — 87040 BLOOD CULTURE FOR BACTERIA: CPT | Performed by: UROLOGY

## 2023-06-28 PROCEDURE — 85384 FIBRINOGEN ACTIVITY: CPT | Performed by: NURSE PRACTITIONER

## 2023-06-28 PROCEDURE — 83735 ASSAY OF MAGNESIUM: CPT | Performed by: CLINICAL NURSE SPECIALIST

## 2023-06-28 PROCEDURE — 83605 ASSAY OF LACTIC ACID: CPT | Performed by: NURSE PRACTITIONER

## 2023-06-28 PROCEDURE — 258N000003 HC RX IP 258 OP 636: Performed by: NURSE PRACTITIONER

## 2023-06-28 PROCEDURE — 258N000003 HC RX IP 258 OP 636: Performed by: CLINICAL NURSE SPECIALIST

## 2023-06-28 PROCEDURE — 83690 ASSAY OF LIPASE: CPT | Performed by: CLINICAL NURSE SPECIALIST

## 2023-06-28 PROCEDURE — 250N000009 HC RX 250: Performed by: ANESTHESIOLOGY

## 2023-06-28 PROCEDURE — 5A09357 ASSISTANCE WITH RESPIRATORY VENTILATION, LESS THAN 24 CONSECUTIVE HOURS, CONTINUOUS POSITIVE AIRWAY PRESSURE: ICD-10-PCS | Performed by: INTERNAL MEDICINE

## 2023-06-28 PROCEDURE — 81001 URINALYSIS AUTO W/SCOPE: CPT | Performed by: NURSE PRACTITIONER

## 2023-06-28 PROCEDURE — 250N000009 HC RX 250: Performed by: NURSE PRACTITIONER

## 2023-06-28 PROCEDURE — 85610 PROTHROMBIN TIME: CPT | Performed by: CLINICAL NURSE SPECIALIST

## 2023-06-28 PROCEDURE — 84484 ASSAY OF TROPONIN QUANT: CPT | Performed by: CLINICAL NURSE SPECIALIST

## 2023-06-28 PROCEDURE — 250N000011 HC RX IP 250 OP 636: Mod: JZ | Performed by: CLINICAL NURSE SPECIALIST

## 2023-06-28 PROCEDURE — 82140 ASSAY OF AMMONIA: CPT | Performed by: NURSE PRACTITIONER

## 2023-06-28 PROCEDURE — 250N000009 HC RX 250

## 2023-06-28 PROCEDURE — 3E043XZ INTRODUCTION OF VASOPRESSOR INTO CENTRAL VEIN, PERCUTANEOUS APPROACH: ICD-10-PCS | Performed by: INTERNAL MEDICINE

## 2023-06-28 PROCEDURE — 85610 PROTHROMBIN TIME: CPT | Performed by: NURSE PRACTITIONER

## 2023-06-28 PROCEDURE — 86140 C-REACTIVE PROTEIN: CPT | Performed by: NURSE PRACTITIONER

## 2023-06-28 PROCEDURE — 250N000011 HC RX IP 250 OP 636: Mod: JZ | Performed by: NURSE PRACTITIONER

## 2023-06-28 PROCEDURE — 83605 ASSAY OF LACTIC ACID: CPT | Performed by: CLINICAL NURSE SPECIALIST

## 2023-06-28 PROCEDURE — 82550 ASSAY OF CK (CPK): CPT | Performed by: NURSE PRACTITIONER

## 2023-06-28 PROCEDURE — 82550 ASSAY OF CK (CPK): CPT | Performed by: CLINICAL NURSE SPECIALIST

## 2023-06-28 PROCEDURE — 84145 PROCALCITONIN (PCT): CPT | Performed by: CLINICAL NURSE SPECIALIST

## 2023-06-28 PROCEDURE — 36415 COLL VENOUS BLD VENIPUNCTURE: CPT | Performed by: NURSE PRACTITIONER

## 2023-06-28 PROCEDURE — 258N000003 HC RX IP 258 OP 636

## 2023-06-28 PROCEDURE — 71045 X-RAY EXAM CHEST 1 VIEW: CPT | Mod: 26 | Performed by: RADIOLOGY

## 2023-06-28 PROCEDURE — 80053 COMPREHEN METABOLIC PANEL: CPT | Performed by: CLINICAL NURSE SPECIALIST

## 2023-06-28 PROCEDURE — 82150 ASSAY OF AMYLASE: CPT | Performed by: CLINICAL NURSE SPECIALIST

## 2023-06-28 PROCEDURE — 70450 CT HEAD/BRAIN W/O DYE: CPT | Mod: MA

## 2023-06-28 PROCEDURE — 70450 CT HEAD/BRAIN W/O DYE: CPT | Mod: 26 | Performed by: STUDENT IN AN ORGANIZED HEALTH CARE EDUCATION/TRAINING PROGRAM

## 2023-06-28 PROCEDURE — 36415 COLL VENOUS BLD VENIPUNCTURE: CPT | Performed by: UROLOGY

## 2023-06-28 PROCEDURE — 71045 X-RAY EXAM CHEST 1 VIEW: CPT

## 2023-06-28 PROCEDURE — 250N000013 HC RX MED GY IP 250 OP 250 PS 637: Performed by: CLINICAL NURSE SPECIALIST

## 2023-06-28 PROCEDURE — 82330 ASSAY OF CALCIUM: CPT | Performed by: CLINICAL NURSE SPECIALIST

## 2023-06-28 RX ORDER — ONDANSETRON 4 MG/1
4 TABLET, ORALLY DISINTEGRATING ORAL EVERY 6 HOURS PRN
Status: DISCONTINUED | OUTPATIENT
Start: 2023-06-28 | End: 2023-07-13 | Stop reason: HOSPADM

## 2023-06-28 RX ORDER — SODIUM CHLORIDE, SODIUM LACTATE, POTASSIUM CHLORIDE, CALCIUM CHLORIDE 600; 310; 30; 20 MG/100ML; MG/100ML; MG/100ML; MG/100ML
INJECTION, SOLUTION INTRAVENOUS ONCE
Status: COMPLETED | OUTPATIENT
Start: 2023-06-29 | End: 2023-06-28

## 2023-06-28 RX ORDER — NOREPINEPHRINE BITARTRATE 0.06 MG/ML
.01-.6 INJECTION, SOLUTION INTRAVENOUS CONTINUOUS
Status: DISCONTINUED | OUTPATIENT
Start: 2023-06-28 | End: 2023-06-28

## 2023-06-28 RX ORDER — ENOXAPARIN SODIUM 100 MG/ML
40 INJECTION SUBCUTANEOUS EVERY 24 HOURS
Status: DISCONTINUED | OUTPATIENT
Start: 2023-06-28 | End: 2023-07-13 | Stop reason: HOSPADM

## 2023-06-28 RX ORDER — MEROPENEM 1 G/1
1 INJECTION, POWDER, FOR SOLUTION INTRAVENOUS EVERY 8 HOURS
Status: DISCONTINUED | OUTPATIENT
Start: 2023-06-28 | End: 2023-07-01

## 2023-06-28 RX ORDER — NOREPINEPHRINE BITARTRATE 0.06 MG/ML
.01-.6 INJECTION, SOLUTION INTRAVENOUS CONTINUOUS
Status: DISCONTINUED | OUTPATIENT
Start: 2023-06-28 | End: 2023-06-29

## 2023-06-28 RX ORDER — HYDROXYZINE HYDROCHLORIDE 25 MG/1
25 TABLET, FILM COATED ORAL 3 TIMES DAILY PRN
Status: DISCONTINUED | OUTPATIENT
Start: 2023-06-28 | End: 2023-06-30

## 2023-06-28 RX ORDER — NOREPINEPHRINE BITARTRATE 0.06 MG/ML
INJECTION, SOLUTION INTRAVENOUS
Status: COMPLETED
Start: 2023-06-28 | End: 2023-06-28

## 2023-06-28 RX ORDER — NALOXONE HYDROCHLORIDE 0.4 MG/ML
0.4 INJECTION, SOLUTION INTRAMUSCULAR; INTRAVENOUS; SUBCUTANEOUS
Status: DISCONTINUED | OUTPATIENT
Start: 2023-06-28 | End: 2023-07-13 | Stop reason: HOSPADM

## 2023-06-28 RX ORDER — AMOXICILLIN 250 MG
1 CAPSULE ORAL DAILY
Status: DISCONTINUED | OUTPATIENT
Start: 2023-06-29 | End: 2023-07-13 | Stop reason: HOSPADM

## 2023-06-28 RX ORDER — ACETAMINOPHEN 325 MG/1
650 TABLET ORAL EVERY 4 HOURS PRN
Status: DISCONTINUED | OUTPATIENT
Start: 2023-06-28 | End: 2023-07-13 | Stop reason: HOSPADM

## 2023-06-28 RX ORDER — FENTANYL CITRATE 50 UG/ML
12.5 INJECTION, SOLUTION INTRAMUSCULAR; INTRAVENOUS ONCE
Status: COMPLETED | OUTPATIENT
Start: 2023-06-29 | End: 2023-06-29

## 2023-06-28 RX ORDER — POTASSIUM CHLORIDE 20MEQ/15ML
40 LIQUID (ML) ORAL ONCE
Status: COMPLETED | OUTPATIENT
Start: 2023-06-28 | End: 2023-06-28

## 2023-06-28 RX ORDER — NALOXONE HYDROCHLORIDE 0.4 MG/ML
0.2 INJECTION, SOLUTION INTRAMUSCULAR; INTRAVENOUS; SUBCUTANEOUS
Status: DISCONTINUED | OUTPATIENT
Start: 2023-06-28 | End: 2023-07-13 | Stop reason: HOSPADM

## 2023-06-28 RX ORDER — FENTANYL CITRATE 50 UG/ML
INJECTION, SOLUTION INTRAMUSCULAR; INTRAVENOUS
Status: COMPLETED
Start: 2023-06-28 | End: 2023-06-29

## 2023-06-28 RX ORDER — PIPERACILLIN SODIUM, TAZOBACTAM SODIUM 4; .5 G/20ML; G/20ML
4.5 INJECTION, POWDER, LYOPHILIZED, FOR SOLUTION INTRAVENOUS EVERY 8 HOURS
Status: DISCONTINUED | OUTPATIENT
Start: 2023-06-28 | End: 2023-06-28

## 2023-06-28 RX ORDER — MAGNESIUM SULFATE HEPTAHYDRATE 40 MG/ML
4 INJECTION, SOLUTION INTRAVENOUS ONCE
Status: COMPLETED | OUTPATIENT
Start: 2023-06-28 | End: 2023-06-28

## 2023-06-28 RX ORDER — OXYBUTYNIN CHLORIDE 5 MG/1
5 TABLET, EXTENDED RELEASE ORAL DAILY
Status: DISCONTINUED | OUTPATIENT
Start: 2023-06-28 | End: 2023-07-13 | Stop reason: HOSPADM

## 2023-06-28 RX ORDER — ROPIVACAINE IN 0.9% SOD CHL/PF 0.1 %
.03-.125 PLASTIC BAG, INJECTION (ML) EPIDURAL CONTINUOUS
Status: DISCONTINUED | OUTPATIENT
Start: 2023-06-28 | End: 2023-06-28

## 2023-06-28 RX ORDER — NICOTINE POLACRILEX 4 MG
15-30 LOZENGE BUCCAL
Status: DISCONTINUED | OUTPATIENT
Start: 2023-06-28 | End: 2023-07-13 | Stop reason: HOSPADM

## 2023-06-28 RX ORDER — BISACODYL 5 MG
15 TABLET, DELAYED RELEASE (ENTERIC COATED) ORAL DAILY PRN
Status: DISCONTINUED | OUTPATIENT
Start: 2023-06-28 | End: 2023-06-30

## 2023-06-28 RX ORDER — ACETAMINOPHEN 325 MG/10.15ML
650 LIQUID ORAL EVERY 4 HOURS PRN
Status: DISCONTINUED | OUTPATIENT
Start: 2023-06-28 | End: 2023-07-13 | Stop reason: HOSPADM

## 2023-06-28 RX ORDER — MIDODRINE HYDROCHLORIDE 2.5 MG/1
10 TABLET ORAL 2 TIMES DAILY
Status: DISCONTINUED | OUTPATIENT
Start: 2023-06-28 | End: 2023-07-01

## 2023-06-28 RX ORDER — ONDANSETRON 2 MG/ML
4 INJECTION INTRAMUSCULAR; INTRAVENOUS EVERY 6 HOURS PRN
Status: DISCONTINUED | OUTPATIENT
Start: 2023-06-28 | End: 2023-07-13 | Stop reason: HOSPADM

## 2023-06-28 RX ORDER — SODIUM CHLORIDE, SODIUM LACTATE, POTASSIUM CHLORIDE, CALCIUM CHLORIDE 600; 310; 30; 20 MG/100ML; MG/100ML; MG/100ML; MG/100ML
INJECTION, SOLUTION INTRAVENOUS CONTINUOUS
Status: DISCONTINUED | OUTPATIENT
Start: 2023-06-28 | End: 2023-06-29

## 2023-06-28 RX ORDER — HYDROMORPHONE HYDROCHLORIDE 1 MG/ML
.5-1 INJECTION, SOLUTION INTRAMUSCULAR; INTRAVENOUS; SUBCUTANEOUS
Status: DISCONTINUED | OUTPATIENT
Start: 2023-06-28 | End: 2023-06-30

## 2023-06-28 RX ORDER — BISACODYL 5 MG
10 TABLET, DELAYED RELEASE (ENTERIC COATED) ORAL DAILY PRN
Status: DISCONTINUED | OUTPATIENT
Start: 2023-06-28 | End: 2023-06-30

## 2023-06-28 RX ORDER — AMOXICILLIN 250 MG
1 CAPSULE ORAL DAILY
COMMUNITY

## 2023-06-28 RX ORDER — DEXTROSE MONOHYDRATE 25 G/50ML
25-50 INJECTION, SOLUTION INTRAVENOUS
Status: DISCONTINUED | OUTPATIENT
Start: 2023-06-28 | End: 2023-07-13 | Stop reason: HOSPADM

## 2023-06-28 RX ORDER — POTASSIUM CHLORIDE 29.8 MG/ML
20 INJECTION INTRAVENOUS ONCE
Status: DISCONTINUED | OUTPATIENT
Start: 2023-06-28 | End: 2023-06-28

## 2023-06-28 RX ORDER — BISACODYL 10 MG
10 SUPPOSITORY, RECTAL RECTAL AT BEDTIME
Status: DISCONTINUED | OUTPATIENT
Start: 2023-06-28 | End: 2023-07-13 | Stop reason: HOSPADM

## 2023-06-28 RX ORDER — BISACODYL 5 MG
5 TABLET, DELAYED RELEASE (ENTERIC COATED) ORAL DAILY PRN
Status: DISCONTINUED | OUTPATIENT
Start: 2023-06-28 | End: 2023-06-30

## 2023-06-28 RX ORDER — GABAPENTIN 300 MG/1
300 CAPSULE ORAL AT BEDTIME
Status: DISCONTINUED | OUTPATIENT
Start: 2023-06-28 | End: 2023-06-30

## 2023-06-28 RX ADMIN — CARBIDOPA AND LEVODOPA 10 MG: 50; 200 TABLET, EXTENDED RELEASE ORAL at 21:38

## 2023-06-28 RX ADMIN — SODIUM CHLORIDE, POTASSIUM CHLORIDE, SODIUM LACTATE AND CALCIUM CHLORIDE: 600; 310; 30; 20 INJECTION, SOLUTION INTRAVENOUS at 21:07

## 2023-06-28 RX ADMIN — GABAPENTIN 300 MG: 300 CAPSULE ORAL at 21:41

## 2023-06-28 RX ADMIN — ENOXAPARIN SODIUM 40 MG: 40 INJECTION SUBCUTANEOUS at 21:29

## 2023-06-28 RX ADMIN — SODIUM CHLORIDE, POTASSIUM CHLORIDE, SODIUM LACTATE AND CALCIUM CHLORIDE 1000 ML: 600; 310; 30; 20 INJECTION, SOLUTION INTRAVENOUS at 20:15

## 2023-06-28 RX ADMIN — SERTRALINE HYDROCHLORIDE 150 MG: 100 TABLET ORAL at 21:34

## 2023-06-28 RX ADMIN — VANCOMYCIN HYDROCHLORIDE 750 MG: 1 INJECTION, POWDER, LYOPHILIZED, FOR SOLUTION INTRAVENOUS at 22:38

## 2023-06-28 RX ADMIN — Medication 0.12 MCG/KG/MIN: at 20:16

## 2023-06-28 RX ADMIN — VASOPRESSIN 2.4 UNITS/HR: 20 INJECTION, SOLUTION INTRAMUSCULAR; SUBCUTANEOUS at 23:52

## 2023-06-28 RX ADMIN — MEROPENEM 1 G: 1 INJECTION, POWDER, FOR SOLUTION INTRAVENOUS at 22:01

## 2023-06-28 RX ADMIN — POTASSIUM CHLORIDE 40 MEQ: 40 SOLUTION ORAL at 21:41

## 2023-06-28 RX ADMIN — NOREPINEPHRINE BITARTRATE 0.12 MCG/KG/MIN: 0.06 INJECTION, SOLUTION INTRAVENOUS at 17:30

## 2023-06-28 RX ADMIN — Medication 0.12 MCG/KG/MIN: at 20:19

## 2023-06-28 RX ADMIN — OXYBUTYNIN CHLORIDE 5 MG: 5 TABLET, EXTENDED RELEASE ORAL at 21:35

## 2023-06-28 RX ADMIN — SODIUM CHLORIDE, POTASSIUM CHLORIDE, SODIUM LACTATE AND CALCIUM CHLORIDE: 600; 310; 30; 20 INJECTION, SOLUTION INTRAVENOUS at 23:51

## 2023-06-28 RX ADMIN — MAGNESIUM SULFATE HEPTAHYDRATE 4 G: 40 INJECTION, SOLUTION INTRAVENOUS at 20:53

## 2023-06-28 RX ADMIN — ONDANSETRON 4 MG: 2 INJECTION INTRAMUSCULAR; INTRAVENOUS at 22:17

## 2023-06-28 RX ADMIN — POTASSIUM PHOSPHATE, MONOBASIC AND POTASSIUM PHOSPHATE, DIBASIC 30 MMOL: 224; 236 INJECTION, SOLUTION, CONCENTRATE INTRAVENOUS at 23:14

## 2023-06-28 ASSESSMENT — ACTIVITIES OF DAILY LIVING (ADL)
ADLS_ACUITY_SCORE: 41
ADLS_ACUITY_SCORE: 41
ADLS_ACUITY_SCORE: 35
ADLS_ACUITY_SCORE: 35
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
## 2023-06-28 NOTE — PROGRESS NOTES
Pt arrived to 10A via EMS on 2 pressors, intermittently lethargic and difficult to arouse requiring sternal rubs. Pt tachycardic denies CP and SOB. Urostomy rogers in place. Pt had 2 BM. Packing in wound soiled and removed. Mepilex placed. STAT CT and labs completed. Mom at bedside.   Renetta Solis RN

## 2023-06-28 NOTE — PHARMACY-ADMISSION MEDICATION HISTORY
Pharmacist Admission Medication History    Admission medication history is complete. The information provided in this note is only as accurate as the sources available at the time of the update.    Medication reconciliation/reorder completed by provider prior to medication history? Yes    Information Source(s): CareEverywhere/SureScripts via N/A    Pertinent Information:   -Pharmacy medication history completed 6/26/23 at [facility]    Changes made to PTA medication list:    Added: elderberry/zinc/vit C supplement, senna-docusate    Deleted: docusate, rivaroxaban    Changed: bisacodyl suppository to prn      Allergies reviewed with patient and updates made in EHR: unable to assess    Medication History Completed By: [facility] 6/28/2023 6:16 PM    Prior to Admission medications    Medication Sig Last Dose Taking? Auth Provider Long Term End Date   acetaminophen (TYLENOL) 325 MG tablet Take 325-650 mg by mouth every 6 hours as needed.  Yes Reported, Patient Yes    albuterol (PROVENTIL) (2.5 MG/3ML) 0.083% neb solution Take 1 vial (2.5 mg) by nebulization every 4 hours as needed for shortness of breath / dyspnea or wheezing  Yes Isidro Reynolds MD Yes    baclofen (LIORESAL) 10 MG tablet Take 30 mg by mouth 3 times daily (10MG X 3 = 30MG)  Yes Reported, Patient Yes    bisacodyl (DULCOLAX) 10 MG suppository Place 1 suppository (10 mg) rectally At Bedtime  Patient taking differently: Place 10 mg rectally nightly as needed for constipation  Yes Iglesia Billy MD No    Cranberry 500 MG TABS Take 500 mg by mouth daily  Yes Reported, Patient Yes    fluticasone-vilanterol (BREO ELLIPTA) 100-25 MCG/INH inhaler Inhale 1 puff into the lungs daily  Yes Suzan Willis PA-C     gabapentin (NEURONTIN) 300 MG capsule Take 300 mg by mouth At Bedtime   Yes Reported, Patient Yes    hydrOXYzine (VISTARIL) 25 MG capsule Take 1 capsule (25 mg) by mouth 3 times daily as needed for anxiety (or at bedtime for  sleep.)  Yes Suzan Willis PA-C     midodrine (PROAMATINE) 5 MG tablet Take 10 mg by mouth 2 times daily   Yes Suzan Willis PA-C Yes    Misc Natural Products (ELDERBERRY/VITAMIN C/ZINC PO) Take 1 tablet by mouth daily  Yes Unknown, Entered By History     Multiple Vitamins-Minerals (WOMENS MULTIVITAMIN) TABS Take 1 tablet by mouth daily  Yes Reported, Patient     oxybutynin ER (DITROPAN-XL) 5 MG 24 hr tablet Take 5 mg by mouth every evening  Yes Reported, Patient     senna-docusate (SENOKOT-S/PERICOLACE) 8.6-50 MG tablet Take 1 tablet by mouth daily  Yes Unknown, Entered By History     sertraline (ZOLOFT) 100 MG tablet TAKE 1.5 TABLETS BY MOUTH EVERY DAY  Yes Suzan Willis PA-C Yes    sodium chloride (NEBUSAL) 3 % neb solution Take 3 mLs by nebulization every 6 hours as needed for wheezing or other (sputum clearance difficulty due to quadridplegia.)  Yes Rajesh Bahena MD     Vitamin D3 (CHOLECALCIFEROL) 125 MCG (5000 UT) tablet Take 1 tablet by mouth every other day  Yes Reported, Patient     sodium chloride 0.9%, bottle, (CURITY STERILE SALINE) 0.9 % irrigation 250 mLs by Intracatheter route daily   Amos Pham MD  4/17/24   sodium chloride 0.9%, bottle, (CURITY STERILE SALINE) 0.9 % irrigation 250 mLs by Intracatheter route 2 times daily   Mata Bacon MD  3/29/24

## 2023-06-29 ENCOUNTER — APPOINTMENT (OUTPATIENT)
Dept: CT IMAGING | Facility: CLINIC | Age: 28
DRG: 870 | End: 2023-06-29
Payer: MEDICARE

## 2023-06-29 ENCOUNTER — APPOINTMENT (OUTPATIENT)
Dept: CT IMAGING | Facility: CLINIC | Age: 28
DRG: 870 | End: 2023-06-29
Attending: NURSE PRACTITIONER
Payer: MEDICARE

## 2023-06-29 ENCOUNTER — APPOINTMENT (OUTPATIENT)
Dept: GENERAL RADIOLOGY | Facility: CLINIC | Age: 28
DRG: 870 | End: 2023-06-29
Payer: MEDICARE

## 2023-06-29 ENCOUNTER — APPOINTMENT (OUTPATIENT)
Dept: CARDIOLOGY | Facility: CLINIC | Age: 28
DRG: 870 | End: 2023-06-29
Attending: NURSE PRACTITIONER
Payer: MEDICARE

## 2023-06-29 ENCOUNTER — APPOINTMENT (OUTPATIENT)
Dept: ULTRASOUND IMAGING | Facility: CLINIC | Age: 28
DRG: 870 | End: 2023-06-29
Attending: NURSE PRACTITIONER
Payer: MEDICARE

## 2023-06-29 ENCOUNTER — MEDICAL CORRESPONDENCE (OUTPATIENT)
Dept: HEALTH INFORMATION MANAGEMENT | Facility: CLINIC | Age: 28
End: 2023-06-29

## 2023-06-29 ENCOUNTER — APPOINTMENT (OUTPATIENT)
Dept: GENERAL RADIOLOGY | Facility: CLINIC | Age: 28
DRG: 870 | End: 2023-06-29
Attending: NURSE PRACTITIONER
Payer: MEDICARE

## 2023-06-29 LAB
ALBUMIN SERPL BCG-MCNC: 2 G/DL (ref 3.5–5.2)
ALLEN'S TEST: ABNORMAL
ALP SERPL-CCNC: 143 U/L (ref 35–104)
ALT SERPL W P-5'-P-CCNC: 96 U/L (ref 0–50)
ANION GAP SERPL CALCULATED.3IONS-SCNC: 14 MMOL/L (ref 7–15)
AST SERPL W P-5'-P-CCNC: 219 U/L (ref 0–45)
BASE EXCESS BLDA CALC-SCNC: -5.6 MMOL/L (ref -9–1.8)
BASE EXCESS BLDA CALC-SCNC: -6 MMOL/L (ref -9–1.8)
BASE EXCESS BLDA CALC-SCNC: -6.3 MMOL/L (ref -9–1.8)
BASE EXCESS BLDA CALC-SCNC: -8.3 MMOL/L (ref -9–1.8)
BASE EXCESS BLDA CALC-SCNC: -8.7 MMOL/L (ref -9–1.8)
BILIRUB DIRECT SERPL-MCNC: <0.2 MG/DL (ref 0–0.3)
BILIRUB SERPL-MCNC: 0.2 MG/DL
BUN SERPL-MCNC: 7.6 MG/DL (ref 6–20)
C DIFF TOX B STL QL: NEGATIVE
C PNEUM DNA SPEC QL NAA+PROBE: NOT DETECTED
CA-I BLD-MCNC: 4.4 MG/DL (ref 4.4–5.2)
CA-I BLD-MCNC: 4.4 MG/DL (ref 4.4–5.2)
CALCIUM SERPL-MCNC: 7.7 MG/DL (ref 8.6–10)
CHLORIDE SERPL-SCNC: 105 MMOL/L (ref 98–107)
CK SERPL-CCNC: 1553 U/L (ref 26–192)
CK SERPL-CCNC: 2306 U/L (ref 26–192)
CK SERPL-CCNC: 614 U/L (ref 26–192)
CREAT SERPL-MCNC: 0.5 MG/DL (ref 0.51–0.95)
CRP SERPL-MCNC: 343.72 MG/L
D DIMER PPP FEU-MCNC: 6.08 UG/ML FEU (ref 0–0.5)
DEPRECATED HCO3 PLAS-SCNC: 18 MMOL/L (ref 22–29)
ERYTHROCYTE [DISTWIDTH] IN BLOOD BY AUTOMATED COUNT: 16 % (ref 10–15)
FIBRINOGEN PPP-MCNC: 476 MG/DL (ref 170–490)
FLUAV H1 2009 PAND RNA SPEC QL NAA+PROBE: NOT DETECTED
FLUAV H1 RNA SPEC QL NAA+PROBE: NOT DETECTED
FLUAV H3 RNA SPEC QL NAA+PROBE: NOT DETECTED
FLUAV RNA SPEC QL NAA+PROBE: NOT DETECTED
FLUBV RNA SPEC QL NAA+PROBE: NOT DETECTED
GFR SERPL CREATININE-BSD FRML MDRD: >90 ML/MIN/1.73M2
GLUCOSE BLDC GLUCOMTR-MCNC: 100 MG/DL (ref 70–99)
GLUCOSE BLDC GLUCOMTR-MCNC: 111 MG/DL (ref 70–99)
GLUCOSE BLDC GLUCOMTR-MCNC: 113 MG/DL (ref 70–99)
GLUCOSE BLDC GLUCOMTR-MCNC: 99 MG/DL (ref 70–99)
GLUCOSE SERPL-MCNC: 120 MG/DL (ref 70–99)
HADV DNA SPEC QL NAA+PROBE: NOT DETECTED
HCO3 BLD-SCNC: 16 MMOL/L (ref 21–28)
HCO3 BLD-SCNC: 16 MMOL/L (ref 21–28)
HCO3 BLD-SCNC: 20 MMOL/L (ref 21–28)
HCOV PNL SPEC NAA+PROBE: NOT DETECTED
HCT VFR BLD AUTO: 25 % (ref 35–47)
HGB BLD-MCNC: 8.4 G/DL (ref 11.7–15.7)
HMPV RNA SPEC QL NAA+PROBE: NOT DETECTED
HOLD SPECIMEN: NORMAL
HPIV1 RNA SPEC QL NAA+PROBE: NOT DETECTED
HPIV2 RNA SPEC QL NAA+PROBE: NOT DETECTED
HPIV3 RNA SPEC QL NAA+PROBE: NOT DETECTED
HPIV4 RNA SPEC QL NAA+PROBE: NOT DETECTED
LACTATE SERPL-SCNC: 1 MMOL/L (ref 0.7–2)
LACTATE SERPL-SCNC: 1 MMOL/L (ref 0.7–2)
LACTATE SERPL-SCNC: 1.4 MMOL/L (ref 0.7–2)
LACTATE SERPL-SCNC: 1.7 MMOL/L (ref 0.7–2)
LVEF ECHO: NORMAL
M PNEUMO DNA SPEC QL NAA+PROBE: NOT DETECTED
MAGNESIUM SERPL-MCNC: 2.4 MG/DL (ref 1.7–2.3)
MCH RBC QN AUTO: 27.1 PG (ref 26.5–33)
MCHC RBC AUTO-ENTMCNC: 33.6 G/DL (ref 31.5–36.5)
MCV RBC AUTO: 81 FL (ref 78–100)
O2/TOTAL GAS SETTING VFR VENT: 45 %
O2/TOTAL GAS SETTING VFR VENT: 50 %
O2/TOTAL GAS SETTING VFR VENT: 60 %
O2/TOTAL GAS SETTING VFR VENT: 60 %
O2/TOTAL GAS SETTING VFR VENT: 90 %
OXYHGB MFR BLD: 98 % (ref 92–100)
PCO2 BLD: 27 MM HG (ref 35–45)
PCO2 BLD: 28 MM HG (ref 35–45)
PCO2 BLD: 35 MM HG (ref 35–45)
PCO2 BLD: 40 MM HG (ref 35–45)
PCO2 BLD: 43 MM HG (ref 35–45)
PH BLD: 7.29 [PH] (ref 7.35–7.45)
PH BLD: 7.3 [PH] (ref 7.35–7.45)
PH BLD: 7.35 [PH] (ref 7.35–7.45)
PH BLD: 7.36 [PH] (ref 7.35–7.45)
PH BLD: 7.38 [PH] (ref 7.35–7.45)
PHOSPHATE SERPL-MCNC: 4.5 MG/DL (ref 2.5–4.5)
PLATELET # BLD AUTO: 360 10E3/UL (ref 150–450)
PO2 BLD: 118 MM HG (ref 80–105)
PO2 BLD: 127 MM HG (ref 80–105)
PO2 BLD: 69 MM HG (ref 80–105)
PO2 BLD: 75 MM HG (ref 80–105)
PO2 BLD: 87 MM HG (ref 80–105)
POTASSIUM SERPL-SCNC: 4.7 MMOL/L (ref 3.4–5.3)
PROCALCITONIN SERPL IA-MCNC: 88.76 NG/ML
PROT SERPL-MCNC: 5.7 G/DL (ref 6.4–8.3)
RBC # BLD AUTO: 3.1 10E6/UL (ref 3.8–5.2)
RSV RNA SPEC QL NAA+PROBE: NOT DETECTED
RSV RNA SPEC QL NAA+PROBE: NOT DETECTED
RV+EV RNA SPEC QL NAA+PROBE: NOT DETECTED
SARS-COV-2 RNA RESP QL NAA+PROBE: NEGATIVE
SODIUM SERPL-SCNC: 137 MMOL/L (ref 136–145)
TROPONIN T SERPL HS-MCNC: 67 NG/L
TROPONIN T SERPL HS-MCNC: 91 NG/L
VANCOMYCIN SERPL-MCNC: 19.3 UG/ML
WBC # BLD AUTO: 64.5 10E3/UL (ref 4–11)

## 2023-06-29 PROCEDURE — 93005 ELECTROCARDIOGRAM TRACING: CPT

## 2023-06-29 PROCEDURE — 250N000013 HC RX MED GY IP 250 OP 250 PS 637: Performed by: CLINICAL NURSE SPECIALIST

## 2023-06-29 PROCEDURE — 94002 VENT MGMT INPAT INIT DAY: CPT

## 2023-06-29 PROCEDURE — 85379 FIBRIN DEGRADATION QUANT: CPT | Performed by: NURSE PRACTITIONER

## 2023-06-29 PROCEDURE — 99222 1ST HOSP IP/OBS MODERATE 55: CPT | Performed by: STUDENT IN AN ORGANIZED HEALTH CARE EDUCATION/TRAINING PROGRAM

## 2023-06-29 PROCEDURE — 82330 ASSAY OF CALCIUM: CPT

## 2023-06-29 PROCEDURE — 250N000011 HC RX IP 250 OP 636: Mod: JZ | Performed by: ANESTHESIOLOGY

## 2023-06-29 PROCEDURE — 87493 C DIFF AMPLIFIED PROBE: CPT | Performed by: NURSE PRACTITIONER

## 2023-06-29 PROCEDURE — 250N000011 HC RX IP 250 OP 636

## 2023-06-29 PROCEDURE — 83735 ASSAY OF MAGNESIUM: CPT | Performed by: NURSE PRACTITIONER

## 2023-06-29 PROCEDURE — G0463 HOSPITAL OUTPT CLINIC VISIT: HCPCS | Mod: 25

## 2023-06-29 PROCEDURE — 74177 CT ABD & PELVIS W/CONTRAST: CPT | Mod: 26 | Performed by: RADIOLOGY

## 2023-06-29 PROCEDURE — 82803 BLOOD GASES ANY COMBINATION: CPT | Performed by: NURSE PRACTITIONER

## 2023-06-29 PROCEDURE — 80202 ASSAY OF VANCOMYCIN: CPT

## 2023-06-29 PROCEDURE — 250N000009 HC RX 250: Performed by: ANESTHESIOLOGY

## 2023-06-29 PROCEDURE — 250N000013 HC RX MED GY IP 250 OP 250 PS 637

## 2023-06-29 PROCEDURE — 255N000002 HC RX 255 OP 636: Performed by: INTERNAL MEDICINE

## 2023-06-29 PROCEDURE — 258N000003 HC RX IP 258 OP 636

## 2023-06-29 PROCEDURE — 87635 SARS-COV-2 COVID-19 AMP PRB: CPT

## 2023-06-29 PROCEDURE — 5A1955Z RESPIRATORY VENTILATION, GREATER THAN 96 CONSECUTIVE HOURS: ICD-10-PCS | Performed by: INTERNAL MEDICINE

## 2023-06-29 PROCEDURE — 250N000011 HC RX IP 250 OP 636: Mod: JZ | Performed by: CLINICAL NURSE SPECIALIST

## 2023-06-29 PROCEDURE — 71045 X-RAY EXAM CHEST 1 VIEW: CPT | Mod: 26 | Performed by: RADIOLOGY

## 2023-06-29 PROCEDURE — 87506 IADNA-DNA/RNA PROBE TQ 6-11: CPT | Performed by: NURSE PRACTITIONER

## 2023-06-29 PROCEDURE — 200N000002 HC R&B ICU UMMC

## 2023-06-29 PROCEDURE — 74018 RADEX ABDOMEN 1 VIEW: CPT | Mod: 26 | Performed by: RADIOLOGY

## 2023-06-29 PROCEDURE — 93970 EXTREMITY STUDY: CPT

## 2023-06-29 PROCEDURE — 71045 X-RAY EXAM CHEST 1 VIEW: CPT | Mod: 26 | Performed by: STUDENT IN AN ORGANIZED HEALTH CARE EDUCATION/TRAINING PROGRAM

## 2023-06-29 PROCEDURE — 84484 ASSAY OF TROPONIN QUANT: CPT | Performed by: NURSE PRACTITIONER

## 2023-06-29 PROCEDURE — 84145 PROCALCITONIN (PCT): CPT | Performed by: NURSE PRACTITIONER

## 2023-06-29 PROCEDURE — 84100 ASSAY OF PHOSPHORUS: CPT | Performed by: NURSE PRACTITIONER

## 2023-06-29 PROCEDURE — 99291 CRITICAL CARE FIRST HOUR: CPT | Mod: FS | Performed by: ANESTHESIOLOGY

## 2023-06-29 PROCEDURE — G1010 CDSM STANSON: HCPCS

## 2023-06-29 PROCEDURE — 82550 ASSAY OF CK (CPK): CPT | Performed by: NURSE PRACTITIONER

## 2023-06-29 PROCEDURE — 250N000009 HC RX 250

## 2023-06-29 PROCEDURE — 82330 ASSAY OF CALCIUM: CPT | Performed by: NURSE PRACTITIONER

## 2023-06-29 PROCEDURE — 999N000065 XR ABDOMEN PORT 1 VIEW

## 2023-06-29 PROCEDURE — G1010 CDSM STANSON: HCPCS | Mod: GC | Performed by: RADIOLOGY

## 2023-06-29 PROCEDURE — 83605 ASSAY OF LACTIC ACID: CPT

## 2023-06-29 PROCEDURE — 85014 HEMATOCRIT: CPT | Performed by: NURSE PRACTITIONER

## 2023-06-29 PROCEDURE — 999N000215 HC STATISTIC HFNC ADULT NON-CPAP

## 2023-06-29 PROCEDURE — 97602 WOUND(S) CARE NON-SELECTIVE: CPT

## 2023-06-29 PROCEDURE — 999N000065 XR CHEST PORT 1 VIEW

## 2023-06-29 PROCEDURE — 71275 CT ANGIOGRAPHY CHEST: CPT | Mod: 26 | Performed by: RADIOLOGY

## 2023-06-29 PROCEDURE — 250N000011 HC RX IP 250 OP 636: Mod: JZ | Performed by: NURSE PRACTITIONER

## 2023-06-29 PROCEDURE — 272N000054 HC CANNULA HIGH FLOW, ADULT

## 2023-06-29 PROCEDURE — 999N000015 HC STATISTIC ARTERIAL MONITORING DAILY

## 2023-06-29 PROCEDURE — 93010 ELECTROCARDIOGRAM REPORT: CPT | Performed by: INTERNAL MEDICINE

## 2023-06-29 PROCEDURE — 93306 TTE W/DOPPLER COMPLETE: CPT | Mod: 26 | Performed by: INTERNAL MEDICINE

## 2023-06-29 PROCEDURE — 99292 CRITICAL CARE ADDL 30 MIN: CPT | Mod: FS | Performed by: ANESTHESIOLOGY

## 2023-06-29 PROCEDURE — 82805 BLOOD GASES W/O2 SATURATION: CPT

## 2023-06-29 PROCEDURE — 250N000009 HC RX 250: Performed by: NURSE PRACTITIONER

## 2023-06-29 PROCEDURE — 94660 CPAP INITIATION&MGMT: CPT

## 2023-06-29 PROCEDURE — 999N000157 HC STATISTIC RCP TIME EA 10 MIN

## 2023-06-29 PROCEDURE — 83605 ASSAY OF LACTIC ACID: CPT | Performed by: NURSE PRACTITIONER

## 2023-06-29 PROCEDURE — 93306 TTE W/DOPPLER COMPLETE: CPT

## 2023-06-29 PROCEDURE — 87486 CHLMYD PNEUM DNA AMP PROBE: CPT

## 2023-06-29 PROCEDURE — 74177 CT ABD & PELVIS W/CONTRAST: CPT | Mod: MG

## 2023-06-29 PROCEDURE — 258N000003 HC RX IP 258 OP 636: Performed by: NURSE PRACTITIONER

## 2023-06-29 PROCEDURE — 258N000003 HC RX IP 258 OP 636: Performed by: CLINICAL NURSE SPECIALIST

## 2023-06-29 PROCEDURE — 93970 EXTREMITY STUDY: CPT | Mod: 26 | Performed by: RADIOLOGY

## 2023-06-29 PROCEDURE — 82803 BLOOD GASES ANY COMBINATION: CPT

## 2023-06-29 PROCEDURE — 82248 BILIRUBIN DIRECT: CPT | Performed by: NURSE PRACTITIONER

## 2023-06-29 PROCEDURE — 999N000009 HC STATISTIC AIRWAY CARE

## 2023-06-29 PROCEDURE — 86140 C-REACTIVE PROTEIN: CPT | Performed by: NURSE PRACTITIONER

## 2023-06-29 RX ORDER — IOPAMIDOL 755 MG/ML
100 INJECTION, SOLUTION INTRAVASCULAR ONCE
Status: COMPLETED | OUTPATIENT
Start: 2023-06-29 | End: 2023-06-29

## 2023-06-29 RX ORDER — AZITHROMYCIN 500 MG/5ML
500 INJECTION, POWDER, LYOPHILIZED, FOR SOLUTION INTRAVENOUS EVERY 24 HOURS
Status: DISCONTINUED | OUTPATIENT
Start: 2023-06-29 | End: 2023-06-29

## 2023-06-29 RX ORDER — FENTANYL CITRATE 50 UG/ML
INJECTION, SOLUTION INTRAMUSCULAR; INTRAVENOUS
Status: COMPLETED
Start: 2023-06-29 | End: 2023-06-29

## 2023-06-29 RX ORDER — PROPOFOL 10 MG/ML
100 INJECTION, EMULSION INTRAVENOUS ONCE
Status: COMPLETED | OUTPATIENT
Start: 2023-06-29 | End: 2023-06-29

## 2023-06-29 RX ORDER — FENTANYL CITRATE 50 UG/ML
100 INJECTION, SOLUTION INTRAMUSCULAR; INTRAVENOUS ONCE
Status: COMPLETED | OUTPATIENT
Start: 2023-06-29 | End: 2023-06-29

## 2023-06-29 RX ORDER — NOREPINEPHRINE BITARTRATE 0.06 MG/ML
.01-.6 INJECTION, SOLUTION INTRAVENOUS CONTINUOUS
Status: DISCONTINUED | OUTPATIENT
Start: 2023-06-29 | End: 2023-07-01

## 2023-06-29 RX ORDER — DOXYCYCLINE 100 MG/10ML
100 INJECTION, POWDER, LYOPHILIZED, FOR SOLUTION INTRAVENOUS EVERY 12 HOURS
Status: COMPLETED | OUTPATIENT
Start: 2023-06-29 | End: 2023-07-03

## 2023-06-29 RX ORDER — PROPOFOL 10 MG/ML
INJECTION, EMULSION INTRAVENOUS
Status: COMPLETED
Start: 2023-06-29 | End: 2023-06-29

## 2023-06-29 RX ORDER — CHLORHEXIDINE GLUCONATE ORAL RINSE 1.2 MG/ML
15 SOLUTION DENTAL EVERY 12 HOURS
Status: DISCONTINUED | OUTPATIENT
Start: 2023-06-29 | End: 2023-07-01

## 2023-06-29 RX ORDER — PHENYLEPHRINE HCL IN 0.9% NACL 50MG/250ML
.1-6 PLASTIC BAG, INJECTION (ML) INTRAVENOUS CONTINUOUS
Status: DISCONTINUED | OUTPATIENT
Start: 2023-06-29 | End: 2023-06-30

## 2023-06-29 RX ORDER — PROPOFOL 10 MG/ML
5-75 INJECTION, EMULSION INTRAVENOUS CONTINUOUS
Status: DISCONTINUED | OUTPATIENT
Start: 2023-06-29 | End: 2023-07-01

## 2023-06-29 RX ADMIN — HUMAN ALBUMIN MICROSPHERES AND PERFLUTREN 5 ML: 10; .22 INJECTION, SOLUTION INTRAVENOUS at 09:58

## 2023-06-29 RX ADMIN — ENOXAPARIN SODIUM 40 MG: 40 INJECTION SUBCUTANEOUS at 19:50

## 2023-06-29 RX ADMIN — HYDROCORTISONE SODIUM SUCCINATE 100 MG: 100 INJECTION, POWDER, FOR SOLUTION INTRAMUSCULAR; INTRAVENOUS at 17:39

## 2023-06-29 RX ADMIN — ACETAMINOPHEN 650 MG: 325 TABLET, FILM COATED ORAL at 08:24

## 2023-06-29 RX ADMIN — NOREPINEPHRINE BITARTRATE 0.03 MCG/KG/MIN: 0.06 INJECTION, SOLUTION INTRAVENOUS at 12:01

## 2023-06-29 RX ADMIN — PROPOFOL 60 MCG/KG/MIN: 10 INJECTION, EMULSION INTRAVENOUS at 14:45

## 2023-06-29 RX ADMIN — Medication 25 MCG: at 23:29

## 2023-06-29 RX ADMIN — VANCOMYCIN HYDROCHLORIDE 750 MG: 1 INJECTION, POWDER, LYOPHILIZED, FOR SOLUTION INTRAVENOUS at 21:34

## 2023-06-29 RX ADMIN — MEROPENEM 1 G: 1 INJECTION, POWDER, FOR SOLUTION INTRAVENOUS at 23:08

## 2023-06-29 RX ADMIN — CARBIDOPA AND LEVODOPA 10 MG: 50; 200 TABLET, EXTENDED RELEASE ORAL at 08:24

## 2023-06-29 RX ADMIN — DOXYCYCLINE 100 MG: 100 INJECTION, POWDER, LYOPHILIZED, FOR SOLUTION INTRAVENOUS at 12:03

## 2023-06-29 RX ADMIN — HYDROCORTISONE SODIUM SUCCINATE 100 MG: 100 INJECTION, POWDER, FOR SOLUTION INTRAMUSCULAR; INTRAVENOUS at 00:31

## 2023-06-29 RX ADMIN — Medication 100 MG: at 14:46

## 2023-06-29 RX ADMIN — ROCURONIUM BROMIDE 100 MG: 10 INJECTION, SOLUTION INTRAVENOUS at 14:46

## 2023-06-29 RX ADMIN — PROPOFOL 100 MG: 10 INJECTION, EMULSION INTRAVENOUS at 14:48

## 2023-06-29 RX ADMIN — CHLORHEXIDINE GLUCONATE 15 ML: 1.2 SOLUTION ORAL at 19:51

## 2023-06-29 RX ADMIN — SODIUM CHLORIDE, POTASSIUM CHLORIDE, SODIUM LACTATE AND CALCIUM CHLORIDE: 600; 310; 30; 20 INJECTION, SOLUTION INTRAVENOUS at 04:10

## 2023-06-29 RX ADMIN — HYDROXYZINE HYDROCHLORIDE 25 MG: 25 TABLET, FILM COATED ORAL at 08:26

## 2023-06-29 RX ADMIN — PROPOFOL 50 MCG/KG/MIN: 10 INJECTION, EMULSION INTRAVENOUS at 18:50

## 2023-06-29 RX ADMIN — VASOPRESSIN 2.4 UNITS/HR: 20 INJECTION, SOLUTION INTRAMUSCULAR; SUBCUTANEOUS at 16:05

## 2023-06-29 RX ADMIN — Medication 2.5 MCG/KG/MIN: at 06:35

## 2023-06-29 RX ADMIN — MEROPENEM 1 G: 1 INJECTION, POWDER, FOR SOLUTION INTRAVENOUS at 14:53

## 2023-06-29 RX ADMIN — IOPAMIDOL 50 ML: 755 INJECTION, SOLUTION INTRAVENOUS at 02:33

## 2023-06-29 RX ADMIN — Medication 0.5 MCG/KG/MIN: at 00:22

## 2023-06-29 RX ADMIN — FENTANYL CITRATE 12.5 MCG: 50 INJECTION, SOLUTION INTRAMUSCULAR; INTRAVENOUS at 00:05

## 2023-06-29 RX ADMIN — VASOPRESSIN 2.4 UNITS/HR: 20 INJECTION, SOLUTION INTRAMUSCULAR; SUBCUTANEOUS at 07:01

## 2023-06-29 RX ADMIN — IOPAMIDOL 59 ML: 755 INJECTION, SOLUTION INTRAVENOUS at 15:36

## 2023-06-29 RX ADMIN — SODIUM CHLORIDE, POTASSIUM CHLORIDE, SODIUM LACTATE AND CALCIUM CHLORIDE: 600; 310; 30; 20 INJECTION, SOLUTION INTRAVENOUS at 00:30

## 2023-06-29 RX ADMIN — SODIUM CHLORIDE 30 ML: 9 INJECTION, SOLUTION INTRAVENOUS at 15:36

## 2023-06-29 RX ADMIN — BACLOFEN 30 MG: 20 TABLET ORAL at 22:17

## 2023-06-29 RX ADMIN — FENTANYL CITRATE 100 MCG: 50 INJECTION INTRAMUSCULAR; INTRAVENOUS at 14:47

## 2023-06-29 RX ADMIN — VANCOMYCIN HYDROCHLORIDE 750 MG: 1 INJECTION, POWDER, LYOPHILIZED, FOR SOLUTION INTRAVENOUS at 09:51

## 2023-06-29 RX ADMIN — Medication 25 MCG: at 21:42

## 2023-06-29 RX ADMIN — BACLOFEN 30 MG: 20 TABLET ORAL at 09:53

## 2023-06-29 RX ADMIN — GABAPENTIN 300 MG: 300 CAPSULE ORAL at 22:22

## 2023-06-29 RX ADMIN — FENTANYL CITRATE 100 MCG: 50 INJECTION, SOLUTION INTRAMUSCULAR; INTRAVENOUS at 14:47

## 2023-06-29 RX ADMIN — MEROPENEM 1 G: 1 INJECTION, POWDER, FOR SOLUTION INTRAVENOUS at 05:49

## 2023-06-29 RX ADMIN — HYDROCORTISONE SODIUM SUCCINATE 100 MG: 100 INJECTION, POWDER, FOR SOLUTION INTRAMUSCULAR; INTRAVENOUS at 08:35

## 2023-06-29 RX ADMIN — SERTRALINE HYDROCHLORIDE 150 MG: 100 TABLET ORAL at 22:18

## 2023-06-29 RX ADMIN — Medication 25 MCG/HR: at 17:44

## 2023-06-29 RX ADMIN — SODIUM CHLORIDE 72 ML: 9 INJECTION, SOLUTION INTRAVENOUS at 02:31

## 2023-06-29 ASSESSMENT — ACTIVITIES OF DAILY LIVING (ADL)
ADLS_ACUITY_SCORE: 43
ADLS_ACUITY_SCORE: 47
ADLS_ACUITY_SCORE: 49
ADLS_ACUITY_SCORE: 41
ADLS_ACUITY_SCORE: 49
ADLS_ACUITY_SCORE: 41
ADLS_ACUITY_SCORE: 51
ADLS_ACUITY_SCORE: 49
ADLS_ACUITY_SCORE: 43
ADLS_ACUITY_SCORE: 51
ADLS_ACUITY_SCORE: 49
ADLS_ACUITY_SCORE: 49
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
## 2023-06-29 NOTE — PLAN OF CARE
Major Shift Events: Patient initially lethargic at start of shift. Became more alert after 2200 last night. Had several complaints of being cold, after getting warmed up, complained of being too hot. Eventually able to stabilize patient's temperature. Patient afebrile throughout this. Around 2330, patient HR went as high as 160s. 500ml Bolus given per DESIREE order. HR stabilized with bolus. Patient switched from Norepinephrine to Phenylephrine due to tachycardia. Vasopressin also added. Medication titrated till MAP above 65. Patient received total of 1500 bolus overnight. After BP stabilized, patient's noted to be requiring increased oxygenation. Nasal cannula titration ineffective, switched to oxymask then high flow nasal cannula, and currently on BIPAP. Patient also noted to have slightly increased work of breathing from baseline. Patient has some anxiety to being on BIPAP, able to redirect and reposition for comfort. CT scan completed overnight, see results review. Patient had x2 BM overnight. Endorsed to next shift to collect stool sample with next BM. Patient able to swallow medications and water. Urostomy irrigated overnight due to mucous noted in rogers. Urine noted to be clearer after irrigation. Art line placed beginning of shift. Electrolytes replaced overnight. WBC noted to remain critically high.   Plan: Monitor oxygenation, Monitor BP, stool sample.  For vital signs and complete assessments, please see documentation flowsheets.        Goal Outcome Evaluation:      Plan of Care Reviewed With: patient       Problem: Gas Exchange Impaired  Goal: Optimal Gas Exchange  Intervention: Optimize Oxygenation and Ventilation  Recent Flowsheet Documentation  Taken 6/29/2023 0400 by Azalia Acosta RN  Head of Bed (HOB) Positioning: HOB at 20-30 degrees  Taken 6/29/2023 0000 by Azalia Acosta RN  Head of Bed (HOB) Positioning: HOB at 20-30 degrees  Taken 6/28/2023 2200 by Azalia Acosta  RN  Head of Bed (HOB) Positioning: HOB at 20-30 degrees  Taken 6/28/2023 2000 by Azalia Acosta RN  Head of Bed (HOB) Positioning: HOB at 20-30 degrees

## 2023-06-29 NOTE — PHARMACY-VANCOMYCIN DOSING SERVICE
Pharmacy Vancomycin Note  Date of Service 2023  Patient's  1995   28 year old, female    Indication: Sepsis  Day of Therapy: Started on 23 ([facility])   Current vancomycin regimen:  750 mg IV q12h  Current vancomycin monitoring method: AUC  Current vancomycin therapeutic monitoring goal: 400-600 mg*h/L    InsightRX Prediction of Current Vancomycin Regimen  Loading dose: N/A  Regimen: 750 mg IV every 12 hours.  Start time: 21:51 on 2023  Exposure target: AUC24 (range)400-600 mg/L.hr   AUC24,ss: 491 mg/L.hr  Probability of AUC24 > 400: 94 %  Ctrough,ss: 15.2 mg/L  Probability of Ctrough,ss > 20: 6 %  Probability of nephrotoxicity (Lodise URBAN ): 10 %      Current estimated CrCl = Estimated Creatinine Clearance: 144.7 mL/min (A) (based on SCr of 0.5 mg/dL (L)).    Creatinine for last 3 days  2023:  6:25 PM Creatinine 0.65 mg/dL  2023:  5:38 AM Creatinine 0.50 mg/dL    Recent Vancomycin Levels (past 3 days)  2023:  9:28 AM Vancomycin 19.3 ug/mL    Vancomycin IV Administrations (past 72 hours)                   vancomycin (VANCOCIN) 750 mg in sodium chloride 0.9 % 250 mL intermittent infusion (mg) 750 mg New Bag 23 0951     750 mg New Bag 23 2238                Nephrotoxins and other renal medications (From now, onward)    Start     Dose/Rate Route Frequency Ordered Stop    23 1130  norepinephrine (LEVOPHED) 16 mg in  mL infusion MAX CONC CENTRAL LINE         0.01-0.6 mcg/kg/min × 55.7 kg (Dosing Weight)  0.5-31.3 mL/hr  Intravenous CONTINUOUS 23 1110      23  vancomycin (VANCOCIN) 750 mg in sodium chloride 0.9 % 250 mL intermittent infusion         750 mg  over 90 Minutes Intravenous EVERY 12 HOURS 23 1945      23 1800  vasopressin (VASOSTRICT) 20 Units in sodium chloride 0.9 % 100 mL standard conc infusion         2.4 Units/hr  12 mL/hr  Intravenous CONTINUOUS 23 1736               Contrast Orders - past 72 hours (72h  ago, onward)    Start     Dose/Rate Route Frequency Stop    06/29/23 1000  perflutren diluted 1mL to 2mL with saline (OPTISON) diluted injection 5 mL         5 mL Intravenous ONCE 06/29/23 0958    06/29/23 0200  iopamidol (ISOVUE-370) solution 100 mL         100 mL Intravenous ONCE 06/29/23 0233          Interpretation of levels and current regimen:  Vancomycin level is reflective of -600    Has serum creatinine changed greater than 50% in last 72 hours: No    Urine output:  good urine output    Renal Function: Improving, Scr 6/26 0.84 >> 6/27 0.67 >> 6/28 0.63 >> 6/29 0.5      Plan:  1. Continue Current Dose vancomycin 750 mg IV q12h  2. Vancomycin monitoring method: AUC  3. Vancomycin therapeutic monitoring goal: 400-600 mg*h/L  4. Pharmacy will check vancomycin levels as appropriate in 1-3 Days.  5. Serum creatinine levels will be ordered a minimum of twice weekly.    Marlon Little [facility]
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
## 2023-06-29 NOTE — PROGRESS NOTES
Pt intubated and placed on a vent. ETT secured with ATAD 20 cm @Lip and was advanced x2 by 2 cm each per chest X-ray and was re-secured 24 cm at the lip. Current vent settings AC 16, , PEEP 10 and 60% FIO2. Pt transported to CT and back to room on a vent without incident. RT will continue to follow.

## 2023-06-29 NOTE — PROGRESS NOTES
Brief Care Management Note:    Relevant Contact Info:  [facility] ([facility]): [phone]      Shereen BULLARD informed this writer of a VM she received from Olive at [facility] ([facility]), informing care mgmt that pt is open to their services and requesting a call back at [phone].    10:33am - Called Latanya at number provided, Jewels transferred this writer to Olive.  Left VM for Olive requesting call back.    Received prompt call back.  Olive requested general update, as Latanya was expecting pt to discharge from [place] to home.  Provided update based upon chart review, Olive expressed understanding.  She stated that pt is open to their services for 2x/wk wound cares from skilled nursing and a HHA for bathing/general ADLs.  She inquired who to contact regarding pt if Latanya has an update, this writer stated this writer, unless pt were to switch units, in which case another RNCC would reach out regarding the pt.  Olive expressed understanding and had no further questions.    12:17pm - Called charge RN, requested update.  Pt on 2 pressors currently, lots of workup going on.  Pt is alert at this point, but O2 status is poor, so pt may get intubated today.  Pt's mother is currently at the bedside.    Care management intends to conduct CMA w/ this pt tomorrow if not intubated.  If pt becomes intubated, care mgmt intends to reach out to pt's mother.  Of note, a CMA was conducted during a previous admission in March of this year.        Care management will continue to follow.    Beau Garcia, RNCC  [facility]  Unit 8M/S & 10 ICU  Pager: [phone]  Phone: [phone]
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
## 2023-06-29 NOTE — SIGNIFICANT EVENT
Significant Event Note    Worsening sudden hypotension levo and vaso infusion. Tele appearing to be -177. EKG confirmed ST. Patient Awake alert and oriented. States she feels cold, however remained a. Febrile. She is visibly anxious.     At this time she has been resuscitated with a total of 1.5L of IVF in addition to her continuous IVF at 250. She has gone now from room air to 8L venti mask with sats at 87-88%. Her Chest xray this evening slightly concerning for mild pulm edema and effusions however her hypoxia is acute, and out of proportion I feel to these finding.     Labs  7.35/35/69/20 on 8L venti mask, previous 7.35/32/100/18 on 2L at 2200.   Lactic remains stable 1.4  Trop remains stable and down trending   EKG SVT then sinus tach     Plan   -x1 dose of fent now for comfort   -Switch levo to jyothi and up titrate as needed, cont Vaso   -give additional 500cc LR now for hypotension and tachycardia   -Start HFNC Now   -Given her critical status, I am concerned about a PE vs septic emboli now in the setting of her sudden and new sudden hypoxia which is out of proportion to her imaging as well, sudden worsening hypotension.   -BLE dopplers are already ordered, and D.dimer is pending, however given her clinical status, I would expect this to likely be elevated, therefore we will move forward with CT PE protocol to rule out PE vs emboli    -If patient has worsening oxygenation needs low threshold to intubate given her critical state     Argenis Belle MICU DESIREE

## 2023-06-29 NOTE — SIGNIFICANT EVENT
Significant Event Note    Time of event: 14:15 PM June 29, 2023    Description of event:  Patient respiratory status worsening. After tolerating HFNC at 70% most of the morning, patient now is requiring Bipap 15/8 at 70% to maintain adequate oxygenation. Patient has reported significant anxiety surrounded the use of the bipap mask. In discussion with patient and her mother, who is at bedside, decision was made to proactively intubate to support the patients breathing and ease her burden.     Plan:  ~ Intubate per Dr. Rose  ~ Planned Vent Settings for CMV: 18/480/8 at 60% to start  ~ Conditional ABGs to evaluate vent settings  ~ CXR to evaluate ETT position  ~ Place small bore NG tube  ~ Propofol and fentanyl for sedation  ~ RASS goal 0 to -1  ~ Continue to plan for abdominal/pelvis CT scan now that airway is secured.       Discussed with: patient's family/emergency contact, bedside nurse and supervising physician, Dr. Rose.     Arsh Gordon -[facility]  Pager #[phone]  Critical Care  [facility]

## 2023-06-29 NOTE — PROGRESS NOTES
Occupational Therapy: Orders received. Chart reviewed and discussed with care team.? Occupational Therapy not indicated due to w/c at baseline and receives assistance with ADLs.? Defer discharge recommendations to care team.? Will complete orders.
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
## 2023-06-29 NOTE — PROCEDURES
[facility]    Arterial line placement    Date/Time: 6/28/2023 9:25 PM    Performed by: Argenis Belle APRN CNP  Authorized by: Argenis Belle APRN CNP      UNIVERSAL PROTOCOL   Site Marked: Yes  Prior Images Obtained and Reviewed:  NA  Required items: Required blood products, implants, devices and special equipment available    Patient identity confirmed:  Verbally with patient  Patient was reevaluated immediately before administering moderate or deep sedation or anesthesia  Confirmation Checklist:  Patient's identity using two indicators  Time out: Immediately prior to the procedure a time out was called    Universal Protocol: the Joint Commission Universal Protocol was followed    Preparation: Patient was prepped and draped in usual sterile fashion    Indication: multiple ABGs hemodynamic monitoring  Location: right radial       ANESTHESIA    Local Anesthetic: Lidocaine 1% without epinephrine      SEDATION    Patient Sedated: No    Vital signs: Vital signs monitored during sedation      PROCEDURE DETAILS  Demian's Test Normal?: Demian's test not abnormal  Needle Gauge:  20  Seldinger technique: Seldinger technique used    Number of Attempts:  1  Post-procedure:  Line sutured and dressing applied  CMS: normal    PROCEDURE  Describe Procedure: Time out was called. Via ultrasound right radial artery was visualized with brisk pulsation noted. Site was cleaned, and Patient was prepped and draped sterile. Subcutaneus lidocaine was administered for analgesia. Radial artery was accessed x1 attempt with brisk bright red blood flow noted, wire was easily threaded, and catheter was advanced over the wire via Seldinger technique . Wire was removed intact with pulsatile bright red blood flow noted. Line was sutured in place. Crisp waveform was noted on the monitor. Patient tolerated procedure well.    Length of time physician/provider present for 1:1 monitoring during  sedation: 0
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
## 2023-06-29 NOTE — PHARMACY-VANCOMYCIN DOSING SERVICE
Pharmacy Vancomycin Initial Note  Date of Service 2023  Patient's  1995  28 year old, female    Indication: Osteomyelitis and Sepsis    Current estimated CrCl = Estimated Creatinine Clearance: 110.5 mL/min (based on SCr of 0.65 mg/dL).    Creatinine for last 3 days  2023:  6:25 PM Creatinine 0.65 mg/dL    Recent Vancomycin Level(s) for last 3 days  No results found for requested labs within last 3 days.      Vancomycin IV Administrations (past 72 hours)      No vancomycin orders with administrations in past 72 hours.                Nephrotoxins and other renal medications (From now, onward)    Start     Dose/Rate Route Frequency Ordered Stop    23  vancomycin (VANCOCIN) 750 mg in sodium chloride 0.9 % 250 mL intermittent infusion         750 mg  over 90 Minutes Intravenous EVERY 12 HOURS 23  norepinephrine (LEVOPHED) 16 mg in  mL infusion MAX CONC CENTRAL LINE         0.01-0.6 mcg/kg/min × 55.7 kg (Dosing Weight)  0.5-31.3 mL/hr  Intravenous CONTINUOUS 23 1800  vasopressin (VASOSTRICT) 20 Units in sodium chloride 0.9 % 100 mL standard conc infusion         2.4 Units/hr  12 mL/hr  Intravenous CONTINUOUS 23 1736            Contrast Orders - past 72 hours (72h ago, onward)    None          [employer] Prediction of Planned Initial Vancomycin Regimen  Loading dose: N/A  Regimen: 750 mg IV every 12 hours.  Start time: 19:45 on 2023  Exposure target: AUC24 (range)400-600 mg/L.hr   AUC24,ss: 562 mg/L.hr  Probability of AUC24 > 400: 98 %  Ctrough,ss: 17.9 mg/L  Probability of Ctrough,ss > 20: 30 %  Probability of nephrotoxicity (Lodise URBAN ): 14 %    **Patient had been receiving vancomycin at OSH, received 1250 mg loading dose on 23 and then received 750 mg IV every 12 hours for 3 doses. Had a vancomycin level drawn 23 AM which was 20.0 mg/L. Added this data into [employer] which gave above prediction. Of  note patient is an intermediate model fit in Insights likely due to paraplegia and low muscle mass.          Plan:  1. Start vancomycin  750 mg IV q12h (continued from OSH).   2. Vancomycin monitoring method: AUC  3. Vancomycin therapeutic monitoring goal: 400-600 mg*h/L  4. Pharmacy will check vancomycin levels as appropriate in 1-3 Days.    5. Serum creatinine levels will be ordered daily for the first week of therapy and at least twice weekly for subsequent weeks.      Amber Bocanegra, [facility]

## 2023-06-29 NOTE — PROGRESS NOTES
"  CLINICAL NUTRITION SERVICES - BRIEF NOTE     Nutrition Prescription    Recommendations already ordered by Registered Dietitian (RD):  Ensure Clear at breakfast and dinner.    Future/Additional Recommendations:  Monitor diet advancement.  Complete full nutrition assessment as able.     Consult: Provider order - \"Eval\"    EVALUATION OF THE PROGRESS TOWARD GOALS   Diet: Clear Liquid       NEW FINDINGS   Received consult to assess pt. Reviewed pt in ICU rounds. Pt remains on clear liquid diet, may be willing to try Ensure Clear, and not appropriate to visit today per conversation with RN. Will send Ensure Clear BID today and will defer full assessment to tomorrow.     INTERVENTIONS  Collaboration with other providers - ICU rounds, RN  Medical food supplement therapy    Monitoring/Evaluation  Progress toward goals will be monitored and evaluated per protocol.     Jame Koo RD  [place] [address] Med/Surg RD pager: [phone]  Weekend/holiday pager: [phone]  "

## 2023-06-30 LAB
ALBUMIN SERPL BCG-MCNC: 2 G/DL (ref 3.5–5.2)
ALLEN'S TEST: ABNORMAL
ALP SERPL-CCNC: 123 U/L (ref 35–104)
ALT SERPL W P-5'-P-CCNC: 74 U/L (ref 0–50)
ANION GAP SERPL CALCULATED.3IONS-SCNC: 16 MMOL/L (ref 7–15)
ANION GAP SERPL CALCULATED.3IONS-SCNC: 21 MMOL/L (ref 7–15)
AST SERPL W P-5'-P-CCNC: 98 U/L (ref 0–45)
ATRIAL RATE - MUSE: 123 BPM
BACTERIA UR CULT: NO GROWTH
BASE EXCESS BLDA CALC-SCNC: -2.7 MMOL/L (ref -9–1.8)
BASE EXCESS BLDA CALC-SCNC: -6.1 MMOL/L (ref -9–1.8)
BASE EXCESS BLDA CALC-SCNC: -8.8 MMOL/L (ref -9–1.8)
BASOPHILS # BLD MANUAL: 0 10E3/UL (ref 0–0.2)
BASOPHILS NFR BLD MANUAL: 0 %
BILIRUB SERPL-MCNC: 0.2 MG/DL
BUN SERPL-MCNC: 10.5 MG/DL (ref 6–20)
BUN SERPL-MCNC: 9.9 MG/DL (ref 6–20)
BURR CELLS BLD QL SMEAR: SLIGHT
C COLI+JEJUNI+LARI FUSA STL QL NAA+PROBE: NOT DETECTED
CA-I BLD-MCNC: 4.6 MG/DL (ref 4.4–5.2)
CALCIUM SERPL-MCNC: 7.8 MG/DL (ref 8.6–10)
CALCIUM SERPL-MCNC: 8 MG/DL (ref 8.6–10)
CHLORIDE SERPL-SCNC: 101 MMOL/L (ref 98–107)
CHLORIDE SERPL-SCNC: 108 MMOL/L (ref 98–107)
CK SERPL-CCNC: 196 U/L (ref 26–192)
CK SERPL-CCNC: 293 U/L (ref 26–192)
CREAT SERPL-MCNC: 0.4 MG/DL (ref 0.51–0.95)
CREAT SERPL-MCNC: 0.42 MG/DL (ref 0.51–0.95)
CRP SERPL-MCNC: 231.4 MG/L
DACRYOCYTES BLD QL SMEAR: SLIGHT
DEPRECATED HCO3 PLAS-SCNC: 14 MMOL/L (ref 22–29)
DEPRECATED HCO3 PLAS-SCNC: 19 MMOL/L (ref 22–29)
DIASTOLIC BLOOD PRESSURE - MUSE: NORMAL MMHG
EC STX1 GENE STL QL NAA+PROBE: NOT DETECTED
EC STX2 GENE STL QL NAA+PROBE: NOT DETECTED
EOSINOPHIL # BLD MANUAL: 0 10E3/UL (ref 0–0.7)
EOSINOPHIL NFR BLD MANUAL: 0 %
ERYTHROCYTE [DISTWIDTH] IN BLOOD BY AUTOMATED COUNT: 16.3 % (ref 10–15)
GFR SERPL CREATININE-BSD FRML MDRD: >90 ML/MIN/1.73M2
GFR SERPL CREATININE-BSD FRML MDRD: >90 ML/MIN/1.73M2
GLUCOSE BLDC GLUCOMTR-MCNC: 109 MG/DL (ref 70–99)
GLUCOSE BLDC GLUCOMTR-MCNC: 115 MG/DL (ref 70–99)
GLUCOSE BLDC GLUCOMTR-MCNC: 89 MG/DL (ref 70–99)
GLUCOSE BLDC GLUCOMTR-MCNC: 92 MG/DL (ref 70–99)
GLUCOSE BLDC GLUCOMTR-MCNC: 92 MG/DL (ref 70–99)
GLUCOSE BLDC GLUCOMTR-MCNC: 94 MG/DL (ref 70–99)
GLUCOSE SERPL-MCNC: 106 MG/DL (ref 70–99)
GLUCOSE SERPL-MCNC: 116 MG/DL (ref 70–99)
HCO3 BLD-SCNC: 16 MMOL/L (ref 21–28)
HCO3 BLD-SCNC: 19 MMOL/L (ref 21–28)
HCO3 BLD-SCNC: 22 MMOL/L (ref 21–28)
HCT VFR BLD AUTO: 22.6 % (ref 35–47)
HGB BLD-MCNC: 8 G/DL (ref 11.7–15.7)
INTERPRETATION ECG - MUSE: NORMAL
LACTATE SERPL-SCNC: 0.8 MMOL/L (ref 0.7–2)
LACTATE SERPL-SCNC: 0.9 MMOL/L (ref 0.7–2)
LACTATE SERPL-SCNC: 0.9 MMOL/L (ref 0.7–2)
LYMPHOCYTES # BLD MANUAL: 1.7 10E3/UL (ref 0.8–5.3)
LYMPHOCYTES NFR BLD MANUAL: 3 %
MAGNESIUM SERPL-MCNC: 1.7 MG/DL (ref 1.7–2.3)
MCH RBC QN AUTO: 28.5 PG (ref 26.5–33)
MCHC RBC AUTO-ENTMCNC: 35.4 G/DL (ref 31.5–36.5)
MCV RBC AUTO: 80 FL (ref 78–100)
MONOCYTES # BLD MANUAL: 0.6 10E3/UL (ref 0–1.3)
MONOCYTES NFR BLD MANUAL: 1 %
NEUTROPHILS # BLD MANUAL: 55.7 10E3/UL (ref 1.6–8.3)
NEUTROPHILS NFR BLD MANUAL: 96 %
NOROV GI+II ORF1-ORF2 JNC STL QL NAA+PR: NOT DETECTED
O2/TOTAL GAS SETTING VFR VENT: 35 %
O2/TOTAL GAS SETTING VFR VENT: 35 %
O2/TOTAL GAS SETTING VFR VENT: 40 %
OXYHGB MFR BLD: 96 % (ref 92–100)
OXYHGB MFR BLD: 98 % (ref 92–100)
P AXIS - MUSE: 49 DEGREES
PCO2 BLD: 29 MM HG (ref 35–45)
PCO2 BLD: 33 MM HG (ref 35–45)
PCO2 BLD: 37 MM HG (ref 35–45)
PH BLD: 7.35 [PH] (ref 7.35–7.45)
PH BLD: 7.36 [PH] (ref 7.35–7.45)
PH BLD: 7.39 [PH] (ref 7.35–7.45)
PHOSPHATE SERPL-MCNC: 2.5 MG/DL (ref 2.5–4.5)
PLAT MORPH BLD: ABNORMAL
PLATELET # BLD AUTO: 294 10E3/UL (ref 150–450)
PO2 BLD: 102 MM HG (ref 80–105)
PO2 BLD: 154 MM HG (ref 80–105)
PO2 BLD: 97 MM HG (ref 80–105)
POTASSIUM SERPL-SCNC: 2.9 MMOL/L (ref 3.4–5.3)
POTASSIUM SERPL-SCNC: 3.5 MMOL/L (ref 3.4–5.3)
PR INTERVAL - MUSE: 134 MS
PROCALCITONIN SERPL IA-MCNC: 36.74 NG/ML
PROT SERPL-MCNC: 5.5 G/DL (ref 6.4–8.3)
QRS DURATION - MUSE: 70 MS
QT - MUSE: 342 MS
QTC - MUSE: 489 MS
R AXIS - MUSE: 87 DEGREES
RBC # BLD AUTO: 2.81 10E6/UL (ref 3.8–5.2)
RBC MORPH BLD: ABNORMAL
RVA NSP5 STL QL NAA+PROBE: NOT DETECTED
SALMONELLA SP RPOD STL QL NAA+PROBE: NOT DETECTED
SHIGELLA SP+EIEC IPAH STL QL NAA+PROBE: NOT DETECTED
SODIUM SERPL-SCNC: 136 MMOL/L (ref 136–145)
SODIUM SERPL-SCNC: 143 MMOL/L (ref 136–145)
SYSTOLIC BLOOD PRESSURE - MUSE: NORMAL MMHG
T AXIS - MUSE: 89 DEGREES
V CHOL+PARA RFBL+TRKH+TNAA STL QL NAA+PR: NOT DETECTED
VENTRICULAR RATE- MUSE: 123 BPM
WBC # BLD AUTO: 58 10E3/UL (ref 4–11)
Y ENTERO RECN STL QL NAA+PROBE: NOT DETECTED

## 2023-06-30 PROCEDURE — 85007 BL SMEAR W/DIFF WBC COUNT: CPT | Performed by: ANESTHESIOLOGY

## 2023-06-30 PROCEDURE — 258N000003 HC RX IP 258 OP 636

## 2023-06-30 PROCEDURE — 84145 PROCALCITONIN (PCT): CPT | Performed by: NURSE PRACTITIONER

## 2023-06-30 PROCEDURE — 83605 ASSAY OF LACTIC ACID: CPT

## 2023-06-30 PROCEDURE — 80053 COMPREHEN METABOLIC PANEL: CPT

## 2023-06-30 PROCEDURE — 200N000002 HC R&B ICU UMMC

## 2023-06-30 PROCEDURE — 84100 ASSAY OF PHOSPHORUS: CPT | Performed by: NURSE PRACTITIONER

## 2023-06-30 PROCEDURE — 36415 COLL VENOUS BLD VENIPUNCTURE: CPT

## 2023-06-30 PROCEDURE — 82550 ASSAY OF CK (CPK): CPT | Performed by: NURSE PRACTITIONER

## 2023-06-30 PROCEDURE — C1751 CATH, INF, PER/CENT/MIDLINE: HCPCS

## 2023-06-30 PROCEDURE — 258N000003 HC RX IP 258 OP 636: Performed by: CLINICAL NURSE SPECIALIST

## 2023-06-30 PROCEDURE — 250N000011 HC RX IP 250 OP 636: Mod: JZ

## 2023-06-30 PROCEDURE — 86140 C-REACTIVE PROTEIN: CPT | Performed by: NURSE PRACTITIONER

## 2023-06-30 PROCEDURE — 99221 1ST HOSP IP/OBS SF/LOW 40: CPT | Mod: GC | Performed by: UROLOGY

## 2023-06-30 PROCEDURE — 82803 BLOOD GASES ANY COMBINATION: CPT

## 2023-06-30 PROCEDURE — 999N000015 HC STATISTIC ARTERIAL MONITORING DAILY

## 2023-06-30 PROCEDURE — 999N000157 HC STATISTIC RCP TIME EA 10 MIN

## 2023-06-30 PROCEDURE — 99233 SBSQ HOSP IP/OBS HIGH 50: CPT | Performed by: STUDENT IN AN ORGANIZED HEALTH CARE EDUCATION/TRAINING PROGRAM

## 2023-06-30 PROCEDURE — 82330 ASSAY OF CALCIUM: CPT

## 2023-06-30 PROCEDURE — 250N000009 HC RX 250

## 2023-06-30 PROCEDURE — C9113 INJ PANTOPRAZOLE SODIUM, VIA: HCPCS | Mod: JZ

## 2023-06-30 PROCEDURE — 250N000013 HC RX MED GY IP 250 OP 250 PS 637

## 2023-06-30 PROCEDURE — 250N000011 HC RX IP 250 OP 636: Mod: JZ | Performed by: CLINICAL NURSE SPECIALIST

## 2023-06-30 PROCEDURE — 99291 CRITICAL CARE FIRST HOUR: CPT | Mod: FS | Performed by: ANESTHESIOLOGY

## 2023-06-30 PROCEDURE — 83735 ASSAY OF MAGNESIUM: CPT | Performed by: NURSE PRACTITIONER

## 2023-06-30 PROCEDURE — 85014 HEMATOCRIT: CPT | Performed by: NURSE PRACTITIONER

## 2023-06-30 PROCEDURE — 250N000013 HC RX MED GY IP 250 OP 250 PS 637: Performed by: ANESTHESIOLOGY

## 2023-06-30 PROCEDURE — 99292 CRITICAL CARE ADDL 30 MIN: CPT | Mod: FS | Performed by: ANESTHESIOLOGY

## 2023-06-30 PROCEDURE — 250N000011 HC RX IP 250 OP 636: Mod: JZ | Performed by: NURSE PRACTITIONER

## 2023-06-30 PROCEDURE — 82805 BLOOD GASES W/O2 SATURATION: CPT

## 2023-06-30 PROCEDURE — 94003 VENT MGMT INPAT SUBQ DAY: CPT

## 2023-06-30 PROCEDURE — 87040 BLOOD CULTURE FOR BACTERIA: CPT

## 2023-06-30 RX ORDER — HYDROXYZINE HYDROCHLORIDE 25 MG/1
25 TABLET, FILM COATED ORAL 3 TIMES DAILY PRN
Status: DISCONTINUED | OUTPATIENT
Start: 2023-06-30 | End: 2023-07-13 | Stop reason: HOSPADM

## 2023-06-30 RX ORDER — BISACODYL 10 MG
10 SUPPOSITORY, RECTAL RECTAL DAILY PRN
Status: DISCONTINUED | OUTPATIENT
Start: 2023-06-30 | End: 2023-07-13 | Stop reason: HOSPADM

## 2023-06-30 RX ORDER — POTASSIUM CHLORIDE 1.5 G/1.58G
20 POWDER, FOR SOLUTION ORAL ONCE
Status: COMPLETED | OUTPATIENT
Start: 2023-06-30 | End: 2023-06-30

## 2023-06-30 RX ORDER — POTASSIUM CHLORIDE 1.5 G/1.58G
40 POWDER, FOR SOLUTION ORAL ONCE
Status: COMPLETED | OUTPATIENT
Start: 2023-06-30 | End: 2023-06-30

## 2023-06-30 RX ORDER — DEXTROSE MONOHYDRATE 100 MG/ML
INJECTION, SOLUTION INTRAVENOUS CONTINUOUS PRN
Status: DISCONTINUED | OUTPATIENT
Start: 2023-06-30 | End: 2023-07-13 | Stop reason: HOSPADM

## 2023-06-30 RX ORDER — SODIUM CHLORIDE, SODIUM LACTATE, POTASSIUM CHLORIDE, CALCIUM CHLORIDE 600; 310; 30; 20 MG/100ML; MG/100ML; MG/100ML; MG/100ML
INJECTION, SOLUTION INTRAVENOUS CONTINUOUS
Status: DISCONTINUED | OUTPATIENT
Start: 2023-06-30 | End: 2023-07-01

## 2023-06-30 RX ORDER — GUAIFENESIN 600 MG/1
15 TABLET, EXTENDED RELEASE ORAL DAILY
Status: DISCONTINUED | OUTPATIENT
Start: 2023-06-30 | End: 2023-07-07

## 2023-06-30 RX ORDER — GABAPENTIN 250 MG/5ML
300 SOLUTION ORAL AT BEDTIME
Status: DISCONTINUED | OUTPATIENT
Start: 2023-06-30 | End: 2023-07-12

## 2023-06-30 RX ADMIN — PROPOFOL 35 MCG/KG/MIN: 10 INJECTION, EMULSION INTRAVENOUS at 13:11

## 2023-06-30 RX ADMIN — DOXYCYCLINE 100 MG: 100 INJECTION, POWDER, LYOPHILIZED, FOR SOLUTION INTRAVENOUS at 23:54

## 2023-06-30 RX ADMIN — VANCOMYCIN HYDROCHLORIDE 750 MG: 1 INJECTION, POWDER, LYOPHILIZED, FOR SOLUTION INTRAVENOUS at 22:19

## 2023-06-30 RX ADMIN — BACLOFEN 30 MG: 20 TABLET ORAL at 20:02

## 2023-06-30 RX ADMIN — POTASSIUM CHLORIDE 40 MEQ: 1.5 POWDER, FOR SOLUTION ORAL at 18:34

## 2023-06-30 RX ADMIN — NOREPINEPHRINE BITARTRATE 0.06 MCG/KG/MIN: 0.06 INJECTION, SOLUTION INTRAVENOUS at 07:21

## 2023-06-30 RX ADMIN — DOXYCYCLINE 100 MG: 100 INJECTION, POWDER, LYOPHILIZED, FOR SOLUTION INTRAVENOUS at 00:09

## 2023-06-30 RX ADMIN — Medication 25 MCG: at 15:00

## 2023-06-30 RX ADMIN — VANCOMYCIN HYDROCHLORIDE 750 MG: 1 INJECTION, POWDER, LYOPHILIZED, FOR SOLUTION INTRAVENOUS at 09:52

## 2023-06-30 RX ADMIN — Medication 50 MG: at 20:12

## 2023-06-30 RX ADMIN — SODIUM CHLORIDE, POTASSIUM CHLORIDE, SODIUM LACTATE AND CALCIUM CHLORIDE: 600; 310; 30; 20 INJECTION, SOLUTION INTRAVENOUS at 15:50

## 2023-06-30 RX ADMIN — PANTOPRAZOLE SODIUM 40 MG: 40 INJECTION, POWDER, FOR SOLUTION INTRAVENOUS at 08:06

## 2023-06-30 RX ADMIN — MEROPENEM 1 G: 1 INJECTION, POWDER, FOR SOLUTION INTRAVENOUS at 21:45

## 2023-06-30 RX ADMIN — ENOXAPARIN SODIUM 40 MG: 40 INJECTION SUBCUTANEOUS at 20:15

## 2023-06-30 RX ADMIN — MEROPENEM 1 G: 1 INJECTION, POWDER, FOR SOLUTION INTRAVENOUS at 06:47

## 2023-06-30 RX ADMIN — DOXYCYCLINE 100 MG: 100 INJECTION, POWDER, LYOPHILIZED, FOR SOLUTION INTRAVENOUS at 11:58

## 2023-06-30 RX ADMIN — SERTRALINE HYDROCHLORIDE 150 MG: 100 TABLET ORAL at 20:02

## 2023-06-30 RX ADMIN — BACLOFEN 30 MG: 20 TABLET ORAL at 09:01

## 2023-06-30 RX ADMIN — VASOPRESSIN 1.2 UNITS/HR: 20 INJECTION, SOLUTION INTRAMUSCULAR; SUBCUTANEOUS at 08:51

## 2023-06-30 RX ADMIN — HYDROCORTISONE SODIUM SUCCINATE 100 MG: 100 INJECTION, POWDER, FOR SOLUTION INTRAMUSCULAR; INTRAVENOUS at 00:12

## 2023-06-30 RX ADMIN — PROPOFOL 45 MCG/KG/MIN: 10 INJECTION, EMULSION INTRAVENOUS at 07:20

## 2023-06-30 RX ADMIN — VASOPRESSIN 2.4 UNITS/HR: 20 INJECTION, SOLUTION INTRAMUSCULAR; SUBCUTANEOUS at 00:19

## 2023-06-30 RX ADMIN — BACLOFEN 30 MG: 20 TABLET ORAL at 13:16

## 2023-06-30 RX ADMIN — CHLORHEXIDINE GLUCONATE 15 ML: 1.2 SOLUTION ORAL at 09:52

## 2023-06-30 RX ADMIN — Medication 50 MG: at 14:29

## 2023-06-30 RX ADMIN — Medication 25 MCG: at 08:35

## 2023-06-30 RX ADMIN — PROPOFOL 50 MCG/KG/MIN: 10 INJECTION, EMULSION INTRAVENOUS at 01:22

## 2023-06-30 RX ADMIN — HYDROCORTISONE SODIUM SUCCINATE 100 MG: 100 INJECTION, POWDER, FOR SOLUTION INTRAMUSCULAR; INTRAVENOUS at 08:06

## 2023-06-30 RX ADMIN — GABAPENTIN 300 MG: 250 SOLUTION ORAL at 22:22

## 2023-06-30 RX ADMIN — POTASSIUM CHLORIDE 20 MEQ: 1.5 POWDER, FOR SOLUTION ORAL at 20:02

## 2023-06-30 RX ADMIN — Medication 25 MCG: at 03:04

## 2023-06-30 RX ADMIN — CHLORHEXIDINE GLUCONATE 15 ML: 1.2 SOLUTION ORAL at 20:09

## 2023-06-30 RX ADMIN — MEROPENEM 1 G: 1 INJECTION, POWDER, FOR SOLUTION INTRAVENOUS at 14:29

## 2023-06-30 RX ADMIN — Medication 25 MCG: at 11:45

## 2023-06-30 RX ADMIN — Medication 15 ML: at 13:16

## 2023-06-30 RX ADMIN — DEXMEDETOMIDINE HYDROCHLORIDE 0.2 MCG/KG/HR: 100 INJECTION, SOLUTION INTRAVENOUS at 11:41

## 2023-06-30 ASSESSMENT — ACTIVITIES OF DAILY LIVING (ADL)
ADLS_ACUITY_SCORE: 47
DEPENDENT_IADLS:: CLEANING;COOKING;LAUNDRY;SHOPPING;MEAL PREPARATION;MEDICATION MANAGEMENT;MONEY MANAGEMENT;TRANSPORTATION;INCONTINENCE
ADLS_ACUITY_SCORE: 47
ADLS_ACUITY_SCORE: 47
ADLS_ACUITY_SCORE: 43
ADLS_ACUITY_SCORE: 47
ADLS_ACUITY_SCORE: 47

## 2023-06-30 NOTE — PHARMACY-CONSULT NOTE
Pharmacy Tube Feeding Consult    Medication reviewed for administration by feeding tube and for potential food/drug interactions.    Recommendation: Changed Gabapentin capsule to liquid form.  Changed bisacodyl 5-15 mg po daily prn to 10 mg rectal po daily prn      Pharmacy will continue to follow as new medications are ordered.

## 2023-06-30 NOTE — PROGRESS NOTES
Major Shift Events:  Patient not tolerating BiPAP this morning, desatting on High Flow. DESIREE/attending decided to intubate at bedside. Abd CT completed. NG placed, needs to be verified by XR. ETT was also advanced 2 cm and still needs to be verified by XR. Sedated/intubated. On Levo/vaso for BP support. Mother at bedside.     Plan: Wean sedation as able. Wean vent settings as able.     For vital signs and complete assessments, please see documentation flowsheets.
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
## 2023-06-30 NOTE — PLAN OF CARE
Goal Outcome Evaluation:    Pt currently intubated, vents settings changed to Pressure support/pressure control, RASS goal 0-1, vasopressin stopped, started on Precedex, currently on propofol, fentanyl and Levophed. At 1600, multi boluses of fentanyl given with activity, TF Osmolite initiated was at 10mls/hr, bladder irrigated with 120mls N/Saline this a.m, wound dressing changed, CVP monitoring initiated ranging between 2-9, k+ resulted at 2.9, protocol initiated. Q2 turns maintained.   IR consulted for nephrostomy tube place, awaiting for pt to be scheduled.

## 2023-06-30 NOTE — PLAN OF CARE
Problem: Sepsis/Septic Shock  Goal: Optimal Nutrition Intake  Outcome: Progressing   Goal Outcome Evaluation:       Pt with NDT in place, initiating TF today.

## 2023-06-30 NOTE — SIGNIFICANT EVENT
Patient transitioned from HFNC to BiPAP. Hypoxic respiratory failure not adequately supported with BiPAP. Discussed intubation likely in a few hours versus right now with patient and her mother. She is having a very hard time with the BiPAP mask and feeling claustrophobic and prefers intubation with sedation.     Pt was on NE/vaso 0.03/2 and I increased the dose to 0.07/2. Patient was in a semi reclined position. RSI induction with cricoid pressure and fent 100, prop 80, oscar 100. Video laryngoscope mac 3, skip mask ventilation, 7.0 ETT, eTCO2 color change, bilateral breath sounds equal. Smooth and no desaturation. Tolerated well.     Critical care time additional 30 minutes, total for day 90 min.     Víctor Rose  ICU attending.
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
## 2023-06-30 NOTE — PLAN OF CARE
Major Shift Events:  Patient vitally stable overnight. Initially hypothermic at start if shift currently normothermic. BP remained stable on x2 pressors overnight. Patient remained on x2 sedation drips. Required fentanyl boluses occasionally overnight due to patient becoming restless. RASS between -3 to -4 overnight. HR remained NSR to low 100s. O2 saturation stable overnight, weaned down to FiO2 35%. No electrolyte replacement needed at this time. Wound care completed overnight.  Plan: Monitor hemodynamic and respirator status  For vital signs and complete assessments, please see documentation flowsheets.      Goal Outcome Evaluation:      Plan of Care Reviewed With: patient      Problem: Plan of Care - These are the overarching goals to be used throughout the patient stay.    Goal: Absence of Hospital-Acquired Illness or Injury  Intervention: Prevent Skin Injury  Recent Flowsheet Documentation  Taken 6/30/2023 0400 by Azalia Acosta RN  Body Position:   right   turned   upper extremity elevated   weight shifting  Taken 6/30/2023 0200 by Azalia Acosta RN  Body Position:   supine, head elevated   weight shifting  Taken 6/30/2023 0000 by Azalia Acosta RN  Body Position:   turned   left   weight shifting   upper extremity elevated  Taken 6/29/2023 2200 by Azalia Acosta RN  Body Position:   turned   right   weight shifting   upper extremity elevated  Taken 6/29/2023 2000 by Azalia Acosta RN  Body Position:   turned   left   weight shifting   upper extremity elevated

## 2023-07-01 ENCOUNTER — APPOINTMENT (OUTPATIENT)
Dept: GENERAL RADIOLOGY | Facility: CLINIC | Age: 28
DRG: 870 | End: 2023-07-01
Attending: ANESTHESIOLOGY
Payer: MEDICARE

## 2023-07-01 ENCOUNTER — LAB REQUISITION (OUTPATIENT)
Dept: LAB | Facility: CLINIC | Age: 28
End: 2023-07-01

## 2023-07-01 ENCOUNTER — APPOINTMENT (OUTPATIENT)
Dept: GENERAL RADIOLOGY | Facility: CLINIC | Age: 28
DRG: 870 | End: 2023-07-01
Attending: NURSE PRACTITIONER
Payer: MEDICARE

## 2023-07-01 DIAGNOSIS — R65.21 SEVERE SEPSIS WITH SEPTIC SHOCK (CODE) (H): ICD-10-CM

## 2023-07-01 LAB
ALBUMIN SERPL BCG-MCNC: 2 G/DL (ref 3.5–5.2)
ALBUMIN SERPL BCG-MCNC: 2.1 G/DL (ref 3.5–5.2)
ALLEN'S TEST: ABNORMAL
ALP SERPL-CCNC: 101 U/L (ref 35–104)
ALP SERPL-CCNC: 104 U/L (ref 35–104)
ALT SERPL W P-5'-P-CCNC: 50 U/L (ref 0–50)
ALT SERPL W P-5'-P-CCNC: 52 U/L (ref 0–50)
ANION GAP SERPL CALCULATED.3IONS-SCNC: 10 MMOL/L (ref 7–15)
ANION GAP SERPL CALCULATED.3IONS-SCNC: 8 MMOL/L (ref 7–15)
APTT PPP: 25 SECONDS (ref 22–38)
AST SERPL W P-5'-P-CCNC: 42 U/L (ref 0–45)
AST SERPL W P-5'-P-CCNC: 42 U/L (ref 0–45)
BASE EXCESS BLDA CALC-SCNC: 0.9 MMOL/L (ref -9–1.8)
BASE EXCESS BLDV CALC-SCNC: 1.5 MMOL/L (ref -7.7–1.9)
BILIRUB DIRECT SERPL-MCNC: <0.2 MG/DL (ref 0–0.3)
BILIRUB SERPL-MCNC: 0.2 MG/DL
BILIRUB SERPL-MCNC: <0.2 MG/DL
BUN SERPL-MCNC: 13.8 MG/DL (ref 6–20)
BUN SERPL-MCNC: 14 MG/DL (ref 6–20)
CALCIUM SERPL-MCNC: 7.4 MG/DL (ref 8.6–10)
CALCIUM SERPL-MCNC: 7.7 MG/DL (ref 8.6–10)
CHLORIDE SERPL-SCNC: 114 MMOL/L (ref 98–107)
CHLORIDE SERPL-SCNC: 114 MMOL/L (ref 98–107)
CREAT SERPL-MCNC: 0.37 MG/DL (ref 0.51–0.95)
CREAT SERPL-MCNC: 0.41 MG/DL (ref 0.51–0.95)
DEPRECATED HCO3 PLAS-SCNC: 23 MMOL/L (ref 22–29)
DEPRECATED HCO3 PLAS-SCNC: 24 MMOL/L (ref 22–29)
ERYTHROCYTE [DISTWIDTH] IN BLOOD BY AUTOMATED COUNT: 16 % (ref 10–15)
FIBRINOGEN PPP-MCNC: 350 MG/DL (ref 170–490)
GFR SERPL CREATININE-BSD FRML MDRD: >90 ML/MIN/1.73M2
GFR SERPL CREATININE-BSD FRML MDRD: >90 ML/MIN/1.73M2
GLUCOSE BLDC GLUCOMTR-MCNC: 101 MG/DL (ref 70–99)
GLUCOSE BLDC GLUCOMTR-MCNC: 111 MG/DL (ref 70–99)
GLUCOSE BLDC GLUCOMTR-MCNC: 113 MG/DL (ref 70–99)
GLUCOSE BLDC GLUCOMTR-MCNC: 119 MG/DL (ref 70–99)
GLUCOSE BLDC GLUCOMTR-MCNC: 120 MG/DL (ref 70–99)
GLUCOSE BLDC GLUCOMTR-MCNC: 142 MG/DL (ref 70–99)
GLUCOSE SERPL-MCNC: 119 MG/DL (ref 70–99)
GLUCOSE SERPL-MCNC: 146 MG/DL (ref 70–99)
HCO3 BLD-SCNC: 25 MMOL/L (ref 21–28)
HCO3 BLDV-SCNC: 27 MMOL/L (ref 21–28)
HCT VFR BLD AUTO: 20.5 % (ref 35–47)
HCT VFR BLD AUTO: 21.8 % (ref 35–47)
HGB BLD-MCNC: 6.9 G/DL (ref 11.7–15.7)
HGB BLD-MCNC: 7.4 G/DL (ref 11.7–15.7)
HOLD SPECIMEN: NORMAL
INR PPP: 1.05 (ref 0.85–1.15)
LACTATE SERPL-SCNC: 0.9 MMOL/L (ref 0.7–2)
MAGNESIUM SERPL-MCNC: 1.6 MG/DL (ref 1.7–2.3)
MCH RBC QN AUTO: 26.8 PG (ref 26.5–33)
MCHC RBC AUTO-ENTMCNC: 33.7 G/DL (ref 31.5–36.5)
MCV RBC AUTO: 80 FL (ref 78–100)
O2/TOTAL GAS SETTING VFR VENT: 35 %
O2/TOTAL GAS SETTING VFR VENT: 50 %
OXYHGB MFR BLD: 98 % (ref 92–100)
PCO2 BLD: 39 MM HG (ref 35–45)
PCO2 BLDV: 46 MM HG (ref 40–50)
PH BLD: 7.42 [PH] (ref 7.35–7.45)
PH BLDV: 7.38 [PH] (ref 7.32–7.43)
PHOSPHATE SERPL-MCNC: 1.6 MG/DL (ref 2.5–4.5)
PHOSPHATE SERPL-MCNC: 2.7 MG/DL (ref 2.5–4.5)
PLATELET # BLD AUTO: 222 10E3/UL (ref 150–450)
PO2 BLD: 136 MM HG (ref 80–105)
PO2 BLDV: 33 MM HG (ref 25–47)
POTASSIUM SERPL-SCNC: 3.1 MMOL/L (ref 3.4–5.3)
POTASSIUM SERPL-SCNC: 3.4 MMOL/L (ref 3.4–5.3)
POTASSIUM SERPL-SCNC: 3.5 MMOL/L (ref 3.4–5.3)
PROT SERPL-MCNC: 5.1 G/DL (ref 6.4–8.3)
PROT SERPL-MCNC: 5.1 G/DL (ref 6.4–8.3)
RBC # BLD AUTO: 2.57 10E6/UL (ref 3.8–5.2)
SODIUM SERPL-SCNC: 145 MMOL/L (ref 136–145)
SODIUM SERPL-SCNC: 148 MMOL/L (ref 136–145)
WBC # BLD AUTO: 24.5 10E3/UL (ref 4–11)

## 2023-07-01 PROCEDURE — 71045 X-RAY EXAM CHEST 1 VIEW: CPT | Mod: 26 | Performed by: RADIOLOGY

## 2023-07-01 PROCEDURE — 84132 ASSAY OF SERUM POTASSIUM: CPT

## 2023-07-01 PROCEDURE — 99291 CRITICAL CARE FIRST HOUR: CPT | Mod: FS | Performed by: ANESTHESIOLOGY

## 2023-07-01 PROCEDURE — 85610 PROTHROMBIN TIME: CPT | Performed by: CLINICAL NURSE SPECIALIST

## 2023-07-01 PROCEDURE — 85027 COMPLETE CBC AUTOMATED: CPT | Performed by: NURSE PRACTITIONER

## 2023-07-01 PROCEDURE — 83735 ASSAY OF MAGNESIUM: CPT | Performed by: NURSE PRACTITIONER

## 2023-07-01 PROCEDURE — C9113 INJ PANTOPRAZOLE SODIUM, VIA: HCPCS | Mod: JZ

## 2023-07-01 PROCEDURE — 87106 FUNGI IDENTIFICATION YEAST: CPT

## 2023-07-01 PROCEDURE — 99233 SBSQ HOSP IP/OBS HIGH 50: CPT | Performed by: STUDENT IN AN ORGANIZED HEALTH CARE EDUCATION/TRAINING PROGRAM

## 2023-07-01 PROCEDURE — 250N000011 HC RX IP 250 OP 636: Mod: JZ | Performed by: NURSE PRACTITIONER

## 2023-07-01 PROCEDURE — 999N000157 HC STATISTIC RCP TIME EA 10 MIN

## 2023-07-01 PROCEDURE — 99292 CRITICAL CARE ADDL 30 MIN: CPT | Mod: FS | Performed by: ANESTHESIOLOGY

## 2023-07-01 PROCEDURE — 250N000011 HC RX IP 250 OP 636: Mod: JZ | Performed by: ANESTHESIOLOGY

## 2023-07-01 PROCEDURE — 71045 X-RAY EXAM CHEST 1 VIEW: CPT

## 2023-07-01 PROCEDURE — 250N000011 HC RX IP 250 OP 636: Mod: JZ

## 2023-07-01 PROCEDURE — 999N000259 HC STATISTIC EXTUBATION

## 2023-07-01 PROCEDURE — 82248 BILIRUBIN DIRECT: CPT

## 2023-07-01 PROCEDURE — 250N000013 HC RX MED GY IP 250 OP 250 PS 637: Performed by: NURSE PRACTITIONER

## 2023-07-01 PROCEDURE — 258N000003 HC RX IP 258 OP 636

## 2023-07-01 PROCEDURE — 84155 ASSAY OF PROTEIN SERUM: CPT

## 2023-07-01 PROCEDURE — 85018 HEMOGLOBIN: CPT | Performed by: NURSE PRACTITIONER

## 2023-07-01 PROCEDURE — 250N000009 HC RX 250

## 2023-07-01 PROCEDURE — 82803 BLOOD GASES ANY COMBINATION: CPT | Performed by: ANESTHESIOLOGY

## 2023-07-01 PROCEDURE — 250N000011 HC RX IP 250 OP 636: Mod: JZ | Performed by: STUDENT IN AN ORGANIZED HEALTH CARE EDUCATION/TRAINING PROGRAM

## 2023-07-01 PROCEDURE — 250N000011 HC RX IP 250 OP 636: Mod: JZ | Performed by: CLINICAL NURSE SPECIALIST

## 2023-07-01 PROCEDURE — 258N000003 HC RX IP 258 OP 636: Performed by: NURSE PRACTITIONER

## 2023-07-01 PROCEDURE — 250N000011 HC RX IP 250 OP 636: Performed by: NURSE ANESTHETIST, CERTIFIED REGISTERED

## 2023-07-01 PROCEDURE — 250N000013 HC RX MED GY IP 250 OP 250 PS 637

## 2023-07-01 PROCEDURE — 83605 ASSAY OF LACTIC ACID: CPT

## 2023-07-01 PROCEDURE — 84132 ASSAY OF SERUM POTASSIUM: CPT | Performed by: ANESTHESIOLOGY

## 2023-07-01 PROCEDURE — 250N000011 HC RX IP 250 OP 636

## 2023-07-01 PROCEDURE — 250N000013 HC RX MED GY IP 250 OP 250 PS 637: Performed by: CLINICAL NURSE SPECIALIST

## 2023-07-01 PROCEDURE — 84100 ASSAY OF PHOSPHORUS: CPT

## 2023-07-01 PROCEDURE — 85730 THROMBOPLASTIN TIME PARTIAL: CPT | Performed by: CLINICAL NURSE SPECIALIST

## 2023-07-01 PROCEDURE — 999N000215 HC STATISTIC HFNC ADULT NON-CPAP

## 2023-07-01 PROCEDURE — 250N000009 HC RX 250: Performed by: NURSE ANESTHETIST, CERTIFIED REGISTERED

## 2023-07-01 PROCEDURE — 250N000009 HC RX 250: Performed by: NURSE PRACTITIONER

## 2023-07-01 PROCEDURE — 250N000013 HC RX MED GY IP 250 OP 250 PS 637: Performed by: ANESTHESIOLOGY

## 2023-07-01 PROCEDURE — 200N000002 HC R&B ICU UMMC

## 2023-07-01 PROCEDURE — 94003 VENT MGMT INPAT SUBQ DAY: CPT

## 2023-07-01 PROCEDURE — 999N000065 XR CHEST PORT 1 VIEW

## 2023-07-01 PROCEDURE — 84100 ASSAY OF PHOSPHORUS: CPT | Performed by: NURSE PRACTITIONER

## 2023-07-01 PROCEDURE — 85384 FIBRINOGEN ACTIVITY: CPT | Performed by: CLINICAL NURSE SPECIALIST

## 2023-07-01 PROCEDURE — 82805 BLOOD GASES W/O2 SATURATION: CPT

## 2023-07-01 RX ORDER — MIDODRINE HYDROCHLORIDE 2.5 MG/1
10 TABLET ORAL 2 TIMES DAILY
Status: DISCONTINUED | OUTPATIENT
Start: 2023-07-01 | End: 2023-07-08

## 2023-07-01 RX ORDER — MAGNESIUM SULFATE HEPTAHYDRATE 40 MG/ML
2 INJECTION, SOLUTION INTRAVENOUS ONCE
Status: COMPLETED | OUTPATIENT
Start: 2023-07-01 | End: 2023-07-01

## 2023-07-01 RX ORDER — CHLORHEXIDINE GLUCONATE ORAL RINSE 1.2 MG/ML
15 SOLUTION DENTAL EVERY 12 HOURS
Status: DISCONTINUED | OUTPATIENT
Start: 2023-07-01 | End: 2023-07-04

## 2023-07-01 RX ORDER — PIPERACILLIN SODIUM, TAZOBACTAM SODIUM 4; .5 G/20ML; G/20ML
4.5 INJECTION, POWDER, LYOPHILIZED, FOR SOLUTION INTRAVENOUS EVERY 6 HOURS
Status: DISCONTINUED | OUTPATIENT
Start: 2023-07-01 | End: 2023-07-05

## 2023-07-01 RX ORDER — PROPOFOL 10 MG/ML
INJECTION, EMULSION INTRAVENOUS
Status: COMPLETED
Start: 2023-07-01 | End: 2023-07-01

## 2023-07-01 RX ORDER — POTASSIUM CHLORIDE 7.45 MG/ML
10 INJECTION INTRAVENOUS
Status: COMPLETED | OUTPATIENT
Start: 2023-07-01 | End: 2023-07-02

## 2023-07-01 RX ORDER — OXYCODONE HYDROCHLORIDE 5 MG/1
5 TABLET ORAL EVERY 4 HOURS PRN
Status: DISCONTINUED | OUTPATIENT
Start: 2023-07-01 | End: 2023-07-02

## 2023-07-01 RX ORDER — POTASSIUM CHLORIDE 1.5 G/1.58G
40 POWDER, FOR SOLUTION ORAL ONCE
Status: COMPLETED | OUTPATIENT
Start: 2023-07-01 | End: 2023-07-01

## 2023-07-01 RX ADMIN — CARBIDOPA AND LEVODOPA 10 MG: 50; 200 TABLET, EXTENDED RELEASE ORAL at 16:14

## 2023-07-01 RX ADMIN — DOXYCYCLINE 100 MG: 100 INJECTION, POWDER, LYOPHILIZED, FOR SOLUTION INTRAVENOUS at 13:15

## 2023-07-01 RX ADMIN — SERTRALINE HYDROCHLORIDE 150 MG: 100 TABLET ORAL at 19:50

## 2023-07-01 RX ADMIN — MAGNESIUM SULFATE HEPTAHYDRATE 2 G: 40 INJECTION, SOLUTION INTRAVENOUS at 09:11

## 2023-07-01 RX ADMIN — ACETAMINOPHEN 650 MG: 325 SOLUTION ORAL at 19:49

## 2023-07-01 RX ADMIN — DEXMEDETOMIDINE HYDROCHLORIDE 0.4 MCG/KG/HR: 100 INJECTION, SOLUTION INTRAVENOUS at 05:34

## 2023-07-01 RX ADMIN — VANCOMYCIN HYDROCHLORIDE 750 MG: 1 INJECTION, POWDER, LYOPHILIZED, FOR SOLUTION INTRAVENOUS at 12:57

## 2023-07-01 RX ADMIN — POTASSIUM & SODIUM PHOSPHATES POWDER PACK 280-160-250 MG 2 PACKET: 280-160-250 PACK at 14:35

## 2023-07-01 RX ADMIN — ACETAMINOPHEN 650 MG: 325 SOLUTION ORAL at 08:27

## 2023-07-01 RX ADMIN — VANCOMYCIN HYDROCHLORIDE 750 MG: 1 INJECTION, POWDER, LYOPHILIZED, FOR SOLUTION INTRAVENOUS at 21:29

## 2023-07-01 RX ADMIN — HYDROXYZINE HYDROCHLORIDE 25 MG: 25 TABLET, FILM COATED ORAL at 14:35

## 2023-07-01 RX ADMIN — HYDROCORTISONE 50 MG: 20 TABLET ORAL at 16:14

## 2023-07-01 RX ADMIN — Medication 50 MG: at 09:16

## 2023-07-01 RX ADMIN — PROPOFOL 60 MG: 10 INJECTION, EMULSION INTRAVENOUS at 22:33

## 2023-07-01 RX ADMIN — POTASSIUM & SODIUM PHOSPHATES POWDER PACK 280-160-250 MG 2 PACKET: 280-160-250 PACK at 06:32

## 2023-07-01 RX ADMIN — MEROPENEM 1 G: 1 INJECTION, POWDER, FOR SOLUTION INTRAVENOUS at 05:28

## 2023-07-01 RX ADMIN — Medication 15 ML: at 08:27

## 2023-07-01 RX ADMIN — BACLOFEN 30 MG: 20 TABLET ORAL at 08:27

## 2023-07-01 RX ADMIN — DEXMEDETOMIDINE HYDROCHLORIDE 0.2 MCG/KG/HR: 100 INJECTION, SOLUTION INTRAVENOUS at 23:12

## 2023-07-01 RX ADMIN — PIPERACILLIN SODIUM AND TAZOBACTAM SODIUM 4.5 G: 4; .5 INJECTION, POWDER, LYOPHILIZED, FOR SOLUTION INTRAVENOUS at 14:35

## 2023-07-01 RX ADMIN — Medication 50 MG: at 02:22

## 2023-07-01 RX ADMIN — POTASSIUM CHLORIDE 10 MEQ: 7.46 INJECTION, SOLUTION INTRAVENOUS at 23:22

## 2023-07-01 RX ADMIN — OXYCODONE HYDROCHLORIDE 5 MG: 5 TABLET ORAL at 19:50

## 2023-07-01 RX ADMIN — POTASSIUM CHLORIDE 10 MEQ: 7.46 INJECTION, SOLUTION INTRAVENOUS at 21:29

## 2023-07-01 RX ADMIN — PANTOPRAZOLE SODIUM 40 MG: 40 INJECTION, POWDER, FOR SOLUTION INTRAVENOUS at 09:05

## 2023-07-01 RX ADMIN — Medication 25 MCG: at 06:36

## 2023-07-01 RX ADMIN — Medication 25 MCG: at 08:04

## 2023-07-01 RX ADMIN — SODIUM CHLORIDE, POTASSIUM CHLORIDE, SODIUM LACTATE AND CALCIUM CHLORIDE 250 ML: 600; 310; 30; 20 INJECTION, SOLUTION INTRAVENOUS at 16:14

## 2023-07-01 RX ADMIN — BACLOFEN 30 MG: 20 TABLET ORAL at 13:23

## 2023-07-01 RX ADMIN — POTASSIUM CHLORIDE 40 MEQ: 1.5 POWDER, FOR SOLUTION ORAL at 06:32

## 2023-07-01 RX ADMIN — BACLOFEN 30 MG: 20 TABLET ORAL at 19:50

## 2023-07-01 RX ADMIN — PROPOFOL 10 MCG/KG/MIN: 10 INJECTION, EMULSION INTRAVENOUS at 05:30

## 2023-07-01 RX ADMIN — POTASSIUM & SODIUM PHOSPHATES POWDER PACK 280-160-250 MG 2 PACKET: 280-160-250 PACK at 13:23

## 2023-07-01 RX ADMIN — GABAPENTIN 300 MG: 250 SOLUTION ORAL at 21:29

## 2023-07-01 RX ADMIN — HYDROXYZINE HYDROCHLORIDE 25 MG: 25 TABLET, FILM COATED ORAL at 08:27

## 2023-07-01 RX ADMIN — POTASSIUM CHLORIDE 10 MEQ: 7.46 INJECTION, SOLUTION INTRAVENOUS at 20:34

## 2023-07-01 RX ADMIN — ROCURONIUM BROMIDE 50 MG: 50 INJECTION, SOLUTION INTRAVENOUS at 22:33

## 2023-07-01 RX ADMIN — OXYCODONE HYDROCHLORIDE 5 MG: 5 TABLET ORAL at 14:35

## 2023-07-01 RX ADMIN — PIPERACILLIN SODIUM AND TAZOBACTAM SODIUM 4.5 G: 4; .5 INJECTION, POWDER, LYOPHILIZED, FOR SOLUTION INTRAVENOUS at 19:50

## 2023-07-01 RX ADMIN — ENOXAPARIN SODIUM 40 MG: 40 INJECTION SUBCUTANEOUS at 19:49

## 2023-07-01 RX ADMIN — POTASSIUM PHOSPHATE, MONOBASIC AND POTASSIUM PHOSPHATE, DIBASIC 15 MMOL: 224; 236 INJECTION, SOLUTION INTRAVENOUS at 10:17

## 2023-07-01 ASSESSMENT — ACTIVITIES OF DAILY LIVING (ADL)
ADLS_ACUITY_SCORE: 47
ADLS_ACUITY_SCORE: 43
ADLS_ACUITY_SCORE: 43
ADLS_ACUITY_SCORE: 47
ADLS_ACUITY_SCORE: 43
ADLS_ACUITY_SCORE: 47
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
## 2023-07-01 NOTE — PROGRESS NOTES
Pt extubated per physician order. RN and RT at bedside. Providers aware of extubation happening. Extubated to Oxymask 5L. Pt tolerated. Will continue to monitor.
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
## 2023-07-01 NOTE — PROGRESS NOTES
Brief Urology Note    Patient showing clinical improvement but urgent decompression remains indicated. Paged interventional radiology on call fellow and staff multiple times, awaiting response. Discussed with primary team who will also work on making contact.    Addendum: Discussed case with IR. Will work with primary team on bringing INR down to more acceptable range and placing L PNT tomorrow morning.    Iglesia Billy MD  Urology Resident
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
## 2023-07-01 NOTE — PLAN OF CARE
Writer took over care for patient from [id number]    Shift Summary:  Patient has been alert but not speaking since extubation. Has been moaning constantly. Nodded 'yes' once when asked if patient was in pain. 5mg oxycodone given with some relief but did not last very long before patient began moaning again and moving arms restlessly.   O2 sats dropped ~1715 to low 80's. Increased WOB also noted with use of abdominal/accesory muscles. O2 increased to 10LPM but no significant improvement in O2 sats. DESIREE paged and orders for HFNC placed. Sats improved to >92% on 35LPM/55%  Swallow test completed. No issues with ice chips or thin liquids noted. Swallowing without difficulty.       Plan:  NPO at midnight for IR procedure tomorrow. Consent is signed and in patient chart.          For vital signs and complete assessments, please see documentation flowsheets.
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
## 2023-07-01 NOTE — PROGRESS NOTES
Major Shift Events: Art line started to go bad this morning at ~0645. Day shift informed. Levo off this AM. Tube feeds stopped around ~0600 for potential IR procedure today.    Plan: Potential nephrostomy tube placement today. Continue plan of care.    Refer to flowsheet for detailed information.    Harvinder Umana RN on 7/1/2023 at 7:37 AM

## 2023-07-02 ENCOUNTER — ANESTHESIA EVENT (OUTPATIENT)
Dept: INTENSIVE CARE | Facility: CLINIC | Age: 28
DRG: 870 | End: 2023-07-02
Payer: MEDICARE

## 2023-07-02 ENCOUNTER — APPOINTMENT (OUTPATIENT)
Dept: INTERVENTIONAL RADIOLOGY/VASCULAR | Facility: CLINIC | Age: 28
DRG: 870 | End: 2023-07-02
Attending: ANESTHESIOLOGY
Payer: MEDICARE

## 2023-07-02 ENCOUNTER — ANESTHESIA (OUTPATIENT)
Dept: INTENSIVE CARE | Facility: CLINIC | Age: 28
DRG: 870 | End: 2023-07-02
Payer: MEDICARE

## 2023-07-02 LAB
ABO/RH(D): NORMAL
ALLEN'S TEST: NO
ANION GAP SERPL CALCULATED.3IONS-SCNC: 13 MMOL/L (ref 7–15)
ANTIBODY SCREEN: NEGATIVE
BASE EXCESS BLDA CALC-SCNC: 0.8 MMOL/L (ref -9–1.8)
BLD PROD TYP BPU: NORMAL
BLOOD COMPONENT TYPE: NORMAL
BUN SERPL-MCNC: 16.7 MG/DL (ref 6–20)
CALCIUM SERPL-MCNC: 7.3 MG/DL (ref 8.6–10)
CHLORIDE SERPL-SCNC: 113 MMOL/L (ref 98–107)
CODING SYSTEM: NORMAL
CREAT SERPL-MCNC: 0.37 MG/DL (ref 0.51–0.95)
CROSSMATCH: NORMAL
DEPRECATED HCO3 PLAS-SCNC: 22 MMOL/L (ref 22–29)
ERYTHROCYTE [DISTWIDTH] IN BLOOD BY AUTOMATED COUNT: 15.9 % (ref 10–15)
GFR SERPL CREATININE-BSD FRML MDRD: >90 ML/MIN/1.73M2
GLUCOSE BLDC GLUCOMTR-MCNC: 101 MG/DL (ref 70–99)
GLUCOSE BLDC GLUCOMTR-MCNC: 108 MG/DL (ref 70–99)
GLUCOSE BLDC GLUCOMTR-MCNC: 117 MG/DL (ref 70–99)
GLUCOSE BLDC GLUCOMTR-MCNC: 91 MG/DL (ref 70–99)
GLUCOSE BLDC GLUCOMTR-MCNC: 94 MG/DL (ref 70–99)
GLUCOSE SERPL-MCNC: 105 MG/DL (ref 70–99)
HCO3 BLD-SCNC: 24 MMOL/L (ref 21–28)
HCT VFR BLD AUTO: 20.5 % (ref 35–47)
HGB BLD-MCNC: 6.9 G/DL (ref 11.7–15.7)
ISSUE DATE AND TIME: NORMAL
MAGNESIUM SERPL-MCNC: 1.5 MG/DL (ref 1.7–2.3)
MAGNESIUM SERPL-MCNC: 2.2 MG/DL (ref 1.7–2.3)
MCH RBC QN AUTO: 27 PG (ref 26.5–33)
MCHC RBC AUTO-ENTMCNC: 33.7 G/DL (ref 31.5–36.5)
MCV RBC AUTO: 80 FL (ref 78–100)
O2/TOTAL GAS SETTING VFR VENT: 35 %
PCO2 BLD: 33 MM HG (ref 35–45)
PH BLD: 7.47 [PH] (ref 7.35–7.45)
PHOSPHATE SERPL-MCNC: 1.6 MG/DL (ref 2.5–4.5)
PHOSPHATE SERPL-MCNC: 2.1 MG/DL (ref 2.5–4.5)
PLATELET # BLD AUTO: 241 10E3/UL (ref 150–450)
PO2 BLD: 109 MM HG (ref 80–105)
POTASSIUM SERPL-SCNC: 2.9 MMOL/L (ref 3.4–5.3)
POTASSIUM SERPL-SCNC: 2.9 MMOL/L (ref 3.4–5.3)
POTASSIUM SERPL-SCNC: 3.2 MMOL/L (ref 3.4–5.3)
POTASSIUM SERPL-SCNC: 3.3 MMOL/L (ref 3.4–5.3)
POTASSIUM SERPL-SCNC: 3.4 MMOL/L (ref 3.4–5.3)
RBC # BLD AUTO: 2.56 10E6/UL (ref 3.8–5.2)
SODIUM SERPL-SCNC: 144 MMOL/L (ref 136–145)
SODIUM SERPL-SCNC: 148 MMOL/L (ref 136–145)
SPECIMEN EXPIRATION DATE: NORMAL
UNIT ABO/RH: NORMAL
UNIT NUMBER: NORMAL
UNIT STATUS: NORMAL
UNIT TYPE ISBT: 7300
VANCOMYCIN SERPL-MCNC: 22.4 UG/ML
WBC # BLD AUTO: 23.8 10E3/UL (ref 4–11)

## 2023-07-02 PROCEDURE — 250N000011 HC RX IP 250 OP 636: Mod: JZ | Performed by: CLINICAL NURSE SPECIALIST

## 2023-07-02 PROCEDURE — 258N000003 HC RX IP 258 OP 636

## 2023-07-02 PROCEDURE — 83735 ASSAY OF MAGNESIUM: CPT

## 2023-07-02 PROCEDURE — 80202 ASSAY OF VANCOMYCIN: CPT | Performed by: ANESTHESIOLOGY

## 2023-07-02 PROCEDURE — 50432 PLMT NEPHROSTOMY CATHETER: CPT | Mod: LT | Performed by: STUDENT IN AN ORGANIZED HEALTH CARE EDUCATION/TRAINING PROGRAM

## 2023-07-02 PROCEDURE — 250N000011 HC RX IP 250 OP 636

## 2023-07-02 PROCEDURE — 250N000009 HC RX 250: Performed by: NURSE PRACTITIONER

## 2023-07-02 PROCEDURE — 84132 ASSAY OF SERUM POTASSIUM: CPT | Performed by: ANESTHESIOLOGY

## 2023-07-02 PROCEDURE — 0T9130Z DRAINAGE OF LEFT KIDNEY WITH DRAINAGE DEVICE, PERCUTANEOUS APPROACH: ICD-10-PCS | Performed by: STUDENT IN AN ORGANIZED HEALTH CARE EDUCATION/TRAINING PROGRAM

## 2023-07-02 PROCEDURE — 250N000009 HC RX 250

## 2023-07-02 PROCEDURE — 99292 CRITICAL CARE ADDL 30 MIN: CPT | Mod: FS | Performed by: ANESTHESIOLOGY

## 2023-07-02 PROCEDURE — 87086 URINE CULTURE/COLONY COUNT: CPT

## 2023-07-02 PROCEDURE — 86850 RBC ANTIBODY SCREEN: CPT

## 2023-07-02 PROCEDURE — 200N000002 HC R&B ICU UMMC

## 2023-07-02 PROCEDURE — 84132 ASSAY OF SERUM POTASSIUM: CPT

## 2023-07-02 PROCEDURE — C1729 CATH, DRAINAGE: HCPCS

## 2023-07-02 PROCEDURE — 255N000002 HC RX 255 OP 636: Performed by: STUDENT IN AN ORGANIZED HEALTH CARE EDUCATION/TRAINING PROGRAM

## 2023-07-02 PROCEDURE — 250N000013 HC RX MED GY IP 250 OP 250 PS 637: Performed by: CLINICAL NURSE SPECIALIST

## 2023-07-02 PROCEDURE — 94003 VENT MGMT INPAT SUBQ DAY: CPT

## 2023-07-02 PROCEDURE — 86923 COMPATIBILITY TEST ELECTRIC: CPT

## 2023-07-02 PROCEDURE — 50432 PLMT NEPHROSTOMY CATHETER: CPT

## 2023-07-02 PROCEDURE — 84100 ASSAY OF PHOSPHORUS: CPT

## 2023-07-02 PROCEDURE — 250N000013 HC RX MED GY IP 250 OP 250 PS 637: Performed by: ANESTHESIOLOGY

## 2023-07-02 PROCEDURE — 250N000011 HC RX IP 250 OP 636: Mod: JZ | Performed by: STUDENT IN AN ORGANIZED HEALTH CARE EDUCATION/TRAINING PROGRAM

## 2023-07-02 PROCEDURE — 83735 ASSAY OF MAGNESIUM: CPT | Performed by: NURSE PRACTITIONER

## 2023-07-02 PROCEDURE — 86901 BLOOD TYPING SEROLOGIC RH(D): CPT

## 2023-07-02 PROCEDURE — 250N000011 HC RX IP 250 OP 636: Performed by: NURSE PRACTITIONER

## 2023-07-02 PROCEDURE — 85027 COMPLETE CBC AUTOMATED: CPT | Performed by: NURSE PRACTITIONER

## 2023-07-02 PROCEDURE — 250N000013 HC RX MED GY IP 250 OP 250 PS 637: Performed by: NURSE PRACTITIONER

## 2023-07-02 PROCEDURE — 250N000011 HC RX IP 250 OP 636: Mod: JZ | Performed by: ANESTHESIOLOGY

## 2023-07-02 PROCEDURE — P9016 RBC LEUKOCYTES REDUCED: HCPCS

## 2023-07-02 PROCEDURE — 272N000192 HC ACCESSORY CR2

## 2023-07-02 PROCEDURE — 272N000504 HC NEEDLE CR4

## 2023-07-02 PROCEDURE — 99233 SBSQ HOSP IP/OBS HIGH 50: CPT | Performed by: STUDENT IN AN ORGANIZED HEALTH CARE EDUCATION/TRAINING PROGRAM

## 2023-07-02 PROCEDURE — 250N000009 HC RX 250: Performed by: STUDENT IN AN ORGANIZED HEALTH CARE EDUCATION/TRAINING PROGRAM

## 2023-07-02 PROCEDURE — 99291 CRITICAL CARE FIRST HOUR: CPT | Mod: FS | Performed by: ANESTHESIOLOGY

## 2023-07-02 PROCEDURE — 250N000013 HC RX MED GY IP 250 OP 250 PS 637

## 2023-07-02 PROCEDURE — 84132 ASSAY OF SERUM POTASSIUM: CPT | Performed by: NURSE PRACTITIONER

## 2023-07-02 PROCEDURE — 272N000508 HC NEEDLE CR8

## 2023-07-02 PROCEDURE — 258N000003 HC RX IP 258 OP 636: Performed by: NURSE PRACTITIONER

## 2023-07-02 PROCEDURE — 999N000157 HC STATISTIC RCP TIME EA 10 MIN

## 2023-07-02 PROCEDURE — 84295 ASSAY OF SERUM SODIUM: CPT

## 2023-07-02 PROCEDURE — 82803 BLOOD GASES ANY COMBINATION: CPT | Performed by: NURSE PRACTITIONER

## 2023-07-02 PROCEDURE — 370N000003 HC ANESTHESIA WARD SERVICE: Performed by: NURSE ANESTHETIST, CERTIFIED REGISTERED

## 2023-07-02 RX ORDER — POTASSIUM CHLORIDE 29.8 MG/ML
20 INJECTION INTRAVENOUS
Status: COMPLETED | OUTPATIENT
Start: 2023-07-02 | End: 2023-07-02

## 2023-07-02 RX ORDER — LIDOCAINE HYDROCHLORIDE 10 MG/ML
.5-1 INJECTION, SOLUTION EPIDURAL; INFILTRATION; INTRACAUDAL; PERINEURAL ONCE
Status: DISCONTINUED | OUTPATIENT
Start: 2023-07-02 | End: 2023-07-02

## 2023-07-02 RX ORDER — NOREPINEPHRINE BITARTRATE 0.06 MG/ML
.01-.6 INJECTION, SOLUTION INTRAVENOUS CONTINUOUS
Status: DISCONTINUED | OUTPATIENT
Start: 2023-07-02 | End: 2023-07-07

## 2023-07-02 RX ORDER — POTASSIUM CHLORIDE 20MEQ/15ML
40 LIQUID (ML) ORAL ONCE
Status: COMPLETED | OUTPATIENT
Start: 2023-07-02 | End: 2023-07-02

## 2023-07-02 RX ORDER — IODIXANOL 320 MG/ML
50 INJECTION, SOLUTION INTRAVASCULAR ONCE
Status: COMPLETED | OUTPATIENT
Start: 2023-07-02 | End: 2023-07-02

## 2023-07-02 RX ORDER — LACTOBACILLUS RHAMNOSUS GG 10B CELL
1 CAPSULE ORAL 2 TIMES DAILY
Status: DISCONTINUED | OUTPATIENT
Start: 2023-07-02 | End: 2023-07-13 | Stop reason: HOSPADM

## 2023-07-02 RX ORDER — MAGNESIUM SULFATE HEPTAHYDRATE 40 MG/ML
4 INJECTION, SOLUTION INTRAVENOUS ONCE
Status: COMPLETED | OUTPATIENT
Start: 2023-07-02 | End: 2023-07-02

## 2023-07-02 RX ORDER — OXYCODONE HYDROCHLORIDE 5 MG/1
5 TABLET ORAL
Status: DISCONTINUED | OUTPATIENT
Start: 2023-07-02 | End: 2023-07-07

## 2023-07-02 RX ORDER — PROPOFOL 10 MG/ML
INJECTION, EMULSION INTRAVENOUS PRN
Status: DISCONTINUED | OUTPATIENT
Start: 2023-07-01 | End: 2023-07-02

## 2023-07-02 RX ORDER — LOPERAMIDE HCL 1 MG/7.5ML
2 SUSPENSION ORAL 4 TIMES DAILY PRN
Status: DISCONTINUED | OUTPATIENT
Start: 2023-07-02 | End: 2023-07-13 | Stop reason: HOSPADM

## 2023-07-02 RX ORDER — FENTANYL CITRATE 50 UG/ML
25-50 INJECTION, SOLUTION INTRAMUSCULAR; INTRAVENOUS
Status: DISCONTINUED | OUTPATIENT
Start: 2023-07-02 | End: 2023-07-08

## 2023-07-02 RX ORDER — PROPOFOL 10 MG/ML
5-20 INJECTION, EMULSION INTRAVENOUS CONTINUOUS
Status: DISCONTINUED | OUTPATIENT
Start: 2023-07-02 | End: 2023-07-04

## 2023-07-02 RX ADMIN — SODIUM PHOSPHATE, MONOBASIC, MONOHYDRATE AND SODIUM PHOSPHATE, DIBASIC, ANHYDROUS 15 MMOL: 276; 142 INJECTION, SOLUTION INTRAVENOUS at 08:58

## 2023-07-02 RX ADMIN — GABAPENTIN 300 MG: 250 SOLUTION ORAL at 23:50

## 2023-07-02 RX ADMIN — BACLOFEN 30 MG: 20 TABLET ORAL at 13:31

## 2023-07-02 RX ADMIN — DEXMEDETOMIDINE HYDROCHLORIDE 1 MCG/KG/HR: 100 INJECTION, SOLUTION INTRAVENOUS at 07:45

## 2023-07-02 RX ADMIN — HYDROCORTISONE 50 MG: 20 TABLET ORAL at 20:18

## 2023-07-02 RX ADMIN — MAGNESIUM SULFATE HEPTAHYDRATE 4 G: 40 INJECTION, SOLUTION INTRAVENOUS at 08:31

## 2023-07-02 RX ADMIN — CARBIDOPA AND LEVODOPA 10 MG: 50; 200 TABLET, EXTENDED RELEASE ORAL at 16:07

## 2023-07-02 RX ADMIN — PIPERACILLIN SODIUM AND TAZOBACTAM SODIUM 4.5 G: 4; .5 INJECTION, POWDER, LYOPHILIZED, FOR SOLUTION INTRAVENOUS at 07:54

## 2023-07-02 RX ADMIN — DOXYCYCLINE 100 MG: 100 INJECTION, POWDER, LYOPHILIZED, FOR SOLUTION INTRAVENOUS at 23:50

## 2023-07-02 RX ADMIN — OXYCODONE HYDROCHLORIDE 5 MG: 5 TABLET ORAL at 13:32

## 2023-07-02 RX ADMIN — BACLOFEN 30 MG: 20 TABLET ORAL at 20:17

## 2023-07-02 RX ADMIN — ENOXAPARIN SODIUM 40 MG: 40 INJECTION SUBCUTANEOUS at 20:18

## 2023-07-02 RX ADMIN — PIPERACILLIN SODIUM AND TAZOBACTAM SODIUM 4.5 G: 4; .5 INJECTION, POWDER, LYOPHILIZED, FOR SOLUTION INTRAVENOUS at 14:10

## 2023-07-02 RX ADMIN — FENTANYL CITRATE 50 MCG: 50 INJECTION INTRAMUSCULAR; INTRAVENOUS at 16:07

## 2023-07-02 RX ADMIN — PIPERACILLIN SODIUM AND TAZOBACTAM SODIUM 4.5 G: 4; .5 INJECTION, POWDER, LYOPHILIZED, FOR SOLUTION INTRAVENOUS at 20:04

## 2023-07-02 RX ADMIN — OXYCODONE HYDROCHLORIDE 5 MG: 5 TABLET ORAL at 17:35

## 2023-07-02 RX ADMIN — PROPOFOL 10 MCG/KG/MIN: 10 INJECTION, EMULSION INTRAVENOUS at 05:54

## 2023-07-02 RX ADMIN — DEXMEDETOMIDINE HYDROCHLORIDE 0.8 MCG/KG/HR: 100 INJECTION, SOLUTION INTRAVENOUS at 16:15

## 2023-07-02 RX ADMIN — ACETAMINOPHEN 650 MG: 325 TABLET, FILM COATED ORAL at 20:17

## 2023-07-02 RX ADMIN — IODIXANOL 10 ML: 320 INJECTION, SOLUTION INTRAVASCULAR at 11:54

## 2023-07-02 RX ADMIN — DEXTROSE AND SODIUM CHLORIDE: 5; 450 INJECTION, SOLUTION INTRAVENOUS at 02:21

## 2023-07-02 RX ADMIN — LIDOCAINE HYDROCHLORIDE 4 ML: 10 INJECTION, SOLUTION EPIDURAL; INFILTRATION; INTRACAUDAL; PERINEURAL at 11:53

## 2023-07-02 RX ADMIN — VANCOMYCIN HYDROCHLORIDE 750 MG: 1 INJECTION, POWDER, LYOPHILIZED, FOR SOLUTION INTRAVENOUS at 23:49

## 2023-07-02 RX ADMIN — SERTRALINE HYDROCHLORIDE 150 MG: 100 TABLET ORAL at 20:18

## 2023-07-02 RX ADMIN — CHLORHEXIDINE GLUCONATE 15 ML: 1.2 SOLUTION ORAL at 07:47

## 2023-07-02 RX ADMIN — POTASSIUM CHLORIDE 40 MEQ: 40 SOLUTION ORAL at 20:17

## 2023-07-02 RX ADMIN — CHLORHEXIDINE GLUCONATE 15 ML: 1.2 SOLUTION ORAL at 20:17

## 2023-07-02 RX ADMIN — Medication 1 CAPSULE: at 20:18

## 2023-07-02 RX ADMIN — POTASSIUM PHOSPHATE, MONOBASIC AND POTASSIUM PHOSPHATE, DIBASIC 9 MMOL: 224; 236 INJECTION, SOLUTION INTRAVENOUS at 20:38

## 2023-07-02 RX ADMIN — OXYCODONE HYDROCHLORIDE 5 MG: 5 TABLET ORAL at 08:14

## 2023-07-02 RX ADMIN — FENTANYL CITRATE 25 MCG: 50 INJECTION INTRAMUSCULAR; INTRAVENOUS at 02:29

## 2023-07-02 RX ADMIN — Medication 15 ML: at 07:48

## 2023-07-02 RX ADMIN — DOXYCYCLINE 100 MG: 100 INJECTION, POWDER, LYOPHILIZED, FOR SOLUTION INTRAVENOUS at 12:44

## 2023-07-02 RX ADMIN — CHLORHEXIDINE GLUCONATE 15 ML: 1.2 SOLUTION ORAL at 00:22

## 2023-07-02 RX ADMIN — Medication 40 MG: at 07:48

## 2023-07-02 RX ADMIN — PIPERACILLIN SODIUM AND TAZOBACTAM SODIUM 4.5 G: 4; .5 INJECTION, POWDER, LYOPHILIZED, FOR SOLUTION INTRAVENOUS at 02:18

## 2023-07-02 RX ADMIN — OXYCODONE HYDROCHLORIDE 5 MG: 5 TABLET ORAL at 23:49

## 2023-07-02 RX ADMIN — DOXYCYCLINE 100 MG: 100 INJECTION, POWDER, LYOPHILIZED, FOR SOLUTION INTRAVENOUS at 00:15

## 2023-07-02 RX ADMIN — LOPERAMIDE HCL 2 MG: 1 SOLUTION ORAL at 16:14

## 2023-07-02 RX ADMIN — CARBIDOPA AND LEVODOPA 10 MG: 50; 200 TABLET, EXTENDED RELEASE ORAL at 07:48

## 2023-07-02 RX ADMIN — FENTANYL CITRATE 25 MCG: 50 INJECTION INTRAMUSCULAR; INTRAVENOUS at 04:15

## 2023-07-02 RX ADMIN — VANCOMYCIN HYDROCHLORIDE 750 MG: 1 INJECTION, POWDER, LYOPHILIZED, FOR SOLUTION INTRAVENOUS at 10:45

## 2023-07-02 RX ADMIN — POTASSIUM CHLORIDE 20 MEQ: 29.8 INJECTION, SOLUTION INTRAVENOUS at 07:25

## 2023-07-02 RX ADMIN — POTASSIUM CHLORIDE 10 MEQ: 7.46 INJECTION, SOLUTION INTRAVENOUS at 00:45

## 2023-07-02 RX ADMIN — POTASSIUM CHLORIDE 20 MEQ: 29.8 INJECTION, SOLUTION INTRAVENOUS at 04:01

## 2023-07-02 RX ADMIN — POTASSIUM CHLORIDE 20 MEQ: 29.8 INJECTION, SOLUTION INTRAVENOUS at 05:58

## 2023-07-02 RX ADMIN — HYDROCORTISONE 50 MG: 20 TABLET ORAL at 08:14

## 2023-07-02 RX ADMIN — HYDROCORTISONE 50 MG: 20 TABLET ORAL at 00:22

## 2023-07-02 RX ADMIN — BACLOFEN 30 MG: 20 TABLET ORAL at 07:48

## 2023-07-02 RX ADMIN — POTASSIUM CHLORIDE 40 MEQ: 40 SOLUTION ORAL at 13:32

## 2023-07-02 RX ADMIN — OXYCODONE HYDROCHLORIDE 5 MG: 5 TABLET ORAL at 20:18

## 2023-07-02 ASSESSMENT — ACTIVITIES OF DAILY LIVING (ADL)
ADLS_ACUITY_SCORE: 43
ADLS_ACUITY_SCORE: 47
ADLS_ACUITY_SCORE: 47
ADLS_ACUITY_SCORE: 45
ADLS_ACUITY_SCORE: 43
ADLS_ACUITY_SCORE: 43
ADLS_ACUITY_SCORE: 47
ADLS_ACUITY_SCORE: 43
ADLS_ACUITY_SCORE: 43
ADLS_ACUITY_SCORE: 47
ADLS_ACUITY_SCORE: 43
ADLS_ACUITY_SCORE: 43

## 2023-07-02 NOTE — PROCEDURES
[facility]    Procedure: IR Procedure Note    Date/Time: 7/2/2023 12:03 PM    Performed by: Zelalem Herman MD  Authorized by: Amos Callejas MD      UNIVERSAL PROTOCOL   Site Marked: NA  Prior Images Obtained and Reviewed:  Yes  Required items: Required blood products, implants, devices and special equipment available    Patient identity confirmed:  Verbally with patient, arm band, provided demographic data and hospital-assigned identification number  Patient was reevaluated immediately before administering moderate or deep sedation or anesthesia  Confirmation Checklist:  Patient's identity using two indicators, relevant allergies, procedure was appropriate and matched the consent or emergent situation and correct equipment/implants were available  Time out: Immediately prior to the procedure a time out was called    Universal Protocol: the Joint Commission Universal Protocol was followed    Preparation: Patient was prepped and draped in usual sterile fashion       ANESTHESIA    Anesthesia: Local infiltration  Local Anesthetic:  Lidocaine 1% without epinephrine      SEDATION  Patient Sedated: Yes    Vital signs: Vital signs monitored during sedation    See dictated procedure note for full details.  Findings: L PNT placed, to bag drainage. 10 ml cloudy fluid aspirated, sent for labs.     Specimens: none    Complications: None    Condition: Stable    Plan: L PNT placed, to bag drainage. 10 ml cloudy fluid aspirated, sent for labs.       PROCEDURE  Describe Procedure: L PNT placed, to bag drainage. 10 ml cloudy fluid aspirated, sent for labs.   Patient Tolerance:  Patient tolerated the procedure well with no immediate complications  Length of time physician/provider present for 1:1 monitoring during sedation: 30

## 2023-07-02 NOTE — PHARMACY-VANCOMYCIN DOSING SERVICE
"Pharmacy Vancomycin Note  Date of Service 2023  Patient's  1995   28 year old, female    Indication: Sepsis  Day of Therapy: started 23 @ OSH  Current vancomycin regimen:  750 mg IV q12h  Current vancomycin monitoring method: AUC  Current vancomycin therapeutic monitoring goal: 400-600 mg*h/L    InsightRX Prediction of Current Vancomycin Regimen  Regimen: 750 mg IV every 12 hours.  Exposure target: AUC24 (range)400-600 mg/L.hr   AUC24,ss: 499 mg/L.hr  Probability of AUC24 > 400: 96 %  Ctrough,ss: 15.7 mg/L  Probability of Ctrough,ss > 20: 6 %  Probability of nephrotoxicity (Lodise URBAN ): 11 %    Current estimated CrCl = Estimated Creatinine Clearance: 190.9 mL/min (A) (based on SCr of 0.37 mg/dL (L)).    Creatinine for last 3 days  2023:  5:12 AM Creatinine 0.42 mg/dL;  4:22 PM Creatinine 0.40 mg/dL  2023:  5:47 AM Creatinine 0.37 mg/dL;  5:38 PM Creatinine 0.41 mg/dL  2023:  6:11 AM Creatinine 0.37 mg/dL    Recent Vancomycin Levels (past 3 days)  2023:  9:28 AM Vancomycin 19.3 ug/mL  2023:  6:11 AM Vancomycin 22.4 ug/mL    Vancomycin IV Administrations (past 72 hours)                   vancomycin (VANCOCIN) 750 mg in sodium chloride 0.9 % 250 mL intermittent infusion (mg) 750 mg New Bag 239     750 mg New Bag  1257     750 mg New Bag 23 2219     750 mg New Bag  0952     750 mg New Bag 23 2134     750 mg New Bag  0951                Nephrotoxins and other renal medications (From now, onward)    Start     Dose/Rate Route Frequency Ordered Stop    23 0030  norepinephrine (LEVOPHED) 16 mg in  mL infusion MAX CONC CENTRAL LINE         0.01-0.6 mcg/kg/min × 55.7 kg (Dosing Weight)  0.5-31.3 mL/hr  Intravenous CONTINUOUS 23 0022      23 1430  piperacillin-tazobactam (ZOSYN) 4.5 g vial to attach to  mL bag        Note to Pharmacy: For [facility], [facility] and [facility]: For Zosyn-naive patients, use the \"Zosyn initial dose + extended infusion\" " order panel.    4.5 g  over 30 Minutes Intravenous EVERY 6 HOURS 07/01/23 1410      06/28/23 2000  vancomycin (VANCOCIN) 750 mg in sodium chloride 0.9 % 250 mL intermittent infusion         750 mg  over 90 Minutes Intravenous EVERY 12 HOURS 06/28/23 1945               Contrast Orders - past 72 hours (72h ago, onward)    Start     Dose/Rate Route Frequency Stop    06/29/23 1500  iopamidol (ISOVUE-370) solution 100 mL         100 mL Intravenous ONCE 06/29/23 1536    06/29/23 1000  perflutren diluted 1mL to 2mL with saline (OPTISON) diluted injection 5 mL         5 mL Intravenous ONCE 06/29/23 0958          Interpretation of levels and current regimen:  Vancomycin level is reflective of -600    Has serum creatinine changed greater than 50% in last 72 hours: No    Urine output:  good urine output (per RN)     Renal Function: Stable    Plan:  1. Continue Current Dose  2. Vancomycin monitoring method: AUC  3. Vancomycin therapeutic monitoring goal: 400-600 mg*h/L  4. Pharmacy will check vancomycin levels as appropriate in 1-3 Days.  5. Serum creatinine levels will be ordered a minimum of twice weekly.    ALLEN BOLES, [facility]

## 2023-07-02 NOTE — SEDATION DOCUMENTATION
Patient Name: Dianna Cottrell  Medical Record Number: [id number]  Today's Date: 7/2/2023    Procedure: Left percutaneous nephrostomy tube placement  Proceduralist: Dr. Callejas and Dr. Herman    Procedure Start: 1137  Procedure end: 1200  Sedation medications administered: Sedated by ICU staff     Report given to: Staff RN present      Other Notes: Pt arrived to IR from ICU. Consent obtained by phone from the mother of pt. Mother denies any questions or concerns regarding procedure. Pt positioned prone and monitored per protocol. Pt tolerated procedure without any noted complications. Pt transferred back to ICU with ICU staff.
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
## 2023-07-02 NOTE — PLAN OF CARE
Shift Summary:    Patient more alert and responsive to questions today by nodding yes/no    IR procedure completed for PNT placement. Procedure went well.     PNT draining ~150-200ml q2h. Drainage is clear/hazy/light red     Tolerating ventilator pressure control well     Pain well managed with scheduled oxycodone and PRN fentanyl     Bladder irrigation completed x1    Urine appearance improving in terms of mucous. Much more clear and less mucous threads    1 unit of blood given after procedure for Hgb of 6.9. No infusion reaction.     TF re-started at 1300 at 20ml/hr. Increase per orders      Plan:    Possible extubation tomorrow    Increase TF to 30ml/hr at 2100    Monitor for increasing s/sx of sepsis. Increased risk per IR 2/2 PNT placement.          For vital signs and complete assessments, please see documentation flowsheets.

## 2023-07-02 NOTE — PROGRESS NOTES
Brief IR Progress Note    Multiple attempts were made to contact the patient's mother Areli at the number provided within the electronic medical record which were unsuccessful.    Given the worsening clinical status overnight and the concern that her clinical status will continue to worsen without decompression of her right kidney, we will perform the percutaneous nephrostomy procedure as an emergent procedure.  The procedure is in line with the patient's already established goals of care.
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
## 2023-07-02 NOTE — PROGRESS NOTES
Critical Care Cross Coverage:      Called to bedside to assess patient for increased WOB. Patient receiving supplemental oxygen via HFNC, FiO2 up to 75%, does not tolerate BiPap. Patient awake, tracking, but not speaking or following commands. Noted to have worsening respiratory distress than previously in the shift with intercostal retractions and use of accessory muscles. Patient reintubated without incident.     Discussed with Dr. Martinez prior to reintubation who was in agreement with plan.    Additional 30 minutes critical care time excluding procedures in addition to time spent by my colleagues today.    Suzan Messina AG-ACNP  Critical Care  [facility]

## 2023-07-02 NOTE — ANESTHESIA CARE TRANSFER NOTE
Patient: Dianna Cottrell    Procedure: * No procedures listed *       Diagnosis: * No pre-op diagnosis entered *  Diagnosis Additional Information: No value filed.    Anesthesia Type:   No value filed.     Note:    Oropharynx: endotracheal tube in place  Level of Consciousness: iatrogenic sedation      Independent Airway: airway patency not satisfactory and stable  Dentition: dentition unchanged  Vital Signs Stable: post-procedure vital signs reviewed and stable    Patient transferred to: ICU  Comments: Anesthesia CRNA Procedure    Patient:Dianna Cottrell ([id number])  Site: [place]  Procedure: intubation  Diagnosis: pneumonia  Surgeon/Doctor:  CRNA: ODILIA Karimi CRNA CRNA 7/2/2023 12:13 AM    ICU Handoff: Call for PAUSE to initiate/utilize ICU HANDOFF, Identified Patient, Identified Responsible Provider, Reviewed the Pertinent Medical History, Discussed Surgical Course, Reviewed Intra-OP Anesthesia Management and Issues during Anesthesia, Set Expectations for Post Procedure Period and Allowed Opportunity for Questions and Acknowledgement of Understanding      Vitals:  Vitals Value Taken Time   BP 85/65 07/02/23 0010   Temp     Pulse 73 07/02/23 0011   Resp 15 07/02/23 0011   SpO2 100 % 07/02/23 0011   Vitals shown include unvalidated device data.    Electronically Signed By: ODILIA Karimi CRNA  July 2, 2023  12:12 AM

## 2023-07-02 NOTE — ANESTHESIA PROCEDURE NOTES
Airway       Patient location during procedure: Floor       Procedure Start/Stop Times: 7/1/2023 10:37 PM  Staff -        CRNA: Trent Alvarado APRN CRNA       Performed By: CRNA  Consent for Airway        Urgency: emergent  Indications and Patient Condition       Indications for airway management: airway protection and respiratory insufficiency       Induction type:RSI       Mask difficulty assessment: 0 - not attempted    Final Airway Details       Final airway type: endotracheal airway       Successful airway: ETT - single  Endotracheal Airway Details        ETT size (mm): 7.0       Cuffed: yes       Successful intubation technique: video laryngoscopy       VL Blade Size: Glidescope 3       Grade View of Cords: 1       Adjucts: stylet       Position: Right       Measured from: gums/teeth       Bite block used: None    Post intubation assessment        ETT secured, Vent settings by primary/ICU team, Primary/ICU team to review CXR, Sedation to be ordered by primary/ICU team and No apparent complications       Placement verified by: capnometry, equal breath sounds and chest rise        Number of attempts at approach: 1       Secured with: commercial tube meek       Ease of procedure: easy       Dentition: Intact and Unchanged    Medication(s) Administered   Medication Administration Time: 7/1/2023 10:37 PM

## 2023-07-03 ENCOUNTER — APPOINTMENT (OUTPATIENT)
Dept: GENERAL RADIOLOGY | Facility: CLINIC | Age: 28
DRG: 870 | End: 2023-07-03
Attending: CLINICAL NURSE SPECIALIST
Payer: MEDICARE

## 2023-07-03 ENCOUNTER — MEDICAL CORRESPONDENCE (OUTPATIENT)
Dept: HEALTH INFORMATION MANAGEMENT | Facility: CLINIC | Age: 28
End: 2023-07-03

## 2023-07-03 LAB
ANION GAP SERPL CALCULATED.3IONS-SCNC: 17 MMOL/L (ref 7–15)
BACTERIA BLD CULT: NO GROWTH
BACTERIA BLD CULT: NO GROWTH
BACTERIA SPT CULT: ABNORMAL
BUN SERPL-MCNC: 18.2 MG/DL (ref 6–20)
CALCIUM SERPL-MCNC: 6.7 MG/DL (ref 8.6–10)
CHLORIDE SERPL-SCNC: 111 MMOL/L (ref 98–107)
CREAT SERPL-MCNC: 0.35 MG/DL (ref 0.51–0.95)
DEPRECATED HCO3 PLAS-SCNC: 20 MMOL/L (ref 22–29)
ERYTHROCYTE [DISTWIDTH] IN BLOOD BY AUTOMATED COUNT: 14.7 % (ref 10–15)
GFR SERPL CREATININE-BSD FRML MDRD: >90 ML/MIN/1.73M2
GLUCOSE BLDC GLUCOMTR-MCNC: 105 MG/DL (ref 70–99)
GLUCOSE BLDC GLUCOMTR-MCNC: 137 MG/DL (ref 70–99)
GLUCOSE BLDC GLUCOMTR-MCNC: 90 MG/DL (ref 70–99)
GLUCOSE BLDC GLUCOMTR-MCNC: 98 MG/DL (ref 70–99)
GLUCOSE SERPL-MCNC: 116 MG/DL (ref 70–99)
GRAM STAIN RESULT: ABNORMAL
GRAM STAIN RESULT: ABNORMAL
HCT VFR BLD AUTO: 25.5 % (ref 35–47)
HGB BLD-MCNC: 8.7 G/DL (ref 11.7–15.7)
MAGNESIUM SERPL-MCNC: 1.6 MG/DL (ref 1.7–2.3)
MCH RBC QN AUTO: 26.5 PG (ref 26.5–33)
MCHC RBC AUTO-ENTMCNC: 34.1 G/DL (ref 31.5–36.5)
MCV RBC AUTO: 78 FL (ref 78–100)
PHOSPHATE SERPL-MCNC: 1.9 MG/DL (ref 2.5–4.5)
PHOSPHATE SERPL-MCNC: 4.4 MG/DL (ref 2.5–4.5)
PLATELET # BLD AUTO: 284 10E3/UL (ref 150–450)
POTASSIUM SERPL-SCNC: 2.9 MMOL/L (ref 3.4–5.3)
POTASSIUM SERPL-SCNC: 3.1 MMOL/L (ref 3.4–5.3)
POTASSIUM SERPL-SCNC: 3.6 MMOL/L (ref 3.4–5.3)
POTASSIUM SERPL-SCNC: 3.6 MMOL/L (ref 3.4–5.3)
RBC # BLD AUTO: 3.28 10E6/UL (ref 3.8–5.2)
SODIUM SERPL-SCNC: 148 MMOL/L (ref 136–145)
WBC # BLD AUTO: 18.4 10E3/UL (ref 4–11)

## 2023-07-03 PROCEDURE — 71045 X-RAY EXAM CHEST 1 VIEW: CPT | Mod: 26 | Performed by: RADIOLOGY

## 2023-07-03 PROCEDURE — 74018 RADEX ABDOMEN 1 VIEW: CPT

## 2023-07-03 PROCEDURE — 94003 VENT MGMT INPAT SUBQ DAY: CPT

## 2023-07-03 PROCEDURE — 80048 BASIC METABOLIC PNL TOTAL CA: CPT

## 2023-07-03 PROCEDURE — 250N000013 HC RX MED GY IP 250 OP 250 PS 637

## 2023-07-03 PROCEDURE — 84132 ASSAY OF SERUM POTASSIUM: CPT | Performed by: CLINICAL NURSE SPECIALIST

## 2023-07-03 PROCEDURE — 999N000259 HC STATISTIC EXTUBATION

## 2023-07-03 PROCEDURE — 250N000009 HC RX 250: Performed by: NURSE PRACTITIONER

## 2023-07-03 PROCEDURE — 999N000157 HC STATISTIC RCP TIME EA 10 MIN

## 2023-07-03 PROCEDURE — 84100 ASSAY OF PHOSPHORUS: CPT | Performed by: CLINICAL NURSE SPECIALIST

## 2023-07-03 PROCEDURE — 94640 AIRWAY INHALATION TREATMENT: CPT | Mod: 76

## 2023-07-03 PROCEDURE — 71045 X-RAY EXAM CHEST 1 VIEW: CPT

## 2023-07-03 PROCEDURE — 250N000013 HC RX MED GY IP 250 OP 250 PS 637: Performed by: NURSE PRACTITIONER

## 2023-07-03 PROCEDURE — 250N000011 HC RX IP 250 OP 636

## 2023-07-03 PROCEDURE — 258N000003 HC RX IP 258 OP 636

## 2023-07-03 PROCEDURE — 74018 RADEX ABDOMEN 1 VIEW: CPT | Mod: 26 | Performed by: RADIOLOGY

## 2023-07-03 PROCEDURE — 84100 ASSAY OF PHOSPHORUS: CPT

## 2023-07-03 PROCEDURE — 250N000013 HC RX MED GY IP 250 OP 250 PS 637: Performed by: CLINICAL NURSE SPECIALIST

## 2023-07-03 PROCEDURE — 250N000009 HC RX 250

## 2023-07-03 PROCEDURE — 250N000011 HC RX IP 250 OP 636: Mod: JZ | Performed by: CLINICAL NURSE SPECIALIST

## 2023-07-03 PROCEDURE — 999N000159 HC STATISTIC RCP TIME PACU VENT EA 10 MIN

## 2023-07-03 PROCEDURE — 250N000011 HC RX IP 250 OP 636: Mod: JZ | Performed by: STUDENT IN AN ORGANIZED HEALTH CARE EDUCATION/TRAINING PROGRAM

## 2023-07-03 PROCEDURE — 200N000002 HC R&B ICU UMMC

## 2023-07-03 PROCEDURE — 85027 COMPLETE CBC AUTOMATED: CPT

## 2023-07-03 PROCEDURE — 258N000003 HC RX IP 258 OP 636: Performed by: NURSE PRACTITIONER

## 2023-07-03 PROCEDURE — 250N000009 HC RX 250: Performed by: CLINICAL NURSE SPECIALIST

## 2023-07-03 PROCEDURE — 250N000011 HC RX IP 250 OP 636: Mod: JZ

## 2023-07-03 PROCEDURE — 83735 ASSAY OF MAGNESIUM: CPT

## 2023-07-03 PROCEDURE — 99233 SBSQ HOSP IP/OBS HIGH 50: CPT | Performed by: INTERNAL MEDICINE

## 2023-07-03 PROCEDURE — 250N000013 HC RX MED GY IP 250 OP 250 PS 637: Performed by: ANESTHESIOLOGY

## 2023-07-03 RX ORDER — POTASSIUM CHLORIDE 1.5 G/1.58G
40 POWDER, FOR SOLUTION ORAL ONCE
Status: DISCONTINUED | OUTPATIENT
Start: 2023-07-03 | End: 2023-07-03

## 2023-07-03 RX ORDER — POTASSIUM CHLORIDE 20MEQ/15ML
40 LIQUID (ML) ORAL ONCE
Status: COMPLETED | OUTPATIENT
Start: 2023-07-03 | End: 2023-07-03

## 2023-07-03 RX ORDER — IPRATROPIUM BROMIDE AND ALBUTEROL SULFATE 2.5; .5 MG/3ML; MG/3ML
3 SOLUTION RESPIRATORY (INHALATION)
Status: DISCONTINUED | OUTPATIENT
Start: 2023-07-03 | End: 2023-07-03

## 2023-07-03 RX ORDER — SODIUM CHLORIDE FOR INHALATION 3 %
3 VIAL, NEBULIZER (ML) INHALATION
Status: DISCONTINUED | OUTPATIENT
Start: 2023-07-03 | End: 2023-07-08

## 2023-07-03 RX ORDER — POTASSIUM CHLORIDE 20MEQ/15ML
20 LIQUID (ML) ORAL ONCE
Status: COMPLETED | OUTPATIENT
Start: 2023-07-03 | End: 2023-07-03

## 2023-07-03 RX ORDER — DEXTROSE MONOHYDRATE 50 MG/ML
INJECTION, SOLUTION INTRAVENOUS CONTINUOUS
Status: DISCONTINUED | OUTPATIENT
Start: 2023-07-03 | End: 2023-07-04

## 2023-07-03 RX ORDER — POTASSIUM CHLORIDE 29.8 MG/ML
20 INJECTION INTRAVENOUS
Status: COMPLETED | OUTPATIENT
Start: 2023-07-03 | End: 2023-07-03

## 2023-07-03 RX ORDER — ALBUTEROL SULFATE 5 MG/ML
2.5 SOLUTION RESPIRATORY (INHALATION)
Status: DISCONTINUED | OUTPATIENT
Start: 2023-07-03 | End: 2023-07-13 | Stop reason: HOSPADM

## 2023-07-03 RX ADMIN — VANCOMYCIN HYDROCHLORIDE 750 MG: 1 INJECTION, POWDER, LYOPHILIZED, FOR SOLUTION INTRAVENOUS at 10:56

## 2023-07-03 RX ADMIN — ACETAMINOPHEN 650 MG: 325 TABLET, FILM COATED ORAL at 20:21

## 2023-07-03 RX ADMIN — POTASSIUM CHLORIDE 20 MEQ: 29.8 INJECTION, SOLUTION INTRAVENOUS at 18:36

## 2023-07-03 RX ADMIN — Medication 1 CAPSULE: at 08:51

## 2023-07-03 RX ADMIN — SODIUM CHLORIDE, POTASSIUM CHLORIDE, SODIUM LACTATE AND CALCIUM CHLORIDE 500 ML: 600; 310; 30; 20 INJECTION, SOLUTION INTRAVENOUS at 23:49

## 2023-07-03 RX ADMIN — OXYCODONE HYDROCHLORIDE 5 MG: 5 TABLET ORAL at 08:52

## 2023-07-03 RX ADMIN — POTASSIUM CHLORIDE 20 MEQ: 40 SOLUTION ORAL at 11:08

## 2023-07-03 RX ADMIN — ENOXAPARIN SODIUM 40 MG: 40 INJECTION SUBCUTANEOUS at 20:21

## 2023-07-03 RX ADMIN — OXYCODONE HYDROCHLORIDE 5 MG: 5 TABLET ORAL at 03:04

## 2023-07-03 RX ADMIN — DEXTROSE MONOHYDRATE: 50 INJECTION, SOLUTION INTRAVENOUS at 21:12

## 2023-07-03 RX ADMIN — BACLOFEN 30 MG: 20 TABLET ORAL at 08:51

## 2023-07-03 RX ADMIN — CARBIDOPA AND LEVODOPA 10 MG: 50; 200 TABLET, EXTENDED RELEASE ORAL at 16:07

## 2023-07-03 RX ADMIN — CHLORHEXIDINE GLUCONATE 15 ML: 1.2 SOLUTION ORAL at 20:21

## 2023-07-03 RX ADMIN — OXYCODONE HYDROCHLORIDE 5 MG: 5 TABLET ORAL at 05:22

## 2023-07-03 RX ADMIN — DEXMEDETOMIDINE HYDROCHLORIDE 0.5 MCG/KG/HR: 100 INJECTION, SOLUTION INTRAVENOUS at 05:51

## 2023-07-03 RX ADMIN — OXYCODONE HYDROCHLORIDE 5 MG: 5 TABLET ORAL at 16:07

## 2023-07-03 RX ADMIN — CARBIDOPA AND LEVODOPA 10 MG: 50; 200 TABLET, EXTENDED RELEASE ORAL at 08:51

## 2023-07-03 RX ADMIN — Medication 40 MG: at 08:51

## 2023-07-03 RX ADMIN — DOXYCYCLINE 100 MG: 100 INJECTION, POWDER, LYOPHILIZED, FOR SOLUTION INTRAVENOUS at 11:22

## 2023-07-03 RX ADMIN — BACLOFEN 30 MG: 20 TABLET ORAL at 14:58

## 2023-07-03 RX ADMIN — DOXYCYCLINE 100 MG: 100 INJECTION, POWDER, LYOPHILIZED, FOR SOLUTION INTRAVENOUS at 22:32

## 2023-07-03 RX ADMIN — SODIUM PHOSPHATE, MONOBASIC, MONOHYDRATE AND SODIUM PHOSPHATE, DIBASIC, ANHYDROUS 15 MMOL: 276; 142 INJECTION, SOLUTION INTRAVENOUS at 08:54

## 2023-07-03 RX ADMIN — PIPERACILLIN SODIUM AND TAZOBACTAM SODIUM 4.5 G: 4; .5 INJECTION, POWDER, LYOPHILIZED, FOR SOLUTION INTRAVENOUS at 20:21

## 2023-07-03 RX ADMIN — PIPERACILLIN SODIUM AND TAZOBACTAM SODIUM 4.5 G: 4; .5 INJECTION, POWDER, LYOPHILIZED, FOR SOLUTION INTRAVENOUS at 14:58

## 2023-07-03 RX ADMIN — HYDROCORTISONE 50 MG: 20 TABLET ORAL at 08:50

## 2023-07-03 RX ADMIN — OXYCODONE HYDROCHLORIDE 5 MG: 5 TABLET ORAL at 14:58

## 2023-07-03 RX ADMIN — BACLOFEN 30 MG: 20 TABLET ORAL at 20:21

## 2023-07-03 RX ADMIN — OXYCODONE HYDROCHLORIDE 5 MG: 5 TABLET ORAL at 22:43

## 2023-07-03 RX ADMIN — Medication 1 CAPSULE: at 20:22

## 2023-07-03 RX ADMIN — Medication 15 ML: at 08:50

## 2023-07-03 RX ADMIN — GABAPENTIN 300 MG: 250 SOLUTION ORAL at 22:32

## 2023-07-03 RX ADMIN — SERTRALINE HYDROCHLORIDE 150 MG: 100 TABLET ORAL at 20:21

## 2023-07-03 RX ADMIN — OXYCODONE HYDROCHLORIDE 5 MG: 5 TABLET ORAL at 20:22

## 2023-07-03 RX ADMIN — ALBUTEROL SULFATE 2.5 MG: 2.5 SOLUTION RESPIRATORY (INHALATION) at 20:16

## 2023-07-03 RX ADMIN — OXYCODONE HYDROCHLORIDE 5 MG: 5 TABLET ORAL at 10:57

## 2023-07-03 RX ADMIN — CHLORHEXIDINE GLUCONATE 15 ML: 1.2 SOLUTION ORAL at 08:50

## 2023-07-03 RX ADMIN — PIPERACILLIN SODIUM AND TAZOBACTAM SODIUM 4.5 G: 4; .5 INJECTION, POWDER, LYOPHILIZED, FOR SOLUTION INTRAVENOUS at 08:50

## 2023-07-03 RX ADMIN — LOPERAMIDE HCL 2 MG: 1 SOLUTION ORAL at 11:09

## 2023-07-03 RX ADMIN — VANCOMYCIN HYDROCHLORIDE 750 MG: 1 INJECTION, POWDER, LYOPHILIZED, FOR SOLUTION INTRAVENOUS at 21:08

## 2023-07-03 RX ADMIN — POTASSIUM CHLORIDE 20 MEQ: 29.8 INJECTION, SOLUTION INTRAVENOUS at 20:22

## 2023-07-03 RX ADMIN — PIPERACILLIN SODIUM AND TAZOBACTAM SODIUM 4.5 G: 4; .5 INJECTION, POWDER, LYOPHILIZED, FOR SOLUTION INTRAVENOUS at 03:04

## 2023-07-03 RX ADMIN — POTASSIUM CHLORIDE 40 MEQ: 40 SOLUTION ORAL at 07:18

## 2023-07-03 ASSESSMENT — ACTIVITIES OF DAILY LIVING (ADL)
ADLS_ACUITY_SCORE: 45
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
## 2023-07-03 NOTE — PROGRESS NOTES
Care Management Follow Up    Length of Stay (days): 5    Expected Discharge Date: 06/30/2023     Concerns to be Addressed: discharge planning, other (see comments) (will need resumption of care orders upon discharge.)     Patient plan of care discussed at interdisciplinary rounds: No    Anticipated Discharge Disposition: Home, Home Care     Anticipated Discharge Services: County Worker, Other (see comment) ([facility], home health aide)  Anticipated Discharge DME: Wheelchair (Patient owns own wheelchair.)    Patient/family educated on Medicare website which has current facility and service quality ratings: no  Education Provided on the Discharge Plan: Yes  Patient/Family in Agreement with the Plan: yes    Referrals Placed by CM/SW:  None currently  Private pay costs discussed: Not applicable    Additional Information:    Relevant Contact Info:  [employer] ([facility]): [phone]  ____________________________________________    9:28am - Received call from Sergo vergara/ [employer].  She requested general update regarding pt.  Informed her that pt was re-intubated, she expressed understanding.  She stated she will reach out toward the end of the week, but requested an update if there is a significant change in pt status sooner.    11:33am - Called ICU, spoke w/ charge RN.  Requested update on pt.  She transferred this writer to bedside RN, call rang for a while and then switched to a busy signal, no answer.    Chart review indicates susceptibilities are pending (see ID provider note).  Extubated today to high flow.          Care management will continue to follow.    Beau Garcia, AVELINOCC  [facility]  [facility] ICU  Pager: [phone]  Phone: [phone]

## 2023-07-03 NOTE — PROGRESS NOTES
CLINICAL NUTRITION SERVICES - BRIEF NOTE     Nutrition Prescription      Recommendations already ordered by Registered Dietitian (RD):  None at present, in light of extubation and TF on hold for distended abdomin, RD will just monitor medical plan of care     Future/Additional Recommendations:  Continue to monitor TF needs vs possible advancement of oral diet      NEW FINDINGS   RD reviewed pt with bedside RN and DESIREE, pt has been extubated, but has been having abdominal distention so TF is on hold at present time, bedside RN noted pt was given some ice chips and has tolerated thus far, so may be able to give oral diet vs TF in the coming day/s.     INTERVENTIONS  Implementation  At present RD will just monitor medical plan of care (TF are on hold in light of above)      Monitoring/Evaluation  Will continue to monitor and evaluate per protocol.    [address] ICU RD pager: [phone]  Weekend/holiday pager: [phone]

## 2023-07-03 NOTE — PROGRESS NOTES
Extubated patient to high flow today at 1140 without incident. HF is set at 25L/35 O2. Will continue to monitor.
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
## 2023-07-03 NOTE — PROGRESS NOTES
Shift Summary:  Over all uneventful night    PNT draining ~100-150ml q2h.     Urostomy patent and draining. Flushed x 1.    Tolerating ventilator     Sedated with propofol and Precedex.    Pain well managed with scheduled oxycodone.    Tube feeding at goal of 35 ml/hr     For vital signs and complete assessments, please see documentation flowsheets.

## 2023-07-03 NOTE — PROGRESS NOTES
10A ICU End of Shift Summary.     Changes this shift:0700 -1900  Extubated to HFNC at 1144   Sedation (propofol and Precedex) discontued   TF held on account of abdominal distension, ABD-XR done, CT ordered  Rectal pouch removed due poor performance.   Cough Assist Per RT      Neuro: A&Ox4,Sedation (propofol and Precedex) discontued   Cardiac: NSR, Normotensive on Midodrine  Respiratory:  Extubated to HFNC at 1144, Fio2 40-55%, lung sound  CTA, 02@ 25 LPM Sats >95%   GI: TF held on account of abdominal distension, XR -ABD remarkable for diffuse mild to moderate gaseous distention of small and large bowel loops. Bowel Incontinence, rectal pouch removed due poor performance.    : R.urostomy patent with adequate UO. Irrigated x 1.  L.PNT draining ~200 q1h  Diet/appetite: Still NPO, tolerating ice chips  Pain: Moderately controlled on scheduled q3h oxycodone.  Skin: R. IT dressing changed x3 due to BM, Perineum redness- barrier cream applied   LDA's: R. Triple lumen CVC, 1 R. PIV  Activities: Q2h turn in bed    Electrolytes replaced per protocol      Plan: Continue POC

## 2023-07-04 ENCOUNTER — APPOINTMENT (OUTPATIENT)
Dept: CT IMAGING | Facility: CLINIC | Age: 28
DRG: 870 | End: 2023-07-04
Attending: CLINICAL NURSE SPECIALIST
Payer: MEDICARE

## 2023-07-04 LAB
ANION GAP SERPL CALCULATED.3IONS-SCNC: 8 MMOL/L (ref 7–15)
BACTERIA BLD CULT: ABNORMAL
BACTERIA UR CULT: NO GROWTH
BUN SERPL-MCNC: 15.9 MG/DL (ref 6–20)
CALCIUM SERPL-MCNC: 6.2 MG/DL (ref 8.6–10)
CHLORIDE SERPL-SCNC: 106 MMOL/L (ref 98–107)
CREAT SERPL-MCNC: 0.26 MG/DL (ref 0.51–0.95)
CRP SERPL-MCNC: 174.66 MG/L
DEPRECATED HCO3 PLAS-SCNC: 28 MMOL/L (ref 22–29)
ERYTHROCYTE [DISTWIDTH] IN BLOOD BY AUTOMATED COUNT: 15.4 % (ref 10–15)
GFR SERPL CREATININE-BSD FRML MDRD: >90 ML/MIN/1.73M2
GLUCOSE BLDC GLUCOMTR-MCNC: 103 MG/DL (ref 70–99)
GLUCOSE BLDC GLUCOMTR-MCNC: 108 MG/DL (ref 70–99)
GLUCOSE BLDC GLUCOMTR-MCNC: 72 MG/DL (ref 70–99)
GLUCOSE BLDC GLUCOMTR-MCNC: 80 MG/DL (ref 70–99)
GLUCOSE BLDC GLUCOMTR-MCNC: 96 MG/DL (ref 70–99)
GLUCOSE SERPL-MCNC: 75 MG/DL (ref 70–99)
HCT VFR BLD AUTO: 26.2 % (ref 35–47)
HGB BLD-MCNC: 8.7 G/DL (ref 11.7–15.7)
MAGNESIUM SERPL-MCNC: 1.1 MG/DL (ref 1.7–2.3)
MAGNESIUM SERPL-MCNC: 2.2 MG/DL (ref 1.7–2.3)
MCH RBC QN AUTO: 27.3 PG (ref 26.5–33)
MCHC RBC AUTO-ENTMCNC: 33.2 G/DL (ref 31.5–36.5)
MCV RBC AUTO: 82 FL (ref 78–100)
PHOSPHATE SERPL-MCNC: 3.7 MG/DL (ref 2.5–4.5)
PLATELET # BLD AUTO: 269 10E3/UL (ref 150–450)
POTASSIUM SERPL-SCNC: 3.1 MMOL/L (ref 3.4–5.3)
POTASSIUM SERPL-SCNC: 3.8 MMOL/L (ref 3.4–5.3)
PROCALCITONIN SERPL IA-MCNC: 0.46 NG/ML
RBC # BLD AUTO: 3.19 10E6/UL (ref 3.8–5.2)
SODIUM SERPL-SCNC: 142 MMOL/L (ref 136–145)
WBC # BLD AUTO: 23.5 10E3/UL (ref 4–11)

## 2023-07-04 PROCEDURE — 99233 SBSQ HOSP IP/OBS HIGH 50: CPT | Performed by: INTERNAL MEDICINE

## 2023-07-04 PROCEDURE — 250N000009 HC RX 250: Performed by: NURSE PRACTITIONER

## 2023-07-04 PROCEDURE — 74177 CT ABD & PELVIS W/CONTRAST: CPT | Mod: MG

## 2023-07-04 PROCEDURE — 93005 ELECTROCARDIOGRAM TRACING: CPT

## 2023-07-04 PROCEDURE — 85027 COMPLETE CBC AUTOMATED: CPT

## 2023-07-04 PROCEDURE — 258N000003 HC RX IP 258 OP 636: Performed by: CLINICAL NURSE SPECIALIST

## 2023-07-04 PROCEDURE — 250N000013 HC RX MED GY IP 250 OP 250 PS 637

## 2023-07-04 PROCEDURE — 999N000157 HC STATISTIC RCP TIME EA 10 MIN

## 2023-07-04 PROCEDURE — 250N000011 HC RX IP 250 OP 636: Mod: JZ | Performed by: CLINICAL NURSE SPECIALIST

## 2023-07-04 PROCEDURE — 80048 BASIC METABOLIC PNL TOTAL CA: CPT

## 2023-07-04 PROCEDURE — 94640 AIRWAY INHALATION TREATMENT: CPT | Mod: 76

## 2023-07-04 PROCEDURE — 83735 ASSAY OF MAGNESIUM: CPT

## 2023-07-04 PROCEDURE — 250N000011 HC RX IP 250 OP 636

## 2023-07-04 PROCEDURE — 84145 PROCALCITONIN (PCT): CPT | Performed by: INTERNAL MEDICINE

## 2023-07-04 PROCEDURE — 250N000011 HC RX IP 250 OP 636: Mod: JZ

## 2023-07-04 PROCEDURE — 250N000011 HC RX IP 250 OP 636: Mod: JZ | Performed by: ANESTHESIOLOGY

## 2023-07-04 PROCEDURE — 84100 ASSAY OF PHOSPHORUS: CPT | Performed by: CLINICAL NURSE SPECIALIST

## 2023-07-04 PROCEDURE — G1010 CDSM STANSON: HCPCS

## 2023-07-04 PROCEDURE — 83735 ASSAY OF MAGNESIUM: CPT | Performed by: CLINICAL NURSE SPECIALIST

## 2023-07-04 PROCEDURE — 250N000009 HC RX 250: Performed by: ANESTHESIOLOGY

## 2023-07-04 PROCEDURE — 74177 CT ABD & PELVIS W/CONTRAST: CPT | Mod: 26 | Performed by: RADIOLOGY

## 2023-07-04 PROCEDURE — 86140 C-REACTIVE PROTEIN: CPT | Performed by: INTERNAL MEDICINE

## 2023-07-04 PROCEDURE — 84132 ASSAY OF SERUM POTASSIUM: CPT | Performed by: CLINICAL NURSE SPECIALIST

## 2023-07-04 PROCEDURE — 250N000009 HC RX 250: Performed by: CLINICAL NURSE SPECIALIST

## 2023-07-04 PROCEDURE — 258N000003 HC RX IP 258 OP 636

## 2023-07-04 PROCEDURE — C9113 INJ PANTOPRAZOLE SODIUM, VIA: HCPCS | Mod: JZ

## 2023-07-04 PROCEDURE — 999N000147 HC STATISTIC PT IP EVAL DEFER

## 2023-07-04 PROCEDURE — 94640 AIRWAY INHALATION TREATMENT: CPT

## 2023-07-04 PROCEDURE — G1010 CDSM STANSON: HCPCS | Mod: GC | Performed by: RADIOLOGY

## 2023-07-04 PROCEDURE — 200N000002 HC R&B ICU UMMC

## 2023-07-04 PROCEDURE — 250N000011 HC RX IP 250 OP 636: Mod: JZ | Performed by: STUDENT IN AN ORGANIZED HEALTH CARE EDUCATION/TRAINING PROGRAM

## 2023-07-04 PROCEDURE — 250N000013 HC RX MED GY IP 250 OP 250 PS 637: Performed by: ANESTHESIOLOGY

## 2023-07-04 PROCEDURE — 93010 ELECTROCARDIOGRAM REPORT: CPT | Performed by: INTERNAL MEDICINE

## 2023-07-04 RX ORDER — MAGNESIUM SULFATE HEPTAHYDRATE 40 MG/ML
4 INJECTION, SOLUTION INTRAVENOUS ONCE
Status: COMPLETED | OUTPATIENT
Start: 2023-07-04 | End: 2023-07-04

## 2023-07-04 RX ORDER — IOPAMIDOL 755 MG/ML
100 INJECTION, SOLUTION INTRAVASCULAR ONCE
Status: COMPLETED | OUTPATIENT
Start: 2023-07-04 | End: 2023-07-04

## 2023-07-04 RX ORDER — POTASSIUM CHLORIDE 29.8 MG/ML
20 INJECTION INTRAVENOUS
Status: COMPLETED | OUTPATIENT
Start: 2023-07-04 | End: 2023-07-04

## 2023-07-04 RX ADMIN — SODIUM CHLORIDE, POTASSIUM CHLORIDE, SODIUM LACTATE AND CALCIUM CHLORIDE 500 ML: 600; 310; 30; 20 INJECTION, SOLUTION INTRAVENOUS at 11:00

## 2023-07-04 RX ADMIN — PANTOPRAZOLE SODIUM 40 MG: 40 INJECTION, POWDER, FOR SOLUTION INTRAVENOUS at 08:40

## 2023-07-04 RX ADMIN — SERTRALINE HYDROCHLORIDE 150 MG: 100 TABLET ORAL at 19:36

## 2023-07-04 RX ADMIN — MAGNESIUM SULFATE HEPTAHYDRATE 4 G: 40 INJECTION, SOLUTION INTRAVENOUS at 06:12

## 2023-07-04 RX ADMIN — CARBIDOPA AND LEVODOPA 10 MG: 50; 200 TABLET, EXTENDED RELEASE ORAL at 07:11

## 2023-07-04 RX ADMIN — NOREPINEPHRINE BITARTRATE 0.05 MCG/KG/MIN: 0.06 INJECTION, SOLUTION INTRAVENOUS at 10:23

## 2023-07-04 RX ADMIN — ALBUTEROL SULFATE 2.5 MG: 2.5 SOLUTION RESPIRATORY (INHALATION) at 07:50

## 2023-07-04 RX ADMIN — OXYCODONE HYDROCHLORIDE 5 MG: 5 TABLET ORAL at 12:49

## 2023-07-04 RX ADMIN — SODIUM CHLORIDE 55 ML: 9 INJECTION, SOLUTION INTRAVENOUS at 02:49

## 2023-07-04 RX ADMIN — SODIUM CHLORIDE, POTASSIUM CHLORIDE, SODIUM LACTATE AND CALCIUM CHLORIDE 500 ML: 600; 310; 30; 20 INJECTION, SOLUTION INTRAVENOUS at 10:11

## 2023-07-04 RX ADMIN — ENOXAPARIN SODIUM 40 MG: 40 INJECTION SUBCUTANEOUS at 19:35

## 2023-07-04 RX ADMIN — PIPERACILLIN SODIUM AND TAZOBACTAM SODIUM 4.5 G: 4; .5 INJECTION, POWDER, LYOPHILIZED, FOR SOLUTION INTRAVENOUS at 20:22

## 2023-07-04 RX ADMIN — OXYCODONE HYDROCHLORIDE 5 MG: 5 TABLET ORAL at 22:55

## 2023-07-04 RX ADMIN — VANCOMYCIN HYDROCHLORIDE 750 MG: 1 INJECTION, POWDER, LYOPHILIZED, FOR SOLUTION INTRAVENOUS at 10:07

## 2023-07-04 RX ADMIN — BACLOFEN 30 MG: 20 TABLET ORAL at 08:41

## 2023-07-04 RX ADMIN — BACLOFEN 30 MG: 20 TABLET ORAL at 14:24

## 2023-07-04 RX ADMIN — OXYCODONE HYDROCHLORIDE 5 MG: 5 TABLET ORAL at 02:04

## 2023-07-04 RX ADMIN — ALBUTEROL SULFATE 2.5 MG: 2.5 SOLUTION RESPIRATORY (INHALATION) at 12:47

## 2023-07-04 RX ADMIN — OXYCODONE HYDROCHLORIDE 5 MG: 5 TABLET ORAL at 20:22

## 2023-07-04 RX ADMIN — POTASSIUM CHLORIDE 20 MEQ: 29.8 INJECTION, SOLUTION INTRAVENOUS at 06:12

## 2023-07-04 RX ADMIN — GABAPENTIN 300 MG: 250 SOLUTION ORAL at 22:55

## 2023-07-04 RX ADMIN — Medication 1 CAPSULE: at 08:41

## 2023-07-04 RX ADMIN — PIPERACILLIN SODIUM AND TAZOBACTAM SODIUM 4.5 G: 4; .5 INJECTION, POWDER, LYOPHILIZED, FOR SOLUTION INTRAVENOUS at 02:04

## 2023-07-04 RX ADMIN — POTASSIUM CHLORIDE 20 MEQ: 29.8 INJECTION, SOLUTION INTRAVENOUS at 08:35

## 2023-07-04 RX ADMIN — BACLOFEN 30 MG: 20 TABLET ORAL at 19:35

## 2023-07-04 RX ADMIN — ALBUTEROL SULFATE 2.5 MG: 2.5 SOLUTION RESPIRATORY (INHALATION) at 16:19

## 2023-07-04 RX ADMIN — ALBUTEROL SULFATE 2.5 MG: 2.5 SOLUTION RESPIRATORY (INHALATION) at 20:06

## 2023-07-04 RX ADMIN — SODIUM CHLORIDE SOLN NEBU 3% 3 ML: 3 NEBU SOLN at 08:10

## 2023-07-04 RX ADMIN — CARBIDOPA AND LEVODOPA 10 MG: 50; 200 TABLET, EXTENDED RELEASE ORAL at 16:09

## 2023-07-04 RX ADMIN — PIPERACILLIN SODIUM AND TAZOBACTAM SODIUM 4.5 G: 4; .5 INJECTION, POWDER, LYOPHILIZED, FOR SOLUTION INTRAVENOUS at 08:41

## 2023-07-04 RX ADMIN — PIPERACILLIN SODIUM AND TAZOBACTAM SODIUM 4.5 G: 4; .5 INJECTION, POWDER, LYOPHILIZED, FOR SOLUTION INTRAVENOUS at 14:24

## 2023-07-04 RX ADMIN — OXYCODONE HYDROCHLORIDE 5 MG: 5 TABLET ORAL at 18:17

## 2023-07-04 RX ADMIN — Medication 15 ML: at 08:41

## 2023-07-04 RX ADMIN — Medication 1 CAPSULE: at 19:35

## 2023-07-04 RX ADMIN — OXYCODONE HYDROCHLORIDE 5 MG: 5 TABLET ORAL at 16:09

## 2023-07-04 RX ADMIN — OXYCODONE HYDROCHLORIDE 5 MG: 5 TABLET ORAL at 06:12

## 2023-07-04 RX ADMIN — IOPAMIDOL 59 ML: 755 INJECTION, SOLUTION INTRAVENOUS at 02:41

## 2023-07-04 RX ADMIN — OXYCODONE HYDROCHLORIDE 5 MG: 5 TABLET ORAL at 09:04

## 2023-07-04 ASSESSMENT — ACTIVITIES OF DAILY LIVING (ADL)
ADLS_ACUITY_SCORE: 49

## 2023-07-04 NOTE — PLAN OF CARE
PT: PT orders received.  Per discussion with pt she is lift dependent at baseline, has assist from family and home care nurses.  She is able to drive her power chair and move UE's some against gravity.  Pt feels that she is at her baseline as far as mobility.  At this time pt with no new mobility concerns, PT orders completed.  RN educated on lifting up into recliner and completion of ROM in UE's/LE's with movement as per pt's HEP.  Pt also educated on having her power chair brought in for improved upright positioning.

## 2023-07-04 NOTE — PROGRESS NOTES
CLINICAL NUTRITION SERVICES - BRIEF NOTE     Nutrition Prescription    RECOMMENDATIONS FOR MDs/PROVIDERS TO ORDER:  None at present.     Recommendations already ordered by Registered Dietitian (RD)  TF being resumed at 20 ml./hr this morning and advancing according to this plan:  Enteral Nutrition Recommendation:  - Access: NDT  - Dosing weight: 56 kg  - Osmolite 1.5 Ronny (or equivalent) @ goal of 35ml/hr (840ml/day) + 1 pkt Prosource TF20 provides: 1340 kcals, 72 g PRO, 640 ml free H20, 171 g CHO, and 0 g fiber daily. This provides 24 kcal/kg and 1.3 g/kg protein.  - resume at 20 ml/hr advance by 10 mL Q 8 hours to goal of 35 mL/hr.  - FWF: 30 mL Q 4 hours for tube patency.  - Additives: Ceravite 15 mL  Recommended repeat of Mg and K today  (will be done at 1 pm) and follow replacement protocol.    Future/Additional Recommendations:  Monitor TF needs and tolerance and advancement of oral diet.       REASON FOR ASSESSMENT  Dianna Cottrell is a/an 28 year old female assessed by the dietitian for Provider Order - Registered Dietitian to Assess and Order TF per Medical Nutrition Therapy Protocol    Findings  Orders Placed This Encounter      NPO for Medical/Clinical Reasons Except for: NPO but receiving Tube Feeding  Reviewed pt with bedside RN.  Pt extubated but remains NPO.   Distension improved.   Plan to resume TF and advance slowly.            Latest Reference Range & Units Most Recent   Sodium 136 - 145 mmol/L 142  7/4/23 05:04   Potassium 3.4 - 5.3 mmol/L 3.1 (L)  7/4/23 05:04   Chloride 94 - 109 mmol/L 108  3/8/23 16:14   Chloride 98 - 107 mmol/L 106  7/4/23 05:04   Carbon Dioxide 20 - 32 mmol/L 27  3/8/23 16:14   Carbon Dioxide (CO2) 22 - 29 mmol/L 28  7/4/23 05:04   Urea Nitrogen 7 - 30 mg/dL 10  3/8/23 16:14   Urea Nitrogen 6.0 - 20.0 mg/dL 15.9  7/4/23 05:04   Creatinine 0.51 - 0.95 mg/dL 0.26 (L)  7/4/23 05:04   GFR Estimate >60 mL/min/1.73m2 >90  7/4/23 05:04   GFR Estimate If Black >60 mL/min/1.73_m2  >90  11/16/20 13:15   Calcium 8.6 - 10.0 mg/dL 6.2 (L)  7/4/23 05:04   Anion Gap 7 - 15 mmol/L 8  7/4/23 05:04   Magnesium 1.7 - 2.3 mg/dL 1.1 (L)  7/4/23 05:04   Phosphorus 2.5 - 4.5 mg/dL 3.7  7/4/23 05:04   (L): Data is abnormally low    INTERVENTIONS  Implementation    Collaboration with other nutrition professionals  Collaboration with other providers  Enteral Nutrition - Modify rate      Follow up/Monitoring  No nutrition follow-up warranted at this time. RD to sign off. Please consult if further needs arise    Katty Mcmillan, MPH, RDN, LD, [facility]  Pager: [phone]
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
## 2023-07-04 NOTE — PROGRESS NOTES
Patient transported to/from CT, via monitored cart/bed.  Respiratory status maintained with Oxyplus Mask @ 5 LPM, additional interventions were monitoring.  Patient's vital signs were monitored continuously and remained stable throughout transport.    Coty Tavares, RT, RT  7/4/2023 3:06 AM

## 2023-07-04 NOTE — PROGRESS NOTES
Patient calling to schedule CPE.  Puneet said he has also been experiencing pain in his right arm near his bicep.  Along with the pain Puneet said he has a tingling sensation in his hand.    Pt alert and oriented x4, refused to get into chair today. Pt's hypotensive this AM and complaining of being dizzy, given 1 L LR bolus and had to restart Levo at low dose. Attempted to shut levo off at 1815, however following BP was 85/47 with map 58, restarted at 0.02 mcg/kg/min. CVC dressing changed. Bladder irrigated with 120ml NS. Right IT dressing changed this AM. Pt with blanchable redness to rectum area and groin. Applied skin prep and barrier cream. To groin also added intedry. TF restarted, D5 turned off. RT this AM worked with patient for an extended amount of time with cough assist and got a large mucous plug out, pt has been stable on 47% 35L throughout the day. Good urine output from both neph tube and urostomy. Smear BM today. Mom at bedside, and updated throughout the day.
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
## 2023-07-04 NOTE — PROGRESS NOTES
10A ICU End of Shift Summary.      Neuro: A&Ox4, Able to make needs known.  Cardiac: NSR. BP occasionally hypotensive- given IV fluid bolus 500 ml X 1, BPs remain soft but MAP >65.  Respiratory:  On HFNC at 35 L Fio2 40%, lung sound  CTA.  GI: TF held on account of abdominal distension, X ray yesterday showed -ABD remarkable for diffuse mild to moderate gaseous distention of small and large bowel loops. Abdominal Pelvis CT completed overnight with final results pending. Remains NPO. No BM overnight- had a smear.   : Right urostomy patent with adequate UO. Irrigated x 1.  Left PNT patent and draining.  Diet/appetite: Still NPO, tolerating ice chips  Pain:On scheduled q3h oxycodone. Denies pain  Skin: Right IT dressing CDI. Perineum redness- barrier cream applied   LDA's: Right Triple lumen CVC, 1 Right PIV SL  Activities: Q2h turn in bed     Magnesium and potassium replaced per protocol      Plan: Continue POC

## 2023-07-05 ENCOUNTER — TELEPHONE (OUTPATIENT)
Dept: WOUND CARE | Facility: CLINIC | Age: 28
End: 2023-07-05
Payer: MEDICARE

## 2023-07-05 ENCOUNTER — TELEPHONE (OUTPATIENT)
Dept: FAMILY MEDICINE | Facility: CLINIC | Age: 28
End: 2023-07-05

## 2023-07-05 ENCOUNTER — APPOINTMENT (OUTPATIENT)
Dept: SPEECH THERAPY | Facility: CLINIC | Age: 28
DRG: 870 | End: 2023-07-05
Payer: MEDICARE

## 2023-07-05 LAB
ANION GAP SERPL CALCULATED.3IONS-SCNC: 7 MMOL/L (ref 7–15)
ATRIAL RATE - MUSE: 79 BPM
BACTERIA BLD CULT: NO GROWTH
BACTERIA BLD CULT: NO GROWTH
BUN SERPL-MCNC: 17.8 MG/DL (ref 6–20)
CALCIUM SERPL-MCNC: 6.8 MG/DL (ref 8.6–10)
CHLORIDE SERPL-SCNC: 105 MMOL/L (ref 98–107)
CREAT SERPL-MCNC: 0.32 MG/DL (ref 0.51–0.95)
DEPRECATED HCO3 PLAS-SCNC: 29 MMOL/L (ref 22–29)
DIASTOLIC BLOOD PRESSURE - MUSE: NORMAL MMHG
ERYTHROCYTE [DISTWIDTH] IN BLOOD BY AUTOMATED COUNT: 15.8 % (ref 10–15)
GFR SERPL CREATININE-BSD FRML MDRD: >90 ML/MIN/1.73M2
GLUCOSE BLDC GLUCOMTR-MCNC: 111 MG/DL (ref 70–99)
GLUCOSE BLDC GLUCOMTR-MCNC: 119 MG/DL (ref 70–99)
GLUCOSE BLDC GLUCOMTR-MCNC: 75 MG/DL (ref 70–99)
GLUCOSE BLDC GLUCOMTR-MCNC: 84 MG/DL (ref 70–99)
GLUCOSE BLDC GLUCOMTR-MCNC: 99 MG/DL (ref 70–99)
GLUCOSE SERPL-MCNC: 87 MG/DL (ref 70–99)
HCT VFR BLD AUTO: 27.8 % (ref 35–47)
HGB BLD-MCNC: 8.9 G/DL (ref 11.7–15.7)
INTERPRETATION ECG - MUSE: NORMAL
MAGNESIUM SERPL-MCNC: 1.6 MG/DL (ref 1.7–2.3)
MCH RBC QN AUTO: 27 PG (ref 26.5–33)
MCHC RBC AUTO-ENTMCNC: 32 G/DL (ref 31.5–36.5)
MCV RBC AUTO: 84 FL (ref 78–100)
P AXIS - MUSE: 60 DEGREES
PHOSPHATE SERPL-MCNC: 3.3 MG/DL (ref 2.5–4.5)
PLATELET # BLD AUTO: 390 10E3/UL (ref 150–450)
POTASSIUM SERPL-SCNC: 3.7 MMOL/L (ref 3.4–5.3)
PR INTERVAL - MUSE: 138 MS
QRS DURATION - MUSE: 80 MS
QT - MUSE: 452 MS
QTC - MUSE: 518 MS
R AXIS - MUSE: 83 DEGREES
RBC # BLD AUTO: 3.3 10E6/UL (ref 3.8–5.2)
SODIUM SERPL-SCNC: 141 MMOL/L (ref 136–145)
SODIUM SERPL-SCNC: 142 MMOL/L (ref 136–145)
SYSTOLIC BLOOD PRESSURE - MUSE: NORMAL MMHG
T AXIS - MUSE: 78 DEGREES
VENTRICULAR RATE- MUSE: 79 BPM
WBC # BLD AUTO: 28.5 10E3/UL (ref 4–11)

## 2023-07-05 PROCEDURE — 200N000002 HC R&B ICU UMMC

## 2023-07-05 PROCEDURE — 272N000202 HC AEROBIKA WITH MANOMETER

## 2023-07-05 PROCEDURE — 250N000013 HC RX MED GY IP 250 OP 250 PS 637: Performed by: CLINICAL NURSE SPECIALIST

## 2023-07-05 PROCEDURE — 84100 ASSAY OF PHOSPHORUS: CPT | Performed by: CLINICAL NURSE SPECIALIST

## 2023-07-05 PROCEDURE — 99233 SBSQ HOSP IP/OBS HIGH 50: CPT | Performed by: INTERNAL MEDICINE

## 2023-07-05 PROCEDURE — 83735 ASSAY OF MAGNESIUM: CPT | Performed by: NURSE PRACTITIONER

## 2023-07-05 PROCEDURE — 999N000215 HC STATISTIC HFNC ADULT NON-CPAP

## 2023-07-05 PROCEDURE — 85027 COMPLETE CBC AUTOMATED: CPT

## 2023-07-05 PROCEDURE — 999N000157 HC STATISTIC RCP TIME EA 10 MIN

## 2023-07-05 PROCEDURE — 94640 AIRWAY INHALATION TREATMENT: CPT | Mod: 76

## 2023-07-05 PROCEDURE — 92610 EVALUATE SWALLOWING FUNCTION: CPT | Mod: GN

## 2023-07-05 PROCEDURE — 250N000013 HC RX MED GY IP 250 OP 250 PS 637

## 2023-07-05 PROCEDURE — 80048 BASIC METABOLIC PNL TOTAL CA: CPT

## 2023-07-05 PROCEDURE — 250N000013 HC RX MED GY IP 250 OP 250 PS 637: Performed by: ANESTHESIOLOGY

## 2023-07-05 PROCEDURE — 84295 ASSAY OF SERUM SODIUM: CPT

## 2023-07-05 PROCEDURE — 250N000011 HC RX IP 250 OP 636

## 2023-07-05 PROCEDURE — 250N000011 HC RX IP 250 OP 636: Mod: JZ | Performed by: STUDENT IN AN ORGANIZED HEALTH CARE EDUCATION/TRAINING PROGRAM

## 2023-07-05 PROCEDURE — 94640 AIRWAY INHALATION TREATMENT: CPT

## 2023-07-05 PROCEDURE — 250N000011 HC RX IP 250 OP 636: Mod: JZ | Performed by: CLINICAL NURSE SPECIALIST

## 2023-07-05 PROCEDURE — 250N000009 HC RX 250: Performed by: CLINICAL NURSE SPECIALIST

## 2023-07-05 RX ORDER — MAGNESIUM SULFATE HEPTAHYDRATE 40 MG/ML
2 INJECTION, SOLUTION INTRAVENOUS ONCE
Status: COMPLETED | OUTPATIENT
Start: 2023-07-05 | End: 2023-07-05

## 2023-07-05 RX ORDER — AMPICILLIN AND SULBACTAM 2; 1 G/1; G/1
3 INJECTION, POWDER, FOR SOLUTION INTRAMUSCULAR; INTRAVENOUS EVERY 6 HOURS
Status: DISCONTINUED | OUTPATIENT
Start: 2023-07-05 | End: 2023-07-06

## 2023-07-05 RX ORDER — CALCIUM GLUCONATE 20 MG/ML
1 INJECTION, SOLUTION INTRAVENOUS ONCE
Status: COMPLETED | OUTPATIENT
Start: 2023-07-05 | End: 2023-07-05

## 2023-07-05 RX ADMIN — OXYCODONE HYDROCHLORIDE 5 MG: 5 TABLET ORAL at 16:58

## 2023-07-05 RX ADMIN — OXYCODONE HYDROCHLORIDE 5 MG: 5 TABLET ORAL at 08:41

## 2023-07-05 RX ADMIN — AMPICILLIN SODIUM AND SULBACTAM SODIUM 3 G: 2; 1 INJECTION, POWDER, FOR SOLUTION INTRAMUSCULAR; INTRAVENOUS at 20:09

## 2023-07-05 RX ADMIN — OXYCODONE HYDROCHLORIDE 5 MG: 5 TABLET ORAL at 10:53

## 2023-07-05 RX ADMIN — BACLOFEN 30 MG: 20 TABLET ORAL at 14:15

## 2023-07-05 RX ADMIN — OXYCODONE HYDROCHLORIDE 5 MG: 5 TABLET ORAL at 04:44

## 2023-07-05 RX ADMIN — CARBIDOPA AND LEVODOPA 10 MG: 50; 200 TABLET, EXTENDED RELEASE ORAL at 16:58

## 2023-07-05 RX ADMIN — OXYCODONE HYDROCHLORIDE 5 MG: 5 TABLET ORAL at 20:09

## 2023-07-05 RX ADMIN — Medication 1 CAPSULE: at 20:09

## 2023-07-05 RX ADMIN — PIPERACILLIN SODIUM AND TAZOBACTAM SODIUM 4.5 G: 4; .5 INJECTION, POWDER, LYOPHILIZED, FOR SOLUTION INTRAVENOUS at 02:35

## 2023-07-05 RX ADMIN — OXYCODONE HYDROCHLORIDE 5 MG: 5 TABLET ORAL at 22:20

## 2023-07-05 RX ADMIN — MAGNESIUM SULFATE HEPTAHYDRATE 2 G: 40 INJECTION, SOLUTION INTRAVENOUS at 09:11

## 2023-07-05 RX ADMIN — Medication 15 ML: at 08:41

## 2023-07-05 RX ADMIN — SODIUM CHLORIDE SOLN NEBU 3% 3 ML: 3 NEBU SOLN at 00:49

## 2023-07-05 RX ADMIN — CALCIUM GLUCONATE 1 G: 20 INJECTION, SOLUTION INTRAVENOUS at 10:58

## 2023-07-05 RX ADMIN — ALBUTEROL SULFATE 2.5 MG: 2.5 SOLUTION RESPIRATORY (INHALATION) at 08:29

## 2023-07-05 RX ADMIN — ALBUTEROL SULFATE 2.5 MG: 2.5 SOLUTION RESPIRATORY (INHALATION) at 20:14

## 2023-07-05 RX ADMIN — ENOXAPARIN SODIUM 40 MG: 40 INJECTION SUBCUTANEOUS at 20:09

## 2023-07-05 RX ADMIN — CARBIDOPA AND LEVODOPA 10 MG: 50; 200 TABLET, EXTENDED RELEASE ORAL at 08:41

## 2023-07-05 RX ADMIN — AMPICILLIN SODIUM AND SULBACTAM SODIUM 3 G: 2; 1 INJECTION, POWDER, FOR SOLUTION INTRAMUSCULAR; INTRAVENOUS at 14:14

## 2023-07-05 RX ADMIN — GABAPENTIN 300 MG: 250 SOLUTION ORAL at 22:20

## 2023-07-05 RX ADMIN — OXYCODONE HYDROCHLORIDE 5 MG: 5 TABLET ORAL at 14:15

## 2023-07-05 RX ADMIN — ALBUTEROL SULFATE 2.5 MG: 2.5 SOLUTION RESPIRATORY (INHALATION) at 16:12

## 2023-07-05 RX ADMIN — BACLOFEN 30 MG: 20 TABLET ORAL at 08:41

## 2023-07-05 RX ADMIN — AMPICILLIN SODIUM AND SULBACTAM SODIUM 3 G: 2; 1 INJECTION, POWDER, FOR SOLUTION INTRAMUSCULAR; INTRAVENOUS at 09:06

## 2023-07-05 RX ADMIN — ALBUTEROL SULFATE 2.5 MG: 2.5 SOLUTION RESPIRATORY (INHALATION) at 13:00

## 2023-07-05 RX ADMIN — BACLOFEN 30 MG: 20 TABLET ORAL at 20:09

## 2023-07-05 RX ADMIN — HYDROXYZINE HYDROCHLORIDE 25 MG: 25 TABLET, FILM COATED ORAL at 20:09

## 2023-07-05 RX ADMIN — OXYCODONE HYDROCHLORIDE 5 MG: 5 TABLET ORAL at 02:35

## 2023-07-05 RX ADMIN — Medication 1 CAPSULE: at 08:41

## 2023-07-05 RX ADMIN — ACETAMINOPHEN 650 MG: 325 SOLUTION ORAL at 20:09

## 2023-07-05 RX ADMIN — SERTRALINE HYDROCHLORIDE 150 MG: 100 TABLET ORAL at 20:08

## 2023-07-05 ASSESSMENT — ACTIVITIES OF DAILY LIVING (ADL)
ADLS_ACUITY_SCORE: 49

## 2023-07-05 NOTE — TELEPHONE ENCOUNTER
Forms/Letter Request    Type of form/letter: Wound Care + Catheter Supplies    Have you been seen for this request: N/A    Do we have the form/letter: Yes:      Who is the form from? [facility] (if other please explain)    Where did/will the form come from? form was faxed in    When is form/letter needed by: as soon as time permits    How would you like the form/letter returned: Fax : to:  [phone]    Patient Notified form requests are processed in 3-5 business days:No    Could we send this information to you in [email or web address] or would you prefer to receive a phone call?:   Patient would like to be contacted via [email or web address]
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
## 2023-07-05 NOTE — PROGRESS NOTES
Major Shift Events:  Pt A&Ox4, denies pain. NSR. Tube feeds increased to goal rate of 35mL/hr at 2100. Right IT dressing changed at 0000 during bed bath per WOC orders. Neph tube and urostomy patent and draining well. RT performed cough assist w/ pt to help clear secretions. One medium loose bowel movement overnight. Bladder irrigated w/ 120mL NS. No electrolyte replacements necessary this AM.    Plan: Continue plan of care. Wean down oxygen needs.    For vital signs and complete assessments, please see documentation flowsheets.

## 2023-07-05 NOTE — PROGRESS NOTES
Brief Urology Note    Checked on Ms. Cottrell, seems to be doing well. L PNT draining clear yellow urine. CCIC catheter in place draining clear yellow urine with minimal mucus. RNs having no issues with irrigation. Discussed that she will likely be on antibiotics until she can get a future L PCNL as outpatient but that her CCIC catheter will come out prior to discharge and she can go back to her pta catheter regiment (may benefit from daily or BID irrigation to clear mucus however).     Urology will continue to follow peripherally.     Iglesia Billy MD  Urology Resident

## 2023-07-05 NOTE — PROGRESS NOTES
Care Management Follow Up    Length of Stay (days): 7    Expected Discharge Date: 06/30/2023     Concerns to be Addressed: discharge planning, other (see comments) (will need resumption of care orders upon discharge.)     Patient plan of care discussed at interdisciplinary rounds: No    Anticipated Discharge Disposition: Home, Home Care     Anticipated Discharge Services: County Worker, Other (see comment) ([facility], home health aide)  Anticipated Discharge DME: Wheelchair (Patient owns own wheelchair.)    Patient/family educated on Medicare website which has current facility and service quality ratings: no  Education Provided on the Discharge Plan: Yes  Patient/Family in Agreement with the Plan: yes    Referrals Placed by CM/SW:  None currently  Private pay costs discussed: Not applicable    Additional Information:    Relevant Contact Info:  [facility] ([facility])  Phone: [phone]  Pt receives 2x/wk wound cares from skilled nursing and a HHA for bathing/general ADLs  ____________________________________________    Called ICU, spoke w/ charge RN, requested update.  Charge stated pt is still on 1 pressor, which is currently the only ICU need pt has.  Once the pressor is no longer needed, pt could transition to medical floor.  Per notes, ID signed off, pt can transition to oral abx at discharge.  Charge RN stated psych may get involved d/t pt being withdrawn and lacking motivation to do things.  She stated pt is likely to be able to return home at discharge.          Care management will continue to follow.    Beau Garcia RN[facility]. [facility]  Units 8M/S & 10 ICU  Pager: [phone]  Phone: [phone]
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
## 2023-07-05 NOTE — PROGRESS NOTES
Worked with patient on flutter valve able to do it well. 10 repetitions x 2. Weak cough better aeration.

## 2023-07-05 NOTE — PROGRESS NOTES
CLINICAL NUTRITION SERVICES - BRIEF NOTE     Nutrition Prescription      Recommendations already ordered by Registered Dietitian (RD):  Plan to resume ND-TF at goal and await SLP assessment for diet advancement     Future/Additional Recommendations:  Pending po diet per SLP, may consider adjustments to TF (cycled regimen) to encourage po intakes so pt can be weaned from TF     NEW FINDINGS   MAR reviewed. Labs reviewed, Mag noted and being replaced, Na trends noted. Pt reviewed during ICU team rounds, feeding tube was clogged this early am, pt has been extubated but remains NPO, Providers will consult SLP to assess safety of oral diet, but nursing questions pt ability to adequately take po, so ideally need to resume TF. After rounds tube was unclogged by nursing staff and TF was resumed, SLP scheduled for later this afternoon.     INTERVENTIONS  Implementation  Enteral Nutrition - resumed at goal, plan to continue until SLP assessment completed   Water flushes - per Providers     Monitoring/Evaluation  Will continue to monitor and evaluate per protocol.    [address] ICU RD pager: [phone]  Weekend/holiday pager: [phone]

## 2023-07-05 NOTE — TELEPHONE ENCOUNTER
Returned call to Areli. She wanted to inform Suzan LIEBERMAN of the hospitalization and what ID and surgery has said. Suzan LIEBERMAN said she will look through the documentation and will reach out to someone if needed for further questions/assistance needed. The patient has a follow up 7/11/23 with Suzan LIEBERMAN.

## 2023-07-05 NOTE — PROGRESS NOTES
"SPIRITUAL HEALTH SERVICES Progress Note  [facility] ([facility]) 10 ICU    Saw pt per length of stay. I informed pt and their mother about Spiritual Health Services. Pt declined a visit and prayer at this time. Pt mother seemed like she was hoping her daughter would say something to me. Both pt and mother said they were \"fine.\" I informed pt and mother they could ask a nurse for a  at anytime. Pt thanked me for stopping by.    Dariel Manzano III   Intern  Pager [phone]      * [facility] remains available 24/7 for emergent requests/referrals, either by having the switchboard page the on-call  or by entering an ASAP/STAT consult in Epic (this will also page the on-call ). Routine Epic consults receive an initial response within 24 hours.*   "

## 2023-07-06 ENCOUNTER — APPOINTMENT (OUTPATIENT)
Dept: SPEECH THERAPY | Facility: CLINIC | Age: 28
DRG: 870 | End: 2023-07-06
Attending: ANESTHESIOLOGY
Payer: MEDICARE

## 2023-07-06 ENCOUNTER — APPOINTMENT (OUTPATIENT)
Dept: GENERAL RADIOLOGY | Facility: CLINIC | Age: 28
DRG: 870 | End: 2023-07-06
Payer: MEDICARE

## 2023-07-06 ENCOUNTER — TELEPHONE (OUTPATIENT)
Dept: FAMILY MEDICINE | Facility: CLINIC | Age: 28
End: 2023-07-06

## 2023-07-06 LAB
ANION GAP SERPL CALCULATED.3IONS-SCNC: 6 MMOL/L (ref 7–15)
BUN SERPL-MCNC: 15.2 MG/DL (ref 6–20)
CALCIUM SERPL-MCNC: 7.3 MG/DL (ref 8.6–10)
CHLORIDE SERPL-SCNC: 105 MMOL/L (ref 98–107)
CREAT SERPL-MCNC: 0.26 MG/DL (ref 0.51–0.95)
DEPRECATED HCO3 PLAS-SCNC: 32 MMOL/L (ref 22–29)
ERYTHROCYTE [DISTWIDTH] IN BLOOD BY AUTOMATED COUNT: 17 % (ref 10–15)
GFR SERPL CREATININE-BSD FRML MDRD: >90 ML/MIN/1.73M2
GLUCOSE BLDC GLUCOMTR-MCNC: 115 MG/DL (ref 70–99)
GLUCOSE BLDC GLUCOMTR-MCNC: 92 MG/DL (ref 70–99)
GLUCOSE BLDC GLUCOMTR-MCNC: 93 MG/DL (ref 70–99)
GLUCOSE BLDC GLUCOMTR-MCNC: 95 MG/DL (ref 70–99)
GLUCOSE SERPL-MCNC: 95 MG/DL (ref 70–99)
HCT VFR BLD AUTO: 26.5 % (ref 35–47)
HGB BLD-MCNC: 8.4 G/DL (ref 11.7–15.7)
MAGNESIUM SERPL-MCNC: 1.5 MG/DL (ref 1.7–2.3)
MAGNESIUM SERPL-MCNC: 3.1 MG/DL (ref 1.7–2.3)
MCH RBC QN AUTO: 27.2 PG (ref 26.5–33)
MCHC RBC AUTO-ENTMCNC: 31.7 G/DL (ref 31.5–36.5)
MCV RBC AUTO: 86 FL (ref 78–100)
PHOSPHATE SERPL-MCNC: 2.9 MG/DL (ref 2.5–4.5)
PLATELET # BLD AUTO: 371 10E3/UL (ref 150–450)
POTASSIUM SERPL-SCNC: 3.9 MMOL/L (ref 3.4–5.3)
RBC # BLD AUTO: 3.09 10E6/UL (ref 3.8–5.2)
SODIUM SERPL-SCNC: 139 MMOL/L (ref 136–145)
SODIUM SERPL-SCNC: 142 MMOL/L (ref 136–145)
SODIUM SERPL-SCNC: 143 MMOL/L (ref 136–145)
WBC # BLD AUTO: 12.6 10E3/UL (ref 4–11)

## 2023-07-06 PROCEDURE — 999N000157 HC STATISTIC RCP TIME EA 10 MIN

## 2023-07-06 PROCEDURE — 250N000009 HC RX 250: Performed by: CLINICAL NURSE SPECIALIST

## 2023-07-06 PROCEDURE — 250N000013 HC RX MED GY IP 250 OP 250 PS 637: Performed by: ANESTHESIOLOGY

## 2023-07-06 PROCEDURE — 250N000011 HC RX IP 250 OP 636: Mod: JZ

## 2023-07-06 PROCEDURE — G0463 HOSPITAL OUTPT CLINIC VISIT: HCPCS

## 2023-07-06 PROCEDURE — 999N000215 HC STATISTIC HFNC ADULT NON-CPAP

## 2023-07-06 PROCEDURE — 84295 ASSAY OF SERUM SODIUM: CPT

## 2023-07-06 PROCEDURE — 250N000013 HC RX MED GY IP 250 OP 250 PS 637

## 2023-07-06 PROCEDURE — 84100 ASSAY OF PHOSPHORUS: CPT | Performed by: NURSE PRACTITIONER

## 2023-07-06 PROCEDURE — 99232 SBSQ HOSP IP/OBS MODERATE 35: CPT

## 2023-07-06 PROCEDURE — 85027 COMPLETE CBC AUTOMATED: CPT

## 2023-07-06 PROCEDURE — 94640 AIRWAY INHALATION TREATMENT: CPT | Mod: 76

## 2023-07-06 PROCEDURE — 94640 AIRWAY INHALATION TREATMENT: CPT

## 2023-07-06 PROCEDURE — 258N000003 HC RX IP 258 OP 636

## 2023-07-06 PROCEDURE — 83735 ASSAY OF MAGNESIUM: CPT | Performed by: NURSE PRACTITIONER

## 2023-07-06 PROCEDURE — 74018 RADEX ABDOMEN 1 VIEW: CPT

## 2023-07-06 PROCEDURE — 250N000011 HC RX IP 250 OP 636: Mod: JZ | Performed by: ANESTHESIOLOGY

## 2023-07-06 PROCEDURE — 99222 1ST HOSP IP/OBS MODERATE 55: CPT

## 2023-07-06 PROCEDURE — 74018 RADEX ABDOMEN 1 VIEW: CPT | Mod: 26 | Performed by: STUDENT IN AN ORGANIZED HEALTH CARE EDUCATION/TRAINING PROGRAM

## 2023-07-06 PROCEDURE — 83735 ASSAY OF MAGNESIUM: CPT

## 2023-07-06 PROCEDURE — 92526 ORAL FUNCTION THERAPY: CPT | Mod: GN

## 2023-07-06 PROCEDURE — 250N000011 HC RX IP 250 OP 636: Mod: JZ | Performed by: CLINICAL NURSE SPECIALIST

## 2023-07-06 PROCEDURE — 200N000002 HC R&B ICU UMMC

## 2023-07-06 PROCEDURE — 80048 BASIC METABOLIC PNL TOTAL CA: CPT

## 2023-07-06 RX ORDER — LEVOFLOXACIN 750 MG/1
750 TABLET, FILM COATED ORAL EVERY 24 HOURS
Status: COMPLETED | OUTPATIENT
Start: 2023-07-06 | End: 2023-07-11

## 2023-07-06 RX ORDER — METRONIDAZOLE 500 MG/1
500 TABLET ORAL 3 TIMES DAILY
Status: DISCONTINUED | OUTPATIENT
Start: 2023-07-07 | End: 2023-07-06

## 2023-07-06 RX ORDER — MAGNESIUM SULFATE HEPTAHYDRATE 40 MG/ML
4 INJECTION, SOLUTION INTRAVENOUS ONCE
Status: COMPLETED | OUTPATIENT
Start: 2023-07-06 | End: 2023-07-06

## 2023-07-06 RX ORDER — METRONIDAZOLE 500 MG/1
500 TABLET ORAL 3 TIMES DAILY
Status: DISCONTINUED | OUTPATIENT
Start: 2023-07-06 | End: 2023-07-06

## 2023-07-06 RX ORDER — LEVOFLOXACIN 750 MG/1
750 TABLET, FILM COATED ORAL EVERY 24 HOURS
Status: DISCONTINUED | OUTPATIENT
Start: 2023-07-07 | End: 2023-07-06

## 2023-07-06 RX ORDER — AMPICILLIN AND SULBACTAM 2; 1 G/1; G/1
3 INJECTION, POWDER, FOR SOLUTION INTRAMUSCULAR; INTRAVENOUS EVERY 6 HOURS
Status: DISCONTINUED | OUTPATIENT
Start: 2023-07-06 | End: 2023-07-06

## 2023-07-06 RX ORDER — METRONIDAZOLE 500 MG/1
500 TABLET ORAL 3 TIMES DAILY
Status: COMPLETED | OUTPATIENT
Start: 2023-07-06 | End: 2023-07-11

## 2023-07-06 RX ADMIN — ALBUTEROL SULFATE 2.5 MG: 2.5 SOLUTION RESPIRATORY (INHALATION) at 08:01

## 2023-07-06 RX ADMIN — OXYCODONE HYDROCHLORIDE 5 MG: 5 TABLET ORAL at 11:03

## 2023-07-06 RX ADMIN — OXYCODONE HYDROCHLORIDE 5 MG: 5 TABLET ORAL at 02:25

## 2023-07-06 RX ADMIN — OXYCODONE HYDROCHLORIDE 5 MG: 5 TABLET ORAL at 17:12

## 2023-07-06 RX ADMIN — BACLOFEN 30 MG: 20 TABLET ORAL at 20:39

## 2023-07-06 RX ADMIN — Medication 1 CAPSULE: at 20:38

## 2023-07-06 RX ADMIN — ALBUTEROL SULFATE 2.5 MG: 2.5 SOLUTION RESPIRATORY (INHALATION) at 16:02

## 2023-07-06 RX ADMIN — BACLOFEN 30 MG: 20 TABLET ORAL at 07:49

## 2023-07-06 RX ADMIN — GABAPENTIN 300 MG: 250 SOLUTION ORAL at 23:14

## 2023-07-06 RX ADMIN — LEVOFLOXACIN 750 MG: 750 TABLET, FILM COATED ORAL at 11:03

## 2023-07-06 RX ADMIN — OXYCODONE HYDROCHLORIDE 5 MG: 5 TABLET ORAL at 20:40

## 2023-07-06 RX ADMIN — CARBIDOPA AND LEVODOPA 10 MG: 50; 200 TABLET, EXTENDED RELEASE ORAL at 07:49

## 2023-07-06 RX ADMIN — ENOXAPARIN SODIUM 40 MG: 40 INJECTION SUBCUTANEOUS at 20:41

## 2023-07-06 RX ADMIN — ALBUTEROL SULFATE 2.5 MG: 2.5 SOLUTION RESPIRATORY (INHALATION) at 20:24

## 2023-07-06 RX ADMIN — Medication 15 ML: at 07:49

## 2023-07-06 RX ADMIN — SODIUM CHLORIDE, POTASSIUM CHLORIDE, SODIUM LACTATE AND CALCIUM CHLORIDE 250 ML: 600; 310; 30; 20 INJECTION, SOLUTION INTRAVENOUS at 11:01

## 2023-07-06 RX ADMIN — OXYCODONE HYDROCHLORIDE 5 MG: 5 TABLET ORAL at 13:42

## 2023-07-06 RX ADMIN — CARBIDOPA AND LEVODOPA 10 MG: 50; 200 TABLET, EXTENDED RELEASE ORAL at 16:17

## 2023-07-06 RX ADMIN — Medication 1 CAPSULE: at 07:49

## 2023-07-06 RX ADMIN — OXYCODONE HYDROCHLORIDE 5 MG: 5 TABLET ORAL at 04:52

## 2023-07-06 RX ADMIN — METRONIDAZOLE 500 MG: 500 TABLET ORAL at 13:41

## 2023-07-06 RX ADMIN — BACLOFEN 30 MG: 20 TABLET ORAL at 13:42

## 2023-07-06 RX ADMIN — METRONIDAZOLE 500 MG: 500 TABLET ORAL at 11:03

## 2023-07-06 RX ADMIN — AMPICILLIN SODIUM AND SULBACTAM SODIUM 3 G: 2; 1 INJECTION, POWDER, FOR SOLUTION INTRAMUSCULAR; INTRAVENOUS at 02:25

## 2023-07-06 RX ADMIN — MAGNESIUM SULFATE HEPTAHYDRATE 4 G: 40 INJECTION, SOLUTION INTRAVENOUS at 06:11

## 2023-07-06 RX ADMIN — OXYCODONE HYDROCHLORIDE 5 MG: 5 TABLET ORAL at 23:14

## 2023-07-06 RX ADMIN — BISACODYL 10 MG: 10 SUPPOSITORY RECTAL at 09:42

## 2023-07-06 RX ADMIN — ALBUTEROL SULFATE 2.5 MG: 2.5 SOLUTION RESPIRATORY (INHALATION) at 12:00

## 2023-07-06 RX ADMIN — AMPICILLIN SODIUM AND SULBACTAM SODIUM 3 G: 2; 1 INJECTION, POWDER, FOR SOLUTION INTRAMUSCULAR; INTRAVENOUS at 08:20

## 2023-07-06 RX ADMIN — METRONIDAZOLE 500 MG: 500 TABLET ORAL at 20:39

## 2023-07-06 RX ADMIN — OXYCODONE HYDROCHLORIDE 5 MG: 5 TABLET ORAL at 07:49

## 2023-07-06 RX ADMIN — SERTRALINE HYDROCHLORIDE 150 MG: 100 TABLET ORAL at 20:40

## 2023-07-06 ASSESSMENT — ACTIVITIES OF DAILY LIVING (ADL)
CHANGE_IN_FUNCTIONAL_STATUS_SINCE_ONSET_OF_CURRENT_ILLNESS/INJURY: NO
DIFFICULTY_EATING/SWALLOWING: NO
ADLS_ACUITY_SCORE: 64
ADLS_ACUITY_SCORE: 49
TRANSFERRING: 2-->COMPLETELY DEPENDENT (NOT DEVELOPMENTALLY APPROPRIATE)
ADLS_ACUITY_SCORE: 64
TOILETING: 2-->COMPLETELY DEPENDENT
TOILETING_ASSISTANCE: TOILETING DIFFICULTY, DEPENDENT
ADLS_ACUITY_SCORE: 49
TOILETING: 2-->COMPLETELY DEPENDENT (NOT DEVELOPMENTALLY APPROPRIATE)
BATHING: 2-->COMPLETELY DEPENDENT (NOT DEVELOPMENTALLY APPROPRIATE)
ADLS_ACUITY_SCORE: 64
TOILETING_ISSUES: YES
DRESSING/BATHING_DIFFICULTY: YES
DRESS: 2-->COMPLETELY DEPENDENT (NOT DEVELOPMENTALLY APPROPRIATE)
TRANSFERRING: 2-->COMPLETELY DEPENDENT
DRESS: 2-->COMPLETELY DEPENDENT
ADLS_ACUITY_SCORE: 64
DRESSING/BATHING: DRESSING DIFFICULTY, DEPENDENT
WEAR_GLASSES_OR_BLIND: NO
ADLS_ACUITY_SCORE: 49
CONCENTRATING,_REMEMBERING_OR_MAKING_DECISIONS_DIFFICULTY: NO
ADLS_ACUITY_SCORE: 64
ADLS_ACUITY_SCORE: 64
WALKING_OR_CLIMBING_STAIRS_DIFFICULTY: YES
DOING_ERRANDS_INDEPENDENTLY_DIFFICULTY: YES
FALL_HISTORY_WITHIN_LAST_SIX_MONTHS: NO
ADLS_ACUITY_SCORE: 64
WALKING_OR_CLIMBING_STAIRS: TRANSFERRING DIFFICULTY, DEPENDENT

## 2023-07-06 NOTE — PLAN OF CARE
Goal Outcome Evaluation:      Plan of Care Reviewed With: patient, other (see comments)    Overall Patient Progress: improvingOverall Patient Progress: improving    Outcome Evaluation: See RD progress note for full reassessment details, will transition to cycled TF on 7/7 to encourage po intakes

## 2023-07-06 NOTE — PROGRESS NOTES
Care Management Follow Up    Length of Stay (days): 8    Expected Discharge Date: 06/30/2023     Concerns to be Addressed: discharge planning    Patient plan of care discussed at interdisciplinary rounds: Yes    Anticipated Discharge Disposition: Home, Home Care     Anticipated Discharge Services:  County Worker, Skilled nursing home care, home health aide  Anticipated Discharge DME: Wheelchair (Patient owns own wheelchair.)    Patient/family educated on Medicare website which has current facility and service quality ratings: no  Education Provided on the Discharge Plan: Yes  Patient/Family in Agreement with the Plan: yes    Referrals Placed by CM/SW:    Private pay costs discussed: Not applicable    Additional Information:  Pt currently on 10 ICU, [facility]. Spoke with Charge Nurse this afternoon. She reported pt is off pressors. Has been withdrawn and Psych was consulted. Advancing diet.   Currently on oxygen 45%, 40LPM. Has a PNT & urostomy. Paraplegic at baseline.       Maria Fernanda Nichols, RN Care Coordinator  [facility]  On 7- RNCC coverage for Unit 8 Med/Surg [facility].       [facility] ([facility])  Phone: [phone]  Pt receives 2x/wk wound cares from skilled nursing and a HHA for bathing/general ADLs

## 2023-07-06 NOTE — TELEPHONE ENCOUNTER
Forms/Letter Request    Type of form/letter: ORDERS ON HOLD DUE TO INPATIENT SINCE 6.26 DUE TO OSTEOMYELITIS    Have you been seen for this request: N/A    Do we have the form/letter: Yes:      Who is the form from? Home care    Where did/will the form come from? form was faxed in    When is form/letter needed by: Due By July 13    How would you like the form/letter returned: Fax : to:  [employer] [facility] @ [phone]    Patient Notified form requests are processed in 3-5 business days:No    Could we send this information to you in [employer] or would you prefer to receive a phone call?:   Patient would like to be contacted via [employer]
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
## 2023-07-06 NOTE — PROGRESS NOTES
Educated patient on good forcefull coughing techniques and assisted  with quad cough x3.
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
## 2023-07-06 NOTE — PROGRESS NOTES
CLINICAL NUTRITION SERVICES - REASSESSMENT NOTE     Nutrition Prescription    RECOMMENDATIONS FOR MDs/PROVIDERS TO ORDER:  None today - plan of care was reviewed with ICU Providers face to face    Malnutrition Status:    Patient does not meet two of the established criteria necessary for diagnosing malnutrition but is at risk for malnutrition    Recommendations already ordered by Registered Dietitian (RD):  Continue ND-TF with Osmolite 1.5 at 35 ml/hr, then starting on 7/7 hold at 8 am and transition to cycled regimen of 45 ml/hr x18 hrs from 2 pm to 8 am to provide 1215 kcal, ~52 gm protein, 164 gm CHO, no fiber, 617 ml water/day (TF will now provide ~85% lower end kcal needs, ~80% lower end protein needs)    Water flushes are deferred to rounding ICU Providers at present (they are adjusting based on lab trends)     Will discontinue Prosource protein flush as we are now attempt to wean pt from TF    Start calorie count x4 days    Room service assist ( assistance to read to pt in the room to obtain meal orders daily)     Any supplement PRN available on formulary ( to encourage pt to order a supplement at meals or between, pt has list in the room)    Future/Additional Recommendations:  Continue to monitor tolerance to TF, wt/lab trends - monitor for tolerance to change to cycled regimen   Monitor po intakes via calorie counts to assess ability to wean pt from TF  Diet per SLP      EVALUATION OF THE PROGRESS TOWARD GOALS   Diet: Advanced to Soft and Bite Sized (level 6) with thin liquids by SLP on 7/5 afternoon   Po intakes: Bites thus far per nursing documentation     Nutrition Support: Continuous ND-TF with Osmolite 1.5 at 35 ml/hr + 1 packet of Prosource TF20 with a 60 ml water flush q 4 hrs to provide 1340 kcal, ~74 gm protein, 170 gm CHO, no fiber, 1000 ml water/day     TF tolerance: Pt appears to be tolerating, although Providers continue to note abdominal bloating (CT on 7/4  and today's abdominal x-rays reviewed this afternoon), pt having at least 1 or more bowel movements daily.      NEW FINDINGS   MAR reviewed. Labs reviewed. WOC RN note reviewed. Pt reviewed during ICU team rounds, discussed now that pt has po diet, will work on weaning pt from TF as po intakes improve (although minimal thus far). Discussed plan of care with pt at bedside, pt notes took some of a muffin this morning, notes possibly feeling full from TF, but unsure, discussed recommendations as above and pt agreeing, also reviewed with ICU Providers after pt visit and they are also agreeing w/RD recommendations and orders/plan as above.     07/05/23 0400 55.1 kg (121 lb 7.6 oz) Bed scale   07/04/23 0600 54.7 kg (120 lb 9.5 oz) Bed scale   07/03/23 0600 55.2 kg (121 lb 11.1 oz) Bed scale   07/02/23 0400 54.4 kg (119 lb 14.9 oz) Bed scale   06/30/23 0400 53 kg (116 lb 13.5 oz) Bed scale   06/29/23 0400 55.7 kg (122 lb 12.7 oz) Bed scale   06/28/23 2000 55.7 kg (122 lb 12.7 oz) Bed scale     06/12/23 55.3 kg (121 lb 14.4 oz)   10/19/22 56.7 kg (125 lb)   10/02/22 56.5 kg (124 lb 9 oz)   06/20/22 59 kg (130 lb)     Weight assessment: Weight appears fairly stable from admission, fairly stable x1 month/3 months, non-significant 6.9% weight loss in >1 year.     REASSESSED NUTRITION NEEDS (using current weight of 55.1 kg)  Estimated Energy Needs: 8892-1859 kcals/day (25 - 30 kcals/kg)  Justification: Maintenance  Estimated Protein Needs: 65-80 grams protein/day (1.2 - 1.5 grams of pro/kg)  Justification: Wound healing  Estimated Fluid Needs: 1 mL/kcal vs other   Justification: Per provider pending fluid status    MALNUTRITION  % Intake: Decreased intake does not meet criteria  % Weight Loss: Weight loss does not meet criteria  Subcutaneous Fat Loss: None observed  Muscle Loss: None observed vs medical diagnosis (chronic paraplegia)  Fluid Accumulation/Edema: Trace per flow sheets   Malnutrition Diagnosis: Patient does not  meet two of the established criteria necessary for diagnosing malnutrition but is at risk for malnutrition    Previous Goals   Total avg nutritional intake to meet a minimum of 25 kcal/kg and 1.2 g PRO/kg daily (per dosing wt 56 kg)  Evaluation: Met    Previous Nutrition Diagnosis  Inadequate oral intake related to NPO for intubation as evidenced by reliance on enteral nutrition to meet 100% of assessed needs  Evaluation: Improving, diagnosis updated to below     CURRENT NUTRITION DIAGNOSIS  Inadequate oral intake related to chewing/swallowing difficulty vs appetite vs other as evidenced by continued need for enteral nutrition support to meet estimated nutritional needs       INTERVENTIONS  Implementation  Collaboration with staff/Providers - as noted/ordered above   Enteral Nutrition - will transition to cycled regimen on 7/7 with slight reduction to encourage po intakes   Feeding tube flush - per rounding Providers   Modular - discontinued to encourage weaning from TF  Calorie Count   Medical food supplement therapy - ordered as above     Goals  Patient to consume % of nutritionally adequate meal trays TID, or the equivalent with supplements/snacks to be able to be weaned from TF.     Monitoring/Evaluation  Progress toward goals will be monitored and evaluated per protocol.    Lynn Nichols RD, CNSC, LD  [address] pager: [phone]  Weekend/holiday pager: [phone]

## 2023-07-06 NOTE — TELEPHONE ENCOUNTER
Forms/Letter Request    Type of form/letter: Urin Cath w/Insertion 3 changes per day    Have you been seen for this request: N/A    Do we have the form/letter: Yes:      Who is the form from? Home care    Where did/will the form come from? form was faxed in    When is form/letter needed by: Thursday, July 13    How would you like the form/letter returned: Fax : to: [employer] @ [phone]    Patient Notified form requests are processed in 3-5 business days:No    Could we send this information to you in [employer] or would you prefer to receive a phone call?:   Patient would like to be contacted via [employer]

## 2023-07-06 NOTE — PROGRESS NOTES
10A ICU End of Shift Summary.     A&Ox4. Withdrawn, refusing out of bed activity. Q2 turns this shift, gets up with lift. Needs encouragement for activity, eating, and deep breathing/coughing. R IJ removed this shift, new PIV placed to LFA. Abdominal x-ray obtained this shift. Calorie count initiated. Suppository given this shift, one loose BM. Dressing change to R buttock done by WOC. Erythema to groin and buttocks, barrier cream applied. Erythema to R heel, prevalon boots placed. Pysch consult placed.

## 2023-07-07 ENCOUNTER — ANESTHESIA (OUTPATIENT)
Dept: INTENSIVE CARE | Facility: CLINIC | Age: 28
DRG: 870 | End: 2023-07-07
Payer: MEDICARE

## 2023-07-07 ENCOUNTER — APPOINTMENT (OUTPATIENT)
Dept: GENERAL RADIOLOGY | Facility: CLINIC | Age: 28
DRG: 870 | End: 2023-07-07
Payer: MEDICARE

## 2023-07-07 ENCOUNTER — ANESTHESIA EVENT (OUTPATIENT)
Dept: INTENSIVE CARE | Facility: CLINIC | Age: 28
DRG: 870 | End: 2023-07-07
Payer: MEDICARE

## 2023-07-07 LAB
ALBUMIN SERPL BCG-MCNC: 2.4 G/DL (ref 3.5–5.2)
ALLEN'S TEST: YES
ALP SERPL-CCNC: 136 U/L (ref 35–104)
ALT SERPL W P-5'-P-CCNC: 26 U/L (ref 0–50)
ANION GAP SERPL CALCULATED.3IONS-SCNC: 7 MMOL/L (ref 7–15)
ANION GAP SERPL CALCULATED.3IONS-SCNC: 8 MMOL/L (ref 7–15)
AST SERPL W P-5'-P-CCNC: 20 U/L (ref 0–45)
BASE EXCESS BLDA CALC-SCNC: 7.6 MMOL/L (ref -9–1.8)
BASE EXCESS BLDV CALC-SCNC: 8.4 MMOL/L (ref -7.7–1.9)
BILIRUB SERPL-MCNC: 0.3 MG/DL
BUN SERPL-MCNC: 11.1 MG/DL (ref 6–20)
BUN SERPL-MCNC: 9.1 MG/DL (ref 6–20)
CALCIUM SERPL-MCNC: 8.1 MG/DL (ref 8.6–10)
CALCIUM SERPL-MCNC: 8.5 MG/DL (ref 8.6–10)
CHLORIDE SERPL-SCNC: 103 MMOL/L (ref 98–107)
CHLORIDE SERPL-SCNC: 103 MMOL/L (ref 98–107)
CREAT SERPL-MCNC: 0.28 MG/DL (ref 0.51–0.95)
CREAT SERPL-MCNC: 0.3 MG/DL (ref 0.51–0.95)
DEPRECATED HCO3 PLAS-SCNC: 30 MMOL/L (ref 22–29)
DEPRECATED HCO3 PLAS-SCNC: 32 MMOL/L (ref 22–29)
ERYTHROCYTE [DISTWIDTH] IN BLOOD BY AUTOMATED COUNT: 16.8 % (ref 10–15)
GFR SERPL CREATININE-BSD FRML MDRD: >90 ML/MIN/1.73M2
GFR SERPL CREATININE-BSD FRML MDRD: >90 ML/MIN/1.73M2
GLUCOSE BLDC GLUCOMTR-MCNC: 119 MG/DL (ref 70–99)
GLUCOSE BLDC GLUCOMTR-MCNC: 84 MG/DL (ref 70–99)
GLUCOSE BLDC GLUCOMTR-MCNC: 87 MG/DL (ref 70–99)
GLUCOSE BLDC GLUCOMTR-MCNC: 96 MG/DL (ref 70–99)
GLUCOSE SERPL-MCNC: 125 MG/DL (ref 70–99)
GLUCOSE SERPL-MCNC: 93 MG/DL (ref 70–99)
HCO3 BLD-SCNC: 32 MMOL/L (ref 21–28)
HCO3 BLDV-SCNC: 32 MMOL/L (ref 21–28)
HCT VFR BLD AUTO: 29.9 % (ref 35–47)
HGB BLD-MCNC: 9.2 G/DL (ref 11.7–15.7)
MAGNESIUM SERPL-MCNC: 1.7 MG/DL (ref 1.7–2.3)
MCH RBC QN AUTO: 27.1 PG (ref 26.5–33)
MCHC RBC AUTO-ENTMCNC: 30.8 G/DL (ref 31.5–36.5)
MCV RBC AUTO: 88 FL (ref 78–100)
O2/TOTAL GAS SETTING VFR VENT: 35 %
O2/TOTAL GAS SETTING VFR VENT: 50 %
OXYHGB MFR BLDV: 98 % (ref 70–75)
PCO2 BLD: 43 MM HG (ref 35–45)
PCO2 BLDV: 42 MM HG (ref 40–50)
PH BLD: 7.48 [PH] (ref 7.35–7.45)
PH BLDV: 7.5 [PH] (ref 7.32–7.43)
PHOSPHATE SERPL-MCNC: 3.2 MG/DL (ref 2.5–4.5)
PLATELET # BLD AUTO: 406 10E3/UL (ref 150–450)
PO2 BLD: 131 MM HG (ref 80–105)
PO2 BLDV: 98 MM HG (ref 25–47)
POTASSIUM SERPL-SCNC: 4 MMOL/L (ref 3.4–5.3)
POTASSIUM SERPL-SCNC: 4.6 MMOL/L (ref 3.4–5.3)
PROCALCITONIN SERPL IA-MCNC: 0.18 NG/ML
PROT SERPL-MCNC: 5.9 G/DL (ref 6.4–8.3)
RBC # BLD AUTO: 3.39 10E6/UL (ref 3.8–5.2)
SODIUM SERPL-SCNC: 141 MMOL/L (ref 136–145)
SODIUM SERPL-SCNC: 142 MMOL/L (ref 136–145)
WBC # BLD AUTO: 18.3 10E3/UL (ref 4–11)

## 2023-07-07 PROCEDURE — 250N000011 HC RX IP 250 OP 636

## 2023-07-07 PROCEDURE — 272N000220 HC BRONCHOSCOPE, DISPOSABLE

## 2023-07-07 PROCEDURE — 250N000013 HC RX MED GY IP 250 OP 250 PS 637

## 2023-07-07 PROCEDURE — 94640 AIRWAY INHALATION TREATMENT: CPT | Mod: 76

## 2023-07-07 PROCEDURE — 82310 ASSAY OF CALCIUM: CPT

## 2023-07-07 PROCEDURE — 250N000013 HC RX MED GY IP 250 OP 250 PS 637: Performed by: ANESTHESIOLOGY

## 2023-07-07 PROCEDURE — 999N000289 HC STATISTIC BRONCHOSCOPY ASST

## 2023-07-07 PROCEDURE — 71045 X-RAY EXAM CHEST 1 VIEW: CPT | Mod: 26 | Performed by: RADIOLOGY

## 2023-07-07 PROCEDURE — 999N000065 XR CHEST PORT 1 VIEW

## 2023-07-07 PROCEDURE — 71045 X-RAY EXAM CHEST 1 VIEW: CPT | Mod: 77

## 2023-07-07 PROCEDURE — 999N000157 HC STATISTIC RCP TIME EA 10 MIN

## 2023-07-07 PROCEDURE — 250N000011 HC RX IP 250 OP 636: Mod: JZ | Performed by: CLINICAL NURSE SPECIALIST

## 2023-07-07 PROCEDURE — 87106 FUNGI IDENTIFICATION YEAST: CPT

## 2023-07-07 PROCEDURE — 99291 CRITICAL CARE FIRST HOUR: CPT | Mod: 25

## 2023-07-07 PROCEDURE — 94640 AIRWAY INHALATION TREATMENT: CPT

## 2023-07-07 PROCEDURE — 250N000009 HC RX 250

## 2023-07-07 PROCEDURE — 250N000011 HC RX IP 250 OP 636: Performed by: NURSE PRACTITIONER

## 2023-07-07 PROCEDURE — 370N000003 HC ANESTHESIA WARD SERVICE: Performed by: NURSE ANESTHETIST, CERTIFIED REGISTERED

## 2023-07-07 PROCEDURE — 0BC78ZZ EXTIRPATION OF MATTER FROM LEFT MAIN BRONCHUS, VIA NATURAL OR ARTIFICIAL OPENING ENDOSCOPIC: ICD-10-PCS | Performed by: INTERNAL MEDICINE

## 2023-07-07 PROCEDURE — 31645 BRNCHSC W/THER ASPIR 1ST: CPT | Performed by: INTERNAL MEDICINE

## 2023-07-07 PROCEDURE — 250N000011 HC RX IP 250 OP 636: Performed by: NURSE ANESTHETIST, CERTIFIED REGISTERED

## 2023-07-07 PROCEDURE — 82803 BLOOD GASES ANY COMBINATION: CPT | Performed by: NURSE PRACTITIONER

## 2023-07-07 PROCEDURE — 87040 BLOOD CULTURE FOR BACTERIA: CPT

## 2023-07-07 PROCEDURE — 82805 BLOOD GASES W/O2 SATURATION: CPT

## 2023-07-07 PROCEDURE — 71045 X-RAY EXAM CHEST 1 VIEW: CPT

## 2023-07-07 PROCEDURE — 94660 CPAP INITIATION&MGMT: CPT

## 2023-07-07 PROCEDURE — 84100 ASSAY OF PHOSPHORUS: CPT | Performed by: NURSE PRACTITIONER

## 2023-07-07 PROCEDURE — 258N000003 HC RX IP 258 OP 636

## 2023-07-07 PROCEDURE — 93010 ELECTROCARDIOGRAM REPORT: CPT | Performed by: INTERNAL MEDICINE

## 2023-07-07 PROCEDURE — 250N000009 HC RX 250: Performed by: CLINICAL NURSE SPECIALIST

## 2023-07-07 PROCEDURE — 94799 UNLISTED PULMONARY SVC/PX: CPT

## 2023-07-07 PROCEDURE — 250N000009 HC RX 250: Performed by: NURSE ANESTHETIST, CERTIFIED REGISTERED

## 2023-07-07 PROCEDURE — 200N000002 HC R&B ICU UMMC

## 2023-07-07 PROCEDURE — 94002 VENT MGMT INPAT INIT DAY: CPT

## 2023-07-07 PROCEDURE — 0BCB8ZZ EXTIRPATION OF MATTER FROM LEFT LOWER LOBE BRONCHUS, VIA NATURAL OR ARTIFICIAL OPENING ENDOSCOPIC: ICD-10-PCS | Performed by: INTERNAL MEDICINE

## 2023-07-07 PROCEDURE — 84145 PROCALCITONIN (PCT): CPT

## 2023-07-07 PROCEDURE — 250N000011 HC RX IP 250 OP 636: Mod: JZ

## 2023-07-07 PROCEDURE — 36415 COLL VENOUS BLD VENIPUNCTURE: CPT

## 2023-07-07 PROCEDURE — 93005 ELECTROCARDIOGRAM TRACING: CPT

## 2023-07-07 PROCEDURE — 83735 ASSAY OF MAGNESIUM: CPT | Performed by: NURSE PRACTITIONER

## 2023-07-07 PROCEDURE — 85027 COMPLETE CBC AUTOMATED: CPT

## 2023-07-07 PROCEDURE — 31624 DX BRONCHOSCOPE/LAVAGE: CPT

## 2023-07-07 RX ORDER — ACETYLCYSTEINE 100 MG/ML
4 SOLUTION ORAL; RESPIRATORY (INHALATION) EVERY 4 HOURS
Status: DISCONTINUED | OUTPATIENT
Start: 2023-07-07 | End: 2023-07-08

## 2023-07-07 RX ORDER — OXYCODONE HYDROCHLORIDE 5 MG/1
5 TABLET ORAL
Status: DISCONTINUED | OUTPATIENT
Start: 2023-07-07 | End: 2023-07-13 | Stop reason: HOSPADM

## 2023-07-07 RX ORDER — CHLORHEXIDINE GLUCONATE ORAL RINSE 1.2 MG/ML
15 SOLUTION DENTAL EVERY 12 HOURS
Status: DISCONTINUED | OUTPATIENT
Start: 2023-07-07 | End: 2023-07-08

## 2023-07-07 RX ORDER — MAGNESIUM SULFATE HEPTAHYDRATE 40 MG/ML
2 INJECTION, SOLUTION INTRAVENOUS ONCE
Status: COMPLETED | OUTPATIENT
Start: 2023-07-07 | End: 2023-07-07

## 2023-07-07 RX ORDER — PROPOFOL 10 MG/ML
5-75 INJECTION, EMULSION INTRAVENOUS CONTINUOUS
Status: DISCONTINUED | OUTPATIENT
Start: 2023-07-07 | End: 2023-07-08

## 2023-07-07 RX ORDER — PROPOFOL 10 MG/ML
INJECTION, EMULSION INTRAVENOUS PRN
Status: DISCONTINUED | OUTPATIENT
Start: 2023-07-07 | End: 2023-07-07

## 2023-07-07 RX ORDER — PROPOFOL 10 MG/ML
INJECTION, EMULSION INTRAVENOUS
Status: COMPLETED
Start: 2023-07-07 | End: 2023-07-07

## 2023-07-07 RX ORDER — GUAIFENESIN 600 MG/1
15 TABLET, EXTENDED RELEASE ORAL DAILY
Status: DISCONTINUED | OUTPATIENT
Start: 2023-07-08 | End: 2023-07-12

## 2023-07-07 RX ORDER — ROPIVACAINE IN 0.9% SOD CHL/PF 0.1 %
.03-.125 PLASTIC BAG, INJECTION (ML) EPIDURAL CONTINUOUS
Status: DISCONTINUED | OUTPATIENT
Start: 2023-07-07 | End: 2023-07-09

## 2023-07-07 RX ORDER — NOREPINEPHRINE BITARTRATE 0.02 MG/ML
INJECTION, SOLUTION INTRAVENOUS
Status: COMPLETED
Start: 2023-07-07 | End: 2023-07-07

## 2023-07-07 RX ADMIN — Medication 1 CAPSULE: at 19:55

## 2023-07-07 RX ADMIN — SODIUM CHLORIDE SOLN NEBU 3% 3 ML: 3 NEBU SOLN at 09:03

## 2023-07-07 RX ADMIN — NOREPINEPHRINE BITARTRATE 0.03 MCG/KG/MIN: 0.02 INJECTION, SOLUTION INTRAVENOUS at 17:04

## 2023-07-07 RX ADMIN — SODIUM CHLORIDE, POTASSIUM CHLORIDE, SODIUM LACTATE AND CALCIUM CHLORIDE 500 ML: 600; 310; 30; 20 INJECTION, SOLUTION INTRAVENOUS at 14:12

## 2023-07-07 RX ADMIN — BACLOFEN 30 MG: 20 TABLET ORAL at 19:55

## 2023-07-07 RX ADMIN — PROPOFOL 30 MCG/KG/MIN: 10 INJECTION, EMULSION INTRAVENOUS at 15:06

## 2023-07-07 RX ADMIN — CARBIDOPA AND LEVODOPA 10 MG: 50; 200 TABLET, EXTENDED RELEASE ORAL at 18:40

## 2023-07-07 RX ADMIN — CARBIDOPA AND LEVODOPA 10 MG: 50; 200 TABLET, EXTENDED RELEASE ORAL at 09:05

## 2023-07-07 RX ADMIN — METRONIDAZOLE 500 MG: 500 TABLET ORAL at 09:01

## 2023-07-07 RX ADMIN — ALBUTEROL SULFATE 2.5 MG: 2.5 SOLUTION RESPIRATORY (INHALATION) at 20:44

## 2023-07-07 RX ADMIN — OXYCODONE HYDROCHLORIDE 5 MG: 5 TABLET ORAL at 18:40

## 2023-07-07 RX ADMIN — BACLOFEN 30 MG: 20 TABLET ORAL at 09:01

## 2023-07-07 RX ADMIN — BACLOFEN 30 MG: 20 TABLET ORAL at 13:22

## 2023-07-07 RX ADMIN — FENTANYL CITRATE 50 MCG: 50 INJECTION INTRAMUSCULAR; INTRAVENOUS at 17:41

## 2023-07-07 RX ADMIN — ACETYLCYSTEINE 4 ML: 100 SOLUTION ORAL; RESPIRATORY (INHALATION) at 12:21

## 2023-07-07 RX ADMIN — ALBUTEROL SULFATE 2.5 MG: 2.5 SOLUTION RESPIRATORY (INHALATION) at 08:22

## 2023-07-07 RX ADMIN — OXYCODONE HYDROCHLORIDE 5 MG: 5 TABLET ORAL at 09:01

## 2023-07-07 RX ADMIN — ENOXAPARIN SODIUM 40 MG: 40 INJECTION SUBCUTANEOUS at 19:54

## 2023-07-07 RX ADMIN — ALBUTEROL SULFATE 2.5 MG: 2.5 SOLUTION RESPIRATORY (INHALATION) at 18:15

## 2023-07-07 RX ADMIN — GABAPENTIN 300 MG: 250 SOLUTION ORAL at 23:23

## 2023-07-07 RX ADMIN — CHLORHEXIDINE GLUCONATE 15 ML: 1.2 SOLUTION ORAL at 19:55

## 2023-07-07 RX ADMIN — ACETYLCYSTEINE 4 ML: 100 SOLUTION ORAL; RESPIRATORY (INHALATION) at 20:44

## 2023-07-07 RX ADMIN — ALBUTEROL SULFATE 2.5 MG: 2.5 SOLUTION RESPIRATORY (INHALATION) at 12:21

## 2023-07-07 RX ADMIN — METRONIDAZOLE 500 MG: 500 TABLET ORAL at 19:55

## 2023-07-07 RX ADMIN — MAGNESIUM SULFATE HEPTAHYDRATE 2 G: 40 INJECTION, SOLUTION INTRAVENOUS at 13:22

## 2023-07-07 RX ADMIN — ACETYLCYSTEINE 4 ML: 100 SOLUTION ORAL; RESPIRATORY (INHALATION) at 18:15

## 2023-07-07 RX ADMIN — Medication 10 MG: at 17:32

## 2023-07-07 RX ADMIN — PROPOFOL 70 MG: 10 INJECTION, EMULSION INTRAVENOUS at 14:43

## 2023-07-07 RX ADMIN — ROCURONIUM BROMIDE 50 MG: 50 INJECTION, SOLUTION INTRAVENOUS at 14:45

## 2023-07-07 RX ADMIN — SERTRALINE HYDROCHLORIDE 150 MG: 100 TABLET ORAL at 19:55

## 2023-07-07 RX ADMIN — Medication 1 CAPSULE: at 09:01

## 2023-07-07 RX ADMIN — METRONIDAZOLE 500 MG: 500 TABLET ORAL at 13:22

## 2023-07-07 RX ADMIN — LEVOFLOXACIN 750 MG: 750 TABLET, FILM COATED ORAL at 09:01

## 2023-07-07 RX ADMIN — OXYCODONE HYDROCHLORIDE 5 MG: 5 TABLET ORAL at 22:47

## 2023-07-07 RX ADMIN — PROPOFOL 40 MCG/KG/MIN: 10 INJECTION, EMULSION INTRAVENOUS at 21:24

## 2023-07-07 RX ADMIN — Medication 15 ML: at 09:01

## 2023-07-07 RX ADMIN — PROPOFOL 30 MG: 10 INJECTION, EMULSION INTRAVENOUS at 14:44

## 2023-07-07 RX ADMIN — OXYCODONE HYDROCHLORIDE 5 MG: 5 TABLET ORAL at 02:59

## 2023-07-07 ASSESSMENT — ACTIVITIES OF DAILY LIVING (ADL)
ADLS_ACUITY_SCORE: 64
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
## 2023-07-07 NOTE — PLAN OF CARE
Major Shift Events: At beginning of shift, patient unable to cough and had a build up of mucous. RT and DESIREE called and arrived at bedside. RT assisted patient to cough, suctioning provided. Patient's mother also assisted with cough, patient able to cough out moderate amount of mucous. HOB elevated to 45 degrees the rest of the night. Patient had x3 BM overnight. Wound dressing change completed. Vital signs stable at this time. MAP greater than 65 maintained overnight. Afebrile. Good urine output noted from urostomy and nephrostomy tube. Bladder irrigated per order.   Plan: Monitor Vitals and oxygenations.   For vital signs and complete assessments, please see documentation flowsheets.      Goal Outcome Evaluation:      Plan of Care Reviewed With: patient        Problem: Gas Exchange Impaired  Goal: Optimal Gas Exchange  Intervention: Optimize Oxygenation and Ventilation  Recent Flowsheet Documentation  Taken 7/7/2023 0400 by Azalia Acosta RN  Head of Bed (HOB) Positioning: HOB at 30-45 degrees  Taken 7/7/2023 0200 by Azalia Acosta RN  Head of Bed (HOB) Positioning: HOB at 30-45 degrees  Taken 7/7/2023 0000 by Azalia Acosta RN  Head of Bed (HOB) Positioning: HOB at 30-45 degrees  Taken 7/6/2023 2200 by Azalia Acosta RN  Head of Bed (HOB) Positioning: HOB at 30-45 degrees  Taken 7/6/2023 2000 by Azalia Acosta RN  Head of Bed (HOB) Positioning: HOB at 30-45 degrees
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
## 2023-07-07 NOTE — PROGRESS NOTES
CLINICAL NUTRITION SERVICES - BRIEF NOTE     Nutrition Prescription      Recommendations already ordered by Registered Dietitian (RD):  Discontinue calorie count as pt made NPO in the setting of worsening respiratory status    Go back to continuous ND-TF with Osmolite 1.5 at 40 ml/hr with a 60 ml water flush q 4 hrs to provide 1440 kcal, 61 gm protein, 194 gm CHO, no fiber, 1091 ml water/day     Future/Additional Recommendations:  Continue to monitor tolerance to TF, wt/lab trends  Diet if/when appropriate per Providers vs SLP      NEW FINDINGS   Calorie Count: 7/6: 226 kcal and 8 g protein (2 meals - breakfast and dinner, lunch ordered but no slip/no documentation for this meal, 0 supplements)     MAR reviewed. Labs reviewed. Weight trends reviewed, question last recorded weight as prior weight trends otherwise stable from pt's UBW history.     Pt reviewed during ICU team rounds, pt with worsening respiratory status, so will plan to adjust TF as above and Providers will make pt NPO again. Noted SLP just advanced diet later in the evening on 7/5 with pt having first full day of po diet on 7/6.     INTERVENTIONS  Implementation  Collaboration with staff and Providers - as noted above   Enteral Nutrition - order adjusted as above to resume continuous TF  Feeding tube flush - continue as ordered/Per Providers  Calorie count/supplement order/room service order - all discontinued at present   Po diet - pt made NPO again per Provider     Monitoring/Evaluation  Will continue to monitor and evaluate per protocol.    Lynn Nichols RD, CNSC, LD  [address] ICU [address] pager: [phone]  Weekend/holiday pager: [phone]

## 2023-07-07 NOTE — ANESTHESIA PROCEDURE NOTES
Airway       Patient location during procedure: OR       Procedure Start/Stop Times: 7/7/2023 2:47 PM  Staff -        CRNA: Sri Joy APRN CRNA       Other Anesthesia Staff: Seble Woods APRN CRNA       Performed By: CRNAIndications and Patient Condition       Indications for airway management: adonay-procedural, airway protection and altered level of consciousness       Induction type:intravenous       Mask difficulty assessment: 1 - vent by mask    Final Airway Details       Final airway type: endotracheal airway       Successful airway: ETT - single  Endotracheal Airway Details        ETT size (mm): 7.0       Cuffed: yes       Successful intubation technique: video laryngoscopy       VL Blade Size: MAC 3       Grade View of Cords: 1       Placement verification comments: (Bilateral breath sounds, not equal. )       Adjucts: stylet       Position: Center       Measured from: gums/teeth       Secured at (cm): 23       Bite block used: None    Post intubation assessment        Placement verified by: capnometry and chest rise        Number of attempts at approach: 1       Number of other approaches attempted: 0       Secured with: commercial tube meek       Ease of procedure: easy       Dentition: Lips/oral mucosa injury, Intact and Unchanged    Medication(s) Administered   Medication Administration Time: 7/7/2023 2:47 PM

## 2023-07-07 NOTE — PROGRESS NOTES
Assisted pulmonology with bedside bronch.      Secretions were thick and white.  Lavage given and sputum sample collected.    VSS.  No immediate complications with procedure.     Continue to monitor respiratory status

## 2023-07-08 ENCOUNTER — APPOINTMENT (OUTPATIENT)
Dept: GENERAL RADIOLOGY | Facility: CLINIC | Age: 28
DRG: 870 | End: 2023-07-08
Payer: MEDICARE

## 2023-07-08 LAB
ALBUMIN UR-MCNC: NEGATIVE MG/DL
ANION GAP SERPL CALCULATED.3IONS-SCNC: 10 MMOL/L (ref 7–15)
APPEARANCE UR: CLEAR
BASE EXCESS BLDV CALC-SCNC: 7.8 MMOL/L (ref -7.7–1.9)
BILIRUB UR QL STRIP: NEGATIVE
BUN SERPL-MCNC: 6.3 MG/DL (ref 6–20)
CALCIUM SERPL-MCNC: 8.5 MG/DL (ref 8.6–10)
CHLORIDE SERPL-SCNC: 104 MMOL/L (ref 98–107)
COLOR UR AUTO: ABNORMAL
CORTIS SERPL-MCNC: 15.1 UG/DL
CREAT SERPL-MCNC: 0.32 MG/DL (ref 0.51–0.95)
CRP SERPL-MCNC: 41.3 MG/L
CYSTATIN C (ROCHE): 1.2 MG/L (ref 0.6–1)
DEPRECATED HCO3 PLAS-SCNC: 27 MMOL/L (ref 22–29)
ERYTHROCYTE [DISTWIDTH] IN BLOOD BY AUTOMATED COUNT: 17.2 % (ref 10–15)
GFR SERPL CREATININE-BSD FRML MDRD: 64 ML/MIN/1.73M2
GFR SERPL CREATININE-BSD FRML MDRD: >90 ML/MIN/1.73M2
GLUCOSE BLDC GLUCOMTR-MCNC: 105 MG/DL (ref 70–99)
GLUCOSE BLDC GLUCOMTR-MCNC: 111 MG/DL (ref 70–99)
GLUCOSE BLDC GLUCOMTR-MCNC: 123 MG/DL (ref 70–99)
GLUCOSE BLDC GLUCOMTR-MCNC: 129 MG/DL (ref 70–99)
GLUCOSE BLDC GLUCOMTR-MCNC: 95 MG/DL (ref 70–99)
GLUCOSE SERPL-MCNC: 131 MG/DL (ref 70–99)
GLUCOSE UR STRIP-MCNC: NEGATIVE MG/DL
HCO3 BLDV-SCNC: 33 MMOL/L (ref 21–28)
HCT VFR BLD AUTO: 29.6 % (ref 35–47)
HGB BLD-MCNC: 9.1 G/DL (ref 11.7–15.7)
HGB UR QL STRIP: NEGATIVE
HOLD SPECIMEN: NORMAL
KETONES UR STRIP-MCNC: NEGATIVE MG/DL
LEUKOCYTE ESTERASE UR QL STRIP: ABNORMAL
MAGNESIUM SERPL-MCNC: 1.9 MG/DL (ref 1.7–2.3)
MCH RBC QN AUTO: 26.7 PG (ref 26.5–33)
MCHC RBC AUTO-ENTMCNC: 30.7 G/DL (ref 31.5–36.5)
MCV RBC AUTO: 87 FL (ref 78–100)
MUCOUS THREADS #/AREA URNS LPF: PRESENT /LPF
NITRATE UR QL: NEGATIVE
O2/TOTAL GAS SETTING VFR VENT: 30 %
OSMOLALITY UR: 228 MMOL/KG (ref 100–1200)
PCO2 BLDV: 46 MM HG (ref 40–50)
PH BLDV: 7.46 [PH] (ref 7.32–7.43)
PH UR STRIP: 8 [PH] (ref 5–7)
PHOSPHATE SERPL-MCNC: 3.8 MG/DL (ref 2.5–4.5)
PLATELET # BLD AUTO: 537 10E3/UL (ref 150–450)
PO2 BLDV: 54 MM HG (ref 25–47)
POTASSIUM SERPL-SCNC: 4.4 MMOL/L (ref 3.4–5.3)
RBC # BLD AUTO: 3.41 10E6/UL (ref 3.8–5.2)
RBC URINE: 3 /HPF
SODIUM SERPL-SCNC: 141 MMOL/L (ref 136–145)
SODIUM UR-SCNC: 101 MMOL/L
SP GR UR STRIP: 1 (ref 1–1.03)
UROBILINOGEN UR STRIP-MCNC: NORMAL MG/DL
WBC # BLD AUTO: 18.6 10E3/UL (ref 4–11)
WBC URINE: 7 /HPF

## 2023-07-08 PROCEDURE — 250N000011 HC RX IP 250 OP 636: Mod: JZ | Performed by: CLINICAL NURSE SPECIALIST

## 2023-07-08 PROCEDURE — 94003 VENT MGMT INPAT SUBQ DAY: CPT

## 2023-07-08 PROCEDURE — 99291 CRITICAL CARE FIRST HOUR: CPT | Performed by: NURSE PRACTITIONER

## 2023-07-08 PROCEDURE — 250N000011 HC RX IP 250 OP 636: Performed by: NURSE PRACTITIONER

## 2023-07-08 PROCEDURE — 94640 AIRWAY INHALATION TREATMENT: CPT | Mod: 76

## 2023-07-08 PROCEDURE — 93005 ELECTROCARDIOGRAM TRACING: CPT

## 2023-07-08 PROCEDURE — 250N000009 HC RX 250: Performed by: CLINICAL NURSE SPECIALIST

## 2023-07-08 PROCEDURE — 999N000157 HC STATISTIC RCP TIME EA 10 MIN

## 2023-07-08 PROCEDURE — 85014 HEMATOCRIT: CPT

## 2023-07-08 PROCEDURE — 36600 WITHDRAWAL OF ARTERIAL BLOOD: CPT

## 2023-07-08 PROCEDURE — 36415 COLL VENOUS BLD VENIPUNCTURE: CPT | Performed by: NURSE PRACTITIONER

## 2023-07-08 PROCEDURE — 81001 URINALYSIS AUTO W/SCOPE: CPT | Performed by: NURSE PRACTITIONER

## 2023-07-08 PROCEDURE — 250N000013 HC RX MED GY IP 250 OP 250 PS 637: Performed by: NURSE PRACTITIONER

## 2023-07-08 PROCEDURE — 94640 AIRWAY INHALATION TREATMENT: CPT

## 2023-07-08 PROCEDURE — 84100 ASSAY OF PHOSPHORUS: CPT | Performed by: NURSE PRACTITIONER

## 2023-07-08 PROCEDURE — 94799 UNLISTED PULMONARY SVC/PX: CPT

## 2023-07-08 PROCEDURE — 200N000002 HC R&B ICU UMMC

## 2023-07-08 PROCEDURE — 250N000009 HC RX 250: Performed by: NURSE PRACTITIONER

## 2023-07-08 PROCEDURE — 250N000013 HC RX MED GY IP 250 OP 250 PS 637: Performed by: CLINICAL NURSE SPECIALIST

## 2023-07-08 PROCEDURE — 87040 BLOOD CULTURE FOR BACTERIA: CPT | Performed by: NURSE PRACTITIONER

## 2023-07-08 PROCEDURE — 71045 X-RAY EXAM CHEST 1 VIEW: CPT

## 2023-07-08 PROCEDURE — 250N000011 HC RX IP 250 OP 636

## 2023-07-08 PROCEDURE — C9113 INJ PANTOPRAZOLE SODIUM, VIA: HCPCS | Mod: JZ

## 2023-07-08 PROCEDURE — 82533 TOTAL CORTISOL: CPT | Performed by: NURSE PRACTITIONER

## 2023-07-08 PROCEDURE — 86140 C-REACTIVE PROTEIN: CPT | Performed by: INTERNAL MEDICINE

## 2023-07-08 PROCEDURE — 83935 ASSAY OF URINE OSMOLALITY: CPT | Performed by: NURSE PRACTITIONER

## 2023-07-08 PROCEDURE — 258N000003 HC RX IP 258 OP 636: Performed by: NURSE PRACTITIONER

## 2023-07-08 PROCEDURE — 94669 MECHANICAL CHEST WALL OSCILL: CPT

## 2023-07-08 PROCEDURE — 250N000013 HC RX MED GY IP 250 OP 250 PS 637

## 2023-07-08 PROCEDURE — 93010 ELECTROCARDIOGRAM REPORT: CPT | Performed by: INTERNAL MEDICINE

## 2023-07-08 PROCEDURE — 250N000011 HC RX IP 250 OP 636: Mod: JZ | Performed by: ANESTHESIOLOGY

## 2023-07-08 PROCEDURE — 99233 SBSQ HOSP IP/OBS HIGH 50: CPT | Performed by: INTERNAL MEDICINE

## 2023-07-08 PROCEDURE — 250N000009 HC RX 250

## 2023-07-08 PROCEDURE — 82610 CYSTATIN C: CPT | Performed by: NURSE PRACTITIONER

## 2023-07-08 PROCEDURE — 83735 ASSAY OF MAGNESIUM: CPT | Performed by: NURSE PRACTITIONER

## 2023-07-08 PROCEDURE — 82803 BLOOD GASES ANY COMBINATION: CPT | Performed by: NURSE PRACTITIONER

## 2023-07-08 PROCEDURE — 36415 COLL VENOUS BLD VENIPUNCTURE: CPT

## 2023-07-08 PROCEDURE — 82310 ASSAY OF CALCIUM: CPT

## 2023-07-08 PROCEDURE — 84300 ASSAY OF URINE SODIUM: CPT | Performed by: NURSE PRACTITIONER

## 2023-07-08 PROCEDURE — 71045 X-RAY EXAM CHEST 1 VIEW: CPT | Mod: 26 | Performed by: RADIOLOGY

## 2023-07-08 PROCEDURE — 250N000013 HC RX MED GY IP 250 OP 250 PS 637: Performed by: ANESTHESIOLOGY

## 2023-07-08 RX ORDER — ACETYLCYSTEINE 100 MG/ML
4 SOLUTION ORAL; RESPIRATORY (INHALATION) EVERY 8 HOURS
Status: DISCONTINUED | OUTPATIENT
Start: 2023-07-08 | End: 2023-07-08

## 2023-07-08 RX ORDER — ACETYLCYSTEINE 100 MG/ML
4 SOLUTION ORAL; RESPIRATORY (INHALATION) 4 TIMES DAILY
Status: DISCONTINUED | OUTPATIENT
Start: 2023-07-08 | End: 2023-07-13 | Stop reason: HOSPADM

## 2023-07-08 RX ORDER — MAGNESIUM SULFATE HEPTAHYDRATE 40 MG/ML
2 INJECTION, SOLUTION INTRAVENOUS ONCE
Status: COMPLETED | OUTPATIENT
Start: 2023-07-08 | End: 2023-07-08

## 2023-07-08 RX ORDER — MIDODRINE HYDROCHLORIDE 5 MG/1
15 TABLET ORAL EVERY 8 HOURS
Status: DISCONTINUED | OUTPATIENT
Start: 2023-07-08 | End: 2023-07-10

## 2023-07-08 RX ORDER — MIDODRINE HYDROCHLORIDE 2.5 MG/1
10 TABLET ORAL EVERY 8 HOURS
Status: DISCONTINUED | OUTPATIENT
Start: 2023-07-08 | End: 2023-07-08

## 2023-07-08 RX ADMIN — BACLOFEN 30 MG: 20 TABLET ORAL at 19:58

## 2023-07-08 RX ADMIN — ACETYLCYSTEINE 4 ML: 100 SOLUTION ORAL; RESPIRATORY (INHALATION) at 00:27

## 2023-07-08 RX ADMIN — CHLORHEXIDINE GLUCONATE 15 ML: 1.2 SOLUTION ORAL at 08:22

## 2023-07-08 RX ADMIN — MAGNESIUM SULFATE HEPTAHYDRATE 2 G: 40 INJECTION, SOLUTION INTRAVENOUS at 06:34

## 2023-07-08 RX ADMIN — METRONIDAZOLE 500 MG: 500 TABLET ORAL at 19:58

## 2023-07-08 RX ADMIN — FENTANYL CITRATE 50 MCG: 50 INJECTION INTRAMUSCULAR; INTRAVENOUS at 08:17

## 2023-07-08 RX ADMIN — METRONIDAZOLE 500 MG: 500 TABLET ORAL at 13:13

## 2023-07-08 RX ADMIN — LEVOFLOXACIN 750 MG: 750 TABLET, FILM COATED ORAL at 08:21

## 2023-07-08 RX ADMIN — HYDROXYZINE HYDROCHLORIDE 25 MG: 25 TABLET, FILM COATED ORAL at 09:41

## 2023-07-08 RX ADMIN — Medication 1 CAPSULE: at 19:58

## 2023-07-08 RX ADMIN — SODIUM CHLORIDE, POTASSIUM CHLORIDE, SODIUM LACTATE AND CALCIUM CHLORIDE 1000 ML: 600; 310; 30; 20 INJECTION, SOLUTION INTRAVENOUS at 13:29

## 2023-07-08 RX ADMIN — ACETYLCYSTEINE 4 ML: 100 SOLUTION ORAL; RESPIRATORY (INHALATION) at 17:05

## 2023-07-08 RX ADMIN — ACETYLCYSTEINE 4 ML: 100 SOLUTION ORAL; RESPIRATORY (INHALATION) at 04:42

## 2023-07-08 RX ADMIN — ALBUTEROL SULFATE 2.5 MG: 2.5 SOLUTION RESPIRATORY (INHALATION) at 09:00

## 2023-07-08 RX ADMIN — MIDODRINE HYDROCHLORIDE 15 MG: 5 TABLET ORAL at 16:01

## 2023-07-08 RX ADMIN — ALBUTEROL SULFATE 2.5 MG: 2.5 SOLUTION RESPIRATORY (INHALATION) at 19:25

## 2023-07-08 RX ADMIN — ACETYLCYSTEINE 4 ML: 100 SOLUTION ORAL; RESPIRATORY (INHALATION) at 09:00

## 2023-07-08 RX ADMIN — GABAPENTIN 300 MG: 250 SOLUTION ORAL at 21:23

## 2023-07-08 RX ADMIN — ACETAMINOPHEN 650 MG: 325 SOLUTION ORAL at 13:13

## 2023-07-08 RX ADMIN — PROPOFOL 50 MCG/KG/MIN: 10 INJECTION, EMULSION INTRAVENOUS at 08:35

## 2023-07-08 RX ADMIN — BACLOFEN 30 MG: 20 TABLET ORAL at 08:21

## 2023-07-08 RX ADMIN — Medication 1 CAPSULE: at 08:21

## 2023-07-08 RX ADMIN — ALBUTEROL SULFATE 2.5 MG: 2.5 SOLUTION RESPIRATORY (INHALATION) at 12:12

## 2023-07-08 RX ADMIN — ALBUTEROL SULFATE 2.5 MG: 2.5 SOLUTION RESPIRATORY (INHALATION) at 17:04

## 2023-07-08 RX ADMIN — BACLOFEN 30 MG: 20 TABLET ORAL at 13:13

## 2023-07-08 RX ADMIN — PROPOFOL 50 MCG/KG/MIN: 10 INJECTION, EMULSION INTRAVENOUS at 04:12

## 2023-07-08 RX ADMIN — Medication 15 ML: at 13:13

## 2023-07-08 RX ADMIN — SERTRALINE HYDROCHLORIDE 150 MG: 100 TABLET ORAL at 19:58

## 2023-07-08 RX ADMIN — ACETYLCYSTEINE 4 ML: 100 SOLUTION ORAL; RESPIRATORY (INHALATION) at 19:24

## 2023-07-08 RX ADMIN — ENOXAPARIN SODIUM 40 MG: 40 INJECTION SUBCUTANEOUS at 19:59

## 2023-07-08 RX ADMIN — PANTOPRAZOLE SODIUM 40 MG: 40 INJECTION, POWDER, FOR SOLUTION INTRAVENOUS at 06:34

## 2023-07-08 RX ADMIN — SODIUM CHLORIDE, POTASSIUM CHLORIDE, SODIUM LACTATE AND CALCIUM CHLORIDE 500 ML: 600; 310; 30; 20 INJECTION, SOLUTION INTRAVENOUS at 09:43

## 2023-07-08 RX ADMIN — ACETYLCYSTEINE 4 ML: 100 SOLUTION ORAL; RESPIRATORY (INHALATION) at 12:13

## 2023-07-08 RX ADMIN — SODIUM CHLORIDE, POTASSIUM CHLORIDE, SODIUM LACTATE AND CALCIUM CHLORIDE 500 ML: 600; 310; 30; 20 INJECTION, SOLUTION INTRAVENOUS at 17:30

## 2023-07-08 RX ADMIN — CARBIDOPA AND LEVODOPA 10 MG: 50; 200 TABLET, EXTENDED RELEASE ORAL at 08:21

## 2023-07-08 RX ADMIN — METRONIDAZOLE 500 MG: 500 TABLET ORAL at 08:21

## 2023-07-08 ASSESSMENT — ACTIVITIES OF DAILY LIVING (ADL)
ADLS_ACUITY_SCORE: 64
ADLS_ACUITY_SCORE: 60
ADLS_ACUITY_SCORE: 60
ADLS_ACUITY_SCORE: 64
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
## 2023-07-08 NOTE — PLAN OF CARE
10A ICU End of Shift Summary.     Changes this shift: Oxygen level decreased to 30%. Sedation titrated as needed. Patient able to follow commands when sedation was decreased. Levo titrated to accommodate low MAPs.   Neuro: Able to follow commands. CPOT scoring used for pain/discomfort scoring. Pupils PERRLA 3mm. Upper extremities have good movement; no lower extremity movement.  Cardiac: NSR in 90's. Normotensive on levophed. No edema present. Pulses 2+. MAP > 65  Respiratory: Mechanical Ventilator Setting: Volume control, FiO2: 30%, Rate: 12, Volume: 400, PEEP 7. Lung sounds coarse/diminished. Secretions are copious, thick, and creamy.   GI/: Abdominal distention present. Nasoduodenal tube in place. Loose bowel movements. Nephrostomy draining well. Urostomy draining well in to rogers bag.   Diet/appetite: Osmolite 1.5 @ goal of 40mL/hr 60mL flushes q4H.   Pain: Denies pain when asked; able to shake head yes/no. Refuses pain medication when offered.   Skin: Redness to groin and bottom. Bilateral heel redness; mepilex changed. Right ischial tuberosity tunneling wound - packing and dressing changed.  LDA's: 1 Right PIV. 1 Left PIV.  Activities: Turns in bed q2H; pillows used       Plan: Plan to extubate today (7/8) if secretions are minimal.

## 2023-07-08 NOTE — PROGRESS NOTES
Interval Progress Note:  Pt currently reaching 24H sandra for infusion of PERIPHERAL vasopressors. Given negative fluid balance and new fever, will bolus with IVF. Additionally increased PTA dose of midodrine.  Given pt's baseline hypotension, will decrease MAP goal to > 60.  Given that pt's vasopressor requirements are down trending and her current rate of levophed infusion is at 0.01, will defer central line placement at this time to allow for response to the above interventions. Discussed this with patient and her mother Areli at bedside who are both in agreement. Plan to continue to monitor the PIV site closely and move transfusion to a different PIV  We did discuss that if her pressor requirements were to uptrend in the coming hours, it would be more concerning for new infection, and a central line would be indicated at that time.     Charley Handley NP    [facility]

## 2023-07-08 NOTE — PLAN OF CARE
Problem: Plan of Care - These are the overarching goals to be used throughout the patient stay.    Goal: Plan of Care Review  Description: The Plan of Care Review/Shift note should be completed every shift.  The Outcome Evaluation is a brief statement about your assessment that the patient is improving, declining, or no change.  This information will be displayed automatically on your shift note.  Outcome: Progressing   Goal Outcome Evaluation:  10A ICU End of Shift Summary.     Changes this shift: Extubated at 1300  Neuro: Paraplegic at baseline.  A&Ox4.    Cardiac: ST with rates as high as 160 in combination with fever, DESIREE aware, EKG completed, cultures sent.  Levo weaned to off at 1809, maintaining MAP>60 at this time.  Respiratory: Minimal secretions this morning via ETT.  Extubated at 1300 to 4L NC.  LS more coarse later in the day.  Increased to 10L oxymask at 1845 d/t desaturation.    GI/: TF running at goal.  BM x2.  Urostomy catheter exchanged.  Good output from urostomy and nephrostomy.  Diet/appetite: NPO until speech sees per DESIREE  Pain: sore throat after extubation, declines medication at this time  Skin: no acute changes  LDA's: PIV x2.  ND.  Activities: Turns q2       Plan: Monitor respiratory status.  VFSS Monday.  Continue plan of care.

## 2023-07-08 NOTE — PROCEDURES
Procedure:   Bronchoscopy        Indication:   Left white out hemithorax        Consent:   Obtained from the mother.  Risks, benefits and alternatives discussed. Risks include bleeding, infection, respiratory failure, vocal cord trauma/paralysis and pneumothorax.  In general, severe complications and death with bronchoscopy described as 0.6% and 0.013%, respectively.  Additional risks are related to conscious sedation/general anesthesia involve bradycardia, arrhythmia, hypotension.  The patient is in agreement to proceed with the procedure.       Pre-medication:   The patient is on mechanical ventilation and on drips of propofol at 35 mg        Procedure Summary:   Time out was performed.   The bronchoscope inserted through the ETT    Airway examination:  Upper airway structures, vocal cords (anatomy and function) appear to be normal. Exam of trachea and bronchus of the right and left bronchial tree to the sub-segmental level revealed no endobronchial lesion. There was thick mucoid secretion obstructing left main bronchus and throughout left lower lobes which was suctioned out with the assistance of some saline to break up the mucous. Further airway surveillance did not show any abnormalities.    Procedure:  The patient tolerated the procedure well without undue discomfort, hypotension or arrhythmia. The procedure was performed in the ICU and vital sign parameters were monitored.        Complications:   No immediate complications.    This procedure is performed by Gordon Lawrence MD

## 2023-07-08 NOTE — PROGRESS NOTES
Pt extibated tp a 4 l NC without complications with a fair cough  , RR 22, Spo2 98%.  Will continue to monitor.

## 2023-07-08 NOTE — PLAN OF CARE
Problem: Gas Exchange Impaired  Goal: Optimal Gas Exchange  Outcome: Not Progressing   Goal Outcome Evaluation:    Pt with frequent desaturation and weak cough this morning.  Unable to clear secretions despite nebs, NT suction, cough assist.  Increased FiO2 and flow rate.  Pt still desaturating with very slow recovery.  DESIREE notified, CXR ordered, L lung significantly worsened from last CXR.  Pt started on bipap 14/10 at 60%.  By 1400, pt on 100% FiO2 on BIPAP, CXR repeated without improvement.  Intubated at 1430 per anesthesia.  Bronch completed this afternoon.  Low BP with MAPs as low as 45, 500ml LR bolus given x1, peripheral norepi initiated, currently running at 0.03.  Sputum cultures sent.  TF restarted at 40ml/hr via ND.  BM x2.  Good output from urostomy and nephrostomy.  PIV x2.  7.0 ETT 23 at teeth.    Plan: Manage secretions overnight.  Monitor respiratory status.  Continue plan of care.

## 2023-07-09 ENCOUNTER — APPOINTMENT (OUTPATIENT)
Dept: GENERAL RADIOLOGY | Facility: CLINIC | Age: 28
DRG: 870 | End: 2023-07-09
Attending: NURSE PRACTITIONER
Payer: MEDICARE

## 2023-07-09 LAB
ANION GAP SERPL CALCULATED.3IONS-SCNC: 11 MMOL/L (ref 7–15)
BACTERIA SPT CULT: ABNORMAL
BUN SERPL-MCNC: 7.2 MG/DL (ref 6–20)
CALCIUM SERPL-MCNC: 8.9 MG/DL (ref 8.6–10)
CHLORIDE SERPL-SCNC: 104 MMOL/L (ref 98–107)
CREAT SERPL-MCNC: 0.32 MG/DL (ref 0.51–0.95)
DEPRECATED HCO3 PLAS-SCNC: 27 MMOL/L (ref 22–29)
ERYTHROCYTE [DISTWIDTH] IN BLOOD BY AUTOMATED COUNT: 17.6 % (ref 10–15)
GFR SERPL CREATININE-BSD FRML MDRD: >90 ML/MIN/1.73M2
GLUCOSE BLDC GLUCOMTR-MCNC: 100 MG/DL (ref 70–99)
GLUCOSE BLDC GLUCOMTR-MCNC: 103 MG/DL (ref 70–99)
GLUCOSE SERPL-MCNC: 107 MG/DL (ref 70–99)
GRAM STAIN RESULT: ABNORMAL
GRAM STAIN RESULT: ABNORMAL
HCT VFR BLD AUTO: 28.5 % (ref 35–47)
HGB BLD-MCNC: 8.6 G/DL (ref 11.7–15.7)
MAGNESIUM SERPL-MCNC: 1.9 MG/DL (ref 1.7–2.3)
MCH RBC QN AUTO: 26.5 PG (ref 26.5–33)
MCHC RBC AUTO-ENTMCNC: 30.2 G/DL (ref 31.5–36.5)
MCV RBC AUTO: 88 FL (ref 78–100)
OSMOLALITY SERPL: 291 MMOL/KG (ref 275–295)
PHOSPHATE SERPL-MCNC: 4.1 MG/DL (ref 2.5–4.5)
PLATELET # BLD AUTO: 479 10E3/UL (ref 150–450)
POTASSIUM SERPL-SCNC: 4.3 MMOL/L (ref 3.4–5.3)
RBC # BLD AUTO: 3.24 10E6/UL (ref 3.8–5.2)
SODIUM SERPL-SCNC: 142 MMOL/L (ref 136–145)
WBC # BLD AUTO: 20.4 10E3/UL (ref 4–11)

## 2023-07-09 PROCEDURE — 94640 AIRWAY INHALATION TREATMENT: CPT

## 2023-07-09 PROCEDURE — 250N000011 HC RX IP 250 OP 636: Mod: JZ | Performed by: NURSE PRACTITIONER

## 2023-07-09 PROCEDURE — 250N000009 HC RX 250: Performed by: NURSE PRACTITIONER

## 2023-07-09 PROCEDURE — 80048 BASIC METABOLIC PNL TOTAL CA: CPT

## 2023-07-09 PROCEDURE — 84100 ASSAY OF PHOSPHORUS: CPT | Performed by: NURSE PRACTITIONER

## 2023-07-09 PROCEDURE — 94640 AIRWAY INHALATION TREATMENT: CPT | Mod: 76

## 2023-07-09 PROCEDURE — 999N000157 HC STATISTIC RCP TIME EA 10 MIN

## 2023-07-09 PROCEDURE — 94669 MECHANICAL CHEST WALL OSCILL: CPT

## 2023-07-09 PROCEDURE — 250N000013 HC RX MED GY IP 250 OP 250 PS 637: Performed by: NURSE PRACTITIONER

## 2023-07-09 PROCEDURE — 83735 ASSAY OF MAGNESIUM: CPT | Performed by: NURSE PRACTITIONER

## 2023-07-09 PROCEDURE — 71045 X-RAY EXAM CHEST 1 VIEW: CPT | Mod: 26 | Performed by: RADIOLOGY

## 2023-07-09 PROCEDURE — 250N000013 HC RX MED GY IP 250 OP 250 PS 637: Performed by: ANESTHESIOLOGY

## 2023-07-09 PROCEDURE — 250N000013 HC RX MED GY IP 250 OP 250 PS 637

## 2023-07-09 PROCEDURE — 85027 COMPLETE CBC AUTOMATED: CPT

## 2023-07-09 PROCEDURE — 200N000002 HC R&B ICU UMMC

## 2023-07-09 PROCEDURE — 36415 COLL VENOUS BLD VENIPUNCTURE: CPT | Performed by: NURSE PRACTITIONER

## 2023-07-09 PROCEDURE — 99233 SBSQ HOSP IP/OBS HIGH 50: CPT | Performed by: INTERNAL MEDICINE

## 2023-07-09 PROCEDURE — 99233 SBSQ HOSP IP/OBS HIGH 50: CPT

## 2023-07-09 PROCEDURE — 250N000011 HC RX IP 250 OP 636: Mod: JZ | Performed by: CLINICAL NURSE SPECIALIST

## 2023-07-09 PROCEDURE — 71045 X-RAY EXAM CHEST 1 VIEW: CPT

## 2023-07-09 PROCEDURE — 83930 ASSAY OF BLOOD OSMOLALITY: CPT | Performed by: NURSE PRACTITIONER

## 2023-07-09 RX ORDER — ALBUTEROL SULFATE 0.83 MG/ML
2.5 SOLUTION RESPIRATORY (INHALATION) EVERY 4 HOURS PRN
Status: DISCONTINUED | OUTPATIENT
Start: 2023-07-09 | End: 2023-07-13 | Stop reason: HOSPADM

## 2023-07-09 RX ORDER — MAGNESIUM SULFATE HEPTAHYDRATE 40 MG/ML
2 INJECTION, SOLUTION INTRAVENOUS ONCE
Status: COMPLETED | OUTPATIENT
Start: 2023-07-09 | End: 2023-07-09

## 2023-07-09 RX ORDER — ACETYLCYSTEINE 100 MG/ML
4 SOLUTION ORAL; RESPIRATORY (INHALATION)
Status: COMPLETED | OUTPATIENT
Start: 2023-07-09 | End: 2023-07-09

## 2023-07-09 RX ADMIN — BACLOFEN 30 MG: 20 TABLET ORAL at 20:02

## 2023-07-09 RX ADMIN — ACETYLCYSTEINE 4 ML: 100 SOLUTION ORAL; RESPIRATORY (INHALATION) at 13:41

## 2023-07-09 RX ADMIN — Medication 1 CAPSULE: at 08:07

## 2023-07-09 RX ADMIN — BACLOFEN 30 MG: 20 TABLET ORAL at 08:07

## 2023-07-09 RX ADMIN — LEVOFLOXACIN 750 MG: 750 TABLET, FILM COATED ORAL at 08:07

## 2023-07-09 RX ADMIN — MIDODRINE HYDROCHLORIDE 15 MG: 5 TABLET ORAL at 00:07

## 2023-07-09 RX ADMIN — METRONIDAZOLE 500 MG: 500 TABLET ORAL at 13:05

## 2023-07-09 RX ADMIN — ALBUTEROL SULFATE 2.5 MG: 2.5 SOLUTION RESPIRATORY (INHALATION) at 09:29

## 2023-07-09 RX ADMIN — GABAPENTIN 300 MG: 250 SOLUTION ORAL at 22:28

## 2023-07-09 RX ADMIN — ALBUTEROL SULFATE 2.5 MG: 2.5 SOLUTION RESPIRATORY (INHALATION) at 16:49

## 2023-07-09 RX ADMIN — ACETYLCYSTEINE 4 ML: 100 SOLUTION ORAL; RESPIRATORY (INHALATION) at 09:30

## 2023-07-09 RX ADMIN — ACETYLCYSTEINE 4 ML: 100 SOLUTION ORAL; RESPIRATORY (INHALATION) at 20:42

## 2023-07-09 RX ADMIN — ALBUTEROL SULFATE 2.5 MG: 2.5 SOLUTION RESPIRATORY (INHALATION) at 01:00

## 2023-07-09 RX ADMIN — MIDODRINE HYDROCHLORIDE 15 MG: 5 TABLET ORAL at 16:27

## 2023-07-09 RX ADMIN — METRONIDAZOLE 500 MG: 500 TABLET ORAL at 20:03

## 2023-07-09 RX ADMIN — ALBUTEROL SULFATE 2.5 MG: 2.5 SOLUTION RESPIRATORY (INHALATION) at 20:42

## 2023-07-09 RX ADMIN — Medication 15 ML: at 13:05

## 2023-07-09 RX ADMIN — Medication 1 CAPSULE: at 20:03

## 2023-07-09 RX ADMIN — ACETYLCYSTEINE 4 ML: 100 SOLUTION ORAL; RESPIRATORY (INHALATION) at 04:44

## 2023-07-09 RX ADMIN — ALBUTEROL SULFATE 2.5 MG: 2.5 SOLUTION RESPIRATORY (INHALATION) at 04:44

## 2023-07-09 RX ADMIN — MIDODRINE HYDROCHLORIDE 15 MG: 5 TABLET ORAL at 08:07

## 2023-07-09 RX ADMIN — ALBUTEROL SULFATE 2.5 MG: 2.5 SOLUTION RESPIRATORY (INHALATION) at 13:42

## 2023-07-09 RX ADMIN — BACLOFEN 30 MG: 20 TABLET ORAL at 13:05

## 2023-07-09 RX ADMIN — MAGNESIUM SULFATE HEPTAHYDRATE 2 G: 40 INJECTION, SOLUTION INTRAVENOUS at 08:09

## 2023-07-09 RX ADMIN — SERTRALINE HYDROCHLORIDE 150 MG: 100 TABLET ORAL at 20:14

## 2023-07-09 RX ADMIN — ACETYLCYSTEINE 4 ML: 100 SOLUTION ORAL; RESPIRATORY (INHALATION) at 16:49

## 2023-07-09 RX ADMIN — ENOXAPARIN SODIUM 40 MG: 40 INJECTION SUBCUTANEOUS at 20:02

## 2023-07-09 RX ADMIN — METRONIDAZOLE 500 MG: 500 TABLET ORAL at 08:07

## 2023-07-09 ASSESSMENT — ACTIVITIES OF DAILY LIVING (ADL)
ADLS_ACUITY_SCORE: 64
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
## 2023-07-09 NOTE — PLAN OF CARE
Problem: Gas Exchange Impaired  Goal: Optimal Gas Exchange  Outcome: Progressing  Intervention: Optimize Oxygenation and Ventilation     Goal Outcome Evaluation:             10A ICU End of Shift Summary.     Changes this shift:   Neuro: A&Ox4.  Paraplegia at baseline  Cardiac: NSR.  BP WNL without pressors  Respiratory: Episode of desaturation to 80% with slow recovery this morning after coughing, improvement after aggressive pulmonary hygiene.  LS coarse this morning, clearer this afternoon.  Weaned from 4L oxymask to 2L NC.  GI/: BM x2 this shift.  Good output from urostomy and neph tube.  Diet/appetite: TF running at goal.  NPO until VFSS tomorrow  Pain: Denies  Skin: No acute changes  LDA's: PIV x2.  L neph tube.  R urostomy.  ND.  Activities: Up in chair for 2 hours today with lift.  Turns q2 while in bed       Plan: VFSS tomorrow.  Continue aggressive pulmonary hygiene.  Wean o2 as able.  Continue plan of care.
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
## 2023-07-10 ENCOUNTER — APPOINTMENT (OUTPATIENT)
Dept: SPEECH THERAPY | Facility: CLINIC | Age: 28
DRG: 870 | End: 2023-07-10
Attending: ANESTHESIOLOGY
Payer: MEDICARE

## 2023-07-10 ENCOUNTER — MEDICAL CORRESPONDENCE (OUTPATIENT)
Dept: HEALTH INFORMATION MANAGEMENT | Facility: CLINIC | Age: 28
End: 2023-07-10

## 2023-07-10 LAB
ANION GAP SERPL CALCULATED.3IONS-SCNC: 12 MMOL/L (ref 7–15)
BUN SERPL-MCNC: 9.4 MG/DL (ref 6–20)
CALCIUM SERPL-MCNC: 8.6 MG/DL (ref 8.6–10)
CHLORIDE SERPL-SCNC: 101 MMOL/L (ref 98–107)
CREAT SERPL-MCNC: 0.36 MG/DL (ref 0.51–0.95)
DEPRECATED HCO3 PLAS-SCNC: 26 MMOL/L (ref 22–29)
ERYTHROCYTE [DISTWIDTH] IN BLOOD BY AUTOMATED COUNT: 17.6 % (ref 10–15)
GFR SERPL CREATININE-BSD FRML MDRD: >90 ML/MIN/1.73M2
GLUCOSE BLDC GLUCOMTR-MCNC: 103 MG/DL (ref 70–99)
GLUCOSE BLDC GLUCOMTR-MCNC: 104 MG/DL (ref 70–99)
GLUCOSE BLDC GLUCOMTR-MCNC: 106 MG/DL (ref 70–99)
GLUCOSE BLDC GLUCOMTR-MCNC: 109 MG/DL (ref 70–99)
GLUCOSE BLDC GLUCOMTR-MCNC: 120 MG/DL (ref 70–99)
GLUCOSE BLDC GLUCOMTR-MCNC: 120 MG/DL (ref 70–99)
GLUCOSE SERPL-MCNC: 102 MG/DL (ref 70–99)
HCT VFR BLD AUTO: 28.6 % (ref 35–47)
HGB BLD-MCNC: 9.1 G/DL (ref 11.7–15.7)
MAGNESIUM SERPL-MCNC: 1.9 MG/DL (ref 1.7–2.3)
MCH RBC QN AUTO: 27.2 PG (ref 26.5–33)
MCHC RBC AUTO-ENTMCNC: 31.8 G/DL (ref 31.5–36.5)
MCV RBC AUTO: 86 FL (ref 78–100)
PHOSPHATE SERPL-MCNC: 3.8 MG/DL (ref 2.5–4.5)
PLATELET # BLD AUTO: 500 10E3/UL (ref 150–450)
POTASSIUM SERPL-SCNC: 4.4 MMOL/L (ref 3.4–5.3)
RBC # BLD AUTO: 3.34 10E6/UL (ref 3.8–5.2)
SODIUM SERPL-SCNC: 139 MMOL/L (ref 136–145)
WBC # BLD AUTO: 17.8 10E3/UL (ref 4–11)

## 2023-07-10 PROCEDURE — 94669 MECHANICAL CHEST WALL OSCILL: CPT

## 2023-07-10 PROCEDURE — 250N000011 HC RX IP 250 OP 636: Mod: JZ | Performed by: CLINICAL NURSE SPECIALIST

## 2023-07-10 PROCEDURE — 250N000013 HC RX MED GY IP 250 OP 250 PS 637: Performed by: NURSE PRACTITIONER

## 2023-07-10 PROCEDURE — 99233 SBSQ HOSP IP/OBS HIGH 50: CPT

## 2023-07-10 PROCEDURE — 85027 COMPLETE CBC AUTOMATED: CPT

## 2023-07-10 PROCEDURE — 999N000157 HC STATISTIC RCP TIME EA 10 MIN

## 2023-07-10 PROCEDURE — 250N000013 HC RX MED GY IP 250 OP 250 PS 637: Performed by: ANESTHESIOLOGY

## 2023-07-10 PROCEDURE — 84100 ASSAY OF PHOSPHORUS: CPT | Performed by: NURSE PRACTITIONER

## 2023-07-10 PROCEDURE — 80048 BASIC METABOLIC PNL TOTAL CA: CPT

## 2023-07-10 PROCEDURE — 250N000013 HC RX MED GY IP 250 OP 250 PS 637

## 2023-07-10 PROCEDURE — 94640 AIRWAY INHALATION TREATMENT: CPT

## 2023-07-10 PROCEDURE — 200N000002 HC R&B ICU UMMC

## 2023-07-10 PROCEDURE — 250N000011 HC RX IP 250 OP 636: Mod: JZ | Performed by: ANESTHESIOLOGY

## 2023-07-10 PROCEDURE — 99233 SBSQ HOSP IP/OBS HIGH 50: CPT | Performed by: INTERNAL MEDICINE

## 2023-07-10 PROCEDURE — 99291 CRITICAL CARE FIRST HOUR: CPT | Mod: FS | Performed by: INTERNAL MEDICINE

## 2023-07-10 PROCEDURE — 83735 ASSAY OF MAGNESIUM: CPT | Performed by: NURSE PRACTITIONER

## 2023-07-10 PROCEDURE — 36415 COLL VENOUS BLD VENIPUNCTURE: CPT

## 2023-07-10 PROCEDURE — 250N000009 HC RX 250: Performed by: NURSE PRACTITIONER

## 2023-07-10 PROCEDURE — 92526 ORAL FUNCTION THERAPY: CPT | Mod: GN

## 2023-07-10 PROCEDURE — 94640 AIRWAY INHALATION TREATMENT: CPT | Mod: 76

## 2023-07-10 RX ORDER — MIDODRINE HYDROCHLORIDE 5 MG/1
10 TABLET ORAL 2 TIMES DAILY
Status: DISCONTINUED | OUTPATIENT
Start: 2023-07-10 | End: 2023-07-13 | Stop reason: HOSPADM

## 2023-07-10 RX ORDER — MAGNESIUM SULFATE HEPTAHYDRATE 40 MG/ML
2 INJECTION, SOLUTION INTRAVENOUS ONCE
Status: COMPLETED | OUTPATIENT
Start: 2023-07-10 | End: 2023-07-10

## 2023-07-10 RX ORDER — ACETYLCYSTEINE 100 MG/ML
4 SOLUTION ORAL; RESPIRATORY (INHALATION) EVERY 8 HOURS PRN
Status: DISCONTINUED | OUTPATIENT
Start: 2023-07-10 | End: 2023-07-13 | Stop reason: HOSPADM

## 2023-07-10 RX ADMIN — ACETYLCYSTEINE 4 ML: 100 SOLUTION ORAL; RESPIRATORY (INHALATION) at 16:24

## 2023-07-10 RX ADMIN — METRONIDAZOLE 500 MG: 500 TABLET ORAL at 19:48

## 2023-07-10 RX ADMIN — MIDODRINE HYDROCHLORIDE 10 MG: 5 TABLET ORAL at 15:47

## 2023-07-10 RX ADMIN — METRONIDAZOLE 500 MG: 500 TABLET ORAL at 13:45

## 2023-07-10 RX ADMIN — METRONIDAZOLE 500 MG: 500 TABLET ORAL at 07:27

## 2023-07-10 RX ADMIN — BACLOFEN 30 MG: 20 TABLET ORAL at 13:45

## 2023-07-10 RX ADMIN — MAGNESIUM SULFATE HEPTAHYDRATE 2 G: 40 INJECTION, SOLUTION INTRAVENOUS at 05:19

## 2023-07-10 RX ADMIN — LEVOFLOXACIN 750 MG: 750 TABLET, FILM COATED ORAL at 08:50

## 2023-07-10 RX ADMIN — ALBUTEROL SULFATE 2.5 MG: 2.5 SOLUTION RESPIRATORY (INHALATION) at 19:51

## 2023-07-10 RX ADMIN — SERTRALINE HYDROCHLORIDE 150 MG: 100 TABLET ORAL at 19:48

## 2023-07-10 RX ADMIN — ACETYLCYSTEINE 4 ML: 100 SOLUTION ORAL; RESPIRATORY (INHALATION) at 08:22

## 2023-07-10 RX ADMIN — ACETYLCYSTEINE 4 ML: 100 SOLUTION ORAL; RESPIRATORY (INHALATION) at 04:51

## 2023-07-10 RX ADMIN — MIDODRINE HYDROCHLORIDE 15 MG: 5 TABLET ORAL at 00:03

## 2023-07-10 RX ADMIN — Medication 1 CAPSULE: at 07:27

## 2023-07-10 RX ADMIN — BACLOFEN 30 MG: 20 TABLET ORAL at 07:27

## 2023-07-10 RX ADMIN — ACETYLCYSTEINE 4 ML: 100 SOLUTION ORAL; RESPIRATORY (INHALATION) at 12:19

## 2023-07-10 RX ADMIN — ACETYLCYSTEINE 4 ML: 100 SOLUTION ORAL; RESPIRATORY (INHALATION) at 19:51

## 2023-07-10 RX ADMIN — Medication 1 CAPSULE: at 21:51

## 2023-07-10 RX ADMIN — ALBUTEROL SULFATE 2.5 MG: 2.5 SOLUTION RESPIRATORY (INHALATION) at 16:24

## 2023-07-10 RX ADMIN — ALBUTEROL SULFATE 2.5 MG: 2.5 SOLUTION RESPIRATORY (INHALATION) at 04:51

## 2023-07-10 RX ADMIN — Medication 15 ML: at 11:35

## 2023-07-10 RX ADMIN — BACLOFEN 30 MG: 20 TABLET ORAL at 19:48

## 2023-07-10 RX ADMIN — MIDODRINE HYDROCHLORIDE 10 MG: 5 TABLET ORAL at 08:50

## 2023-07-10 RX ADMIN — ENOXAPARIN SODIUM 40 MG: 40 INJECTION SUBCUTANEOUS at 19:48

## 2023-07-10 RX ADMIN — ALBUTEROL SULFATE 2.5 MG: 2.5 SOLUTION RESPIRATORY (INHALATION) at 12:19

## 2023-07-10 RX ADMIN — GABAPENTIN 300 MG: 250 SOLUTION ORAL at 21:51

## 2023-07-10 RX ADMIN — ALBUTEROL SULFATE 2.5 MG: 2.5 SOLUTION RESPIRATORY (INHALATION) at 08:22

## 2023-07-10 ASSESSMENT — ACTIVITIES OF DAILY LIVING (ADL)
ADLS_ACUITY_SCORE: 64

## 2023-07-10 NOTE — PROGRESS NOTES
Changes this shift:   Neuro: A&Ox4.  Paraplegia at baseline  Cardiac: NSR.  BP WNL without pressors  Respiratory: LS clear to all lobes,diminished to left upper lobe.  GI/: BM x2 this shift.  Good output from urostomy and neph tube.  Diet/appetite: TF running at goal.  NPO until VFSS tomorrow  Pain: Denies  Skin: No acute changes  LDA's: PIV x2.  L neph tube.  R urostomy.  ND.  Activities: Bedrest reposition q2 while in bed.   Events this shift: Pt BP increased after scheduled midodrine was administered, DESIREE notified and modified midodrine order 15 mg Q 8 hrs. Flushed urostomy twice this shift indicated by low output. Electrolyte protocols completed for magnesium sulfate.     Plan: VFSS tomorrow.  Continue aggressive pulmonary hygiene.  Wean o2 as able.  Continue plan of care.
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
## 2023-07-10 NOTE — PLAN OF CARE
10A ICU End of Shift Summary.     Changes this shift: Patient downgraded to med surg status.  Neuro: A&Ox4. Able to follow commands, interactive, alert.   Cardiac: NSR.  Respiratory: O2 sats stable on room air. Q4 RT treatments.  GI/: Neph tube and urostomy draining adequate amounts of clear yellow urine. Irrigated urostomy. Two loose BMs this shift.  Diet/appetite: Patient cleared to have ice chips per speech therapy. Patient reports appetite.  Pain: denies pain  Skin: Dressing to R buttock wound changed this shift. Erythema to groin and perineum, barrier cream applied.  LDA's: PIV to LFA and RFA.  Activities: Up to chair for about 4 hours this shift, up with ceiling lift. Q2 turns.       Plan: Video swallow study to be done tomorrow, 7/11, at 1400.

## 2023-07-10 NOTE — PROGRESS NOTES
Patient was seen, case reviewed with team.  Handoff from ICU team provided to hospitalist team earlier today  Comprehensive progress note from ICU team from this morning reviewed    Patient is being transferred to the hospitalist team following admission for septic shock, acute respiratory failure secondary to mucous plugging, in the setting of quadriplegia with chronic IT wound with probable underlying osteomyelitis of the right inferior ischial tuberosity.    Patient seen medically stable  Respiratory status improving    Current orders reviewed.  Patient will complete course of antibiotics tomorrow,  She will need close monitoring of respiratory status, nutritional status, electrolytes.  We will need to try to facilitate patient's procurement of a vest for pulmonary therapy        Elia Carson MD
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
## 2023-07-10 NOTE — PLAN OF CARE
10A ICU End of Shift Summary.     Changes this shift: Patient seen by respiratory q4H for cough assist and vest treatments with success. Lung sounds improved greatly throughout shift. Patient hypertensive after midodrine was administered, as expected.  Neuro: Aox4. Denies all pain. Afebrile. Patient is a paraplegic; has movement in upper extremities but no movement in lower extremities   Cardiac: NSR most of shift. Mildly tachycardic post midodrine administration. No edema present. Pulses 2+  Respiratory: 2L NC   GI/: 2 loose BM this shift; suppository held. ND tube in place 77cm @ the nare. Urostomy attached to rogers catheter draining well; irrigated with 120mL of NS twice this shift. Nephrostomy draining well.  Diet/appetite: Tube feeds at goal of 40mL/hr; 60mL free water flushes q4H  Pain: Denies  Skin: Right ischial tuberosity wound; repacked and redressed. Redness to groin, buttocks, and sacrum; mepilex in place. Mepilex placed on bilateral heels to avoid breakdown.   LDA's: Right PIV. Left PIV. Right urostomy. Left nephrostomy.   Activities: Turns in bed q2H; pillows being used.    Plan: Swallow study this morning in hopes of removing ND tube and advancing diet.

## 2023-07-10 NOTE — PROGRESS NOTES
Care Management Follow Up    Length of Stay (days): 12    Expected Discharge Date:  TBD     Concerns to be Addressed: discharge planning, other (see comments) (will need resumption of care orders upon discharge.)     Patient plan of care discussed at interdisciplinary rounds: No    Anticipated Discharge Disposition: Home, Home Care     Anticipated Discharge Services: County Worker, Other (see comment) ([facility], home health aide)  Anticipated Discharge DME: Wheelchair (Patient owns own wheelchair.)    Patient/family educated on Medicare website which has current facility and service quality ratings: no  Education Provided on the Discharge Plan: Yes  Patient/Family in Agreement with the Plan: yes    Referrals Placed by CM/SW:  None currently  Private pay costs discussed: Not applicable    Additional Information:    Relevant Contact Info:  [facility] ([facility])  Phone: [phone]  Pt receives 2x/wk wound cares from skilled nursing and a HHA for bathing/general ADLs  ____________________________________________      1:06pm - Called ICU, spoke w/ bedside RN.  Requested update on pt.  RN stated that pt is downgraded to medicine floor today, no more debridement is planned, abx finish tomorrow, pt is on room air now, SLP will see pt today, and a video swallow study will be done tomorrow.  RN stated the pt seems pretty close to being ready for discharge back to home.    1:12pm - Called [facility]anya, left  for intake providing general update.  Provided direct call back number if questions/concerns.        Care management will continue to follow.    Beau Garcia RN[facility]  Units 8M/S & 10 ICU  Pager: [phone]  Phone: [phone]

## 2023-07-11 ENCOUNTER — APPOINTMENT (OUTPATIENT)
Dept: SPEECH THERAPY | Facility: CLINIC | Age: 28
DRG: 870 | End: 2023-07-11
Attending: ANESTHESIOLOGY
Payer: MEDICARE

## 2023-07-11 ENCOUNTER — APPOINTMENT (OUTPATIENT)
Dept: GENERAL RADIOLOGY | Facility: CLINIC | Age: 28
DRG: 870 | End: 2023-07-11
Attending: ANESTHESIOLOGY
Payer: MEDICARE

## 2023-07-11 LAB
ANION GAP SERPL CALCULATED.3IONS-SCNC: 10 MMOL/L (ref 7–15)
ATRIAL RATE - MUSE: 134 BPM
ATRIAL RATE - MUSE: 78 BPM
BUN SERPL-MCNC: 9.6 MG/DL (ref 6–20)
CALCIUM SERPL-MCNC: 8.8 MG/DL (ref 8.6–10)
CHLORIDE SERPL-SCNC: 103 MMOL/L (ref 98–107)
CREAT SERPL-MCNC: 0.33 MG/DL (ref 0.51–0.95)
DEPRECATED HCO3 PLAS-SCNC: 27 MMOL/L (ref 22–29)
DIASTOLIC BLOOD PRESSURE - MUSE: NORMAL MMHG
DIASTOLIC BLOOD PRESSURE - MUSE: NORMAL MMHG
ERYTHROCYTE [DISTWIDTH] IN BLOOD BY AUTOMATED COUNT: 17.9 % (ref 10–15)
GFR SERPL CREATININE-BSD FRML MDRD: >90 ML/MIN/1.73M2
GLUCOSE BLDC GLUCOMTR-MCNC: 98 MG/DL (ref 70–99)
GLUCOSE SERPL-MCNC: 104 MG/DL (ref 70–99)
HCT VFR BLD AUTO: 28.8 % (ref 35–47)
HGB BLD-MCNC: 9.1 G/DL (ref 11.7–15.7)
INTERPRETATION ECG - MUSE: NORMAL
INTERPRETATION ECG - MUSE: NORMAL
MAGNESIUM SERPL-MCNC: 1.9 MG/DL (ref 1.7–2.3)
MCH RBC QN AUTO: 27.6 PG (ref 26.5–33)
MCHC RBC AUTO-ENTMCNC: 31.6 G/DL (ref 31.5–36.5)
MCV RBC AUTO: 87 FL (ref 78–100)
P AXIS - MUSE: 43 DEGREES
P AXIS - MUSE: 65 DEGREES
PHOSPHATE SERPL-MCNC: 4 MG/DL (ref 2.5–4.5)
PLATELET # BLD AUTO: 458 10E3/UL (ref 150–450)
POTASSIUM SERPL-SCNC: 4.2 MMOL/L (ref 3.4–5.3)
PR INTERVAL - MUSE: 120 MS
PR INTERVAL - MUSE: 138 MS
QRS DURATION - MUSE: 66 MS
QRS DURATION - MUSE: 70 MS
QT - MUSE: 304 MS
QT - MUSE: 432 MS
QTC - MUSE: 453 MS
QTC - MUSE: 492 MS
R AXIS - MUSE: 86 DEGREES
R AXIS - MUSE: 91 DEGREES
RBC # BLD AUTO: 3.3 10E6/UL (ref 3.8–5.2)
SODIUM SERPL-SCNC: 140 MMOL/L (ref 136–145)
SYSTOLIC BLOOD PRESSURE - MUSE: NORMAL MMHG
SYSTOLIC BLOOD PRESSURE - MUSE: NORMAL MMHG
T AXIS - MUSE: 82 DEGREES
T AXIS - MUSE: 84 DEGREES
VENTRICULAR RATE- MUSE: 134 BPM
VENTRICULAR RATE- MUSE: 78 BPM
WBC # BLD AUTO: 12.9 10E3/UL (ref 4–11)

## 2023-07-11 PROCEDURE — 250N000013 HC RX MED GY IP 250 OP 250 PS 637: Performed by: INTERNAL MEDICINE

## 2023-07-11 PROCEDURE — 74230 X-RAY XM SWLNG FUNCJ C+: CPT | Mod: 26 | Performed by: STUDENT IN AN ORGANIZED HEALTH CARE EDUCATION/TRAINING PROGRAM

## 2023-07-11 PROCEDURE — 250N000013 HC RX MED GY IP 250 OP 250 PS 637

## 2023-07-11 PROCEDURE — 99233 SBSQ HOSP IP/OBS HIGH 50: CPT | Performed by: INTERNAL MEDICINE

## 2023-07-11 PROCEDURE — 80048 BASIC METABOLIC PNL TOTAL CA: CPT

## 2023-07-11 PROCEDURE — 999N000157 HC STATISTIC RCP TIME EA 10 MIN

## 2023-07-11 PROCEDURE — 74230 X-RAY XM SWLNG FUNCJ C+: CPT

## 2023-07-11 PROCEDURE — 83735 ASSAY OF MAGNESIUM: CPT | Performed by: NURSE PRACTITIONER

## 2023-07-11 PROCEDURE — 36415 COLL VENOUS BLD VENIPUNCTURE: CPT

## 2023-07-11 PROCEDURE — 94669 MECHANICAL CHEST WALL OSCILL: CPT

## 2023-07-11 PROCEDURE — 94640 AIRWAY INHALATION TREATMENT: CPT | Mod: 76

## 2023-07-11 PROCEDURE — 250N000011 HC RX IP 250 OP 636: Mod: JZ | Performed by: PHYSICIAN ASSISTANT

## 2023-07-11 PROCEDURE — 250N000013 HC RX MED GY IP 250 OP 250 PS 637: Performed by: ANESTHESIOLOGY

## 2023-07-11 PROCEDURE — G0463 HOSPITAL OUTPT CLINIC VISIT: HCPCS

## 2023-07-11 PROCEDURE — 250N000009 HC RX 250: Performed by: NURSE PRACTITIONER

## 2023-07-11 PROCEDURE — 250N000011 HC RX IP 250 OP 636: Mod: JZ | Performed by: CLINICAL NURSE SPECIALIST

## 2023-07-11 PROCEDURE — 120N000002 HC R&B MED SURG/OB UMMC

## 2023-07-11 PROCEDURE — 85018 HEMOGLOBIN: CPT

## 2023-07-11 PROCEDURE — 84100 ASSAY OF PHOSPHORUS: CPT | Performed by: NURSE PRACTITIONER

## 2023-07-11 PROCEDURE — 92611 MOTION FLUOROSCOPY/SWALLOW: CPT | Mod: GN

## 2023-07-11 RX ORDER — MAGNESIUM SULFATE HEPTAHYDRATE 40 MG/ML
2 INJECTION, SOLUTION INTRAVENOUS ONCE
Status: COMPLETED | OUTPATIENT
Start: 2023-07-11 | End: 2023-07-11

## 2023-07-11 RX ORDER — MICONAZOLE NITRATE 20 MG/G
CREAM TOPICAL 2 TIMES DAILY
Status: DISCONTINUED | OUTPATIENT
Start: 2023-07-11 | End: 2023-07-13 | Stop reason: HOSPADM

## 2023-07-11 RX ADMIN — ALBUTEROL SULFATE 2.5 MG: 2.5 SOLUTION RESPIRATORY (INHALATION) at 08:50

## 2023-07-11 RX ADMIN — METRONIDAZOLE 500 MG: 500 TABLET ORAL at 20:53

## 2023-07-11 RX ADMIN — ACETYLCYSTEINE 4 ML: 100 SOLUTION ORAL; RESPIRATORY (INHALATION) at 08:50

## 2023-07-11 RX ADMIN — BACLOFEN 30 MG: 20 TABLET ORAL at 14:16

## 2023-07-11 RX ADMIN — METRONIDAZOLE 500 MG: 500 TABLET ORAL at 09:28

## 2023-07-11 RX ADMIN — ALBUTEROL SULFATE 2.5 MG: 2.5 SOLUTION RESPIRATORY (INHALATION) at 12:56

## 2023-07-11 RX ADMIN — ENOXAPARIN SODIUM 40 MG: 40 INJECTION SUBCUTANEOUS at 20:53

## 2023-07-11 RX ADMIN — MAGNESIUM SULFATE HEPTAHYDRATE 2 G: 40 INJECTION, SOLUTION INTRAVENOUS at 05:52

## 2023-07-11 RX ADMIN — BACLOFEN 30 MG: 20 TABLET ORAL at 09:27

## 2023-07-11 RX ADMIN — Medication 1 CAPSULE: at 09:27

## 2023-07-11 RX ADMIN — SERTRALINE HYDROCHLORIDE 150 MG: 100 TABLET ORAL at 20:53

## 2023-07-11 RX ADMIN — ACETYLCYSTEINE 4 ML: 100 SOLUTION ORAL; RESPIRATORY (INHALATION) at 16:34

## 2023-07-11 RX ADMIN — MIDODRINE HYDROCHLORIDE 10 MG: 5 TABLET ORAL at 16:49

## 2023-07-11 RX ADMIN — Medication 15 ML: at 14:06

## 2023-07-11 RX ADMIN — MICONAZOLE NITRATE: 20 CREAM TOPICAL at 14:56

## 2023-07-11 RX ADMIN — ACETYLCYSTEINE 4 ML: 100 SOLUTION ORAL; RESPIRATORY (INHALATION) at 12:56

## 2023-07-11 RX ADMIN — ALBUTEROL SULFATE 2.5 MG: 2.5 SOLUTION RESPIRATORY (INHALATION) at 20:23

## 2023-07-11 RX ADMIN — LOPERAMIDE HCL 2 MG: 1 SOLUTION ORAL at 17:41

## 2023-07-11 RX ADMIN — MICONAZOLE NITRATE: 20 CREAM TOPICAL at 20:52

## 2023-07-11 RX ADMIN — Medication 1 CAPSULE: at 23:10

## 2023-07-11 RX ADMIN — GABAPENTIN 300 MG: 250 SOLUTION ORAL at 21:00

## 2023-07-11 RX ADMIN — METRONIDAZOLE 500 MG: 500 TABLET ORAL at 14:06

## 2023-07-11 RX ADMIN — BACLOFEN 30 MG: 20 TABLET ORAL at 20:53

## 2023-07-11 RX ADMIN — ACETYLCYSTEINE 4 ML: 100 SOLUTION ORAL; RESPIRATORY (INHALATION) at 20:22

## 2023-07-11 RX ADMIN — LEVOFLOXACIN 750 MG: 750 TABLET, FILM COATED ORAL at 09:28

## 2023-07-11 RX ADMIN — MIDODRINE HYDROCHLORIDE 10 MG: 5 TABLET ORAL at 09:28

## 2023-07-11 RX ADMIN — ALBUTEROL SULFATE 2.5 MG: 2.5 SOLUTION RESPIRATORY (INHALATION) at 16:34

## 2023-07-11 ASSESSMENT — ACTIVITIES OF DAILY LIVING (ADL)
ADLS_ACUITY_SCORE: 64

## 2023-07-11 NOTE — PLAN OF CARE
/78 (BP Location: Left arm)   Pulse 97   Temp 98.3  F (36.8  C) (Oral)   Resp 18   Wt 48.6 kg (107 lb 2.3 oz)   SpO2 98%   BMI 17.29 kg/m        A& O X 3, Denies SOB,CP N/V  VSS, RA, Denies pain  Paraplegic, dorsal/radial pulse intact  TF running at 45 ml/hr, Tolerating well  Clear yellow urine from Nephrostomy   And urostomy tube, draining adequately    X 1 large loose stools, changed and repositioned   antifungal cream applied Imodium given  Pt had swallow test done today, recommended easy to chew diet  Dr. Faith paged and notifed that patient wanted TF removed    Received new orders for easy to chew diet    ND tube feeding discontinued, tube to be removed per Dr. Faith  Will continue with POC
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
## 2023-07-11 NOTE — PROGRESS NOTES
10A Shift Summary 7/10 1900 - 7/11 0700  Neuro: A&Ox4, denies pain, afebrile. Alert and following commands.  Cardiac: BP MAP > 65. Pt on scheduled midodrine. Apical pulse regular.  Respiratory: Room air, O2 saturation stable.  GI: TF continuing at goal rate, 40 mL with 60Q4 flush. 1 loose/watery BM this shift.  : Urostomy irrigated per orders. Neph tube and urostomy draining clear yellow urine.  Skin: Dressing to R buttock wound clean dry and intact. Groin and perineum has erythema and blisters. Triad paste applied per orders.  Access: PIV x 2, flushed and saline locked.    Mag replaced this AM, K+, Mag and Phos rechecks tmrw AM.    Plan: Plan for scheduled video swallow study today 7/11 at 1400. Continue plan of care, update provider with changes.    Refer to flowsheet for detailed information.

## 2023-07-11 NOTE — PROGRESS NOTES
CLINICAL NUTRITION SERVICES - BRIEF NOTE     Nutrition Prescription      Recommendations already ordered by Registered Dietitian (RD):  Added Banatrol BID in the setting of loose stools - continue TF/water flushes as ordered     Future/Additional Recommendations:  Monitor for diet per SLP and make adjustment to TF as appropriate      NEW FINDINGS   MAR reviewed. Labs reviewed. Noted multiple loose stools documented per nursing on 7/10, will start above intervention for this, reviewed this with bedside RN this am, RN agreeing with above intervention. Pt planned for video swallow at 2 pm today.     INTERVENTIONS  Implementation  Enteral Nutrition/water flushes - continue as ordered  Modular - ordered as above for loose stools     Monitoring/Evaluation  Will continue to monitor and evaluate per protocol.    Lynn Nichols RD, CNSC, LD  [address] pager: [phone]  Weekend/holiday pager: [phone]

## 2023-07-11 NOTE — PLAN OF CARE
Goal Outcome Evaluation:   Pt transferred to  in stable condition. Report given to primary nurse, Pt's belongings, chart, and medications sent with Pt.

## 2023-07-12 ENCOUNTER — APPOINTMENT (OUTPATIENT)
Dept: SPEECH THERAPY | Facility: CLINIC | Age: 28
DRG: 870 | End: 2023-07-12
Attending: ANESTHESIOLOGY
Payer: MEDICARE

## 2023-07-12 LAB
ALBUMIN SERPL BCG-MCNC: 2.8 G/DL (ref 3.5–5.2)
ANION GAP SERPL CALCULATED.3IONS-SCNC: 10 MMOL/L (ref 7–15)
BACTERIA BLD CULT: NO GROWTH
BACTERIA BLD CULT: NO GROWTH
BUN SERPL-MCNC: 9.2 MG/DL (ref 6–20)
CALCIUM SERPL-MCNC: 9 MG/DL (ref 8.6–10)
CHLORIDE SERPL-SCNC: 101 MMOL/L (ref 98–107)
CREAT SERPL-MCNC: 0.41 MG/DL (ref 0.51–0.95)
CRP SERPL-MCNC: 29.69 MG/L
DEPRECATED HCO3 PLAS-SCNC: 26 MMOL/L (ref 22–29)
ERYTHROCYTE [DISTWIDTH] IN BLOOD BY AUTOMATED COUNT: 18 % (ref 10–15)
GFR SERPL CREATININE-BSD FRML MDRD: >90 ML/MIN/1.73M2
GLUCOSE SERPL-MCNC: 89 MG/DL (ref 70–99)
HCT VFR BLD AUTO: 27 % (ref 35–47)
HGB BLD-MCNC: 8.4 G/DL (ref 11.7–15.7)
MAGNESIUM SERPL-MCNC: 2 MG/DL (ref 1.7–2.3)
MCH RBC QN AUTO: 26.7 PG (ref 26.5–33)
MCHC RBC AUTO-ENTMCNC: 31.1 G/DL (ref 31.5–36.5)
MCV RBC AUTO: 86 FL (ref 78–100)
PHOSPHATE SERPL-MCNC: 4.8 MG/DL (ref 2.5–4.5)
PLATELET # BLD AUTO: 414 10E3/UL (ref 150–450)
POTASSIUM SERPL-SCNC: 3.6 MMOL/L (ref 3.4–5.3)
PREALB SERPL IA-MCNC: 22 MG/DL (ref 15–45)
RBC # BLD AUTO: 3.15 10E6/UL (ref 3.8–5.2)
SODIUM SERPL-SCNC: 137 MMOL/L (ref 136–145)
WBC # BLD AUTO: 11.2 10E3/UL (ref 4–11)

## 2023-07-12 PROCEDURE — 250N000013 HC RX MED GY IP 250 OP 250 PS 637

## 2023-07-12 PROCEDURE — 250N000011 HC RX IP 250 OP 636: Mod: JZ | Performed by: PHYSICIAN ASSISTANT

## 2023-07-12 PROCEDURE — 85027 COMPLETE CBC AUTOMATED: CPT

## 2023-07-12 PROCEDURE — 250N000013 HC RX MED GY IP 250 OP 250 PS 637: Performed by: ANESTHESIOLOGY

## 2023-07-12 PROCEDURE — 250N000013 HC RX MED GY IP 250 OP 250 PS 637: Performed by: INTERNAL MEDICINE

## 2023-07-12 PROCEDURE — 84100 ASSAY OF PHOSPHORUS: CPT | Performed by: NURSE PRACTITIONER

## 2023-07-12 PROCEDURE — 84134 ASSAY OF PREALBUMIN: CPT

## 2023-07-12 PROCEDURE — 99233 SBSQ HOSP IP/OBS HIGH 50: CPT | Performed by: INTERNAL MEDICINE

## 2023-07-12 PROCEDURE — 120N000002 HC R&B MED SURG/OB UMMC

## 2023-07-12 PROCEDURE — 999N000157 HC STATISTIC RCP TIME EA 10 MIN

## 2023-07-12 PROCEDURE — 36415 COLL VENOUS BLD VENIPUNCTURE: CPT

## 2023-07-12 PROCEDURE — 250N000009 HC RX 250: Performed by: NURSE PRACTITIONER

## 2023-07-12 PROCEDURE — 94640 AIRWAY INHALATION TREATMENT: CPT | Mod: 76

## 2023-07-12 PROCEDURE — 92526 ORAL FUNCTION THERAPY: CPT | Mod: GN

## 2023-07-12 PROCEDURE — 250N000013 HC RX MED GY IP 250 OP 250 PS 637: Performed by: PHYSICIAN ASSISTANT

## 2023-07-12 PROCEDURE — 258N000003 HC RX IP 258 OP 636

## 2023-07-12 PROCEDURE — 80069 RENAL FUNCTION PANEL: CPT

## 2023-07-12 PROCEDURE — 250N000011 HC RX IP 250 OP 636: Mod: JZ | Performed by: CLINICAL NURSE SPECIALIST

## 2023-07-12 PROCEDURE — 86140 C-REACTIVE PROTEIN: CPT

## 2023-07-12 PROCEDURE — 83735 ASSAY OF MAGNESIUM: CPT | Performed by: NURSE PRACTITIONER

## 2023-07-12 RX ORDER — SODIUM CHLORIDE 9 MG/ML
INJECTION, SOLUTION INTRAVENOUS
Status: COMPLETED
Start: 2023-07-12 | End: 2023-07-12

## 2023-07-12 RX ORDER — POTASSIUM CHLORIDE 20MEQ/15ML
10 LIQUID (ML) ORAL ONCE
Status: COMPLETED | OUTPATIENT
Start: 2023-07-12 | End: 2023-07-12

## 2023-07-12 RX ORDER — MAGNESIUM SULFATE HEPTAHYDRATE 40 MG/ML
2 INJECTION, SOLUTION INTRAVENOUS ONCE
Status: COMPLETED | OUTPATIENT
Start: 2023-07-12 | End: 2023-07-12

## 2023-07-12 RX ORDER — MULTIPLE VITAMINS W/ MINERALS TAB 9MG-400MCG
1 TAB ORAL DAILY
Status: DISCONTINUED | OUTPATIENT
Start: 2023-07-12 | End: 2023-07-13 | Stop reason: HOSPADM

## 2023-07-12 RX ORDER — GABAPENTIN 300 MG/1
300 CAPSULE ORAL AT BEDTIME
Status: DISCONTINUED | OUTPATIENT
Start: 2023-07-12 | End: 2023-07-13 | Stop reason: HOSPADM

## 2023-07-12 RX ADMIN — SODIUM CHLORIDE 500 ML: 9 INJECTION, SOLUTION INTRAVENOUS at 09:03

## 2023-07-12 RX ADMIN — ACETYLCYSTEINE 4 ML: 100 SOLUTION ORAL; RESPIRATORY (INHALATION) at 20:26

## 2023-07-12 RX ADMIN — POTASSIUM CHLORIDE 10 MEQ: 20 SOLUTION ORAL at 09:02

## 2023-07-12 RX ADMIN — SERTRALINE HYDROCHLORIDE 150 MG: 100 TABLET ORAL at 20:17

## 2023-07-12 RX ADMIN — BACLOFEN 30 MG: 20 TABLET ORAL at 09:02

## 2023-07-12 RX ADMIN — GABAPENTIN 300 MG: 300 CAPSULE ORAL at 21:49

## 2023-07-12 RX ADMIN — MIDODRINE HYDROCHLORIDE 10 MG: 5 TABLET ORAL at 16:23

## 2023-07-12 RX ADMIN — BACLOFEN 30 MG: 20 TABLET ORAL at 16:22

## 2023-07-12 RX ADMIN — ENOXAPARIN SODIUM 40 MG: 40 INJECTION SUBCUTANEOUS at 20:17

## 2023-07-12 RX ADMIN — ACETYLCYSTEINE 4 ML: 100 SOLUTION ORAL; RESPIRATORY (INHALATION) at 10:34

## 2023-07-12 RX ADMIN — MIDODRINE HYDROCHLORIDE 10 MG: 5 TABLET ORAL at 09:02

## 2023-07-12 RX ADMIN — SODIUM CHLORIDE 1000 ML: 9 INJECTION, SOLUTION INTRAVENOUS at 18:45

## 2023-07-12 RX ADMIN — Medication 1 CAPSULE: at 21:49

## 2023-07-12 RX ADMIN — Medication 1 CAPSULE: at 09:02

## 2023-07-12 RX ADMIN — ACETYLCYSTEINE 4 ML: 100 SOLUTION ORAL; RESPIRATORY (INHALATION) at 17:18

## 2023-07-12 RX ADMIN — MAGNESIUM SULFATE HEPTAHYDRATE 2 G: 40 INJECTION, SOLUTION INTRAVENOUS at 09:03

## 2023-07-12 RX ADMIN — ALBUTEROL SULFATE 2.5 MG: 2.5 SOLUTION RESPIRATORY (INHALATION) at 20:26

## 2023-07-12 RX ADMIN — ALBUTEROL SULFATE 2.5 MG: 2.5 SOLUTION RESPIRATORY (INHALATION) at 10:34

## 2023-07-12 RX ADMIN — MICONAZOLE NITRATE: 20 CREAM TOPICAL at 20:17

## 2023-07-12 RX ADMIN — MICONAZOLE NITRATE: 20 CREAM TOPICAL at 09:03

## 2023-07-12 RX ADMIN — BACLOFEN 30 MG: 20 TABLET ORAL at 20:17

## 2023-07-12 RX ADMIN — MULTIPLE VITAMINS W/ MINERALS TAB 1 TABLET: TAB at 18:45

## 2023-07-12 RX ADMIN — ALBUTEROL SULFATE 2.5 MG: 2.5 SOLUTION RESPIRATORY (INHALATION) at 13:37

## 2023-07-12 RX ADMIN — ALBUTEROL SULFATE 2.5 MG: 2.5 SOLUTION RESPIRATORY (INHALATION) at 17:18

## 2023-07-12 RX ADMIN — ACETYLCYSTEINE 4 ML: 100 SOLUTION ORAL; RESPIRATORY (INHALATION) at 13:37

## 2023-07-12 ASSESSMENT — ACTIVITIES OF DAILY LIVING (ADL)
ADLS_ACUITY_SCORE: 60
ADLS_ACUITY_SCORE: 64
ADLS_ACUITY_SCORE: 64
ADLS_ACUITY_SCORE: 60
ADLS_ACUITY_SCORE: 64
ADLS_ACUITY_SCORE: 60
ADLS_ACUITY_SCORE: 60

## 2023-07-12 NOTE — PLAN OF CARE
BP 98/67 (BP Location: Right arm)   Pulse 70   Temp 98.3  F (36.8  C) (Oral)   Resp 16   Wt 48.6 kg (107 lb 2.3 oz)   SpO2 100%   BMI 17.29 kg/m    Pt is alert and oriented x 4, able to make needs known. On room air. Plastic consult in to inspect sacral/coccyx/ischial tuberosity wounds, dressings changed, antifungal paste applied after cleansing with wound cleanser. Pt repositioned q 2 h, heels elevated with heel pillows, pt requested pillows behind knees. Pt tolerated pills crushed with applesauce, K+ liquid given, pt stated they did not like the taste, and would be open to trying the K+ packets. Urostomy emptied for 250cc karla urine, nephrostomy emptied for 250cc cloudy karla urine. Pt needs assist ordering food, assist x1 to feed, ate 50% of muffin and few sips of strawberry kiwi water. Napping on and off, changed dressing to IT d/t soiling, diet changed to regular, pt karina well, calorie counts to begin at midnight tonight. Nss infusing 50mL/hr right PIV for TKO, Disposition TBD pending consults, continue plan of care.
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
## 2023-07-12 NOTE — PROGRESS NOTES
CLINICAL NUTRITION SERVICES - BRIEF NOTE     Nutrition Prescription    RECOMMENDATIONS FOR MDs/PROVIDERS TO ORDER:  - none at present.     Recommendations already ordered by Registered Dietitian (RD):  - Discontinue FWF order.  - Start Calorie counts 7/13-7/15.   - Collaborate with other providers--consult to PharmD to switch liquid neurontin to pill form now that ND FT removed.   - Will switch liquid multivitamin with minerals to pill version.      Future/Additional Recommendations:  - Follow po intake, discuss ONS/snacks further based on intake today and tomorrow.    - Monitor stools with Banatrol discontinued 7/11 with TF.  May need to resume or encourage po intake of other soluble fiber sources.      Spoke briefly with pt re: po intake and ways of increasing protein to meet her needs.  Pt prefers not to use supplement drinks.      EVALUATION OF THE PROGRESS TOWARD GOALS   Diet: Easy to Chew diet (Level 7), Thin liquids per SLP eval w/ VSS 7/11 and today upgraded to regular.  Nutrition Support: TF, Banatrol and ND FT discontinued 7/11 pm.  Prior order: TF with Osmolite 1.5 at 40 ml/hr with a 60 ml water flush q 4 hrs to provide 1440 kcal, 61 gm protein, 194 gm CHO, no fiber, 1091 ml water/day.  Banatrol BID added 7/11 that provides 10 gm fiber, 4 g Protein with majority of kcal (90 kcal) from fiber that is serves as a prebiotic.    Intake: Per RN, ate 100% of small breakfast.  Pt/mom note she took all but a few bites of a sandwich and Pepsi for lunch.  Together provided about 980 kcal and ~35 g protein. Pt needs to consume at least 1380 kcal and 65 g Protein to meet low end of needs.     NEW FINDINGS   Per SLP 7/11 note: 1:1 supervision needed--dependent feeder.  Essentially normal swallow function.  Anticipates upgrading to regular texture in 1-2 days.     UO < 1L 7/11 but high on night shift.  Anticipate increase w/ oral fluid intake.     Multivit w/ minerals in liquid form with ND FT removed yesterday.      Pt feels stools are still pretty loose today.      INTERVENTIONS  Education--discussed main sources of protein--pt aware of these. Encouraged her to order two sources w/ each meal such as milk or small Greek yogurt with entree.  Pt declined list of protein containing foods found on hospital menu.  Explained plan for calorie count to track adequacy of intake.   Switch multivitamin w/ minerals from liquid to tablet form.  Collaborate with other providers--consult to PharmD to switch liquid Rx (Neurontin) to pill/capsule form now that FT removed and liquid Rx contributing to loose stools.   Calorie counts.     Monitoring/Evaluation  Progress toward goals will be monitored and evaluated per protocol.     Marilynn Alicia) Ananda HERNANDEZ, LD   6B and 8A Med/surg (M-F) Pager: [phone]  Weekend/holiday pager: [phone]
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
## 2023-07-12 NOTE — PLAN OF CARE
[id number]    VS: /76 (BP Location: Right arm)   Pulse 110   Temp 98.2  F (36.8  C) (Oral)   Resp 16   Wt 48.6 kg (107 lb 2.3 oz)   SpO2 97%   BMI 17.29 kg/m       O2: Stable on room air. Denies SOB, chest pain, dizziness.    Output: Urostomy in place with adequate output throughout shift.    Last BM: Incontinent of bowel, 2 loose/soft stools this shift.    Activity: Paraplegic, assist x2 with cares and repositioning.    Skin: Redness to buttocks, groin and posterior thighs, antifungal barrier cream applied.   Right IT wound.    Pain: Denied pain throughout shift.    CMS: A&O, denies numbness and tingling. Dorsal/radial pulse intact.    Dressing: Mepilex over right IT wound.   Diet: Easy to chew diet, thin liquids, pills crushed in applesauce.    LDA: Nephrostomy in place, clear yellow output.    Equipment: Personal belongings.    Plan: Urology, plastics consults.    Additional Info: Able to make needs known. Call light within reach. Continue with plan of care.   Reposition every 2 hours.

## 2023-07-12 NOTE — PROGRESS NOTES
[facility]    CRITICAL CARE ATTENDING PHYSICIAN NOTE     I am managing ongoing chronic issues (paraplegia, chronic osteo)  resulting and acute decompensation resulting in critical condition that has now improved.      1. Acute Hypoxic Respiratory failure: resolved. Patient required multiple intubations during this hospitalization. Mucous plugs were a problem. I emphasized to the patient the importance of following up with pulmonary specialized in neuromuscular disease to evaluate the strength of her respiratory muscles with periodic pulmonary function tests and also to assist with airway clearance (cough assist device and vest)   2.  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  Enterobacter UTI: Continue oral antibiotic for 14 days per ID.   4. Persistent Leucocytosis: chronic wound vs. UTI? Continue to follow        See DESIREE/resident's associated note for further details.   The patient was seen and examined with the resident/DESIREE.  We have discussed the patient in detail and I agree with the findings, assessment, and plan as documented when this note was cosigned on this day. The plan was formulated in conjunction with pharmacy, ICU nurses, and respiratory therapist. I have evaluated all laboratory values and imaging studies for the past 24 hours. I have reviewed all the consults that have been ordered and are active for this patient.       Critical Care Time: 30 min.  I spent this time (excluding procedures) personally providing and directing care services at the bedside and on the critical care unit.      She may transfer to the floor today     Marina Lebron MD, MPH    Pulmonary, Allergy and Critical Care Medicine    DOS 7/10/23

## 2023-07-12 NOTE — PROGRESS NOTES
Care Management Follow Up    Length of Stay (days): 14    Expected Discharge Date:  TBD     Concerns to be Addressed: discharge planning, will need new home care referral order at discharge d/t cert period ending  Patient plan of care discussed at interdisciplinary rounds: Yes    Anticipated Discharge Disposition: Home, Home Care     Anticipated Discharge Services: County Worker, Other (see comment) ([facility], home health aide)  Anticipated Discharge DME: Wheelchair (Patient owns own wheelchair.)    Patient/family educated on Medicare website which has current facility and service quality ratings: no  Education Provided on the Discharge Plan: Yes  Patient/Family in Agreement with the Plan: yes    Referrals Placed by CM/SW:  None currently  Private pay costs discussed: Not applicable    Additional Information:    Relevant Contact Info:  [email or web address] [facility] ([facility])  Phone: [phone]  Pt receives  3x/wk (informed of this frequency on 7/12 by Sergo Pelaez - 2x/wk was inaccurate from previous conversation silvestre Pelaez rep) wound cares from skilled nursing and a HHA for bathing/general ADLs  ____________________________________________    Provider updated this writer this AM that pt has plastics and urology consults pending, and once those are completed, it is possible pt would be discharge-ready (pending outcome of consults).    Chart review of latest provider note (filed 7/11) indicates pt was getting chest percussion therapy for mucous plugging every 4 hours.  It is unclear if this will be needed post-discharge.    11:21am - Paged provider, received prompt call back.  Inquired about chest percussion therapy.  Provider stated he had spoken w/ RT and pt regarding this therapy.  He stated pt has not been receiving it today, has been on room air for 2 days, and it is unlikely for chest percussion therapy to be needed post-discharge.    11:26am - Called [facility], spoke w/ Felicia in intake.  Provided  general update regarding pt and that, pending info from provider, it is possible she could discharge in the next 1-2 days.  She expressed understanding, stated pt's certification period has ended, so she would technically be a new referral.  Inquired if there would be any issue taking this pt back on for services.  Felicia reviewed notes, stated she does not believe there would be an issue but that Olive, the usual intake representative, is out today and can follow-up w/ this writer tomorrow.  Offered to send homecare referral documentation so they have up-to-date info, Felicia was agreeable.  Subsequently sent via [facility] tab.    11:56am - Received call from AVELINO Trotter w/ [facility].  She stated she is assisting Felicia and had some additional questions.  She requested general update regarding how pt is currently doing and what current wound care recommendations are.  Provided general update and read recs from [facility] RN note filed 7/11.  Sergo stated they could likely continue 3x/wk wound cares, but was unsure if they could accommodate daily cares.  She requested this writer to send WO note and provider progress note, as well as check w/ pt's mother regarding if she is agreeable to assist w/ wound cares.  She stated either herself or Olive will follow-up w/ this writer tomorrow.    12:12pm - Called pt's mother, no answer.    Bedside RN updated this writer that pt's mother is in pt's room visiting.    Met w/ pt and her mother, Areli, at bedside, introduced self and role, and inquired about Areli's ability to assist w/ wound cares.  Areli confirmed she has been helping w/ wound cares already, and that she has no concerns/issues w/ continuing to do so.  Informed her that current wound care regimen may change, and if so, home care agency would conduct teaching, she expressed understanding.  Asked if pt or Areli had any questions.  Areli asked why the providers have decided not to do anything  regarding pt's kidney stone, and when pt is ready to discharge.  Provided rough estimate of 1-2 days, explained that provider is the final authority and this estimate is based only upon what this writer has seen in the chart and heard from staff regarding how pt is doing.  Informed them that pending what plastics says, as well as primary provider, LORETO is subject to change.  They expressed understanding.  Advised Areli to ask primary provider when he sees pt today about kidney stone and LORETO, she expressed understanding.  Pt and Areli had no further questions.  Advised them to alert bedside RN if anything comes up that they would like to speak w/ care coordination team about, they expressed understanding.    1:38pm - Faxed facesheet, WOC RN note, and provider progress note to [facility].          Care management will continue to follow.    Beau Garcia RNMUS[facility]  Units 8M/S & 10 ICU  Pager: [phone]  Phone: [phone]

## 2023-07-12 NOTE — PROGRESS NOTES
Urology Sign-Off Note:    Urology was consulted for obstructing left ureteral stone, now s/p left PNT placement by interventional radiology. She has also been managed with an indwelling catheter in her catheterization channel with BID irrigations to ensure clearance of mucus. Patient was previously requiring ICU levels of care, and has since been transferred to the floor.    As patient is clinically improved, will plan to transition back to her home catheterization regimen. Per review of her prior urology clinic notes, current regimen is once daily irrigation of her augmented bladder and catheterization every 4 hours during the day - she no longer needs to catheterize at nightime. She catheterizes with a 14 Fr straight catheter.    Discussed with medicine primary team AND unit charge nurse to ensure cares would be able to be provided on the floor for bladder management and received verbal confirmation.    We will discuss definitive stone management with one of our endourologists, and we will coordinate definitive stone management in the coming weeks. In the interim, she can be safely managed with the left PNT to gravity drainage. She is also scheduled for surveillance renal ultrasound of her kidneys with our team, currently scheduled for April 2024.    Plan:  - Okay to discontinue indwelling catheter from cath channel (ordered for you)  - Start CIC q4 hours during the daytime using 14 Fr straight catheter (ordered for you)  - Perform once daily irrigations with sterile saline to clear augmented bladder of mucus (ordered for you)  - Please note, her bladder neck was surgically closed and a catheter cannot be placed per her native urethra. If a catheter is unable to be passed per her catheter channel, this is an emergency and urology should be notified   - We will coordinate definitive stone management with one of our endourologists - in the interim, continue left PNT to gravity drainage  - Urology will sign-off.  Please page/call with new questions/concerns    Amos Plummer MD  Urology Resident, PGY-4

## 2023-07-13 ENCOUNTER — APPOINTMENT (OUTPATIENT)
Dept: SPEECH THERAPY | Facility: CLINIC | Age: 28
DRG: 870 | End: 2023-07-13
Attending: ANESTHESIOLOGY
Payer: MEDICARE

## 2023-07-13 ENCOUNTER — DOCUMENTATION ONLY (OUTPATIENT)
Dept: UROLOGY | Facility: CLINIC | Age: 28
End: 2023-07-13
Payer: MEDICARE

## 2023-07-13 VITALS
DIASTOLIC BLOOD PRESSURE: 59 MMHG | RESPIRATION RATE: 16 BRPM | OXYGEN SATURATION: 94 % | TEMPERATURE: 99.3 F | WEIGHT: 105.82 LBS | SYSTOLIC BLOOD PRESSURE: 90 MMHG | BODY MASS INDEX: 17.08 KG/M2 | HEART RATE: 66 BPM

## 2023-07-13 LAB
ANION GAP SERPL CALCULATED.3IONS-SCNC: 12 MMOL/L (ref 7–15)
BACTERIA BLD CULT: NO GROWTH
BACTERIA BLD CULT: NO GROWTH
BUN SERPL-MCNC: 7.6 MG/DL (ref 6–20)
CALCIUM SERPL-MCNC: 8.6 MG/DL (ref 8.6–10)
CHLORIDE SERPL-SCNC: 105 MMOL/L (ref 98–107)
CREAT SERPL-MCNC: 0.39 MG/DL (ref 0.51–0.95)
DEPRECATED HCO3 PLAS-SCNC: 23 MMOL/L (ref 22–29)
ERYTHROCYTE [DISTWIDTH] IN BLOOD BY AUTOMATED COUNT: 18.4 % (ref 10–15)
GFR SERPL CREATININE-BSD FRML MDRD: >90 ML/MIN/1.73M2
GLUCOSE SERPL-MCNC: 91 MG/DL (ref 70–99)
HCT VFR BLD AUTO: 26.5 % (ref 35–47)
HGB BLD-MCNC: 8.1 G/DL (ref 11.7–15.7)
MAGNESIUM SERPL-MCNC: 2.2 MG/DL (ref 1.7–2.3)
MCH RBC QN AUTO: 26.7 PG (ref 26.5–33)
MCHC RBC AUTO-ENTMCNC: 30.6 G/DL (ref 31.5–36.5)
MCV RBC AUTO: 88 FL (ref 78–100)
PHOSPHATE SERPL-MCNC: 4.7 MG/DL (ref 2.5–4.5)
PLATELET # BLD AUTO: 414 10E3/UL (ref 150–450)
POTASSIUM SERPL-SCNC: 3.3 MMOL/L (ref 3.4–5.3)
POTASSIUM SERPL-SCNC: 4.3 MMOL/L (ref 3.4–5.3)
RBC # BLD AUTO: 3.03 10E6/UL (ref 3.8–5.2)
SODIUM SERPL-SCNC: 140 MMOL/L (ref 136–145)
WBC # BLD AUTO: 10.6 10E3/UL (ref 4–11)

## 2023-07-13 PROCEDURE — 36415 COLL VENOUS BLD VENIPUNCTURE: CPT | Performed by: INTERNAL MEDICINE

## 2023-07-13 PROCEDURE — 92526 ORAL FUNCTION THERAPY: CPT | Mod: GN

## 2023-07-13 PROCEDURE — 36415 COLL VENOUS BLD VENIPUNCTURE: CPT

## 2023-07-13 PROCEDURE — 250N000013 HC RX MED GY IP 250 OP 250 PS 637

## 2023-07-13 PROCEDURE — 84132 ASSAY OF SERUM POTASSIUM: CPT | Performed by: INTERNAL MEDICINE

## 2023-07-13 PROCEDURE — 250N000009 HC RX 250: Performed by: NURSE PRACTITIONER

## 2023-07-13 PROCEDURE — 94640 AIRWAY INHALATION TREATMENT: CPT | Mod: 76

## 2023-07-13 PROCEDURE — 999N000157 HC STATISTIC RCP TIME EA 10 MIN

## 2023-07-13 PROCEDURE — 80048 BASIC METABOLIC PNL TOTAL CA: CPT

## 2023-07-13 PROCEDURE — 85027 COMPLETE CBC AUTOMATED: CPT

## 2023-07-13 PROCEDURE — 84100 ASSAY OF PHOSPHORUS: CPT | Performed by: NURSE PRACTITIONER

## 2023-07-13 PROCEDURE — 250N000013 HC RX MED GY IP 250 OP 250 PS 637: Performed by: INTERNAL MEDICINE

## 2023-07-13 PROCEDURE — 250N000013 HC RX MED GY IP 250 OP 250 PS 637: Performed by: ANESTHESIOLOGY

## 2023-07-13 PROCEDURE — 99239 HOSP IP/OBS DSCHRG MGMT >30: CPT | Performed by: INTERNAL MEDICINE

## 2023-07-13 PROCEDURE — 83735 ASSAY OF MAGNESIUM: CPT | Performed by: NURSE PRACTITIONER

## 2023-07-13 RX ORDER — POTASSIUM CHLORIDE 1500 MG/1
20 TABLET, EXTENDED RELEASE ORAL ONCE
Status: COMPLETED | OUTPATIENT
Start: 2023-07-13 | End: 2023-07-13

## 2023-07-13 RX ORDER — ZINC SULFATE 50(220)MG
220 CAPSULE ORAL DAILY
Status: DISCONTINUED | OUTPATIENT
Start: 2023-07-13 | End: 2023-07-13 | Stop reason: HOSPADM

## 2023-07-13 RX ORDER — ZINC SULFATE 50(220)MG
220 CAPSULE ORAL DAILY
Qty: 14 CAPSULE | Refills: 0 | Status: SHIPPED | OUTPATIENT
Start: 2023-07-13 | End: 2023-07-27

## 2023-07-13 RX ADMIN — ACETYLCYSTEINE 4 ML: 100 SOLUTION ORAL; RESPIRATORY (INHALATION) at 12:55

## 2023-07-13 RX ADMIN — MULTIPLE VITAMINS W/ MINERALS TAB 1 TABLET: TAB at 07:59

## 2023-07-13 RX ADMIN — POTASSIUM CHLORIDE 20 MEQ: 1500 TABLET, EXTENDED RELEASE ORAL at 07:59

## 2023-07-13 RX ADMIN — MIDODRINE HYDROCHLORIDE 10 MG: 5 TABLET ORAL at 07:59

## 2023-07-13 RX ADMIN — ALBUTEROL SULFATE 2.5 MG: 2.5 SOLUTION RESPIRATORY (INHALATION) at 12:55

## 2023-07-13 RX ADMIN — MICONAZOLE NITRATE: 20 CREAM TOPICAL at 08:00

## 2023-07-13 RX ADMIN — Medication 1 CAPSULE: at 07:59

## 2023-07-13 RX ADMIN — BACLOFEN 30 MG: 20 TABLET ORAL at 07:59

## 2023-07-13 ASSESSMENT — ACTIVITIES OF DAILY LIVING (ADL)
ADLS_ACUITY_SCORE: 58
ADLS_ACUITY_SCORE: 60
ADLS_ACUITY_SCORE: 60
ADLS_ACUITY_SCORE: 58
ADLS_ACUITY_SCORE: 58
ADLS_ACUITY_SCORE: 60

## 2023-07-13 NOTE — PLAN OF CARE
VS: Temp: 99.3  F (37.4  C) Temp src: Oral BP: 90/59 Pulse: 66   Resp: 16 SpO2: 94 % O2 Device: None (Room air)     O2: Stable On RA   Output: Urostomy 600 out karla colored and Nephrostomy 100 out karla colored.   Last BM: 7/12/2023. Pt abdomen is rounded and nontender. BS active x4.   Activity: Not OOB, 2 assist and Repositioned q2h    Skin: Pt has Meilax on buttock covering wounds. Pt has floating heel boots on both feet. Pt has pillow under knees.    Pain: Denies pain during shift.    Neuro: A&Ox4. Paraplegic. diminished sensation below chest.    Dressing: mepilex on buttocks, dressing CDI   Diet: Reg. Takes pills whole with water.    LDA: R piv sandor locked.    Equipment: Personal items, iv pole, heel boots,    Plan: Continue POC.   Additional Info:

## 2023-07-13 NOTE — CONSULTS
Motherr declined class- stated she wants to do the education with bedside RNs.    Petey Medina RN  [facility]  [phone]

## 2023-07-13 NOTE — PROGRESS NOTES
Speech Language Therapy Discharge Summary    Reason for therapy discharge:    All goals and outcomes met, no further needs identified.    Progress towards therapy goal(s). See goals on Care Plan in [facility] electronic health record for goal details.  Goals met    Therapy recommendation(s):    No further therapy is recommended.

## 2023-07-13 NOTE — PLAN OF CARE
BP 90/59 (BP Location: Right arm)   Pulse 66   Temp 99.3  F (37.4  C) (Oral)   Resp 16   Wt 48.6 kg (107 lb 2.3 oz)   SpO2 94%   BMI 17.29 kg/m     Pt is alert and oriented x4, able to make needs known, directs cares, states she is ready to go home.   Pt. discharged at 1530 to home, and left with personal belongings. Pt. received complete discharge paperwork  Pt. was given times of last dose for all discharge medications in writing on discharge medication sheets. Discharge teaching included nephrostomy tube care medication, pain management, activity restrictions, dressing changes, and signs and symptoms of infection. Dressing supplies sent home. Pt. to follow up with PCP in 7 days. Pt.and mother had no further questions at the time of discharge and no unmet needs were identified.

## 2023-07-13 NOTE — PROGRESS NOTES
CLINICAL NUTRITION SERVICES - BRIEF NOTE     Nutrition Prescription    RECOMMENDATIONS FOR MDs/PROVIDERS TO ORDER:  - Please continue current zinc sulfate order for total of 14 days.  Recommend zinc and CRP lab check after completing 14 day course.  Inflammation/low albumin will lower expected zinc levels.  If poor intake and/or continued loose stools plus zinc levels below normal, could order another 10-14 days of 220 mg zinc sulfate and recheck.  However long term zinc supplementation not recommended as it may cause a copper deficiency.  Should check copper lab if zinc continues > 6 months along with CRP.  Increased inflammation will skew copper levels higher (opposite of Zinc).  Supplemental zinc when normal zinc levels will not improve healing further.     Recommendations already ordered by Registered Dietitian (RD):  - Ordered re-zeroing bed if feasible to confirm accuracy of weights--discussed w/ RN.   - Add Gelatein Plus--cherry with lunch and Special K protein bar sent with dinner to have w/ meal or later in evening.   - Start 220 mg zinc sulfate x 14 days with risk for depletion from 2 weeks of on-going loose stools.  - Collaborate with other providers--contacted provider re: continuing zinc with discharge orders.      Future/Additional Recommendations:  - Plan to meet with pt/mom re: diet education for wound healing prior to discharge.      EVALUATION OF THE PROGRESS TOWARD GOALS   Diet: regular  Intake: Pt had good recall of po intake yesterday (prior to start of sean counts).  Ate well but intake of protein lower at 2 of 3 meals--consumed 1567 kcal and 48 g protein.  Met estimated calorie needs but ~20-30 g below protein needs.      NEW FINDINGS   Weights are fluctuating greatly so difficult to  true wt for dosing of needs.      Phos 4.7 (H x 2 days).     Noted urostomy and nephrostomy output karla colored per 7/13 noc shift RN. Urostomy/nephrostomy output 1330 ml over past 24 hr.    No stools yet  today but described as loose.  Imodium available prn since 7/2 but only given once on 7/11 over the past week. Loose watery stools started on 6/30 per flowsheets.  2 weeks of loose stools puts pt at risk for increased zinc losses although adequate zinc intake 7/1 through 7/10 with TF and multivit w/ minerals.     Addendum:   Wt updated w/ bed zeroed while pt in sling just before discharge.  7/13: 48 kg (105 lb 13 oz)    Based on recommendations from Nutrition Support in Spinal Cord Injuries (Chapter 5.5) in Advanced Nutrition and Dietetics in Nutrition Support, estimated protein recommendations with stage 4 PI is 1.5 to 2 g Pro/kg or 72 to 120 g/day. As total surface area of PI is small, may not need higher end of range.     INTERVENTIONS  Education--noted protein intake yesterday was below needs to promote healing.  Discussed protein supplements to try. Agreed to Gelatein Plus and Special K protein bar. Addendum: prior to discharge, met with pt and mom Areli to review handout (Pressure Injury Nutrition Therapy). Discussed protein goals (specific protein servings was pending wt that was done just before discharge) and added zinc supplement for pt to take x 14 days.  Mom noted pt is receiving Culturell Immune support that includes 90 mg Vitamin C, 3 mg zinc.  Explained that the zinc content was low so okay to continue along with new zinc supplement. Also discussed Vitamin C and Vitamin A sources to include along with continuing a general multivit w/ minerals. Will contact mom via phone 7/14 with updated protein goal based on updated wt w/ bed zeroed.  Medical food supplement therapy    Monitoring/Evaluation  Progress toward goals will be monitored and evaluated per protocol.     Marilynn Malave (Becky) RD, LD   6B and 8A Med/surg (M-F) Pager: [phone]  Weekend/holiday pager: [phone]
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
## 2023-07-13 NOTE — PROGRESS NOTES
Care Management Discharge Note    Discharge Date: 07/13/2023       Discharge Disposition: Home, Home Care    Discharge Services: County Worker, Other (see comment) ([facility], home health aide)    Discharge DME: Wheelchair (Patient owns own wheelchair.)    Discharge Transportation: family or friend will provide (Patient's mother has a wheelchair van that patient uses for transportation.)    Private pay costs discussed: Not applicable    Does the patient's insurance plan have a 3 day qualifying hospital stay waiver?  Yes   Will the waiver be used for post-acute placement? No    PAS Confirmation Code:  N/A  Patient/family educated on Medicare website which has current facility and service quality ratings: no    Education Provided on the Discharge Plan: Yes  Persons Notified of Discharge Plans: pt; pt's mother; home care agency  Patient/Family in Agreement with the Plan: yes    Handoff Referral Completed: Yes - internal    Additional Information:    Relevant Contact Info:  [facility] ([facility])  Phone: [phone]  Pt receives 3x/wk wound cares from skilled nursing and a HHA for bathing/general ADLs  ____________________________________________    10:01am - Received VM from Olive at [facility].  She requested a call back to be updated regarding LORETO so she can arrange for nursing to see pt.  Provided the following call back number: [phone] ext [phone].    Reached out to WOC RN to inquire if the recommended wound cares are to be kept the same for when pt discharges, as well as to inquire about angelito-cushion recommendation seen in WOC note.  WOC RN said wound cares will remain the same, as they were continuing pt's outpatient regimen that Dr Zacarias writes wound care orders for.  WOC RN stated the angelito-cushion was mistakenly added as part of the order set and stated pt has a custom mapped cushion for her power WC already, WOC RN intends to modify note.    11:04am - Paged provider, requested call back.   Did not receive response to this page.    RD inquired w/ this writer regarding LORETO, as she would like to meet w/ pt and her mother before discharge to discuss nutrition to optimize wound healing.  Informed RD that provider has not said anything yet regarding LORETO, but based on notes, it could potentially be today, RD acknowledged.    11:50am - Charge RN updated this writer that provider is discharging pt and that her mother will provide transport.    11:51am - Called Latanya, meryl vergara/ Olive.  Informed her that pt is discharging today and that wound cares will be what is listed in WOC RN note at this time, she expressed understanding.  Informed her that pt's mother stated she is able to assist with wound cares.  Olive stated that she will check to ensure they have the WOC note, and if not, will call this writer back.  She stated they need new face-to-face d/t expiration of cert period.    Home care order placed for provider signature.    12:04pm - Received call from Olive at [facility], she stated she could only find an outdated WOC note and her Epic access is not allowing her to see the newest note.  She requested it be faxed.    12:15pm - WOC RN note faxed to Latanya ([facility]) at [phone].    12:20pm - Sent signed orders via [facility] tab.    1:47pm - Called meryl Pelaez.  She confirmed receipt of WOC RN note and stated she will check in-basket for the provider orders.  She stated they do not need anything else.          Care management available as needed.    Beau Garcia, AVELINOCC  [facility]  Units 8M/S & 10 ICU  Pager: [phone]  Phone: [phone]

## 2023-07-13 NOTE — PROGRESS NOTES
Type of Form Received: Order (intermittent catheter w/ insertion)    Form Received (Date) 7/13/23   Form Filled out Yes, date 7/13/23   Placed in provider folder Yes       Received Completed forms Yes   Faxed Forms Faxed To: [facility]  Fax Number: [phone]   Sent to HIM (Date) 8/1/23

## 2023-07-14 ENCOUNTER — TELEPHONE (OUTPATIENT)
Dept: FAMILY MEDICINE | Facility: CLINIC | Age: 28
End: 2023-07-14
Payer: MEDICARE

## 2023-07-14 ENCOUNTER — PATIENT OUTREACH (OUTPATIENT)
Dept: CARE COORDINATION | Facility: CLINIC | Age: 28
End: 2023-07-14
Payer: MEDICARE

## 2023-07-14 ENCOUNTER — MEDICAL CORRESPONDENCE (OUTPATIENT)
Dept: HEALTH INFORMATION MANAGEMENT | Facility: CLINIC | Age: 28
End: 2023-07-14

## 2023-07-14 NOTE — LETTER
[facility] CARE COORDINATION  [address] [place] [address]    July 17, 2023    Dianna Cottrell  [address] [address]      Dear Dianna,        I am a  clinic care coordinator who works with Suzan Willis PA-C with the [facility]. I have been trying to reach you recently to introduce Clinic Care Coordination. Below is a description of clinic care coordination and how I can further assist you.       The clinic care coordination team is made up of a registered nurse, , financial resource worker and community health worker who understand the health care system. The goal of clinic care coordination is to help you manage your health and improve access to the health care system. Our team works alongside your provider to assist you in determining your health and social needs. We can help you obtain health care and community resources, providing you with necessary information and education. We can work with you through any barriers and develop a care plan that helps coordinate and strengthen the communication between you and your care team.  Our services are voluntary and are offered without charge to you personally.    Please feel free to contact me with any questions or concerns regarding care coordination and what we can offer.      We are focused on providing you with the highest-quality healthcare experience possible.    Sincerely,     Julia Cooney RN Care Coordination   [facility]  [place], [place], [place]  Phone: [phone]

## 2023-07-14 NOTE — TELEPHONE ENCOUNTER
Pt phoned in reference to [employer] messages. Left detailed voicemail with responses to the variety of patient's and health histories that we see for renal stones and PCNLs and also that we often times will place on preventative abx, however that plan and timing is uo to the provider. Note routed to provider and will call pt back if plan is adjusted.    Zygomaticofacial Flap Text: Given the location of the defect, shape of the defect and the proximity to free margins a zygomaticofacial flap was deemed most appropriate for repair. Using a sterile surgical marker, the appropriate flap was drawn incorporating the defect and placing the expected incisions within the relaxed skin tension lines where possible. The area thus outlined was incised deep to adipose tissue with a #15 scalpel blade with preservation of a vascular pedicle.  The skin margins were undermined to an appropriate distance in all directions utilizing iris scissors. The flap was then carried over into the defect and anchored with interrupted buried subcutaneous sutures.

## 2023-07-14 NOTE — PROGRESS NOTES
Clinic Care Coordination Contact  [facility]/Voicemail       Clinical Data: Care Coordinator Outreach  Outreach attempted x 1.  Left message on patient's voicemail with call back information and requested return call.  Plan: Care Coordinator will try to reach patient again in 1-2 business days.    Julia Cooney RN Care Coordination   [facility]  Email: [email or web address]  Phone: [phone]

## 2023-07-14 NOTE — TELEPHONE ENCOUNTER
Forms/Letter Request    Type of form/letter: DC from Agency    Have you been seen for this request: N/A    Do we have the form/letter: Yes:      Who is the form from? Home care    Where did/will the form come from? form was faxed in    When is form/letter needed by: asap (2nd Attempt)    How would you like the form/letter returned: Fax : [employer] [facility] @ [phone]    Patient Notified form requests are processed in 3-5 business days:No    Could we send this information to you in [employer] or would you prefer to receive a phone call?:   Patient would like to be contacted via [employer]

## 2023-07-17 ENCOUNTER — MEDICAL CORRESPONDENCE (OUTPATIENT)
Dept: HEALTH INFORMATION MANAGEMENT | Facility: CLINIC | Age: 28
End: 2023-07-17
Payer: MEDICARE

## 2023-07-17 NOTE — PROGRESS NOTES
Clinic Care Coordination Contact  [facility]/Voicemail       Clinical Data: Care Coordinator Outreach  Outreach attempted x 2.  Left message on patient's voicemail with call back information and requested return call.  Plan: Care Coordinator will send care coordination introduction letter with care coordinator contact information and explanation of care coordination services via [email or web address]. Care Coordinator will do no further outreaches at this time.    Julia Cooney RN Care Coordination   [facility]  Email: [email or web address]  Phone: [phone]

## 2023-07-17 NOTE — TELEPHONE ENCOUNTER
Form requires provider signature only. Form is in provider's bin.     Crista Hassan CMA ([facility])

## 2023-07-20 ENCOUNTER — HOSPITAL ENCOUNTER (OUTPATIENT)
Dept: WOUND CARE | Facility: CLINIC | Age: 28
Discharge: HOME | End: 2023-07-20
Attending: SURGERY | Admitting: SURGERY
Payer: MEDICARE

## 2023-07-20 VITALS
SYSTOLIC BLOOD PRESSURE: 99 MMHG | TEMPERATURE: 97.1 F | DIASTOLIC BLOOD PRESSURE: 63 MMHG | HEART RATE: 78 BPM | RESPIRATION RATE: 15 BRPM

## 2023-07-20 DIAGNOSIS — L89.314 PRESSURE INJURY OF RIGHT ISCHIUM, STAGE 4 (H): Primary | ICD-10-CM

## 2023-07-20 PROCEDURE — 99213 OFFICE O/P EST LOW 20 MIN: CPT | Performed by: SURGERY

## 2023-07-20 PROCEDURE — 97602 WOUND(S) CARE NON-SELECTIVE: CPT

## 2023-07-20 NOTE — DISCHARGE INSTRUCTIONS
"Dianna Cottrell      1995  A DME order was not completed because the supplies are ordered by home care or at a care facility  [facility] Phone: [phone] Fax: [phone] 3x/wk    Dr Zacarias is planning FLAP surgery on 8/25/23  Need History and Physical up to 30 days prior to surgery  To do list for surgery:  1. Group 2 Mattress DONE  2. wheelchair or Get Pressure Mapped Done   3. Done MRI   4.Your nutrition values must be with in normal range. A diet high in protein is important for wound healing, we recommend getting  grams of protein per day. Taking protein shakes or bars are a good way to get extra protein in your diet.   5. No Smoking at all.  6. Medical clearance      Wound Dressing Change: Right Ischial tuberosity  - Wash your hands with soap and water before you begin your dressing change and prepare a clean surface for dressings.  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 A group 1 mattress will not be adequate to offload these severe ulcerations. Patient has impaired sensation and is unable to reposition independently.  Reposition MINIMALLY every 1-2 hours in bed to relieve pressure and promote perfusion to tissue.  Chair: Less than 1 hour at a time; 3 times a day for meals; When up to the chair, do not sit for longer than one hour total before returning to bed to relieve pressure and promote perfusion to the tissue.  Completely recline/tilt for 15 minutes each hour.  Sit on a chair cushion when up " to the chair; Make appointment for pressure mapping of wheel chair     Fred Zacarias M.D. July 20, 2023    Call us at [phone] if you have any questions about your wounds, have redness or swelling around your wound, have a fever of 101 degrees Fahrenheit or greater or if you have any other problems or concerns. We answer the phone Monday through Friday 8 am to 4 pm, please leave a message as we check the voicemail frequently throughout the day.     If you had a positive experience please indicate that on your patient satisfaction survey form that [facility] will be sending you. It was a pleasure meeting with you today.  Thank you for allowing me and my team the privilege of caring for you today.  YOU are the reason we are here, and I truly hope we provided you with the excellent service you deserve.  Please let us know if there is anything else we can do for you so that we can be sure you are leaving completely satisfied with your care experience.      If you have any billing related questions please call the [facility] Business office at [phone]. The clinic staff does not handle billing related matters. If you are scheduled to have a follow up appointment, you will receive a reminder call the day before your visit. On the appointment day please arrive 15 minutes prior to your appointment time. If you are unable to keep that appointment, please call the clinic to cancel or reschedule. If you are more than 10 minutes late or greater for your scheduled appointment time, the clinic policy is that you may be asked to reschedule.

## 2023-07-20 NOTE — PROGRESS NOTES
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  She also has a tiny coccygeal wound as well.  I had not seen her in quite some time, and then she bounced into the hospital with what sounds like urosepsis, and ended up having an obstructing stone that required placement of a perc neph tube.  We took a look at her wound and while it was small, as far as a skin defect, there was a bit of a pocket traveling underneath some what used to be adherent scar tissue.  While in the hospital, it certainly was not her biggest problem, and they let her out last Thursday.  She has a special [employer] Adalgisa Easy Air lateral rotating pressure air mattress at home, and her mom is basically her PCA.  They have been using Mesalt packing and having moderate usual amount of drainage.  She has been feeling much better since her original presentation.  She missed her kids and really if it were not for her mom, I do not think we would be talking about flapping her wound.    On exam, the skin defect over the right IT is fairly small, but enough to get some packing in.  From what I can see of the wound, it looks fairly clean with granulation tissue.  There is an area at the most proximal aspect of the adherent scar that is starting to break down and may end up being kind of a counter drainage hole.  Either way, it will be removed when we definitively fix her wound.  I am hoping to shoot for 08/25 to do a right IT debridement and flap probably from the posterior thigh.  We did receive an email from Dr. Tatum about when to remove her kidney stone, and actually if we could combine that or have it staged within a couple days of each other that would be ideal.  We will contact his  and see what we can work out for dates.  Currently, she has [employer] as her home care company, and she is due to have an appointment or a  followup with her primary care provider, Suzan Willis, in the next week or two, so I asked her to ask for a history and physical, so that she would not have to do an extra visit for surgery next month.  She seemed to think that was doable.  Until we take her to surgery, I think the Mesalt is no longer needed since things are clean.  They could use some packing strips 1/2-inch with the Vashe for the dressings, as long as it is not too soggy, and then cover that with Mepilex.  Please see nursing notes for dimensions of the wound, vital signs, and photos today.    Vira Zacarias MD        D: 2023   T: 2023   MT: krista    Name:     SYLVESTER BLACKWELL  MRN:      [id number]        Account:    [id number]   :      1995           Visit Date: 2023     Document: [id number]

## 2023-07-20 NOTE — PROGRESS NOTES
Patient Active Problem List   Diagnosis    c5 burst fracture    Lesions of vulva    IUD (intrauterine device) in place- placed 12/2017    H/O: pneumonia    АНДРЕЙ (generalized anxiety disorder)    Quadriplegia, post-traumatic (H)- incomplete quad, limited use upper extremities    Major depressive disorder with single episode, in partial remission (H)    Autonomic dysreflexia    Leukocytosis    Pressure injury of right ischium, stage 4 (H)    Personal history of DVT (deep vein thrombosis)    Neurogenic bladder    Impaired lung function    Encounter for insertion of mirena IUD    YONI III with severe dysplasia    Moderate protein-calorie malnutrition (H)    Major depression    Neurogenic bowel    Spasticity    Spinal cord injury, C5-C7 (H)    Urinary incontinence    Nephrolithiasis    Hypoxia    Neuromuscular respiratory weakness (H)    Urinary retention    Sepsis (H)     Past Medical History:   Diagnosis Date    Anemia     with pregnancy    c5 burst fracture 12/18/2012    C5-C7 fracture with cord injury    Compression fracture of L1 lumbar vertebra (H) 12/18/2012    L1 superior endplate compression fracture    Decubitus ulcer     OF RIGHT ISCHIUM    Depressive disorder     Encounter for insertion of mirena IUD 12/13/2017    Fracture of thoracic spine without spinal cord lesion (H) 12/18/2012    T3-T8 spinous process fractures    History of spinal cord injury     History of thrombophlebitis     Hypertension 12/06/2016    Impaired lung function     Nephrolithiasis 4/11/2022    Neurogenic bladder     Neurogenic bowel     Quadriplegia (H)     Thrombosis     Urinary tract infection     Vocal cord dysfunction     Left vocal cord weakness noted by ENT post extubation 12/2012     Labs:   Recent Labs   Lab Test 07/13/23  0551 07/12/23  0626 07/02/23  0611 07/01/23  1754 04/12/22  0712 04/15/21  1445   ALBUMIN  --  2.8*   < >  --    < >  --    HGB 8.1* 8.4*   < >  --    < >  --    INR  --   --   --  1.05   < >  --    WBC 10.6  11.2*   < >  --    < >  --    CRP  --   --   --   --   --  9.7*    < > = values in this interval not displayed.     Nutrition requirements were discussed with patient today.  Vitals:  BP 99/63 (BP Location: Left arm, Patient Position: Sitting)   Pulse 78   Temp 97.1  F (36.2  C) (Temporal)   Resp 15   Wound:   Wound Thigh Friction injury (Active)       Wound Thigh Friction injury (Active)       Wound Ischial tuberosity Pressure injury community acquired Stage 4 (Active)   Wound Bed Red 07/12/23 1034   Tatiana-wound Assessment Erythema 07/12/23 1034   Wound Length (cm) 2 cm 07/09/23 2200   Drainage Amount Moderate 07/12/23 1034   Drainage Color/Characteristics Serosanguineous 07/12/23 1034   Wound Care/Cleansing Other (Comment) 07/11/23 0900   Dressing Foam 07/12/23 2200   Amount of Packing Added 1 07/12/23 1034   Amount of Packing Removed 1 07/11/23 0900   Packing removed by Physician 07/12/23 1034   Dressing Status New dressing;Changed;Clean, dry, intact 07/12/23 1034   Dressing Change Due 07/12/23 07/11/23 0900       Wound (used by OP [facility]I only) 10/14/19 1407 Right pressure injury (Active)       Wound (used by OP I only) 05/09/23 1512 Right medial ischial tuberosity pressure injury (Active)   Thickness/Stage Stage 2 07/09/23 2200   Base red;slough 07/20/23 1317   Red (%), Wound Tissue Color 100 07/12/23 1034   Periwound redness 07/20/23 1317   Periwound Temperature warm 07/20/23 1317   Periwound Skin Turgor soft 07/11/23 0900   Edges irregular 07/12/23 1034   Length (cm) 0.8 07/20/23 1317   Width (cm) 0.8 07/20/23 1317   Depth (cm) 0.2 07/20/23 1317   Wound (cm^2) 0.64 cm^2 07/20/23 1317   Wound Volume (cm^3) 0.13 cm^3 07/20/23 1317   Wound healing % 14.67 07/20/23 1317   Tunneling [Depth (cm)/Location] 6o'/ 2.5cm 06/14/23 1145   Undermining [Depth (cm)/Location] 12-12o'/ 1.6cm 06/14/23 1145   Drainage Characteristics/Odor serosanguineous 07/20/23 1317   Drainage Amount moderate 07/20/23 1317   Care, Wound wound  cleanser 07/12/23 1034   Dressing Care dressing changed 07/12/23 1034       Wound (used by OP I only) 07/20/23 1316 Right lateral ischial tuberosity pressure injury (Active)   Thickness/Stage Stage 4 07/20/23 1317   Base slough;granulating 07/20/23 1317   Periwound redness;macerated 07/20/23 1317   Periwound Temperature warm 07/20/23 1317   Periwound Skin Turgor soft 07/20/23 1317   Edges open 07/20/23 1317   Length (cm) 1.1 07/20/23 1317   Width (cm) 0.8 07/20/23 1317   Depth (cm) 1.5 07/20/23 1317   Wound (cm^2) 0.88 cm^2 07/20/23 1317   Wound Volume (cm^3) 1.32 cm^3 07/20/23 1317   Undermining [Depth (cm)/Location] 1 o'clock/2.5 cm; 12-12 o'clock/1.5 cm 07/20/23 1317   Drainage Characteristics/Odor serosanguineous 07/20/23 1317   Drainage Amount large 07/20/23 1317   Care, Wound non-select wound debridement performed 07/20/23 1317       Wound (used by OP I only) 07/20/23 1316 sacral pressure injury (Active)   Thickness/Stage Stage 3 07/20/23 1317   Base granulating;moist 07/20/23 1317   Periwound intact;redness 07/20/23 1317   Periwound Temperature warm 07/20/23 1317   Periwound Skin Turgor soft 07/20/23 1317   Edges open 07/20/23 1317   Length (cm) 0.1 07/20/23 1317   Width (cm) 0.1 07/20/23 1317   Depth (cm) 0 07/20/23 1317   Wound (cm^2) 0.01 cm^2 07/20/23 1317   Wound Volume (cm^3) 0 cm^3 07/20/23 1317   Drainage Amount none 07/20/23 1317   Care, Wound non-select wound debridement performed 07/20/23 1317       Incision/Surgical Site 04/11/22 Left Back (Active)       Incision/Surgical Site 03/16/23 Right Abdomen (Active)      Photo:    Further instructions from your care team         Dianna Cottrell      1995  A DME order was not completed because the supplies are ordered by home care or at a care facility  [facility] [place] Phone: [phone] Fax: [phone] 3x/wk    Dr Zacarias is planning FLAP surgery on 8/25/23  Need History and Physical up to 30 days prior to surgery  To do list for  "surgery:  1. Group 2 Mattress DONE  2. wheelchair or Get Pressure Mapped Done   3. Done MRI   4.Your nutrition values must be with in normal range. A diet high in protein is important for wound healing, we recommend getting  grams of protein per day. Taking protein shakes or bars are a good way to get extra protein in your diet.   5. No Smoking at all.  6. Medical clearance      Wound Dressing Change: Right Ischial tuberosity  - Wash your hands with soap and water before you begin your dressing change and prepare a clean surface for dressings.  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 A group 1 mattress will not be adequate to offload these severe ulcerations. Patient has impaired sensation and is unable to reposition independently.  Reposition MINIMALLY every 1-2 hours in bed to relieve pressure and promote perfusion to tissue.  Chair: Less than 1 hour at a time; 3 times a day for meals; When up to the chair, do not sit for longer than one hour total before returning to bed to relieve pressure and promote perfusion to the tissue.  Completely recline/tilt for 15 minutes each hour.  Sit on a chair cushion when up to the chair; Make appointment for pressure mapping of wheel chair     Fred Zacarias M.D. July 20, 2023    "

## 2023-07-21 ENCOUNTER — TELEPHONE (OUTPATIENT)
Dept: FAMILY MEDICINE | Facility: CLINIC | Age: 28
End: 2023-07-21

## 2023-07-21 NOTE — TELEPHONE ENCOUNTER
Forms/Letter Request    Type of form/letter:  [facility]HA SCHEDULE  + Plan of Care   EFFECTIVE:  7.16.2023 5WK1, 6WK7, 2WK1    Have you been seen for this request: Yes      Do we have the form/letter: Yes:      Who is the form from? Home care    Where did/will the form come from? form was faxed in    When is form/letter needed by: July 28    How would you like the form/letter returned: Fax : Latanya Wayna [facility]. @ [phone]    Patient Notified form requests are processed in 3-5 business days:No    Could we send this information to you in triptap or would you prefer to receive a phone call?:   Patient would like to be contacted via triptap

## 2023-07-22 ENCOUNTER — HEALTH MAINTENANCE LETTER (OUTPATIENT)
Age: 28
End: 2023-07-22

## 2023-07-25 ENCOUNTER — TELEPHONE (OUTPATIENT)
Dept: PLASTIC SURGERY | Facility: CLINIC | Age: 28
End: 2023-07-25
Payer: MEDICARE

## 2023-07-25 NOTE — TELEPHONE ENCOUNTER
[facility] Call Center    Phone Message    May a detailed message be left on voicemail: yes     Reason for Call: Pt called requesting a call back at [phone]. This is in regards to scheduling. Pt wondering what other days Dr Zacarias has available.      Action Taken: Other: clinic coords surg uc    Travel Screening: Not Applicable

## 2023-07-27 ENCOUNTER — MEDICAL CORRESPONDENCE (OUTPATIENT)
Dept: HEALTH INFORMATION MANAGEMENT | Facility: CLINIC | Age: 28
End: 2023-07-27
Payer: MEDICARE

## 2023-07-28 ENCOUNTER — TELEPHONE (OUTPATIENT)
Dept: FAMILY MEDICINE | Facility: CLINIC | Age: 28
End: 2023-07-28
Payer: MEDICARE

## 2023-07-28 NOTE — TELEPHONE ENCOUNTER
Form placed in provider basket for review and signature.    Reason for call:  Form  Reason for Call:  Form, our goal is to have forms completed with 72 hours, however, some forms may require a visit or additional information.    Type of letter, form or note:  medical    Who is the form from?: Home care    Where did the form come from: form was faxed in    What clinic location was the form placed at?: [facility] [phone]    Where the form was placed:  provider Box/Folder    What number is listed as a contact on the form?: [phone]       Additional comments:

## 2023-07-31 ENCOUNTER — MEDICAL CORRESPONDENCE (OUTPATIENT)
Dept: HEALTH INFORMATION MANAGEMENT | Facility: CLINIC | Age: 28
End: 2023-07-31
Payer: MEDICARE

## 2023-07-31 NOTE — TELEPHONE ENCOUNTER
Patient is asking what would be the next available date would be to reschedule her surgical procedure with Dr. Zacarias.    Phone: [phone]

## 2023-08-01 ENCOUNTER — TELEPHONE (OUTPATIENT)
Dept: FAMILY MEDICINE | Facility: CLINIC | Age: 28
End: 2023-08-01

## 2023-08-01 NOTE — TELEPHONE ENCOUNTER
Form placed in provider basket for review and signature.     Reason for call:  Form  Reason for Call:  Form, our goal is to have forms completed with 72 hours, however, some forms may require a visit or additional information.     Type of letter, form or note:  [facility]     Who is the form from?: Home care [facility]ound [facility]      Where did the form come from: form was faxed in     What clinic location was the form placed at?: [facility] [phone]     Where the form was placed:  provider Box/Folder     What number is listed as a contact on the form?: [phone]       Additional comments: [facility] [phone]

## 2023-08-02 DIAGNOSIS — Z53.9 DIAGNOSIS NOT YET DEFINED: Primary | ICD-10-CM

## 2023-08-02 PROCEDURE — G0180 MD CERTIFICATION HHA PATIENT: HCPCS | Performed by: PHYSICIAN ASSISTANT

## 2023-08-03 ENCOUNTER — TELEPHONE (OUTPATIENT)
Dept: SURGERY | Facility: CLINIC | Age: 28
End: 2023-08-03
Payer: MEDICARE

## 2023-08-03 DIAGNOSIS — N20.0 NEPHROLITHIASIS: Primary | ICD-10-CM

## 2023-08-04 ENCOUNTER — PREP FOR PROCEDURE (OUTPATIENT)
Dept: PLASTIC SURGERY | Facility: CLINIC | Age: 28
End: 2023-08-04

## 2023-08-04 NOTE — TELEPHONE ENCOUNTER
RN Care Coordinator: Floridalma Barr; [phone]     Surgery is scheduled with Dr. Escamilla & Dr. Zacarias  Date: 8/21   Location: [facility]  Scheduled per: next available date      H&P to be completed by Primary Care team; patient to schedule     Post-op visit(s): Dr. Zacarias's post-op appointments scheduled by [facility]  Dr. Escamilla's post-op is TBD      Patient will receive a phone call from pre-admission nurses 1-2 days prior to surgery with arrival and start time.       Left a detailed voicemail AND sent a goviral message with the scheduled information. Provided direct line. Will watch for response and/or call back from patient.       Surgery packet was sent via US mail and via goviral    __    Farzana Valadez, Senior Perioperative Coordinator, on 8/4/2023 at 11:08 AM  P: [phone]

## 2023-08-07 ENCOUNTER — PREP FOR PROCEDURE (OUTPATIENT)
Dept: UROLOGY | Facility: CLINIC | Age: 28
End: 2023-08-07
Payer: MEDICARE

## 2023-08-07 NOTE — TELEPHONE ENCOUNTER
Patient sent message to writer inquiring about rescheduling surgery to next available date due to an event she would like to attend on 8/27.    Writer was able to coordinate surgery on 9/20.    Surgery is rescheduled with Dr. Schofield & Dr. Zacarias    Date: 9/20   Location: [facility]  Rescheduled per: next available date      Patient will receive a phone call from pre-admission nurses 1-2 days prior to surgery with arrival and start time.     Left a detailed voicemail AND sent a [employer] message with the scheduled information. Provided direct line. Will watch for response and/or call back from patient.       Patient questions/concerns: N/A       Surgery packet: to be sent via US mail and via [employer]    __    Farzana Valadez, Senior Perioperative Coordinator, on 8/7/2023 at 12:31 PM  P: [phone]

## 2023-08-10 ENCOUNTER — TELEPHONE (OUTPATIENT)
Dept: FAMILY MEDICINE | Facility: CLINIC | Age: 28
End: 2023-08-10
Payer: MEDICARE

## 2023-08-10 DIAGNOSIS — R94.2 IMPAIRED LUNG FUNCTION: Chronic | ICD-10-CM

## 2023-08-10 DIAGNOSIS — G70.9 NEUROMUSCULAR RESPIRATORY WEAKNESS (H): ICD-10-CM

## 2023-08-10 DIAGNOSIS — J99 NEUROMUSCULAR RESPIRATORY WEAKNESS (H): ICD-10-CM

## 2023-08-10 RX ORDER — FLUTICASONE FUROATE AND VILANTEROL TRIFENATATE 100; 25 UG/1; UG/1
1 POWDER RESPIRATORY (INHALATION) DAILY
Qty: 28 EACH | Refills: 5 | Status: SHIPPED | OUTPATIENT
Start: 2023-08-10 | End: 2024-02-15

## 2023-08-10 NOTE — TELEPHONE ENCOUNTER
Dianna calling regarding the refill on her Breo Ellipta inhaler.      Jenny Stafford RN  [facility] ~ Registered Nurse  Clinic Triage ~ [place] [facility] [place]  August 10, 2023

## 2023-08-23 ENCOUNTER — OFFICE VISIT (OUTPATIENT)
Dept: FAMILY MEDICINE | Facility: CLINIC | Age: 28
End: 2023-08-23
Payer: MEDICARE

## 2023-08-23 VITALS
OXYGEN SATURATION: 97 % | WEIGHT: 105 LBS | TEMPERATURE: 97.8 F | SYSTOLIC BLOOD PRESSURE: 94 MMHG | RESPIRATION RATE: 14 BRPM | HEIGHT: 66 IN | BODY MASS INDEX: 16.88 KG/M2 | HEART RATE: 61 BPM | DIASTOLIC BLOOD PRESSURE: 62 MMHG

## 2023-08-23 DIAGNOSIS — S14.109S QUADRIPLEGIA, POST-TRAUMATIC (H): Chronic | ICD-10-CM

## 2023-08-23 DIAGNOSIS — G82.50 QUADRIPLEGIA, POST-TRAUMATIC (H): Chronic | ICD-10-CM

## 2023-08-23 DIAGNOSIS — J99 NEUROMUSCULAR RESPIRATORY WEAKNESS (H): ICD-10-CM

## 2023-08-23 DIAGNOSIS — B35.6 TINEA CRURIS: ICD-10-CM

## 2023-08-23 DIAGNOSIS — L89.324 DECUBITUS ULCER OF ISCHIAL AREA, LEFT, STAGE IV (H): ICD-10-CM

## 2023-08-23 DIAGNOSIS — Z97.5 IUD (INTRAUTERINE DEVICE) IN PLACE: Chronic | ICD-10-CM

## 2023-08-23 DIAGNOSIS — Z86.718 PERSONAL HISTORY OF DVT (DEEP VEIN THROMBOSIS): ICD-10-CM

## 2023-08-23 DIAGNOSIS — G70.9 NEUROMUSCULAR RESPIRATORY WEAKNESS (H): ICD-10-CM

## 2023-08-23 DIAGNOSIS — F32.4 MAJOR DEPRESSIVE DISORDER WITH SINGLE EPISODE, IN PARTIAL REMISSION (H): ICD-10-CM

## 2023-08-23 DIAGNOSIS — E44.0 MODERATE PROTEIN-CALORIE MALNUTRITION (H): ICD-10-CM

## 2023-08-23 DIAGNOSIS — N20.0 NEPHROLITHIASIS: ICD-10-CM

## 2023-08-23 DIAGNOSIS — R94.2 IMPAIRED LUNG FUNCTION: Chronic | ICD-10-CM

## 2023-08-23 DIAGNOSIS — Z01.818 PREOP GENERAL PHYSICAL EXAM: Primary | ICD-10-CM

## 2023-08-23 LAB
ANION GAP SERPL CALCULATED.3IONS-SCNC: 11 MMOL/L (ref 7–15)
BUN SERPL-MCNC: 13.9 MG/DL (ref 6–20)
CALCIUM SERPL-MCNC: 10.3 MG/DL (ref 8.6–10)
CHLORIDE SERPL-SCNC: 105 MMOL/L (ref 98–107)
CREAT SERPL-MCNC: 0.52 MG/DL (ref 0.51–0.95)
DEPRECATED HCO3 PLAS-SCNC: 25 MMOL/L (ref 22–29)
ERYTHROCYTE [DISTWIDTH] IN BLOOD BY AUTOMATED COUNT: 14.8 % (ref 10–15)
GFR SERPL CREATININE-BSD FRML MDRD: >90 ML/MIN/1.73M2
GLUCOSE SERPL-MCNC: 86 MG/DL (ref 70–99)
HCT VFR BLD AUTO: 37.4 % (ref 35–47)
HGB BLD-MCNC: 11.8 G/DL (ref 11.7–15.7)
MCH RBC QN AUTO: 27.5 PG (ref 26.5–33)
MCHC RBC AUTO-ENTMCNC: 31.6 G/DL (ref 31.5–36.5)
MCV RBC AUTO: 87 FL (ref 78–100)
PLATELET # BLD AUTO: 380 10E3/UL (ref 150–450)
POTASSIUM SERPL-SCNC: 4.4 MMOL/L (ref 3.4–5.3)
RBC # BLD AUTO: 4.29 10E6/UL (ref 3.8–5.2)
SODIUM SERPL-SCNC: 141 MMOL/L (ref 136–145)
WBC # BLD AUTO: 12 10E3/UL (ref 4–11)

## 2023-08-23 PROCEDURE — 99214 OFFICE O/P EST MOD 30 MIN: CPT | Performed by: PHYSICIAN ASSISTANT

## 2023-08-23 PROCEDURE — 36415 COLL VENOUS BLD VENIPUNCTURE: CPT | Performed by: PHYSICIAN ASSISTANT

## 2023-08-23 PROCEDURE — 80048 BASIC METABOLIC PNL TOTAL CA: CPT | Performed by: PHYSICIAN ASSISTANT

## 2023-08-23 PROCEDURE — 85027 COMPLETE CBC AUTOMATED: CPT | Performed by: PHYSICIAN ASSISTANT

## 2023-08-23 RX ORDER — CLOTRIMAZOLE 1 %
CREAM (GRAM) TOPICAL 2 TIMES DAILY
Qty: 85 G | Refills: 1 | Status: ON HOLD | OUTPATIENT
Start: 2023-08-23 | End: 2023-09-21

## 2023-08-23 ASSESSMENT — PATIENT HEALTH QUESTIONNAIRE - PHQ9
SUM OF ALL RESPONSES TO PHQ QUESTIONS 1-9: 1
SUM OF ALL RESPONSES TO PHQ QUESTIONS 1-9: 1
10. IF YOU CHECKED OFF ANY PROBLEMS, HOW DIFFICULT HAVE THESE PROBLEMS MADE IT FOR YOU TO DO YOUR WORK, TAKE CARE OF THINGS AT HOME, OR GET ALONG WITH OTHER PEOPLE: NOT DIFFICULT AT ALL

## 2023-08-23 ASSESSMENT — ANXIETY QUESTIONNAIRES
IF YOU CHECKED OFF ANY PROBLEMS ON THIS QUESTIONNAIRE, HOW DIFFICULT HAVE THESE PROBLEMS MADE IT FOR YOU TO DO YOUR WORK, TAKE CARE OF THINGS AT HOME, OR GET ALONG WITH OTHER PEOPLE: NOT DIFFICULT AT ALL
GAD7 TOTAL SCORE: 0
6. BECOMING EASILY ANNOYED OR IRRITABLE: NOT AT ALL
3. WORRYING TOO MUCH ABOUT DIFFERENT THINGS: NOT AT ALL
GAD7 TOTAL SCORE: 0
7. FEELING AFRAID AS IF SOMETHING AWFUL MIGHT HAPPEN: NOT AT ALL
2. NOT BEING ABLE TO STOP OR CONTROL WORRYING: NOT AT ALL
4. TROUBLE RELAXING: NOT AT ALL
5. BEING SO RESTLESS THAT IT IS HARD TO SIT STILL: NOT AT ALL
1. FEELING NERVOUS, ANXIOUS, OR ON EDGE: NOT AT ALL

## 2023-08-23 ASSESSMENT — PAIN SCALES - GENERAL: PAINLEVEL: NO PAIN (0)

## 2023-08-23 NOTE — PATIENT INSTRUCTIONS
For informational purposes only. Not to replace the advice of your health care provider. Copyright   2003,  [facility]. All rights reserved. Clinically reviewed by Shannon Clark MD. [place] [id number] - REV .  Preparing for Your Surgery  Getting started  A nurse will call you to review your health history and instructions. They will give you an arrival time based on your scheduled surgery time. Please be ready to share:  Your doctor's clinic name and phone number  Your medical, surgical, and anesthesia history  A list of allergies and sensitivities  A list of medicines, including herbal treatments and over-the-counter drugs  Whether the patient has a legal guardian (ask how to send us the papers in advance)  Please tell us if you're pregnant--or if there's any chance you might be pregnant. Some surgeries may injure a fetus (unborn baby), so they require a pregnancy test. Surgeries that are safe for a fetus don't always need a test, and you can choose whether to have one.   If you have a child who's having surgery, please ask for a copy of Preparing for Your Child's Surgery.    Preparing for surgery  Within 10 to 30 days of surgery: Have a pre-op exam (sometimes called an H&P, or History and Physical). This can be done at a clinic or pre-operative center.  If you're having a , you may not need this exam. Talk to your care team.  At your pre-op exam, talk to your care team about all medicines you take. If you need to stop any medicines before surgery, ask when to start taking them again.  We do this for your safety. Many medicines can make you bleed too much during surgery. Some change how well surgery (anesthesia) drugs work.  Call your insurance company to let them know you're having surgery. (If you don't have insurance, call [phone].)  Call your clinic if there's any change in your health. This includes signs of a cold or flu (sore throat, runny nose, cough, rash, fever). It also  includes a scrape or scratch near the surgery site.  If you have questions on the day of surgery, call your hospital or surgery center.  Eating and drinking guidelines  For your safety: Unless your surgeon tells you otherwise, follow the guidelines below.  Eat and drink as usual until 8 hours before you arrive for surgery. After that, no food or milk.  Drink clear liquids until 2 hours before you arrive. These are liquids you can see through, like water, Gatorade, and Propel Water. They also include plain black coffee and tea (no cream or milk), candy, and breath mints. You can spit out gum when you arrive.  If you drink alcohol: Stop drinking it the night before surgery.  If your care team tells you to take medicine on the morning of surgery, it's okay to take it with a sip of water.  Preventing infection  Shower or bathe the night before and morning of your surgery. Follow the instructions your clinic gave you. (If no instructions, use regular soap.)  Don't shave or clip hair near your surgery site. We'll remove the hair if needed.  Don't smoke or vape the morning of surgery. You may chew nicotine gum up to 2 hours before surgery. A nicotine patch is okay.  Note: Some surgeries require you to completely quit smoking and nicotine. Check with your surgeon.  Your care team will make every effort to keep you safe from infection. We will:  Clean our hands often with soap and water (or an alcohol-based hand rub).  Clean the skin at your surgery site with a special soap that kills germs.  Give you a special gown to keep you warm. (Cold raises the risk of infection.)  Wear special hair covers, masks, gowns and gloves during surgery.  Give antibiotic medicine, if prescribed. Not all surgeries need antibiotics.  What to bring on the day of surgery  Photo ID and insurance card  Copy of your health care directive, if you have one  Glasses and hearing aids (bring cases)  You can't wear contacts during surgery  Inhaler and eye  drops, if you use them (tell us about these when you arrive)  CPAP machine or breathing device, if you use them  A few personal items, if spending the night  If you have . . .  A pacemaker, ICD (cardiac defibrillator) or other implant: Bring the ID card.  An implanted stimulator: Bring the remote control.  A legal guardian: Bring a copy of the certified (court-stamped) guardianship papers.  Please remove any jewelry, including body piercings. Leave jewelry and other valuables at home.  If you're going home the day of surgery  You must have a responsible adult drive you home. They should stay with you overnight as well.  If you don't have someone to stay with you, and you aren't safe to go home alone, we may keep you overnight. Insurance often won't pay for this.  After surgery  If it's hard to control your pain or you need more pain medicine, please call your surgeon's office.  Questions?   If you have any questions for your care team, list them here: _________________________________________________________________________________________________________________________________________________________________________ ____________________________________ ____________________________________ ____________________________________    How to Take Your Medication Before Surgery  - Take all of your medications before surgery as usual
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
## 2023-08-28 ENCOUNTER — TELEPHONE (OUTPATIENT)
Dept: FAMILY MEDICINE | Facility: CLINIC | Age: 28
End: 2023-08-28
Payer: MEDICARE

## 2023-08-28 NOTE — TELEPHONE ENCOUNTER
Left message for patient to return call to clinic for results below or view in mychart.          Jovita Bustamante,    Your recent test results show:     Complete blood counts-- no anemia. White count mildly elevated likely due to the inflammation/infection of the ulcer.     Kidney function (serum creatinine and GFR) and electrolyte tests are normal.     Take care,  Suzan Willis PA-C

## 2023-08-28 NOTE — TELEPHONE ENCOUNTER
Forms/Letter Request    Type of form/letter:  Orders    Have you been seen for this request: Yes     Do we have the form/letter: Yes: in TC Bin up front    Who is the form from? [facility] (if other please explain)    Where did/will the form come from? form was faxed in    When is form/letter needed by: 3-5 days    How would you like the form/letter returned: Fax : [phone]    Crista Hassan CMA ([facility])

## 2023-08-29 ENCOUNTER — TELEPHONE (OUTPATIENT)
Dept: FAMILY MEDICINE | Facility: CLINIC | Age: 28
End: 2023-08-29
Payer: MEDICARE

## 2023-08-29 NOTE — TELEPHONE ENCOUNTER
Forms/Letter Request    Type of form/letter:  Home Care necessities form     Have you been seen for this request: Yes     Do we have the form/letter: Yes    Who is the form from? Home care    Where did/will the form come from? form was faxed in    When is form/letter needed by: as time permits     How would you like the form/letter returned: Fax : [phone]    Patient Notified form requests are processed in 3-5 business days:No

## 2023-08-31 ENCOUNTER — TELEPHONE (OUTPATIENT)
Dept: FAMILY MEDICINE | Facility: CLINIC | Age: 28
End: 2023-08-31
Payer: MEDICARE

## 2023-08-31 NOTE — TELEPHONE ENCOUNTER
Forms/Letter Request    Type of form/letter:  orders for gauze dressing     Have you been seen for this request: Yes    Do we have the form/letter: Yes    Who is the form from? Home care    Where did/will the form come from? form was faxed in    When is form/letter needed by: as time permits     How would you like the form/letter returned: Fax : [phone]    Patient Notified form requests are processed in 3-5 business days:No

## 2023-08-31 NOTE — TELEPHONE ENCOUNTER
Got a call from [employer] asking if this has been received by  clinic.     Writer informed caller that this has been completed and faxed back on 8/30/23. Caller stated that they do not have the returned fax.     Is this able to faxed again?

## 2023-09-08 ENCOUNTER — TELEPHONE (OUTPATIENT)
Dept: FAMILY MEDICINE | Facility: CLINIC | Age: 28
End: 2023-09-08
Payer: MEDICARE

## 2023-09-08 ENCOUNTER — PATIENT OUTREACH (OUTPATIENT)
Dept: UROLOGY | Facility: CLINIC | Age: 28
End: 2023-09-08
Payer: MEDICARE

## 2023-09-08 DIAGNOSIS — N39.0 UTI (URINARY TRACT INFECTION): Primary | ICD-10-CM

## 2023-09-08 NOTE — TELEPHONE ENCOUNTER
Forms/Letter Request    Type of form/letter:  rx for medical supplies- wound dressing    Have you been seen for this request: N/A    Do we have the form/letter: Yes: placed in [employer] mail slot    Who is the form from? [facility]     Where did/will the form come from? form was faxed in    When is form/letter needed by: unknown    How would you like the form/letter returned: Fax : [phone]    Patient Notified form requests are processed in 3-5 business days: No, received via fax

## 2023-09-08 NOTE — CONFIDENTIAL NOTE
Forms/Letter Request     Type of form/letter:  Catheter supplies     Have you been seen for this request: N/A     Do we have the form/letter: Yes:       Who is the form from? [facility]      Where did/will the form come from? form was faxed in     When is form/letter needed by: when time permits     How would you like the form/letter returned: Fax : [phone]     Patient Notified form requests are processed in 3-5 business days: No, received via fax

## 2023-09-12 ENCOUNTER — TELEPHONE (OUTPATIENT)
Dept: FAMILY MEDICINE | Facility: CLINIC | Age: 28
End: 2023-09-12
Payer: MEDICARE

## 2023-09-12 ENCOUNTER — MEDICAL CORRESPONDENCE (OUTPATIENT)
Dept: HEALTH INFORMATION MANAGEMENT | Facility: CLINIC | Age: 28
End: 2023-09-12
Payer: MEDICARE

## 2023-09-12 ENCOUNTER — TELEPHONE (OUTPATIENT)
Dept: UROLOGY | Facility: CLINIC | Age: 28
End: 2023-09-12
Payer: MEDICARE

## 2023-09-12 NOTE — TELEPHONE ENCOUNTER
Forms/Letter Request    Type of form/letter:  Prescribing confirmation     Have you been seen for this request: Yes    Do we have the form/letter: Yes    Who is the form from? [facility] (if other please explain)    Where did/will the form come from? form was faxed in    When is form/letter needed by: as time permits     How would you like the form/letter returned: Fax : [phone]    Patient Notified form requests are processed in 3-5 business days:No

## 2023-09-12 NOTE — TELEPHONE ENCOUNTER
Forms/Letter Request    Type of form/letter:  Home Health care orders     Have you been seen for this request: Yes    Do we have the form/letter: Yes    Who is the form from? Home care    Where did/will the form come from? form was faxed in    When is form/letter needed by: 9/18/23    How would you like the form/letter returned: Fax : [phone]    Patient Notified form requests are processed in 3-5 business days:No

## 2023-09-12 NOTE — TELEPHONE ENCOUNTER
[facility] Call Center    Phone Message    May a detailed message be left on voicemail: no     Reason for Call: Other: Patients nurse Nany called to request the lab orders to be sent to them at [phone] so they can get this done before the surgery.     Action Taken: Message routed to:  [facility] ([facility] Urology    Travel Screening: Not Applicable

## 2023-09-18 ENCOUNTER — PATIENT OUTREACH (OUTPATIENT)
Dept: UROLOGY | Facility: CLINIC | Age: 28
End: 2023-09-18
Payer: MEDICARE

## 2023-09-18 DIAGNOSIS — Z53.9 DIAGNOSIS NOT YET DEFINED: Primary | ICD-10-CM

## 2023-09-18 DIAGNOSIS — N39.0 UTI (URINARY TRACT INFECTION): Primary | ICD-10-CM

## 2023-09-18 PROCEDURE — 99207 PR MD RECERTIFICATION HHA PT: CPT | Performed by: PHYSICIAN ASSISTANT

## 2023-09-18 RX ORDER — SULFAMETHOXAZOLE/TRIMETHOPRIM 800-160 MG
1 TABLET ORAL 2 TIMES DAILY
Qty: 20 TABLET | Refills: 0 | Status: ON HOLD | OUTPATIENT
Start: 2023-09-18 | End: 2023-09-26

## 2023-09-18 NOTE — PROGRESS NOTES
Per Dr Schofield verbal order for Bactrim starting today x 10 days for UTI-will update patient  Yulia YOU

## 2023-09-18 NOTE — PROGRESS NOTES
RNCC sent message to provider for advisement on abx.  Pts uc positive for 2 bacteria per results from UC at [facility] [place]  Waiting provider reply for abx  Yulia YOU

## 2023-09-19 ENCOUNTER — ANESTHESIA EVENT (OUTPATIENT)
Dept: SURGERY | Facility: CLINIC | Age: 28
DRG: 573 | End: 2023-09-19
Payer: MEDICARE

## 2023-09-19 ASSESSMENT — ENCOUNTER SYMPTOMS
DYSRHYTHMIAS: 0
SEIZURES: 0

## 2023-09-19 ASSESSMENT — LIFESTYLE VARIABLES: TOBACCO_USE: 1

## 2023-09-19 NOTE — TELEPHONE ENCOUNTER
Left voicemail for patient inquiring if she is wanting an air mattress in the hospital or at home. Sent [employer] as well.    Provided direct contact number to discuss.    P: [phone]    __    Farzana Valadez, Senior Perioperative Coordinator, on 9/19/2023 at 10:11 AM
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
## 2023-09-20 ENCOUNTER — APPOINTMENT (OUTPATIENT)
Dept: GENERAL RADIOLOGY | Facility: CLINIC | Age: 28
DRG: 573 | End: 2023-09-20
Attending: UROLOGY
Payer: MEDICARE

## 2023-09-20 ENCOUNTER — HOSPITAL ENCOUNTER (INPATIENT)
Facility: CLINIC | Age: 28
LOS: 6 days | Discharge: HOME HEALTH | DRG: 573 | End: 2023-09-26
Attending: UROLOGY | Admitting: SURGERY
Payer: MEDICARE

## 2023-09-20 ENCOUNTER — ANESTHESIA (OUTPATIENT)
Dept: SURGERY | Facility: CLINIC | Age: 28
DRG: 573 | End: 2023-09-20
Payer: MEDICARE

## 2023-09-20 DIAGNOSIS — Z86.718 H/O DEEP VENOUS THROMBOSIS: ICD-10-CM

## 2023-09-20 DIAGNOSIS — L89.314 PRESSURE INJURY OF RIGHT ISCHIUM, STAGE 4 (H): ICD-10-CM

## 2023-09-20 DIAGNOSIS — R21 RASH: ICD-10-CM

## 2023-09-20 DIAGNOSIS — L89.152 PRESSURE INJURY OF COCCYGEAL REGION, STAGE 2 (H): ICD-10-CM

## 2023-09-20 DIAGNOSIS — Z98.890 S/P FLAP GRAFT: ICD-10-CM

## 2023-09-20 DIAGNOSIS — L89.159 PRESSURE INJURY OF SKIN OF COCCYGEAL REGION: Primary | ICD-10-CM

## 2023-09-20 PROBLEM — J18.9 COMMUNITY ACQUIRED PNEUMONIA OF LEFT LOWER LOBE OF LUNG: Status: ACTIVE | Noted: 2022-09-20

## 2023-09-20 LAB
ABO/RH(D): NORMAL
ANTIBODY SCREEN: NEGATIVE
GLUCOSE BLDC GLUCOMTR-MCNC: 98 MG/DL (ref 70–99)
SPECIMEN EXPIRATION DATE: NORMAL

## 2023-09-20 PROCEDURE — 250N000009 HC RX 250

## 2023-09-20 PROCEDURE — C1758 CATHETER, URETERAL: HCPCS | Performed by: UROLOGY

## 2023-09-20 PROCEDURE — 15738 MUSCLE-SKIN GRAFT LEG: CPT | Mod: GC | Performed by: SURGERY

## 2023-09-20 PROCEDURE — 360N000085 HC SURGERY LEVEL 5 W/ FLUORO, PER MIN: Performed by: UROLOGY

## 2023-09-20 PROCEDURE — 999N000180 XR SURGERY CARM FLUORO LESS THAN 5 MIN: Mod: TC

## 2023-09-20 PROCEDURE — 120N000002 HC R&B MED SURG/OB UMMC

## 2023-09-20 PROCEDURE — 250N000025 HC SEVOFLURANE, PER MIN: Performed by: UROLOGY

## 2023-09-20 PROCEDURE — 710N000009 HC RECOVERY PHASE 1, LEVEL 1, PER MIN: Performed by: UROLOGY

## 2023-09-20 PROCEDURE — 87102 FUNGUS ISOLATION CULTURE: CPT | Performed by: UROLOGY

## 2023-09-20 PROCEDURE — 50081 PERQ NL/PL LITHOTRP CPLX>2CM: CPT | Mod: LT | Performed by: UROLOGY

## 2023-09-20 PROCEDURE — 86901 BLOOD TYPING SEROLOGIC RH(D): CPT

## 2023-09-20 PROCEDURE — 0QB20ZX EXCISION OF RIGHT PELVIC BONE, OPEN APPROACH, DIAGNOSTIC: ICD-10-PCS | Performed by: SURGERY

## 2023-09-20 PROCEDURE — 250N000011 HC RX IP 250 OP 636: Mod: JZ

## 2023-09-20 PROCEDURE — 86850 RBC ANTIBODY SCREEN: CPT

## 2023-09-20 PROCEDURE — 0QB20ZZ EXCISION OF RIGHT PELVIC BONE, OPEN APPROACH: ICD-10-PCS | Performed by: SURGERY

## 2023-09-20 PROCEDURE — 250N000011 HC RX IP 250 OP 636

## 2023-09-20 PROCEDURE — 258N000003 HC RX IP 258 OP 636: Performed by: UROLOGY

## 2023-09-20 PROCEDURE — 0TC74ZZ EXTIRPATION OF MATTER FROM LEFT URETER, PERCUTANEOUS ENDOSCOPIC APPROACH: ICD-10-PCS | Performed by: UROLOGY

## 2023-09-20 PROCEDURE — 88311 DECALCIFY TISSUE: CPT | Mod: TC | Performed by: UROLOGY

## 2023-09-20 PROCEDURE — 99222 1ST HOSP IP/OBS MODERATE 55: CPT | Performed by: PHYSICIAN ASSISTANT

## 2023-09-20 PROCEDURE — 250N000013 HC RX MED GY IP 250 OP 250 PS 637

## 2023-09-20 PROCEDURE — 0T9430Z DRAINAGE OF LEFT KIDNEY PELVIS WITH DRAINAGE DEVICE, PERCUTANEOUS APPROACH: ICD-10-PCS | Performed by: UROLOGY

## 2023-09-20 PROCEDURE — 370N000017 HC ANESTHESIA TECHNICAL FEE, PER MIN: Performed by: UROLOGY

## 2023-09-20 PROCEDURE — 999N000141 HC STATISTIC PRE-PROCEDURE NURSING ASSESSMENT: Performed by: UROLOGY

## 2023-09-20 PROCEDURE — 258N000003 HC RX IP 258 OP 636

## 2023-09-20 PROCEDURE — 255N000002 HC RX 255 OP 636: Performed by: UROLOGY

## 2023-09-20 PROCEDURE — 250N000011 HC RX IP 250 OP 636: Performed by: UROLOGY

## 2023-09-20 PROCEDURE — 87077 CULTURE AEROBIC IDENTIFY: CPT | Performed by: UROLOGY

## 2023-09-20 PROCEDURE — 36415 COLL VENOUS BLD VENIPUNCTURE: CPT

## 2023-09-20 PROCEDURE — 0JXL0ZC TRANSFER RIGHT UPPER LEG SUBCUTANEOUS TISSUE AND FASCIA WITH SKIN, SUBCUTANEOUS TISSUE AND FASCIA, OPEN APPROACH: ICD-10-PCS | Performed by: SURGERY

## 2023-09-20 PROCEDURE — C1769 GUIDE WIRE: HCPCS | Performed by: UROLOGY

## 2023-09-20 PROCEDURE — 272N000001 HC OR GENERAL SUPPLY STERILE: Performed by: UROLOGY

## 2023-09-20 PROCEDURE — 15946 EXC ISCH PR ULC PREP MUS FLP: CPT | Mod: RT | Performed by: SURGERY

## 2023-09-20 PROCEDURE — 82365 CALCULUS SPECTROSCOPY: CPT | Performed by: UROLOGY

## 2023-09-20 PROCEDURE — 87070 CULTURE OTHR SPECIMN AEROBIC: CPT | Performed by: UROLOGY

## 2023-09-20 PROCEDURE — 250N000013 HC RX MED GY IP 250 OP 250 PS 637: Performed by: STUDENT IN AN ORGANIZED HEALTH CARE EDUCATION/TRAINING PROGRAM

## 2023-09-20 PROCEDURE — 0TP5X0Z REMOVAL OF DRAINAGE DEVICE FROM KIDNEY, EXTERNAL APPROACH: ICD-10-PCS | Performed by: UROLOGY

## 2023-09-20 PROCEDURE — 87075 CULTR BACTERIA EXCEPT BLOOD: CPT | Performed by: UROLOGY

## 2023-09-20 PROCEDURE — 87106 FUNGI IDENTIFICATION YEAST: CPT | Performed by: UROLOGY

## 2023-09-20 PROCEDURE — 250N000013 HC RX MED GY IP 250 OP 250 PS 637: Performed by: SURGERY

## 2023-09-20 PROCEDURE — 272N000002 HC OR SUPPLY OTHER OPNP: Performed by: UROLOGY

## 2023-09-20 RX ORDER — HYDROMORPHONE HYDROCHLORIDE 1 MG/ML
0.4 INJECTION, SOLUTION INTRAMUSCULAR; INTRAVENOUS; SUBCUTANEOUS EVERY 5 MIN PRN
Status: DISCONTINUED | OUTPATIENT
Start: 2023-09-20 | End: 2023-09-20 | Stop reason: HOSPADM

## 2023-09-20 RX ORDER — ACETAMINOPHEN 325 MG/1
975 TABLET ORAL ONCE
Status: COMPLETED | OUTPATIENT
Start: 2023-09-20 | End: 2023-09-20

## 2023-09-20 RX ORDER — FENTANYL CITRATE 50 UG/ML
50 INJECTION, SOLUTION INTRAMUSCULAR; INTRAVENOUS EVERY 5 MIN PRN
Status: DISCONTINUED | OUTPATIENT
Start: 2023-09-20 | End: 2023-09-20 | Stop reason: HOSPADM

## 2023-09-20 RX ORDER — LIDOCAINE 40 MG/G
CREAM TOPICAL
Status: DISCONTINUED | OUTPATIENT
Start: 2023-09-20 | End: 2023-09-20 | Stop reason: HOSPADM

## 2023-09-20 RX ORDER — BACLOFEN 10 MG/1
30 TABLET ORAL 3 TIMES DAILY
Status: DISCONTINUED | OUTPATIENT
Start: 2023-09-20 | End: 2023-09-26 | Stop reason: HOSPADM

## 2023-09-20 RX ORDER — HYDROMORPHONE HYDROCHLORIDE 1 MG/ML
0.2 INJECTION, SOLUTION INTRAMUSCULAR; INTRAVENOUS; SUBCUTANEOUS EVERY 5 MIN PRN
Status: DISCONTINUED | OUTPATIENT
Start: 2023-09-20 | End: 2023-09-20 | Stop reason: HOSPADM

## 2023-09-20 RX ORDER — OXYCODONE HYDROCHLORIDE 5 MG/1
5 TABLET ORAL EVERY 4 HOURS PRN
Status: DISCONTINUED | OUTPATIENT
Start: 2023-09-20 | End: 2023-09-26 | Stop reason: HOSPADM

## 2023-09-20 RX ORDER — LIDOCAINE HYDROCHLORIDE 20 MG/ML
INJECTION, SOLUTION INFILTRATION; PERINEURAL PRN
Status: DISCONTINUED | OUTPATIENT
Start: 2023-09-20 | End: 2023-09-20

## 2023-09-20 RX ORDER — CEFAZOLIN SODIUM/WATER 2 G/20 ML
2 SYRINGE (ML) INTRAVENOUS
Status: DISCONTINUED | OUTPATIENT
Start: 2023-09-20 | End: 2023-09-20 | Stop reason: HOSPADM

## 2023-09-20 RX ORDER — LIDOCAINE 40 MG/G
CREAM TOPICAL
Status: DISCONTINUED | OUTPATIENT
Start: 2023-09-20 | End: 2023-09-26 | Stop reason: HOSPADM

## 2023-09-20 RX ORDER — POLYETHYLENE GLYCOL 3350 17 G/17G
17 POWDER, FOR SOLUTION ORAL DAILY
Status: DISCONTINUED | OUTPATIENT
Start: 2023-09-21 | End: 2023-09-26 | Stop reason: HOSPADM

## 2023-09-20 RX ORDER — ACETAMINOPHEN 325 MG/1
975 TABLET ORAL EVERY 8 HOURS
Status: COMPLETED | OUTPATIENT
Start: 2023-09-20 | End: 2023-09-23

## 2023-09-20 RX ORDER — CEFAZOLIN SODIUM/WATER 2 G/20 ML
2 SYRINGE (ML) INTRAVENOUS SEE ADMIN INSTRUCTIONS
Status: DISCONTINUED | OUTPATIENT
Start: 2023-09-20 | End: 2023-09-20 | Stop reason: HOSPADM

## 2023-09-20 RX ORDER — NALOXONE HYDROCHLORIDE 0.4 MG/ML
0.4 INJECTION, SOLUTION INTRAMUSCULAR; INTRAVENOUS; SUBCUTANEOUS
Status: DISCONTINUED | OUTPATIENT
Start: 2023-09-20 | End: 2023-09-26 | Stop reason: HOSPADM

## 2023-09-20 RX ORDER — AMPICILLIN 2 G/1
2 INJECTION, POWDER, FOR SOLUTION INTRAVENOUS ONCE
Status: DISCONTINUED | OUTPATIENT
Start: 2023-09-20 | End: 2023-09-20 | Stop reason: HOSPADM

## 2023-09-20 RX ORDER — ONDANSETRON 4 MG/1
4 TABLET, ORALLY DISINTEGRATING ORAL EVERY 30 MIN PRN
Status: DISCONTINUED | OUTPATIENT
Start: 2023-09-20 | End: 2023-09-20 | Stop reason: HOSPADM

## 2023-09-20 RX ORDER — GABAPENTIN 300 MG/1
300 CAPSULE ORAL AT BEDTIME
Status: DISCONTINUED | OUTPATIENT
Start: 2023-09-20 | End: 2023-09-26 | Stop reason: HOSPADM

## 2023-09-20 RX ORDER — FENTANYL CITRATE 50 UG/ML
25 INJECTION, SOLUTION INTRAMUSCULAR; INTRAVENOUS EVERY 5 MIN PRN
Status: DISCONTINUED | OUTPATIENT
Start: 2023-09-20 | End: 2023-09-20 | Stop reason: HOSPADM

## 2023-09-20 RX ORDER — ONDANSETRON 2 MG/ML
4 INJECTION INTRAMUSCULAR; INTRAVENOUS EVERY 6 HOURS PRN
Status: DISCONTINUED | OUTPATIENT
Start: 2023-09-20 | End: 2023-09-26 | Stop reason: HOSPADM

## 2023-09-20 RX ORDER — SULFAMETHOXAZOLE/TRIMETHOPRIM 800-160 MG
1 TABLET ORAL 2 TIMES DAILY
Status: DISCONTINUED | OUTPATIENT
Start: 2023-09-20 | End: 2023-09-26 | Stop reason: HOSPADM

## 2023-09-20 RX ORDER — SODIUM CHLORIDE, SODIUM LACTATE, POTASSIUM CHLORIDE, CALCIUM CHLORIDE 600; 310; 30; 20 MG/100ML; MG/100ML; MG/100ML; MG/100ML
INJECTION, SOLUTION INTRAVENOUS CONTINUOUS
Status: DISCONTINUED | OUTPATIENT
Start: 2023-09-20 | End: 2023-09-26 | Stop reason: HOSPADM

## 2023-09-20 RX ORDER — SODIUM CHLORIDE, SODIUM LACTATE, POTASSIUM CHLORIDE, CALCIUM CHLORIDE 600; 310; 30; 20 MG/100ML; MG/100ML; MG/100ML; MG/100ML
INJECTION, SOLUTION INTRAVENOUS CONTINUOUS
Status: DISCONTINUED | OUTPATIENT
Start: 2023-09-20 | End: 2023-09-20 | Stop reason: HOSPADM

## 2023-09-20 RX ORDER — BUPIVACAINE HYDROCHLORIDE 2.5 MG/ML
INJECTION, SOLUTION INFILTRATION; PERINEURAL PRN
Status: DISCONTINUED | OUTPATIENT
Start: 2023-09-20 | End: 2023-09-20 | Stop reason: HOSPADM

## 2023-09-20 RX ORDER — ONDANSETRON 2 MG/ML
4 INJECTION INTRAMUSCULAR; INTRAVENOUS EVERY 30 MIN PRN
Status: DISCONTINUED | OUTPATIENT
Start: 2023-09-20 | End: 2023-09-20 | Stop reason: HOSPADM

## 2023-09-20 RX ORDER — FENTANYL CITRATE 50 UG/ML
INJECTION, SOLUTION INTRAMUSCULAR; INTRAVENOUS PRN
Status: DISCONTINUED | OUTPATIENT
Start: 2023-09-20 | End: 2023-09-20

## 2023-09-20 RX ORDER — ACETAMINOPHEN 325 MG/1
650 TABLET ORAL EVERY 4 HOURS PRN
Status: DISCONTINUED | OUTPATIENT
Start: 2023-09-23 | End: 2023-09-26 | Stop reason: HOSPADM

## 2023-09-20 RX ORDER — PROPOFOL 10 MG/ML
INJECTION, EMULSION INTRAVENOUS PRN
Status: DISCONTINUED | OUTPATIENT
Start: 2023-09-20 | End: 2023-09-20

## 2023-09-20 RX ORDER — AMOXICILLIN 250 MG
1 CAPSULE ORAL DAILY
Status: DISCONTINUED | OUTPATIENT
Start: 2023-09-21 | End: 2023-09-20

## 2023-09-20 RX ORDER — ALBUTEROL SULFATE 0.83 MG/ML
2.5 SOLUTION RESPIRATORY (INHALATION) EVERY 4 HOURS PRN
Status: DISCONTINUED | OUTPATIENT
Start: 2023-09-20 | End: 2023-09-26 | Stop reason: HOSPADM

## 2023-09-20 RX ORDER — FLUTICASONE FUROATE AND VILANTEROL 100; 25 UG/1; UG/1
1 POWDER RESPIRATORY (INHALATION) AT BEDTIME
Status: DISCONTINUED | OUTPATIENT
Start: 2023-09-20 | End: 2023-09-26 | Stop reason: HOSPADM

## 2023-09-20 RX ORDER — PROCHLORPERAZINE MALEATE 5 MG
10 TABLET ORAL EVERY 6 HOURS PRN
Status: DISCONTINUED | OUTPATIENT
Start: 2023-09-20 | End: 2023-09-26 | Stop reason: HOSPADM

## 2023-09-20 RX ORDER — HYDROXYZINE HYDROCHLORIDE 25 MG/1
25 TABLET, FILM COATED ORAL 3 TIMES DAILY PRN
Status: DISCONTINUED | OUTPATIENT
Start: 2023-09-20 | End: 2023-09-26 | Stop reason: HOSPADM

## 2023-09-20 RX ORDER — ONDANSETRON 4 MG/1
4 TABLET, ORALLY DISINTEGRATING ORAL EVERY 6 HOURS PRN
Status: DISCONTINUED | OUTPATIENT
Start: 2023-09-20 | End: 2023-09-26 | Stop reason: HOSPADM

## 2023-09-20 RX ORDER — AMPICILLIN 2 G/1
2 INJECTION, POWDER, FOR SOLUTION INTRAVENOUS ONCE
Status: COMPLETED | OUTPATIENT
Start: 2023-09-20 | End: 2023-09-20

## 2023-09-20 RX ORDER — EPHEDRINE SULFATE 50 MG/ML
INJECTION, SOLUTION INTRAMUSCULAR; INTRAVENOUS; SUBCUTANEOUS PRN
Status: DISCONTINUED | OUTPATIENT
Start: 2023-09-20 | End: 2023-09-20

## 2023-09-20 RX ORDER — NALOXONE HYDROCHLORIDE 0.4 MG/ML
0.2 INJECTION, SOLUTION INTRAMUSCULAR; INTRAVENOUS; SUBCUTANEOUS
Status: DISCONTINUED | OUTPATIENT
Start: 2023-09-20 | End: 2023-09-26 | Stop reason: HOSPADM

## 2023-09-20 RX ORDER — OXYBUTYNIN CHLORIDE 5 MG/1
5 TABLET, EXTENDED RELEASE ORAL EVERY EVENING
Status: DISCONTINUED | OUTPATIENT
Start: 2023-09-20 | End: 2023-09-26 | Stop reason: HOSPADM

## 2023-09-20 RX ORDER — OXYCODONE HYDROCHLORIDE 10 MG/1
10 TABLET ORAL EVERY 4 HOURS PRN
Status: DISCONTINUED | OUTPATIENT
Start: 2023-09-20 | End: 2023-09-26 | Stop reason: HOSPADM

## 2023-09-20 RX ORDER — AMOXICILLIN 250 MG
1 CAPSULE ORAL 2 TIMES DAILY
Status: DISCONTINUED | OUTPATIENT
Start: 2023-09-20 | End: 2023-09-26 | Stop reason: HOSPADM

## 2023-09-20 RX ORDER — MIDODRINE HYDROCHLORIDE 5 MG/1
10 TABLET ORAL 2 TIMES DAILY WITH MEALS
Status: DISCONTINUED | OUTPATIENT
Start: 2023-09-21 | End: 2023-09-26 | Stop reason: HOSPADM

## 2023-09-20 RX ORDER — ONDANSETRON 2 MG/ML
INJECTION INTRAMUSCULAR; INTRAVENOUS PRN
Status: DISCONTINUED | OUTPATIENT
Start: 2023-09-20 | End: 2023-09-20

## 2023-09-20 RX ORDER — BISACODYL 10 MG
10 SUPPOSITORY, RECTAL RECTAL DAILY PRN
Status: DISCONTINUED | OUTPATIENT
Start: 2023-09-20 | End: 2023-09-26 | Stop reason: HOSPADM

## 2023-09-20 RX ORDER — HYDROMORPHONE HYDROCHLORIDE 1 MG/ML
0.4 INJECTION, SOLUTION INTRAMUSCULAR; INTRAVENOUS; SUBCUTANEOUS
Status: DISCONTINUED | OUTPATIENT
Start: 2023-09-20 | End: 2023-09-26 | Stop reason: HOSPADM

## 2023-09-20 RX ORDER — HYDROMORPHONE HYDROCHLORIDE 1 MG/ML
0.2 INJECTION, SOLUTION INTRAMUSCULAR; INTRAVENOUS; SUBCUTANEOUS
Status: DISCONTINUED | OUTPATIENT
Start: 2023-09-20 | End: 2023-09-26 | Stop reason: HOSPADM

## 2023-09-20 RX ADMIN — Medication 10 MG: at 13:17

## 2023-09-20 RX ADMIN — GABAPENTIN 300 MG: 300 CAPSULE ORAL at 22:26

## 2023-09-20 RX ADMIN — Medication 20 MG: at 14:06

## 2023-09-20 RX ADMIN — FENTANYL CITRATE 50 MCG: 50 INJECTION, SOLUTION INTRAMUSCULAR; INTRAVENOUS at 15:44

## 2023-09-20 RX ADMIN — SENNOSIDES AND DOCUSATE SODIUM 1 TABLET: 50; 8.6 TABLET ORAL at 20:37

## 2023-09-20 RX ADMIN — LIDOCAINE HYDROCHLORIDE 80 MG: 20 INJECTION, SOLUTION INFILTRATION; PERINEURAL at 12:56

## 2023-09-20 RX ADMIN — SODIUM CHLORIDE, POTASSIUM CHLORIDE, SODIUM LACTATE AND CALCIUM CHLORIDE: 600; 310; 30; 20 INJECTION, SOLUTION INTRAVENOUS at 14:44

## 2023-09-20 RX ADMIN — PHENYLEPHRINE HYDROCHLORIDE 150 MCG: 10 INJECTION INTRAVENOUS at 14:12

## 2023-09-20 RX ADMIN — FENTANYL CITRATE 25 MCG: 50 INJECTION INTRAMUSCULAR; INTRAVENOUS at 19:05

## 2023-09-20 RX ADMIN — PROPOFOL 140 MG: 10 INJECTION, EMULSION INTRAVENOUS at 12:56

## 2023-09-20 RX ADMIN — AMPICILLIN 2 G: 2 INJECTION, POWDER, FOR SOLUTION INTRAMUSCULAR; INTRAVENOUS at 15:08

## 2023-09-20 RX ADMIN — PHENYLEPHRINE HYDROCHLORIDE 100 MCG: 10 INJECTION INTRAVENOUS at 13:03

## 2023-09-20 RX ADMIN — AMPICILLIN 2 G: 2 INJECTION, POWDER, FOR SOLUTION INTRAMUSCULAR; INTRAVENOUS at 17:08

## 2023-09-20 RX ADMIN — OXYBUTYNIN CHLORIDE 5 MG: 5 TABLET, EXTENDED RELEASE ORAL at 22:26

## 2023-09-20 RX ADMIN — ACETAMINOPHEN 975 MG: 325 TABLET, FILM COATED ORAL at 12:21

## 2023-09-20 RX ADMIN — PHENYLEPHRINE HYDROCHLORIDE 100 MCG: 10 INJECTION INTRAVENOUS at 14:58

## 2023-09-20 RX ADMIN — PHENYLEPHRINE HYDROCHLORIDE 100 MCG: 10 INJECTION INTRAVENOUS at 13:55

## 2023-09-20 RX ADMIN — ONDANSETRON 4 MG: 2 INJECTION INTRAMUSCULAR; INTRAVENOUS at 17:34

## 2023-09-20 RX ADMIN — FENTANYL CITRATE 25 MCG: 50 INJECTION INTRAMUSCULAR; INTRAVENOUS at 18:28

## 2023-09-20 RX ADMIN — SULFAMETHOXAZOLE AND TRIMETHOPRIM 1 TABLET: 800; 160 TABLET ORAL at 22:24

## 2023-09-20 RX ADMIN — PHENYLEPHRINE HYDROCHLORIDE 0.1 MCG/KG/MIN: 10 INJECTION INTRAVENOUS at 15:15

## 2023-09-20 RX ADMIN — PROCHLORPERAZINE EDISYLATE 5 MG: 5 INJECTION INTRAMUSCULAR; INTRAVENOUS at 19:07

## 2023-09-20 RX ADMIN — FENTANYL CITRATE 50 MCG: 50 INJECTION, SOLUTION INTRAMUSCULAR; INTRAVENOUS at 14:44

## 2023-09-20 RX ADMIN — PHENYLEPHRINE HYDROCHLORIDE 100 MCG: 10 INJECTION INTRAVENOUS at 14:44

## 2023-09-20 RX ADMIN — PHENYLEPHRINE HYDROCHLORIDE 150 MCG: 10 INJECTION INTRAVENOUS at 14:27

## 2023-09-20 RX ADMIN — Medication 50 MG: at 12:56

## 2023-09-20 RX ADMIN — PHENYLEPHRINE HYDROCHLORIDE 100 MCG: 10 INJECTION INTRAVENOUS at 13:32

## 2023-09-20 RX ADMIN — FENTANYL CITRATE 50 MCG: 50 INJECTION, SOLUTION INTRAMUSCULAR; INTRAVENOUS at 12:56

## 2023-09-20 RX ADMIN — PHENYLEPHRINE HYDROCHLORIDE 100 MCG: 10 INJECTION INTRAVENOUS at 13:20

## 2023-09-20 RX ADMIN — GENTAMICIN SULFATE 260 MG: 40 INJECTION, SOLUTION INTRAMUSCULAR; INTRAVENOUS at 12:19

## 2023-09-20 RX ADMIN — BACLOFEN 30 MG: 10 TABLET ORAL at 20:37

## 2023-09-20 RX ADMIN — AMPICILLIN 2 G: 2 INJECTION, POWDER, FOR SOLUTION INTRAMUSCULAR; INTRAVENOUS at 13:08

## 2023-09-20 RX ADMIN — ACETAMINOPHEN 975 MG: 325 TABLET ORAL at 20:37

## 2023-09-20 RX ADMIN — ONDANSETRON 4 MG: 2 INJECTION INTRAMUSCULAR; INTRAVENOUS at 18:14

## 2023-09-20 RX ADMIN — SODIUM CHLORIDE, POTASSIUM CHLORIDE, SODIUM LACTATE AND CALCIUM CHLORIDE: 600; 310; 30; 20 INJECTION, SOLUTION INTRAVENOUS at 12:40

## 2023-09-20 ASSESSMENT — ACTIVITIES OF DAILY LIVING (ADL)
ADLS_ACUITY_SCORE: 38

## 2023-09-20 NOTE — BRIEF OP NOTE
[facility]    Brief Operative Note    Pre-operative diagnosis: Nephrolithiasis [N20.0]  Post-operative diagnosis Same as pre-operative diagnosis    Procedure: Procedure(s):  NEPHROLITHOTOMY, PERCUTANEOUS, USING HOLMIUM LASER,    Surgeon:     * Parveen Schofield MD - Primary     * Diana Mckeon MD    Anesthesia: General   Estimated Blood Loss: 3cc    Drains: 7Fr nephrostomy tube (to drainage), 5Fr open-ended catheter (capped), 14Fr Winston catheter (to drainage)    Specimens:   ID Type Source Tests Collected by Time Destination   A : LEFT KIDNEY STONES Calculus/Stone Kidney, Left STONE ANALYSIS Parveen Schofield MD 9/20/2023  2:39 PM    B : LEFT KIDNEY STONES Calculus/Stone Kidney, Left AEROBIC BACTERIAL CULTURE ROUTINE Parveen Schofield MD 9/20/2023  2:39 PM      Findings: Large ureteral stone, partially dusted, partially fragmented and basketed out. Several lower pole stones basketed out. Left upper tract stone free at end of case. PNT and 5Fr down to bladder left in place.   Complications: None.  Implants: * No implants in log *    Post-op plan:   - plastics to perform ischial debridement/possible flap   - admit to plastics for post-op cares   - urology will see in AM  - continue preop Bactrim thru 9/28/23  - defer other cares to primary team     Tube/drain plan:   - Remove 14Fr Winston catheter from channel tomorrow AM, patient can return to previous CIC regimen while admitted  - PNT and 5Fr to remain until discharge; on day before discharge, remove 5Fr; on day of discharge, perform methylene blue challenge thru PNT and if passed, remove PNT

## 2023-09-20 NOTE — PROVIDER NOTIFICATION
Reddened area on coccyx covered by dressing currently.  Patient's mom states skin is reddened but intact.  Patient has decubitus ulcer on right buttocks that Dr. Zacarias is doing a procedure on today in the operating room.  Patients skin looks good otherwise, no reddened or open areas.

## 2023-09-20 NOTE — BRIEF OP NOTE
[facility]    Brief Operative Note    Pre-operative diagnosis: Nephrolithiasis [N20.0]  Right ischial pressure sore, stage 4 (H) [L89.314]  Other chronic osteomyelitis, unspecified site (H) [M86.60]  Quadriplegia (H) [G82.50]  Post-operative diagnosis Same as pre-operative diagnosis    Procedure: Procedure(s):  NEPHROLITHOTOMY, PERCUTANEOUS, USING HOLMIUM LASER, NEPHROSTOMY TUBE EXCHANGE  IRRIGATION AND DEBRIDEMENT, PRESSURE ULCER, WITH FLAP CLOSURE, Right ischial decubitus with osteomyelitis. probable posterior thigh flap, possible SPY, possible VAC.  Surgeon: Surgeon(s) and Role:  Panel 1:     * Parveen Schofield MD - Primary     * Diana Mckeon MD  Panel 2:     * Vira Zacarias MD - Primary     * Dayday Benson MD - Resident - Assisting  Anesthesia: General   Estimated Blood Loss: 50 ml    Drains: Siddharth-Roblero  Specimens:   ID Type Source Tests Collected by Time Destination   1 : RIGHT ISCHIAL BONE Bone Biopsy Pelvis, Right ANAEROBIC BACTERIAL CULTURE ROUTINE, FUNGAL OR YEAST CULTURE ROUTINE, AEROBIC BACTERIAL CULTURE ROUTINE, SURGICAL PATHOLOGY EXAM Parveen Schofield MD 9/20/2023  3:53 PM    A : LEFT KIDNEY STONES Calculus/Stone Kidney, Left STONE ANALYSIS Parveen Schofield MD 9/20/2023  2:39 PM    B : LEFT KIDNEY STONES Calculus/Stone Kidney, Left AEROBIC BACTERIAL CULTURE ROUTINE Parveen Schofield MD 9/20/2023  2:39 PM      Findings:   None.  Complications: None.  Implants: * No implants in log *    Specialty bed  Lay flat flap protocol   Lovenox to start in the AM  Admit to Plastics with medicine team to co-manage    Follow-up cultures to decide on need for further antibiotics in addition to bactrim    Winston/CIC per urology    Dayday Benson MD - PGY 8  Plastic Surgery Resident  Pager [phone] or page on-call plastics resident
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
## 2023-09-20 NOTE — ANESTHESIA PROCEDURE NOTES
Airway       Patient location during procedure: OR       Procedure Start/Stop Times: 9/20/2023 1:01 PM  Staff -        Anesthesiologist:  Kristen Murray MD       Resident/Fellow: Arsh Patel MD       Performed By: resident  Consent for Airway        Urgency: elective  Indications and Patient Condition       Indications for airway management: adonay-procedural       Induction type:intravenous       Mask difficulty assessment: 1 - vent by mask    Final Airway Details       Final airway type: endotracheal airway       Successful airway: ETT - single  Endotracheal Airway Details        ETT size (mm): 7.0       Cuffed: yes       Successful intubation technique: video laryngoscopy       VL Blade Size: MAC 3       Grade View of Cords: 1       Adjucts: stylet       Position: Right       Measured from: lips       Secured at (cm): 23       Bite block used: None    Post intubation assessment        Placement verified by: capnometry, equal breath sounds and chest rise        Number of attempts at approach: 1       Number of other approaches attempted: 0       Secured with: pink tape       Ease of procedure: easy       Dentition: Intact and Unchanged    Medication(s) Administered   Medication Administration Time: 9/20/2023 1:01 PM    Additional Comments       Airway completed by rotating medical student: Joe Gordon

## 2023-09-20 NOTE — ANESTHESIA CARE TRANSFER NOTE
Patient: Dianna Cottrell    Procedure: Procedure(s):  NEPHROLITHOTOMY, PERCUTANEOUS, USING HOLMIUM LASER, NEPHROSTOMY TUBE EXCHANGE  IRRIGATION AND DEBRIDEMENT, PRESSURE ULCER, WITH FLAP CLOSURE, Right ischial decubitus with osteomyelitis.  posterior thigh flap       Diagnosis: Nephrolithiasis [N20.0]  Right ischial pressure sore, stage 4 (H) [L89.314]  Other chronic osteomyelitis, unspecified site (H) [M86.60]  Quadriplegia (H) [G82.50]  Diagnosis Additional Information: No value filed.    Anesthesia Type:   General     Note:    Oropharynx: oropharynx clear of all foreign objects and spontaneously breathing  Level of Consciousness: awake and drowsy  Oxygen Supplementation: face mask  Level of Supplemental Oxygen (L/min / FiO2): 8  Independent Airway: airway patency satisfactory and stable  Dentition: dentition unchanged  Vital Signs Stable: post-procedure vital signs reviewed and stable  Report to RN Given: handoff report given  Patient transferred to: PACU    Handoff Report: Identifed the Patient, Identified the Reponsible Provider, Reviewed the pertinent medical history, Discussed the surgical course, Reviewed Intra-OP anesthesia mangement and issues during anesthesia, Set expectations for post-procedure period and Allowed opportunity for questions and acknowledgement of understanding      Vitals:  Vitals Value Taken Time   /87 09/20/23 1806   Temp 36.1    Pulse 72 09/20/23 1809   Resp 10 09/20/23 1809   SpO2 100 % 09/20/23 1809   Vitals shown include unvalidated device data.    Electronically Signed By: ODILIA Cortes CRNA  September 20, 2023  6:11 PM

## 2023-09-21 LAB
ANION GAP SERPL CALCULATED.3IONS-SCNC: 9 MMOL/L (ref 7–15)
BUN SERPL-MCNC: 5.8 MG/DL (ref 6–20)
CALCIUM SERPL-MCNC: 8.7 MG/DL (ref 8.6–10)
CHLORIDE SERPL-SCNC: 107 MMOL/L (ref 98–107)
CREAT SERPL-MCNC: 0.41 MG/DL (ref 0.51–0.95)
DEPRECATED HCO3 PLAS-SCNC: 22 MMOL/L (ref 22–29)
EGFRCR SERPLBLD CKD-EPI 2021: >90 ML/MIN/1.73M2
ERYTHROCYTE [DISTWIDTH] IN BLOOD BY AUTOMATED COUNT: 13.3 % (ref 10–15)
GLUCOSE BLDC GLUCOMTR-MCNC: 90 MG/DL (ref 70–99)
GLUCOSE SERPL-MCNC: 86 MG/DL (ref 70–99)
HCT VFR BLD AUTO: 33 % (ref 35–47)
HGB BLD-MCNC: 10.4 G/DL (ref 11.7–15.7)
MAGNESIUM SERPL-MCNC: 1.9 MG/DL (ref 1.7–2.3)
MCH RBC QN AUTO: 28.2 PG (ref 26.5–33)
MCHC RBC AUTO-ENTMCNC: 31.5 G/DL (ref 31.5–36.5)
MCV RBC AUTO: 89 FL (ref 78–100)
PHOSPHATE SERPL-MCNC: 3.2 MG/DL (ref 2.5–4.5)
PLATELET # BLD AUTO: 260 10E3/UL (ref 150–450)
POTASSIUM SERPL-SCNC: 4 MMOL/L (ref 3.4–5.3)
RBC # BLD AUTO: 3.69 10E6/UL (ref 3.8–5.2)
SODIUM SERPL-SCNC: 138 MMOL/L (ref 136–145)
WBC # BLD AUTO: 11.7 10E3/UL (ref 4–11)

## 2023-09-21 PROCEDURE — 36415 COLL VENOUS BLD VENIPUNCTURE: CPT | Performed by: PHYSICIAN ASSISTANT

## 2023-09-21 PROCEDURE — 250N000013 HC RX MED GY IP 250 OP 250 PS 637: Performed by: SURGERY

## 2023-09-21 PROCEDURE — 84100 ASSAY OF PHOSPHORUS: CPT | Performed by: PHYSICIAN ASSISTANT

## 2023-09-21 PROCEDURE — 250N000013 HC RX MED GY IP 250 OP 250 PS 637: Performed by: STUDENT IN AN ORGANIZED HEALTH CARE EDUCATION/TRAINING PROGRAM

## 2023-09-21 PROCEDURE — 250N000013 HC RX MED GY IP 250 OP 250 PS 637: Performed by: PHYSICIAN ASSISTANT

## 2023-09-21 PROCEDURE — 97602 WOUND(S) CARE NON-SELECTIVE: CPT

## 2023-09-21 PROCEDURE — 85027 COMPLETE CBC AUTOMATED: CPT | Performed by: PHYSICIAN ASSISTANT

## 2023-09-21 PROCEDURE — 83735 ASSAY OF MAGNESIUM: CPT | Performed by: PHYSICIAN ASSISTANT

## 2023-09-21 PROCEDURE — 999N000111 HC STATISTIC OT IP EVAL DEFER

## 2023-09-21 PROCEDURE — 80048 BASIC METABOLIC PNL TOTAL CA: CPT | Performed by: PHYSICIAN ASSISTANT

## 2023-09-21 PROCEDURE — G0463 HOSPITAL OUTPT CLINIC VISIT: HCPCS | Mod: 25

## 2023-09-21 PROCEDURE — 120N000002 HC R&B MED SURG/OB UMMC

## 2023-09-21 PROCEDURE — 258N000003 HC RX IP 258 OP 636: Performed by: SURGERY

## 2023-09-21 PROCEDURE — 99233 SBSQ HOSP IP/OBS HIGH 50: CPT | Performed by: INTERNAL MEDICINE

## 2023-09-21 PROCEDURE — 258N000003 HC RX IP 258 OP 636

## 2023-09-21 RX ORDER — SODIUM CHLORIDE, SODIUM LACTATE, POTASSIUM CHLORIDE, CALCIUM CHLORIDE 600; 310; 30; 20 MG/100ML; MG/100ML; MG/100ML; MG/100ML
INJECTION, SOLUTION INTRAVENOUS
Status: COMPLETED
Start: 2023-09-21 | End: 2023-09-21

## 2023-09-21 RX ADMIN — GABAPENTIN 300 MG: 300 CAPSULE ORAL at 21:49

## 2023-09-21 RX ADMIN — SENNOSIDES AND DOCUSATE SODIUM 1 TABLET: 50; 8.6 TABLET ORAL at 09:14

## 2023-09-21 RX ADMIN — BACLOFEN 30 MG: 10 TABLET ORAL at 20:25

## 2023-09-21 RX ADMIN — SULFAMETHOXAZOLE AND TRIMETHOPRIM 1 TABLET: 800; 160 TABLET ORAL at 20:25

## 2023-09-21 RX ADMIN — ACETAMINOPHEN 975 MG: 325 TABLET ORAL at 20:25

## 2023-09-21 RX ADMIN — ACETAMINOPHEN 975 MG: 325 TABLET ORAL at 12:37

## 2023-09-21 RX ADMIN — SODIUM CHLORIDE, POTASSIUM CHLORIDE, SODIUM LACTATE AND CALCIUM CHLORIDE 1000 ML: 600; 310; 30; 20 INJECTION, SOLUTION INTRAVENOUS at 00:55

## 2023-09-21 RX ADMIN — SERTRALINE HYDROCHLORIDE 150 MG: 100 TABLET ORAL at 09:14

## 2023-09-21 RX ADMIN — MIDODRINE HYDROCHLORIDE 10 MG: 5 TABLET ORAL at 09:14

## 2023-09-21 RX ADMIN — OXYBUTYNIN CHLORIDE 5 MG: 5 TABLET, EXTENDED RELEASE ORAL at 20:26

## 2023-09-21 RX ADMIN — SODIUM CHLORIDE, POTASSIUM CHLORIDE, SODIUM LACTATE AND CALCIUM CHLORIDE: 600; 310; 30; 20 INJECTION, SOLUTION INTRAVENOUS at 10:50

## 2023-09-21 RX ADMIN — FLUTICASONE FUROATE AND VILANTEROL TRIFENATATE 1 PUFF: 100; 25 POWDER RESPIRATORY (INHALATION) at 21:49

## 2023-09-21 RX ADMIN — SENNOSIDES AND DOCUSATE SODIUM 1 TABLET: 50; 8.6 TABLET ORAL at 20:25

## 2023-09-21 RX ADMIN — ACETAMINOPHEN 975 MG: 325 TABLET ORAL at 04:28

## 2023-09-21 RX ADMIN — BISACODYL 10 MG: 10 SUPPOSITORY RECTAL at 20:26

## 2023-09-21 RX ADMIN — SULFAMETHOXAZOLE AND TRIMETHOPRIM 1 TABLET: 800; 160 TABLET ORAL at 09:14

## 2023-09-21 RX ADMIN — BACLOFEN 30 MG: 10 TABLET ORAL at 09:14

## 2023-09-21 RX ADMIN — BACLOFEN 30 MG: 10 TABLET ORAL at 14:27

## 2023-09-21 RX ADMIN — MIDODRINE HYDROCHLORIDE 10 MG: 5 TABLET ORAL at 17:37

## 2023-09-21 RX ADMIN — RIVAROXABAN 10 MG: 10 TABLET, FILM COATED ORAL at 17:37

## 2023-09-21 ASSESSMENT — ACTIVITIES OF DAILY LIVING (ADL)
ADLS_ACUITY_SCORE: 42
ADLS_ACUITY_SCORE: 46
ADLS_ACUITY_SCORE: 42
ADLS_ACUITY_SCORE: 38
ADLS_ACUITY_SCORE: 42
ADLS_ACUITY_SCORE: 42
DEPENDENT_IADLS:: CLEANING;COOKING;LAUNDRY;SHOPPING;MEAL PREPARATION;MEDICATION MANAGEMENT;TRANSPORTATION;INCONTINENCE
ADLS_ACUITY_SCORE: 42
ADLS_ACUITY_SCORE: 42
ADLS_ACUITY_SCORE: 38
ADLS_ACUITY_SCORE: 38

## 2023-09-21 NOTE — PLAN OF CARE
Occupational Therapy: Orders received. Chart reviewed and discussed with care team.? Occupational Therapy not indicated due to per discussion with pt, pt declined OT stating only concern is self-feeding d/t family taking adaptive feeding equipment home. Pt reports family may be able to bring feeding AE to hospital. Educated pt on if family is unable to bring feeding AE from home, to have nursing notify OT to fabricate temporary feeding AE if needed, with pt agreeable.? Pt agreeable to OT signing off. Defer discharge recommendations to medical team.? Will complete orders.

## 2023-09-21 NOTE — PROGRESS NOTES
Care Management Follow Up    Length of Stay (days): 1    Expected Discharge Date: 09/26/2023     Concerns to be Addressed:       Patient plan of care discussed at interdisciplinary rounds: Yes    Anticipated Discharge Disposition:       Anticipated Discharge Services:    Anticipated Discharge DME:      Patient/family educated on Medicare website which has current facility and service quality ratings:    Education Provided on the Discharge Plan:    Patient/Family in Agreement with the Plan:      Referrals Placed by CM/SW:    Private pay costs discussed: Not applicable    Additional Information:  Pt requested information for transportation to appointments after discharge. Pt has transportation benefits with [employer]. Per pt, stretcher transportation will be necessary per Plastics recommendations. The following information was provided to pt.    [employer]  [employer] which provide stretcher rides for appointments. Must call AT LEAST 1 day in advance, earlier is better.    [facility] [place] [phone]  [employer] [phone]  [employer] [phone]  [facility] [phone]    Veronica Davenport RNCC  Covering for 5 Med/Surg  Phone [phone]  Pager [phone]      SEARCHABLE in [facility] - search CARE COORDINATOR      [place] ([phone]) Saturday & Sunday; ([phone]) [place] Recognized Holidays    Weekend Pager--  Units: 5A, 5B & 5C  Pager: [phone]  Units: 6B, 6C & 6D    Pager: [phone]  Units: 7A, 7B, 7C & 7D    Pager: [phone]  Units: 6A & ICU   Pager: [phone]  Units: 5 Ortho, 5MS & WB ED Pager: [phone]  Units: 6MS, 8A & 10 ICU  Pager [phone]

## 2023-09-21 NOTE — PROGRESS NOTES
Plastic Surgery Progress Note  Attending: Dr. Zacarias    Doing ok. No pain overnight. Does have a sensation that she has to urinate despite rogers in catheterization channel. Has not eaten anything, tolerating liquids.     Temp:  [97  F (36.1  C)-98.5  F (36.9  C)] 98.5  F (36.9  C)  Pulse:  [74-92] 87  Resp:  [8-16] 16  BP: ()/(56-93) 103/67  SpO2:  [93 %-100 %] 99 %  I/O last 3 completed shifts:  In: 1500 [I.V.:1500]  Out: 1249 [Urine:1187; Drains:12; Blood:50]  General: NAD  Resp: NLB  Abdomen: soft, nondistended. Rogers to cath channel. L perc nephrostomy tube in place.   Extremities: R posterior thigh flap warm, soft, pink. Incision c/d/I. Rash to buttock area stable. No open areas, no significant swelling or ecchymosis. ROSANNE with dark serosang fluid.     ROSANNE 12ml yesterday    CBC  Recent Labs   Lab 09/21/23  0633   WBC 11.7*   RBC 3.69*   HGB 10.4*   HCT 33.0*   MCV 89   MCH 28.2   MCHC 31.5   RDW 13.3        BMP  Recent Labs   Lab 09/21/23  0633 09/21/23  0632 09/20/23  1034     --   --    POTASSIUM 4.0  --   --    CHLORIDE 107  --   --    LOR 8.7  --   --    CO2 22  --   --    BUN 5.8*  --   --    CR 0.41*  --   --    GLC 86 90 98   MAG 1.9  --   --    PHOS 3.2  --   --        ASSESSMENT: 28 year old female POD #1 R IT debridement with posterior thigh fasciocutaneous flap 9/20/23.    PLAN:   -bedrest, avoid pressure on flap. Ok for supine and L lateral. Q2 hr turn while awake, q4 overnight  -WAVE mattress  -strip, empty and record ROSANNE output  -advance diet  -anticoagulation: 10mg xarelto x7 days per prior heme recs  -follow up intra op bone cultures  -bactrim course per urology  -rogers per urology  -appreciate medicine comanagement  -follow up derm recs for rash  -dispo: will recover at home. Social work/care coordination for dispo planning, will need stretcher ride, no sitting. Possible IV antibiotics.     Discussed with JOSIE EspinozaC  Plastic and Reconstructive  Surgery   or plastic surgery on call in [facility]
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
## 2023-09-21 NOTE — ANESTHESIA POSTPROCEDURE EVALUATION
Patient: Dianna Cottrell    Procedure: Procedure(s):  NEPHROLITHOTOMY, PERCUTANEOUS, USING HOLMIUM LASER, NEPHROSTOMY TUBE EXCHANGE  IRRIGATION AND DEBRIDEMENT, PRESSURE ULCER, WITH FLAP CLOSURE, Right ischial decubitus with osteomyelitis.  posterior thigh flap       Anesthesia Type:  General    Note:  Disposition: Inpatient   Postop Pain Control: Uneventful            Sign Out: Well controlled pain   PONV: No   Neuro/Psych: Uneventful            Sign Out: Acceptable/Baseline neuro status   Airway/Respiratory: Uneventful            Sign Out: Acceptable/Baseline resp. status   CV/Hemodynamics: Uneventful            Sign Out: Acceptable CV status; No obvious hypovolemia; No obvious fluid overload   Other NRE:    DID A NON-ROUTINE EVENT OCCUR?            Last vitals:  Vitals Value Taken Time   /65 09/20/23 1945   Temp 36.1  C (97  F) 09/20/23 1806   Pulse 86 09/20/23 1954   Resp 15 09/20/23 1954   SpO2 99 % 09/20/23 1950   Vitals shown include unvalidated device data.    Electronically Signed By: Elian Hernandes MD  September 20, 2023  7:55 PM
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
## 2023-09-21 NOTE — PLAN OF CARE
Alert & oriented x4, able to make needs known, uses call light appropriately. Needs assist of two with turning and repositioning every two hrs. ISC x3 for dark, karla urine. Bladder irrigated x1 with some mucus return. No BM this shift. Right hip drsg CDI (changed by provider this AM). ROSANNE in place, draining serous-sanguineous drainage. Denies pain or discomfort. Appetite 100% for meals.

## 2023-09-21 NOTE — PLAN OF CARE
Goal Outcome Evaluation: Progressing    End of shift Summary: See flowsheet for VS and detail assessments.     Changes this Shift:      Denied pain. Drains patent. Dressings intact. Turn and repo assist x2. R PIV infusing.     Plan: Continue POC
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
## 2023-09-21 NOTE — PLAN OF CARE
Pt arrived from PACU around 2030. Arrived on specialty bed. PIV infusing. VSS on RA. Denies pain. Drains patent. Pt able to take meds whole with assist. Repositioned at 2245. Heels elevated. Dressing to R flap intact. Tolerating clear liquids. Last BM was yesterday - pt does suppositories for bowel program. Declined doing bowel program this evening. Pt okay using regular room call light. Knows to keep HOB <30 degrees. Able to make needs known.

## 2023-09-21 NOTE — PLAN OF CARE
Goal Outcome Evaluation:  VS:    O2: Sating >90% on RA. Lung sounds clear. Denies chest pain and SOB.   Output: Voids spontaneously and adequately.    Last BM:   atplltBowel sounds active x4. Passing flatus.    Activity: Up with 1 assist, FWW, and gait belt.    Skin:    Pain: Denies   CMS: A&Ox4. Denies N/T.    Dressing: Dressing to flap C/D/I.   Diet: Regular. Appetite was good.Denies N/V.    LDA: PIV to 1 is S/L.    Equipment: IV pole,  and personal belongings. Call light within reach and uses appropriately.   Plan:  TBD   Additional Info: Straight cath X1

## 2023-09-21 NOTE — PHARMACY-ADMISSION MEDICATION HISTORY
Pharmacist Admission Medication History    Admission medication history is complete. The information provided in this note is only as accurate as the sources available at the time of the update.    Medication reconciliation/reorder completed by provider prior to medication history? Yes    Information Source(s): Patient via phone    Pertinent Information: Dianna was a good historian. She knew all of her medications and most recent doses. I was able to confirm this information with the dispense report in Epic. She reports that the albuterol nebs and NS nebs are primarily used in the colder months. Bactrim was ordered prior to surgery but she was never able to pick it up from her pharmacy.    Changes made to PTA medication list:  Added: None  Deleted: None  Changed: None    Medication Affordability:       Allergies reviewed with patient and updates made in EHR: yes    Medication History Completed By: [facility] 9/21/2023 2:23 PM    Prior to Admission medications    Medication Sig Last Dose Taking? Auth Provider Long Term End Date   acetaminophen (TYLENOL) 325 MG tablet Take 325-650 mg by mouth every 6 hours as needed. 9/20/2023 at 0130 Yes Reported, Patient Yes    albuterol (PROVENTIL) (2.5 MG/3ML) 0.083% neb solution Take 1 vial (2.5 mg) by nebulization every 4 hours as needed for shortness of breath / dyspnea or wheezing More than a month at July Yes Isidro Reynolds MD Yes    baclofen (LIORESAL) 10 MG tablet Take 30 mg by mouth 3 times daily (10MG X 3 = 30MG) 9/21/2023 at 0830 Yes Reported, Patient Yes    bisacodyl (DULCOLAX) 10 MG suppository Place 1 suppository (10 mg) rectally At Bedtime  Patient taking differently: Place 10 mg rectally nightly as needed for constipation 9/19/2023 at 2000 Yes Iglesia Billy MD No    fluticasone-vilanterol (BREO ELLIPTA) 100-25 MCG/ACT inhaler TAKE 1 PUFF BY MOUTH EVERY DAY 9/19/2023 at 2200 Yes Suzan Willis PA-C     gabapentin (NEURONTIN) 300 MG capsule Take 300  mg by mouth At Bedtime  9/19/2023 at 2200 Yes Reported, Patient Yes    hydrOXYzine (VISTARIL) 25 MG capsule Take 1 capsule (25 mg) by mouth 3 times daily as needed for anxiety (or at bedtime for sleep.) 9/20/2023 at 0130 Yes Suzan Willis PA-C     midodrine (PROAMATINE) 5 MG tablet Take 10 mg by mouth 2 times daily  9/20/2023 at 0830 Yes Suzan Willis PA-C Yes    Misc Natural Products (ELDERBERRY/VITAMIN C/ZINC PO) Take 1 tablet by mouth daily 9/19/2023 at 0930 Yes Unknown, Entered By History     Multiple Vitamins-Minerals (WOMENS MULTIVITAMIN) TABS Take 1 tablet by mouth daily 9/19/2023 at 0930 Yes Reported, Patient     oxybutynin ER (DITROPAN-XL) 5 MG 24 hr tablet Take 5 mg by mouth every evening 9/19/2023 at 0930 Yes Reported, Patient     senna-docusate (SENOKOT-S/PERICOLACE) 8.6-50 MG tablet Take 1 tablet by mouth daily 9/19/2023 at 2000 Yes Unknown, Entered By History     sertraline (ZOLOFT) 100 MG tablet TAKE 1.5 TABLETS BY MOUTH EVERY DAY 9/19/2023 at 2200 Yes Suzan Willis PA-C Yes    sodium chloride (NEBUSAL) 3 % neb solution Take 3 mLs by nebulization every 6 hours as needed for wheezing or other (sputum clearance difficulty due to quadridplegia.) More than a month at July Yes Rajesh Bahena MD     sodium chloride 0.9%, bottle, (CURITY STERILE SALINE) 0.9 % irrigation 250 mLs by Intracatheter route 2 times daily 9/19/2023 at 2200 Yes Mata Bacon MD  3/29/24   Vitamin D3 (CHOLECALCIFEROL) 125 MCG (5000 UT) tablet Take 1 tablet by mouth every other day Past Week Yes Reported, Patient     sulfamethoxazole-trimethoprim (BACTRIM DS) 800-160 MG tablet Take 1 tablet by mouth 2 times daily for 10 days  at Never Started  Parveen Schofield MD  9/28/23     Crista Hook, PharmD   Phone #[phone]

## 2023-09-21 NOTE — PROGRESS NOTES
PACU to Inpatient Nursing Handoff    Patient Dianna Cottrell is a 28 year old female who speaks English.   Procedure Procedure(s):  NEPHROLITHOTOMY, PERCUTANEOUS, USING HOLMIUM LASER, NEPHROSTOMY TUBE EXCHANGE  IRRIGATION AND DEBRIDEMENT, PRESSURE ULCER, WITH FLAP CLOSURE, Right ischial decubitus with osteomyelitis.  posterior thigh flap   Surgeon(s) Primary: Parveen Schofield MD     Allergies   Allergen Reactions    Succinylcholine Other (See Comments)     Spinal cord injury 12/18/12, patient at risk for extrajunctional receptors and hyperkalemia  Severity: Unknown; Notes: spinal cord injury 2012. at risk for extrajunctional receptors and hyperkalemia.; Type: Drug Allergy;   Spinal cord injury 12/18/12, patient at risk for extrajunctional receptors and hyperkalemia  Spinal cord injury 12/18/12, patient at risk for extrajunctional receptors and hyperkalemia       Isolation  No active isolations     Past Medical History   has a past medical history of Anemia, c5 burst fracture (12/18/2012), Compression fracture of L1 lumbar vertebra (H) (12/18/2012), Decubitus ulcer, Depressive disorder, Encounter for insertion of mirena IUD (12/13/2017), Fracture of thoracic spine without spinal cord lesion (H) (12/18/2012), History of spinal cord injury, History of thrombophlebitis, Hypertension (12/06/2016), Impaired lung function, Nephrolithiasis (4/11/2022), Neurogenic bladder, Neurogenic bowel, Quadriplegia (H), Thrombosis, Urinary tract infection, and Vocal cord dysfunction.    Anesthesia General   Dermatome Level     Preop Meds acetaminophen (Tylenol) - time given: 1221   Nerve block Not applicable   Intraop Meds fentaNYL 50 mcg/mL (mcg)   Total dose: 150 mcg  Date/Time Rate/Dose/Volume Action Route Admin User Audit    09/20/23 1256 50 mcg Given Intravenous Arsh Patel MD     1444 50 mcg Given Intravenous Arsh Patel MD     1544 50 mcg Given Intravenous Trent Alvarado APRN CRNA     lidocaine 2% (mg)   Total dose:  80 mg  Date/Time Rate/Dose/Volume Action Route Admin User Audit    09/20/23 1256 80 mg Given Intravenous Arsh Patel MD     propofol 10 mg/mL (mg)   Total dose: 140 mg  Date/Time Rate/Dose/Volume Action Route Admin User Audit    09/20/23 1256 140 mg Given Intravenous Arsh Patel MD     rocuronium 10 mg/mL (mg)   Total dose: 70 mg  Date/Time Rate/Dose/Volume Action Route Admin User Audit    09/20/23 1256 50 mg Given Intravenous Arsh Patel MD     1406 20 mg Given Intravenous Arsh Patel MD     ePHEDrine 5 mg/mL in NS (mg)   Total dose: 10 mg  Date/Time Rate/Dose/Volume Action Route Admin User Audit    09/20/23 1317 10 mg Given Intravenous Arsh Patel MD     phenylephrine (JOSÉ-SYNEPHRINE) injection (mcg)   Total dose: 900 mcg  Date/Time Rate/Dose/Volume Action Route Admin User Audit    09/20/23 1303 100 mcg New Bag Intravenous Arsh Patel MD     1320 100 mcg Bolus Intravenous Arsh Patel MD edited    1332 100 mcg Bolus Intravenous Arsh Patel MD     1355 100 mcg Bolus Intravenous Arsh Patel MD     1412 150 mcg Bolus Intravenous Arsh Patel MD     1427 150 mcg Bolus Intravenous Arsh Patel MD     1444 100 mcg Bolus Intravenous Arsh Patel MD     1458 100 mcg Bolus Intravenous Arsh Patel MD     ondansetron 2 mg/mL (mg)   Total dose: 4 mg  Date/Time Rate/Dose/Volume Action Route Admin User Audit    09/20/23 1734 4 mg Given Intravenous LauraegJordon oterotilyn DANIA APRN CRNA     ampicillin (OMNIPEN) 2 g vial to attach to  mL bag (g)   Total dose: 6 g Dosing weight: 50.4  Date/Time Rate/Dose/Volume Action Route Admin User Audit    09/20/23 1308 2 g (over 15 min) Given Intravenous Arsh Patel MD edited    1508 2 g Given Intravenous Fellegy, Mattilyn A, APRN CRNA     1708 2 g Given Intravenous Fellegy Mattilyn A, APRN CRNA     phenylephrine 0.1 mg/mL infusion (mcg/kg/min) (mcg/kg/min)   Total dose: 0.71 mg Dosing weight: 50.4  Date/Time Rate/Dose/Volume Action Route Admin User Audit     09/20/23 1515 0.1 mcg/kg/min - 3.024 mL/hr New Bag Intravenous Fellegy, Mattilyn A, APRN CRNA     1656 0.08 mcg/kg/min - 2.419 mL/hr Rate Change Intravenous Fellegy, Mattilyn A, APRN CRNA     1746  Stopped Intravenous Fellegy, Mattilyn A, APRN CRNA     lactated ringers infusion (mL)   Total volume: 1,500 mL Dosing weight: 47.6  Date/Time Rate/Dose/Volume Action Route Admin User Audit    09/20/23 1240  New Bag Intravenous Arsh Patel MD edited    1355 450 mL Anesthesia Volume Adjustment Intravenous Arsh Patel MD     1444 550 mL New Bag Intravenous Arsh Patel MD     1734 500 mL Anesthesia Volume Adjustment Intravenous Fellegy, Mattilyn A, APRN CRNA        Local Meds No   Antibiotics ampicillin/sulbactam (Unasyn) - last given at 1708  gentamicin (Garamycin) - last given at 1219     Pain Patient Currently in Pain: yes   PACU meds  fentanyl (Sublimaze): 50 mcg (total dose) last given at 1905   ondansetron (Zofran): 4 mg (total dose) last given at 1814   prochlorperazine (Compazine): 5 mg (total dose) last given at 1907    PCA / epidural No   Capnography     Telemetry ECG Rhythm: Normal sinus rhythm   Inpatient Telemetry Monitor Ordered? No        Labs Glucose Lab Results   Component Value Date    GLC 98 09/20/2023     03/08/2023     02/06/2018       Hgb Lab Results   Component Value Date    HGB 11.8 08/23/2023    HGB 13.9 02/02/2021       INR Lab Results   Component Value Date    INR 1.05 07/01/2023      PACU Imaging Not applicable     Wound/Incision Wound Ischial tuberosity Pressure injury community acquired Stage 4 (Active)   Number of days: 83       Wound (used by OP [facility] only) 10/14/19 1407 Right pressure injury (Active)   Number of days: 1437       Wound (used by OP I only) 05/09/23 1512 Right medial ischial tuberosity pressure injury (Active)   Number of days: 134       Wound (used by OP I only) 07/20/23 1316 Right lateral ischial tuberosity pressure injury (Active)   Number of days:  62       Wound (used by OP [facility] only) 07/20/23 1316 sacral pressure injury (Active)   Number of days: 62       Incision/Surgical Site 09/20/23 Left;Lower Back (Active)   Incision Assessment UTV 09/20/23 1806   Incision Drainage Amount None 09/20/23 1806   Dressing Intervention Clean, dry, intact 09/20/23 1806   Number of days: 0       Incision/Surgical Site 09/20/23 Left Flank (Active)   Incision Assessment UTV 09/20/23 1806   Tatiana-Incision Assessment UTV 09/20/23 1806   Incision Drainage Amount None 09/20/23 1806   Dressing Intervention Clean, dry, intact 09/20/23 1806   Number of days: 0       Incision/Surgical Site 09/20/23 Bilateral Leg (Active)   Incision Assessment UTV 09/20/23 1806   Tatiana-Incision Assessment UTV 09/20/23 1806   Incision Drainage Amount None 09/20/23 1806   Dressing Intervention Clean, dry, intact 09/20/23 1806   Number of days: 0       Incision/Surgical Site 09/20/23 Posterior;Right Thigh (Active)   Incision Assessment UTV 09/20/23 1806   Tatiana-Incision Assessment UTV 09/20/23 1806   Incision Drainage Amount None 09/20/23 1806   Dressing Intervention Clean, dry, intact 09/20/23 1806   Number of days: 0      CMS        Equipment Not applicable   Other LDA       IV Access Peripheral IV 09/20/23 Right;Posterior Hand (Active)   Site Assessment WDL 09/20/23 1806   Line Status Saline locked 09/20/23 1806   Dressing Transparent 09/20/23 1806   Dressing Status clean;dry;intact 09/20/23 1806   Phlebitis Scale 0-->no symptoms 09/20/23 1806   Infiltration? no 09/20/23 1806   Number of days: 0      Blood Products Not applicable EBL 60 mL   Intake/Output Date 09/20/23 0700 - 09/21/23 0659   Shift 4563-9566 5213-1930 5198-9756 24 Hour Total   INTAKE   I.V. 1000 500  1500   Shift Total(mL/kg) 1000(19.84) 500(9.92)  1500(29.76)   OUTPUT   Urine 250 200  450   Blood  50  50   Shift Total(mL/kg) 250(4.96) 250(4.96)  500(9.92)   Weight (kg) 50.4 50.4 50.4 50.4      Drains / Winston Closed/Suction Drain  Right;Posterior Thigh Bulb 15 Lao (Active)   Site Description UTV 09/20/23 1806   Dressing Status Normal: Clean, Dry & Intact 09/20/23 1806   Drainage Appearance Serosanguenous 09/20/23 1806   Status To bulb suction 09/20/23 1806   Number of days: 0       Nephrostomy 1 Left (Active)   Site Description UTV 09/20/23 1806   Dressing Status Normal: Clean, Dry & Intact 09/20/23 1806   Number of days: 0       Urostomy Vesicostomy RLQ (Active)   Stoma Assessment Ranlo 09/20/23 1806   Peristomal Assessment Intact 09/20/23 1806   Number of days:        Urethral Catheter 09/20/23 Non-latex 14 fr (Active)   Collection Container Patent 09/20/23 1806   Number of days: 0       Ureteral Drain/Stent Left ureter 5 fr (Active)   Number of days: 0      Time of void PreOp Time of Void Prior to Procedure: 1127 (cather in place currently) (09/20/23 1127)    PostOp      Diapered? No   Bladder Scan     PO         Vitals    B/P: 116/87  T: 97  F (36.1  C)    Temp src: Temporal  P:  Pulse: 75 (09/20/23 1900)          R: 15  O2:  SpO2: 98 %    O2 Device: None (Room air) (09/20/23 1900)    Oxygen Delivery: 6 LPM (09/20/23 1806)         Family/support present mother   Patient belongings Motorized wheel chair   Patient transported on bed   DC meds/scripts (obs/outpt) Not applicable   Inpatient Pain Meds Released? Yes       Special needs/considerations Spinal cord injury, quad.   Tasks needing completion None       IGNACIO MURRAY, RN  [facility] [id number]

## 2023-09-21 NOTE — PLAN OF CARE
Goal Outcome Evaluation:      Plan of Care Reviewed With: patient    Overall Patient Progress: no changeOverall Patient Progress: no change         RN care coordinator will follow for discharge planning and home care follow up.
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
## 2023-09-21 NOTE — PROGRESS NOTES
Urology  Progress Note    No acute events overnight  Pain controlled    Exam  /67 (BP Location: Right arm)   Pulse 87   Temp 98.5  F (36.9  C) (Oral)   Resp 16   Wt 50.4 kg (111 lb 1.8 oz)   LMP 06/07/2023   SpO2 99%   BMI 17.94 kg/m    No acute distress  Unlabored breathing  Abdomen soft, nontender, nondistended.   L PNT in place with peach urine  Rogers per cath channel with clear urine in tubing    PNT: 200/NR  Rogers: 500/500    Labs  WBC 12  Hgb 10  Cr 0.41    Stone culture 9/20: 1+ GNBs, further speciation pending    Assessment/Plan  28 year old y/o female POD#1 s/p left PCNL for ureteral and renal stones. Also underwent ischial ulcer debridement with flap closure by plastic surgery (primary team.)    - appreciate excellent cares by plastics and medicine teams  - bactrim to continue through 9/28/23  - order placed to remove rogers from catheterizable channel, resume PTA cath regimen (q3h during day, once at night, daily saline irrigation; orders placed)  - left PNT and ureteral 5 Fr to remain while Ms Cottrell recovers from her surgery. The day prior to discharge, will remove 5 Fr catheter from ureter. Day of discharge will plan methylene blue challenge and PNT removal if dye passes to bladder.    Will discuss with Dr. Schofield.    Mariaelena Simon MD, PGY-5  Urology Resident     Contacting the Urology Team     Please use the following job codes to reach the Urology Team. Note that you must use an in house phone and that job codes cannot receive text pages.   On weekdays, dial [phone] (or star-star-star [phone] on the new [employer] telephones) then [phone] to reach the Adult Urology resident or PA on call  On weekdays, dial [phone] (or star-star-star [phone] on the new [employer] telephones) then [phone] to reach the Pediatric Urology resident  On weeknights and weekends, dial [phone] (or star-star-star [phone] on the new [employer] telephones) then [phone] to reach the Urology resident on call (for both Adult and Pediatrics)

## 2023-09-22 LAB
GLUCOSE BLDC GLUCOMTR-MCNC: 112 MG/DL (ref 70–99)
PATH REPORT.COMMENTS IMP SPEC: NORMAL
PATH REPORT.COMMENTS IMP SPEC: NORMAL
PATH REPORT.FINAL DX SPEC: NORMAL
PATH REPORT.GROSS SPEC: NORMAL
PATH REPORT.MICROSCOPIC SPEC OTHER STN: NORMAL
PATH REPORT.RELEVANT HX SPEC: NORMAL
PHOTO IMAGE: NORMAL

## 2023-09-22 PROCEDURE — 250N000013 HC RX MED GY IP 250 OP 250 PS 637: Performed by: PHYSICIAN ASSISTANT

## 2023-09-22 PROCEDURE — 250N000011 HC RX IP 250 OP 636: Performed by: SURGERY

## 2023-09-22 PROCEDURE — 88307 TISSUE EXAM BY PATHOLOGIST: CPT | Mod: 26 | Performed by: PATHOLOGY

## 2023-09-22 PROCEDURE — 250N000013 HC RX MED GY IP 250 OP 250 PS 637: Performed by: STUDENT IN AN ORGANIZED HEALTH CARE EDUCATION/TRAINING PROGRAM

## 2023-09-22 PROCEDURE — 120N000002 HC R&B MED SURG/OB UMMC

## 2023-09-22 PROCEDURE — 88311 DECALCIFY TISSUE: CPT | Mod: 26 | Performed by: PATHOLOGY

## 2023-09-22 PROCEDURE — 250N000013 HC RX MED GY IP 250 OP 250 PS 637: Performed by: SURGERY

## 2023-09-22 PROCEDURE — 99232 SBSQ HOSP IP/OBS MODERATE 35: CPT | Performed by: INTERNAL MEDICINE

## 2023-09-22 PROCEDURE — 99223 1ST HOSP IP/OBS HIGH 75: CPT | Performed by: STUDENT IN AN ORGANIZED HEALTH CARE EDUCATION/TRAINING PROGRAM

## 2023-09-22 PROCEDURE — G0463 HOSPITAL OUTPT CLINIC VISIT: HCPCS

## 2023-09-22 RX ORDER — CLINDAMYCIN PHOSPHATE 10 UG/ML
LOTION TOPICAL 2 TIMES DAILY
Status: DISCONTINUED | OUTPATIENT
Start: 2023-09-22 | End: 2023-09-26 | Stop reason: HOSPADM

## 2023-09-22 RX ORDER — MICONAZOLE NITRATE 20 MG/G
CREAM TOPICAL 2 TIMES DAILY
Status: DISCONTINUED | OUTPATIENT
Start: 2023-09-22 | End: 2023-09-26 | Stop reason: HOSPADM

## 2023-09-22 RX ADMIN — ONDANSETRON 4 MG: 4 TABLET, ORALLY DISINTEGRATING ORAL at 12:41

## 2023-09-22 RX ADMIN — FLUTICASONE FUROATE AND VILANTEROL TRIFENATATE 1 PUFF: 100; 25 POWDER RESPIRATORY (INHALATION) at 23:08

## 2023-09-22 RX ADMIN — ACETAMINOPHEN 975 MG: 325 TABLET ORAL at 20:45

## 2023-09-22 RX ADMIN — SULFAMETHOXAZOLE AND TRIMETHOPRIM 1 TABLET: 800; 160 TABLET ORAL at 10:25

## 2023-09-22 RX ADMIN — GABAPENTIN 300 MG: 300 CAPSULE ORAL at 23:08

## 2023-09-22 RX ADMIN — MICONAZOLE NITRATE: 20 CREAM TOPICAL at 10:46

## 2023-09-22 RX ADMIN — RIVAROXABAN 10 MG: 10 TABLET, FILM COATED ORAL at 17:20

## 2023-09-22 RX ADMIN — BACLOFEN 30 MG: 10 TABLET ORAL at 14:07

## 2023-09-22 RX ADMIN — OXYBUTYNIN CHLORIDE 5 MG: 5 TABLET, EXTENDED RELEASE ORAL at 20:45

## 2023-09-22 RX ADMIN — MIDODRINE HYDROCHLORIDE 10 MG: 5 TABLET ORAL at 17:20

## 2023-09-22 RX ADMIN — SULFAMETHOXAZOLE AND TRIMETHOPRIM 1 TABLET: 800; 160 TABLET ORAL at 20:45

## 2023-09-22 RX ADMIN — CLINDAMYCIN PHOSPHATE: 10 LOTION TOPICAL at 10:46

## 2023-09-22 RX ADMIN — ACETAMINOPHEN 975 MG: 325 TABLET ORAL at 14:07

## 2023-09-22 RX ADMIN — SENNOSIDES AND DOCUSATE SODIUM 1 TABLET: 50; 8.6 TABLET ORAL at 10:25

## 2023-09-22 RX ADMIN — BACLOFEN 30 MG: 10 TABLET ORAL at 20:45

## 2023-09-22 RX ADMIN — BISACODYL 10 MG: 10 SUPPOSITORY RECTAL at 21:01

## 2023-09-22 RX ADMIN — ACETAMINOPHEN 975 MG: 325 TABLET ORAL at 06:20

## 2023-09-22 RX ADMIN — BACLOFEN 30 MG: 10 TABLET ORAL at 10:25

## 2023-09-22 RX ADMIN — SENNOSIDES AND DOCUSATE SODIUM 1 TABLET: 50; 8.6 TABLET ORAL at 20:45

## 2023-09-22 RX ADMIN — MIDODRINE HYDROCHLORIDE 10 MG: 5 TABLET ORAL at 10:45

## 2023-09-22 RX ADMIN — SERTRALINE HYDROCHLORIDE 150 MG: 100 TABLET ORAL at 10:25

## 2023-09-22 ASSESSMENT — ACTIVITIES OF DAILY LIVING (ADL)
ADLS_ACUITY_SCORE: 46
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
## 2023-09-22 NOTE — PROGRESS NOTES
Care Management Follow Up    Length of Stay (days): 2    Expected Discharge Date: 09/26/2023     Concerns to be Addressed:       Patient plan of care discussed at interdisciplinary rounds: Yes    Anticipated Discharge Disposition:  Home     Anticipated Discharge Services:  Home Infusion  Anticipated Discharge DME:  None    Education Provided on the Discharge Plan:  Transportation to home and future appointments  Patient/Family in Agreement with the Plan:  Yes    Referrals Placed by CM/SW:  Unable to complete IV ABX benefits check because waiting for bone cultures.  Private pay costs discussed: Not applicable    Additional Information:    Verified with Ruthie Mccormick PA-C with Plastics that the plan is to discharge home, no sitting, will need stretcher rides, and possible IV ABX pending bone cultures.    12:20: Updated Sergo with [facility] ([phone]) with discharge disposition. She also disclosed that patient uses extended hours PCA services through [facility] ([phone]) and a meal delivery service through .[employer] [facility] ([phone]).        Kristina Cottrell, MSN, APRN, AGCNS-BC - RN Care Coordinator  Training MB5 - [phone]    SEARCHABLE in [facility] - search CARE COORDINATOR     [place] ([phone]) Saturday & Sunday; ([phone]) [facility] Recognized Holidays     Units: 5A, 5B & 5C  Pager: [phone]    Units: 6B, 6C & 6D    Pager: [phone]    Units: 7A, 7B, 7C & 7D    Pager: [phone]    Units: 6A & ICU   Pager: [phone]    Units: 5 Ortho, 5MS & WB ED Pager: [phone]    Units: 6MS, 8A & 10 ICU  Pager [phone]

## 2023-09-22 NOTE — PROGRESS NOTES
Plastic Surgery Progress Note  Attending: Dr. Zacarias    Doing ok. NAEON.     Temp:  [99.2  F (37.3  C)-99.5  F (37.5  C)] 99.5  F (37.5  C)  Pulse:  [84-87] 87  Resp:  [18] 18  BP: (100-110)/(72-74) 100/72  SpO2:  [95 %-98 %] 98 %  I/O last 3 completed shifts:  In: -   Out: 2945 [Urine:2900; Drains:45]  General: NAD  Resp: NLB   Abdomen: soft, nondistended. L perc nephrostomy tube in place.   Extremities: R posterior thigh flap warm, soft, pink. Incision c/d/I. Rash to buttock area stable. No open areas, no significant swelling or ecchymosis. ROSANNE with dark serosang fluid.     ROSANNE 45ml yesterday    ASSESSMENT: 28 year old female POD #2 R IT debridement with posterior thigh fasciocutaneous flap 9/20/23. healing as anticipated.     PLAN:   -dressing changed today. Start daily dressing changes with nursing tomorrow.   -bedrest, avoid pressure on flap. Ok for supine and L lateral. Q2 hr turn while awake, q4 overnight  -WAVE mattress  -strip, empty and record ROSANNE output  -advance diet  -anticoagulation: 10mg xarelto x7 days per prior heme recs  -follow up intra op bone cultures  -bactrim course per urology  -rogers per urology  -appreciate medicine comanagement  -follow up derm recs for rash- start econazole and clindamycin topical  -dispo: will recover at home. Social work/care coordination for dispo planning, will need stretcher ride, no sitting. Possible IV antibiotics.     Ruthie Mccormick PA-C  Plastic and Reconstructive Surgery   or plastic surgery [place] [facility] [place]

## 2023-09-22 NOTE — PROGRESS NOTES
VS: BP 91/62   Pulse 87   Temp 99.5  F (37.5  C) (Oral)   Resp 18   Wt 50.4 kg (111 lb 1.8 oz)   LMP 06/07/2023   SpO2 97%   BMI 17.94 kg/m     O2: RA, denies SOB   Output: Straight cath, last completed @ 1050. Writer asked pt @ 1400 med pass to complete next straight cath, pt requested next cath to be competed by oncoming shift.    Last BM: 9/21 per report   Activity: Quadriplegic, limited motion with BUE. Pt remained in bed during shift with repositioning   Skin: Surgical incision to R posterior thigh, pressure wound to bottom   Pain: Denies              CMS: A&Ox4, denies chest pain and dizziness. Endorses n/v, PRN PO Zofran administered with relief   Dressing: Dressing to posterior thigh changed by MD, dressing to pressure wound c/d/i, dressing to ROSANNE c/d/i   Diet: Regular with assistance   LDA: ROSANNE drain to R posterior thigh incision, R PIV SL, L nephrostomy intact draining to gravity   Equipment: IV pole, personal belongings, straight cath materials   Plan: Call light in reach, continue POC    Pending ID clearance before discharge    Additional Info:

## 2023-09-22 NOTE — PLAN OF CARE
Patient is alert and oriented x 4, able to make needs and wants known.  LBM 09/21/2023  Wound care done to Right thigh wound flap this morning by MD  Requires repositioning and x1 feeding assist   Pending ID clearance before discharge  ROSANNE drain In place to right thigh wound, left nephrostomy tube intact draining by gravity  Straight cath done 1050 am  - collected 300 ml

## 2023-09-22 NOTE — PLAN OF CARE
Goal Outcome Evaluation:  VS:    O2: Sating >90% on RA. Lung sounds clear. Denies chest pain and SOB.   Output: Voids spontaneously and adequately. Straight X1   Last BM: Bowel sounds active x4. Passing flatus.    Activity: Up with 1 assist, Reposition Q2 hours   Skin: wound Flap   Pain: Denies    CMS: A&Ox4. Denies N/T.    Dressing: Dressing to R hip C/D/I.   Diet: Regular. Appetite was good. . Denies N/V.    LDA: PIV to R is S/L.    Equipment: IV pole, and personal belongings. Call light within reach and uses appropriately.   Plan:  TBD   Additional Info: Straight Cath X1,Continue
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
## 2023-09-22 NOTE — PLAN OF CARE
Turned and reposition every two hrs. Declined eating dinner, drinking fluids. Drsg CDI to right flap. ROSANNE draining sanguineous drainage. ISC every three hrs. Nephrostomy in place, draining clear, yellow urine. Denies pain or discomfort. Had medium, hard BM after suppository.

## 2023-09-23 LAB
APPEARANCE STONE: NORMAL
BACTERIA BONE ANAEROBE+AEROBE CULT: ABNORMAL
BACTERIA BONE ANAEROBE+AEROBE CULT: ABNORMAL
COMPN STONE: NORMAL
SPECIMEN WT: 109 MG

## 2023-09-23 PROCEDURE — 250N000013 HC RX MED GY IP 250 OP 250 PS 637: Performed by: PHYSICIAN ASSISTANT

## 2023-09-23 PROCEDURE — 250N000013 HC RX MED GY IP 250 OP 250 PS 637: Performed by: STUDENT IN AN ORGANIZED HEALTH CARE EDUCATION/TRAINING PROGRAM

## 2023-09-23 PROCEDURE — 258N000003 HC RX IP 258 OP 636: Performed by: STUDENT IN AN ORGANIZED HEALTH CARE EDUCATION/TRAINING PROGRAM

## 2023-09-23 PROCEDURE — 250N000013 HC RX MED GY IP 250 OP 250 PS 637: Performed by: SURGERY

## 2023-09-23 PROCEDURE — 250N000011 HC RX IP 250 OP 636: Mod: JZ | Performed by: SURGERY

## 2023-09-23 PROCEDURE — 250N000011 HC RX IP 250 OP 636: Performed by: STUDENT IN AN ORGANIZED HEALTH CARE EDUCATION/TRAINING PROGRAM

## 2023-09-23 PROCEDURE — 120N000002 HC R&B MED SURG/OB UMMC

## 2023-09-23 RX ORDER — AMPICILLIN AND SULBACTAM 2; 1 G/1; G/1
3 INJECTION, POWDER, FOR SOLUTION INTRAMUSCULAR; INTRAVENOUS EVERY 6 HOURS
Status: DISCONTINUED | OUTPATIENT
Start: 2023-09-23 | End: 2023-09-26 | Stop reason: HOSPADM

## 2023-09-23 RX ORDER — FLUCONAZOLE 200 MG/1
400 TABLET ORAL DAILY
Status: DISCONTINUED | OUTPATIENT
Start: 2023-09-23 | End: 2023-09-26 | Stop reason: HOSPADM

## 2023-09-23 RX ORDER — LIDOCAINE 40 MG/G
CREAM TOPICAL
Status: ACTIVE | OUTPATIENT
Start: 2023-09-23 | End: 2023-09-26

## 2023-09-23 RX ADMIN — ACETAMINOPHEN 650 MG: 325 TABLET ORAL at 15:08

## 2023-09-23 RX ADMIN — MICONAZOLE NITRATE: 20 CREAM TOPICAL at 16:29

## 2023-09-23 RX ADMIN — CLINDAMYCIN PHOSPHATE: 10 LOTION TOPICAL at 11:27

## 2023-09-23 RX ADMIN — SENNOSIDES AND DOCUSATE SODIUM 1 TABLET: 50; 8.6 TABLET ORAL at 11:00

## 2023-09-23 RX ADMIN — MICONAZOLE NITRATE: 20 CREAM TOPICAL at 22:31

## 2023-09-23 RX ADMIN — BACLOFEN 30 MG: 10 TABLET ORAL at 11:01

## 2023-09-23 RX ADMIN — VANCOMYCIN HYDROCHLORIDE 750 MG: 1 INJECTION, POWDER, LYOPHILIZED, FOR SOLUTION INTRAVENOUS at 16:28

## 2023-09-23 RX ADMIN — CLINDAMYCIN PHOSPHATE: 10 LOTION TOPICAL at 22:56

## 2023-09-23 RX ADMIN — FLUCONAZOLE 400 MG: 200 TABLET ORAL at 15:10

## 2023-09-23 RX ADMIN — SULFAMETHOXAZOLE AND TRIMETHOPRIM 1 TABLET: 800; 160 TABLET ORAL at 20:06

## 2023-09-23 RX ADMIN — SULFAMETHOXAZOLE AND TRIMETHOPRIM 1 TABLET: 800; 160 TABLET ORAL at 11:00

## 2023-09-23 RX ADMIN — ACETAMINOPHEN 975 MG: 325 TABLET ORAL at 13:32

## 2023-09-23 RX ADMIN — OXYBUTYNIN CHLORIDE 5 MG: 5 TABLET, EXTENDED RELEASE ORAL at 20:06

## 2023-09-23 RX ADMIN — RIVAROXABAN 10 MG: 10 TABLET, FILM COATED ORAL at 17:09

## 2023-09-23 RX ADMIN — BACLOFEN 30 MG: 10 TABLET ORAL at 20:06

## 2023-09-23 RX ADMIN — BACLOFEN 30 MG: 10 TABLET ORAL at 15:08

## 2023-09-23 RX ADMIN — MIDODRINE HYDROCHLORIDE 10 MG: 5 TABLET ORAL at 17:09

## 2023-09-23 RX ADMIN — SENNOSIDES AND DOCUSATE SODIUM 1 TABLET: 50; 8.6 TABLET ORAL at 20:05

## 2023-09-23 RX ADMIN — GABAPENTIN 300 MG: 300 CAPSULE ORAL at 22:29

## 2023-09-23 RX ADMIN — ONDANSETRON 4 MG: 2 INJECTION INTRAMUSCULAR; INTRAVENOUS at 13:32

## 2023-09-23 RX ADMIN — FLUTICASONE FUROATE AND VILANTEROL TRIFENATATE 1 PUFF: 100; 25 POWDER RESPIRATORY (INHALATION) at 22:30

## 2023-09-23 RX ADMIN — SERTRALINE HYDROCHLORIDE 150 MG: 100 TABLET ORAL at 11:01

## 2023-09-23 RX ADMIN — AMPICILLIN SODIUM AND SULBACTAM SODIUM 3 G: 2; 1 INJECTION, POWDER, FOR SOLUTION INTRAMUSCULAR; INTRAVENOUS at 13:33

## 2023-09-23 RX ADMIN — MIDODRINE HYDROCHLORIDE 10 MG: 5 TABLET ORAL at 11:01

## 2023-09-23 RX ADMIN — AMPICILLIN SODIUM AND SULBACTAM SODIUM 3 G: 2; 1 INJECTION, POWDER, FOR SOLUTION INTRAMUSCULAR; INTRAVENOUS at 18:49

## 2023-09-23 ASSESSMENT — ACTIVITIES OF DAILY LIVING (ADL)
ADLS_ACUITY_SCORE: 46
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
## 2023-09-23 NOTE — PROGRESS NOTES
Plastic Surgery Progress Note  Attending: Dr. Rell RANGEL. Diet going well without n/v. Denies issues with urination/bowel movements. Pain well controlled. Planning to discuss IV abx and discharge plan with ID.    Temp:  [98.5  F (36.9  C)-100.2  F (37.9  C)] 99.3  F (37.4  C)  Pulse:  [] 96  Resp:  [16] 16  BP: ()/(51-84) 102/60  SpO2:  [93 %-95 %] 94 %  I/O last 3 completed shifts:  In: -   Out: 1070 [Urine:1045; Drains:25]  General: NAD  Resp: NLB   Abdomen: soft, nondistended. L perc nephrostomy tube in place.   Extremities: R posterior thigh flap warm, soft, pink. Incision c/d/I. Rash to buttock area stable. No open areas, no significant swelling or ecchymosis. ROSANNE with dark serosang fluid.     ROSANNE 45ml yesterday    ASSESSMENT: 28 year old female POD #3 R IT debridement with posterior thigh fasciocutaneous flap 9/20/23. healing as anticipated.     PLAN:   -Daily dressing changes.  -bedrest, avoid pressure on flap. Ok for supine and L lateral. Q2 hr turn while awake, q4 overnight  -WAVE mattress  -strip, empty and record ROSANNE output  -Regular diet  -anticoagulation: 10mg xarelto x7 days per prior heme recs  -Continue to follow intra op bone cultures  -Antibiotics (vancomycin and Unasyn) per ID recommendations  -rogers per urology  -appreciate medicine comanagement  -follow up derm recs for rash- start econazole and clindamycin topical  -dispo: will recover at home. Social work/care coordination for dispo planning, will need stretcher ride, no sitting. PICC line for anticipated IV antibiotics.     Juventino Orellana MD  Plastic and Reconstructive Surgery PGY2

## 2023-09-23 NOTE — PHARMACY-VANCOMYCIN DOSING SERVICE
Pharmacy Vancomycin Initial Note  Date of Service 2023  Patient's  1995  28 year old, female    Indication: Osteomyelitis    Current estimated CrCl = Estimated Creatinine Clearance: 159.6 mL/min (A) (based on SCr of 0.41 mg/dL (L)).    Creatinine for last 3 days  2023:  6:33 AM Creatinine 0.41 mg/dL    Recent Vancomycin Level(s) for last 3 days  No results found for requested labs within last 3 days.      Vancomycin IV Administrations (past 72 hours)        No vancomycin orders with administrations in past 72 hours.                    Nephrotoxins and other renal medications (From now, onward)      Start     Dose/Rate Route Frequency Ordered Stop    23 1230  vancomycin (VANCOCIN) 750 mg in sodium chloride 0.9 % 250 mL intermittent infusion         750 mg  over 90 Minutes Intravenous EVERY 12 HOURS 23 1146      23 1200  ampicillin-sulbactam (UNASYN) 3 g vial to attach to  mL bag         3 g  over 15-30 Minutes Intravenous EVERY 6 HOURS 23 1139              Contrast Orders - past 72 hours (72h ago, onward)      Start     Dose/Rate Route Frequency Stop    23 1455  iothalamate meglumine (CONRAY) 60 % injection  Status:  Discontinued           PRN 23 1806                  Plan:  Start vancomycin  750 mg IV q12h (previous dosing regimen back in 2023). RxInsight overestimate pt's kidney function since pt is quadriplegic. Will use Rx insight once a vancomycin level is available.   Vancomycin monitoring method: AUC  Vancomycin therapeutic monitoring goal: 400-600 mg*h/L  Pharmacy will check vancomycin levels as appropriate in 1-3 Days.    Serum creatinine levels will be ordered daily for the first week of therapy and at least twice weekly for subsequent weeks.      Marlon Little [facility]

## 2023-09-23 NOTE — PLAN OF CARE
Problem: Plan of Care - These are the overarching goals to be used throughout the patient stay.    Goal: Plan of Care Review  Description: The Plan of Care Review/Shift note should be completed every shift.  The Outcome Evaluation is a brief statement about your assessment that the patient is improving, declining, or no change.  This information will be displayed automatically on your shift note.  9/23/2023 1736 by Delisa Tapia, RN  Outcome: Progressing  Flowsheets (Taken 9/23/2023 1736)  Outcome Evaluation: Pt stable, started IV antibiotics and PO diflucan  Plan of Care Reviewed With: patient  Overall Patient Progress: no change  9/23/2023 0759 by Delisa Tapia, RN  Outcome: Progressing  Flowsheets (Taken 9/22/2023 1900)  Plan of Care Reviewed With: patient  Overall Patient Progress: no change   Goal Outcome Evaluation:      Plan of Care Reviewed With: patient    Overall Patient Progress: no changeOverall Patient Progress: no change    Outcome Evaluation: Pt stable, started IV antibiotics and PO diflucan    Pt alert and oriented x4, denied pain, dressing to graft site changed, creams applied to rash, straight cathed x2, irrigated with 300 mls sterile water with 500 output, small clots present. Turned and repositioned q 3 hr per pt request. Pt ate 100% lunch, slept in late skipped breakfast, supplement use encouraged for healing. Neph tube draining reddish/brown, patent, dressing CDI. Calls appropriately. VSS. Needs PICC preferred Sunday. Monday at latest. Right PIV patent running 1st dose vanco. Continue POC.

## 2023-09-23 NOTE — PROGRESS NOTES
Patient has PICC order for long term antibiotic and plan to discharge home with home infusion therapy service either on Sunday or Monday. Her PIV is still working fine, so Vascular access service will plan to place PICC before discharge.

## 2023-09-23 NOTE — PLAN OF CARE
Goal Outcome Evaluation:      Plan of Care Reviewed With: patient    Overall Patient Progress: no changeOverall Patient Progress: no change           Problem: Plan of Care - These are the overarching goals to be used throughout the patient stay.    Goal: Plan of Care Review  Description: The Plan of Care Review/Shift note should be completed every shift.  The Outcome Evaluation is a brief statement about your assessment that the patient is improving, declining, or no change.  This information will be displayed automatically on your shift note.  Outcome: Progressing  Flowsheets (Taken 9/22/2023 1900)  Plan of Care Reviewed With: patient  Overall Patient Progress: no change     Pt alert and oriented x4, denied pain, dressing CDI, straight cathed 100 mls. Ate 100% dinner. On RA. Low BP, gave midodrine, resolved. Wants suppository this evening. Turned and repositioned q 2 hr. Able to make needs known, continue POC

## 2023-09-23 NOTE — PLAN OF CARE
Goal Outcome Evaluation:      Plan of Care Reviewed With: patient    Overall Patient Progress: no changeOverall Patient Progress: no change     BP 96/61 (BP Location: Right arm)   Pulse 86   Temp 98.5  F (36.9  C) (Oral)   Resp 16   Wt 50.4 kg (111 lb 1.8 oz)   LMP 06/07/2023   SpO2 94%   BMI 17.94 kg/m      Mitrofanoff straight cathed x2. Irrigated x1. Refused straight cath in morning prior to shift exchange.     Suppository given - no BM. Dig stim performed and LBM 9/23.            138

## 2023-09-23 NOTE — OP NOTE
OPERATIVE NOTE    DATE OF PROCEDURE: 9/20/2023  ATTENDING SURGEON: Fred Zacarias MD  RESIDENT SURGEON: Dayday Benson, PGY 7  ASSISTANT(S): None  PREOP DIAGNOSIS: Stage IV right ischial decubitus with possible underlying osteomyelitis  POSTOP DIAGNOSIS: Same  PROCEDURE: Sharp excisional debridement of right ischial decubitus.  Posterior thigh fasciocutaneous VY rotation advancement flap.  ANESTHESIA: GETA  EBL: 50 mL  IVFS: 1500 mL  UO: 450 mL  COUNTS: Correct  COMPLICATIONS: None  DRAINS: #15 [place] channel x1  SPECIMENS: Right ischial bone biopsies for cultures and pathology    INDICATIONS:  This is a 28 year old incomplete quadriplegic female with a diagnosis of stage IV right ischial decubitus with probable osteomyelitis.  The patient had undergone previous debridement and antibiotic therapy but failed to go to complete healing by secondary intention.  The decision was made to bring her back to the OR for additional debridement and definitive flap closure over the right ischial tuberosity.    PROCEDURE:  The patient was seen in the preop waiting area. The operative site was marked either on the patient or on the anatomical diagram. Informed consent was obtained after reviewing the possible risks and complications, including but not limited to the following: infection, bleeding, hematoma/seroma formation,  poor healing, dehiscence, spitting sutures, hypertrophic or keloid scars, injury to surrounding musculoskeletal and neurovascular structures, including colorectal and urologic structures,  residual deformities or asymmetries, need for further surgery, altered sensation of the surgical area, anesthetic risks such as DVT, PE, cardiopulmonary arrest.  The patient understands there is also a possibility of partial or complete flap necrosis.    The patient was then brought to the operating room. Anesthesia was administered with GETA. The patient was on the table in a prone position.  Of note, the patient was  already in position after her urologic procedure with Dr. Schofield.  Please see his notes for description of the first portion of her case.  We did add an additional pillow underneath her hips to lessen the lower back sway.  The surgical site was prepped and draped in the usual sterile fashion using PCMX. Preop photos were taken. A time out was taken and the proper patient and procedure were identified.    Methylene blue was used to stain the right ischial wound.  Of note, there was not much depth or undermining.  There was however, moderate adherence of the new skin scar to the underlying bony prominence.  This adherent scar was excised along with the wound, sharply with scalpel and cautery.  Skin, subcutaneous fat, muscle was excised down to the bone.  The bony base was debrided sharply with a curette and rongeur.  The wound was then irrigated with copious saline before harvesting multiple bone biopsies with a rongeur.  These specimens were sent for pathology to look for signs of acute or chronic osteomyelitis, as well as cultures including aerobic, anaerobic and fungal.  The Pulsavac was then used with Vashe solution.  Hemostasis was achieved with the cautery.  The Doppler was used to localize the perforators and the descending branch of the inferior gluteal artery as the vascular pedicle for our proposed posterior thigh fasciocutaneous flap.  A VY flap was designed and incised sharply with 10 blade.  This was further developed through the subcu fat, down to and through the posterior thigh fascia, taking care to bevel away from the skin paddle in order to preserve any additional perforators.  The flap was then elevated and this subfascial plane until it was mobilized enough to rotate into the ischial defect.  Some additional undermining of the upper wall of the wound was done to facilitate flap inset.  A small portion of the proximal flap was de-epithelialized sharply.    The flap was then anchored with 2-0 Vicryl  figure-of-eight sutures at the deepest level of the wound defect.  Additional layers of closure were done in a similar fashion.  Deep dermal reapproximation was done with 2-0 and 3-0 Vicryl buried sutures, followed by 3-0 Prolene vertical mattress sutures for skin closure.  The donor site was closed in a VY fashion distally, and the flap was anchored throughout its length with interrupted 2-0 Vicryl sutures at the fascial layer advancing proximally to minimize tension on the closure.  The donor site was further closed with 3-0 Vicryl deep dermal buried sutures and ultimately skin staples.  Just prior to our initial closure, the #15 round channel drain was introduced through a separate stab wound incision laterally and distally.  This was secured with a 3-0 nylon suture.  The drain was placed up over the IT.  After closure was completed, the tubing was trimmed and put to bulb suction.  Throughout the procedure, Doppler signals continued to be strong.    We then dressed our incisions with Adaptic strips, ABDs, and Medipore tape.  The patient was then returned to a supine position on a Wave mattress.  She was extubated in the operating room, and transferred to the recovery room in satisfactory condition having tolerated the procedure without difficulty or complication.  Throughout our procedure and transfer, her urologic stents and drains were also preserved.  There were no areas of pressure injury noted.

## 2023-09-24 LAB
ANION GAP SERPL CALCULATED.3IONS-SCNC: 10 MMOL/L (ref 7–15)
BACTERIA SPEC CULT: ABNORMAL
BUN SERPL-MCNC: 7 MG/DL (ref 6–20)
CALCIUM SERPL-MCNC: 8.7 MG/DL (ref 8.6–10)
CHLORIDE SERPL-SCNC: 107 MMOL/L (ref 98–107)
CREAT SERPL-MCNC: 0.46 MG/DL (ref 0.51–0.95)
DEPRECATED HCO3 PLAS-SCNC: 23 MMOL/L (ref 22–29)
EGFRCR SERPLBLD CKD-EPI 2021: >90 ML/MIN/1.73M2
ERYTHROCYTE [DISTWIDTH] IN BLOOD BY AUTOMATED COUNT: 13.3 % (ref 10–15)
GLUCOSE SERPL-MCNC: 88 MG/DL (ref 70–99)
HCT VFR BLD AUTO: 30.9 % (ref 35–47)
HGB BLD-MCNC: 9.7 G/DL (ref 11.7–15.7)
MCH RBC QN AUTO: 27.8 PG (ref 26.5–33)
MCHC RBC AUTO-ENTMCNC: 31.4 G/DL (ref 31.5–36.5)
MCV RBC AUTO: 89 FL (ref 78–100)
PLATELET # BLD AUTO: 296 10E3/UL (ref 150–450)
POTASSIUM SERPL-SCNC: 4.1 MMOL/L (ref 3.4–5.3)
RBC # BLD AUTO: 3.49 10E6/UL (ref 3.8–5.2)
SODIUM SERPL-SCNC: 140 MMOL/L (ref 136–145)
WBC # BLD AUTO: 9.2 10E3/UL (ref 4–11)

## 2023-09-24 PROCEDURE — 250N000013 HC RX MED GY IP 250 OP 250 PS 637: Performed by: SURGERY

## 2023-09-24 PROCEDURE — 99232 SBSQ HOSP IP/OBS MODERATE 35: CPT | Performed by: INTERNAL MEDICINE

## 2023-09-24 PROCEDURE — 250N000013 HC RX MED GY IP 250 OP 250 PS 637: Performed by: STUDENT IN AN ORGANIZED HEALTH CARE EDUCATION/TRAINING PROGRAM

## 2023-09-24 PROCEDURE — 99207 PR CDG-CUT & PASTE-POTENTIAL IMPACT ON LEVEL: CPT | Performed by: INTERNAL MEDICINE

## 2023-09-24 PROCEDURE — 250N000011 HC RX IP 250 OP 636: Performed by: STUDENT IN AN ORGANIZED HEALTH CARE EDUCATION/TRAINING PROGRAM

## 2023-09-24 PROCEDURE — 250N000011 HC RX IP 250 OP 636: Performed by: SURGERY

## 2023-09-24 PROCEDURE — 85027 COMPLETE CBC AUTOMATED: CPT | Performed by: STUDENT IN AN ORGANIZED HEALTH CARE EDUCATION/TRAINING PROGRAM

## 2023-09-24 PROCEDURE — 36415 COLL VENOUS BLD VENIPUNCTURE: CPT | Performed by: STUDENT IN AN ORGANIZED HEALTH CARE EDUCATION/TRAINING PROGRAM

## 2023-09-24 PROCEDURE — 258N000003 HC RX IP 258 OP 636: Performed by: STUDENT IN AN ORGANIZED HEALTH CARE EDUCATION/TRAINING PROGRAM

## 2023-09-24 PROCEDURE — 120N000002 HC R&B MED SURG/OB UMMC

## 2023-09-24 PROCEDURE — 80048 BASIC METABOLIC PNL TOTAL CA: CPT | Performed by: STUDENT IN AN ORGANIZED HEALTH CARE EDUCATION/TRAINING PROGRAM

## 2023-09-24 PROCEDURE — 250N000013 HC RX MED GY IP 250 OP 250 PS 637: Performed by: PHYSICIAN ASSISTANT

## 2023-09-24 RX ADMIN — BACLOFEN 30 MG: 10 TABLET ORAL at 13:56

## 2023-09-24 RX ADMIN — AMPICILLIN SODIUM AND SULBACTAM SODIUM 3 G: 2; 1 INJECTION, POWDER, FOR SOLUTION INTRAMUSCULAR; INTRAVENOUS at 02:32

## 2023-09-24 RX ADMIN — SENNOSIDES AND DOCUSATE SODIUM 1 TABLET: 50; 8.6 TABLET ORAL at 08:49

## 2023-09-24 RX ADMIN — VANCOMYCIN HYDROCHLORIDE 750 MG: 1 INJECTION, POWDER, LYOPHILIZED, FOR SOLUTION INTRAVENOUS at 13:28

## 2023-09-24 RX ADMIN — CLINDAMYCIN PHOSPHATE: 10 LOTION TOPICAL at 08:50

## 2023-09-24 RX ADMIN — OXYBUTYNIN CHLORIDE 5 MG: 5 TABLET, EXTENDED RELEASE ORAL at 20:09

## 2023-09-24 RX ADMIN — AMPICILLIN SODIUM AND SULBACTAM SODIUM 3 G: 2; 1 INJECTION, POWDER, FOR SOLUTION INTRAMUSCULAR; INTRAVENOUS at 06:46

## 2023-09-24 RX ADMIN — ONDANSETRON 4 MG: 4 TABLET, ORALLY DISINTEGRATING ORAL at 11:26

## 2023-09-24 RX ADMIN — MIDODRINE HYDROCHLORIDE 10 MG: 5 TABLET ORAL at 08:49

## 2023-09-24 RX ADMIN — BISACODYL 10 MG: 10 SUPPOSITORY RECTAL at 20:09

## 2023-09-24 RX ADMIN — SENNOSIDES AND DOCUSATE SODIUM 1 TABLET: 50; 8.6 TABLET ORAL at 20:08

## 2023-09-24 RX ADMIN — SULFAMETHOXAZOLE AND TRIMETHOPRIM 1 TABLET: 800; 160 TABLET ORAL at 20:09

## 2023-09-24 RX ADMIN — AMPICILLIN SODIUM AND SULBACTAM SODIUM 3 G: 2; 1 INJECTION, POWDER, FOR SOLUTION INTRAMUSCULAR; INTRAVENOUS at 11:27

## 2023-09-24 RX ADMIN — SERTRALINE HYDROCHLORIDE 150 MG: 100 TABLET ORAL at 08:48

## 2023-09-24 RX ADMIN — MICONAZOLE NITRATE: 20 CREAM TOPICAL at 13:31

## 2023-09-24 RX ADMIN — GABAPENTIN 300 MG: 300 CAPSULE ORAL at 22:18

## 2023-09-24 RX ADMIN — RIVAROXABAN 10 MG: 10 TABLET, FILM COATED ORAL at 17:53

## 2023-09-24 RX ADMIN — MIDODRINE HYDROCHLORIDE 10 MG: 5 TABLET ORAL at 18:51

## 2023-09-24 RX ADMIN — SULFAMETHOXAZOLE AND TRIMETHOPRIM 1 TABLET: 800; 160 TABLET ORAL at 08:49

## 2023-09-24 RX ADMIN — MICONAZOLE NITRATE: 20 CREAM TOPICAL at 22:19

## 2023-09-24 RX ADMIN — BACLOFEN 30 MG: 10 TABLET ORAL at 20:08

## 2023-09-24 RX ADMIN — BACLOFEN 30 MG: 10 TABLET ORAL at 08:47

## 2023-09-24 RX ADMIN — VANCOMYCIN HYDROCHLORIDE 750 MG: 1 INJECTION, POWDER, LYOPHILIZED, FOR SOLUTION INTRAVENOUS at 00:39

## 2023-09-24 RX ADMIN — AMPICILLIN SODIUM AND SULBACTAM SODIUM 3 G: 2; 1 INJECTION, POWDER, FOR SOLUTION INTRAMUSCULAR; INTRAVENOUS at 17:53

## 2023-09-24 RX ADMIN — AMPICILLIN SODIUM AND SULBACTAM SODIUM 3 G: 2; 1 INJECTION, POWDER, FOR SOLUTION INTRAMUSCULAR; INTRAVENOUS at 23:15

## 2023-09-24 RX ADMIN — CLINDAMYCIN PHOSPHATE: 10 LOTION TOPICAL at 20:18

## 2023-09-24 RX ADMIN — FLUTICASONE FUROATE AND VILANTEROL TRIFENATATE 1 PUFF: 100; 25 POWDER RESPIRATORY (INHALATION) at 22:18

## 2023-09-24 RX ADMIN — FLUCONAZOLE 400 MG: 200 TABLET ORAL at 08:48

## 2023-09-24 ASSESSMENT — ACTIVITIES OF DAILY LIVING (ADL)
ADLS_ACUITY_SCORE: 46

## 2023-09-24 NOTE — PROGRESS NOTES
Plastic Surgery Progress Note  Attending: Dr. Rell RANGEL. Happy with progress, but ready for discharge. Diet going well without n/v. Denies issues with urination/bowel movements. Pain well controlled. Planning to discuss IV abx and discharge plan/timing with ID.    Temp:  [97.9  F (36.6  C)-99.1  F (37.3  C)] 97.9  F (36.6  C)  Pulse:  [63-95] 63  Resp:  [16-18] 16  BP: ()/(66-88) 132/88  SpO2:  [94 %-98 %] 95 %  I/O last 3 completed shifts:  In: 240 [P.O.:240]  Out: 2289 [Urine:2225; Drains:64]  General: NAD  Resp: NLB   Abdomen: soft, nondistended. L perc nephrostomy tube in place.   Extremities: R posterior thigh flap warm, soft, pink. Incision c/d/I. Rash to buttock area stable. No open areas, no significant swelling or ecchymosis. ROSANNE with dark serosang fluid.     ROSANNE 60ml yesterday    ASSESSMENT: 28 year old female POD #4 R IT debridement with posterior thigh fasciocutaneous flap 9/20/23. healing as anticipated.     PLAN:   -Daily dressing changes.  -bedrest, avoid pressure on flap. Ok for supine and L lateral. Q2 hr turn while awake, q4 overnight  -WAVE mattress  -strip, empty and record ROSANNE output  -Regular diet  -anticoagulation: 10mg xarelto x7 days per prior heme recs  -Continue to follow intra op bone cultures  -Antibiotics (vancomycin and Unasyn) per ID recommendations; Growth of staph epi, citrobacter, granulicatella, and candida on cultures.  -rogers per urology  -appreciate medicine comanagement  -follow up derm recs for rash- start econazole and clindamycin topical  -dispo: will recover at home. Social work/care coordination for dispo planning, will need stretcher ride, no sitting. PICC line at discharge for anticipated IV antibiotics.     Juventino Orellana MD  Plastic and Reconstructive Surgery PGY2

## 2023-09-24 NOTE — PLAN OF CARE
VS: Temp: 98.9  F (37.2  C) Temp src: Oral BP: 98/66 Pulse: 85   Resp: 16 SpO2: 98 % O2 Device: None (Room air)      O2: Stable >90% on room air, denies SOB and chest pain, denies difficulty breathing, lung sounds clear and equal bilaterally.   Output: Straight cath output 175 ml, nephrotomy tube output 250 ml.   Last BM: Last BM 9/22/23   Activity: Bedfast   Skin: Urostomy in RLQ in and nephrotomy on left lower back, right thigh surgical incision.   Pain: Denies pain   Neuro: A & Ox4, calm and cooperative, no sensation in BL lower legs.   Dressing: ABD dressing-CDI   Diet: Regular diet   LDA: Right PIV saline locked   Equipment: Personal belongings in room, call light, IV pole.   Plan: Continue with  plan of care   Additional Info:
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
## 2023-09-24 NOTE — PLAN OF CARE
Goal Outcome Evaluation:    Pt alert and oriented X4, afebrile, VSS, Continue of bowel and bladder, bowel program for elimination, urostomy, straight Catheter X1. Reposition every 2 hours. Wound dressing CDI, RPIV infusing ABX intermittently. CON.

## 2023-09-25 ENCOUNTER — HOME INFUSION (PRE-WILLOW HOME INFUSION) (OUTPATIENT)
Dept: PHARMACY | Facility: CLINIC | Age: 28
End: 2023-09-25
Payer: MEDICARE

## 2023-09-25 LAB
CREAT SERPL-MCNC: 0.44 MG/DL (ref 0.51–0.95)
EGFRCR SERPLBLD CKD-EPI 2021: >90 ML/MIN/1.73M2
VANCOMYCIN SERPL-MCNC: 19.6 UG/ML

## 2023-09-25 PROCEDURE — 36415 COLL VENOUS BLD VENIPUNCTURE: CPT | Performed by: UROLOGY

## 2023-09-25 PROCEDURE — 250N000011 HC RX IP 250 OP 636: Mod: JZ | Performed by: SURGERY

## 2023-09-25 PROCEDURE — 250N000009 HC RX 250: Performed by: STUDENT IN AN ORGANIZED HEALTH CARE EDUCATION/TRAINING PROGRAM

## 2023-09-25 PROCEDURE — 82565 ASSAY OF CREATININE: CPT | Performed by: UROLOGY

## 2023-09-25 PROCEDURE — 999N000007 HC SITE CHECK

## 2023-09-25 PROCEDURE — 258N000003 HC RX IP 258 OP 636: Performed by: STUDENT IN AN ORGANIZED HEALTH CARE EDUCATION/TRAINING PROGRAM

## 2023-09-25 PROCEDURE — 36569 INSJ PICC 5 YR+ W/O IMAGING: CPT

## 2023-09-25 PROCEDURE — 99232 SBSQ HOSP IP/OBS MODERATE 35: CPT | Performed by: INTERNAL MEDICINE

## 2023-09-25 PROCEDURE — 250N000013 HC RX MED GY IP 250 OP 250 PS 637: Performed by: PHYSICIAN ASSISTANT

## 2023-09-25 PROCEDURE — 250N000013 HC RX MED GY IP 250 OP 250 PS 637: Performed by: STUDENT IN AN ORGANIZED HEALTH CARE EDUCATION/TRAINING PROGRAM

## 2023-09-25 PROCEDURE — 250N000011 HC RX IP 250 OP 636: Performed by: STUDENT IN AN ORGANIZED HEALTH CARE EDUCATION/TRAINING PROGRAM

## 2023-09-25 PROCEDURE — 99207 PR CDG-CUT & PASTE-POTENTIAL IMPACT ON LEVEL: CPT | Performed by: INTERNAL MEDICINE

## 2023-09-25 PROCEDURE — 250N000013 HC RX MED GY IP 250 OP 250 PS 637: Performed by: SURGERY

## 2023-09-25 PROCEDURE — 250N000011 HC RX IP 250 OP 636: Performed by: UROLOGY

## 2023-09-25 PROCEDURE — 999N000040 HC STATISTIC CONSULT NO CHARGE VASC ACCESS

## 2023-09-25 PROCEDURE — 272N000454 HC KIT, 3FR SV SINGLE LUMEN POWERPICC

## 2023-09-25 PROCEDURE — 80202 ASSAY OF VANCOMYCIN: CPT | Performed by: UROLOGY

## 2023-09-25 PROCEDURE — 120N000002 HC R&B MED SURG/OB UMMC

## 2023-09-25 RX ORDER — HEPARIN SODIUM,PORCINE 10 UNIT/ML
5-20 VIAL (ML) INTRAVENOUS EVERY 24 HOURS
Status: DISCONTINUED | OUTPATIENT
Start: 2023-09-25 | End: 2023-09-26 | Stop reason: HOSPADM

## 2023-09-25 RX ORDER — MICONAZOLE NITRATE 20 MG/G
CREAM TOPICAL 2 TIMES DAILY
Qty: 113 G | Refills: 1 | Status: SHIPPED | OUTPATIENT
Start: 2023-09-25

## 2023-09-25 RX ORDER — CLINDAMYCIN PHOSPHATE 10 UG/ML
LOTION TOPICAL 2 TIMES DAILY
Qty: 60 ML | Refills: 1 | Status: SHIPPED | OUTPATIENT
Start: 2023-09-25

## 2023-09-25 RX ORDER — HEPARIN SODIUM,PORCINE 10 UNIT/ML
5-20 VIAL (ML) INTRAVENOUS
Status: DISCONTINUED | OUTPATIENT
Start: 2023-09-25 | End: 2023-09-26 | Stop reason: HOSPADM

## 2023-09-25 RX ADMIN — BACLOFEN 30 MG: 10 TABLET ORAL at 21:29

## 2023-09-25 RX ADMIN — AMPICILLIN SODIUM AND SULBACTAM SODIUM 3 G: 2; 1 INJECTION, POWDER, FOR SOLUTION INTRAMUSCULAR; INTRAVENOUS at 15:04

## 2023-09-25 RX ADMIN — RIVAROXABAN 10 MG: 10 TABLET, FILM COATED ORAL at 18:05

## 2023-09-25 RX ADMIN — SENNOSIDES AND DOCUSATE SODIUM 1 TABLET: 50; 8.6 TABLET ORAL at 10:14

## 2023-09-25 RX ADMIN — VANCOMYCIN HYDROCHLORIDE 750 MG: 1 INJECTION, POWDER, LYOPHILIZED, FOR SOLUTION INTRAVENOUS at 13:12

## 2023-09-25 RX ADMIN — MICONAZOLE NITRATE: 20 CREAM TOPICAL at 13:11

## 2023-09-25 RX ADMIN — MICONAZOLE NITRATE: 20 CREAM TOPICAL at 21:39

## 2023-09-25 RX ADMIN — ONDANSETRON 4 MG: 2 INJECTION INTRAMUSCULAR; INTRAVENOUS at 10:38

## 2023-09-25 RX ADMIN — SERTRALINE HYDROCHLORIDE 150 MG: 100 TABLET ORAL at 10:13

## 2023-09-25 RX ADMIN — LIDOCAINE HYDROCHLORIDE ANHYDROUS 2 ML: 10 INJECTION, SOLUTION INFILTRATION at 09:26

## 2023-09-25 RX ADMIN — SULFAMETHOXAZOLE AND TRIMETHOPRIM 1 TABLET: 800; 160 TABLET ORAL at 10:14

## 2023-09-25 RX ADMIN — BACLOFEN 30 MG: 10 TABLET ORAL at 13:44

## 2023-09-25 RX ADMIN — FLUTICASONE FUROATE AND VILANTEROL TRIFENATATE 1 PUFF: 100; 25 POWDER RESPIRATORY (INHALATION) at 21:37

## 2023-09-25 RX ADMIN — MAGNESIUM HYDROXIDE 30 ML: 400 SUSPENSION ORAL at 10:13

## 2023-09-25 RX ADMIN — GABAPENTIN 300 MG: 300 CAPSULE ORAL at 21:29

## 2023-09-25 RX ADMIN — BACLOFEN 30 MG: 10 TABLET ORAL at 10:14

## 2023-09-25 RX ADMIN — OXYBUTYNIN CHLORIDE 5 MG: 5 TABLET, EXTENDED RELEASE ORAL at 21:29

## 2023-09-25 RX ADMIN — MIDODRINE HYDROCHLORIDE 10 MG: 5 TABLET ORAL at 10:14

## 2023-09-25 RX ADMIN — AMPICILLIN SODIUM AND SULBACTAM SODIUM 3 G: 2; 1 INJECTION, POWDER, FOR SOLUTION INTRAMUSCULAR; INTRAVENOUS at 21:29

## 2023-09-25 RX ADMIN — BISACODYL 10 MG: 10 SUPPOSITORY RECTAL at 21:15

## 2023-09-25 RX ADMIN — CLINDAMYCIN PHOSPHATE: 10 LOTION TOPICAL at 21:38

## 2023-09-25 RX ADMIN — CLINDAMYCIN PHOSPHATE: 10 LOTION TOPICAL at 13:11

## 2023-09-25 RX ADMIN — HEPARIN, PORCINE (PF) 10 UNIT/ML INTRAVENOUS SYRINGE 5 ML: at 10:38

## 2023-09-25 RX ADMIN — FLUCONAZOLE 400 MG: 200 TABLET ORAL at 10:14

## 2023-09-25 RX ADMIN — SENNOSIDES AND DOCUSATE SODIUM 1 TABLET: 50; 8.6 TABLET ORAL at 21:29

## 2023-09-25 RX ADMIN — VANCOMYCIN HYDROCHLORIDE 750 MG: 1 INJECTION, POWDER, LYOPHILIZED, FOR SOLUTION INTRAVENOUS at 00:18

## 2023-09-25 RX ADMIN — AMPICILLIN SODIUM AND SULBACTAM SODIUM 3 G: 2; 1 INJECTION, POWDER, FOR SOLUTION INTRAMUSCULAR; INTRAVENOUS at 05:45

## 2023-09-25 ASSESSMENT — ACTIVITIES OF DAILY LIVING (ADL)
ADLS_ACUITY_SCORE: 46

## 2023-09-25 NOTE — PROGRESS NOTES
Therapy: IV abx  Insurance: [employer] secondary to Medicare    100% coverage with [employer] secondary, however may have a copay per dispense on the drug through pharmacy plan    In reference to admission on 9/20/23 to check IV abx coverage    Please contact Intake with any questions, [phone] or In Basket pool, [facility] ([phone]).

## 2023-09-25 NOTE — PROGRESS NOTES
[facility]    Received referral from ELAINE Soto for IV abx therapy.  Benefits verified, Pt has [employer] PM[employer] secondary to Medicare. Secondary plan, covered at 100%, may have a copay per dispense on the drug through pharmacy plan. Met with patient to introduce home infusion services, review benefits and offer choice of providers. She wishes to use \[facility]\"" to provide IV Abx.  Provided info on \[facility]\"" services including RN visits, RPH/RN on call 24/7, supplies, and delivery process to home.  Educated on how to contact \[facility]\"". Patient states she lives with her mom and mom is her main caregiver. Pt states she has home care services through [facility] . Patient placed phone call to her mother Areli while writer was in the room. Areli states she has done IV Abx administration for patient in the past and is willing to do this again. They stated they will be comfortable doing infusion in home environment. Writer arranged to meet with Areli in pt's room tomorrow at 1015 for education/training on how to administer IV Abx by medicine ball. I Liaison will continue to follow until DC for any changes or additional needs.   DC: Tomorrow  DC Therapies: IV Vanco Q12H via HP, IV Unasyn Q6H via HP.  Delivery/Supplies: To pt's home by 5pm.  LINES/TUBES: PICC SLNV  AGENCY: [facility]  SNV: TBD  NOTE:       Sharon Polanco RN, BSN / \[facility]\""-Nurse Liaison  Cell:  [phone] [facility]  [email or web address]  [facility]-24 hrs  [phone]

## 2023-09-25 NOTE — PROGRESS NOTES
Care Management Follow Up    Length of Stay (days): 5    Expected Discharge Date: 09/26/2023     Concerns to be Addressed:    DC home with IV ABX and current ABX plan   Patient plan of care discussed at interdisciplinary rounds: Yes    Anticipated Discharge Disposition:  home     Anticipated Discharge Services:  IV ABX  Anticipated Discharge DME:  N/A    Patient/family educated on Medicare website which has current facility and service quality ratings:  yes  Education Provided on the Discharge Plan:  yes  Patient/Family in Agreement with the Plan:  yes    Referrals Placed by CM/SW:  external  Private pay costs discussed: Not applicable    Additional Information:  Met with patient at bedside to discuss DC planning. Patient wanting to DC today but understands to many variables to make this happen, verbalized okay wit setting up stretcher ride for AM.    Issues with the two IV ABX being compatible, patient would need to flush 8 times a day as would not be able to put the Amp on a  pump. [facility] liaison will check with patient to see if flushing/dosing 8 times a day is viable, if not may need double lumen PICC placed.    Patients mother is okay with dosing/flushing 6 times a day.     PCS form in chart    Stretcher ride set up for 9/26/23  11:41 AM-12:26 PM    Updated Sergo at [facility]  on patient discharge  tomorrow no need to fax orders as she has access to Epic        Brittany MAGUIREN RN CCM  RN Care Coordinator 5 MS and 10 ICU  [address] [place] [address]  [email or web address]   Office:   [phone]   Pager: [phone]     Weekend [place] Pager:5 ortho,5 Med/Surg & WB ED  [phone]   Weekend 6MS, 8A, 10ICU- Pager: [phone]     For weekend RN care coordinator needs (home discharge with needs including home care, assisted living facility returns, durable medical equip, IV antibiotics  RNCC Weekend [place] Pager:5 ortho,5 Med/Surg & WB ED   [phone]  RNCC  Weekend 6MS, 8A, 10ICU- Pager: [phone]
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
## 2023-09-25 NOTE — PHARMACY-VANCOMYCIN DOSING SERVICE
Pharmacy Vancomycin Note  Date of Service 2023  Patient's  1995   28 year old, female    Indication: Osteomyelitis  Day of Therapy: 2, initiated   Current vancomycin regimen:  750 mg IV q12h  Current vancomycin monitoring method: AUC  Current vancomycin therapeutic monitoring goal: 400-600 mg*h/L    InsightRX Prediction of Current Vancomycin Regimen  Regimen: 750 mg IV every 12 hours.  Start time: 12:18 on 2023  Exposure target: AUC24 (range)400-600 mg/L.hr   AUC24,ss: 407 mg/L.hr  Probability of AUC24 > 400: 54 %  Ctrough,ss: 11.7 mg/L  Probability of Ctrough,ss > 20: 1 %  Probability of nephrotoxicity (Lodise URBAN ): 7 %      Current estimated CrCl = Estimated Creatinine Clearance: 148.8 mL/min (A) (based on SCr of 0.44 mg/dL (L)).    Creatinine for last 3 days  2023:  8:02 AM Creatinine 0.46 mg/dL  2023:  6:41 AM Creatinine 0.44 mg/dL    Recent Vancomycin Levels (past 3 days)  2023:  6:41 AM Vancomycin 19.6 ug/mL    Vancomycin IV Administrations (past 72 hours)                     vancomycin (VANCOCIN) 750 mg in sodium chloride 0.9 % 250 mL intermittent infusion (mg) 750 mg New Bag 23 0018     750 mg New Bag 23 1328     750 mg New Bag  0039     750 mg New Bag 23 1628                    Nephrotoxins and other renal medications (From now, onward)      Start     Dose/Rate Route Frequency Ordered Stop    23 1230  vancomycin (VANCOCIN) 750 mg in sodium chloride 0.9 % 250 mL intermittent infusion         750 mg  over 90 Minutes Intravenous EVERY 12 HOURS 23 1146      23 1200  ampicillin-sulbactam (UNASYN) 3 g vial to attach to  mL bag         3 g  over 15-30 Minutes Intravenous EVERY 6 HOURS 23 1139                 Contrast Orders - past 72 hours (72h ago, onward)      None            Interpretation of levels and current regimen:  Vancomycin level is reflective of -600    Has serum creatinine changed greater than  50% in last 72 hours: No    Urine output:  good urine output    Renal Function: Stable    I    Plan:  Continue Current Dose  Vancomycin monitoring method: AUC  Vancomycin therapeutic monitoring goal: 400-600 mg*h/L  Pharmacy will check vancomycin levels as appropriate in 3-5 Days.  Serum creatinine levels will be ordered daily for the first week of therapy and at least twice weekly for subsequent weeks.    Alla Briceno, PharmD - Pediatric Clinical Pharmacist

## 2023-09-25 NOTE — PROGRESS NOTES
CARE MANAGEMENT    9/25-[facility] arranged Stretcher transport for Patient to discharge from [facility] to her home.      DATE:9/26/23 (Tomorrow)    TIME:(WINDOW-11:41PM-12:26PM)    PROVIDER:CELESTE MELISSA    PROVIDER CONTACT FOR QUESTIONS,CONCERNS OR CHANGES:  [phone].      INFORMATION FOR THE NURSING STAFF      THANK YOU FOR YOUR SUPPORT!!        Jennifer Escudero  [facility] Worker

## 2023-09-25 NOTE — CONSULTS
Successful right upper arm PICC placement.  No final ID recommendations or discharge plan, however, patient is discharging on antibiotics. PICC ok to use.  Tip verification 3cg technology

## 2023-09-25 NOTE — PROGRESS NOTES
Plastic Surgery Progress Note  Attending: Dr. Rell RANGEL.    Temp:  [97.9  F (36.6  C)-99.9  F (37.7  C)] 99.2  F (37.3  C)  Pulse:  [] 107  Resp:  [16] 16  BP: ()/(65-88) 89/65  SpO2:  [95 %-100 %] 100 %  I/O last 3 completed shifts:  In: -   Out: 2885 [Urine:2875; Drains:10]  General: NAD  Resp: NLB   Abdomen: soft, nondistended. L perc nephrostomy tube in place.   Extremities: R posterior thigh flap warm, soft, pink. Incision c/d/I. Rash to buttock area stable. No open areas, no significant swelling or ecchymosis. ROSANNE with dark serosang fluid.     ROSANNE 9ml yesterday    ASSESSMENT: 28 year old female POD #5 R IT debridement with posterior thigh fasciocutaneous flap 9/20/23. healing as anticipated.     PLAN:   -Daily dressing changes.  -bedrest, avoid pressure on flap. Ok for supine and L lateral. Q2 hr turn while awake, q4 overnight  -WAVE mattress  -strip, empty and record ROSANNE output  -Regular diet  -anticoagulation: 10mg xarelto x7 days per prior heme recs  -Continue to follow intra op bone cultures  -Antibiotics (vancomycin, Unasyn, flagyl) per ID recommendations; Growth of staph epi, citrobacter, granulicatella, and candida on cultures. Need final antibiotic rec from ID.   -rogers per urology  -appreciate medicine comanagement  -needs PICC placement today, ordered 9/23  -needs PNT removal and dye challenge by urology prior to discharge  -follow up derm recs for rash- start econazole and clindamycin topical  -dispo: will recover at home. Social work/care coordination for dispo planning, will need stretcher ride, no sitting. PICC line at discharge for anticipated IV antibiotics.     Ruthie Mccormick PA-C  Plastic and Reconstructive Surgery   or [facility] on call [facility]

## 2023-09-25 NOTE — PROCEDURES
[facility]    Single Lumen PICC Placement    Date/Time: 9/25/2023 10:06 AM    Performed by: Lisette Crawford RN  Authorized by: Parveen Schofield MD  Indications: vascular access      UNIVERSAL PROTOCOL   Site Marked: Yes  Prior Images Obtained and Reviewed:  Yes  Required items: Required blood products, implants, devices and special equipment available    Patient identity confirmed:  Verbally with patient, arm band and hospital-assigned identification number  NA - No sedation, light sedation, or local anesthesia  Confirmation Checklist:  Patient's identity using two indicators, relevant allergies, procedure was appropriate and matched the consent or emergent situation and correct equipment/implants were available  Time out: Immediately prior to the procedure a time out was called    Universal Protocol: the Joint Commission Universal Protocol was followed    Preparation: Patient was prepped and draped in usual sterile fashion    ESBL (mL):  1     ANESTHESIA    Anesthesia:  Local infiltration  Local Anesthetic:  Lidocaine 1% without epinephrine  Anesthetic Total (mL):  2      SEDATION    Patient Sedated: No        Preparation: skin prepped with ChloraPrep  Skin prep agent: skin prep agent completely dried prior to procedure  Sterile barriers: maximum sterile barriers were used: cap, mask, sterile gown, sterile gloves, and large sterile sheet  Hand hygiene: hand hygiene performed prior to central venous catheter insertion  Type of line used: PICC  Catheter type: single lumen  Lumen type: non-valved and power PICC  Catheter size: 3 Fr  Brand: Bard  Lot number: [id number]  Placement method: venipuncture, MST, ultrasound and tip navigation system  Number of attempts: 1  Difficulty threading catheter: no  Successful placement: yes  Orientation: right  Catheter to Vein (%): 24  Location: basilic vein  Tip Location: SVC/RA Junction  Site rationale: CVR.  Lower on arm due to nerve  bundle on top of vessel more proximal  : 8cm from AC.  Extremity circumference: 23.5  Visible catheter length: 0  Total catheter length: 43  Dressing and securement: adhesive securement device, blood cleaned with CHG, alcohol impregnated caps, chlorhexidine disc applied, gloves changed prior to final dressing, sterile dressing applied and transparent securement dressing (SecurAcath out of stock)  Post procedure assessment: blood return through all ports, free fluid flow and placement verified by 3CG technology  PROCEDURE   Patient Tolerance:  Patient tolerated the procedure well with no immediate complicationsDescribe Procedure: Successful right upper arm PICC placement.  No final ID recommendations or discharge plan, however, patient is discharging on antibiotics. PICC ok to use.  Tip verification 3cg technology  Disposal: sharps and needle count correct at the end of procedure, needles and guidewire disposed in sharps container

## 2023-09-25 NOTE — PLAN OF CARE
Goal Outcome Evaluation:      Plan of Care Reviewed With: patient             Received alert and oriented x4, in room air.  Denies pain, N/V, numbness/tingling sensation  Wound dressing are clean and intact, ROSANNE drain milked Q4hrs  Urostomy - inserted straight cath Q4hrs  Left nephrostomy, draining well  Assist of 1, dependent   PIV line at right forearm, TKO  Keep on the same care plan.  No acute changes on this shift

## 2023-09-25 NOTE — PLAN OF CARE
Patient is alert and Ox4, VSS, on RA. Quadriplegia.     Denied chest pain or SOB,     R thigh dressing CDI, ROSANNE drain stripped Q 4 hrs and minimal bloody output.   L enterolostomy drain patent with good amount of output.   RLQ urostomy stoma dressing changed after each straight cath.     Good appetite, feeder needed.   Ax1 to turn/reposition.     On bowel program, suppository applied this PM.     R PIV, TKO.    Cont of POC.
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
## 2023-09-26 ENCOUNTER — TELEPHONE (OUTPATIENT)
Dept: FAMILY MEDICINE | Facility: CLINIC | Age: 28
End: 2023-09-26
Payer: MEDICARE

## 2023-09-26 VITALS
WEIGHT: 111.11 LBS | HEART RATE: 85 BPM | RESPIRATION RATE: 19 BRPM | OXYGEN SATURATION: 99 % | BODY MASS INDEX: 17.94 KG/M2 | TEMPERATURE: 98 F | DIASTOLIC BLOOD PRESSURE: 59 MMHG | SYSTOLIC BLOOD PRESSURE: 93 MMHG

## 2023-09-26 PROCEDURE — 250N000011 HC RX IP 250 OP 636: Performed by: UROLOGY

## 2023-09-26 PROCEDURE — 250N000013 HC RX MED GY IP 250 OP 250 PS 637: Performed by: STUDENT IN AN ORGANIZED HEALTH CARE EDUCATION/TRAINING PROGRAM

## 2023-09-26 PROCEDURE — 99207 PR CDG-CUT & PASTE-POTENTIAL IMPACT ON LEVEL: CPT | Performed by: INTERNAL MEDICINE

## 2023-09-26 PROCEDURE — 250N000013 HC RX MED GY IP 250 OP 250 PS 637: Performed by: SURGERY

## 2023-09-26 PROCEDURE — 99232 SBSQ HOSP IP/OBS MODERATE 35: CPT | Performed by: INTERNAL MEDICINE

## 2023-09-26 PROCEDURE — 250N000011 HC RX IP 250 OP 636: Performed by: STUDENT IN AN ORGANIZED HEALTH CARE EDUCATION/TRAINING PROGRAM

## 2023-09-26 PROCEDURE — 258N000003 HC RX IP 258 OP 636: Performed by: STUDENT IN AN ORGANIZED HEALTH CARE EDUCATION/TRAINING PROGRAM

## 2023-09-26 RX ORDER — AMPICILLIN AND SULBACTAM 2; 1 G/1; G/1
3 INJECTION, POWDER, FOR SOLUTION INTRAMUSCULAR; INTRAVENOUS EVERY 6 HOURS
Qty: 468 G | Refills: 0 | Status: ACTIVE | OUTPATIENT
Start: 2023-09-26 | End: 2023-11-04

## 2023-09-26 RX ORDER — FLUCONAZOLE 200 MG/1
400 TABLET ORAL DAILY
Qty: 78 TABLET | Refills: 0 | Status: SHIPPED | OUTPATIENT
Start: 2023-09-26 | End: 2023-11-04

## 2023-09-26 RX ADMIN — VANCOMYCIN HYDROCHLORIDE 750 MG: 1 INJECTION, POWDER, LYOPHILIZED, FOR SOLUTION INTRAVENOUS at 02:01

## 2023-09-26 RX ADMIN — SENNOSIDES AND DOCUSATE SODIUM 1 TABLET: 50; 8.6 TABLET ORAL at 08:09

## 2023-09-26 RX ADMIN — VANCOMYCIN HYDROCHLORIDE 750 MG: 1 INJECTION, POWDER, LYOPHILIZED, FOR SOLUTION INTRAVENOUS at 10:09

## 2023-09-26 RX ADMIN — SERTRALINE HYDROCHLORIDE 150 MG: 100 TABLET ORAL at 08:09

## 2023-09-26 RX ADMIN — METHYLENE BLUE 38.5 MG: 5 INJECTION INTRAVENOUS at 08:00

## 2023-09-26 RX ADMIN — CLINDAMYCIN PHOSPHATE: 10 LOTION TOPICAL at 10:12

## 2023-09-26 RX ADMIN — FLUCONAZOLE 400 MG: 200 TABLET ORAL at 08:09

## 2023-09-26 RX ADMIN — MIDODRINE HYDROCHLORIDE 10 MG: 5 TABLET ORAL at 08:09

## 2023-09-26 RX ADMIN — AMPICILLIN SODIUM AND SULBACTAM SODIUM 3 G: 2; 1 INJECTION, POWDER, FOR SOLUTION INTRAMUSCULAR; INTRAVENOUS at 09:33

## 2023-09-26 RX ADMIN — HEPARIN, PORCINE (PF) 10 UNIT/ML INTRAVENOUS SYRINGE 5 ML: at 11:55

## 2023-09-26 RX ADMIN — BACLOFEN 30 MG: 10 TABLET ORAL at 08:08

## 2023-09-26 RX ADMIN — AMPICILLIN SODIUM AND SULBACTAM SODIUM 3 G: 2; 1 INJECTION, POWDER, FOR SOLUTION INTRAMUSCULAR; INTRAVENOUS at 03:15

## 2023-09-26 ASSESSMENT — ACTIVITIES OF DAILY LIVING (ADL)
ADLS_ACUITY_SCORE: 46

## 2023-09-26 NOTE — PLAN OF CARE
Goal Outcome Evaluation:      Plan of Care Reviewed With: patient    Overall Patient Progress: improvingOverall Patient Progress: improving    Outcome Evaluation: PICC for home infusion inserted 9/25 and patient and family were given education on PICC use and IV abx education on 9/26 before discharge      Problem: Wound Healing Progression  Goal: Optimal Wound Healing  Outcome: Adequate for Care Transition   Right thigh incision dressing changed before discharge   Sacrum wound care order performed before discharge    Nursing Assessment:  VT: BP 93/59 (BP Location: Left arm, Patient Position: Semi-Bar's, Cuff Size: Adult Small)   Pulse 85   Temp 98  F (36.7  C) (Oral)   Resp 19   Wt 50.4 kg (111 lb 1.8 oz)   LMP 06/07/2023   SpO2 99%   BMI 17.94 kg/m       Activity: assist of 2 with repositioning, turned every 2-3 hours    GI/: patient straight cath twice before discharge. Urology administered methylene blue in AM and removed nephrostomy tube    Pain Management:  Denied pain

## 2023-09-26 NOTE — PLAN OF CARE
Pt. discharged at 1300 via EMS to home.  Pt. was accompanied by EMS, and left with personal belongings.  Prior to discharge, PIV was removed.  Pt. received complete discharge paperwork and medications as filled by discharge pharmacy.  Pt. was given times of last dose for all discharge medications in writing on discharge medication sheets. Pt. had no further questions at the time of discharge and no unmet needs were identified.

## 2023-09-26 NOTE — PROGRESS NOTES
Plastic Surgery Progress Note  Attending: Dr. Rell RANGEL. Urology placed dye this morning.    Temp:  [98  F (36.7  C)-98.6  F (37  C)] 98  F (36.7  C)  Pulse:  [63-85] 85  Resp:  [16-19] 19  BP: ()/(59-73) 93/59  SpO2:  [98 %-99 %] 99 %  I/O last 3 completed shifts:  In: -   Out: 1960 [Urine:1960]  General: NAD  Resp: NLB   Abdomen: soft, nondistended. L perc nephrostomy tube in place.   Extremities: R posterior thigh flap warm, soft, pink. Incision c/d/I. Stool present. Rash to buttock area stable. No open areas, no significant swelling or ecchymosis. ROSANNE with dark serosang fluid.     ROSANNE 5ml yesterday    ASSESSMENT: 28 year old female POD #6 R IT debridement with posterior thigh fasciocutaneous flap 9/20/23. healing as anticipated.     PLAN:   -Daily dressing changes while at home to protect from stool  -bedrest, avoid pressure on flap. Ok for supine and L lateral. Q2 hr turn while awake, q4 overnight  -WAVE mattress  -strip, empty and record ROSANNE output  -Regular diet  -anticoagulation: 10mg xarelto x7 days per prior heme recs  -Continue to follow intra op bone cultures  -Antibiotics (vancomycin, Unasyn, flagyl) per ID recommendations; Growth of staph epi, citrobacter, granulicatella, and candida on cultures.   -rogers per urology  -appreciate medicine comanagement  -PICC placed yesterday 9/25  -needs PNT removal and dye challenge by urology prior to discharge  -follow up derm recs for rash- start econazole and clindamycin topical  -dispo: will recover at home. Social work/care coordination for dispo planning, will need stretcher ride, no sitting. PICC line at discharge for anticipated IV antibiotics.     Ruthie Mccormick PA-C  Plastic and Reconstructive Surgery   or plastic surgery on call in [facility]

## 2023-09-26 NOTE — CONFIDENTIAL NOTE
Forms/Letter Request     Type of form/letter:  Home Health client orders placed on hold as client is inpatient as of 9.20.2023 following a planned surgery     Have you been seen for this request: no     Do we have the form/letter: Yes     Who is the form from? [facility]     Where did/will the form come from? form was faxed in     When is form/letter needed by: October 6     How would you like the form/letter returned: Fax : [phone]     Patient Notified form requests are processed in 3-5 business days:No

## 2023-09-26 NOTE — PHARMACY
[facility]  Parenteral ANtibiotic Review at Departure from Acute Care Collaborative Note     Patient: Dianna Cottrell  MRN: [id number]  Allergies: Succinylcholine    Current Location: UR 5MS  OPAT to be provided by: [facility]       Line Type: PICC    Diagnosis/Indications: Right IT acute on chronic osteomyelitis  Organism(s): Staphylococcus epidermidis, Candida albicans, h/o Actinomyces in blood (6/2023)  MRDO? Yes - other  Pending Cultures/Microbiological Tests: no      Inpatient ID involved in developing OPAT plan: Yes - discharge OPAT plan has no changes from ID provider, Dr. Elvi العلي, OPAT plan charted on 9/25/2023    Outpatient ID Follow-up: ID OPAT Clinic Referral Placed ([facility] Ph: [phone] and Fax: [phone]) - appointment scheduled  Designated Provider: Dr. Agustín Samayoa    Antimicrobial Regimen / Route Anticipated  Duration Start Date Stop /  Reassess Date   Vancomycin  mg every 12 hours/IV 6 weeks 9/23/2023 11/4/2023   Ampicillin + Sulbactam 3 g every 6 hours/IV 6 weeks 9/23/2023 11/4/2023   Fluconazole 400 mg every 24 hours/PO Tentative 6 weeks 9/23/2023 Tenative 11/4/2023     Laboratory Tests and Monitoring Frequency: CBC with Diff, CMP, CRP Once Weekly    Therapeutic Drug Monitoring: Vancomycin   - Drug: Vancomycin   - Goal(s): -600   - Level Type & Frequency: Please obtain a serum vancomycin level (ideally trough) at least once weekly; may increase vancomycin level monitoring frequency (e.g., twice weekly) with regimen changes, labile renal function, and/or addition of nephrotoxic medication(s)   - Level(s) last checked date: 9/25/2023    Imaging/Miscellaneous Monitoring: None    ID Pharmacist Interventions: None                          Renetta Valero, PharmD, BCIDP  Pager: [phone]

## 2023-09-26 NOTE — PROGRESS NOTES
Urology  Progress Note    No acute events overnight  Pain controlled  Leaving today per patient    Exam  BP 93/59 (BP Location: Left arm, Patient Position: Semi-Bar's, Cuff Size: Adult Small)   Pulse 85   Temp 98  F (36.7  C) (Oral)   Resp 19   Wt 50.4 kg (111 lb 1.8 oz)   LMP 06/07/2023   SpO2 99%   BMI 17.94 kg/m    No acute distress  Unlabored breathing  Abdomen soft, nontender, nondistended.   L PNT in place with clear urine    UOP 2400/310    Labs  AM labs pending    Stone culture 9/20: 1+ GNBs, further speciation pending    Assessment/Plan  28 year old y/o female s/p left PCNL 09/20 for ureteral and renal stones. Also underwent ischial ulcer debridement with flap closure by plastic surgery (primary team.)    - appreciate excellent cares by plastics and medicine teams  - bactrim to continue through 9/28/23  - left PNT removed today s/p passing methylene blue challenge    Will discuss with Dr. Schofield.    David Sosa  PGY-4  [phone]       Contacting the Urology Team     Please use the following job codes to reach the Urology Team. Note that you must use an in house phone and that job codes cannot receive text pages.   On weekdays, dial [phone] (or star-star-star [phone] on the new [employer] telephones) then [phone] to reach the Adult Urology resident or PA on call  On weekdays, dial [phone] (or star-star-star [phone] on the new [employer] telephones) then [phone] to reach the Pediatric Urology resident  On weeknights and weekends, dial [phone] (or star-star-star [phone] on the new [employer] telephones) then [phone] to reach the Urology resident on call (for both Adult and Pediatrics)

## 2023-09-26 NOTE — DISCHARGE INSTRUCTIONS
[facility]  [place]  [phone]     [facility]  Phone  [phone]  Fax  [phone]  Intake: [phone]

## 2023-09-26 NOTE — PLAN OF CARE
Problem: Plan of Care - These are the overarching goals to be used throughout the patient stay.    Goal: Optimal Comfort and Wellbeing  Outcome: Progressing  Goal: Readiness for Transition of Care  Outcome: Progressing  Flowsheets (Taken 9/26/2023 0750)  Anticipated Changes Related to Illness: inability to care for self  Transportation Anticipated: health plan transportation  Concerns to be Addressed: all concerns addressed in this encounter  Intervention: Mutually Develop Transition Plan  Recent Flowsheet Documentation  Taken 9/26/2023 0750 by Alejo López RN  Anticipated Changes Related to Illness: inability to care for self  Transportation Anticipated: health plan transportation  Concerns to be Addressed: all concerns addressed in this encounter     Problem: Wound Healing Progression  Goal: Optimal Wound Healing  Outcome: Progressing   Goal Outcome Evaluation:        Pt. Straight cath this shift. 2x a shift. No acute changes. Pt. Denied pain this shift. Continue POC. Possible Discharge today.

## 2023-09-26 NOTE — PROGRESS NOTES
Care Management Discharge Note    Discharge Date: 09/26/2023       Discharge Disposition:  Home    Discharge Services:  Home Care, Home Infusion    Discharge DME:  No new DME ordered    Discharge Transportation: health plan transportation    Private pay costs discussed: Not applicable    Does the patient's insurance plan have a 3 day qualifying hospital stay waiver?  No    PAS Confirmation Code:  NA  Patient/family educated on Medicare website which has current facility and service quality ratings:  NA    Education Provided on the Discharge Plan:  Yes  Persons Notified of Discharge Plans: Pt, family  Patient/Family in Agreement with the Plan:  Yes    Handoff Referral Completed: Yes    Additional Information:  Pt medically ready for discharge home with home care and home infusion. Pt's mother at bedside for teaching with [facility] liaison, will get morning doses of Unasyn and Vancomycin, then discharge home via [facility] Stretcher. [facility]  liaison notified and will pull orders from [facility]. Transport pickup window between 1503-0302. No further discharge needs noted.     [facility]  [place]  [phone]    [facility]  Phone  [phone]  Fax  [phone]  Intake: [phone]       Maxine Lainez, AVELINO   Nurse Coordinator    Covering for: 5 M/S  Phone: *[phone]    Social Work and Care Management Department       SEARCHABLE in [facility] - search CARE COORDINATOR       [place] ([phone]) Saturday & Sunday; ([phone]) [facility] Recognized Holidays     Units: 5A, 5B & 5C  Pager: [phone]    Units: 6B, 6C & 6D    Pager: [phone]    Units: 7A, 7B, 7C & 7D    Pager: [phone]    Units: 6A & ICU   Pager: [phone]    Units: 5 Ortho, 5MS & WB ED Pager: [phone]    Units: 6MS, 8A & 10 ICU  Pager [phone]        Maxine Lainez RN

## 2023-09-26 NOTE — PROGRESS NOTES
[facility]    Pt will discharge home today with [facility] providing IV Abx. Pt has home care nursing from [facility]. Met with pt and her mom Areli to discuss plan for delivery of supplies to their home later today. Educated on medication dose times, storage of meds., how to keep PICC dressing C/D/I, and [facility] main triage number. Educated mom on IV Administration via home pump. She (medicine casandra). She verbalized understanding of all information given. She went through each step including cleaning IV end cap x 15 seconds, pulse flushing the line, connecting and disconnecting med ball, and clamping the PICC line. She had good technique and asked good questions. Patient is ready for discharge from a home infusion perspective.     Sharon Polanco RN, BSN / Nurse Liaison  [facility]  [email or web address]  Cell [phone] M-F/ [facility] office [phone] *24/7

## 2023-09-26 NOTE — PLAN OF CARE
Goal Outcome Evaluation:      Plan of Care Reviewed With: patient    Overall Patient Progress: improving    Outcome Evaluation: Patient is alert and Ox4,denied SOB and pain. Pt is Quadriplegic and need reposition Q3-4h.   Pt. straight care twice during this shift and irrigated once. Good appetite, need assistant for  feeding. PICC inserted and small BM on 09/25/23. Patient stable and plan to discharge home.

## 2023-09-26 NOTE — PLAN OF CARE
Nursing Assessment:  VT: /73 (BP Location: Left arm)   Pulse 63   Temp 98.6  F (37  C) (Oral)   Resp 16   Wt 50.4 kg (111 lb 1.8 oz)   LMP 06/07/2023   SpO2 98%   BMI 17.94 kg/m       Skin: daily incision dressing changed performed today, coccyx wound dressing changed today    Nursing Plan:  Continue POC, Urology to administer methylene blue in during rounds on 9/26    Discharge Disposition:  Discharge home on stretcher ride on 9/26/23 11:41 AM-12:26 PM

## 2023-09-26 NOTE — DISCHARGE SUMMARY
[facility] Discharge Summary    Dianna Cottrell MRN: [id number]   YOB: 1995 Age: 28 year old     Date of Admission:  9/20/2023  Date of Discharge::  9/26/2023  1:00 PM  Admitting Physician:  Vira Zacarias MD  Discharge Physician:  Ruthie hoyos   Primary Care Physician:         Suzan Willis          Admission Diagnoses:   Nephrolithiasis [N20.0]  Right ischial pressure sore, stage 4 (H) [L89.314]  Other chronic osteomyelitis, unspecified site (H) [M86.60]  Quadriplegia (H) [G82.50]  S/P flap graft [Z98.890]  rash          Discharge Diagnosis:   Same          Procedures:     NEPHROLITHOTOMY, PERCUTANEOUS, USING HOLMIUM LASER, NEPHROSTOMY TUBE EXCHANGE   IRRIGATION AND DEBRIDEMENT, PRESSURE ULCER, WITH FLAP CLOSURE, Right ischial decubitus with osteomyelitis.  posterior thigh flap               Non-operative procedures:   PICC line placement          Consultations:   INTERNAL MEDICINE ADULT IP CONSULT FOR [facility]  DERMATOLOGY IP CONSULT  OCCUPATIONAL THERAPY ADULT IP CONSULT  CARE MANAGEMENT / SOCIAL WORK IP CONSULT  WOUND OSTOMY CONTINENCE NURSE  IP CONSULT  NUTRITION SERVICES ADULT IP CONSULT  INFECTIOUS DISEASE [facility] ADULT IP CONSULT  PHARMACY TO DOSE VANCO  VASCULAR ACCESS ADULT IP CONSULT  OPAT PHARMACY IP CONSULT            Brief History of Illness:   History of L uretal and renal stones and R IT pressure sore both requiring surgical intervention in combo procedure.            Hospital Course:   The patient underwent the above operation on 9/20/23, which she tolerated well without complications. She was transferred to the floor for routine postoperative care and the remainder of her hospitalization was unremarkable. Her ischial bone biopsy grew staph epi, citrobacter, granulicatella and candida. She was started on the appropriate antibiotics per ID for a 6 week course, to be extended as needed. She was seen by urology who removed her nephrostomy tube prior to  discharge.     At the time of discharge, she was tolerating a regular diet, voiding via self cath without difficulty, and pain was controlled with oral pain medications. The patient was discharged home in stable and improved condition. She will follow up in clinic in 5 weeks.         Final Pathology Result:     Final Diagnosis   BONE, RIGHT ISCHIAL, BIOPSY:  -Fragments of bone with chronic osteomyelitis.  -Negative for acute osteomyelitis.            Medications Prior to Admission:     Medications Prior to Admission   Medication Sig Dispense Refill Last Dose    acetaminophen (TYLENOL) 325 MG tablet Take 325-650 mg by mouth every 6 hours as needed.   9/20/2023 at 0130    albuterol (PROVENTIL) (2.5 MG/3ML) 0.083% neb solution Take 1 vial (2.5 mg) by nebulization every 4 hours as needed for shortness of breath / dyspnea or wheezing 100 mL 1 More than a month at July    baclofen (LIORESAL) 10 MG tablet Take 30 mg by mouth 3 times daily (10MG X 3 = 30MG)   9/21/2023 at 0830    bisacodyl (DULCOLAX) 10 MG suppository Place 1 suppository (10 mg) rectally At Bedtime (Patient taking differently: Place 10 mg rectally nightly as needed for constipation) 30 suppository 0 9/19/2023 at 2000    fluticasone-vilanterol (BREO ELLIPTA) 100-25 MCG/ACT inhaler TAKE 1 PUFF BY MOUTH EVERY DAY 28 each 5 9/19/2023 at 2200    gabapentin (NEURONTIN) 300 MG capsule Take 300 mg by mouth At Bedtime    9/19/2023 at 2200    hydrOXYzine (VISTARIL) 25 MG capsule Take 1 capsule (25 mg) by mouth 3 times daily as needed for anxiety (or at bedtime for sleep.) 30 capsule 1 9/20/2023 at 0130    midodrine (PROAMATINE) 5 MG tablet Take 10 mg by mouth 2 times daily  6 tablet 0 9/20/2023 at 0830    [facility] Natural Products (ELDERBERRY/VITAMIN C/ZINC PO) Take 1 tablet by mouth daily   9/19/2023 at 0930    Multiple Vitamins-Minerals (WOMENS MULTIVITAMIN) TABS Take 1 tablet by mouth daily   9/19/2023 at 0930    oxybutynin ER (DITROPAN-XL) 5 MG 24 hr tablet Take 5 mg  by mouth every evening   9/19/2023 at 0930    senna-docusate (SENOKOT-S/PERICOLACE) 8.6-50 MG tablet Take 1 tablet by mouth daily   9/19/2023 at 2000    sertraline (ZOLOFT) 100 MG tablet TAKE 1.5 TABLETS BY MOUTH EVERY  tablet 1 9/19/2023 at 2200    sodium chloride (NEBUSAL) 3 % neb solution Take 3 mLs by nebulization every 6 hours as needed for wheezing or other (sputum clearance difficulty due to quadridplegia.) 300 mL 1 More than a month at July    sodium chloride 0.9%, bottle, (CURITY STERILE SALINE) 0.9 % irrigation 250 mLs by Intracatheter route 2 times daily 29744 mL 11 9/19/2023 at 2200    Vitamin D3 (CHOLECALCIFEROL) 125 MCG (5000 UT) tablet Take 1 tablet by mouth every other day   Past Week    [DISCONTINUED] sulfamethoxazole-trimethoprim (BACTRIM DS) 800-160 MG tablet Take 1 tablet by mouth 2 times daily for 10 days 20 tablet 0  at Never Started            Discharge Medications:     Current Discharge Medication List        START taking these medications    Details   ampicillin-sulbactam (UNASYN) 3 (2-1) g SOLR vial Inject 3 g into the vein every 6 hours for 39 days  Qty: 468 g, Refills: 0    Associated Diagnoses: S/P flap graft; Pressure injury of right ischium, stage 4 (H)      clindamycin (CLEOCIN T) 1 % external lotion Apply topically 2 times daily  Qty: 60 mL, Refills: 1    Associated Diagnoses: Rash      fluconazole (DIFLUCAN) 200 MG tablet Take 2 tablets (400 mg) by mouth daily for 39 days  Qty: 78 tablet, Refills: 0    Associated Diagnoses: S/P flap graft; Pressure injury of right ischium, stage 4 (H)      miconazole (MICATIN) 2 % external cream Apply topically 2 times daily  Qty: 113 g, Refills: 1    Comments: Any medium size tube ok  Associated Diagnoses: Rash      rivaroxaban ANTICOAGULANT (XARELTO) 10 MG TABS tablet Take 1 tablet (10 mg) by mouth daily (with dinner) for 3 days  Qty: 3 tablet, Refills: 0    Associated Diagnoses: H/O deep venous thrombosis      vancomycin 750 mg Inject 750  mg into the vein every 12 hours for 39 days  Qty: 37979 mL, Refills: 0    Associated Diagnoses: S/P flap graft; Pressure injury of right ischium, stage 4 (H)           CONTINUE these medications which have NOT CHANGED    Details   acetaminophen (TYLENOL) 325 MG tablet Take 325-650 mg by mouth every 6 hours as needed.      albuterol (PROVENTIL) (2.5 MG/3ML) 0.083% neb solution Take 1 vial (2.5 mg) by nebulization every 4 hours as needed for shortness of breath / dyspnea or wheezing  Qty: 100 mL, Refills: 1    Associated Diagnoses: Quadriplegia, C5-C7 complete (H); Airway clearance impairment; Frequent episodes of pneumonia      baclofen (LIORESAL) 10 MG tablet Take 30 mg by mouth 3 times daily (10MG X 3 = 30MG)      bisacodyl (DULCOLAX) 10 MG suppository Place 1 suppository (10 mg) rectally At Bedtime  Qty: 30 suppository, Refills: 0    Associated Diagnoses: Neurogenic bladder      fluticasone-vilanterol (BREO ELLIPTA) 100-25 MCG/ACT inhaler TAKE 1 PUFF BY MOUTH EVERY DAY  Qty: 28 each, Refills: 5    Comments: DX Code Needed  PATIENT REQUESTS NEW RX.  Associated Diagnoses: Impaired lung function; Neuromuscular respiratory weakness (H)      gabapentin (NEURONTIN) 300 MG capsule Take 300 mg by mouth At Bedtime       hydrOXYzine (VISTARIL) 25 MG capsule Take 1 capsule (25 mg) by mouth 3 times daily as needed for anxiety (or at bedtime for sleep.)  Qty: 30 capsule, Refills: 1    Associated Diagnoses: АНДРЕЙ (generalized anxiety disorder)      midodrine (PROAMATINE) 5 MG tablet Take 10 mg by mouth 2 times daily   Qty: 6 tablet, Refills: 0      Misc Natural Products (ELDERBERRY/VITAMIN C/ZINC PO) Take 1 tablet by mouth daily      Multiple Vitamins-Minerals (WOMENS MULTIVITAMIN) TABS Take 1 tablet by mouth daily      oxybutynin ER (DITROPAN-XL) 5 MG 24 hr tablet Take 5 mg by mouth every evening      senna-docusate (SENOKOT-S/PERICOLACE) 8.6-50 MG tablet Take 1 tablet by mouth daily      sertraline (ZOLOFT) 100 MG tablet TAKE  1.5 TABLETS BY MOUTH EVERY DAY  Qty: 135 tablet, Refills: 1    Associated Diagnoses: АНДРЕЙ (generalized anxiety disorder); Major depressive disorder with single episode, in partial remission (H24)      sodium chloride (NEBUSAL) 3 % neb solution Take 3 mLs by nebulization every 6 hours as needed for wheezing or other (sputum clearance difficulty due to quadridplegia.)  Qty: 300 mL, Refills: 1    Associated Diagnoses: Frequent episodes of pneumonia; Quadriplegia, C5-C7 complete (H); Airway clearance impairment      sodium chloride 0.9%, bottle, (CURITY STERILE SALINE) 0.9 % irrigation 250 mLs by Intracatheter route 2 times daily  Qty: 04990 mL, Refills: 11    Associated Diagnoses: Neurogenic bladder      Vitamin D3 (CHOLECALCIFEROL) 125 MCG (5000 UT) tablet Take 1 tablet by mouth every other day           STOP taking these medications       sulfamethoxazole-trimethoprim (BACTRIM DS) 800-160 MG tablet Comments:   Reason for Stopping:                    Day of Discharge Physical Exam:   Temp:  [98  F (36.7  C)-98.6  F (37  C)] 98  F (36.7  C)  Pulse:  [63-85] 85  Resp:  [16-19] 19  BP: ()/(59-73) 93/59  SpO2:  [98 %-99 %] 99 %  General: AAOx4, NAD, lying comfortably in bed  CV/Pulm: RRR, no dyspnea, NLB on RA  Abd: soft, non-distended, non-tender  Extremities: R posterior thigh flap warm, soft, viable. ROSANNE in place.         Discharge Instructions and Follow-Up:        Home Infusion Referral      Home Care Referral      OPAT enrollment and ID Clinic Referral      Reason for your hospital stay    Wound debridement and flap closure     Activity    Your activity upon discharge: bedrest. Pressure offloading mattress. Avoid pressure on flap, ok for laying on your back and left side. Change positions every few hours while awake.     Adult [facility] Follow-up and recommended labs and tests    10/26/2023 1:15 PM Vira Zacarias MD [facility] WOUND HEALING [facility]     Tubes and drains    You are going home with the following tubes  or drains: ROSANNE.  Strip, empty and record ROSANNE output twice daily. Bring your drain log to your follow up appointment.     IV access    **Ordering Provider MUST call/page Care Coordinator/ to discuss arranging this service**    You are going home with the following vascular access device: PICC.     Wound care and dressings    Instructions to care for your wound at home: keep incision covered if there is a change for contamination of stool. If no issues with leaking stool or frequent bowel movements ok to keep area open to air.     When to contact your care team    Please contact [facility] wound clinic with any concerns including signs of infection (fever, swelling, redness, increased pain, purulent drainage) or any other concerns. [phone]     Discharge Instructions    Continue topical cream and lotion for rashes until rash resolves.     Follow Up ([facility])    Infectious disease    Weekly labs required: CBC with diff, CMP, and CRP. Dr. Samayoa will follow labs at discharge until ID follow up. Fax labs to ID clinic.     Are there pending microbial tests: No      Imaging for ID follow up: ID Imaging: No.      All OPAT discharges will be scheduled with Alyssa Caal NP within 2 weeks of discharge unless otherwise specified with another provider. Type of clinic appointment Okay for in person or a virtual visit.   If additional appointments are needed later in the antibiotic course specify the provider Yao, timing of visit 3-4 weeks. and the type of visit Okay for in person or a virtual visit.      ID provider route note: OPAT RN Care Coordinator [facility] ID Clinic Information:  Phone: [phone]  Fax: [phone] (Attention Rodney Fox)     Diet    Follow this diet upon discharge: Regular           Home Health Care:     Arranged            Discharge Disposition:     Discharged to home      Condition at discharge: Stable      Patient discussed with  staff on day of discharge.
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
## 2023-09-27 ENCOUNTER — MEDICAL CORRESPONDENCE (OUTPATIENT)
Dept: HEALTH INFORMATION MANAGEMENT | Facility: CLINIC | Age: 28
End: 2023-09-27

## 2023-09-27 ENCOUNTER — TELEPHONE (OUTPATIENT)
Dept: FAMILY MEDICINE | Facility: CLINIC | Age: 28
End: 2023-09-27
Payer: MEDICARE

## 2023-09-27 ENCOUNTER — TELEPHONE (OUTPATIENT)
Dept: INFECTIOUS DISEASES | Facility: CLINIC | Age: 28
End: 2023-09-27
Payer: MEDICARE

## 2023-09-27 NOTE — TELEPHONE ENCOUNTER
[facility]    Phone Message    May a detailed message be left on voicemail: yes     Reason for Call: Other: Dianna would like a my chart response she wants to know if the visit 10/25/23 is necessary before her upcoming wound clinic appointment thank you.      Action Taken: Other: ID    Travel Screening: Not Applicable

## 2023-09-27 NOTE — CONFIDENTIAL NOTE
Forms/Letter Request     Type of form/letter:  Chart notes for wound dressings   Have you been seen for this request: Yes     Do we have the form/letter: Yes     Who is the form from? [facility]     Where did/will the form come from? form was faxed in     When is form/letter needed by: as soon as time permits     How would you like the form/letter returned: Fax : [phone]     Patient Notified form requests are processed in 3-5 business days:No

## 2023-09-28 ENCOUNTER — PATIENT OUTREACH (OUTPATIENT)
Dept: CARE COORDINATION | Facility: CLINIC | Age: 28
End: 2023-09-28
Payer: MEDICARE

## 2023-09-28 LAB — BACTERIA SPEC CULT: NORMAL

## 2023-09-29 ENCOUNTER — MYC MEDICAL ADVICE (OUTPATIENT)
Dept: INFECTIOUS DISEASES | Facility: CLINIC | Age: 28
End: 2023-09-29
Payer: MEDICARE

## 2023-10-02 ENCOUNTER — LAB REQUISITION (OUTPATIENT)
Dept: LAB | Facility: CLINIC | Age: 28
End: 2023-10-02
Payer: MEDICARE

## 2023-10-02 DIAGNOSIS — M86.661: ICD-10-CM

## 2023-10-02 LAB
ALBUMIN SERPL BCG-MCNC: 4.2 G/DL (ref 3.5–5.2)
ALP SERPL-CCNC: 86 U/L (ref 35–104)
ALT SERPL W P-5'-P-CCNC: 10 U/L (ref 0–50)
ANION GAP SERPL CALCULATED.3IONS-SCNC: 12 MMOL/L (ref 7–15)
AST SERPL W P-5'-P-CCNC: 18 U/L (ref 0–45)
BASOPHILS # BLD AUTO: 0.1 10E3/UL (ref 0–0.2)
BASOPHILS NFR BLD AUTO: 1 %
BILIRUB SERPL-MCNC: <0.2 MG/DL
BUN SERPL-MCNC: 15.9 MG/DL (ref 6–20)
CALCIUM SERPL-MCNC: 9.1 MG/DL (ref 8.6–10)
CHLORIDE SERPL-SCNC: 102 MMOL/L (ref 98–107)
CREAT SERPL-MCNC: 0.44 MG/DL (ref 0.51–0.95)
CRP SERPL-MCNC: 7.72 MG/L
DEPRECATED HCO3 PLAS-SCNC: 23 MMOL/L (ref 22–29)
EGFRCR SERPLBLD CKD-EPI 2021: >90 ML/MIN/1.73M2
EOSINOPHIL # BLD AUTO: 0.5 10E3/UL (ref 0–0.7)
EOSINOPHIL NFR BLD AUTO: 4 %
ERYTHROCYTE [DISTWIDTH] IN BLOOD BY AUTOMATED COUNT: 13.2 % (ref 10–15)
GLUCOSE SERPL-MCNC: 86 MG/DL (ref 70–99)
HCT VFR BLD AUTO: 33.4 % (ref 35–47)
HGB BLD-MCNC: 10.4 G/DL (ref 11.7–15.7)
IMM GRANULOCYTES # BLD: 0.1 10E3/UL
IMM GRANULOCYTES NFR BLD: 1 %
LYMPHOCYTES # BLD AUTO: 3 10E3/UL (ref 0.8–5.3)
LYMPHOCYTES NFR BLD AUTO: 25 %
MCH RBC QN AUTO: 27.9 PG (ref 26.5–33)
MCHC RBC AUTO-ENTMCNC: 31.1 G/DL (ref 31.5–36.5)
MCV RBC AUTO: 90 FL (ref 78–100)
MONOCYTES # BLD AUTO: 0.8 10E3/UL (ref 0–1.3)
MONOCYTES NFR BLD AUTO: 7 %
NEUTROPHILS # BLD AUTO: 7.5 10E3/UL (ref 1.6–8.3)
NEUTROPHILS NFR BLD AUTO: 62 %
NRBC # BLD AUTO: 0 10E3/UL
NRBC BLD AUTO-RTO: 0 /100
PLATELET # BLD AUTO: 411 10E3/UL (ref 150–450)
POTASSIUM SERPL-SCNC: 4.3 MMOL/L (ref 3.4–5.3)
PROT SERPL-MCNC: 7.1 G/DL (ref 6.4–8.3)
RBC # BLD AUTO: 3.73 10E6/UL (ref 3.8–5.2)
SODIUM SERPL-SCNC: 137 MMOL/L (ref 135–145)
VANCOMYCIN SERPL-MCNC: 11.7 UG/ML
WBC # BLD AUTO: 11.9 10E3/UL (ref 4–11)

## 2023-10-02 PROCEDURE — 85004 AUTOMATED DIFF WBC COUNT: CPT | Performed by: STUDENT IN AN ORGANIZED HEALTH CARE EDUCATION/TRAINING PROGRAM

## 2023-10-02 PROCEDURE — 86140 C-REACTIVE PROTEIN: CPT | Performed by: STUDENT IN AN ORGANIZED HEALTH CARE EDUCATION/TRAINING PROGRAM

## 2023-10-02 PROCEDURE — 80202 ASSAY OF VANCOMYCIN: CPT | Performed by: STUDENT IN AN ORGANIZED HEALTH CARE EDUCATION/TRAINING PROGRAM

## 2023-10-02 PROCEDURE — 80053 COMPREHEN METABOLIC PANEL: CPT | Performed by: STUDENT IN AN ORGANIZED HEALTH CARE EDUCATION/TRAINING PROGRAM

## 2023-10-04 ENCOUNTER — MEDICAL CORRESPONDENCE (OUTPATIENT)
Dept: HEALTH INFORMATION MANAGEMENT | Facility: CLINIC | Age: 28
End: 2023-10-04
Payer: MEDICARE

## 2023-10-05 ENCOUNTER — TELEPHONE (OUTPATIENT)
Dept: WOUND CARE | Facility: CLINIC | Age: 28
End: 2023-10-05
Payer: MEDICARE

## 2023-10-05 ENCOUNTER — LAB REQUISITION (OUTPATIENT)
Dept: LAB | Facility: CLINIC | Age: 28
End: 2023-10-05
Payer: MEDICARE

## 2023-10-05 DIAGNOSIS — M86.661: ICD-10-CM

## 2023-10-05 LAB
CREAT SERPL-MCNC: 0.44 MG/DL (ref 0.51–0.95)
EGFRCR SERPLBLD CKD-EPI 2021: >90 ML/MIN/1.73M2
VANCOMYCIN SERPL-MCNC: 17 UG/ML

## 2023-10-05 PROCEDURE — 80202 ASSAY OF VANCOMYCIN: CPT | Performed by: STUDENT IN AN ORGANIZED HEALTH CARE EDUCATION/TRAINING PROGRAM

## 2023-10-05 PROCEDURE — 82565 ASSAY OF CREATININE: CPT | Performed by: STUDENT IN AN ORGANIZED HEALTH CARE EDUCATION/TRAINING PROGRAM

## 2023-10-05 NOTE — TELEPHONE ENCOUNTER
Discussed with Dr. Zacarias who states she is managing the patients ROSANNE drain. Returned call to Nany. Nany states the ROSANNE drain is not having any output. Dr. Zacarias gave the ok for the ROSANNE drain to be removed. Nany sees the patient again on 10/9/23 and will remove the ROSANNE drain then. No further questions or concerns.

## 2023-10-05 NOTE — TELEPHONE ENCOUNTER
Home care nurse Nany called to see if Dr Zacarias is managing the patient's ROSANNE drain  [phone]

## 2023-10-06 ENCOUNTER — TELEPHONE (OUTPATIENT)
Dept: FAMILY MEDICINE | Facility: CLINIC | Age: 28
End: 2023-10-06
Payer: MEDICARE

## 2023-10-06 NOTE — TELEPHONE ENCOUNTER
Forms/Letter Request     Type of form/letter:  Home health plan of care      Have you been seen for this request: n/a     Do we have the form/letter: Yes     Who is the form from? [facility]     Where did/will the form come from? form was faxed in     When is form/letter needed by: October 13th     How would you like the form/letter returned: Fax : [facility] [phone]     Patient Notified form requests are processed in 3-5 business days:No

## 2023-10-09 ENCOUNTER — LAB REQUISITION (OUTPATIENT)
Dept: LAB | Facility: CLINIC | Age: 28
End: 2023-10-09
Payer: MEDICARE

## 2023-10-09 ENCOUNTER — TELEPHONE (OUTPATIENT)
Dept: WOUND CARE | Facility: CLINIC | Age: 28
End: 2023-10-09
Payer: MEDICARE

## 2023-10-09 DIAGNOSIS — M86.661: ICD-10-CM

## 2023-10-09 DIAGNOSIS — Z53.9 DIAGNOSIS NOT YET DEFINED: Primary | ICD-10-CM

## 2023-10-09 LAB
ALBUMIN SERPL BCG-MCNC: 4 G/DL (ref 3.5–5.2)
ALP SERPL-CCNC: 80 U/L (ref 35–104)
ALT SERPL W P-5'-P-CCNC: 16 U/L (ref 0–50)
ANION GAP SERPL CALCULATED.3IONS-SCNC: 12 MMOL/L (ref 7–15)
AST SERPL W P-5'-P-CCNC: 18 U/L (ref 0–45)
BASO+EOS+MONOS # BLD AUTO: ABNORMAL 10*3/UL
BASO+EOS+MONOS NFR BLD AUTO: ABNORMAL %
BASOPHILS # BLD AUTO: 0.1 10E3/UL (ref 0–0.2)
BASOPHILS NFR BLD AUTO: 1 %
BILIRUB SERPL-MCNC: <0.2 MG/DL
BUN SERPL-MCNC: 17.3 MG/DL (ref 6–20)
CALCIUM SERPL-MCNC: 9.4 MG/DL (ref 8.6–10)
CHLORIDE SERPL-SCNC: 104 MMOL/L (ref 98–107)
CREAT SERPL-MCNC: 0.41 MG/DL (ref 0.51–0.95)
CRP SERPL-MCNC: 7.82 MG/L
DEPRECATED HCO3 PLAS-SCNC: 25 MMOL/L (ref 22–29)
EGFRCR SERPLBLD CKD-EPI 2021: >90 ML/MIN/1.73M2
EOSINOPHIL # BLD AUTO: 0.5 10E3/UL (ref 0–0.7)
EOSINOPHIL NFR BLD AUTO: 4 %
ERYTHROCYTE [DISTWIDTH] IN BLOOD BY AUTOMATED COUNT: 13.6 % (ref 10–15)
GLUCOSE SERPL-MCNC: 90 MG/DL (ref 70–99)
HCT VFR BLD AUTO: 34.4 % (ref 35–47)
HGB BLD-MCNC: 10.6 G/DL (ref 11.7–15.7)
HOLD SPECIMEN: NORMAL
IMM GRANULOCYTES # BLD: 0.1 10E3/UL
IMM GRANULOCYTES NFR BLD: 1 %
LYMPHOCYTES # BLD AUTO: 2.4 10E3/UL (ref 0.8–5.3)
LYMPHOCYTES NFR BLD AUTO: 23 %
MCH RBC QN AUTO: 27.3 PG (ref 26.5–33)
MCHC RBC AUTO-ENTMCNC: 30.8 G/DL (ref 31.5–36.5)
MCV RBC AUTO: 89 FL (ref 78–100)
MONOCYTES # BLD AUTO: 0.8 10E3/UL (ref 0–1.3)
MONOCYTES NFR BLD AUTO: 8 %
NEUTROPHILS # BLD AUTO: 6.8 10E3/UL (ref 1.6–8.3)
NEUTROPHILS NFR BLD AUTO: 63 %
NRBC # BLD AUTO: 0 10E3/UL
NRBC BLD AUTO-RTO: 0 /100
PLATELET # BLD AUTO: 367 10E3/UL (ref 150–450)
POTASSIUM SERPL-SCNC: 4 MMOL/L (ref 3.4–5.3)
PROT SERPL-MCNC: 7.6 G/DL (ref 6.4–8.3)
RBC # BLD AUTO: 3.88 10E6/UL (ref 3.8–5.2)
SODIUM SERPL-SCNC: 141 MMOL/L (ref 135–145)
VANCOMYCIN SERPL-MCNC: 17.8 UG/ML
WBC # BLD AUTO: 10.5 10E3/UL (ref 4–11)

## 2023-10-09 PROCEDURE — 85025 COMPLETE CBC W/AUTO DIFF WBC: CPT | Performed by: STUDENT IN AN ORGANIZED HEALTH CARE EDUCATION/TRAINING PROGRAM

## 2023-10-09 PROCEDURE — 84132 ASSAY OF SERUM POTASSIUM: CPT | Performed by: STUDENT IN AN ORGANIZED HEALTH CARE EDUCATION/TRAINING PROGRAM

## 2023-10-09 PROCEDURE — 86140 C-REACTIVE PROTEIN: CPT | Performed by: STUDENT IN AN ORGANIZED HEALTH CARE EDUCATION/TRAINING PROGRAM

## 2023-10-09 PROCEDURE — G0180 MD CERTIFICATION HHA PATIENT: HCPCS | Performed by: PHYSICIAN ASSISTANT

## 2023-10-09 PROCEDURE — 80202 ASSAY OF VANCOMYCIN: CPT | Performed by: STUDENT IN AN ORGANIZED HEALTH CARE EDUCATION/TRAINING PROGRAM

## 2023-10-09 NOTE — TELEPHONE ENCOUNTER
Home care nurse called, removed ROSANNE and now it is draining from the hole.  Needing instructions  [place] [phone]

## 2023-10-09 NOTE — TELEPHONE ENCOUNTER
Returned call to Nany. Discussed with her to apply 4x4 gauze or ABD over the insertion site and change daily and as needed for soilage. No further questions or concerns.

## 2023-10-13 ENCOUNTER — TELEPHONE (OUTPATIENT)
Dept: FAMILY MEDICINE | Facility: CLINIC | Age: 28
End: 2023-10-13
Payer: MEDICARE

## 2023-10-13 NOTE — CONFIDENTIAL NOTE
Forms/Letter Request     Type of form/letter:  Wound Care supplies Missing details of wound assessment + Plan of Care     Have you been seen for this request: n/a     Do we have the form/letter: Yes     Who is the form from? [facility]     Where did/will the form come from? form was faxed in     When is form/letter needed by: @ your earliest convenience     How would you like the form/letter returned: Fax : [facility] [phone]     Patient Notified form requests are processed in 3-5 business days:No        
Statement Selected

## 2023-10-13 NOTE — TELEPHONE ENCOUNTER
Placed in provider basket    * Due: 3-5 Business Days      Looking for amend to 8/23/23, see note on form

## 2023-10-16 ENCOUNTER — LAB REQUISITION (OUTPATIENT)
Dept: LAB | Facility: CLINIC | Age: 28
End: 2023-10-16
Payer: MEDICARE

## 2023-10-16 DIAGNOSIS — M86.661: ICD-10-CM

## 2023-10-16 LAB
ALBUMIN SERPL BCG-MCNC: 3.7 G/DL (ref 3.5–5.2)
ALP SERPL-CCNC: 82 U/L (ref 35–104)
ALT SERPL W P-5'-P-CCNC: 16 U/L (ref 0–50)
ANION GAP SERPL CALCULATED.3IONS-SCNC: 12 MMOL/L (ref 7–15)
AST SERPL W P-5'-P-CCNC: 19 U/L (ref 0–45)
BASO+EOS+MONOS # BLD AUTO: ABNORMAL 10*3/UL
BASO+EOS+MONOS NFR BLD AUTO: ABNORMAL %
BASOPHILS # BLD AUTO: 0.1 10E3/UL (ref 0–0.2)
BASOPHILS NFR BLD AUTO: 1 %
BILIRUB SERPL-MCNC: <0.2 MG/DL
BUN SERPL-MCNC: 11.8 MG/DL (ref 6–20)
CALCIUM SERPL-MCNC: 8.9 MG/DL (ref 8.6–10)
CHLORIDE SERPL-SCNC: 105 MMOL/L (ref 98–107)
CREAT SERPL-MCNC: 0.47 MG/DL (ref 0.51–0.95)
CRP SERPL-MCNC: 14.26 MG/L
DEPRECATED HCO3 PLAS-SCNC: 25 MMOL/L (ref 22–29)
EGFRCR SERPLBLD CKD-EPI 2021: >90 ML/MIN/1.73M2
EOSINOPHIL # BLD AUTO: 0.5 10E3/UL (ref 0–0.7)
EOSINOPHIL NFR BLD AUTO: 7 %
ERYTHROCYTE [DISTWIDTH] IN BLOOD BY AUTOMATED COUNT: 13.7 % (ref 10–15)
GLUCOSE SERPL-MCNC: 84 MG/DL (ref 70–99)
HCT VFR BLD AUTO: 33.5 % (ref 35–47)
HGB BLD-MCNC: 10.6 G/DL (ref 11.7–15.7)
IMM GRANULOCYTES # BLD: 0 10E3/UL
IMM GRANULOCYTES NFR BLD: 0 %
LYMPHOCYTES # BLD AUTO: 2 10E3/UL (ref 0.8–5.3)
LYMPHOCYTES NFR BLD AUTO: 27 %
MCH RBC QN AUTO: 27.7 PG (ref 26.5–33)
MCHC RBC AUTO-ENTMCNC: 31.6 G/DL (ref 31.5–36.5)
MCV RBC AUTO: 88 FL (ref 78–100)
MONOCYTES # BLD AUTO: 0.6 10E3/UL (ref 0–1.3)
MONOCYTES NFR BLD AUTO: 8 %
NEUTROPHILS # BLD AUTO: 4.2 10E3/UL (ref 1.6–8.3)
NEUTROPHILS NFR BLD AUTO: 57 %
NRBC # BLD AUTO: 0 10E3/UL
NRBC BLD AUTO-RTO: 0 /100
PLATELET # BLD AUTO: 272 10E3/UL (ref 150–450)
POTASSIUM SERPL-SCNC: 3.1 MMOL/L (ref 3.4–5.3)
PROT SERPL-MCNC: 7.1 G/DL (ref 6.4–8.3)
RBC # BLD AUTO: 3.83 10E6/UL (ref 3.8–5.2)
SODIUM SERPL-SCNC: 142 MMOL/L (ref 135–145)
VANCOMYCIN SERPL-MCNC: 19.5 UG/ML
WBC # BLD AUTO: 7.4 10E3/UL (ref 4–11)

## 2023-10-16 PROCEDURE — 80053 COMPREHEN METABOLIC PANEL: CPT | Performed by: STUDENT IN AN ORGANIZED HEALTH CARE EDUCATION/TRAINING PROGRAM

## 2023-10-16 PROCEDURE — 80202 ASSAY OF VANCOMYCIN: CPT | Performed by: STUDENT IN AN ORGANIZED HEALTH CARE EDUCATION/TRAINING PROGRAM

## 2023-10-16 PROCEDURE — 85025 COMPLETE CBC W/AUTO DIFF WBC: CPT | Performed by: STUDENT IN AN ORGANIZED HEALTH CARE EDUCATION/TRAINING PROGRAM

## 2023-10-16 PROCEDURE — 86140 C-REACTIVE PROTEIN: CPT | Performed by: STUDENT IN AN ORGANIZED HEALTH CARE EDUCATION/TRAINING PROGRAM

## 2023-10-17 ENCOUNTER — TELEPHONE (OUTPATIENT)
Dept: WOUND CARE | Facility: CLINIC | Age: 28
End: 2023-10-17
Payer: MEDICARE

## 2023-10-17 NOTE — TELEPHONE ENCOUNTER
Patient is having trouble finding stretcher transportation for her appointment on 10.26.  Wondering what options may be available.

## 2023-10-17 NOTE — TELEPHONE ENCOUNTER
TC - I don't have any more forms in my basket. Could this be a duplicate from last week that was already signed?   JOSIE RachelC

## 2023-10-17 NOTE — TELEPHONE ENCOUNTER
Return call and LVM to call back to possibly change the appoiintment to a Televisit if she has Home care.

## 2023-10-18 LAB — BACTERIA BONE ANAEROBE+AEROBE CULT: ABNORMAL

## 2023-10-19 ENCOUNTER — LAB REQUISITION (OUTPATIENT)
Dept: LAB | Facility: CLINIC | Age: 28
End: 2023-10-19
Payer: MEDICARE

## 2023-10-19 DIAGNOSIS — M86.661: ICD-10-CM

## 2023-10-19 LAB
CREAT SERPL-MCNC: 0.47 MG/DL (ref 0.51–0.95)
EGFRCR SERPLBLD CKD-EPI 2021: >90 ML/MIN/1.73M2
HOLD SPECIMEN: NORMAL
VANCOMYCIN SERPL-MCNC: 18.6 UG/ML

## 2023-10-19 PROCEDURE — 80202 ASSAY OF VANCOMYCIN: CPT | Performed by: STUDENT IN AN ORGANIZED HEALTH CARE EDUCATION/TRAINING PROGRAM

## 2023-10-19 PROCEDURE — 82565 ASSAY OF CREATININE: CPT | Performed by: STUDENT IN AN ORGANIZED HEALTH CARE EDUCATION/TRAINING PROGRAM

## 2023-10-19 NOTE — TELEPHONE ENCOUNTER
Co-worker and  stated that a video or tele visit is okay if patient has home care and they can send photos through [employer]. Home care can remove staples if deemed okay to do so at appointment.     Writer spoke with Dianna who has home care available and comes out once per week on Mondays. Patients mom can take the photos the day prior or the morning of the appointment on Thursday 10/26.     Writer will send [employer] Message so patient can attach photos.

## 2023-10-19 NOTE — TELEPHONE ENCOUNTER
Patient says she has staples to be removed and doesn't think the appointment can be done virtually.

## 2023-10-20 ENCOUNTER — TRANSFERRED RECORDS (OUTPATIENT)
Dept: HEALTH INFORMATION MANAGEMENT | Facility: CLINIC | Age: 28
End: 2023-10-20
Payer: MEDICARE

## 2023-10-23 ENCOUNTER — LAB REQUISITION (OUTPATIENT)
Dept: LAB | Facility: CLINIC | Age: 28
End: 2023-10-23
Payer: MEDICARE

## 2023-10-23 DIAGNOSIS — M86.661: ICD-10-CM

## 2023-10-23 LAB
ACANTHOCYTES BLD QL SMEAR: NORMAL
ALBUMIN SERPL BCG-MCNC: 3.8 G/DL (ref 3.5–5.2)
ALP SERPL-CCNC: 95 U/L (ref 35–104)
ALT SERPL W P-5'-P-CCNC: 18 U/L (ref 0–50)
ANION GAP SERPL CALCULATED.3IONS-SCNC: 14 MMOL/L (ref 7–15)
AST SERPL W P-5'-P-CCNC: 33 U/L (ref 0–45)
AUER BODIES BLD QL SMEAR: NORMAL
BASO STIPL BLD QL SMEAR: NORMAL
BASOPHILS # BLD AUTO: 0.1 10E3/UL (ref 0–0.2)
BASOPHILS NFR BLD AUTO: 1 %
BILIRUB SERPL-MCNC: <0.2 MG/DL
BITE CELLS BLD QL SMEAR: NORMAL
BLISTER CELLS BLD QL SMEAR: NORMAL
BUN SERPL-MCNC: 6.7 MG/DL (ref 6–20)
BURR CELLS BLD QL SMEAR: NORMAL
CALCIUM SERPL-MCNC: 8.8 MG/DL (ref 8.6–10)
CHLORIDE SERPL-SCNC: 105 MMOL/L (ref 98–107)
CREAT SERPL-MCNC: 0.47 MG/DL (ref 0.51–0.95)
CRP SERPL-MCNC: 12.34 MG/L
DACRYOCYTES BLD QL SMEAR: NORMAL
DEPRECATED HCO3 PLAS-SCNC: 26 MMOL/L (ref 22–29)
EGFRCR SERPLBLD CKD-EPI 2021: >90 ML/MIN/1.73M2
ELLIPTOCYTES BLD QL SMEAR: NORMAL
EOSINOPHIL # BLD AUTO: 0.3 10E3/UL (ref 0–0.7)
EOSINOPHIL NFR BLD AUTO: 4 %
ERYTHROCYTE [DISTWIDTH] IN BLOOD BY AUTOMATED COUNT: 14 % (ref 10–15)
FRAGMENTS BLD QL SMEAR: NORMAL
GLUCOSE SERPL-MCNC: 112 MG/DL (ref 70–99)
HCT VFR BLD AUTO: 34.4 % (ref 35–47)
HGB BLD-MCNC: 11.1 G/DL (ref 11.7–15.7)
HGB C CRYSTALS: NORMAL
HOWELL-JOLLY BOD BLD QL SMEAR: NORMAL
IMM GRANULOCYTES # BLD: 0 10E3/UL
IMM GRANULOCYTES NFR BLD: 1 %
LYMPHOCYTES # BLD AUTO: 2.5 10E3/UL (ref 0.8–5.3)
LYMPHOCYTES NFR BLD AUTO: 29 %
MCH RBC QN AUTO: 28 PG (ref 26.5–33)
MCHC RBC AUTO-ENTMCNC: 32.3 G/DL (ref 31.5–36.5)
MCV RBC AUTO: 87 FL (ref 78–100)
MONOCYTES # BLD AUTO: 0.8 10E3/UL (ref 0–1.3)
MONOCYTES NFR BLD AUTO: 9 %
NEUTROPHILS # BLD AUTO: 4.9 10E3/UL (ref 1.6–8.3)
NEUTROPHILS NFR BLD AUTO: 56 %
NEUTS HYPERSEG BLD QL SMEAR: NORMAL
NRBC # BLD AUTO: 0 10E3/UL
NRBC BLD AUTO-RTO: 0 /100
PLAT MORPH BLD: NORMAL
PLATELET # BLD AUTO: 254 10E3/UL (ref 150–450)
POLYCHROMASIA BLD QL SMEAR: NORMAL
POTASSIUM SERPL-SCNC: 3.2 MMOL/L (ref 3.4–5.3)
PROT SERPL-MCNC: 6.9 G/DL (ref 6.4–8.3)
RBC # BLD AUTO: 3.96 10E6/UL (ref 3.8–5.2)
RBC AGGLUT BLD QL: NORMAL
RBC MORPH BLD: NORMAL
ROULEAUX BLD QL SMEAR: NORMAL
SICKLE CELLS BLD QL SMEAR: NORMAL
SMUDGE CELLS BLD QL SMEAR: NORMAL
SODIUM SERPL-SCNC: 145 MMOL/L (ref 135–145)
SPHEROCYTES BLD QL SMEAR: NORMAL
STOMATOCYTES BLD QL SMEAR: NORMAL
TARGETS BLD QL SMEAR: NORMAL
TOXIC GRANULES BLD QL SMEAR: NORMAL
VANCOMYCIN SERPL-MCNC: 14.7 UG/ML
VARIANT LYMPHS BLD QL SMEAR: NORMAL
WBC # BLD AUTO: 8.7 10E3/UL (ref 4–11)

## 2023-10-23 PROCEDURE — 85025 COMPLETE CBC W/AUTO DIFF WBC: CPT | Performed by: STUDENT IN AN ORGANIZED HEALTH CARE EDUCATION/TRAINING PROGRAM

## 2023-10-23 PROCEDURE — 86140 C-REACTIVE PROTEIN: CPT | Performed by: STUDENT IN AN ORGANIZED HEALTH CARE EDUCATION/TRAINING PROGRAM

## 2023-10-23 PROCEDURE — 80202 ASSAY OF VANCOMYCIN: CPT | Performed by: STUDENT IN AN ORGANIZED HEALTH CARE EDUCATION/TRAINING PROGRAM

## 2023-10-23 PROCEDURE — 80053 COMPREHEN METABOLIC PANEL: CPT | Performed by: STUDENT IN AN ORGANIZED HEALTH CARE EDUCATION/TRAINING PROGRAM

## 2023-10-26 ENCOUNTER — HOSPITAL ENCOUNTER (OUTPATIENT)
Dept: WOUND CARE | Facility: CLINIC | Age: 28
Discharge: HOME | End: 2023-10-26
Payer: MEDICARE

## 2023-10-26 NOTE — DISCHARGE INSTRUCTIONS
Dianna Cottrell      1995  A DME order was not completed because supplies were not needed  [facility] [place] Phone: [phone] Fax: [phone] 3x/wk    Dr Zacarias FLAP surgery on 8/25/23  Continue to follow with Infection Disease MD for Antibiotics    Wound Care: Right posterior thigh/ Ischial tuberosity- Monday  Wash your hands with soap and water before you begin your dressing change and prepare a clean surface for dressings.  Remove all staples and sutures from surgical site  Gently cleanse skin with bath wipe/ Peroxide/ Wound spray and pat dry  Leave skin intact and open to air  Do NOT massage the skin or FLAP area    Important!!!:  After each sitting period/session, the wound or suture site must be checked by the nurse.  Any sign of pressure or damage (e.g. dehiscence of suture line, area of new drainage, poor capillary refill of flap) pushes the protocol back to point of no sign of damage.  All sitting protocols start with the patient IN BED flat on buttocks.    Sitting Protocol  1.  Begin sitting up in bed for 15 minutes, 3-4 times per day.  2.  At 1-2 days move to 30 minutes of sitting in bed, 3-4 times per day.  If OK wound check, then advance sitting period by 15 minutes the next day or two.   a)   To 45 minutes sitting in bed  b)   To 60 minutes sitting in bed  3.  When patient can tolerate sitting in bed for sessions of 60 minutes, get wheelchair cushion mapped (in both the seating position and also in the tilted position if your chair tilts)  Move the patient to the chair to sit, starting with sitting for 60 minutes in the chair, 3 times per day, checking the flap and incisions after each sitting session.  If OK wound check, then advance sitting period in the chair by 15 minutes the next day or two.  To 1 hour, 15 minutes sitting in the chair  To 1 hour, 30 minutes sitting in the chair  To 1 hour, 45 minutes sitting in the chair  To 2 hours sitting in the chair- can start showers in  Shower chair  5.   When tolerating 2 hour sitting sessions, proceed with usual regimen, in wheelchair, with appropriate off-loading techniques.  6.  It will take a full 2 years for the skin to mature and then will only be 80% strong!             Remember: If at any point during the sitting protocol, either in bed or in chair, the wound changes for the worse, return to the previous time period that was tolerated.     Fred Zacarias M.D. October 26, 2023    Call us at [phone] if you have any questions about your wounds, have redness or swelling around your wound, have a fever of 101 degrees Fahrenheit or greater or if you have any other problems or concerns. We answer the phone Monday through Friday 8 am to 4 pm, please leave a message as we check the voicemail frequently throughout the day.     If you had a positive experience please indicate that on your patient satisfaction survey form that [facility] will be sending you.   It was a pleasure meeting with you today.  Thank you for allowing me and my team the privilege of caring for you today.  YOU are the reason we are here, and I truly hope we provided you with the excellent service you deserve.  Please let us know if there is anything else we can do for you so that we can be sure you are leaving completely satisfied with your care experience.      If you have any billing related questions please call the [facility] Business office at [phone]. The clinic staff does not handle billing related matters.   If you are scheduled to have a follow up appointment, you will receive a reminder call the day before your visit. On the appointment day please arrive 15 minutes prior to your appointment time. If you are unable to keep that appointment, please call the clinic to cancel or reschedule. If you are more than 10 minutes late or greater for your scheduled appointment time, the clinic policy is that you may be asked to reschedule.

## 2023-10-26 NOTE — PROGRESS NOTES
Patient Active Problem List   Diagnosis    c5 burst fracture    Lesions of vulva    IUD (intrauterine device) in place- placed 12/2017    H/O: pneumonia    АНДРЕЙ (generalized anxiety disorder)    Quadriplegia, post-traumatic (H)- incomplete quad, limited use upper extremities    Major depressive disorder with single episode, in partial remission (H24)    Autonomic dysreflexia    Leukocytosis    Pressure injury of right ischium, stage 4 (H)    Personal history of DVT (deep vein thrombosis)    Neurogenic bladder    Impaired lung function    Encounter for insertion of mirena IUD    YONI III with severe dysplasia    Moderate protein-calorie malnutrition (H24)    Major depression    Neurogenic bowel    Spasticity    Spinal cord injury, C5-C7 (H)    Urinary incontinence    Nephrolithiasis    Hypoxia    Neuromuscular respiratory weakness (H)    Urinary retention    Sepsis (H)    Community acquired pneumonia of left lower lobe of lung    S/P flap graft     Past Medical History:   Diagnosis Date    Anemia     with pregnancy    c5 burst fracture 12/18/2012    C5-C7 fracture with cord injury    Compression fracture of L1 lumbar vertebra (H) 12/18/2012    L1 superior endplate compression fracture    Decubitus ulcer     OF RIGHT ISCHIUM    Depressive disorder     Encounter for insertion of mirena IUD 12/13/2017    Fracture of thoracic spine without spinal cord lesion (H) 12/18/2012    T3-T8 spinous process fractures    History of spinal cord injury     History of thrombophlebitis     Hypertension 12/06/2016    Impaired lung function     Nephrolithiasis 4/11/2022    Neurogenic bladder     Neurogenic bowel     Quadriplegia (H)     Thrombosis     Urinary tract infection     Vocal cord dysfunction     Left vocal cord weakness noted by ENT post extubation 12/2012     Labs:   Recent Labs   Lab Test 10/23/23  0903 07/02/23  0611 07/01/23  1754 04/12/22  0712 04/15/21  1445   ALBUMIN 3.8   < >  --    < >  --    HGB 11.1*   < >  --    < >   --    INR  --   --  1.05   < >  --    WBC 8.7   < >  --    < >  --    CRP  --   --   --   --  9.7*    < > = values in this interval not displayed.     Nutrition requirements were discussed with patient today.  Vitals:  LMP 06/07/2023   Wound:   Wound Ischial tuberosity Pressure injury community acquired Stage 4 (Active)       Wound Coccyx Pressure injury community acquired Stage 2 (Active)   Wound Bed Other (Comment) 09/26/23 0545   Drainage Amount None 09/26/23 1029   Dressing Foam 09/26/23 1029   Dressing Status Removed;Changed;New dressing 09/26/23 1029   Dressing Change Due 09/29/23 09/26/23 1029       Wound (used by OP [facility] only) 10/14/19 1407 Right pressure injury (Active)       Wound (used by OP [facility] only) 05/09/23 1512 Right medial ischial tuberosity pressure injury (Active)   Thickness/Stage Stage 4 06/14/23 1145   Base red;slough 10/26/23 1307   Periwound redness 10/26/23 1307   Periwound Temperature warm 10/26/23 1307   Periwound Skin Turgor soft 06/14/23 1145   Edges open 06/14/23 1145   Length (cm) 0.8 07/20/23 1317   Width (cm) 0.8 07/20/23 1317   Depth (cm) 0.2 07/20/23 1317   Wound (cm^2) 0.64 cm^2 07/20/23 1317   Wound Volume (cm^3) 0.13 cm^3 07/20/23 1317   Wound healing % 14.67 07/20/23 1317   Tunneling [Depth (cm)/Location] 6o'/ 2.5cm 06/14/23 1145   Undermining [Depth (cm)/Location] 12-12o'/ 1.6cm 06/14/23 1145   Drainage Characteristics/Odor serosanguineous 10/26/23 1307   Drainage Amount moderate 10/26/23 1307   Care, Wound chemical cautery applied 06/14/23 1145       Wound (used by OP [facility] only) 07/20/23 1316 Right lateral ischial tuberosity pressure injury (Active)   Thickness/Stage Stage 4 10/26/23 1307   Base slough;granulating 10/26/23 1307   Periwound redness;macerated 10/26/23 1307   Periwound Temperature warm 10/26/23 1307   Periwound Skin Turgor soft 10/26/23 1307   Edges open 10/26/23 1307   Length (cm) 1.1 07/20/23 1317   Width (cm) 0.8 07/20/23 1317   Depth (cm) 1.5 07/20/23 1317    Wound (cm^2) 0.88 cm^2 07/20/23 1317   Wound Volume (cm^3) 1.32 cm^3 07/20/23 1317   Undermining [Depth (cm)/Location] 1 o'clock/2.5 cm; 12-12 o'clock/1.5 cm 07/20/23 1317   Drainage Characteristics/Odor serosanguineous 10/26/23 1307   Drainage Amount large 10/26/23 1307   Care, Wound non-select wound debridement performed 07/20/23 1317       Wound (used by OP [facility] only) 07/20/23 1316 sacral pressure injury (Active)   Thickness/Stage Stage 3 10/26/23 1307   Base granulating;moist 10/26/23 1307   Periwound intact;redness 10/26/23 1307   Periwound Temperature warm 10/26/23 1307   Periwound Skin Turgor soft 10/26/23 1307   Edges open 10/26/23 1307   Length (cm) 0.1 07/20/23 1317   Width (cm) 0.1 07/20/23 1317   Depth (cm) 0 07/20/23 1317   Wound (cm^2) 0.01 cm^2 07/20/23 1317   Wound Volume (cm^3) 0 cm^3 07/20/23 1317   Drainage Amount none 10/26/23 1307   Care, Wound non-select wound debridement performed 07/20/23 1317       Incision/Surgical Site 09/20/23 Left;Lower Back (Active)   Incision Assessment [facility] 09/26/23 1029   Tatiana-Incision Assessment [facility] 09/25/23 0000   Closure ESTEFANÍA 09/25/23 0000   Incision Drainage Amount None 09/24/23 1600   Dressing Intervention Clean, dry, intact 09/26/23 0545       Incision/Surgical Site 09/20/23 Left Flank (Active)   Incision Assessment [facility] 09/26/23 1029   Tatiana-Incision Assessment [facility] 09/25/23 0000   Incision Drainage Amount None 09/24/23 1600   Drainage Description [facility] 09/25/23 0000   Dressing Intervention Clean, dry, intact 09/26/23 0545       Incision/Surgical Site 09/20/23 Bilateral Leg (Active)   Incision Assessment [facility] 09/26/23 0545   Tatiana-Incision Assessment [facility] 09/25/23 0000   Closure ESTEFANÍA 09/25/23 0000   Incision Drainage Amount None 09/24/23 1600   Dressing Intervention Clean, dry, intact 09/26/23 0545       Incision/Surgical Site 09/20/23 Posterior;Right Thigh (Active)   Incision Assessment [facility] 09/26/23 1029   Tatiana-Incision Assessment [facility] 09/25/23 1015   Closure  Approximated;Staples;Sutures 09/26/23 1029   Incision Drainage Amount None 09/26/23 1029   Dressing Intervention Dressing removed;New dressing applied 09/26/23 1029    Photo: see media tab for the pictures  Further instructions from your care team         Dianna Cottrell      1995  A DME order was not completed because supplies were not needed  [facility] [place] Phone: [phone] Fax: [phone] 3x/wk    Dr Zacarias FLAP surgery on 8/25/23  Continue to follow with Infection Disease MD for Antibiotics    Wound Care: Right posterior thigh/ Ischial tuberosity- Monday  Wash your hands with soap and water before you begin your dressing change and prepare a clean surface for dressings.  Remove all staples and sutures from surgical site  Gently cleanse skin with bath wipe/ Peroxide/ Wound spray and pat dry  Leave skin intact and open to air  Do NOT massage the skin or FLAP area    Important!!!:  After each sitting period/session, the wound or suture site must be checked by the nurse.  Any sign of pressure or damage (e.g. dehiscence of suture line, area of new drainage, poor capillary refill of flap) pushes the protocol back to point of no sign of damage.  All sitting protocols start with the patient IN BED flat on buttocks.    Sitting Protocol  1.  Begin sitting up in bed for 15 minutes, 3-4 times per day.  2.  At 1-2 days move to 30 minutes of sitting in bed, 3-4 times per day.  If OK wound check, then advance sitting period by 15 minutes the next day or two.   a)   To 45 minutes sitting in bed  b)   To 60 minutes sitting in bed  3.  When patient can tolerate sitting in bed for sessions of 60 minutes, get wheelchair cushion mapped (in both the seating position and also in the tilted position if your chair tilts)  Move the patient to the chair to sit, starting with sitting for 60 minutes in the chair, 3 times per day, checking the flap and incisions after each sitting session.  If OK wound check, then  advance sitting period in the chair by 15 minutes the next day or two.  To 1 hour, 15 minutes sitting in the chair  To 1 hour, 30 minutes sitting in the chair  To 1 hour, 45 minutes sitting in the chair  To 2 hours sitting in the chair- can start showers in Shower chair  5.   When tolerating 2 hour sitting sessions, proceed with usual regimen, in wheelchair, with appropriate off-loading techniques.  6.  It will take a full 2 years for the skin to mature and then will only be 80% strong!             Remember: If at any point during the sitting protocol, either in bed or in chair, the wound changes for the worse, return to the previous time period that was tolerated.     Fred Zacarias M.D. October 26, 2023

## 2023-10-26 NOTE — PROGRESS NOTES
[facility]  PROGRESS NOTE    HISTORY OF PRESENT ILLNESS: This is a 28-year-old incomplete quad with  right ischial stage IV decubitus with osteomyelitis.  She also has a tiny coccygeal wound as well.     INTERVAL HISTORY:   7/20/23:  I had not seen her in quite some time, and then she bounced into the hospital with what sounds like urosepsis, and ended up having an obstructing stone that required placement of a perc neph tube.  We took a look at her wound and while it was small, as far as a skin defect, there was a bit of a pocket traveling underneath some what used to be adherent scar tissue.  While in the hospital, it certainly was not her biggest problem, and they let her out last Thursday.  She has a special Span Adalgisa Easy Air lateral rotating pressure air mattress at home, and her mom is basically her PCA.  They have been using Mesalt packing and having moderate usual amount of drainage.  She has been feeling much better since her original presentation.  She missed her kids and really if it were not for her mom, I do not think we would be talking about flapping her wound.    10/26/23: this is a telephone visit today with Dianna and her mom. She underwent R posterior thigh flap closure for R IT decubitus with osteomyelitis on 9/20. She has done her postop recovery at home and it sounds like things have been going well. The ROSANNE already fell out.   She has been using some skin creams prescribed by Dermatology and it sounds like the redness has resolved. Latanya Drew) has been seeing her for any dressing needs and weekly lab draws while she is on antibiotics. Her course will continue for 2 more weeks.      PHYSICAL EXAM:   7/20/23: On exam, the skin defect over the right IT is fairly small, but enough to get some packing in.  From what I can see of the wound, it looks fairly clean with granulation tissue.  There is an area at the most proximal aspect of the adherent scar  that is starting to break down and may end up being kind of a counter drainage hole.  Either way, it will be removed when we definitively fix her wound.  I am hoping to shoot for 08/25 to do a right IT debridement and flap probably from the posterior thigh.  We did receive an email from Dr. Tatum about when to remove her kidney stone, and actually if we could combine that or have it staged within a couple days of each other that would be ideal.  We will contact his  and see what we can work out for dates.     10/26/23: photos show a viable intact R posterior thigh flap with some scabbing along the inset sutures and at the T-junction of the VY donor site closure. There are no gross indications of infection or dehiscence.       PROCEDURES: none    Please see nursing notes for wound measurements, photos and vital signs.    ASSESSMENT/ PLAN:   7/20/23:    Currently, she has [employer] as her home care company, and she is due to have an appointment or a followup with her primary care provider, Suzan Willis, in the next week or two, so I asked her to ask for a history and physical, so that she would not have to do an extra visit for surgery next month.  She seemed to think that was doable.  Until we take her to surgery, I think the Mesalt is no longer needed since things are clean.  They could use some packing strips 1/2-inch with the Vashe for the dressings, as long as it is not too soggy, and then cover that with Mepilex.     10/26/23: home care to remove sutures and staples on Monday. Will send sitting protocol to begin Monday AFTER sutures removed. Contact clinic if any problems or questions. If any signs of pressure injury or dehiscence, will need to halt sitting protocol. Once she is tolerating sitting in bed, she can get wheelchair cushion remapped and continue increasing sitting time in WC.  She can now roll onto operative R side for bedding changes too.     As for getting up to shower chair (which is  well-padded), she can begin once she is tolerating 2 hours in WC.     FOLLOW-UP:   7/20/23: hopefully OR in next month.    10/26/23: we will find a time for Dianna to come for a final follow up in her wheelchair that may not be until December, unless she develops wound problems.

## 2023-10-29 DIAGNOSIS — F41.1 GAD (GENERALIZED ANXIETY DISORDER): ICD-10-CM

## 2023-10-29 DIAGNOSIS — F32.4 MAJOR DEPRESSIVE DISORDER WITH SINGLE EPISODE, IN PARTIAL REMISSION (H): ICD-10-CM

## 2023-10-30 ENCOUNTER — LAB REQUISITION (OUTPATIENT)
Dept: LAB | Facility: CLINIC | Age: 28
End: 2023-10-30
Payer: MEDICARE

## 2023-10-30 DIAGNOSIS — M86.661: ICD-10-CM

## 2023-10-30 LAB
BASOPHILS # BLD AUTO: 0.1 10E3/UL (ref 0–0.2)
BASOPHILS NFR BLD AUTO: 1 %
CRP SERPL-MCNC: 21.81 MG/L
EOSINOPHIL # BLD AUTO: 0.6 10E3/UL (ref 0–0.7)
EOSINOPHIL NFR BLD AUTO: 7 %
ERYTHROCYTE [DISTWIDTH] IN BLOOD BY AUTOMATED COUNT: 14.7 % (ref 10–15)
HCT VFR BLD AUTO: 35.3 % (ref 35–47)
HGB BLD-MCNC: 11.3 G/DL (ref 11.7–15.7)
IMM GRANULOCYTES # BLD: 0 10E3/UL
IMM GRANULOCYTES NFR BLD: 0 %
LYMPHOCYTES # BLD AUTO: 1.6 10E3/UL (ref 0.8–5.3)
LYMPHOCYTES NFR BLD AUTO: 19 %
MCH RBC QN AUTO: 28 PG (ref 26.5–33)
MCHC RBC AUTO-ENTMCNC: 32 G/DL (ref 31.5–36.5)
MCV RBC AUTO: 87 FL (ref 78–100)
MONOCYTES # BLD AUTO: 0.8 10E3/UL (ref 0–1.3)
MONOCYTES NFR BLD AUTO: 10 %
NEUTROPHILS # BLD AUTO: 5.3 10E3/UL (ref 1.6–8.3)
NEUTROPHILS NFR BLD AUTO: 63 %
NRBC # BLD AUTO: 0 10E3/UL
NRBC BLD AUTO-RTO: 0 /100
PLATELET # BLD AUTO: 300 10E3/UL (ref 150–450)
RBC # BLD AUTO: 4.04 10E6/UL (ref 3.8–5.2)
VANCOMYCIN SERPL-MCNC: 19.5 UG/ML
WBC # BLD AUTO: 8.4 10E3/UL (ref 4–11)

## 2023-10-30 PROCEDURE — 80202 ASSAY OF VANCOMYCIN: CPT | Performed by: STUDENT IN AN ORGANIZED HEALTH CARE EDUCATION/TRAINING PROGRAM

## 2023-10-30 PROCEDURE — 86140 C-REACTIVE PROTEIN: CPT | Performed by: STUDENT IN AN ORGANIZED HEALTH CARE EDUCATION/TRAINING PROGRAM

## 2023-10-30 PROCEDURE — 85025 COMPLETE CBC W/AUTO DIFF WBC: CPT | Performed by: STUDENT IN AN ORGANIZED HEALTH CARE EDUCATION/TRAINING PROGRAM

## 2023-10-30 RX ORDER — SERTRALINE HYDROCHLORIDE 100 MG/1
150 TABLET, FILM COATED ORAL DAILY
Qty: 135 TABLET | Refills: 0 | Status: SHIPPED | OUTPATIENT
Start: 2023-10-30 | End: 2024-03-04

## 2023-10-31 DIAGNOSIS — L89.319 DECUBITUS ULCER OF RIGHT ISCHIAL TUBEROSITY REGION: Primary | ICD-10-CM

## 2023-11-04 DIAGNOSIS — R06.89 AIRWAY CLEARANCE IMPAIRMENT: ICD-10-CM

## 2023-11-04 DIAGNOSIS — J18.9 FREQUENT EPISODES OF PNEUMONIA: ICD-10-CM

## 2023-11-04 DIAGNOSIS — G82.53 QUADRIPLEGIA, C5-C7 COMPLETE (H): ICD-10-CM

## 2023-11-06 ENCOUNTER — LAB REQUISITION (OUTPATIENT)
Dept: LAB | Facility: CLINIC | Age: 28
End: 2023-11-06
Payer: MEDICARE

## 2023-11-06 ENCOUNTER — MYC REFILL (OUTPATIENT)
Dept: FAMILY MEDICINE | Facility: CLINIC | Age: 28
End: 2023-11-06

## 2023-11-06 DIAGNOSIS — F41.1 GAD (GENERALIZED ANXIETY DISORDER): ICD-10-CM

## 2023-11-06 DIAGNOSIS — F32.4 MAJOR DEPRESSIVE DISORDER WITH SINGLE EPISODE, IN PARTIAL REMISSION (H): ICD-10-CM

## 2023-11-06 DIAGNOSIS — M86.661: ICD-10-CM

## 2023-11-06 LAB
ALBUMIN SERPL BCG-MCNC: 3.8 G/DL (ref 3.5–5.2)
ALP SERPL-CCNC: 117 U/L (ref 35–104)
ALT SERPL W P-5'-P-CCNC: ABNORMAL U/L
ANION GAP SERPL CALCULATED.3IONS-SCNC: 16 MMOL/L (ref 7–15)
AST SERPL W P-5'-P-CCNC: ABNORMAL U/L
BASOPHILS # BLD AUTO: 0.1 10E3/UL (ref 0–0.2)
BASOPHILS NFR BLD AUTO: 1 %
BILIRUB SERPL-MCNC: <0.2 MG/DL
BUN SERPL-MCNC: 8.4 MG/DL (ref 6–20)
CALCIUM SERPL-MCNC: 8.5 MG/DL (ref 8.6–10)
CHLORIDE SERPL-SCNC: 107 MMOL/L (ref 98–107)
CREAT SERPL-MCNC: 0.49 MG/DL (ref 0.51–0.95)
CRP SERPL-MCNC: 31.17 MG/L
DEPRECATED HCO3 PLAS-SCNC: 21 MMOL/L (ref 22–29)
EGFRCR SERPLBLD CKD-EPI 2021: >90 ML/MIN/1.73M2
EOSINOPHIL # BLD AUTO: 0.7 10E3/UL (ref 0–0.7)
EOSINOPHIL NFR BLD AUTO: 7 %
ERYTHROCYTE [DISTWIDTH] IN BLOOD BY AUTOMATED COUNT: 14.8 % (ref 10–15)
GLUCOSE SERPL-MCNC: 100 MG/DL (ref 70–99)
HCT VFR BLD AUTO: 35 % (ref 35–47)
HGB BLD-MCNC: 11 G/DL (ref 11.7–15.7)
IMM GRANULOCYTES # BLD: 0 10E3/UL
IMM GRANULOCYTES NFR BLD: 0 %
LYMPHOCYTES # BLD AUTO: 2 10E3/UL (ref 0.8–5.3)
LYMPHOCYTES NFR BLD AUTO: 20 %
MCH RBC QN AUTO: 27.1 PG (ref 26.5–33)
MCHC RBC AUTO-ENTMCNC: 31.4 G/DL (ref 31.5–36.5)
MCV RBC AUTO: 86 FL (ref 78–100)
MONOCYTES # BLD AUTO: 0.9 10E3/UL (ref 0–1.3)
MONOCYTES NFR BLD AUTO: 9 %
NEUTROPHILS # BLD AUTO: 6.4 10E3/UL (ref 1.6–8.3)
NEUTROPHILS NFR BLD AUTO: 63 %
NRBC # BLD AUTO: 0 10E3/UL
NRBC BLD AUTO-RTO: 0 /100
PLATELET # BLD AUTO: 326 10E3/UL (ref 150–450)
POTASSIUM SERPL-SCNC: 3.3 MMOL/L (ref 3.4–5.3)
PROT SERPL-MCNC: 6.4 G/DL (ref 6.4–8.3)
RBC # BLD AUTO: 4.06 10E6/UL (ref 3.8–5.2)
SODIUM SERPL-SCNC: 144 MMOL/L (ref 135–145)
VANCOMYCIN SERPL-MCNC: 17.4 UG/ML
WBC # BLD AUTO: 10 10E3/UL (ref 4–11)

## 2023-11-06 PROCEDURE — 84155 ASSAY OF PROTEIN SERUM: CPT | Performed by: STUDENT IN AN ORGANIZED HEALTH CARE EDUCATION/TRAINING PROGRAM

## 2023-11-06 PROCEDURE — 80202 ASSAY OF VANCOMYCIN: CPT | Performed by: STUDENT IN AN ORGANIZED HEALTH CARE EDUCATION/TRAINING PROGRAM

## 2023-11-06 PROCEDURE — 86140 C-REACTIVE PROTEIN: CPT | Performed by: STUDENT IN AN ORGANIZED HEALTH CARE EDUCATION/TRAINING PROGRAM

## 2023-11-06 PROCEDURE — 85025 COMPLETE CBC W/AUTO DIFF WBC: CPT | Performed by: STUDENT IN AN ORGANIZED HEALTH CARE EDUCATION/TRAINING PROGRAM

## 2023-11-06 NOTE — TELEPHONE ENCOUNTER
Pt calling back to check in on the Albuterol. Also was told to ask for referral to pulmonology due to her inflammatory markers being slightly elevated and she does not have a regular pulmonologist.     Pt hoping PCP could prescribe something other than the albuterol so that he cough doesn't turn into pneumonia in the meantime. Currently on IV antibiotics through PIC line

## 2023-11-07 ENCOUNTER — NURSE TRIAGE (OUTPATIENT)
Dept: FAMILY MEDICINE | Facility: CLINIC | Age: 28
End: 2023-11-07
Payer: MEDICARE

## 2023-11-07 ENCOUNTER — TELEPHONE (OUTPATIENT)
Dept: FAMILY MEDICINE | Facility: CLINIC | Age: 28
End: 2023-11-07
Payer: MEDICARE

## 2023-11-07 RX ORDER — ALBUTEROL SULFATE 0.83 MG/ML
SOLUTION RESPIRATORY (INHALATION)
Qty: 150 ML | Refills: 3 | Status: SHIPPED | OUTPATIENT
Start: 2023-11-07

## 2023-11-07 RX ORDER — SERTRALINE HYDROCHLORIDE 100 MG/1
150 TABLET, FILM COATED ORAL DAILY
Qty: 135 TABLET | Refills: 0 | OUTPATIENT
Start: 2023-11-07

## 2023-11-07 RX ORDER — ALBUTEROL SULFATE 0.83 MG/ML
SOLUTION RESPIRATORY (INHALATION)
Qty: 150 ML | Refills: 3 | Status: SHIPPED | OUTPATIENT
Start: 2023-11-07 | End: 2023-11-07

## 2023-11-07 NOTE — TELEPHONE ENCOUNTER
Prior Authorization Approval    Authorization Effective Date: 1/1/2023  Authorization Expiration Date: 11/6/2024  Medication: Albuterol Sulfate (2.5 MG/3ML)0.083% nebulizer solution    Approved Dose/Quantity:   Reference #:     Insurance Company: [facility] [employer] - Phone [phone] Fax [phone]  Expected CoPay:       CoPay Card Available:      Foundation Assistance Needed:    Which Pharmacy is filling the prescription (Not needed for infusion/clinic administered): [id number], [place] - [address] [place] [address]  Pharmacy Notified: yes   Patient Notified:  yes- Pharmacy will contact patient when ready to /ship

## 2023-11-07 NOTE — TELEPHONE ENCOUNTER
Per pharmacy patient already paid with a discount card, $18 copay.     Let them know for future fills patient's part D pharmacy benefits approved the nebulizing solution.

## 2023-11-07 NOTE — TELEPHONE ENCOUNTER
Pt is already established with pulmonology (she does not need a new referral). She just needs to call to schedule. Please assist.     Pt has C5 quadriplegia and restrictive lung disease and has been prone to pneumonia. I advise an in person visit with her history. She has home care nursing- how are her vitals and sats?     Is there a same day in her area (lives in [place]) today?     Per last pulm notes- 04-25-22:  - Will put her on a scheduled nebulized Brovana daily with continued use of flutter valve (Aerobika) in line with nebulizer, to help clear secretions.  - Should continue to add saline nebs at least twice a day if she feels secretions are thick and difficult to cough up. This should be used with albuterol.   - Recommend she continue to use the cough assist device she has at home immediately after these neb treatments. Continue use with Incentive Spirometer.    Is she doing any of these things that were advised from last pulm visit?      Suzan Willis PA-C

## 2023-11-07 NOTE — TELEPHONE ENCOUNTER
Please start a PA for the patient.       albuterol (PROVENTIL) (2.5 MG/3ML) 0.083% neb solution TAKE 1 VIAL BY NEBULIZATION EVERY 4 HOURS AS NEEDED FOR SHORTNESS OF BREATH / DYSPNEA OR WHEEZING 150 mL 3 ordered 11/07/2023 -- -- -- --          Summary: TAKE 1 VIAL BY NEBULIZATION EVERY 4 HOURS AS NEEDED FOR SHORTNESS OF BREATH / DYSPNEA OR WHEEZING, Disp-150 mL, R-3, E-Prescribe          Carmen Canales, MSN, RN, PHN  [facility]  November 7, 2023

## 2023-11-07 NOTE — TELEPHONE ENCOUNTER
Transmission to pharmacy failed. RN resent script again to pharmacy.     Renetta Shrestha, BSN, RN

## 2023-11-07 NOTE — TELEPHONE ENCOUNTER
Central Prior Authorization Team   Phone: [phone]    PA Initiation    Medication: Albuterol Sulfate (2.5 MG/3ML)0.083% nebulizer solution    Insurance Company: [facility] - Phone [phone] Fax [phone]  Pharm[id number] [place] [place] - [address] [place] [address] TRAIL  Filling Pharmacy Phone: [phone]  Filling Pharmacy Fax:    Start Date: 11/7/2023

## 2023-11-07 NOTE — TELEPHONE ENCOUNTER
Provider: Patient would like an appointment to renew pulmonary referral and discuss current cough as she is afraid it will turn to pneumonia and end with a hospitalization    S: Cough    B: Patient calling stating her cough started 3 days ago. She says it is mostly productive and sometimes gets mucous up. She denies congestion/runny nose. Patient denies shortness of breath but has been using albuterol inhaler.    A: Patient states her highest temp has been 99.7.     R: RN provided home care instructions and patient stated understanding. Patient would still like appointment with PCP to discuss pulmonary referral and her current illness    Reason for Disposition   Cough with no complications    Additional Information   Negative: Bluish (or gray) lips or face   Negative: SEVERE difficulty breathing (e.g., struggling for each breath, speaks in single words)   Negative: Rapid onset of cough and has hives   Negative: Coughing started suddenly after medicine, an allergic food or bee sting   Negative: Difficulty breathing after exposure to flames, smoke, or fumes   Negative: Sounds like a life-threatening emergency to the triager   Negative: Previous asthma attacks and this feels like asthma attack   Negative: Dry cough (non-productive; no sputum or minimal clear sputum) and within 14 days of COVID-19 Exposure   Negative: MODERATE difficulty breathing (e.g., speaks in phrases, SOB even at rest, pulse 100-120) and still present when not coughing   Negative: Chest pain present when not coughing   Negative: Passed out (i.e., fainted, collapsed and was not responding)   Negative: Patient sounds very sick or weak to the triager   Negative: MILD difficulty breathing (e.g., minimal/no SOB at rest, SOB with walking, pulse <100) and still present when not coughing   Negative: Coughed up > 1 tablespoon (15 ml) blood (Exception: Blood-tinged sputum.)   Negative: Fever > 103 F (39.4 C)   Negative: Fever > 101 F (38.3 C) and over 60  years of age   Negative: Fever > 100.0 F (37.8 C) and has diabetes mellitus or a weak immune system (e.g., HIV positive, cancer chemotherapy, organ transplant, splenectomy, chronic steroids)   Negative: Fever > 100.0 F (37.8 C) and bedridden (e.g., CVA, chronic illness, recovering from surgery)   Negative: Increasing ankle swelling   Negative: Wheezing is present   Negative: SEVERE coughing spells (e.g., whooping sound after coughing, vomiting after coughing)   Negative: Coughing up johanna-colored (reddish-brown) or blood-tinged sputum   Negative: Fever present > 3 days (72 hours)   Negative: Fever returns after gone for over 24 hours and symptoms worse or not improved   Negative: Using nasal washes and pain medicine > 24 hours and sinus pain persists   Negative: Known COPD or other severe lung disease (i.e., bronchiectasis, cystic fibrosis, lung surgery) and worsening symptoms (i.e., increased sputum purulence or amount, increased breathing difficulty)   Negative: Continuous (nonstop) coughing interferes with work or school and no improvement using cough treatment per Care Advice   Negative: Patient wants to be seen   Negative: Cough has been present for > 3 weeks   Negative: Allergy symptoms are also present (e.g., itchy eyes, clear nasal discharge, postnasal drip)   Negative: Nasal discharge present > 10 days   Negative: Exposure to TB (Tuberculosis)   Negative: Taking an ACE Inhibitor medicine (e.g., benazepril/LOTENSIN, captopril/CAPOTEN, enalapril/VASOTEC, lisinopril/ZESTRIL)    Protocols used: Cough-A-OH

## 2023-11-07 NOTE — TELEPHONE ENCOUNTER
RN called in RX into pharmacy.   Patient was notified of RX and need to schedule a visit.     Carmen Canales, MSN, RN, PHN  [place]/[facility]  November 7, 2023

## 2023-11-07 NOTE — TELEPHONE ENCOUNTER
I tried to refill albuterol. Electronic Transmission failed. RN- can we call in the refill.     If she thinks she needs more than her albuterol needs to be seen for visit.    JOSIE RachelC

## 2023-11-07 NOTE — TELEPHONE ENCOUNTER
RN called to discuss further.   Routing to PCP for review.     Rn provided the number to call to schedule with pulmonology.   Home care dose not come out daily. The last time they were there was yesterday.   BP - 117/73  HR - unsure  Respirations - unsure  O2 - 96-97 %  Temp - 99.4 F  She mentioned that the RN informed her her lungs didn't have any rattling or wheezing.   Encouraged to schedule a same day appointment due to her health history. Recommended to schedule same day closer to home. Per patient she had a skin flat surgery on her gluteal area a month ago. She currently has stitches located in her gluteal area. Therefore, per patient she is unable to get up into her chair to sit until the stitches are removed.   The patient has not been using the flutter valve or cough assist. RN encouraged her to use this devices to help with her symptoms. She has been using her Tej machine. She has been adding saline to the nebulizer solutions.   Patient informed me that her Albuterol is not covered by insurance per pharmacy. Patient noted will pay out of pocket.   RN called pharmacy ([facility]) to discuss further.   Informed that the patient's insurance will not cover due to dx. Codes. The medication is covered if the patient has asthma or copd. No alternative is covered. Cost out of pocket is $41.09 (pharmacy can find a discount card for her too).   Patient will pay out of pocket for medication.   RN will send PA to PA team.       Carmen Canales, MSN, RN, PHN  [place]/[facility]  November 7, 2023

## 2023-11-09 ENCOUNTER — TELEPHONE (OUTPATIENT)
Dept: FAMILY MEDICINE | Facility: CLINIC | Age: 28
End: 2023-11-09
Payer: MEDICARE

## 2023-11-09 NOTE — TELEPHONE ENCOUNTER
Forms/Letter Request    Type of form/letter:  ORDERS FOR SIGNATURE    Have you been seen for this request: N/A    Do we have the form/letter: Yes    Who is the form from? [facility]  (if other please explain)    Where did/will the form come from? form was faxed in    When is form/letter needed by: within 7 business days     How would you like the form/letter returned: Fax : [phone]    Patient Notified form requests are processed in 3-5 business days:No    Could we send this information to you in [employer] or would you prefer to receive a phone call?:   Patient would like to be contacted via [employer]

## 2023-11-13 ENCOUNTER — LAB REQUISITION (OUTPATIENT)
Dept: LAB | Facility: CLINIC | Age: 28
End: 2023-11-13
Payer: MEDICARE

## 2023-11-13 DIAGNOSIS — M86.661: ICD-10-CM

## 2023-11-13 LAB
ALBUMIN SERPL BCG-MCNC: 4 G/DL (ref 3.5–5.2)
ALP SERPL-CCNC: 120 U/L (ref 35–104)
ALT SERPL W P-5'-P-CCNC: 14 U/L (ref 0–50)
ANION GAP SERPL CALCULATED.3IONS-SCNC: 9 MMOL/L (ref 7–15)
AST SERPL W P-5'-P-CCNC: 16 U/L (ref 0–45)
BASOPHILS # BLD AUTO: 0.1 10E3/UL (ref 0–0.2)
BASOPHILS NFR BLD AUTO: 1 %
BILIRUB SERPL-MCNC: <0.2 MG/DL
BUN SERPL-MCNC: 4.9 MG/DL (ref 6–20)
CALCIUM SERPL-MCNC: 8.9 MG/DL (ref 8.6–10)
CHLORIDE SERPL-SCNC: 106 MMOL/L (ref 98–107)
CREAT SERPL-MCNC: 0.45 MG/DL (ref 0.51–0.95)
CRP SERPL-MCNC: 44.83 MG/L
DEPRECATED HCO3 PLAS-SCNC: 26 MMOL/L (ref 22–29)
EGFRCR SERPLBLD CKD-EPI 2021: >90 ML/MIN/1.73M2
EOSINOPHIL # BLD AUTO: 0.7 10E3/UL (ref 0–0.7)
EOSINOPHIL NFR BLD AUTO: 7 %
ERYTHROCYTE [DISTWIDTH] IN BLOOD BY AUTOMATED COUNT: 14.8 % (ref 10–15)
GLUCOSE SERPL-MCNC: 89 MG/DL (ref 70–99)
HCT VFR BLD AUTO: 34.3 % (ref 35–47)
HGB BLD-MCNC: 11.1 G/DL (ref 11.7–15.7)
IMM GRANULOCYTES # BLD: 0 10E3/UL
IMM GRANULOCYTES NFR BLD: 0 %
LYMPHOCYTES # BLD AUTO: 1.9 10E3/UL (ref 0.8–5.3)
LYMPHOCYTES NFR BLD AUTO: 18 %
MCH RBC QN AUTO: 27.1 PG (ref 26.5–33)
MCHC RBC AUTO-ENTMCNC: 32.4 G/DL (ref 31.5–36.5)
MCV RBC AUTO: 84 FL (ref 78–100)
MONOCYTES # BLD AUTO: 0.8 10E3/UL (ref 0–1.3)
MONOCYTES NFR BLD AUTO: 8 %
NEUTROPHILS # BLD AUTO: 6.8 10E3/UL (ref 1.6–8.3)
NEUTROPHILS NFR BLD AUTO: 66 %
NRBC # BLD AUTO: 0 10E3/UL
NRBC BLD AUTO-RTO: 0 /100
PLATELET # BLD AUTO: 336 10E3/UL (ref 150–450)
POTASSIUM SERPL-SCNC: 3.2 MMOL/L (ref 3.4–5.3)
PROT SERPL-MCNC: 6.9 G/DL (ref 6.4–8.3)
RBC # BLD AUTO: 4.09 10E6/UL (ref 3.8–5.2)
SODIUM SERPL-SCNC: 141 MMOL/L (ref 135–145)
WBC # BLD AUTO: 10.4 10E3/UL (ref 4–11)

## 2023-11-13 PROCEDURE — 86140 C-REACTIVE PROTEIN: CPT | Performed by: STUDENT IN AN ORGANIZED HEALTH CARE EDUCATION/TRAINING PROGRAM

## 2023-11-13 PROCEDURE — 80053 COMPREHEN METABOLIC PANEL: CPT | Performed by: STUDENT IN AN ORGANIZED HEALTH CARE EDUCATION/TRAINING PROGRAM

## 2023-11-13 PROCEDURE — 80280 DRUG ASSAY VEDOLIZUMAB: CPT | Performed by: STUDENT IN AN ORGANIZED HEALTH CARE EDUCATION/TRAINING PROGRAM

## 2023-11-13 PROCEDURE — 85014 HEMATOCRIT: CPT | Performed by: STUDENT IN AN ORGANIZED HEALTH CARE EDUCATION/TRAINING PROGRAM

## 2023-11-15 ENCOUNTER — VIRTUAL VISIT (OUTPATIENT)
Dept: INFECTIOUS DISEASES | Facility: CLINIC | Age: 28
End: 2023-11-15
Attending: STUDENT IN AN ORGANIZED HEALTH CARE EDUCATION/TRAINING PROGRAM
Payer: MEDICARE

## 2023-11-15 ENCOUNTER — MEDICAL CORRESPONDENCE (OUTPATIENT)
Dept: HEALTH INFORMATION MANAGEMENT | Facility: CLINIC | Age: 28
End: 2023-11-15

## 2023-11-15 ENCOUNTER — TELEPHONE (OUTPATIENT)
Dept: FAMILY MEDICINE | Facility: CLINIC | Age: 28
End: 2023-11-15

## 2023-11-15 DIAGNOSIS — M86.651 CHRONIC OSTEOMYELITIS OF PELVIS, RIGHT (H): Primary | ICD-10-CM

## 2023-11-15 PROCEDURE — 99214 OFFICE O/P EST MOD 30 MIN: CPT | Mod: 24 | Performed by: STUDENT IN AN ORGANIZED HEALTH CARE EDUCATION/TRAINING PROGRAM

## 2023-11-15 RX ORDER — AMOXICILLIN 500 MG/1
500 CAPSULE ORAL 3 TIMES DAILY
Qty: 90 CAPSULE | Refills: 5 | Status: SHIPPED | OUTPATIENT
Start: 2023-11-15 | End: 2024-03-15

## 2023-11-15 ASSESSMENT — PAIN SCALES - GENERAL: PAINLEVEL: NO PAIN (0)

## 2023-11-15 NOTE — CONFIDENTIAL NOTE
Forms/Letter Request    Type of form/letter: PRN visit for PICC removal +   SN 11/12 2WK1,1WK1    Have you been seen for this request: N/A    Do we have the form/letter: Yes:     Who is the form from? Home care    Where did/will the form come from? form was faxed in    When is form/letter needed by: @ your earliest convenience    How would you like the form/letter returned: Fax : to:  [place] [employer]. @ [phone]    Patient Notified form requests are processed in 3-5 business days:No    Could we send this information to you in [employer] or would you prefer to receive a phone call?:   Patient would like to be contacted via [employer]
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
## 2023-11-15 NOTE — NURSING NOTE
Is the patient currently in the state of [place]? YES    Visit mode:VIDEO    If the visit is dropped, the patient can be reconnected by: VIDEO VISIT: Send to e-mail at: [email or web address]    Will anyone else be joining the visit? NO  (If patient encounters technical issues they should call [phone]    How would you like to obtain your AVS? MyChart    Are changes needed to the allergy or medication list? Pt stated no changes to allergies and Pt stated no med changes    Reason for visit: RECHECK    Vandana BLUEF
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
## 2023-11-16 ENCOUNTER — HOSPITAL ENCOUNTER (OUTPATIENT)
Dept: WOUND CARE | Facility: CLINIC | Age: 28
Discharge: HOME | End: 2023-11-16
Attending: SURGERY | Admitting: SURGERY
Payer: MEDICARE

## 2023-11-16 VITALS — TEMPERATURE: 97.6 F | SYSTOLIC BLOOD PRESSURE: 114 MMHG | HEART RATE: 73 BPM | DIASTOLIC BLOOD PRESSURE: 78 MMHG

## 2023-11-16 DIAGNOSIS — Z98.890 S/P FLAP GRAFT: Primary | ICD-10-CM

## 2023-11-16 PROCEDURE — G0463 HOSPITAL OUTPT CLINIC VISIT: HCPCS

## 2023-11-16 PROCEDURE — 99024 POSTOP FOLLOW-UP VISIT: CPT | Performed by: SURGERY

## 2023-11-16 NOTE — DISCHARGE INSTRUCTIONS
[facility]  [address] [facility] [address] [address]  Appointment Phone [phone] Nurse Advisors [phone]    Dianna Cottrell      1995    Physical therapy can begin gently and slowly    Important!!!:  After each sitting period/session, the wound or suture site must be checked by the nurse.  Any sign of pressure or damage (e.g. dehiscence of suture line, area of new drainage, poor capillary refill of flap) pushes the protocol back to point of no sign of damage.  Sitting Protocol  1.  Patient is up to 60 minutes sitting in bed  3.  When patient can tolerate sitting in bed for sessions of 60 minutes, get wheelchair cushion mapped (in both the seating position and also in the tilted position if your chair tilts)  Move the patient to the chair to sit, starting with sitting for 60 minutes in the chair, 3 times per day, checking the flap and incisions after each sitting session.  If OK wound check, then advance sitting period in the chair by 15 minutes the next day or two.  To 1 hour, 15 minutes sitting in the chair  To 1 hour, 30 minutes sitting in the chair  To 1 hour, 45 minutes sitting in the chair  To 2 hours sitting in the chair  5.   When tolerating 2 hour sitting sessions, proceed with usual regimen, in wheelchair, with appropriate off-loading techniques.             Remember: If at any point during the sitting protocol, either in bed or in chair, the wound changes for the worse, return to the previous time period that was tolerated.      Call the [facility] [place] regarding any changes, questions, or concerns at [phone].   Dr. Vira Zacarias      Education for Reducing a Patient s Risk for Pressure Injuries  There is no single preventive for pressure injuries. Give priority to pressure relief. Reduce other risks to help maintain a healthy flow of nutrients to the patient s skin.  Relieve pressure  Relieving pressure is the  single most important factor in preventing and treating pressure injuries.  You can relieve pressure and restore the skin s blood supply by repositioning the patient and using special devices. Post a schedule to remind you to reposition the patient -- from side to back or from stomach to side. Make minor position changes even more frequently. Specially designed pillows, beds, or mattresses can also help reduce pressure.  In a bed  Use pillows under calves to elevate the legs from above the knees to the ankles.  Alter the angles of arms and legs.  In a wheelchair  Be sure the wheelchair is the proper size for the patient to give optimal support. For example, if the seat it too narrow, it will put extra pressure on the hips.  Cushion the back and buttocks with pillows or wheelchair cushions, and pad the footrest.  Use a special wheelchair cushion that is designed to distribute weight evenly and relieve pressure points that is evaluated yearly.  Have special cushions tested and adjusted at the proper times as recommended by the . This will allow the pressure relief to remain effective.   Manage moisture  Keep skin clean and lubricated, but free of excess moisture. Put the patient on a regular toilet schedule. Use incontinent devices, if appropriate. Consult with a doctor about using diarrhea medicines:  Use talc-free powders or barrier creams.  Pat skin dry after bathing.  Lubricate skin with lotion.  Reduce shear and friction  Prevent skin breakdown by reducing friction and shear. Patients are less likely to slide down in bed if they re supported by pillows and the head of the bed isn t raised too high. During bed or wheelchair transfers, lift--don t drag--the patient. If you can t do this alone, get help. And be sure to use assistive devices, whenever possible.  In a bed  Use draw-sheets or transfer boards to move patients.  Clean and smooth the bed surface.  Lift the head of the bed no more than  30 .  Raise the foot of the bed slightly.  In a wheelchair  Support the patient s back with a pillow.  Use a foot extension.

## 2023-11-16 NOTE — PROGRESS NOTES
"[facility]  PROGRESS NOTE     HISTORY OF PRESENT ILLNESS: This is a 28-year-old incomplete quad with  right ischial stage IV decubitus with osteomyelitis.  She also has a tiny coccygeal wound as well.      INTERVAL HISTORY:   7/20/23:  I had not seen her in quite some time, and then she bounced into the hospital with what sounds like urosepsis, and ended up having an obstructing stone that required placement of a perc neph tube.  We took a look at her wound and while it was small, as far as a skin defect, there was a bit of a pocket traveling underneath some what used to be adherent scar tissue.  While in the hospital, it certainly was not her biggest problem, and they let her out last Thursday.  She has a special Cloneless Adalgisa Easy Air lateral rotating pressure air mattress at home, and her mom is basically her PCA.  They have been using Mesalt packing and having moderate usual amount of drainage.  She has been feeling much better since her original presentation.  She missed her kids and really if it were not for her mom, I do not think we would be talking about flapping her wound.     10/26/23: this is a telephone visit today with Dianna and her mom. She underwent R posterior thigh flap closure for R IT decubitus with osteomyelitis on 9/20. She has done her postop recovery at home and it sounds like things have been going well. The ROSANNE already fell out.   She has been using some skin creams prescribed by Dermatology and it sounds like the redness has resolved. Latanya (Iram) has been seeing her for any dressing needs and weekly lab draws while she is on antibiotics. Her course will continue for 2 more weeks.      11/16/23: Dianna is here in person with her mom. Her kids have strep and URI's so she has been making sure she keeps up with her nebs and hydration. She has also been having diarrhea from her antibiotics, causing a fungal \"diaper rash\". Fortunately, she saw Dr Samayoa " "from ID yesterday and they d/c'd the PICC and are switching over to just PO amox for the next 6-12 months for the actinomyces on cultures. Hopefully her stooling can get better over time.     She has been tolerating sitting upright in bed for at least 60 minute sessions without much change to her flap. She has put in a call to [employer] for pressure mapping her wheelchair post-flap, but she will also contact Kirill Saunders if things get delayed so she can get back into her wheelchair at home.     I think it's safe now to resume ROM for her RLE, as long as it is done gently and slowly. They will let her PCAs know that they should not \"push\" if there is any tissue resistance. As for transfers, mom lifts patient (pivot), but PCAs use hunter with sling. This would be OK as long as there is no pressure or shear trauma from the sling over the flap. For  toiletting/shower, she does have a Roho cushion for her shower chair, and they can also consider using extra padding with pillows, etc.      PHYSICAL EXAM:   7/20/23: On exam, the skin defect over the right IT is fairly small, but enough to get some packing in.  From what I can see of the wound, it looks fairly clean with granulation tissue.  There is an area at the most proximal aspect of the adherent scar that is starting to break down and may end up being kind of a counter drainage hole.  Either way, it will be removed when we definitively fix her wound.  I am hoping to shoot for 08/25 to do a right IT debridement and flap probably from the posterior thigh.  We did receive an email from Dr. Tatum about when to remove her kidney stone, and actually if we could combine that or have it staged within a couple days of each other that would be ideal.  We will contact his  and see what we can work out for dates.      10/26/23: photos show a viable intact R posterior thigh flap with some scabbing along the inset sutures and at the T-junction of the VY donor site closure. " There are no gross indications of infection or dehiscence.     11/16/23: Viable, intact R posterior thigh flap. Healing well, though there is only thin flap over IT prominence. Extensive perianal/vulvar fungal rash.         PROCEDURES: none     Please see nursing notes for wound measurements, photos and vital signs.     ASSESSMENT/ PLAN:   7/20/23:    Currently, she has [employer] as her home care company, and she is due to have an appointment or a followup with her primary care provider, Suzan Willis, in the next week or two, so I asked her to ask for a history and physical, so that she would not have to do an extra visit for surgery next month.  She seemed to think that was doable.  Until we take her to surgery, I think the Mesalt is no longer needed since things are clean.  They could use some packing strips 1/2-inch with the Vashe for the dressings, as long as it is not too soggy, and then cover that with Mepilex.      10/26/23: home care to remove sutures and staples on Monday. Will send sitting protocol to begin Monday AFTER sutures removed. Contact clinic if any problems or questions. If any signs of pressure injury or dehiscence, will need to halt sitting protocol. Once she is tolerating sitting in bed, she can get wheelchair cushion remapped and continue increasing sitting time in WC.  She can now roll onto operative R side for bedding changes too.      As for getting up to shower chair (which is well-padded), she can begin once she is tolerating 2 hours in WC.     11/16/23: continue increasing sitting time in bed until can get wheelchair pressure mapped. Start ROM exercises and shower chair ADLs as long as NO SIGNS OF TRAUMA TO FLAP.  Antifungal barrier - cream or powder to perianal/perineal rash. Consider taking pre/probiotics for diarrhea on abx.      FOLLOW-UP:   7/20/23: hopefully OR in next month.     10/26/23: we will find a time for Dianna to come for a final follow up in her wheelchair that may not  be until December, unless she develops wound problems.    11/16/23: next followup can be in 2 months IN wheelchair PRN. RTC sooner if healing problems emerge.

## 2023-11-16 NOTE — PROGRESS NOTES
Patient Active Problem List   Diagnosis    c5 burst fracture    Lesions of vulva    IUD (intrauterine device) in place- placed 12/2017    H/O: pneumonia    АНДРЕЙ (generalized anxiety disorder)    Quadriplegia, post-traumatic (H)- incomplete quad, limited use upper extremities    Major depressive disorder with single episode, in partial remission (H24)    Autonomic dysreflexia    Leukocytosis    Pressure injury of right ischium, stage 4 (H)    Personal history of DVT (deep vein thrombosis)    Neurogenic bladder    Impaired lung function    Encounter for insertion of mirena IUD    YONI III with severe dysplasia    Moderate protein-calorie malnutrition (H24)    Major depression    Neurogenic bowel    Spasticity    Spinal cord injury, C5-C7 (H)    Urinary incontinence    Nephrolithiasis    Hypoxia    Neuromuscular respiratory weakness (H)    Urinary retention    Sepsis (H)    Community acquired pneumonia of left lower lobe of lung    S/P flap graft     Past Medical History:   Diagnosis Date    Anemia     with pregnancy    c5 burst fracture 12/18/2012    C5-C7 fracture with cord injury    Compression fracture of L1 lumbar vertebra (H) 12/18/2012    L1 superior endplate compression fracture    Decubitus ulcer     OF RIGHT ISCHIUM    Depressive disorder     Encounter for insertion of mirena IUD 12/13/2017    Fracture of thoracic spine without spinal cord lesion (H) 12/18/2012    T3-T8 spinous process fractures    History of spinal cord injury     History of thrombophlebitis     Hypertension 12/06/2016    Impaired lung function     Nephrolithiasis 4/11/2022    Neurogenic bladder     Neurogenic bowel     Quadriplegia (H)     Thrombosis     Urinary tract infection     Vocal cord dysfunction     Left vocal cord weakness noted by ENT post extubation 12/2012   Labs:   Recent Labs   Lab Test 11/13/23  0905 07/02/23  0611 07/01/23  1754 04/12/22  0712 04/15/21  1445   ALBUMIN 4.0   < >  --    < >  --    HGB 11.1*   < >  --    < >  --     INR  --   --  1.05   < >  --    WBC 10.4   < >  --    < >  --    CRP  --   --   --   --  9.7*    < > = values in this interval not displayed.     Nutrition requirements were discussed with patient today.  Vitals:  /78 (BP Location: Right arm, Patient Position: Sitting, Cuff Size: Adult Regular)   Pulse 73   Temp 97.6  F (36.4  C)   LMP 06/07/2023   Wound:   Wound Ischial tuberosity Pressure injury community acquired Stage 4 (Active)       Wound Coccyx Pressure injury community acquired Stage 2 (Active)       Wound (used by OP [facility] only) 10/14/19 1407 Right pressure injury (Active)       Wound (used by OP [facility] only) 07/20/23 1316 Right lateral ischial tuberosity pressure injury (Active)   Thickness/Stage Stage 4 10/26/23 1307   Base epithelialization 11/16/23 1200   Periwound intact 11/16/23 1200   Periwound Temperature warm 11/16/23 1200   Periwound Skin Turgor soft 11/16/23 1200   Edges rolled/closed 11/16/23 1200   Length (cm) 0 11/16/23 1200   Width (cm) 0 11/16/23 1200   Depth (cm) 0 11/16/23 1200   Wound (cm^2) 0 cm^2 11/16/23 1200   Wound Volume (cm^3) 0 cm^3 11/16/23 1200   Wound healing % 100 11/16/23 1200   Drainage Characteristics/Odor serosanguineous 10/26/23 1307   Drainage Amount none 11/16/23 1200       Incision/Surgical Site 09/20/23 Left;Lower Back (Active)       Incision/Surgical Site 09/20/23 Left Flank (Active)       Incision/Surgical Site 09/20/23 Bilateral Leg (Active)       Incision/Surgical Site 09/20/23 Posterior;Right Thigh (Active)      Photo:   Further instructions from your care team         [facility]  [address] [address]  Appointment Phone [phone] Nurse Advisors [phone]    Dianna Cottrell      1995    Physical therapy can begin gently and slowly    Important!!!:  After each sitting period/session, the wound or suture site must be checked by the nurse.  Any sign of pressure or damage (e.g.  dehiscence of suture line, area of new drainage, poor capillary refill of flap) pushes the protocol back to point of no sign of damage.  Sitting Protocol  1.  Patient is up to 60 minutes sitting in bed  3.  When patient can tolerate sitting in bed for sessions of 60 minutes, get wheelchair cushion mapped (in both the seating position and also in the tilted position if your chair tilts)  Move the patient to the chair to sit, starting with sitting for 60 minutes in the chair, 3 times per day, checking the flap and incisions after each sitting session.  If OK wound check, then advance sitting period in the chair by 15 minutes the next day or two.  To 1 hour, 15 minutes sitting in the chair  To 1 hour, 30 minutes sitting in the chair  To 1 hour, 45 minutes sitting in the chair  To 2 hours sitting in the chair  5.   When tolerating 2 hour sitting sessions, proceed with usual regimen, in wheelchair, with appropriate off-loading techniques.             Remember: If at any point during the sitting protocol, either in bed or in chair, the wound changes for the worse, return to the previous time period that was tolerated.      Call the [facility] regarding any changes, questions, or concerns at [phone].   Dr. Vira Zacarias      Education for Reducing a Patient s Risk for Pressure Injuries  There is no single preventive for pressure injuries. Give priority to pressure relief. Reduce other risks to help maintain a healthy flow of nutrients to the patient s skin.  Relieve pressure  Relieving pressure is the single most important factor in preventing and treating pressure injuries.  You can relieve pressure and restore the skin s blood supply by repositioning the patient and using special devices. Post a schedule to remind you to reposition the patient -- from side to back or from stomach to side. Make minor position changes even more frequently. Specially designed pillows, beds, or  mattresses can also help reduce pressure.  In a bed  Use pillows under calves to elevate the legs from above the knees to the ankles.  Alter the angles of arms and legs.  In a wheelchair  Be sure the wheelchair is the proper size for the patient to give optimal support. For example, if the seat it too narrow, it will put extra pressure on the hips.  Cushion the back and buttocks with pillows or wheelchair cushions, and pad the footrest.  Use a special wheelchair cushion that is designed to distribute weight evenly and relieve pressure points that is evaluated yearly.  Have special cushions tested and adjusted at the proper times as recommended by the . This will allow the pressure relief to remain effective.   Manage moisture  Keep skin clean and lubricated, but free of excess moisture. Put the patient on a regular toilet schedule. Use incontinent devices, if appropriate. Consult with a doctor about using diarrhea medicines:  Use talc-free powders or barrier creams.  Pat skin dry after bathing.  Lubricate skin with lotion.  Reduce shear and friction  Prevent skin breakdown by reducing friction and shear. Patients are less likely to slide down in bed if they re supported by pillows and the head of the bed isn t raised too high. During bed or wheelchair transfers, lift--don t drag--the patient. If you can t do this alone, get help. And be sure to use assistive devices, whenever possible.  In a bed  Use draw-sheets or transfer boards to move patients.  Clean and smooth the bed surface.  Lift the head of the bed no more than 30 .  Raise the foot of the bed slightly.  In a wheelchair  Support the patient s back with a pillow.  Use a foot extension.

## 2023-11-22 ENCOUNTER — MEDICAL CORRESPONDENCE (OUTPATIENT)
Dept: HEALTH INFORMATION MANAGEMENT | Facility: CLINIC | Age: 28
End: 2023-11-22
Payer: MEDICARE

## 2023-11-22 ENCOUNTER — TELEPHONE (OUTPATIENT)
Dept: FAMILY MEDICINE | Facility: CLINIC | Age: 28
End: 2023-11-22
Payer: MEDICARE

## 2023-11-22 NOTE — TELEPHONE ENCOUNTER
Forms/Letter Request     Type of form/letter: (second attempt) PRN visit for PICC removal +   SN 11/12 2WK1,1WK1     Have you been seen for this request: N/A     Do we have the form/letter: Yes:      Who is the form from? Home care     Where did/will the form come from? form was faxed in     When is form/letter needed by: @ your earliest convenience     How would you like the form/letter returned: Fax : to:  Yillio. @ [phone]     Patient Notified form requests are processed in 3-5 business days:No     Could we send this information to you in [employer] or would you prefer to receive a phone call?:   Patient would like to be contacted via [employer]

## 2023-11-27 ENCOUNTER — TELEPHONE (OUTPATIENT)
Dept: FAMILY MEDICINE | Facility: CLINIC | Age: 28
End: 2023-11-27
Payer: MEDICARE

## 2023-11-27 NOTE — TELEPHONE ENCOUNTER
Forms/Letter Request    Type of form/letter:  Med list update - Client is on Amooxicillin for the next 6-12 months     Have you been seen for this request: N/A    Do we have the form/letter: Yes    Who is the form from? Home care    Where did/will the form come from? form was faxed in    When is form/letter needed by: at your earliest convenience     How would you like the form/letter returned: Fax : [phone]    Patient Notified form requests are processed in 3-5 business days:No    Could we send this information to you in [employer] or would you prefer to receive a phone call?:   Patient would like to be contacted via [employer]

## 2023-11-27 NOTE — CONFIDENTIAL NOTE
Forms/Letter Request    Type of form/letter:  Plan of Care    Have you been seen for this request: Yes      Do we have the form/letter: Yes:      Who is the form from? Home care    Where did/will the form come from? form was faxed in    When is form/letter needed by: Monday, December 4    How would you like the form/letter returned: [facility] @ [phone]    Patient Notified form requests are processed in 3-5 business days:No    Could we send this information to you in [employer] or would you prefer to receive a phone call?:   Patient would like to be contacted via [employer]

## 2023-12-04 ENCOUNTER — MEDICAL CORRESPONDENCE (OUTPATIENT)
Dept: HEALTH INFORMATION MANAGEMENT | Facility: CLINIC | Age: 28
End: 2023-12-04

## 2023-12-04 ENCOUNTER — TELEPHONE (OUTPATIENT)
Dept: FAMILY MEDICINE | Facility: CLINIC | Age: 28
End: 2023-12-04
Payer: MEDICARE

## 2023-12-04 DIAGNOSIS — Z53.9 DIAGNOSIS NOT YET DEFINED: Primary | ICD-10-CM

## 2023-12-04 PROCEDURE — G0179 MD RECERTIFICATION HHA PT: HCPCS | Performed by: PHYSICIAN ASSISTANT

## 2023-12-04 NOTE — TELEPHONE ENCOUNTER
Forms/Letter Request    Type of form/letter:  Med list update     Have you been seen for this request: N/A    Do we have the form/letter: Yes    Who is the form from? Home care    Where did/will the form come from? form was faxed in    When is form/letter needed by: within 7 business days     How would you like the form/letter returned: Fax : [phone]    Patient Notified form requests are processed in 3-5 business days:No    Could we send this information to you in [employer] or would you prefer to receive a phone call?:   Patient would like to be contacted via [employer]

## 2023-12-05 NOTE — CONFIDENTIAL NOTE
Forms/Letter Request    Type of form/letter:  Medication update  amoxicillin 6-12 mo.    Have you been seen for this request: N/A    Do we have the form/letter: Yes:      Who is the form from? Home care    Where did/will the form come from? form was faxed in    When is form/letter needed by: @ your earliest convenience    How would you like the form/letter returned:  faxed to:    [facility] @ [phone]    Patient Notified form requests are processed in 3-5 business days:No    Could we send this information to you in via680 or would you prefer to receive a phone call?:   Patient would like to be contacted via via680

## 2023-12-06 ENCOUNTER — MEDICAL CORRESPONDENCE (OUTPATIENT)
Dept: HEALTH INFORMATION MANAGEMENT | Facility: CLINIC | Age: 28
End: 2023-12-06
Payer: MEDICARE

## 2023-12-07 ENCOUNTER — VIRTUAL VISIT (OUTPATIENT)
Dept: FAMILY MEDICINE | Facility: CLINIC | Age: 28
End: 2023-12-07
Payer: MEDICARE

## 2023-12-07 ENCOUNTER — TELEPHONE (OUTPATIENT)
Dept: CT IMAGING | Facility: CLINIC | Age: 28
End: 2023-12-07

## 2023-12-07 DIAGNOSIS — F41.1 GAD (GENERALIZED ANXIETY DISORDER): ICD-10-CM

## 2023-12-07 DIAGNOSIS — F32.1 CURRENT MODERATE EPISODE OF MAJOR DEPRESSIVE DISORDER, UNSPECIFIED WHETHER RECURRENT (H): ICD-10-CM

## 2023-12-07 DIAGNOSIS — G82.53 QUADRIPLEGIA, C5-C7 COMPLETE (H): Primary | ICD-10-CM

## 2023-12-07 DIAGNOSIS — J99 NEUROMUSCULAR RESPIRATORY WEAKNESS (H): ICD-10-CM

## 2023-12-07 DIAGNOSIS — R06.89 AIRWAY CLEARANCE IMPAIRMENT: ICD-10-CM

## 2023-12-07 DIAGNOSIS — G70.9 NEUROMUSCULAR RESPIRATORY WEAKNESS (H): ICD-10-CM

## 2023-12-07 DIAGNOSIS — L89.324 DECUBITUS ULCER OF ISCHIAL AREA, LEFT, STAGE IV (H): ICD-10-CM

## 2023-12-07 PROCEDURE — 99214 OFFICE O/P EST MOD 30 MIN: CPT | Mod: 24 | Performed by: PHYSICIAN ASSISTANT

## 2023-12-07 RX ORDER — HYDROXYZINE PAMOATE 25 MG/1
25 CAPSULE ORAL 3 TIMES DAILY PRN
Qty: 90 CAPSULE | Refills: 1 | Status: SHIPPED | OUTPATIENT
Start: 2023-12-07 | End: 2024-01-09

## 2023-12-07 NOTE — PROGRESS NOTES
"    Instructions Relayed to Patient by Virtual Roomer:     Patient is active on [employer]:   Relayed following to patient: \"It looks like you are active on [employer], are you able to join the visit this way? If not, do you need us to send you a link now or would you like your provider to send a link via text or email when they are ready to initiate the visit?\"    Reminded patient to ensure they were logged on to virtual visit by arrival time listed. Documented in appointment notes if patient had flexibility to initiate visit sooner than arrival time. If pediatric virtual visit, ensured pediatric patient along with parent/guardian will be present for video visit.     Patient offered the website [email or web address] and/or phone number to [employer] Help line: [phone]   Dianna is a 28 year old who is being evaluated via a billable video visit.      How would you like to obtain your AVS? [employer]  If the video visit is dropped, the invitation should be resent by: Text to cell phone: [phone]  Will anyone else be joining your video visit? No          Assessment & Plan     Quadriplegia, C5-C7 complete (H)  Decubitus ulcer of ischial area, left, stage IV (H)  Dianna needs on going home health and skilled nursing related to quadriplegia and chronic osteomyelitis.    S/p skin flag surgery which sounds to be healing.    Will cont with infectious disease and wound care.   I spoke with Philomena her RN with home care. They will need notes from today's visit for billing, no additional forms.     Neuromuscular respiratory weakness (H)  Airway clearance impairment  Met with pulmonology last week. I do not have notes for review.  Per Dianna , no change to inhalers or nebs.   She has quit vaping- congratulated on this achievement.   She will be referred to another pulm sub specialist at the [facility].     АНДРЕЙ (generalized anxiety disorder)  Current moderate episode of major depressive disorder, unspecified whether " "recurrent (H)  Continue on sertraline current dose, will refill when needed.  Refilled hydroxyzine for sleep or anxiety prn.  - hydrOXYzine lina (VISTARIL) 25 MG capsule; Take 1 capsule (25 mg) by mouth 3 times daily as needed for anxiety (or at bedtime for sleep.)    Follow Up: see above. Additionally patient was instructed to contact clinic for worsening symptoms, non-improvement in time frame discussed, and for questions regarding treatment plan.   For virtual visits, the patient was advised to be seen for in person evaluation if symptoms or condition are worsening or non-improvement as expected.       Suzan Willis PA-C  [facility] GIANNI Bustamante is a 28 year old, presenting for the following health issues:  Home Care/Hospice (Continuation of Services)        8/23/2023    10:35 AM   Additional Questions   Roomed by Merry GARDNER   Accompanied by None       History of Present Illness       Reason for visit:  Home care Continuation    She eats 0-1 servings of fruits and vegetables daily.She consumes 0 sweetened beverage(s) daily.She exercises with enough effort to increase her heart rate 9 or less minutes per day.  She exercises with enough effort to increase her heart rate 3 or less days per week.   She is taking medications regularly.     [facility]- Philomena RN- [phone].   Changing from medicare payer to [facility] payer.   That client needs on going home health and skilled nursing related to quadriplegia and osteomyelitis - infection monitoring.     Since her skin flap surgery in Sept. Doing well.  Has finally been able to get in her chair and has been able to get into the shower recently. Feels like she is about \"80%\".  Can't use hunter yet- friction of the sling on the graft. Mother is still doing transferring for now.     Has skilled nursing  once weekly - they check vital signs, wound cares and monitoring, pulmonary exams.   Home health- M/W/F- help with showering, ROM, " lotion.   Mother is care aid also- food prep, medications, dressing, cleaning, driving, laundry.     Saw pulmonology last week- in [place]. [facility]. Dr. Valeriy Hernandez.   She has quit vaping since that appointment. They did not make any changes to her inhalers or medications. She is still using the breo.  would like her to see a pulm sub specialist at the [facility].     Saw her PMR doctor  -in Nov 2023    Mood- doing better can get in chair       Review of Systems   Constitutional, HEENT, cardiovascular, pulmonary, gi and gu systems are negative, except as otherwise noted.      Objective           Vitals:  No vitals were obtained today due to virtual visit.    Physical Exam   GENERAL: Healthy, alert and no distress  EYES: Eyes grossly normal to inspection.  No discharge or erythema, or obvious scleral/conjunctival abnormalities.  HENT: Normal cephalic/atraumatic.  External ears, nose and mouth without ulcers or lesions.  No nasal drainage visible.  RESP: No audible wheeze, cough, or visible cyanosis.  No visible retractions or increased work of breathing.    SKIN: Visible skin clear. No significant rash, abnormal pigmentation or lesions.  Neuro: is in her adjustable hospital bed for visit.   PSYCH: Mentation appears normal, affect normal/bright, judgement and insight intact, normal speech and appearance well-groomed.                Video-Visit Details    Type of service:  Video Visit   Video Start Time:  1:20pm  Video End Time: 1:45pm    Originating Location (pt. Location): Home    Distant Location (provider location):  Off-site  Platform used for Video Visit: Cherie

## 2023-12-07 NOTE — TELEPHONE ENCOUNTER
Called pt and LVM. She was on my resschedule list for [place] for an appt she had scheduled with Alyssa BRYAN. Alyssa is no longer here at [facility]. She has another appt with Dr. Samayoa on 2/21. If she needs to be seen sooner then that she can call back otherwise, keeping appt for February

## 2023-12-12 ENCOUNTER — MEDICAL CORRESPONDENCE (OUTPATIENT)
Dept: HEALTH INFORMATION MANAGEMENT | Facility: CLINIC | Age: 28
End: 2023-12-12

## 2023-12-13 ENCOUNTER — TELEPHONE (OUTPATIENT)
Dept: FAMILY MEDICINE | Facility: CLINIC | Age: 28
End: 2023-12-13
Payer: MEDICARE

## 2023-12-13 NOTE — TELEPHONE ENCOUNTER
Forms/Letter Request    Type of form/letter:  Situation/reason is discharge     Have you been seen for this request:     Do we have the form/letter: Yes    Who is the form from? Home care    Where did/will the form come from? form was faxed in    When is form/letter needed by: 7 business days     How would you like the form/letter returned: Fax : [phone]    Patient Notified form requests are processed in 3-5 business days:No    Could we send this information to you in [employer] or would you prefer to receive a phone call?:   Patient would like to be contacted via [employer]

## 2023-12-15 ENCOUNTER — TELEPHONE (OUTPATIENT)
Dept: FAMILY MEDICINE | Facility: CLINIC | Age: 28
End: 2023-12-15
Payer: MEDICARE

## 2023-12-15 NOTE — CONFIDENTIAL NOTE
Forms/Letter Request     Type of form/letter:  Bathing assistance      Have you been seen for this request:      Do we have the form/letter: Yes     Who is the form from? Home care     Where did/will the form come from? form was faxed in     When is form/letter needed by: 7 business days      How would you like the form/letter returned: Fax : [phone]     Patient Notified form requests are processed in 3-5 business days:No     Could we send this information to you in [employer] or would you prefer to receive a phone call?:   Patient would like to be contacted via [employer]

## 2023-12-18 ENCOUNTER — MEDICAL CORRESPONDENCE (OUTPATIENT)
Dept: HEALTH INFORMATION MANAGEMENT | Facility: CLINIC | Age: 28
End: 2023-12-18

## 2023-12-18 ENCOUNTER — TELEPHONE (OUTPATIENT)
Dept: INFECTIOUS DISEASES | Facility: CLINIC | Age: 28
End: 2023-12-18
Payer: MEDICARE

## 2023-12-18 DIAGNOSIS — Z53.9 DIAGNOSIS NOT YET DEFINED: Primary | ICD-10-CM

## 2023-12-18 PROCEDURE — G0180 MD CERTIFICATION HHA PATIENT: HCPCS | Performed by: PHYSICIAN ASSISTANT

## 2023-12-18 NOTE — TELEPHONE ENCOUNTER
EP LVM 12/18 to resched 2/21 video follow up with Dr. Samayoa; provider not avail. --2nd attempt.

## 2023-12-27 DIAGNOSIS — F41.1 GAD (GENERALIZED ANXIETY DISORDER): ICD-10-CM

## 2023-12-27 DIAGNOSIS — F32.4 MAJOR DEPRESSIVE DISORDER WITH SINGLE EPISODE, IN PARTIAL REMISSION (H): ICD-10-CM

## 2023-12-27 RX ORDER — SERTRALINE HYDROCHLORIDE 100 MG/1
150 TABLET, FILM COATED ORAL DAILY
Qty: 135 TABLET | Refills: 0 | OUTPATIENT
Start: 2023-12-27

## 2024-01-04 ENCOUNTER — TELEPHONE (OUTPATIENT)
Dept: WOUND CARE | Facility: CLINIC | Age: 29
End: 2024-01-04

## 2024-01-04 ENCOUNTER — HOSPITAL ENCOUNTER (OUTPATIENT)
Dept: WOUND CARE | Facility: CLINIC | Age: 29
Discharge: HOME | End: 2024-01-04
Attending: SURGERY | Admitting: SURGERY
Payer: MEDICARE

## 2024-01-04 VITALS — DIASTOLIC BLOOD PRESSURE: 69 MMHG | SYSTOLIC BLOOD PRESSURE: 100 MMHG | HEART RATE: 74 BPM | TEMPERATURE: 96.2 F

## 2024-01-04 DIAGNOSIS — L89.314 PRESSURE INJURY OF RIGHT PERINEAL ISCHIAL REGION, STAGE 4 (H): Primary | ICD-10-CM

## 2024-01-04 PROCEDURE — G0463 HOSPITAL OUTPT CLINIC VISIT: HCPCS | Mod: 25

## 2024-01-04 PROCEDURE — 99213 OFFICE O/P EST LOW 20 MIN: CPT | Performed by: SURGERY

## 2024-01-04 PROCEDURE — 97602 WOUND(S) CARE NON-SELECTIVE: CPT

## 2024-01-04 NOTE — PROGRESS NOTES
Patient Active Problem List   Diagnosis    c5 burst fracture    Lesions of vulva    IUD (intrauterine device) in place- placed 12/2017    H/O: pneumonia    АНДРЕЙ (generalized anxiety disorder)    Quadriplegia, post-traumatic (H)- incomplete quad, limited use upper extremities    Major depressive disorder with single episode, in partial remission (H24)    Autonomic dysreflexia    Leukocytosis    Pressure injury of right ischium, stage 4 (H)    Personal history of DVT (deep vein thrombosis)    Neurogenic bladder    Impaired lung function    Encounter for insertion of mirena IUD    YONI III with severe dysplasia    Moderate protein-calorie malnutrition (H24)    Major depression    Neurogenic bowel    Spasticity    Spinal cord injury, C5-C7 (H)    Urinary incontinence    Nephrolithiasis    Hypoxia    Neuromuscular respiratory weakness (H)    Urinary retention    Sepsis (H)    Community acquired pneumonia of left lower lobe of lung    S/P flap graft    Current moderate episode of major depressive disorder, unspecified whether recurrent (H)     Past Medical History:   Diagnosis Date    Anemia     with pregnancy    c5 burst fracture 12/18/2012    C5-C7 fracture with cord injury    Compression fracture of L1 lumbar vertebra (H) 12/18/2012    L1 superior endplate compression fracture    Decubitus ulcer     OF RIGHT ISCHIUM    Depressive disorder     Encounter for insertion of mirena IUD 12/13/2017    Fracture of thoracic spine without spinal cord lesion (H) 12/18/2012    T3-T8 spinous process fractures    History of spinal cord injury     History of thrombophlebitis     Hypertension 12/06/2016    Impaired lung function     Nephrolithiasis 4/11/2022    Neurogenic bladder     Neurogenic bowel     Quadriplegia (H)     Thrombosis     Urinary tract infection     Vocal cord dysfunction     Left vocal cord weakness noted by ENT post extubation 12/2012     Labs:   Recent Labs   Lab Test 11/13/23  0905 07/02/23  0611 07/01/23  175  04/12/22  0712 04/15/21  1445   ALBUMIN 4.0   < >  --    < >  --    HGB 11.1*   < >  --    < >  --    INR  --   --  1.05   < >  --    WBC 10.4   < >  --    < >  --    CRP  --   --   --   --  9.7*    < > = values in this interval not displayed.     Nutrition requirements were discussed with patient today.  Vitals:  /69 (BP Location: Right arm, Patient Position: Sitting, Cuff Size: Adult Regular)   Pulse 74   Temp (!) 96.2  F (35.7  C) (Temporal)   LMP  (LMP Unknown)   Wound:   Wound Ischial tuberosity Pressure injury community acquired Stage 4 (Active)       Wound Coccyx Pressure injury community acquired Stage 2 (Active)       Wound (used by OP [facility] only) 10/14/19 1407 Right pressure injury (Active)       Wound (used by OP [facility] only) 07/20/23 1316 Right ischial tuberosity pressure injury (Active)   Thickness/Stage Stage 4 01/04/24 1357   Base slough;granulating 01/04/24 1357   Periwound intact 01/04/24 1357   Periwound Temperature warm 01/04/24 1357   Periwound Skin Turgor firm 01/04/24 1357   Edges open 01/04/24 1357   Length (cm) 1.5 01/04/24 1357   Width (cm) 1.6 01/04/24 1357   Depth (cm) 1.4 01/04/24 1357   Wound (cm^2) 2.4 cm^2 01/04/24 1357   Wound Volume (cm^3) 3.36 cm^3 01/04/24 1357   Wound healing % -172.73 01/04/24 1357   Undermining [Depth (cm)/Location] 1 o'clock/2.5 cm; 12-12 o'clock/1.5 cm 07/20/23 1317   Drainage Characteristics/Odor serosanguineous 01/04/24 1357   Drainage Amount moderate 01/04/24 1357   Care, Wound non-select wound debridement performed 07/20/23 1317       Incision/Surgical Site 09/20/23 Left;Lower Back (Active)       Incision/Surgical Site 09/20/23 Left Flank (Active)       Incision/Surgical Site 09/20/23 Bilateral Leg (Active)       Incision/Surgical Site 09/20/23 Posterior;Right Thigh (Active)      Photo:    Further instructions from your care team         Dianna Cottrell      1995  A DME order for supplies has been placed to [facility]. If there are any  issues with your order including not receiving the order please call [facility] ([facility]) at [phone]. They can also provide a tracking number for you.    Dressing changes outside of clinic are being performed by [facility]  [facility] Phone: [phone] Fax: [phone]     Wound Dressing Change: Right Ischial Tuberosity  - Prepare clean surface and wash your hands with soap and water  - Cleanse with mild unscented soap (such as Cetaphil, Cerave or Dove) and water  - Apply small amount of VASHE on gauze, lay into wound bed, let sit for 10 minutes, remove gauze   Cut and tuck 1/5th piece Endoform AM into wound base, may moisten to activate  Tuck a 1 piece of sterile 4x4 gauze to push the Endoform into the wound  Cover with 4x4 Zetuvit Silicone plus dressing  Change Daily and as needed for soilage     Repositioning: shower chair: use ROHO cushion on shower chair  Perineal Cleanser: google brands   Perineal Wash Melina  Cleanse and Protect  with Odor Control Lotion 8 oz. Pump Bottle Scented $12.95  Bed: Reposition MINIMALLY every 1-2 hours in bed to relieve pressure and promote perfusion to tissue.  Chair: When up to the chair, do not sit for longer than one hour total before returning to bed for at least 60 minutes to relieve pressure and promote perfusion to the tissue.  Completely recline/tilt for 15 minutes each hour.  Sit on a chair cushion when up to the chair.      Fred Zacarias M.D. January 4, 2024

## 2024-01-04 NOTE — PROGRESS NOTES
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  She also has a tiny coccygeal wound.      INTERVAL HISTORY:   7/20/23:  I had not seen her in quite some time, and then she bounced into the hospital with what sounds like urosepsis, and ended up having an obstructing stone that required placement of a perc neph tube.  We took a look at her wound and while it was small, as far as a skin defect, there was a bit of a pocket traveling underneath some what used to be adherent scar tissue.  While in the hospital, it certainly was not her biggest problem, and they let her out last Thursday.  She has a special [employer] Ad[employer]isa Easy Air lateral rotating pressure air mattress at home, and her mom is basically her PCA.  They have been using Mesalt packing and having moderate usual amount of drainage.  She has been feeling much better since her original presentation.  She missed her kids and really if it were not for her mom, I do not think we would be talking about flapping her wound.  10/26/23: this is a telephone visit today with Dianna and her mom. She underwent R posterior thigh flap closure for R IT decubitus with osteomyelitis on 9/20. She has done her postop recovery at home and it sounds like things have been going well. The ROSANNE already fell out.   She has been using some skin creams prescribed by Dermatology and it sounds like the redness has resolved. [facility]) has been seeing her for any dressing needs and weekly lab draws while she is on antibiotics. Her course will continue for 2 more weeks.    11/16/23: Dianna is here in person with her mom. Her kids have strep and URI's so she has been making sure she keeps up with her nebs and hydration. She has also been having diarrhea from her antibiotics, causing a fungal \"diaper rash\". Fortunately, she saw Dr Samayoa from ID yesterday " "and they d/c'd the PICC and are switching over to just PO amox for the next 6-12 months for the actinomyces on cultures. Hopefully her stooling can get better over time.   She has been tolerating sitting upright in bed for at least 60 minute sessions without much change to her flap. She has put in a call to [facility] for pressure mapping her wheelchair post-flap, but she will also contact Kirill Saunders if things get delayed so she can get back into her wheelchair at home.   I think it's safe now to resume ROM for her RLE, as long as it is done gently and slowly. They will let her PCAs know that they should not \"push\" if there is any tissue resistance. As for transfers, mom lifts patient (pivot), but PCAs use geoffrey with sling. This would be OK as long as there is no pressure or shear trauma from the sling over the flap. For  toiletting/shower, she does have a Roho cushion for her shower chair, and they can also consider using extra padding with pillows, etc.      1/4/24: here today with mom. Still using [facility] for home care. Has been alternating Mesalt with VASHE cleanses. Mom noticed a new 'spot' about a month ago, began using TRIAD there but no changes. \"All of a sudden\" the spot started opening at the surface, mom continued cleaning with VASHE soaks and covering it with a cotton ball. In the past week it opened up into a \"much bigger\" hole. Since then has been using Mesalt. Mom thinks it may have been too much pressure from patient's new shower chair since edge of her wound touches shower chair. No drainage or puddles were seen coming from wound. States she hasn't been sitting in her wheelchair for more than about 6 hours at a time. Did not use Geoffrey at all. Does weight shifts and offloading.   Mom also reported using antifungal powder and barrier cream on perianal/perineal groin area which also extends to inner thighs,  but brings up concerns about skin flakiness and lack of rash improvement. Patient states she uses " Dove soap and water to clean the area and rinses well. Only wears underwear when she goes out of the house. States patient is taking amoxicillin TID for the next 6 months per ID and probiotics for loose stools, which have been adding to the moisture and irritation in the area.       PHYSICAL EXAM:   7/20/23: On exam, the skin defect over the right IT is fairly small, but enough to get some packing in.  From what I can see of the wound, it looks fairly clean with granulation tissue.  There is an area at the most proximal aspect of the adherent scar that is starting to break down and may end up being kind of a counter drainage hole.  Either way, it will be removed when we definitively fix her wound.  I am hoping to shoot for 08/25 to do a right IT debridement and flap probably from the posterior thigh.  We did receive an email from Dr. Tatum about when to remove her kidney stone, and actually if we could combine that or have it staged within a couple days of each other that would be ideal.  We will contact his  and see what we can work out for dates.   10/26/23: photos show a viable intact R posterior thigh flap with some scabbing along the inset sutures and at the T-junction of the VY donor site closure. There are no gross indications of infection or dehiscence.   11/16/23: Viable, intact R posterior thigh flap. Healing well, though there is only thin flap over IT prominence. Extensive perianal/vulvar fungal rash.      1/4/24: well-healed R posterior thigh flap. New opening along upper incision tunneling down to IT. Tissues appear mildly grungy and somewhat pale. No gross infection or necrosis. Periwound intact but dry. Most proximal end of incision is somewhat adherent or tethered. Bone not exposed but only thin covering at base.     Please see nursing notes for wound measurements, photos and vital signs.     ASSESSMENT/ PLAN:   7/20/23: Currently, she has [employer] as her home care company, and she is due  to have an appointment or a followup with her primary care provider, Suzan Willis, in the next week or two, so I asked her to ask for a history and physical, so that she would not have to do an extra visit for surgery next month.  She seemed to think that was doable.  Until we take her to surgery, I think the Mesalt is no longer needed since things are clean.  They could use some packing strips 1/2-inch with the Vashe for the dressings, as long as it is not too soggy, and then cover that with Mepilex.   10/26/23: home care to remove sutures and staples on Monday. Will send sitting protocol to begin Monday AFTER sutures removed. Contact clinic if any problems or questions. If any signs of pressure injury or dehiscence, will need to halt sitting protocol. Once she is tolerating sitting in bed, she can get wheelchair cushion remapped and continue increasing sitting time in WC.  She can now roll onto operative R side for bedding changes too.   As for getting up to shower chair (which is well-padded), she can begin once she is tolerating 2 hours in WC.   11/16/23: continue increasing sitting time in bed until can get wheelchair pressure mapped. Start ROM exercises and shower chair ADLs as long as NO SIGNS OF TRAUMA TO FLAP.  Antifungal barrier - cream or powder to perianal/perineal rash. Consider taking pre/probiotics for diarrhea on abx.      1/4/24:   -Pack hole every day using Endoform AM, gauze, and Zetuvit Silicone Plus cover. Can stretch to every other day if it seems there is enough Endoform still present in wound when going to change it.   -Try using Roho in shower chair (with something to fill the hole in the cushion if needed). Mom presented other ideas she wanted to try to limit Dianna's chair use as well. Could limit wheelchair time to weekly hairwashing and do other bathing in bed.   -Can try using hair dryer on low as well as a dimethicone cleanser in skin folds to remedy the rash. Also recommended Melina  cream.  Consider wicking or absorbent microfiber cloth or material as well.     FOLLOW-UP:   7/20/23: hopefully OR in next month.  10/26/23: we will find a time for Dianna to come for a final follow up in her wheelchair that may not be until December, unless she develops wound problems.  11/16/23: next followup can be in 2 months IN wheelchair PRN. RTC sooner if healing problems emerge.     1/4/24: Nurse to contact patient for follow-up scheduling. 1 month if possible.     This note was prepared on behalf of Vira Zacarias MD by Ivy Floyd, a trained medical scribe, based on my observations and the provider's statements to me.

## 2024-01-04 NOTE — DISCHARGE INSTRUCTIONS
Dianna Cottrell      1995  A DME order for supplies has been placed to [facility]. If there are any issues with your order including not receiving the order please call [facility] at [phone]. They can also provide a tracking number for you.    Dressing changes outside of clinic are being performed by [facility]  [facility] [place] Phone: [phone] Fax: [phone]     Wound Dressing Change: Right Ischial Tuberosity  - Prepare clean surface and wash your hands with soap and water  - Cleanse with mild unscented soap (such as Cetaphil, Cerave or Dove) and water  - Apply small amount of VASHE on gauze, lay into wound bed, let sit for 10 minutes, remove gauze   Cut and tuck 1/5th piece Endoform AM into wound base, may moisten to activate  Tuck a 1 piece of sterile 4x4 gauze to push the Endoform into the wound  Cover with 4x4 Zetuvit Silicone plus dressing  Change Daily and as needed for soilage     Repositioning: shower chair: use ROHO cushion on shower chair  Perineal Cleanser: google brands   Perineal Wash Melina  Cleanse and Protect  with Odor Control Lotion 8 oz. Pump Bottle Scented $12.95  Bed: Reposition MINIMALLY every 1-2 hours in bed to relieve pressure and promote perfusion to tissue.  Chair: When up to the chair, do not sit for longer than one hour total before returning to bed for at least 60 minutes to relieve pressure and promote perfusion to the tissue.  Completely recline/tilt for 15 minutes each hour.  Sit on a chair cushion when up to the chair.      Fred Zacarias M.D. January 4, 2024    Call us at [phone] if you have any questions about your wounds, have redness or swelling around your wound, have a fever of 101 degrees Fahrenheit or greater or if you have any other problems or concerns. We answer the phone Monday through Friday 8 am to 4 pm, please leave a message as we check the voicemail frequently throughout the day.     If you had a  positive experience please indicate that on your patient satisfaction survey form that [facility] will be sending you.  It was a pleasure meeting with you today.  Thank you for allowing me and my team the privilege of caring for you today.  YOU are the reason we are here, and I truly hope we provided you with the excellent service you deserve.  Please let us know if there is anything else we can do for you so that we can be sure you are leaving completely satisfied with your care experience.      If you have any billing related questions please call the [facility] Business office at [phone]. The clinic staff does not handle billing related matters.  If you are scheduled to have a follow up appointment, you will receive a reminder call the day before your visit. On the appointment day please arrive 15 minutes prior to your appointment time. If you are unable to keep that appointment, please call the clinic to cancel or reschedule. If you are more than 10 minutes late or greater for your scheduled appointment time, the clinic policy is that you may be asked to reschedule.

## 2024-01-04 NOTE — TELEPHONE ENCOUNTER
Left voice message for patient indicating she could come in today at 2 pm vs. 245 due to a schedule change up.  Israel Barlow LPN on 1/4/2024 at 11:31 AM

## 2024-01-05 ENCOUNTER — TELEPHONE (OUTPATIENT)
Dept: WOUND CARE | Facility: CLINIC | Age: 29
End: 2024-01-05
Payer: MEDICARE

## 2024-01-05 NOTE — TELEPHONE ENCOUNTER
Call received from Marline maldonado; [facility] [phone]. Current order for 2x2 endoform silver of 6  comes in a box of 10 only.  Alternative  Beverly #   2x2 is avail. Please contact for approval to order and or new order.   Israel Barlow LPN on 1/5/2024 at 11:39 AM

## 2024-01-09 ENCOUNTER — TELEPHONE (OUTPATIENT)
Dept: FAMILY MEDICINE | Facility: CLINIC | Age: 29
End: 2024-01-09

## 2024-01-09 DIAGNOSIS — F41.1 GAD (GENERALIZED ANXIETY DISORDER): ICD-10-CM

## 2024-01-09 RX ORDER — HYDROXYZINE PAMOATE 25 MG/1
25 CAPSULE ORAL 3 TIMES DAILY PRN
Qty: 270 CAPSULE | Refills: 1 | Status: SHIPPED | OUTPATIENT
Start: 2024-01-09 | End: 2024-06-24

## 2024-01-09 NOTE — TELEPHONE ENCOUNTER
Forms/Letter Request    Type of form/letter:  complete RX form     Have you been seen for this request: N/A    Do we have the form/letter: Yes    Who is the form from? Home care    Where did/will the form come from? form was faxed in    When is form/letter needed by: at your earliest convenience     How would you like the form/letter returned: Fax : [phone]    Patient Notified form requests are processed in 3-5 business days:No    Could we send this information to you in [employer] or would you prefer to receive a phone call?:   Patient would like to be contacted via [employer]

## 2024-01-11 ENCOUNTER — TELEPHONE (OUTPATIENT)
Dept: FAMILY MEDICINE | Facility: CLINIC | Age: 29
End: 2024-01-11
Payer: MEDICARE

## 2024-01-11 NOTE — TELEPHONE ENCOUNTER
Forms/Letter Request    Type of form/letter:  ORDERS     Have you been seen for this request: N/A    Do we have the form/letter: Yes    Who is the form from? Home care    Where did/will the form come from? form was faxed in    When is form/letter needed by: 1/19/2024     How would you like the form/letter returned: Fax : [phone]    Patient Notified form requests are processed in 3-5 business days:No    Could we send this information to you in [employer] or would you prefer to receive a phone call?:   Patient would like to be contacted via [employer]

## 2024-01-15 ENCOUNTER — MEDICAL CORRESPONDENCE (OUTPATIENT)
Dept: HEALTH INFORMATION MANAGEMENT | Facility: CLINIC | Age: 29
End: 2024-01-15
Payer: MEDICARE

## 2024-01-15 ENCOUNTER — TELEPHONE (OUTPATIENT)
Dept: FAMILY MEDICINE | Facility: CLINIC | Age: 29
End: 2024-01-15
Payer: MEDICARE

## 2024-01-15 NOTE — TELEPHONE ENCOUNTER
Forms/Letter Request    Type of form/letter:  extended HHA visits on Friday's     Have you been seen for this request: N/A    Do we have the form/letter: Yes    Who is the form from? Home care    Where did/will the form come from? form was faxed in    When is form/letter needed by: 1/22/2024    How would you like the form/letter returned: Fax : [phone]    Patient Notified form requests are processed in 3-5 business days:No    Could we send this information to you in [employer] or would you prefer to receive a phone call?:   Patient would like to be contacted via [employer]

## 2024-01-16 NOTE — TELEPHONE ENCOUNTER
Forms completed/signed. Placed in TC/MA bin to be faxed. Suzan Willis PA-C     O-T Plasty Text: The defect edges were debeveled with a #15 scalpel blade.  Given the location of the defect, shape of the defect and the proximity to free margins an O-T plasty was deemed most appropriate.  Using a sterile surgical marker, an appropriate O-T plasty was drawn incorporating the defect and placing the expected incisions within the relaxed skin tension lines where possible.    The area thus outlined was incised deep to adipose tissue with a #15 scalpel blade.  The skin margins were undermined to an appropriate distance in all directions utilizing iris scissors.

## 2024-01-19 ENCOUNTER — TELEPHONE (OUTPATIENT)
Dept: FAMILY MEDICINE | Facility: CLINIC | Age: 29
End: 2024-01-19
Payer: MEDICARE

## 2024-01-19 NOTE — TELEPHONE ENCOUNTER
Forms/Letter Request    Type of form/letter: OTHER: complete missing information on form / medical records needed       Do we have the form/letter: Yes    Who is the form from? Home care    Where did/will the form come from? form was faxed in    When is form/letter needed by: at your earliest convenience     How would you like the form/letter returned: Fax : [phone]    Patient Notified form requests are processed in 5-7 business days:No    Could we send this information to you in [employer] or would you prefer to receive a phone call?:   Patient would like to be contacted via [employer]

## 2024-01-21 ENCOUNTER — MEDICAL CORRESPONDENCE (OUTPATIENT)
Dept: HEALTH INFORMATION MANAGEMENT | Facility: CLINIC | Age: 29
End: 2024-01-21
Payer: MEDICARE

## 2024-01-24 ENCOUNTER — TELEPHONE (OUTPATIENT)
Dept: FAMILY MEDICINE | Facility: CLINIC | Age: 29
End: 2024-01-24
Payer: MEDICARE

## 2024-01-24 NOTE — TELEPHONE ENCOUNTER
Forms/Letter Request    Type of form/letter: Nursing Home/Assisted Living Orders      Do we have the form/letter: Yes    Who is the form from? Home care    Where did/will the form come from? form was faxed in    When is form/letter needed by: 1/31/2024    How would you like the form/letter returned: Fax : [phone]    Patient Notified form requests are processed in 5-7 business days:No    Could we send this information to you in [employer] or would you prefer to receive a phone call?:   Patient would like to be contacted via [employer]

## 2024-01-29 ENCOUNTER — OFFICE VISIT (OUTPATIENT)
Dept: UROLOGY | Facility: CLINIC | Age: 29
End: 2024-01-29
Payer: MEDICARE

## 2024-01-29 VITALS
BODY MASS INDEX: 18.48 KG/M2 | DIASTOLIC BLOOD PRESSURE: 59 MMHG | SYSTOLIC BLOOD PRESSURE: 87 MMHG | WEIGHT: 115 LBS | HEIGHT: 66 IN

## 2024-01-29 DIAGNOSIS — N20.0 NEPHROLITHIASIS: Primary | ICD-10-CM

## 2024-01-29 PROCEDURE — 99213 OFFICE O/P EST LOW 20 MIN: CPT | Performed by: STUDENT IN AN ORGANIZED HEALTH CARE EDUCATION/TRAINING PROGRAM

## 2024-01-29 ASSESSMENT — PAIN SCALES - GENERAL: PAINLEVEL: NO PAIN (0)

## 2024-01-29 NOTE — LETTER
"1/29/2024       RE: Dianna Cottrell  [address] [facility] [address]     Dear Colleague,    Thank you for referring your patient, Dianna Cottrell, to the [facility] [place] at [facility]. Please see a copy of my visit note below.    POSTOP UROLOGY NOTE    HPI:  Dianna Cottrell is a 28 year old female being seen for a postoperative visit after undergoing a CCIC with bladder neck closure on 3/16/23    Today she is doing well.  CIC ever 3-4hrs with 14Fr straight cath  She reports 1-2 episodes of difficult catheterizations that occurred following several hours without cathing and a very full bladder but otherwise has no issues.  Had infected obstructing stone in the summer requiring nephrostomy tube and had procedure with Dr. Schofield for percutaneous nephrolithotomy. Is now drain/tube free.  Denies additional UTIs.  Denies flank pain or hematuria at this time.   Concerned about occasional unilateral bloating of her abdomen which is transient and not associated with pain. She has regular bowel movements on her current bowel regimen    Exam:  BP (!) 87/59   Ht 1.676 m (5' 6\")   Wt 52.2 kg (115 lb)   LMP  (LMP Unknown)   BMI 18.56 kg/m    GENERAL: Healthy, alert and no distress  EYES: Eyes grossly normal to inspection.  No discharge or erythema  RESP: No audible wheeze, cough, or visible cyanosis.  NEURO: Alert and oriented x3  PSYCH: Mentation appears normal, judgement and insight intact, normal speech   ABD: pfannensteil incision healing well  : Channel with red healthy mucosa    14fr straight cath goes easily into channel   Irrigated with mild level of mucus       Assessment & Plan  S/p CCIC with bladder neck closure on 3/16/23. Overall doing well    Continue CIC via channel q3-4h with Daily irrigation   Recommend smaller intervals if drinking more to avoid overfilling and causing difficulty cathing  If experiencing " difficult catheterizations consistently, will contact the office for cystoscopy of channel with pediatric scope  Will obtain renal US, KUB for evaluation of stones, upper tracts and bowel  --Bloating likely secondary to stool burden, recommend continued bowel regimen and possible clean out in the setting of KUB with moderate stool burden  --likely stone burden on left side kidney, will set patient up for follow up with stone provider  Follow up in 1year      Sincerely,    Alem Izquierdo MD

## 2024-01-29 NOTE — NURSING NOTE
"Chief Complaint   Patient presents with    Follow Up     Pt states she has been having bladder issues. Thursday morning 1/25/24 pt reported she was unable to get urine return from cath.. pt and her mom tried for an hour prior to getting urine return.   Pt stated its hard to get cath in as well. \"Lots of resistance\"   Pt reported \"lots of stomach aches\"    When pt has spazams pts right side of stomach is shifting to left         Blood pressure (!) 87/59, height 1.676 m (5' 6\"), weight 52.2 kg (115 lb), not currently breastfeeding. Body mass index is 18.56 kg/m .    Patient Active Problem List   Diagnosis    c5 burst fracture    Lesions of vulva    IUD (intrauterine device) in place- placed 12/2017    H/O: pneumonia    АНДРЕЙ (generalized anxiety disorder)    Quadriplegia, post-traumatic (H)- incomplete quad, limited use upper extremities    Major depressive disorder with single episode, in partial remission (H24)    Autonomic dysreflexia    Leukocytosis    Pressure injury of right ischium, stage 4 (H)    Personal history of DVT (deep vein thrombosis)    Neurogenic bladder    Impaired lung function    Encounter for insertion of mirena IUD    YONI III with severe dysplasia    Moderate protein-calorie malnutrition (H24)    Major depression    Neurogenic bowel    Spasticity    Spinal cord injury, C5-C7 (H)    Urinary incontinence    Nephrolithiasis    Hypoxia    Neuromuscular respiratory weakness (H)    Urinary retention    Sepsis (H)    Community acquired pneumonia of left lower lobe of lung    S/P flap graft    Current moderate episode of major depressive disorder, unspecified whether recurrent (H)       Allergies   Allergen Reactions    Succinylcholine Other (See Comments)     Spinal cord injury 12/18/12, patient at risk for extrajunctional receptors and hyperkalemia  Severity: Unknown; Notes: spinal cord injury 2012. at risk for extrajunctional receptors and hyperkalemia.; Type: Drug Allergy;   Spinal cord injury " 12/18/12, patient at risk for extrajunctional receptors and hyperkalemia  Spinal cord injury 12/18/12, patient at risk for extrajunctional receptors and hyperkalemia       Current Outpatient Medications   Medication Sig Dispense Refill    acetaminophen (TYLENOL) 325 MG tablet Take 325-650 mg by mouth every 6 hours as needed.      albuterol (PROVENTIL) (2.5 MG/3ML) 0.083% neb solution TAKE 1 VIAL BY NEBULIZATION EVERY 4 HOURS AS NEEDED FOR SHORTNESS OF BREATH / DYSPNEA OR WHEEZING 150 mL 3    amoxicillin (AMOXIL) 500 MG capsule Take 1 capsule (500 mg) by mouth 3 times daily for 180 days 90 capsule 5    baclofen (LIORESAL) 10 MG tablet Take 30 mg by mouth 3 times daily (10MG X 3 = 30MG)      bisacodyl (DULCOLAX) 10 MG suppository Place 1 suppository (10 mg) rectally At Bedtime (Patient taking differently: Place 10 mg rectally nightly as needed for constipation) 30 suppository 0    clindamycin (CLEOCIN T) 1 % external lotion Apply topically 2 times daily 60 mL 1    fluticasone-vilanterol (BREO ELLIPTA) 100-25 MCG/ACT inhaler TAKE 1 PUFF BY MOUTH EVERY DAY 28 each 5    gabapentin (NEURONTIN) 300 MG capsule Take 300 mg by mouth At Bedtime       hydrOXYzine lina (VISTARIL) 25 MG capsule TAKE 1 CAPSULE (25 MG) BY MOUTH 3 TIMES DAILY AS NEEDED FOR ANXIETY (OR AT BEDTIME FOR SLEEP.) 270 capsule 1    miconazole (MICATIN) 2 % external cream Apply topically 2 times daily 113 g 1    midodrine (PROAMATINE) 5 MG tablet Take 10 mg by mouth 2 times daily  6 tablet 0    Misc Natural Products (ELDERBERRY/VITAMIN C/ZINC PO) Take 1 tablet by mouth daily      Multiple Vitamins-Minerals (WOMENS MULTIVITAMIN) TABS Take 1 tablet by mouth daily      oxybutynin ER (DITROPAN-XL) 5 MG 24 hr tablet Take 5 mg by mouth every evening      rivaroxaban ANTICOAGULANT (XARELTO) 10 MG TABS tablet Take 1 tablet (10 mg) by mouth daily (with dinner) for 3 days 3 tablet 0    senna-docusate (SENOKOT-S/PERICOLACE) 8.6-50 MG tablet Take 1 tablet by mouth daily       sertraline (ZOLOFT) 100 MG tablet TAKE 1 AND 1/2 TABLETS BY MOUTH EVERY  tablet 0    sodium chloride (NEBUSAL) 3 % neb solution Take 3 mLs by nebulization every 6 hours as needed for wheezing or other (sputum clearance difficulty due to quadridplegia.) 300 mL 1    sodium chloride 0.9%, bottle, (CURITY STERILE SALINE) 0.9 % irrigation 250 mLs by Intracatheter route 2 times daily 22332 mL 11    Vitamin D3 (CHOLECALCIFEROL) 125 MCG (5000 UT) tablet Take 1 tablet by mouth every other day         Social History     Tobacco Use    Smoking status: Former    Smokeless tobacco: Former   Vaping Use    Vaping Use: Former    Substances: THC, Flavoring    Devices: Disposable, Pre-filled pod   Substance Use Topics    Alcohol use: No     Alcohol/week: 0.0 standard drinks of alcohol    Drug use: Yes     Types: Marijuana     Comment: usually smokes at least two bongs per day, also smokes marijuana out of e-cig pen most days,       Crista Shrestha  1/29/2024  3:14 PM

## 2024-01-29 NOTE — PROGRESS NOTES
"POSTOP UROLOGY NOTE    HPI:  Dianna Cottrell is a 28 year old female being seen for a postoperative visit after undergoing a CCIC with bladder neck closure on 3/16/23    Today she is doing well.  CIC ever 3-4hrs with 14Fr straight cath  She reports 1-2 episodes of difficult catheterizations that occurred following several hours without cathing and a very full bladder but otherwise has no issues.  Had infected obstructing stone in the summer requiring nephrostomy tube and had procedure with Dr. Schofield for percutaneous nephrolithotomy. Is now drain/tube free.  Denies additional UTIs.  Denies flank pain or hematuria at this time.   Concerned about occasional unilateral bloating of her abdomen which is transient and not associated with pain. She has regular bowel movements on her current bowel regimen    Exam:  BP (!) 87/59   Ht 1.676 m (5' 6\")   Wt 52.2 kg (115 lb)   LMP  (LMP Unknown)   BMI 18.56 kg/m    GENERAL: Healthy, alert and no distress  EYES: Eyes grossly normal to inspection.  No discharge or erythema  RESP: No audible wheeze, cough, or visible cyanosis.  NEURO: Alert and oriented x3  PSYCH: Mentation appears normal, judgement and insight intact, normal speech   ABD: pfannensteil incision healing well  : Channel with red healthy mucosa    14fr straight cath goes easily into channel   Irrigated with mild level of mucus       Assessment & Plan   S/p CCIC with bladder neck closure on 3/16/23. Overall doing well    Continue CIC via channel q3-4h with Daily irrigation   Recommend smaller intervals if drinking more to avoid overfilling and causing difficulty cathing  If experiencing difficult catheterizations consistently, will contact the office for cystoscopy of channel with pediatric scope  Will obtain renal US, KUB for evaluation of stones, upper tracts and bowel  --Bloating likely secondary to stool burden, recommend continued bowel regimen and possible clean out in the setting of KUB with moderate " stool burden  --likely stone burden on left side kidney, will set patient up for follow up with stone provider  Follow up in 1year

## 2024-01-29 NOTE — PATIENT INSTRUCTIONS
Schedule imaging within the next week.  Schedule for follow-up with Dr. Bacon or fellow in one year.    It was a pleasure meeting with you today.  Thank you for allowing me and my team the privilege of caring for you today.  YOU are the reason we are here, and I truly hope we provided you with the excellent service you deserve.  Please let us know if there is anything else we can do for you so that we can be sure you are leaving completely satisfied with your care experience.

## 2024-02-01 ENCOUNTER — HOSPITAL ENCOUNTER (OUTPATIENT)
Dept: WOUND CARE | Facility: CLINIC | Age: 29
Discharge: HOME | End: 2024-02-01
Attending: SURGERY | Admitting: SURGERY
Payer: MEDICARE

## 2024-02-01 VITALS
HEIGHT: 66 IN | HEART RATE: 92 BPM | TEMPERATURE: 97.5 F | WEIGHT: 118.3 LBS | RESPIRATION RATE: 15 BRPM | DIASTOLIC BLOOD PRESSURE: 69 MMHG | BODY MASS INDEX: 19.01 KG/M2 | SYSTOLIC BLOOD PRESSURE: 95 MMHG

## 2024-02-01 DIAGNOSIS — L89.314 PRESSURE INJURY OF RIGHT ISCHIUM, STAGE 4 (H): Primary | ICD-10-CM

## 2024-02-01 LAB
CRP SERPL-MCNC: 18.19 MG/L
ERYTHROCYTE [SEDIMENTATION RATE] IN BLOOD BY WESTERGREN METHOD: 32 MM/HR (ref 0–20)

## 2024-02-01 PROCEDURE — 85652 RBC SED RATE AUTOMATED: CPT | Performed by: SURGERY

## 2024-02-01 PROCEDURE — 99213 OFFICE O/P EST LOW 20 MIN: CPT | Performed by: SURGERY

## 2024-02-01 PROCEDURE — 17250 CHEM CAUT OF GRANLTJ TISSUE: CPT | Performed by: SURGERY

## 2024-02-01 PROCEDURE — 36415 COLL VENOUS BLD VENIPUNCTURE: CPT | Performed by: SURGERY

## 2024-02-01 PROCEDURE — 86140 C-REACTIVE PROTEIN: CPT | Performed by: SURGERY

## 2024-02-01 NOTE — PROGRESS NOTES
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  She also has a tiny coccygeal wound.      INTERVAL HISTORY:   7/20/23:  I had not seen her in quite some time, and then she bounced into the hospital with what sounds like urosepsis, and ended up having an obstructing stone that required placement of a perc neph tube.  We took a look at her wound and while it was small, as far as a skin defect, there was a bit of a pocket traveling underneath some what used to be adherent scar tissue.  While in the hospital, it certainly was not her biggest problem, and they let her out last Thursday.  She has a special [employer] Adalgisa Easy Air lateral rotating pressure air mattress at home, and her mom is basically her PCA.  They have been using Mesalt packing and having moderate usual amount of drainage.  She has been feeling much better since her original presentation.  She missed her kids and really if it were not for her mom, I do not think we would be talking about flapping her wound.  10/26/23: this is a telephone visit today with Dianna and her mom. She underwent R posterior thigh flap closure for R IT decubitus with osteomyelitis on 9/20. She has done her postop recovery at home and it sounds like things have been going well. The ROSANNE already fell out.   She has been using some skin creams prescribed by Dermatology and it sounds like the redness has resolved. Latanya Drew) has been seeing her for any dressing needs and weekly lab draws while she is on antibiotics. Her course will continue for 2 more weeks.    11/16/23: Dianna is here in person with her mom. Her kids have strep and URI's so she has been making sure she keeps up with her nebs and hydration. She has also been having diarrhea from her antibiotics, causing a fungal \"diaper rash\". Fortunately, she saw Dr Samayoa from ID yesterday " "and they d/c'd the PICC and are switching over to just PO amox for the next 6-12 months for the actinomyces on cultures. Hopefully her stooling can get better over time.   She has been tolerating sitting upright in bed for at least 60 minute sessions without much change to her flap. She has put in a call to [facility] for pressure mapping her wheelchair post-flap, but she will also contact Kirill Saunders if things get delayed so she can get back into her wheelchair at home.   I think it's safe now to resume ROM for her RLE, as long as it is done gently and slowly. They will let her PCAs know that they should not \"push\" if there is any tissue resistance. As for transfers, mom lifts patient (pivot), but PCAs use geoffrey with sling. This would be OK as long as there is no pressure or shear trauma from the sling over the flap. For  toiletting/shower, she does have a Roho cushion for her shower chair, and they can also consider using extra padding with pillows, etc.   1/4/24: here today with mom. Still using [facility] for home care. Has been alternating Mesalt with VASHE cleanses. Mom noticed a new 'spot' about a month ago, began using TRIAD there but no changes. \"All of a sudden\" the spot started opening at the surface, mom continued cleaning with VASHE soaks and covering it with a cotton ball. In the past week it opened up into a \"much bigger\" hole. Since then has been using Mesalt. Mom thinks it may have been too much pressure from patient's new shower chair since edge of her wound touches shower chair. No drainage or puddles were seen coming from wound. States she hasn't been sitting in her wheelchair for more than about 6 hours at a time. Did not use Egoffrey at all. Does weight shifts and offloading.   Mom also reported using antifungal powder and barrier cream on perianal/perineal groin area which also extends to inner thighs,  but brings up concerns about skin flakiness and lack of rash improvement. Patient states she uses " Dove soap and water to clean the area and rinses well. Only wears underwear when she goes out of the house. States patient is taking amoxicillin TID for the next 6 months per ID and probiotics for loose stools, which have been adding to the moisture and irritation in the area.     2/1/24: Dianna is here today with her mom. They have been using wound cleanser or mild body wash for all areas. The R IT gets VASHE soaks, then Endoform AM every other day. Mom states that drainage is relatively unchanged. Dianna is feeling OK. She is still on Amoxiciliin. She has been getting bed baths on M-W, and showers only on Fridays.  They are using her Roho cushion on the shower chair, and mom is doing transfers to avoid any injury from hunter or sling use.       PHYSICAL EXAM:   7/20/23: On exam, the skin defect over the right IT is fairly small, but enough to get some packing in.  From what I can see of the wound, it looks fairly clean with granulation tissue.  There is an area at the most proximal aspect of the adherent scar that is starting to break down and may end up being kind of a counter drainage hole.  Either way, it will be removed when we definitively fix her wound.  I am hoping to shoot for 08/25 to do a right IT debridement and flap probably from the posterior thigh.  We did receive an email from Dr. Tatum about when to remove her kidney stone, and actually if we could combine that or have it staged within a couple days of each other that would be ideal.  We will contact his  and see what we can work out for dates.   10/26/23: photos show a viable intact R posterior thigh flap with some scabbing along the inset sutures and at the T-junction of the VY donor site closure. There are no gross indications of infection or dehiscence.   11/16/23: Viable, intact R posterior thigh flap. Healing well, though there is only thin flap over IT prominence. Extensive perianal/vulvar fungal rash.    1/4/24: well-healed R  posterior thigh flap. New opening along upper incision tunneling down to IT. Tissues appear mildly grungy and somewhat pale. No gross infection or necrosis. Periwound intact but dry. Most proximal end of incision is somewhat adherent or tethered. Bone not exposed but only thin covering at base.     2/1/24: R IT recurrent ulcer along flap scar appears clean but granulation tissues are boggy- somewhat hypertrophic. There is some tunneling. L rashy patch over L IT/gluteal area, not raw today. Groin rashes appear fungal.     Please see nursing notes for wound measurements, photos and vital signs.    PROCEDURES:  2/1/24: silver nitrate applied to R IT inner wound surfaces.      ASSESSMENT/ PLAN:   7/20/23: Currently, she has [employer] as her home care company, and she is due to have an appointment or a followup with her primary care provider, Suzan Willis, in the next week or two, so I asked her to ask for a history and physical, so that she would not have to do an extra visit for surgery next month.  She seemed to think that was doable.  Until we take her to surgery, I think the Mesalt is no longer needed since things are clean.  They could use some packing strips 1/2-inch with the Vashe for the dressings, as long as it is not too soggy, and then cover that with Mepilex.   10/26/23: home care to remove sutures and staples on Monday. Will send sitting protocol to begin Monday AFTER sutures removed. Contact clinic if any problems or questions. If any signs of pressure injury or dehiscence, will need to halt sitting protocol. Once she is tolerating sitting in bed, she can get wheelchair cushion remapped and continue increasing sitting time in WC.  She can now roll onto operative R side for bedding changes too.   As for getting up to shower chair (which is well-padded), she can begin once she is tolerating 2 hours in WC.   11/16/23: continue increasing sitting time in bed until can get wheelchair pressure mapped. Start ROM  "exercises and shower chair ADLs as long as NO SIGNS OF TRAUMA TO FLAP.  Antifungal barrier - cream or powder to perianal/perineal rash. Consider taking pre/probiotics for diarrhea on abx.   1/4/24:   -Pack hole every day using Endoform AM, gauze, and Zetuvit Silicone Plus cover. Can stretch to every other day if it seems there is enough Endoform still present in wound when going to change it.   -Try using Roho in shower chair (with something to fill the hole in the cushion if needed). Mom presented other ideas she wanted to try to limit Dianna's chair use as well. Could limit wheelchair time to weekly hairwashing and do other bathing in bed.   -Can try using hair dryer on low as well as a dimethicone cleanser in skin folds to remedy the rash. Also recommended Melina cream.  Consider wicking or absorbent microfiber cloth or material as well.     2/1/24: labs drawn today to determine if persistent of recurrent osteomyelitis. If elevated, would recommend repeat MRI. Trial Ioplex foam QD-QOD since still some drainage. Trial Triamcinolone cream to L gluteal rash. Continue with antifungals for groin rash. Consider Derm referral for rashes if persistent and unresponsive. Can also have Derm evaluate \"wart-like\" lesions on L elbow.      FOLLOW-UP:   7/20/23: hopefully OR in next month.  10/26/23: we will find a time for Dianna to come for a final follow up in her wheelchair that may not be until December, unless she develops wound problems.  11/16/23: next followup can be in 2 months IN wheelchair PRN. RTC sooner if healing problems emerge.   1/4/24: Nurse to contact patient for follow-up scheduling. 1 month if possible.     2/1/24: 1-2 months as available.         "

## 2024-02-01 NOTE — DISCHARGE INSTRUCTIONS
Dianna Cottrell      1995  A DME order was not completed because the supplies are ordered by home care or at a care facility  [facility] [place] Phone: [phone] Fax: [phone]      Dressing changes outside of clinic are being performed by [facility]    Plan: 2/1/24  Labs drawn; may need to reorder MRI depending on results  Silver Nitrate cauterize wound tunnel- drainage may be brown grey for a few days  Change dressing Plan using Ioplex  Follow up with Dermatologist for wound to Left Elbow    Wound Dressing Change: Right Ischial Tuberosity  - Prepare clean surface and wash your hands with soap and water  - Cleanse with mild unscented soap (such as Cetaphil, Cerave or Dove) and water  - Apply small amount of VASHE on gauze, lay into wound bed, let sit for 10 minutes, remove gauze   Cut and tuck 1/3rd piece of 4x5 Ioplex to fill the wound tunnel and base  Keep folded and closed in baggie to avoid drying out   Tuck a 1 piece of sterile 4x4 gauze to keep mouth of wound open  Cover with 4x4 Zetuvit Silicone plus dressing  Change Daily or every other day depending on the color of the Iodoplex      Repositioning: shower chair: use ROHO cushion on shower chair  Perineal Cleanser: google brands   Perineal Wash Melina  Cleanse and Protect  with Odor Control Lotion 8 oz. Pump Bottle Scented $12.95  Bed: Reposition MINIMALLY every 1-2 hours in bed to relieve pressure and promote perfusion to tissue.  Chair: When up to the chair, do not sit for longer than one hour total before returning to bed for at least 60 minutes to relieve pressure and promote perfusion to the tissue.  Completely recline/tilt for 15 minutes each hour.  Sit on a chair cushion when up to the chair.      Main Provider: Fred Zacarias M.D. February 1, 2024    Call us at [phone] if you have any questions about your wounds, have redness or swelling around your wound, have a fever of 101 degrees Fahrenheit or greater or  if you have any other problems or concerns. We answer the phone Monday through Friday 8 am to 4 pm, please leave a message as we check the voicemail frequently throughout the day.     If you had a positive experience please indicate that on your patient satisfaction survey form that [facility] will be sending you.  It was a pleasure meeting with you today.  Thank you for allowing me and my team the privilege of caring for you today.  YOU are the reason we are here, and I truly hope we provided you with the excellent service you deserve.  Please let us know if there is anything else we can do for you so that we can be sure you are leaving completely satisfied with your care experience.      If you have any billing related questions please call the [facility] Business office at [phone]. The clinic staff does not handle billing related matters.  If you are scheduled to have a follow up appointment, you will receive a reminder call the day before your visit. On the appointment day please arrive 15 minutes prior to your appointment time. If you are unable to keep that appointment, please call the clinic to cancel or reschedule. If you are more than 10 minutes late or greater for your scheduled appointment time, the clinic policy is that you may be asked to reschedule.

## 2024-02-01 NOTE — PROGRESS NOTES
Patient Active Problem List   Diagnosis    c5 burst fracture    Lesions of vulva    IUD (intrauterine device) in place- placed 12/2017    H/O: pneumonia    АНДРЕЙ (generalized anxiety disorder)    Quadriplegia, post-traumatic (H)- incomplete quad, limited use upper extremities    Major depressive disorder with single episode, in partial remission (H24)    Autonomic dysreflexia    Leukocytosis    Pressure injury of right ischium, stage 4 (H)    Personal history of DVT (deep vein thrombosis)    Neurogenic bladder    Impaired lung function    Encounter for insertion of mirena IUD    YONI III with severe dysplasia    Moderate protein-calorie malnutrition (H24)    Major depression    Neurogenic bowel    Spasticity    Spinal cord injury, C5-C7 (H)    Urinary incontinence    Nephrolithiasis    Hypoxia    Neuromuscular respiratory weakness (H)    Urinary retention    Sepsis (H)    Community acquired pneumonia of left lower lobe of lung    S/P flap graft    Current moderate episode of major depressive disorder, unspecified whether recurrent (H)     Past Medical History:   Diagnosis Date    Anemia     with pregnancy    c5 burst fracture 12/18/2012    C5-C7 fracture with cord injury    Compression fracture of L1 lumbar vertebra (H) 12/18/2012    L1 superior endplate compression fracture    Decubitus ulcer     OF RIGHT ISCHIUM    Depressive disorder     Encounter for insertion of mirena IUD 12/13/2017    Fracture of thoracic spine without spinal cord lesion (H) 12/18/2012    T3-T8 spinous process fractures    History of spinal cord injury     History of thrombophlebitis     Hypertension 12/06/2016    Impaired lung function     Nephrolithiasis 4/11/2022    Neurogenic bladder     Neurogenic bowel     Quadriplegia (H)     Thrombosis     Urinary tract infection     Vocal cord dysfunction     Left vocal cord weakness noted by ENT post extubation 12/2012     Labs:   Recent Labs   Lab Test 11/13/23  0905 07/02/23  0611 07/01/23  1755  "04/12/22  0712 04/15/21  1445   ALBUMIN 4.0   < >  --    < >  --    HGB 11.1*   < >  --    < >  --    INR  --   --  1.05   < >  --    WBC 10.4   < >  --    < >  --    CRP  --   --   --   --  9.7*    < > = values in this interval not displayed.     Nutrition requirements were discussed with patient today.  Vitals:  BP 95/69 (BP Location: Left arm, Patient Position: Chair, Cuff Size: Adult Regular)   Pulse 92   Temp 97.5  F (36.4  C) (Temporal)   Resp 15   Ht 1.676 m (5' 6\")   Wt 53.7 kg (118 lb 4.8 oz)   LMP  (LMP Unknown)   BMI 19.09 kg/m    Wound:   Wound Ischial tuberosity Pressure injury community acquired Stage 4 (Active)       Wound Coccyx Pressure injury community acquired Stage 2 (Active)       Wound (used by OP [facility] only) 10/14/19 1407 Right pressure injury (Active)       Wound (used by OP [facility] only) 07/20/23 1316 Right ischial tuberosity pressure injury (Active)   Thickness/Stage Stage 4 02/01/24 1310   Base slough;granulating 02/01/24 1310   Periwound intact;pink 02/01/24 1310   Periwound Temperature warm 02/01/24 1310   Periwound Skin Turgor firm 01/04/24 1357   Edges open 02/01/24 1310   Length (cm) 1.4 02/01/24 1310   Width (cm) 2.8 02/01/24 1310   Depth (cm) 1.4 02/01/24 1310   Wound (cm^2) 3.92 cm^2 02/01/24 1310   Wound Volume (cm^3) 5.49 cm^3 02/01/24 1310   Wound healing % -345.45 02/01/24 1310   Undermining [Depth (cm)/Location] 12 - 1 o'clock / 2.8 cm 02/01/24 1310   Drainage Characteristics/Odor serosanguineous 02/01/24 1310   Drainage Amount large 02/01/24 1310   Care, Wound chemical cautery applied 02/01/24 1310       Incision/Surgical Site 09/20/23 Left;Lower Back (Active)       Incision/Surgical Site 09/20/23 Left Flank (Active)       Incision/Surgical Site 09/20/23 Bilateral Leg (Active)       Incision/Surgical Site 09/20/23 Posterior;Right Thigh (Active)      Photo:        Further instructions from your care team         Dianna Cottrell      1995  A DME order was not completed " because the supplies are ordered by home care or at a care facility  [facility] [place] Phone: [phone] Fax: [phone]      Dressing changes outside of clinic are being performed by [facility]    Plan: 2/1/24  Labs drawn; may need to reorder MRI depending on results  Silver Nitrate cauterize wound tunnel- drainage may be brown grey for a few days  Change dressing Plan using Ioplex  Follow up with Dermatologist for wound to Left Elbow    Wound Dressing Change: Right Ischial Tuberosity  - Prepare clean surface and wash your hands with soap and water  - Cleanse with mild unscented soap (such as Cetaphil, Cerave or Dove) and water  - Apply small amount of VASHE on gauze, lay into wound bed, let sit for 10 minutes, remove gauze   Cut and tuck 1/3rd piece of 4x5 Ioplex to fill the wound tunnel and base  Keep folded and closed in baggie to avoid drying out   Tuck a 1 piece of sterile 4x4 gauze to keep mouth of wound open  Cover with 4x4 Zetuvit Silicone plus dressing  Change Daily or every other day depending on the color of the Iodoplex      Repositioning: shower chair: use ROHO cushion on shower chair  Perineal Cleanser: [employer]   Perineal Wash Melina  Cleanse and Protect  with Odor Control Lotion 8 oz. Pump Bottle Scented $12.95  Bed: Reposition MINIMALLY every 1-2 hours in bed to relieve pressure and promote perfusion to tissue.  Chair: When up to the chair, do not sit for longer than one hour total before returning to bed for at least 60 minutes to relieve pressure and promote perfusion to the tissue.  Completely recline/tilt for 15 minutes each hour.  Sit on a chair cushion when up to the chair.      Main Provider: Fred Zacarias M.D. February 1, 2024

## 2024-02-03 ENCOUNTER — ANCILLARY PROCEDURE (OUTPATIENT)
Dept: GENERAL RADIOLOGY | Facility: CLINIC | Age: 29
End: 2024-02-03
Attending: STUDENT IN AN ORGANIZED HEALTH CARE EDUCATION/TRAINING PROGRAM
Payer: MEDICARE

## 2024-02-03 DIAGNOSIS — N20.0 NEPHROLITHIASIS: ICD-10-CM

## 2024-02-03 PROCEDURE — 74018 RADEX ABDOMEN 1 VIEW: CPT | Mod: GC | Performed by: STUDENT IN AN ORGANIZED HEALTH CARE EDUCATION/TRAINING PROGRAM

## 2024-02-05 ENCOUNTER — TELEPHONE (OUTPATIENT)
Dept: FAMILY MEDICINE | Facility: CLINIC | Age: 29
End: 2024-02-05
Payer: MEDICARE

## 2024-02-05 NOTE — TELEPHONE ENCOUNTER
Forms/Letter Request    Type of form/letter: Nursing Home/Assisted Living Orders      Do we have the form/letter: Yes    Who is the form from? Home care    Where did/will the form come from? Faxed in     When is form/letter needed by: 2/12/2024    How would you like the form/letter returned: Fax : [phone]    Patient Notified form requests are processed in 5-7 business days:No    Could we send this information to you in NextPage or would you prefer to receive a phone call?:   Patient would like to be contacted via NextPage

## 2024-02-06 ENCOUNTER — TELEPHONE (OUTPATIENT)
Dept: UROLOGY | Facility: CLINIC | Age: 29
End: 2024-02-06
Payer: MEDICARE

## 2024-02-06 NOTE — TELEPHONE ENCOUNTER
Left Voicemail (1st Attempt) for the patient to call back and schedule the following:    Appointment type: Return  Provider: Dr. Schofield   Return date: Next available   Specialty phone number: [phone]  Additional appointment(s) needed: N/A  Additonal Notes: Stone follow up

## 2024-02-07 ENCOUNTER — TELEPHONE (OUTPATIENT)
Dept: UROLOGY | Facility: CLINIC | Age: 29
End: 2024-02-07
Payer: MEDICARE

## 2024-02-08 ENCOUNTER — MEDICAL CORRESPONDENCE (OUTPATIENT)
Dept: HEALTH INFORMATION MANAGEMENT | Facility: CLINIC | Age: 29
End: 2024-02-08
Payer: MEDICARE

## 2024-02-10 ENCOUNTER — MEDICAL CORRESPONDENCE (OUTPATIENT)
Dept: HEALTH INFORMATION MANAGEMENT | Facility: CLINIC | Age: 29
End: 2024-02-10

## 2024-02-12 ENCOUNTER — TELEPHONE (OUTPATIENT)
Dept: FAMILY MEDICINE | Facility: CLINIC | Age: 29
End: 2024-02-12

## 2024-02-12 ENCOUNTER — OFFICE VISIT (OUTPATIENT)
Dept: FAMILY MEDICINE | Facility: CLINIC | Age: 29
End: 2024-02-12
Payer: MEDICARE

## 2024-02-12 VITALS
SYSTOLIC BLOOD PRESSURE: 86 MMHG | OXYGEN SATURATION: 96 % | DIASTOLIC BLOOD PRESSURE: 54 MMHG | TEMPERATURE: 97.9 F | RESPIRATION RATE: 14 BRPM | HEART RATE: 79 BPM

## 2024-02-12 DIAGNOSIS — Z72.89 ENGAGES IN VAPING: ICD-10-CM

## 2024-02-12 DIAGNOSIS — F32.1 CURRENT MODERATE EPISODE OF MAJOR DEPRESSIVE DISORDER, UNSPECIFIED WHETHER RECURRENT (H): ICD-10-CM

## 2024-02-12 DIAGNOSIS — G70.9 NEUROMUSCULAR RESPIRATORY WEAKNESS (H): ICD-10-CM

## 2024-02-12 DIAGNOSIS — B07.8 COMMON WART: Primary | ICD-10-CM

## 2024-02-12 DIAGNOSIS — J99 NEUROMUSCULAR RESPIRATORY WEAKNESS (H): ICD-10-CM

## 2024-02-12 DIAGNOSIS — G82.50 QUADRIPLEGIA, POST-TRAUMATIC (H): ICD-10-CM

## 2024-02-12 DIAGNOSIS — Z23 NEED FOR PROPHYLACTIC VACCINATION AND INOCULATION AGAINST INFLUENZA: ICD-10-CM

## 2024-02-12 DIAGNOSIS — M86.651 CHRONIC OSTEOMYELITIS OF PELVIS, RIGHT (H): ICD-10-CM

## 2024-02-12 DIAGNOSIS — S14.109S QUADRIPLEGIA, POST-TRAUMATIC (H): ICD-10-CM

## 2024-02-12 DIAGNOSIS — L89.314 PRESSURE INJURY OF RIGHT ISCHIUM, STAGE 4 (H): ICD-10-CM

## 2024-02-12 PROCEDURE — G0008 ADMIN INFLUENZA VIRUS VAC: HCPCS | Performed by: PHYSICIAN ASSISTANT

## 2024-02-12 PROCEDURE — 17110 DESTRUCTION B9 LES UP TO 14: CPT | Performed by: PHYSICIAN ASSISTANT

## 2024-02-12 PROCEDURE — 90686 IIV4 VACC NO PRSV 0.5 ML IM: CPT | Performed by: PHYSICIAN ASSISTANT

## 2024-02-12 PROCEDURE — 99214 OFFICE O/P EST MOD 30 MIN: CPT | Mod: 25 | Performed by: PHYSICIAN ASSISTANT

## 2024-02-12 ASSESSMENT — PAIN SCALES - GENERAL: PAINLEVEL: NO PAIN (0)

## 2024-02-12 ASSESSMENT — PATIENT HEALTH QUESTIONNAIRE - PHQ9
10. IF YOU CHECKED OFF ANY PROBLEMS, HOW DIFFICULT HAVE THESE PROBLEMS MADE IT FOR YOU TO DO YOUR WORK, TAKE CARE OF THINGS AT HOME, OR GET ALONG WITH OTHER PEOPLE: NOT DIFFICULT AT ALL
SUM OF ALL RESPONSES TO PHQ QUESTIONS 1-9: 2
SUM OF ALL RESPONSES TO PHQ QUESTIONS 1-9: 2

## 2024-02-12 NOTE — TELEPHONE ENCOUNTER
Patient questioned if KL could do an intervention with the wart on her elbow or needed a referral. RN stated KL will assess and recommend appropriate next steps.     Reminded patient of time, 1440, appointment is.    Demi Salazar RN  [facility] - Registered Nurse  Clinic Triage [place]   February 12, 2024

## 2024-02-12 NOTE — CONFIDENTIAL NOTE
Forms/Letter Request    Type of form/letter: OTHER: Plan of Care [facility]      Do we have the form/letter: Yes:      Who is the form from? Home care    Where did/will the form come from? form was faxed in    When is form/letter needed by: February 19    How would you like the form/letter returned: Fax : to:  [phone]    Patient Notified form requests are processed in 5-7 business days:No    Could we send this information to you in Purfresh or would you prefer to receive a phone call?:   Patient would like to be contacted via Purfresh

## 2024-02-12 NOTE — PROGRESS NOTES
Assessment & Plan     Common wart  Wart vs callus   Procedure: Verbal consent from pt (parent) for cryotherapy. Discussed cryotherapy, risks vs benefits, expectations post-procedure, after care at home, and follow up in 3-4 weeks for retreatment as needed. Liquid nitrogen applied until white appearance of lesion (10 sec each, less if indicated) x3 rounds. Pt tolerated well. See Pt Summary/AVS for home care instructions given.     Engages in vaping  Strong advised all vaping, marijuana smoking.   She feels the habit and the hand to mouth motion is the issue. Discussed behavior modifications and substitutions  She would like to try nicotine replacement in gum form.   - nicotine (NICORETTE) 2 MG gum; Place 1 each (2 mg) inside cheek every hour as needed for nicotine withdrawal symptoms    Need for prophylactic vaccination and inoculation against influenza  Given   - INFLUENZA VACCINE IM > 6 MONTHS VALENT IIV4 (AFLURIA/FLUZONE)    Chronic osteomyelitis of pelvis, right (H)  Pressure injury of right ischium, stage 4 (H)  Continue with infectious disease and wound care team at [facility]     Quadriplia, post-traumatic (H)  Sees her PMR MD at [facility]     Neuromuscular respiratory weakness (H)  Has been doing well on her inhaler regimen.   Saw pulmonology in past few months. Was being referred to subspecialist at the [facility] but has not yet made that appt.     Current moderate episode of major depressive disorder, unspecified whether recurrent (H)  Stable on her sertraline.       Follow Up: see above. Additionally patient was instructed to contact clinic for worsening symptoms, non-improvement in time frame discussed, and for questions regarding treatment plan.   For virtual visits, the patient was advised to be seen for in person evaluation if symptoms or condition are worsening or non-improvement as expected.   MIO Rachel   Dianna is a 28 year old, presenting for the following health  issues:  Wart        2/12/2024     2:42 PM   Additional Questions   Roomed by Merry GARDNER   Accompanied by Mom         2/12/2024     2:42 PM   Patient Reported Additional Medications   Patient reports taking the following new medications NA     History of Present Illness       Reason for visit:  Wart on my left elbow  Symptom onset:  3-4 weeks ago  Symptom intensity:  Mild  Symptom progression:  Staying the same  Had these symptoms before:  No    She eats 0-1 servings of fruits and vegetables daily.She consumes 3 sweetened beverage(s) daily.She exercises with enough effort to increase her heart rate 9 or less minutes per day.  She exercises with enough effort to increase her heart rate 3 or less days per week.   She is taking medications regularly.         Warts  Onset/Duration: 1 month ago   Description (location/number): left elbow, 1   Accompanying signs and symptoms (pain, redness): No  History: prior warts: No  Therapies tried and outcome: none      Vaping- working on quitting. Saw pulmonologist in the fall. He strongly advised quitting vaping. She quit vaping for awhile but restarted. Smoking a lot of marijuana- mom is with today and thinks using 5x a day . She thinks her inhaler has been helpful. She also thinks the antibiotic she has had for the osteomyelitis has helped her breathing this winter season. She will do a flu shot today    She is due for a pap - she plans to see her OB/GYN     She is seeing wound care again. Pressure sore opened again after flap closure surgery. She has been on long term amoxicillin from Infectious disease.       Review of Systems  Constitutional, HEENT, cardiovascular, pulmonary, gi and gu systems are negative, except as otherwise noted.      Objective    BP (!) 86/54   Pulse 79   Temp 97.9  F (36.6  C) (Temporal)   Resp 14   SpO2 96%   There is no height or weight on file to calculate BMI.  Physical Exam   Physical Exam        GENERAL APPEARANCE: well appearing alert and no  distress; examined sitting in her power chair      EYES: EOMI, PERRL, wearing glasses     HENT: ear canals and TM's normal and nose and mouth without ulcers or lesions     NECK: no adenopathy, no asymmetry, masses, or scars and thyroid normal to palpation     RESP: lungs clear to auscultation - no rales, rhonchi or wheezes; no cough.      CV: regular rates and rhythm, normal S1 S2, no S3 or S4 and no murmur, click or rub     ABDOMEN:  soft, nontender, no HSM or masses and bowel sounds normal     MS: muscular atrophy of all 4 limbs. No edema in LE or calf tenderness. Has some limited elbow and wrist ROM.      SKIN: left elbow: nodule with hyperkeratotic surface/scale. Second smaller nodule nearby. no suspicious lesions or rashes     NEURO: Normal strength and tone, sensory exam grossly normal, mentation intact and speech normal     PSYCH: mentation appears normal. and affect normal/bright     LYMPHATICS: No cervical adenopathy            Signed Electronically by: Suzan Willis PA-C
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
## 2024-02-14 ENCOUNTER — VIRTUAL VISIT (OUTPATIENT)
Dept: INFECTIOUS DISEASES | Facility: CLINIC | Age: 29
End: 2024-02-14
Attending: STUDENT IN AN ORGANIZED HEALTH CARE EDUCATION/TRAINING PROGRAM
Payer: MEDICARE

## 2024-02-14 VITALS — HEIGHT: 66 IN | WEIGHT: 111 LBS | BODY MASS INDEX: 17.84 KG/M2

## 2024-02-14 DIAGNOSIS — L89.319 DECUBITUS ULCER OF RIGHT ISCHIAL TUBEROSITY REGION: Primary | ICD-10-CM

## 2024-02-14 PROCEDURE — 99214 OFFICE O/P EST MOD 30 MIN: CPT | Mod: 95 | Performed by: STUDENT IN AN ORGANIZED HEALTH CARE EDUCATION/TRAINING PROGRAM

## 2024-02-14 RX ORDER — DOXYCYCLINE HYCLATE 100 MG
100 TABLET ORAL 2 TIMES DAILY
Qty: 60 TABLET | Refills: 5 | Status: SHIPPED | OUTPATIENT
Start: 2024-02-14 | End: 2024-08-12

## 2024-02-14 ASSESSMENT — PAIN SCALES - GENERAL: PAINLEVEL: NO PAIN (0)
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
## 2024-02-14 NOTE — NURSING NOTE
Patient confirms medications and allergies are accurate via patients echeck in completion, and or denies any changes since last reviewed/verified.     Is the patient currently in the state of [place]? YES    Visit mode:VIDEO    If the visit is dropped, the patient can be reconnected by: VIDEO VISIT: Text to cell phone:   Telephone Information:   Mobile [phone]       Will anyone else be joining the visit? NO  (If patient encounters technical issues they should call [phone])    How would you like to obtain your AVS? MyChart    Are changes needed to the allergy or medication list? No    Reason for visit: RECHECK    Martha WYATT

## 2024-02-15 ENCOUNTER — MEDICAL CORRESPONDENCE (OUTPATIENT)
Dept: HEALTH INFORMATION MANAGEMENT | Facility: CLINIC | Age: 29
End: 2024-02-15
Payer: MEDICARE

## 2024-02-15 DIAGNOSIS — R94.2 IMPAIRED LUNG FUNCTION: Chronic | ICD-10-CM

## 2024-02-15 DIAGNOSIS — J99 NEUROMUSCULAR RESPIRATORY WEAKNESS (H): ICD-10-CM

## 2024-02-15 DIAGNOSIS — G70.9 NEUROMUSCULAR RESPIRATORY WEAKNESS (H): ICD-10-CM

## 2024-02-15 RX ORDER — FLUTICASONE FUROATE AND VILANTEROL TRIFENATATE 100; 25 UG/1; UG/1
1 POWDER RESPIRATORY (INHALATION) DAILY
Qty: 180 EACH | Refills: 0 | Status: SHIPPED | OUTPATIENT
Start: 2024-02-15 | End: 2024-05-15

## 2024-02-19 PROBLEM — E44.0 MODERATE PROTEIN-CALORIE MALNUTRITION (H): Status: RESOLVED | Noted: 2021-01-20 | Resolved: 2024-02-19

## 2024-02-19 PROBLEM — M86.651: Status: ACTIVE | Noted: 2024-02-19

## 2024-02-23 ENCOUNTER — TELEPHONE (OUTPATIENT)
Dept: FAMILY MEDICINE | Facility: CLINIC | Age: 29
End: 2024-02-23

## 2024-02-23 NOTE — CONFIDENTIAL NOTE
Forms/Letter Request    Type of form/letter: OTHER: Plan of Treatment       Do we have the form/letter: Yes:      Who is the form from? Home care    Where did/will the form come from? form was faxed in    When is form/letter needed by: March 1    How would you like the form/letter returned: Fax : to:  Latanya [facility] @ [phone]    Patient Notified form requests are processed in 5-7 business days:No    Could we send this information to you in [employer] or would you prefer to receive a phone call?:   Patient would like to be contacted via [employer]

## 2024-02-26 DIAGNOSIS — Z53.9 DIAGNOSIS NOT YET DEFINED: Primary | ICD-10-CM

## 2024-02-26 PROCEDURE — G0179 MD RECERTIFICATION HHA PT: HCPCS | Performed by: PHYSICIAN ASSISTANT

## 2024-02-29 ENCOUNTER — DOCUMENTATION ONLY (OUTPATIENT)
Dept: INFECTIOUS DISEASES | Facility: CLINIC | Age: 29
End: 2024-02-29
Payer: MEDICARE

## 2024-03-01 DIAGNOSIS — F41.1 GAD (GENERALIZED ANXIETY DISORDER): ICD-10-CM

## 2024-03-01 DIAGNOSIS — F32.4 MAJOR DEPRESSIVE DISORDER WITH SINGLE EPISODE, IN PARTIAL REMISSION (H): ICD-10-CM

## 2024-03-01 NOTE — TELEPHONE ENCOUNTER
Completed by Dr. Yuli Petersen     faxed  
Placed form in Dr. Yuli Petersen inbox to sign  
Reason for call:  Form  Reason for Call:  Form, our goal is to have forms completed with 72 hours, however, some forms may require a visit or additional information.    Type of letter, form or note:  Medical    Who is the form from?: Latanya    Where did the form come from: form was faxed in    What clinic location was the form placed at?: [facility] - [phone]    Where the form was placed: 's in box     What number is listed as a contact on the form?: [phone]       Additional comments: Fax back to [phone]    Call taken on 1/13/2020 at 9:43 AM by Vasquez Mcmillan        
[FreeTextEntry1] : 60 y/o M returns today, is complaining of post pain that he didn't have before the hernia was reduced, is moving bowels but does not have this pain prior to his obstruction. Concerned if there may be some degree of a tort, will repeat CT scan with oral contrast decide management after, if nothing impressive will follow symptoms and will repeat laparoscopy and reconsider 3 months after operation.

## 2024-03-04 ENCOUNTER — TELEPHONE (OUTPATIENT)
Dept: FAMILY MEDICINE | Facility: CLINIC | Age: 29
End: 2024-03-04
Payer: MEDICARE

## 2024-03-04 RX ORDER — SERTRALINE HYDROCHLORIDE 100 MG/1
150 TABLET, FILM COATED ORAL DAILY
Qty: 135 TABLET | Refills: 1 | Status: SHIPPED | OUTPATIENT
Start: 2024-03-04 | End: 2024-06-20

## 2024-03-04 NOTE — CONFIDENTIAL NOTE
Forms/Letter Request     Type of form/letter: Skilled Nursing 3WK2,2WK1,3WK1,2WK1,1WL1     Do we have the form/letter: Yes:       Who is the form from? Home care     Where did/will the form come from? form was faxed in     When is form/letter needed by: by Friday, March 8 for dressing change assistance      How would you like the form/letter returned: Fax : to:  Latanya [facility] @ [phone]     Patient Notified form requests are processed in 5-7 business days:No     Could we send this information to you in [email or web address] or would you prefer to receive a phone call?:   Patient would like to be contacted via [email or web address]

## 2024-03-06 ENCOUNTER — MEDICAL CORRESPONDENCE (OUTPATIENT)
Dept: HEALTH INFORMATION MANAGEMENT | Facility: CLINIC | Age: 29
End: 2024-03-06
Payer: MEDICARE

## 2024-03-15 ENCOUNTER — HOSPITAL ENCOUNTER (OUTPATIENT)
Dept: WOUND CARE | Facility: CLINIC | Age: 29
Discharge: HOME | End: 2024-03-15
Attending: SURGERY | Admitting: SURGERY
Payer: MEDICARE

## 2024-03-15 VITALS — TEMPERATURE: 97.2 F | RESPIRATION RATE: 15 BRPM | HEART RATE: 99 BPM

## 2024-03-15 DIAGNOSIS — L89.314 PRESSURE INJURY OF RIGHT ISCHIUM, STAGE 4 (H): Primary | ICD-10-CM

## 2024-03-15 PROCEDURE — 17250 CHEM CAUT OF GRANLTJ TISSUE: CPT | Performed by: PHYSICIAN ASSISTANT

## 2024-03-15 PROCEDURE — 99214 OFFICE O/P EST MOD 30 MIN: CPT | Mod: 25 | Performed by: PHYSICIAN ASSISTANT

## 2024-03-15 NOTE — DISCHARGE INSTRUCTIONS
Dianna Cottrell   1995    A DME order was not completed because the supplies are ordered by home care or at a care facility    Dressing changes outside of clinic are being performed by Home Care [facility]    Latanya [facility] Phone: [phone] Fax: [phone]      Plan: 3/15/24  Keep Ioplex closed in baggie to avoid drying out   Follow up with Dermatologist for wound to Left Elbow     Wound Dressing Change: Right Ischial Tuberosity  - Prepare clean surface and wash your hands with soap and water  - Cleanse with mild unscented soap (such as Cetaphil, Cerave or Dove) and water  - Apply small amount of VASHE on gauze, lay into wound bed, let sit for 10 minutes, remove gauze   - Cut and tuck 1/3rd piece of 4x5 Ioplex to fill the wound tunnel and base  - Tuck a 1 piece of sterile 4x4 gauze to keep mouth of wound open and secure Ioplex in place  - Cover with 4x4 Zetuvit Silicone plus dressing  Change Daily      Repositioning: shower chair: use ROHO cushion on shower chair  Perineal Cleanser: google brands   Perineal Wash Melina  Cleanse and Protect  with Odor Control Lotion 8 oz. Pump Bottle Scented $12.95  Bed: Reposition MINIMALLY every 1-2 hours in bed to relieve pressure and promote perfusion to tissue.  Chair: When up to the chair, do not sit for longer than one hour total before returning to bed for at least 60 minutes to relieve pressure and promote perfusion to the tissue.  Completely recline/tilt for 15 minutes each hour.  Sit on a chair cushion when up to the chair.       Main Provider: Toyin Yi PA-C March 15, 2024    Call us at [phone] if you have any questions about your wounds, have redness or swelling around your wound, have a fever of 101 degrees Fahrenheit or greater or if you have any other problems or concerns. We answer the phone Monday through Friday 8 am to 4 pm, please leave a message as we check the voicemail frequently throughout the day.     If you had a positive  experience please indicate that on your patient satisfaction survey form that [facility] will be sending you.    It was a pleasure meeting with you today.  Thank you for allowing me and my team the privilege of caring for you today.  YOU are the reason we are here, and I truly hope we provided you with the excellent service you deserve.  Please let us know if there is anything else we can do for you so that we can be sure you are leaving completely satisfied with your care experience.      If you have any billing related questions please call the [facility] Business office at [phone]. The clinic staff does not handle billing related matters.    If you are scheduled to have a follow up appointment, you will receive a reminder call the day before your visit. On the appointment day please arrive 15 minutes prior to your appointment time. If you are unable to keep that appointment, please call the clinic to cancel or reschedule. If you are more than 10 minutes late or greater for your scheduled appointment time, the clinic policy is that you may be asked to reschedule.

## 2024-03-22 ENCOUNTER — TELEPHONE (OUTPATIENT)
Dept: FAMILY MEDICINE | Facility: CLINIC | Age: 29
End: 2024-03-22
Payer: MEDICARE

## 2024-03-22 NOTE — CONFIDENTIAL NOTE
Forms/Letter Request     Type of form/letter: Skilled Nursing  Wound Care 3.17.2024  3WK2,2WK1,1WK1     Do we have the form/letter: Yes:       Who is the form from? Home care     Where did/will the form come from? form was faxed in     When is form/letter needed by: by Friday, March 29       How would you like the form/letter returned: Fax : to:  [facility] @ [phone]     Patient Notified form requests are processed in 5-7 business days:No     Could we send this information to you in [employer] or would you prefer to receive a phone call?:   Patient would like to be contacted via [employer]

## 2024-03-25 ENCOUNTER — TELEPHONE (OUTPATIENT)
Dept: FAMILY MEDICINE | Facility: CLINIC | Age: 29
End: 2024-03-25
Payer: MEDICARE

## 2024-03-25 NOTE — PROGRESS NOTES
[facility]    ASSESSMENT:   (L89.314) Pressure injury of right ischium, stage 4 (H)  (primary encounter diagnosis)  Hx of osteomyelitis of RIT sp debridement and prolonged antibiotic therapy    PLAN/DISCUSSION:   Wound care plan: continue Ioplex + Zetuvit. Latanya and mom apply dressings. See bottom of note for detailed wound care and patient instructions    INTERVAL Hx:   3/15: Returns to clinic. Wound measuring smaller. Conts on Ioplex.     HISTORY OF PRESENT ILLNESS:   Dianna Cottrell is a 29 year old female with incomplete quadriplegia who presents today for a stage 4 RIT ulceration. Most recently underwent I&D with flap closure for a RIT pressure ulcer with osteomyelitis on 9/20/23. She received appropriate prolonged antibiotic therapy and followed the seating protocol as prescribed. Unfortunately around December 2023 they noticed a spot over her RIT opening up which then suddenly became a big hole. She and her mother suspect it was from her shower chair.     1/4/24: Dr. Zacarias advised Endoform AM + Zetuvit QD - QOD. Rec'd using Roho in shower chair.    2/1/24: Labs drawn. CRP 18.9 (down from 44.83) ESR 32. AgNO3 applied. Recommended Ioplex QD - QOD, TAC cream to L gluteal rash.     MATTRESS:  Group 2 mattress  URINE MANAGEMENT: CCIC with bladder neck closure  BOWEL MANAGEMENT:  bowel program    VITALS: Pulse 99   Temp 97.2  F (36.2  C)   Resp 15      PHYSICAL EXAM:  GENERAL: Patient is alert and oriented and in no acute distress  INTEGUMENTARY:   Wound Ischial tuberosity Pressure injury community acquired Stage 4 (Active)       Wound Coccyx Pressure injury community acquired Stage 2 (Active)       Wound (used by OP I only) 10/14/19 1407 Right pressure injury (Active)       Wound (used by OP I only) 07/20/23 1316 Right ischial tuberosity pressure injury (Active)   Thickness/Stage Stage 4 03/15/24 1525   Base slough;granulating;white 03/15/24 1525   Periwound intact;macerated  03/15/24 1525   Periwound Temperature warm 03/15/24 1525   Periwound Skin Turgor firm 03/15/24 1525   Edges open;rolled/closed 03/15/24 1525   Length (cm) 0.7 03/15/24 1525   Width (cm) 2.2 03/15/24 1525   Depth (cm) 0.4 03/15/24 1525   Wound (cm^2) 1.54 cm^2 03/15/24 1525   Wound Volume (cm^3) 0.62 cm^3 03/15/24 1525   Wound healing % -75 03/15/24 1525   Undermining [Depth (cm)/Location] 11- 1 o'clock / 3.2 cm 03/15/24 1525   Drainage Characteristics/Odor serosanguineous;tan 03/15/24 1525   Drainage Amount large 03/15/24 1525   Care, Wound chemical cautery applied 03/15/24 1525       Incision/Surgical Site 09/20/23 Left;Lower Back (Active)       Incision/Surgical Site 09/20/23 Left Flank (Active)       Incision/Surgical Site 09/20/23 Bilateral Leg (Active)       Incision/Surgical Site 09/20/23 Posterior;Right Thigh (Active)             PROCEDURES: Silver nitrate was applied to the wound.    MDM: 30 minutes were spent on the day of visit reviewing previous chart notes, assessing the patient and developing the plan of care, this excludes time spent on any procedures.       PATIENT INSTRUCTIONS      Further instructions from your care team         Dianna Cottrell   1995    A DME order was not completed because the supplies are ordered by home care or at a care facility    Dressing changes outside of clinic are being performed by Home Care and [facility] Phone: [phone] Fax: [phone]      Plan: 3/15/24  Keep Ioplex closed in baggie to avoid drying out   Follow up with Dermatologist for wound to Left Elbow     Wound Dressing Change: Right Ischial Tuberosity  - Prepare clean surface and wash your hands with soap and water  - Cleanse with mild unscented soap (such as Cetaphil, Cerave or Dove) and water  - Apply small amount of VASHE on gauze, lay into wound bed, let sit for 10 minutes, remove gauze   - Cut and tuck 1/3rd piece of 4x5 Ioplex to fill the wound tunnel and base  - Tuck a  1 piece of sterile 4x4 gauze to keep mouth of wound open and secure Ioplex in place  - Cover with 4x4 Zetuvit Silicone plus dressing  Change Daily      Repositioning: shower chair: use ROHO cushion on shower chair  Perineal Cleanser: google brands   Perineal Wash Melina  Cleanse and Protect  with Odor Control Lotion 8 oz. Pump Bottle Scented $12.95  Bed: Reposition MINIMALLY every 1-2 hours in bed to relieve pressure and promote perfusion to tissue.  Chair: When up to the chair, do not sit for longer than one hour total before returning to bed for at least 60 minutes to relieve pressure and promote perfusion to the tissue.  Completely recline/tilt for 15 minutes each hour.  Sit on a chair cushion when up to the chair.       Main Provider: Toyin Yi PA-C March 15, 2024    Call us at [phone] if you have any questions about your wounds, have redness or swelling around your wound, have a fever of 101 degrees Fahrenheit or greater or if you have any other problems or concerns. We answer the phone Monday through Friday 8 am to 4 pm, please leave a message as we check the voicemail frequently throughout the day.     If you had a positive experience please indicate that on your patient satisfaction survey form that [facility] will be sending you.    It was a pleasure meeting with you today.  Thank you for allowing me and my team the privilege of caring for you today.  YOU are the reason we are here, and I truly hope we provided you with the excellent service you deserve.  Please let us know if there is anything else we can do for you so that we can be sure you are leaving completely satisfied with your care experience.      If you have any billing related questions please call the [facility] Business office at [phone]. The clinic staff does not handle billing related matters.    If you are scheduled to have a follow up appointment, you will receive a reminder call the day before your visit. On the  appointment day please arrive 15 minutes prior to your appointment time. If you are unable to keep that appointment, please call the clinic to cancel or reschedule. If you are more than 10 minutes late or greater for your scheduled appointment time, the clinic policy is that you may be asked to reschedule.          Electronically signed by Toyin Yi PA-C on March 25, 2024

## 2024-03-26 ENCOUNTER — VIRTUAL VISIT (OUTPATIENT)
Dept: UROLOGY | Facility: CLINIC | Age: 29
End: 2024-03-26
Payer: MEDICARE

## 2024-03-26 DIAGNOSIS — N20.0 NEPHROLITHIASIS: Primary | ICD-10-CM

## 2024-03-26 PROCEDURE — 99214 OFFICE O/P EST MOD 30 MIN: CPT | Mod: GT | Performed by: UROLOGY
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
## 2024-03-26 NOTE — NURSING NOTE
Is the patient currently in the state of [place]? YES    Visit mode:VIDEO    If the visit is dropped, the patient can be reconnected by: VIDEO VISIT: Send to e-mail at: [email or web address]    Will anyone else be joining the visit? NO  (If patient encounters technical issues they should call [phone]    How would you like to obtain your AVS? MyChart    Are changes needed to the allergy or medication list? No    Reason for visit: Follow Up    Kerline WYATT

## 2024-03-30 ENCOUNTER — MEDICAL CORRESPONDENCE (OUTPATIENT)
Dept: HEALTH INFORMATION MANAGEMENT | Facility: CLINIC | Age: 29
End: 2024-03-30
Payer: MEDICARE

## 2024-04-01 ENCOUNTER — TELEPHONE (OUTPATIENT)
Dept: FAMILY MEDICINE | Facility: CLINIC | Age: 29
End: 2024-04-01
Payer: MEDICARE

## 2024-04-01 ENCOUNTER — MEDICAL CORRESPONDENCE (OUTPATIENT)
Dept: HEALTH INFORMATION MANAGEMENT | Facility: CLINIC | Age: 29
End: 2024-04-01
Payer: MEDICARE

## 2024-04-01 NOTE — TELEPHONE ENCOUNTER
Forms/Letter Request    Type of form/letter: Nursing Home/Assisted Living Orders      Do we have the form/letter: Yes    Who is the form from? Home care    Where did/will the form come from? form was faxed in    When is form/letter needed by: AT YOUR EARLIEST TIME     How would you like the form/letter returned: [phone]     Patient Notified form requests are processed in 5-7 business days:No    Could we send this information to you in [employer] or would you prefer to receive a phone call?:   Patient would like to be contacted via [employer]

## 2024-04-08 ENCOUNTER — TELEPHONE (OUTPATIENT)
Dept: FAMILY MEDICINE | Facility: CLINIC | Age: 29
End: 2024-04-08
Payer: MEDICARE

## 2024-04-08 ENCOUNTER — MEDICAL CORRESPONDENCE (OUTPATIENT)
Dept: HEALTH INFORMATION MANAGEMENT | Facility: CLINIC | Age: 29
End: 2024-04-08
Payer: MEDICARE

## 2024-04-08 NOTE — TELEPHONE ENCOUNTER
Forms/Letter Request    Type of form/letter: Nursing Home/Assisted Living Orders      Do we have the form/letter: Yes: placed in bin for TC    Who is the form from? Home care    Where did/will the form come from? form was mailed in    When is form/letter needed by:     How would you like the form/letter returned: Fax : [phone]    Patient Notified form requests are processed in 5-7 business days:No
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
## 2024-04-08 NOTE — TELEPHONE ENCOUNTER
Forms/Letter Request     Type of form/letter: Nursing Home/Assisted Living Orders        Do we have the form/letter: Yes: placed in bin for TC     Who is the form from? Home care     Where did/will the form come from? form was mailed in     When is form/letter needed by:      How would you like the form/letter returned: Fax : [phone]     Patient Notified form requests are processed in 5-7 business days:No

## 2024-04-09 ENCOUNTER — TELEPHONE (OUTPATIENT)
Dept: FAMILY MEDICINE | Facility: CLINIC | Age: 29
End: 2024-04-09
Payer: MEDICARE

## 2024-04-09 NOTE — TELEPHONE ENCOUNTER
Forms/Letter Request    Type of form/letter: Nursing Home/Assisted Living Orders      Do we have the form/letter: Yes    Who is the form from? Home care    Where did/will the form come from? form was faxed in    When is form/letter needed by: 7 days    How would you like the form/letter returned: Fax : [phone]    Patient Notified form requests are processed in 5-7 business days:No    Could we send this information to you in [employer] or would you prefer to receive a phone call?:   Patient would like to be contacted via [employer]

## 2024-04-10 ENCOUNTER — MEDICAL CORRESPONDENCE (OUTPATIENT)
Dept: HEALTH INFORMATION MANAGEMENT | Facility: CLINIC | Age: 29
End: 2024-04-10
Payer: MEDICARE

## 2024-04-10 ENCOUNTER — TELEPHONE (OUTPATIENT)
Dept: FAMILY MEDICINE | Facility: CLINIC | Age: 29
End: 2024-04-10
Payer: MEDICARE

## 2024-04-10 DIAGNOSIS — Z53.9 DIAGNOSIS NOT YET DEFINED: Primary | ICD-10-CM

## 2024-04-10 PROCEDURE — G0179 MD RECERTIFICATION HHA PT: HCPCS | Performed by: PHYSICIAN ASSISTANT

## 2024-04-10 NOTE — TELEPHONE ENCOUNTER
Specific questions/orders for wound care.  Please fax to pt's wound care team.  at [facility].  Suzan Willis PA-C

## 2024-04-10 NOTE — CONFIDENTIAL NOTE
Forms/Letter Request     Type of form/letter: Wound Treatment        Do we have the form/letter: Yes     [facility]     Where did/will the form come from? form was faxed in     When is form/letter needed by: complete @ your earliest convenience     How would you like the form/letter returned: Fax : [phone]     Patient Notified form requests are processed in 5-7 business days:No     Could we send this information to you in What's Trending or would you prefer to receive a phone call?:   Patient would like to be contacted via [employer]

## 2024-04-11 ENCOUNTER — TRANSFERRED RECORDS (OUTPATIENT)
Dept: HEALTH INFORMATION MANAGEMENT | Facility: CLINIC | Age: 29
End: 2024-04-11

## 2024-04-15 ENCOUNTER — TELEPHONE (OUTPATIENT)
Dept: WOUND CARE | Facility: CLINIC | Age: 29
End: 2024-04-15
Payer: MEDICARE

## 2024-04-15 NOTE — TELEPHONE ENCOUNTER
Patient is unavailable to make her in clinic appt 4/18/24 as she will be out of town (still in the state though) and is asking if a virtual visit would be possible?

## 2024-04-15 NOTE — TELEPHONE ENCOUNTER
Voicemail left with patient giving ok to switch appointment on 4/18 to a video visit. Appointment changed to video visit.

## 2024-04-18 ENCOUNTER — HOSPITAL ENCOUNTER (OUTPATIENT)
Dept: WOUND CARE | Facility: CLINIC | Age: 29
Discharge: HOME | End: 2024-04-18
Attending: SURGERY
Payer: MEDICARE

## 2024-04-18 DIAGNOSIS — L89.314 PRESSURE INJURY OF RIGHT ISCHIUM, STAGE 4 (H): Primary | ICD-10-CM

## 2024-04-18 NOTE — DISCHARGE INSTRUCTIONS
2024   Dianna Cottrell   1995    {WAS/WAS NOT:813827}    Dressing changes outside of clinic are being performed by {Dressing Changes:184729}    Wound Dressing Change: ***   - Wash your hands with soap and water before you begin your dressing change and prepare a clean surface for dressings.  -{woundcleansin}  -Primary dressing:  -Secondary dressing:    A DME order was not completed because the supplies are ordered by home care or at a care facility     Dressing changes outside of clinic are being performed by Home Care [facility] Family     [facility] Phone: [phone] Fax: [phone]      Plan: 3/15/24  Keep Ioplex closed in baggie to avoid drying out   Follow up with Dermatologist for wound to Left Elbow     Wound Dressing Change: Right Ischial Tuberosity  - Prepare clean surface and wash your hands with soap and water  - Cleanse with mild unscented soap (such as Cetaphil, Cerave or Dove) and water  - Apply small amount of VASHE on gauze, lay into wound bed, let sit for 10 minutes, remove gauze   - Cut and tuck 1/3rd piece of 4x5 Ioplex to fill the wound tunnel and base  - Tuck a 1 piece of sterile 4x4 gauze to keep mouth of wound open and secure Ioplex in place  - Cover with 4x4 Zetuvit Silicone plus dressing  Change Daily      Repositioning: shower chair: use ROHO cushion on shower chair  Perineal Cleanser: google brands   Perineal Wash Melina  Cleanse and Protect  with Odor Control Lotion 8 oz. Pump Bottle Scented $12.95  Bed: Reposition MINIMALLY every 1-2 hours in bed to relieve pressure and promote perfusion to tissue.  Chair: When up to the chair, do not sit for longer than one hour total before returning to bed for at least 60 minutes to relieve pressure and promote perfusion to the tissue.  Completely recline/tilt for 15 minutes each hour.  Sit on a chair cushion when up to the chair.     Main Provider: Fred Zacarias M.D. 2024    Call us at [phone] if  you have any questions about your wounds, if you have redness or swelling around your wound, have a fever of 101 degrees Fahrenheit or greater or if you have any other problems or concerns. We answer the phone Monday through Friday 8 am to 4 pm, please leave a message as we check the voicemail frequently throughout the day. If you have a concern over the weekend, please leave a message and we will return your call Monday. If the need is urgent, go to the ER or urgent care.    If you had a positive experience please indicate that on your patient satisfaction survey form that [facility] will be sending you.    It was a pleasure meeting with you today.  Thank you for allowing me and my team the privilege of caring for you today.  YOU are the reason we are here, and I truly hope we provided you with the excellent service you deserve.  Please let us know if there is anything else we can do for you so that we can be sure you are leaving completely satisfied with your care experience.      If you have any billing related questions please call the [facility] Business office at [phone]. The clinic staff does not handle billing related matters.    If you are scheduled to have a follow up appointment, you will receive a reminder call the day before your visit. On the appointment day please arrive 15 minutes prior to your appointment time. If you are unable to keep that appointment, please call the clinic to cancel or reschedule. If you are more than 10 minutes late or greater for your scheduled appointment time, the clinic policy is that you may be asked to reschedule.

## 2024-04-25 ENCOUNTER — MEDICAL CORRESPONDENCE (OUTPATIENT)
Dept: HEALTH INFORMATION MANAGEMENT | Facility: CLINIC | Age: 29
End: 2024-04-25
Payer: MEDICARE

## 2024-04-30 ENCOUNTER — HOSPITAL ENCOUNTER (OUTPATIENT)
Dept: ULTRASOUND IMAGING | Facility: CLINIC | Age: 29
Discharge: HOME | End: 2024-04-30
Attending: STUDENT IN AN ORGANIZED HEALTH CARE EDUCATION/TRAINING PROGRAM | Admitting: STUDENT IN AN ORGANIZED HEALTH CARE EDUCATION/TRAINING PROGRAM
Payer: MEDICARE

## 2024-04-30 DIAGNOSIS — N31.9 NEUROGENIC BLADDER: ICD-10-CM

## 2024-04-30 PROCEDURE — 76770 US EXAM ABDO BACK WALL COMP: CPT

## 2024-05-01 ENCOUNTER — TELEPHONE (OUTPATIENT)
Dept: FAMILY MEDICINE | Facility: CLINIC | Age: 29
End: 2024-05-01
Payer: MEDICARE

## 2024-05-01 NOTE — TELEPHONE ENCOUNTER
Forms/Letter Request    Type of form/letter: Home Health Certification      Do we have the form/letter: Yes:      Who is the form from? [facility] care    Where did/will the form come from? form was faxed in    When is form/letter needed by: complete @ your earliest convenience    How would you like the form/letter returned: Fax : to:  [phone]    Patient Notified form requests are processed in 5-7 business days:No    Could we send this information to you in [employer] or would you prefer to receive a phone call?:   Patient would like to be contacted via [employer]

## 2024-05-14 ENCOUNTER — VIRTUAL VISIT (OUTPATIENT)
Dept: UROLOGY | Facility: CLINIC | Age: 29
End: 2024-05-14
Payer: MEDICARE

## 2024-05-14 DIAGNOSIS — N20.0 NEPHROLITHIASIS: Primary | ICD-10-CM

## 2024-05-14 PROCEDURE — 99213 OFFICE O/P EST LOW 20 MIN: CPT | Mod: 95 | Performed by: UROLOGY

## 2024-05-14 ASSESSMENT — PAIN SCALES - GENERAL: PAINLEVEL: NO PAIN (0)
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
## 2024-05-14 NOTE — NURSING NOTE
Is the patient currently in the state of [place]? YES    Visit mode:VIDEO    If the visit is dropped, the patient can be reconnected by: VIDEO VISIT: Text to cell phone:   Telephone Information:   Mobile [phone]       Will anyone else be joining the visit? NO  (If patient encounters technical issues they should call [phone]    How would you like to obtain your AVS? MyChart    Are changes needed to the allergy or medication list? No    Are refills needed on medications prescribed by this physician? NO    Reason for visit: Follow Up    Kerline WYATT
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
## 2024-05-15 DIAGNOSIS — J99 NEUROMUSCULAR RESPIRATORY WEAKNESS (H): ICD-10-CM

## 2024-05-15 DIAGNOSIS — R94.2 IMPAIRED LUNG FUNCTION: Chronic | ICD-10-CM

## 2024-05-15 DIAGNOSIS — G70.9 NEUROMUSCULAR RESPIRATORY WEAKNESS (H): ICD-10-CM

## 2024-05-15 RX ORDER — FLUTICASONE FUROATE AND VILANTEROL TRIFENATATE 100; 25 UG/1; UG/1
1 POWDER RESPIRATORY (INHALATION) DAILY
Qty: 28 EACH | Refills: 5 | Status: SHIPPED | OUTPATIENT
Start: 2024-05-15

## 2024-05-16 ENCOUNTER — MEDICAL CORRESPONDENCE (OUTPATIENT)
Dept: HEALTH INFORMATION MANAGEMENT | Facility: CLINIC | Age: 29
End: 2024-05-16
Payer: MEDICARE

## 2024-05-16 ENCOUNTER — TELEPHONE (OUTPATIENT)
Dept: UROLOGY | Facility: CLINIC | Age: 29
End: 2024-05-16
Payer: MEDICARE

## 2024-05-16 ENCOUNTER — TELEPHONE (OUTPATIENT)
Dept: FAMILY MEDICINE | Facility: CLINIC | Age: 29
End: 2024-05-16
Payer: MEDICARE

## 2024-05-16 DIAGNOSIS — N31.9 NEUROGENIC BLADDER: Primary | ICD-10-CM

## 2024-05-16 NOTE — TELEPHONE ENCOUNTER
Forms/Letter Request     Type of form/letter: OTHER: [facility] orders Catheter supplies     Do we have the form/letter: Yes: placed in forms bin behind      Who [facility] the [facility] please [facility]     Where did/will the form come from? form was faxed in     When is form/letter needed by:      How would you like the form/letter returned: Fax : [phone]     Patient Notified form requests are processed in 5-7 business days:     Could we send this information to you in [employer] or would you prefer to receive a phone call?:   [employer]

## 2024-05-16 NOTE — TELEPHONE ENCOUNTER
[facility] Call Center    Phone Message    May a detailed message be left on voicemail: no     Reason for Call: Medication Refill Request    Has the patient contacted the pharmacy for the refill? Yes   Name of medication being requested: sodium chloride irragation solution, pt has one bottle left, needs this renewed, please call to confirm w, pt   pt will not make it thru weekend with out it  Provider who prescribed the medication: edin  Pharmacy:  speciality  Date medication is needed: ASAP     Action Taken: Other: urology    Travel Screening: Not Applicable

## 2024-05-20 DIAGNOSIS — G82.53 QUADRIPLEGIA, C5-C7 COMPLETE (H): ICD-10-CM

## 2024-05-20 DIAGNOSIS — N31.9 NEUROGENIC BLADDER: Primary | ICD-10-CM

## 2024-05-20 DIAGNOSIS — J18.9 FREQUENT EPISODES OF PNEUMONIA: ICD-10-CM

## 2024-05-20 DIAGNOSIS — R06.89 AIRWAY CLEARANCE IMPAIRMENT: ICD-10-CM

## 2024-05-20 RX ORDER — MAGNESIUM HYDROXIDE 1200 MG/15ML
LIQUID ORAL 2 TIMES DAILY
Status: DISCONTINUED | OUTPATIENT
Start: 2024-05-20 | End: 2024-05-20 | Stop reason: HOSPADM

## 2024-05-20 RX ORDER — SODIUM CHLORIDE FOR INHALATION 3 %
3 VIAL, NEBULIZER (ML) INHALATION EVERY 6 HOURS PRN
Qty: 300 ML | Refills: 1 | Status: SHIPPED | OUTPATIENT
Start: 2024-05-20 | End: 2024-06-24

## 2024-05-22 ENCOUNTER — TELEPHONE (OUTPATIENT)
Dept: UROLOGY | Facility: CLINIC | Age: 29
End: 2024-05-22
Payer: MEDICARE

## 2024-05-22 NOTE — TELEPHONE ENCOUNTER
Hello pt is requesting the sodium chloride 0.9 % can we please get this sent over please and thank you

## 2024-05-22 NOTE — TELEPHONE ENCOUNTER
----- Message from Katty Alvarez sent at 2024  8:12 AM CDT -----  Regardin month follow up  CT and return visit in 4 months    DA  24

## 2024-05-23 ENCOUNTER — HOSPITAL ENCOUNTER (OUTPATIENT)
Dept: WOUND CARE | Facility: CLINIC | Age: 29
Discharge: HOME | End: 2024-05-23
Attending: SURGERY | Admitting: SURGERY
Payer: MEDICARE

## 2024-05-23 VITALS — HEART RATE: 89 BPM | SYSTOLIC BLOOD PRESSURE: 100 MMHG | TEMPERATURE: 98 F | DIASTOLIC BLOOD PRESSURE: 64 MMHG

## 2024-05-23 DIAGNOSIS — L89.314 PRESSURE INJURY OF RIGHT ISCHIUM, STAGE 4 (H): Primary | ICD-10-CM

## 2024-05-23 PROCEDURE — 17250 CHEM CAUT OF GRANLTJ TISSUE: CPT | Performed by: SURGERY

## 2024-05-23 PROCEDURE — 99213 OFFICE O/P EST LOW 20 MIN: CPT | Mod: 25 | Performed by: SURGERY

## 2024-05-23 NOTE — DISCHARGE INSTRUCTIONS
05/23/2024   Dianna Cottrell   1995    A DME order was not completed because the supplies are ordered by home care or at a care facility  Dressing changes outside of clinic are being performed by [facility]  [facility] Phone: [phone] Fax: [phone]      Plan: 5/23/24  We applied Silver Nitrate to the hypergranulation tissue today.    This may lead to temporary staining of the skin with increased drainage and discolored gray/black drainage.    This may be present for a few days and is a normal process.   Keep working on increasing your protein intake     Wound Dressing Change: Right Ischial Tuberosity  - Prepare clean surface and wash your hands with soap and water  - Cleanse with mild unscented soap (such as Cetaphil, Cerave or Dove) and water  - Apply small amount of VASHE on gauze, lay into wound bed, let sit for 10 minutes, remove gauze   - Cut and tuck 1/20th piece of 4x5 Ioplex to fill the wound tunnel and base at 1 o'clock   - Tuck a 1 piece of sterile 4x4 gauze to keep mouth of wound open and secure Ioplex in place  - Cover with 4x4 Zetuvit Silicone plus dressing  Change Daily      Repositioning:   shower chair: use ROHO cushion on shower chair  Perineal Cleanser: google brands   Perineal Wash Melina  Cleanse and Protect  with Odor Control Lotion 8 oz. Pump Bottle Scented $12.95  Bed: Reposition MINIMALLY every 1-2 hours in bed to relieve pressure and promote perfusion to tissue.  Chair: When up to the chair, do not sit for longer than one hour total before returning to bed for at least 60 minutes to relieve pressure and promote perfusion to the tissue.  Completely recline/tilt for 15 minutes each hour.  Sit on a chair cushion when up to the chair.    Main Provider: Fred Zacarias M.D. May 23, 2024    Call us at [phone] if you have any questions about your wounds, if you have redness or swelling around your wound, have a fever of 101 degrees Fahrenheit or  greater or if you have any other problems or concerns. We answer the phone Monday through Friday 8 am to 4 pm, please leave a message as we check the voicemail frequently throughout the day. If you have a concern over the weekend, please leave a message and we will return your call Monday. If the need is urgent, go to the ER or urgent care.    If you had a positive experience please indicate that on your patient satisfaction survey form that [facility] will be sending you.    It was a pleasure meeting with you today.  Thank you for allowing me and my team the privilege of caring for you today.  YOU are the reason we are here, and I truly hope we provided you with the excellent service you deserve.  Please let us know if there is anything else we can do for you so that we can be sure you are leaving completely satisfied with your care experience.      If you have any billing related questions please call the [facility] Business office at [phone]. The clinic staff does not handle billing related matters.    If you are scheduled to have a follow up appointment, you will receive a reminder call the day before your visit. On the appointment day please arrive 15 minutes prior to your appointment time. If you are unable to keep that appointment, please call the clinic to cancel or reschedule. If you are more than 10 minutes late or greater for your scheduled appointment time, the clinic policy is that you may be asked to reschedule.

## 2024-05-23 NOTE — PROGRESS NOTES
Patient Active Problem List   Diagnosis    c5 burst fracture    Lesions of vulva    IUD (intrauterine device) in place- placed 12/2017    H/O: pneumonia    АНДРЕЙ (generalized anxiety disorder)    Quadriplegia, post-traumatic (H)- incomplete quad, limited use upper extremities    Major depressive disorder with single episode, in partial remission (H24)    Autonomic dysreflexia    Leukocytosis    Pressure injury of right ischium, stage 4 (H)    Personal history of DVT (deep vein thrombosis)    Neurogenic bladder    Impaired lung function    Encounter for insertion of mirena IUD    YONI III with severe dysplasia    Major depression    Neurogenic bowel    Spasticity    Spinal cord injury, C5-C7 (H)    Urinary incontinence    Nephrolithiasis    Hypoxia    Neuromuscular respiratory weakness (H)    Urinary retention    Sepsis (H)    Community acquired pneumonia of left lower lobe of lung    S/P flap graft    Current moderate episode of major depressive disorder, unspecified whether recurrent (H)    Chronic osteomyelitis of pelvis, right (H)     Past Medical History:   Diagnosis Date    Anemia     with pregnancy    c5 burst fracture 12/18/2012    C5-C7 fracture with cord injury    Compression fracture of L1 lumbar vertebra (H) 12/18/2012    L1 superior endplate compression fracture    Decubitus ulcer     OF RIGHT ISCHIUM    Depressive disorder     Encounter for insertion of mirena IUD 12/13/2017    Fracture of thoracic spine without spinal cord lesion (H) 12/18/2012    T3-T8 spinous process fractures    History of spinal cord injury     History of thrombophlebitis     Hypertension 12/06/2016    Impaired lung function     Nephrolithiasis 4/11/2022    Neurogenic bladder     Neurogenic bowel     Quadriplegia (H)     Thrombosis     Urinary tract infection     Vocal cord dysfunction     Left vocal cord weakness noted by ENT post extubation 12/2012     Labs:   Recent Labs   Lab Test 11/13/23  0905 07/02/23  0611 07/01/23  1755  04/12/22  0712 04/15/21  1445   ALBUMIN 4.0   < >  --    < >  --    HGB 11.1*   < >  --    < >  --    INR  --   --  1.05   < >  --    WBC 10.4   < >  --    < >  --    CRP  --   --   --   --  9.7*    < > = values in this interval not displayed.     Nutrition requirements were discussed with patient today.  Vitals:  /64 (BP Location: Left arm, Patient Position: Sitting, Cuff Size: Adult Regular)   Pulse 89   Temp 98  F (36.7  C) (Temporal)   Wound:   Wound (used by OP [facility] only) 07/20/23 1316 Right ischial tuberosity pressure injury (Active)   Thickness/Stage Stage 4 05/23/24 1439   Base slough;granulating 05/23/24 1439   Periwound intact 05/23/24 1439   Periwound Temperature warm 05/23/24 1439   Periwound Skin Turgor soft 11/16/23 1200   Edges open 05/23/24 1439   Length (cm) 1.8 05/23/24 1439   Width (cm) 0.3 05/23/24 1439   Depth (cm) 0.3 05/23/24 1439   Wound (cm^2) 0.54 cm^2 05/23/24 1439   Wound Volume (cm^3) 0.16 cm^3 05/23/24 1439   Wound healing % 38.64 05/23/24 1439   Tunneling [Depth (cm)/Location] 1 o'clock / 3.1cm 05/23/24 1439   Undermining [Depth (cm)/Location] 12 - 1 o'clock / 2.8 cm 02/01/24 1310   Drainage Characteristics/Odor serosanguineous 05/23/24 1439   Drainage Amount large 05/23/24 1439   Care, Wound chemical cautery applied 05/23/24 1439      Photo:       Further instructions from your care team         05/23/2024   Dianna Cottrell   1995    A DME order was not completed because the supplies are ordered by home care or at a care facility  Dressing changes outside of clinic are being performed by [facility]  [facility] [place] Phone: [phone] Fax: [phone]      Plan: 5/23/24  We applied Silver Nitrate to the hypergranulation tissue today.    This may lead to temporary staining of the skin with increased drainage and discolored gray/black drainage.    This may be present for a few days and is a normal process.   Keep working on increasing your protein  intake     Wound Dressing Change: Right Ischial Tuberosity  - Prepare clean surface and wash your hands with soap and water  - Cleanse with mild unscented soap (such as Cetaphil, Cerave or Dove) and water  - Apply small amount of VASHE on gauze, lay into wound bed, let sit for 10 minutes, remove gauze   - Cut and tuck 1/20th piece of 4x5 Ioplex to fill the wound tunnel and base at 1 o'clock   - Tuck a 1 piece of sterile 4x4 gauze to keep mouth of wound open and secure Ioplex in place  - Cover with 4x4 Zetuvit Silicone plus dressing  Change Daily      Repositioning:   shower chair: use Casual Steps cushion on shower chair  Perineal Cleanser: google brands   Perineal Wash Melina  Cleanse and Protect  with Odor Control Lotion 8 oz. Pump Bottle Scented $12.95  Bed: Reposition MINIMALLY every 1-2 hours in bed to relieve pressure and promote perfusion to tissue.  Chair: When up to the chair, do not sit for longer than one hour total before returning to bed for at least 60 minutes to relieve pressure and promote perfusion to the tissue.  Completely recline/tilt for 15 minutes each hour.  Sit on a chair cushion when up to the chair.    Main Provider: Fred Zacarias M.D. May 23, 2024

## 2024-05-23 NOTE — PROGRESS NOTES
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  She also has a tiny coccygeal wound.      INTERVAL HISTORY:   7/20/23:  I had not seen her in quite some time, and then she bounced into the hospital with what sounds like urosepsis, and ended up having an obstructing stone that required placement of a perc neph tube.  We took a look at her wound and while it was small, as far as a skin defect, there was a bit of a pocket traveling underneath some what used to be adherent scar tissue.  While in the hospital, it certainly was not her biggest problem, and they let her out last Thursday.  She has a special [employer] Adalgisa Easy Air lateral rotating pressure air mattress at home, and her mom is basically her PCA.  They have been using Mesalt packing and having moderate usual amount of drainage.  She has been feeling much better since her original presentation.  She missed her kids and really if it were not for her mom, I do not think we would be talking about flapping her wound.  10/26/23: this is a telephone visit today with Dianna and her mom. She underwent R posterior thigh flap closure for R IT decubitus with osteomyelitis on 9/20. She has done her postop recovery at home and it sounds like things have been going well. The ROSANNE already fell out.   She has been using some skin creams prescribed by Dermatology and it sounds like the redness has resolved. [facility]) has been seeing her for any dressing needs and weekly lab draws while she is on antibiotics. Her course will continue for 2 more weeks.    11/16/23: Dianna is here in person with her mom. Her kids have strep and URI's so she has been making sure she keeps up with her nebs and hydration. She has also been having diarrhea from her antibiotics, causing a fungal \"diaper rash\". 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 Hopefully her stooling can get better over time.   She has been tolerating sitting upright in bed for at least 60 minute sessions without much change to her flap. She has put in a call to [facility] for pressure mapping her wheelchair post-flap, but she will also contact Kirill Saunders if things get delayed so she can get back into her wheelchair at home.   I think it's safe now to resume ROM for her RLE, as long as it is done gently and slowly. They will let her PCAs know that they should not \"push\" if there is any tissue resistance. As for transfers, mom lifts patient (pivot), but PCAs use geoffrey with sling. This would be OK as long as there is no pressure or shear trauma from the sling over the flap. For  toiletting/shower, she does have a Roho cushion for her shower chair, and they can also consider using extra padding with pillows, etc.   1/4/24: here today with mom. Still using [facility] for home care. Has been alternating Mesalt with VASHE cleanses. Mom noticed a new 'spot' about a month ago, began using TRIAD there but no changes. \"All of a sudden\" the spot started opening at the surface, mom continued cleaning with VASHE soaks and covering it with a cotton ball. In the past week it opened up into a \"much bigger\" hole. Since then has been using Mesalt. Mom thinks it may have been too much pressure from patient's new shower chair since edge of her wound touches shower chair. No drainage or puddles were seen coming from wound. States she hasn't been sitting in her wheelchair for more than about 6 hours at a time. Did not use Geoffrey at all. Does weight shifts and offloading.   Mom also reported using antifungal powder and barrier cream on perianal/perineal groin area which also extends to inner thighs,  but brings up concerns about skin flakiness and lack of rash improvement. Patient states " she uses Dove soap and water to clean the area and rinses well. Only wears underwear when she goes out of the house. States patient is taking amoxicillin TID for the next 6 months per ID and probiotics for loose stools, which have been adding to the moisture and irritation in the area.   2/1/24: Dianna is here today with her mom. They have been using wound cleanser or mild body wash for all areas. The R IT gets VASHE soaks, then Endoform AM every other day. Mom states that drainage is relatively unchanged. Dianna is feeling OK. She is still on Amoxiciliin. She has been getting bed baths on M-W, and showers only on Fridays.  They are using her Roho cushion on the shower chair, and mom is doing transfers to avoid any injury from hunter or sling use.     5/23: Last seen by Suzan in March. Last saw me in February.  Is here today with her mom. Still trying to do Ioplex once a day, but not always able to depending on how late she gets home from work. Usually the tip of it is white and the rest is black when they take it out. No new wounds.   Reports she's been eating more protein via chicken. Usually tilted back when she isn't moving around.      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 Started in March but has only worked a few shifts so far, she likes it so far though.           PHYSICAL EXAM:   7/20/23: On exam, the skin defect over the right IT is fairly small, but enough to get some packing in.  From what I can see of the wound, it looks fairly clean with granulation tissue.  There is an area at the most proximal aspect of the adherent scar that is starting to break down and may end up being kind of a counter drainage hole.  Either way, it will be removed when we definitively fix her wound.  I am hoping to shoot for 08/25 to do a right IT debridement and flap probably from the posterior thigh.  We did receive an email from   Rc about when to remove her kidney stone, and actually if we could combine that or have it staged within a couple days of each other that would be ideal.  We will contact his  and see what we can work out for dates.   10/26/23: photos show a viable intact R posterior thigh flap with some scabbing along the inset sutures and at the T-junction of the VY donor site closure. There are no gross indications of infection or dehiscence.   11/16/23: Viable, intact R posterior thigh flap. Healing well, though there is only thin flap over IT prominence. Extensive perianal/vulvar fungal rash.    1/4/24: well-healed R posterior thigh flap. New opening along upper incision tunneling down to IT. Tissues appear mildly grungy and somewhat pale. No gross infection or necrosis. Periwound intact but dry. Most proximal end of incision is somewhat adherent or tethered. Bone not exposed but only thin covering at base.   2/1/24: R IT recurrent ulcer along flap scar appears clean but granulation tissues are boggy- somewhat hypertrophic. There is some tunneling. L rashy patch over L IT/gluteal area, not raw today. Groin rashes appear fungal.        5/23: Things have healed a lot on the outside. Little skin island trying to grow on the base near 1:00 position Tiny tunnel at 1:00.     Please see nursing notes for wound measurements, photos and vital signs.     PROCEDURES:  2/1/24: silver nitrate applied to R IT inner wound surfaces.     5/23: silver nitrate to right IT inner wound surfaces.      ASSESSMENT/ PLAN:   7/20/23: Currently, she has [employer] as her home care company, and she is due to have an appointment or a followup with her primary care provider, Suzan Willis, in the next week or two, so I asked her to ask for a history and physical, so that she would not have to do an extra visit for surgery next month.  She seemed to think that was doable.  Until we take her to surgery, I think the Mesalt is no longer needed since  things are clean.  They could use some packing strips 1/2-inch with the Vashe for the dressings, as long as it is not too soggy, and then cover that with Mepilex.   10/26/23: home care to remove sutures and staples on Monday. Will send sitting protocol to begin Monday AFTER sutures removed. Contact clinic if any problems or questions. If any signs of pressure injury or dehiscence, will need to halt sitting protocol. Once she is tolerating sitting in bed, she can get wheelchair cushion remapped and continue increasing sitting time in WC.  She can now roll onto operative R side for bedding changes too.   As for getting up to shower chair (which is well-padded), she can begin once she is tolerating 2 hours in WC.   11/16/23: continue increasing sitting time in bed until can get wheelchair pressure mapped. Start ROM exercises and shower chair ADLs as long as NO SIGNS OF TRAUMA TO FLAP.  Antifungal barrier - cream or powder to perianal/perineal rash. Consider taking pre/probiotics for diarrhea on abx.   1/4/24:   -Pack hole every day using Endoform AM, gauze, and Zetuvit Silicone Plus cover. Can stretch to every other day if it seems there is enough Endoform still present in wound when going to change it.   -Try using Roho in shower chair (with something to fill the hole in the cushion if needed). Mom presented other ideas she wanted to try to limit Dianna's chair use as well. Could limit wheelchair time to weekly hairwashing and do other bathing in bed.   -Can try using hair dryer on low as well as a dimethicone cleanser in skin folds to remedy the rash. Also recommended Melina cream.  Consider wicking or absorbent microfiber cloth or material as well.   2/1/24: labs drawn today to determine if persistent of recurrent osteomyelitis. If elevated, would recommend repeat MRI. Trial Ioplex foam QD-QOD since still some drainage. Trial Triamcinolone cream to L gluteal rash. Continue with antifungals for groin rash. Consider  "Derm referral for rashes if persistent and unresponsive. Can also have Derm evaluate \"wart-like\" lesions on L elbow.      5/23: Continue same cares irrigating with VASHE and packing with Ioplex strip once a day. Try to get Ioplex in as far as you can.     FOLLOW-UP:   7/20/23: hopefully OR in next month.  10/26/23: we will find a time for Dianna to come for a final follow up in her wheelchair that may not be until December, unless she develops wound problems.  11/16/23: next followup can be in 2 months IN wheelchair PRN. RTC sooner if healing problems emerge.   1/4/24: Nurse to contact patient for follow-up scheduling. 1 month if possible.   2/1/24: 1-2 months as available.       5/23: Continue monthly follow-ups, preferably in person.            "

## 2024-05-24 DIAGNOSIS — N31.9 NEUROGENIC BLADDER: Primary | ICD-10-CM

## 2024-05-24 NOTE — TELEPHONE ENCOUNTER
This writer called Dianna back to discuss NS orders for irrigation, no answer.  Dartfisht message sent as well.      Lula Bolton RN

## 2024-06-07 ENCOUNTER — TELEPHONE (OUTPATIENT)
Dept: FAMILY MEDICINE | Facility: CLINIC | Age: 29
End: 2024-06-07
Payer: MEDICARE

## 2024-06-07 NOTE — TELEPHONE ENCOUNTER
Forms/Letter Request     Type of form/letter: Plan of Care Orders        Do we have the form/letter: Yes:      Who is the form from? [facility]     Where did/will the form come from? form was faxed     When is form/letter needed by:  complete @ your earliest convenience     How would you like the form/letter returned: Fax : [phone]     Patient Notified form requests are processed in 5-7 business days:No    Contact Patient via "[employer]"

## 2024-06-09 ENCOUNTER — MEDICAL CORRESPONDENCE (OUTPATIENT)
Dept: HEALTH INFORMATION MANAGEMENT | Facility: CLINIC | Age: 29
End: 2024-06-09

## 2024-06-10 DIAGNOSIS — Z53.9 DIAGNOSIS NOT YET DEFINED: Primary | ICD-10-CM

## 2024-06-10 PROCEDURE — G0179 MD RECERTIFICATION HHA PT: HCPCS | Performed by: PHYSICIAN ASSISTANT

## 2024-06-11 ENCOUNTER — TELEPHONE (OUTPATIENT)
Dept: FAMILY MEDICINE | Facility: CLINIC | Age: 29
End: 2024-06-11
Payer: MEDICARE

## 2024-06-11 NOTE — TELEPHONE ENCOUNTER
Forms/Letter Request    Type of form/letter: Nursing Home/Assisted Living Orders      Do we have the form/letter: Yes    Who is the form from? Home care     Where did/will the form come from? form was faxed in    When is form/letter needed by: at your earliest time     How would you like the form/letter returned: Fax : [phone]    Patient Notified form requests are processed in 5-7 business days:No    Could we send this information to you in [employer] or would you prefer to receive a phone call?:   Patient would like to be contacted via [employer]

## 2024-06-11 NOTE — TELEPHONE ENCOUNTER
Form requires provider signature only, routing to provider and placed in provider bin.    Crista Hassan CMA ([facility])

## 2024-06-12 ENCOUNTER — TELEPHONE (OUTPATIENT)
Dept: FAMILY MEDICINE | Facility: CLINIC | Age: 29
End: 2024-06-12
Payer: MEDICARE

## 2024-06-12 ENCOUNTER — MEDICAL CORRESPONDENCE (OUTPATIENT)
Dept: HEALTH INFORMATION MANAGEMENT | Facility: CLINIC | Age: 29
End: 2024-06-12
Payer: MEDICARE

## 2024-06-13 ENCOUNTER — MEDICAL CORRESPONDENCE (OUTPATIENT)
Dept: HEALTH INFORMATION MANAGEMENT | Facility: CLINIC | Age: 29
End: 2024-06-13
Payer: MEDICARE

## 2024-06-20 DIAGNOSIS — F32.4 MAJOR DEPRESSIVE DISORDER WITH SINGLE EPISODE, IN PARTIAL REMISSION (H): ICD-10-CM

## 2024-06-20 DIAGNOSIS — F41.1 GAD (GENERALIZED ANXIETY DISORDER): ICD-10-CM

## 2024-06-20 RX ORDER — SERTRALINE HYDROCHLORIDE 100 MG/1
150 TABLET, FILM COATED ORAL DAILY
Qty: 135 TABLET | Refills: 1 | OUTPATIENT
Start: 2024-06-20

## 2024-06-20 NOTE — TELEPHONE ENCOUNTER
Pt called asking why the sertraline was refused. Wondering if she need to been seen or just redo a screening.     If she needs to be seen she will need a refill to get her to an appt

## 2024-06-24 DIAGNOSIS — G82.53 QUADRIPLEGIA, C5-C7 COMPLETE (H): ICD-10-CM

## 2024-06-24 DIAGNOSIS — R06.89 AIRWAY CLEARANCE IMPAIRMENT: ICD-10-CM

## 2024-06-24 DIAGNOSIS — J18.9 FREQUENT EPISODES OF PNEUMONIA: ICD-10-CM

## 2024-06-24 RX ORDER — HYDROXYZINE PAMOATE 25 MG/1
25 CAPSULE ORAL 3 TIMES DAILY PRN
Qty: 270 CAPSULE | Refills: 0 | Status: SHIPPED | OUTPATIENT
Start: 2024-06-24

## 2024-06-24 RX ORDER — SERTRALINE HYDROCHLORIDE 100 MG/1
150 TABLET, FILM COATED ORAL DAILY
Qty: 135 TABLET | Refills: 0 | Status: SHIPPED | OUTPATIENT
Start: 2024-06-24

## 2024-06-25 RX ORDER — SODIUM CHLORIDE FOR INHALATION 3 %
3 VIAL, NEBULIZER (ML) INHALATION EVERY 6 HOURS PRN
Qty: 300 ML | Refills: 1 | Status: SHIPPED | OUTPATIENT
Start: 2024-06-25

## 2024-06-27 ENCOUNTER — PATIENT OUTREACH (OUTPATIENT)
Dept: UROLOGY | Facility: CLINIC | Age: 29
End: 2024-06-27
Payer: MEDICARE

## 2024-06-27 ENCOUNTER — TELEPHONE (OUTPATIENT)
Dept: UROLOGY | Facility: CLINIC | Age: 29
End: 2024-06-27
Payer: MEDICARE

## 2024-06-27 ENCOUNTER — TELEPHONE (OUTPATIENT)
Dept: UROLOGY | Facility: CLINIC | Age: 29
End: 2024-06-27

## 2024-06-27 DIAGNOSIS — N31.9 NEUROGENIC BLADDER: Primary | ICD-10-CM

## 2024-06-27 NOTE — TELEPHONE ENCOUNTER
We received a DME order signed by dr Alem Izquierdo for sterile water for bladder irrigation. We would need a RX with qty and day supply as well as max qty per day in order for this to be filled at the pharmacy. Thank you, Jacque Cardoza [facility],   Specialty Pharmacy

## 2024-06-27 NOTE — PROGRESS NOTES
Call placed to patient to let her know that we will refax the order to [facility].    Thank you,  Cecille Luna RN, BSN Urology Triage

## 2024-07-01 NOTE — TELEPHONE ENCOUNTER
Retail Pharmacy Prior Authorization Team   Phone: [phone]    PA Initiation    Medication: SODIUM CHLORIDE 3 % IN NEBU  Insurance Company: [employer] - Phone [phone] Fax [phone]  Pharmacy Filling the Rx: [facility] - [place], [place] - [facility] [address]  Filling Pharmacy Phone: [phone]  Filling Pharmacy Fax:    Start Date: 7/1/2024

## 2024-07-02 ENCOUNTER — PATIENT OUTREACH (OUTPATIENT)
Dept: UROLOGY | Facility: CLINIC | Age: 29
End: 2024-07-02
Payer: MEDICARE

## 2024-07-02 NOTE — PROGRESS NOTES
Call received from patient. She uses:  --sterile water  --240 ml 2x/day and prn  --Please send amount appropriate to prescription and what her insurance will cover.    Spoke with pharmacist at [facility] and they need clarification as to how often in a day the prn is.     Call placed to patient again and left a message for her to call me back with this information. My direct number given.    Thank you,  Cecille Luna RN, BSN Urology Triage

## 2024-07-02 NOTE — TELEPHONE ENCOUNTER
PRIOR AUTHORIZATION DENIED    Medication: SODIUM CHLORIDE 3 % IN NEBU  Insurance Company: [employer] - Phone [phone] Fax [phone]  Denial Date: 7/1/2024  Denial Reason(s):           Appeal Information:         Patient Notified: No

## 2024-07-09 ENCOUNTER — PATIENT OUTREACH (OUTPATIENT)
Dept: UROLOGY | Facility: CLINIC | Age: 29
End: 2024-07-09
Payer: MEDICARE

## 2024-07-09 NOTE — PROGRESS NOTES
Call received from patient. She states that prn use of sterile water flushes is 1x/day. Call then placed to [facility] and spoke with pharmacist to let them know. They will amend order.    Thank you,  Cecille Luna RN, BSN Urology Triage

## 2024-07-17 ENCOUNTER — HOSPITAL ENCOUNTER (OUTPATIENT)
Dept: WOUND CARE | Facility: CLINIC | Age: 29
Discharge: HOME | End: 2024-07-17
Attending: REGISTERED NURSE | Admitting: REGISTERED NURSE
Payer: MEDICARE

## 2024-07-17 VITALS — HEART RATE: 65 BPM | DIASTOLIC BLOOD PRESSURE: 61 MMHG | TEMPERATURE: 96.6 F | SYSTOLIC BLOOD PRESSURE: 88 MMHG

## 2024-07-17 DIAGNOSIS — L89.314 PRESSURE INJURY OF RIGHT ISCHIUM, STAGE 4 (H): Primary | ICD-10-CM

## 2024-07-17 PROBLEM — Z98.890 S/P FLAP GRAFT: Chronic | Status: ACTIVE | Noted: 2023-09-20

## 2024-07-17 PROBLEM — G90.4 AUTONOMIC DYSREFLEXIA: Chronic | Status: ACTIVE | Noted: 2019-12-13

## 2024-07-17 PROBLEM — F41.1 GAD (GENERALIZED ANXIETY DISORDER): Chronic | Status: ACTIVE | Noted: 2018-05-10

## 2024-07-17 PROBLEM — R33.9 URINARY RETENTION: Chronic | Status: ACTIVE | Noted: 2023-04-17

## 2024-07-17 PROBLEM — Z86.718 PERSONAL HISTORY OF DVT (DEEP VEIN THROMBOSIS): Chronic | Status: ACTIVE | Noted: 2020-03-12

## 2024-07-17 PROBLEM — F32.4 MAJOR DEPRESSIVE DISORDER WITH SINGLE EPISODE, IN PARTIAL REMISSION (H): Chronic | Status: ACTIVE | Noted: 2019-02-11

## 2024-07-17 PROBLEM — F12.90 MARIJUANA USE: Chronic | Status: ACTIVE | Noted: 2024-07-17

## 2024-07-17 PROBLEM — M86.651: Chronic | Status: ACTIVE | Noted: 2024-02-19

## 2024-07-17 PROBLEM — J99 NEUROMUSCULAR RESPIRATORY WEAKNESS (H): Chronic | Status: ACTIVE | Noted: 2023-03-08

## 2024-07-17 PROBLEM — G70.9 NEUROMUSCULAR RESPIRATORY WEAKNESS (H): Chronic | Status: ACTIVE | Noted: 2023-03-08

## 2024-07-17 PROBLEM — N31.9 NEUROGENIC BLADDER: Chronic | Status: ACTIVE | Noted: 2020-09-16

## 2024-07-17 PROCEDURE — G2211 COMPLEX E/M VISIT ADD ON: HCPCS | Performed by: REGISTERED NURSE

## 2024-07-17 PROCEDURE — 99214 OFFICE O/P EST MOD 30 MIN: CPT | Performed by: REGISTERED NURSE

## 2024-07-17 PROCEDURE — 97602 WOUND(S) CARE NON-SELECTIVE: CPT

## 2024-07-17 NOTE — PROGRESS NOTES
[facility]    ASSESSMENT:   (L89.314) Pressure injury of right ischium, stage 4 (H)  Stable, perhaps improved somewhat    PLAN/DISCUSSION:   Wound care plan: Stop IoPlex and gauze.  Start Fibracol and continue Zetuvit.  If the distal tissue continues to reopen, agree with starting Triad.  Change every other day.   See bottom of note for detailed wound care and patient instructions  Dietary recommendations discussed, see AVS     INTERVAL Hx:   7/17/24: Wound is quite small, tucked proximally into more robust tissue with a deficit leading to her surgical scar where the superficial tissue breaks down now and then.  Hard to pack IoPlex into the very small opening.  [facility] RN wonders if they should apply Triad to the area that intermittently reopens distal to the wound.    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    TREATMENT COURSE:  1/4/24: New open area along right ischial flap     MATTRESS: Group 2 mattress  WHEELCHAIR CUSHION: Roho cushion    VITALS: BP (!) 88/61 (BP Location: Right arm)   Pulse 65   Temp (!) 96.6  F (35.9  C) (Temporal)      PHYSICAL EXAM:  GENERAL: Patient is alert and oriented and in no acute distress  INTEGUMENTARY:   Wound (used by OP [facility] only) 07/20/23 1316 Right ischial tuberosity pressure injury (Active)   Thickness/Stage Stage 4 07/17/24 1049   Base granulating 07/17/24 1049   Periwound intact 07/17/24 1049   Periwound Temperature warm 07/17/24 1049   Periwound Skin Turgor firm 07/17/24 1049   Edges open;rolled/closed 07/17/24 1049   Length (cm) 0.2 07/17/24 1049   Width (cm) 0.2 07/17/24 1049   Depth (cm) 0.8 07/17/24 1049   Wound (cm^2) 0.04 cm^2  07/17/24 1049   Wound Volume (cm^3) 0.03 cm^3 07/17/24 1049   Wound healing % 95.45 07/17/24 1049   Undermining [Depth (cm)/Location] 0 07/17/24 1049   Drainage Characteristics/Odor serosanguineous 07/17/24 1049   Drainage Amount moderate 07/17/24 1049   Care, Wound non-select wound debridement performed. 07/17/24 1049           PROCEDURES: Mechanical debridement was performed with cleansing of the wound by the nursing staff    MDM: 30 minutes were spent on the date of the visit reviewing previous chart notes, evaluating patient and developing the treatment plan, this excludes any time spent on procedures    PATIENT INSTRUCTIONS      Further instructions from your care team         07/17/2024   Dianna Cottrell   1995    A DME order was not completed because the supplies are ordered by home care or at a care facility    Dressing changes outside of clinic are being performed [facility] [place] Phone: [phone] Fax: [phone]      Plan: 7/17/24  Keep working on increasing your protein intake  Ok to use a barrier cream or Triad paste on the skin around the wound or skin that may open around the wound     Wound Dressing Change: Right Ischial Tuberosity  - Prepare clean surface and wash your hands with soap and water  - Cleanse with mild unscented soap (such as Cetaphil, Cerave or Dove) and water  - Apply small amount of VASHE on gauze, lay into wound bed, let sit for 10 minutes, remove gauze   - Cut and tuck 1/20th piece of 4x5 Fibracol to fill the wound (no need to leave a tail outside of the wound as the fibracol will likely dissolve into the wound)  - Cover with 4x4 Zetuvit Silicone plus dressing  Change every other day     Repositioning:   shower chair: use ROHO cushion on shower chair  Perineal Cleanser: google brands   Perineal Wash Melina  Cleanse and Protect  with Odor Control Lotion 8 oz. Pump Bottle Scented $12.95  Bed: Reposition MINIMALLY every 1-2 hours in bed to  relieve pressure and promote perfusion to tissue.  Chair: When up to the chair, do not sit for longer than one hour total before returning to bed for at least 60 minutes to relieve pressure and promote perfusion to the tissue.  Completely recline/tilt for 15 minutes each hour.  Sit on a chair cushion when up to the chair.     Main Provider: Kavin Willis CNP July 17, 2024    Call us at [phone] if you have any questions about your wounds, if you have redness or swelling around your wound, have a fever of 101 degrees Fahrenheit or greater or if you have any other problems or concerns. We answer the phone Monday through Friday 8 am to 4 pm, please leave a message as we check the voicemail frequently throughout the day. If you have a concern over the weekend, please leave a message and we will return your call Monday. If the need is urgent, go to the ER or urgent care.    If you had a positive experience please indicate that on your patient satisfaction survey form that [facility] will be sending you.    It was a pleasure meeting with you today.  Thank you for allowing me and my team the privilege of caring for you today.  YOU are the reason we are here, and I truly hope we provided you with the excellent service you deserve.  Please let us know if there is anything else we can do for you so that we can be sure you are leaving completely satisfied with your care experience.      If you have any billing related questions please call the [facility] Business office at [phone]. The clinic staff does not handle billing related matters.    If you are scheduled to have a follow up appointment, you will receive a reminder call the day before your visit. On the appointment day please arrive 15 minutes prior to your appointment time. If you are unable to keep that appointment, please call the clinic to cancel or reschedule. If you are more than 10 minutes late or greater for your scheduled appointment time, the  clinic policy is that you may be asked to reschedule.        Electronically signed by: Kavin Willis, RUPAL, APRN, CNP, CWCN

## 2024-07-17 NOTE — DISCHARGE INSTRUCTIONS
07/17/2024   Dianna Cottrell   1995    A DME order was not completed because the supplies are ordered by home care or at a care facility    Dressing changes outside of clinic are being performed by [facility]  [facility] [place] Phone: [phone] Fax: [phone]      Plan: 7/17/24  Keep working on increasing your protein intake  Ok to use a barrier cream or Triad paste on the skin around the wound or skin that may open around the wound     Wound Dressing Change: Right Ischial Tuberosity  - Prepare clean surface and wash your hands with soap and water  - Cleanse with mild unscented soap (such as Cetaphil, Cerave or Dove) and water  - Apply small amount of VASHE on gauze, lay into wound bed, let sit for 10 minutes, remove gauze   - Cut and tuck 1/20th piece of 4x5 Fibracol to fill the wound (no need to leave a tail outside of the wound as the fibracol will likely dissolve into the wound)  - Cover with 4x4 Zetuvit Silicone plus dressing  Change every other day     Repositioning:   shower chair: use ROHO cushion on shower chair  Perineal Cleanser: google brands   Perineal Wash Melina  Cleanse and Protect  with Odor Control Lotion 8 oz. Pump Bottle Scented $12.95  Bed: Reposition MINIMALLY every 1-2 hours in bed to relieve pressure and promote perfusion to tissue.  Chair: When up to the chair, do not sit for longer than one hour total before returning to bed for at least 60 minutes to relieve pressure and promote perfusion to the tissue.  Completely recline/tilt for 15 minutes each hour.  Sit on a chair cushion when up to the chair.     Main Provider: Kavin Willis CNP July 17, 2024    Call us at [phone] if you have any questions about your wounds, if you have redness or swelling around your wound, have a fever of 101 degrees Fahrenheit or greater or if you have any other problems or concerns. We answer the phone Monday through Friday 8 am to 4 pm, please leave a message as we check the  voicemail frequently throughout the day. If you have a concern over the weekend, please leave a message and we will return your call Monday. If the need is urgent, go to the ER or urgent care.    If you had a positive experience please indicate that on your patient satisfaction survey form that [facility] will be sending you.    It was a pleasure meeting with you today.  Thank you for allowing me and my team the privilege of caring for you today.  YOU are the reason we are here, and I truly hope we provided you with the excellent service you deserve.  Please let us know if there is anything else we can do for you so that we can be sure you are leaving completely satisfied with your care experience.      If you have any billing related questions please call the [facility] Business office at [phone]. The clinic staff does not handle billing related matters.    If you are scheduled to have a follow up appointment, you will receive a reminder call the day before your visit. On the appointment day please arrive 15 minutes prior to your appointment time. If you are unable to keep that appointment, please call the clinic to cancel or reschedule. If you are more than 10 minutes late or greater for your scheduled appointment time, the clinic policy is that you may be asked to reschedule.

## 2024-07-18 ENCOUNTER — MEDICAL CORRESPONDENCE (OUTPATIENT)
Dept: HEALTH INFORMATION MANAGEMENT | Facility: CLINIC | Age: 29
End: 2024-07-18
Payer: MEDICARE

## 2024-07-25 ENCOUNTER — MEDICAL CORRESPONDENCE (OUTPATIENT)
Dept: HEALTH INFORMATION MANAGEMENT | Facility: CLINIC | Age: 29
End: 2024-07-25
Payer: MEDICARE

## 2024-08-12 ENCOUNTER — TELEPHONE (OUTPATIENT)
Dept: FAMILY MEDICINE | Facility: CLINIC | Age: 29
End: 2024-08-12
Payer: MEDICARE

## 2024-08-12 DIAGNOSIS — Z53.9 DIAGNOSIS NOT YET DEFINED: Primary | ICD-10-CM

## 2024-08-12 PROCEDURE — G0179 MD RECERTIFICATION HHA PT: HCPCS | Performed by: PHYSICIAN ASSISTANT

## 2024-08-12 NOTE — TELEPHONE ENCOUNTER
Forms/Letter Request    Type of form/letter: Home Health Certification      Do we have the form/letter: Yes: placed in bin    Who is the form from? Home care    Where did/will the form come from? form was faxed in    When is form/letter needed by:ASAP     How would you like the form/letter returned: Fax [phone]

## 2024-08-22 ENCOUNTER — HOSPITAL ENCOUNTER (OUTPATIENT)
Dept: WOUND CARE | Facility: CLINIC | Age: 29
Discharge: HOME | End: 2024-08-22
Attending: REGISTERED NURSE | Admitting: REGISTERED NURSE
Payer: MEDICARE

## 2024-08-22 VITALS — DIASTOLIC BLOOD PRESSURE: 52 MMHG | HEART RATE: 79 BPM | TEMPERATURE: 96.2 F | SYSTOLIC BLOOD PRESSURE: 83 MMHG

## 2024-08-22 DIAGNOSIS — L89.314 PRESSURE INJURY OF RIGHT ISCHIUM, STAGE 4 (H): Primary | Chronic | ICD-10-CM

## 2024-08-22 PROCEDURE — G0463 HOSPITAL OUTPT CLINIC VISIT: HCPCS

## 2024-08-22 PROCEDURE — 99212 OFFICE O/P EST SF 10 MIN: CPT | Performed by: SURGERY

## 2024-08-22 NOTE — DISCHARGE INSTRUCTIONS
08/22/2024   Dianna Cottrell   1995  A DME order was not completed because the supplies are ordered by home care or at a care facility  [facility] [place] Phone: [phone] Fax: [phone]      Plan: 8/22/24  Dressing changes outside of clinic are being performed by [facility]  Continue to improve Daily protein intake: foods; shakes; Bars     Wound Dressing Change: Right Ischial Tuberosity  - Prepare clean surface and wash your hands with soap and water  - Shower and wash with mild unscented soap and water  - Cover with 4x4 Silicone foam dressing with border   Change every other day     Repositioning:   Shower chair use ROHO cushion  Perineal Cleanser: google brands ProStat or Perineal Wash Melina Cleanse and Protect   Bed: Reposition MINIMALLY every 1-2 hours in bed to relieve pressure and promote perfusion to tissue.  Chair: When up to the chair, do not sit for longer than one hour total before returning to bed for at least 60 minutes to relieve pressure and promote perfusion to the tissue.  Completely recline/tilt for 15 minutes each hour.  Sit on a chair cushion when up to the chair.    Reducing a Patient s Risk for Pressure Injuries  There is no single preventive for pressure injuries. Give priority to pressure relief. Reduce other risks to help maintain a healthy flow of nutrients to the patient s skin.  Relieve pressure  Relieving pressure is the single most important factor in preventing and treating pressure injuries.  You can relieve pressure and restore the skin s blood supply by repositioning the patient and using special devices. Post a schedule to remind you to reposition the patient -- from side to back or from stomach to side. Make minor position changes even more frequently. Specially designed pillows, beds, or mattresses can also help reduce pressure.  In a bed  Use pillows under calves to elevate the legs from above the knees to the ankles.  Alter the angles of arms and  legs.  In a wheelchair  Be sure the wheelchair is the proper size for the patient to give optimal support. For example, if the seat it too narrow, it will put extra pressure on the hips.  Cushion the back and buttocks with pillows or wheelchair cushions, and pad the footrest.  Use a special wheelchair cushion that is designed to distribute weight evenly and relieve pressure points that is evaluated yearly.  Have special cushions tested and adjusted at the proper times as recommended by the . This will allow the pressure relief to remain effective.   Manage moisture  Keep skin clean and lubricated, but free of excess moisture. Put the patient on a regular toilet schedule. Use incontinent devices, if appropriate. Consult with a doctor about using diarrhea medicines:  Use talc-free powders or barrier creams.  Pat skin dry after bathing.  Lubricate skin with lotion.  Reduce shear and friction  Prevent skin breakdown by reducing friction and shear. Patients are less likely to slide down in bed if they re supported by pillows and the head of the bed isn t raised too high. During bed or wheelchair transfers, lift--don t drag--the patient. If you can t do this alone, get help. And be sure to use assistive devices, whenever possible.  In a bed  Use draw-sheets or transfer boards to move patients.  Clean and smooth the bed surface.  Lift the head of the bed no more than 30 .  Raise the foot of the bed slightly.  In a wheelchair  Support the patient s back with a pillow.  Use a foot extension.     Main Provider: Fred Zacarias M.D. August 22, 2024    Call us at [phone] if you have any questions about your wounds, if you have redness or swelling around your wound, have a fever of 101 degrees Fahrenheit or greater or if you have any other problems or concerns. We answer the phone Monday through Friday 8 am to 4 pm, please leave a message as we check the voicemail frequently throughout the day. If you have  a concern over the weekend, please leave a message and we will return your call Monday. If the need is urgent, go to the ER or urgent care.    If you had a positive experience please indicate that on your patient satisfaction survey form that [facility] will be sending you.  It was a pleasure meeting with you today.  Thank you for allowing me and my team the privilege of caring for you today.  YOU are the reason we are here, and I truly hope we provided you with the excellent service you deserve.  Please let us know if there is anything else we can do for you so that we can be sure you are leaving completely satisfied with your care experience.      If you have any billing related questions please call the [facility] Business office at [phone]. The clinic staff does not handle billing related matters.  If you are scheduled to have a follow up appointment, you will receive a reminder call the day before your visit. On the appointment day please arrive 15 minutes prior to your appointment time. If you are unable to keep that appointment, please call the clinic to cancel or reschedule. If you are more than 10 minutes late or greater for your scheduled appointment time, the clinic policy is that you may be asked to reschedule.

## 2024-08-22 NOTE — PROGRESS NOTES
"[facility]  PROGRESS NOTE     HISTORY OF PRESENT ILLNESS: This is a 28-year-old incomplete quad with right ischial stage IV decubitus with osteomyelitis.  She also has a tiny coccygeal wound.      INTERVAL HISTORY:   7/20/23:  I had not seen her in quite some time, and then she bounced into the hospital with what sounds like urosepsis, and ended up having an obstructing stone that required placement of a perc neph tube.  We took a look at her wound and while it was small, as far as a skin defect, there was a bit of a pocket traveling underneath some what used to be adherent scar tissue.  While in the hospital, it certainly was not her biggest problem, and they let her out last Thursday.  She has a special [employer] [address] [employer] Easy Air lateral rotating pressure air mattress at home, and her mom is basically her PCA.  They have been using Mesalt packing and having moderate usual amount of drainage.  She has been feeling much better since her original presentation.  She missed her kids and really if it were not for her mom, I do not think we would be talking about flapping her wound.  10/26/23: this is a telephone visit today with Dianna and her mom. She underwent R posterior thigh flap closure for R IT decubitus with osteomyelitis on 9/20. She has done her postop recovery at home and it sounds like things have been going well. The ROSANNE already fell out.   She has been using some skin creams prescribed by Dermatology and it sounds like the redness has resolved. Latanya Drew) has been seeing her for any dressing needs and weekly lab draws while she is on antibiotics. Her course will continue for 2 more weeks.    11/16/23: Dianna is here in person with her mom. Her kids have strep and URI's so she has been making sure she keeps up with her nebs and hydration. She has also been having diarrhea from her antibiotics, causing a fungal \"diaper rash\". 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 Hopefully her stooling can get better over time.   She has been tolerating sitting upright in bed for at least 60 minute sessions without much change to her flap. She has put in a call to [facility] for pressure mapping her wheelchair post-flap, but she will also contact Kirill Saunders if things get delayed so she can get back into her wheelchair at home.   I think it's safe now to resume ROM for her RLE, as long as it is done gently and slowly. They will let her PCAs know that they should not \"push\" if there is any tissue resistance. As for transfers, mom lifts patient (pivot), but PCAs use geoffrey with sling. This would be OK as long as there is no pressure or shear trauma from the sling over the flap. For  toiletting/shower, she does have a Roho cushion for her shower chair, and they can also consider using extra padding with pillows, etc.   1/4/24: here today with mom. Still using [facility]anya for home care. Has been alternating Mesalt with VASHE cleanses. Mom noticed a new 'spot' about a month ago, began using TRIAD there but no changes. \"All of a sudden\" the spot started opening at the surface, mom continued cleaning with VASHE soaks and covering it with a cotton ball. In the past week it opened up into a \"much bigger\" hole. Since then has been using Mesalt. Mom thinks it may have been too much pressure from patient's new shower chair since edge of her wound touches shower chair. No drainage or puddles were seen coming from wound. States she hasn't been sitting in her wheelchair for more than about 6 hours at a time. Did not use Geoffrey at all. Does weight shifts and offloading.   Mom also reported using antifungal powder and barrier cream on perianal/perineal groin area which also extends to inner thighs,  but brings up concerns about skin flakiness and lack of rash improvement. Patient states " she uses Dove soap and water to clean the area and rinses well. Only wears underwear when she goes out of the house. States patient is taking amoxicillin TID for the next 6 months per ID and probiotics for loose stools, which have been adding to the moisture and irritation in the area.   2/1/24: Dianna is here today with her mom. They have been using wound cleanser or mild body wash for all areas. The R IT gets VASHE soaks, then Endoform AM every other day. Mom states that drainage is relatively unchanged. Dianna is feeling OK. She is still on Amoxiciliin. She has been getting bed baths on M-W, and showers only on Fridays.  They are using her Roho cushion on the shower chair, and mom is doing transfers to avoid any injury from hunter or sling use.   5/23: Last seen by Suzan in March. Last saw me in February.  Is here today with her mom. Still trying to do Ioplex once a day, but not always able to depending on how late she gets home from work. Usually the tip of it is white and the rest is black when they take it out. No new wounds.   Reports she's been eating more protein via chicken. Usually tilted back when she isn't moving around.  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 Started in March but has only worked a few shifts so far, she likes it so far though.     8/22: No drainage on the Zetuvit covering today. Dianna is here with her mom. Wound looks all closed up today!  Dianna continues to do her security work shifts of 10-12 hours, but she does tilt and recline when she isn't moving around. We discussed the importance of continuing to offload and re-evaluating wheelchair and cushion regularly.       PHYSICAL EXAM:   7/20/23: On exam, the skin defect over the right IT is fairly small, but enough to get some packing in.  From what I can see of the wound, it looks fairly clean with granulation tissue.  There is an area at the  most proximal aspect of the adherent scar that is starting to break down and may end up being kind of a counter drainage hole.  Either way, it will be removed when we definitively fix her wound.  I am hoping to shoot for 08/25 to do a right IT debridement and flap probably from the posterior thigh.  We did receive an email from Dr. Tatum about when to remove her kidney stone, and actually if we could combine that or have it staged within a couple days of each other that would be ideal.  We will contact his  and see what we can work out for dates.   10/26/23: photos show a viable intact R posterior thigh flap with some scabbing along the inset sutures and at the T-junction of the VY donor site closure. There are no gross indications of infection or dehiscence.   11/16/23: Viable, intact R posterior thigh flap. Healing well, though there is only thin flap over IT prominence. Extensive perianal/vulvar fungal rash.    1/4/24: well-healed R posterior thigh flap. New opening along upper incision tunneling down to IT. Tissues appear mildly grungy and somewhat pale. No gross infection or necrosis. Periwound intact but dry. Most proximal end of incision is somewhat adherent or tethered. Bone not exposed but only thin covering at base.   2/1/24: R IT recurrent ulcer along flap scar appears clean but granulation tissues are boggy- somewhat hypertrophic. There is some tunneling. L rashy patch over L IT/gluteal area, not raw today. Groin rashes appear fungal.   5/23: Things have healed a lot on the outside. Little skin island trying to grow on the base near 1:00 position Tiny tunnel at 1:00.      8/22: Adherent divot with a linear scar that used to be a sinus tract, but it has finally re-epithelialized.        Please see nursing notes for wound measurements, photos and vital signs.     PROCEDURES:  2/1/24: silver nitrate applied to R IT inner wound surfaces.   5/23: silver nitrate to right IT inner wound surfaces.      8/22: none       ASSESSMENT/ PLAN:   7/20/23: Currently, she has [employer] as her home care company, and she is due to have an appointment or a followup with her primary care provider, Suzan Willis, in the next week or two, so I asked her to ask for a history and physical, so that she would not have to do an extra visit for surgery next month.  She seemed to think that was doable.  Until we take her to surgery, I think the Mesalt is no longer needed since things are clean.  They could use some packing strips 1/2-inch with the Vashe for the dressings, as long as it is not too soggy, and then cover that with Mepilex.   10/26/23: home care to remove sutures and staples on Monday. Will send sitting protocol to begin Monday AFTER sutures removed. Contact clinic if any problems or questions. If any signs of pressure injury or dehiscence, will need to halt sitting protocol. Once she is tolerating sitting in bed, she can get wheelchair cushion remapped and continue increasing sitting time in WC.  She can now roll onto operative R side for bedding changes too.   As for getting up to shower chair (which is well-padded), she can begin once she is tolerating 2 hours in WC.   11/16/23: continue increasing sitting time in bed until can get wheelchair pressure mapped. Start ROM exercises and shower chair ADLs as long as NO SIGNS OF TRAUMA TO FLAP.  Antifungal barrier - cream or powder to perianal/perineal rash. Consider taking pre/probiotics for diarrhea on abx.   1/4/24:   -Pack hole every day using Endoform AM, gauze, and Zetuvit Silicone Plus cover. Can stretch to every other day if it seems there is enough Endoform still present in wound when going to change it.   -Try using Roho in shower chair (with something to fill the hole in the cushion if needed). Mom presented other ideas she wanted to try to limit Dianna's chair use as well. Could limit wheelchair time to weekly hairwashing and do other bathing in bed.   -Can try  "using hair dryer on low as well as a dimethicone cleanser in skin folds to remedy the rash. Also recommended Melina cream.  Consider wicking or absorbent microfiber cloth or material as well.   2/1/24: labs drawn today to determine if persistent of recurrent osteomyelitis. If elevated, would recommend repeat MRI. Trial Ioplex foam QD-QOD since still some drainage. Trial Triamcinolone cream to L gluteal rash. Continue with antifungals for groin rash. Consider Derm referral for rashes if persistent and unresponsive. Can also have Derm evaluate \"wart-like\" lesions on L elbow.    5/23: Continue same cares irrigating with VASHE and packing with Ioplex strip once a day. Try to get Ioplex in as far as you can.     8/22: Continue offloading. Prophylactic padding. Continue to monitor every couple of days to keep an eye on it using a camera or another pair of eyes.        FOLLOW-UP:   As needed  "

## 2024-08-22 NOTE — PROGRESS NOTES
Patient arrived for wound care visit. Certified Wound Care Nurse time spent evaluating patient record, completed a full evaluation and documented wound(s) & adonay-wound skin; provided recommendation based on treatment plan. Applied dressing, reviewed discharge instructions, patient education, and discussed plan of care with appropriate medical team staff members and patient and/or family members.   Patient is healed; education provided to Patient and family on follow up on wound care and support in future.     Patient Active Problem List   Diagnosis    C5 burst fracture    Lesions of vulva    IUD (intrauterine device) in place- placed 12/2017    H/O: pneumonia    АНДРЕЙ (generalized anxiety disorder)    Quadriplegia, post-traumatic (H)- incomplete quad, limited use upper extremities    Autonomic dysreflexia    Pressure injury of right ischium, stage 4 (H)    Personal history of DVT (deep vein thrombosis)    Neurogenic bladder    Impaired lung function    YONI III with severe dysplasia    Major depression    Neurogenic bowel    Spasticity    Spinal cord injury, C5-C7 (H)    Urinary incontinence    Nephrolithiasis    Neuromuscular respiratory weakness (H)    Urinary retention    S/P flap graft    Chronic osteomyelitis of pelvis, right (H)    Marijuana use     Past Medical History:   Diagnosis Date    Anemia     with pregnancy    c5 burst fracture 12/18/2012    C5-C7 fracture with cord injury    Compression fracture of L1 lumbar vertebra (H) 12/18/2012    L1 superior endplate compression fracture    Depressive disorder     Encounter for insertion of mirena IUD 12/13/2017    Fracture of thoracic spine without spinal cord lesion (H) 12/18/2012    T3-T8 spinous process fractures    History of spinal cord injury     History of thrombophlebitis     Hypertension 12/06/2016    Impaired lung function     Nephrolithiasis 04/11/2022    Neurogenic bladder     Neurogenic bowel     Quadriplegia (H)     Thrombosis     Tobacco use      Urinary tract infection     Vocal cord dysfunction     Left vocal cord weakness noted by ENT post extubation 12/2012     Labs:   Recent Labs   Lab Test 11/13/23  0905 07/02/23  0611 07/01/23  1754 04/12/22  0712 04/15/21  1445   ALBUMIN 4.0   < >  --    < >  --    HGB 11.1*   < >  --    < >  --    INR  --   --  1.05   < >  --    WBC 10.4   < >  --    < >  --    CRP  --   --   --   --  9.7*    < > = values in this interval not displayed.     Nutrition requirements were discussed with patient today.  Vitals:  BP (!) 83/52 (BP Location: Left arm, Patient Position: Chair)   Pulse 79   Temp (!) 96.2  F (35.7  C) (Temporal)   Wound:   Wound (used by OP [facility] only) 07/20/23 1316 Right ischial tuberosity pressure injury (Active)   Thickness/Stage Stage 4 08/22/24 1408   Base closed/resurfaced;epithelialization 08/22/24 1408   Periwound intact 08/22/24 1408   Periwound Temperature warm 08/22/24 1408   Periwound Skin Turgor firm 08/22/24 1408   Edges rolled/closed 08/22/24 1408   Length (cm) 0 08/22/24 1408   Width (cm) 0 08/22/24 1408   Depth (cm) 0 08/22/24 1408   Wound (cm^2) 0 cm^2 08/22/24 1408   Wound Volume (cm^3) 0 cm^3 08/22/24 1408   Wound healing % 100 08/22/24 1408   Undermining [Depth (cm)/Location] 0 07/17/24 1049   Drainage Characteristics/Odor serosanguineous 07/17/24 1049   Drainage Amount none 08/22/24 1408   Care, Wound non-select wound debridement performed. 07/17/24 1049      Photo:       Further instructions from your care team         08/22/2024   Dianna Cottrell   1995  A DME order was not completed because the supplies are ordered by home care or at a care facility  [facility] Phone: [phone] Fax: [phone]      Plan: 8/22/24  Dressing changes outside of clinic are being performed by [facility]  Continue to improve Daily protein intake: foods; shakes; Bars     Wound Dressing Change: Right Ischial Tuberosity  - Prepare clean surface and wash your hands with soap  and water  - Shower and wash with mild unscented soap and water  - Cover with 4x4 Silicone foam dressing with border   Change every other day     Repositioning:   Shower chair use ROHO cushion  Perineal Cleanser: [employer] brands ProStat or Perineal Wash Melina Cleanse and Protect   Bed: Reposition MINIMALLY every 1-2 hours in bed to relieve pressure and promote perfusion to tissue.  Chair: When up to the chair, do not sit for longer than one hour total before returning to bed for at least 60 minutes to relieve pressure and promote perfusion to the tissue.  Completely recline/tilt for 15 minutes each hour.  Sit on a chair cushion when up to the chair.    Reducing a Patient s Risk for Pressure Injuries  There is no single preventive for pressure injuries. Give priority to pressure relief. Reduce other risks to help maintain a healthy flow of nutrients to the patient s skin.  Relieve pressure  Relieving pressure is the single most important factor in preventing and treating pressure injuries.  You can relieve pressure and restore the skin s blood supply by repositioning the patient and using special devices. Post a schedule to remind you to reposition the patient -- from side to back or from stomach to side. Make minor position changes even more frequently. Specially designed pillows, beds, or mattresses can also help reduce pressure.  In a bed  Use pillows under calves to elevate the legs from above the knees to the ankles.  Alter the angles of arms and legs.  In a wheelchair  Be sure the wheelchair is the proper size for the patient to give optimal support. For example, if the seat it too narrow, it will put extra pressure on the hips.  Cushion the back and buttocks with pillows or wheelchair cushions, and pad the footrest.  Use a special wheelchair cushion that is designed to distribute weight evenly and relieve pressure points that is evaluated yearly.  Have special cushions tested and adjusted at the proper times as  recommended by the . This will allow the pressure relief to remain effective.   Manage moisture  Keep skin clean and lubricated, but free of excess moisture. Put the patient on a regular toilet schedule. Use incontinent devices, if appropriate. Consult with a doctor about using diarrhea medicines:  Use talc-free powders or barrier creams.  Pat skin dry after bathing.  Lubricate skin with lotion.  Reduce shear and friction  Prevent skin breakdown by reducing friction and shear. Patients are less likely to slide down in bed if they re supported by pillows and the head of the bed isn t raised too high. During bed or wheelchair transfers, lift--don t drag--the patient. If you can t do this alone, get help. And be sure to use assistive devices, whenever possible.  In a bed  Use draw-sheets or transfer boards to move patients.  Clean and smooth the bed surface.  Lift the head of the bed no more than 30 .  Raise the foot of the bed slightly.  In a wheelchair  Support the patient s back with a pillow.  Use a foot extension.     Main Provider: Fred Zacarias M.D. August 22, 2024

## 2024-08-27 ENCOUNTER — TELEPHONE (OUTPATIENT)
Dept: FAMILY MEDICINE | Facility: CLINIC | Age: 29
End: 2024-08-27
Payer: MEDICARE

## 2024-08-27 NOTE — TELEPHONE ENCOUNTER
Forms/Letter Request    Type of form/letter: OTHER: Orders ([id number])       Do we have the form/letter: Yes: placed in bin     Who is the form from? Home care    Where did/will the form come from? form was faxed in    When is form/letter needed by: ASAP    How would you like the form/letter returned: Fax : [phone]
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
## 2024-08-29 ENCOUNTER — HOSPITAL ENCOUNTER (OUTPATIENT)
Dept: CT IMAGING | Facility: CLINIC | Age: 29
Discharge: HOME | End: 2024-08-29
Attending: UROLOGY | Admitting: UROLOGY
Payer: MEDICARE

## 2024-08-29 DIAGNOSIS — N20.0 NEPHROLITHIASIS: ICD-10-CM

## 2024-08-29 PROCEDURE — 74176 CT ABD & PELVIS W/O CONTRAST: CPT | Mod: MG

## 2024-09-01 NOTE — PROGRESS NOTES
CYSTOSCOPY PROCEDURE    Pre-Procedure Diagnosis: neurogenic bladder  Post-Procedure Diagnosis: same   Procedure: Cystoscopy    Surgeon: Mata Bacon      Indications:  Ms. Dianna Cottrell is a 27 year old-year-old woman who is seen for NGB with incontinence around indwelling urethral. catheter.     Description of Procedure:  After informed consent was obtained, the patient was brought to the procedure room and placed in the low lithotomy position.  The perineum was prepped and draped in a sterile fashion.     External genital exam was significant for urethral erosion. I could place a finger into the bladder. There was some erosion of the labia minora.  There was leakage with straining.     A flex cystoscope was introduced through a well lubricated urethra and into the urinary bladder. The urethra showed  evidence of dorsal erosion. No bone visible. Bladder small capacity without tumors or stones. No AUTONOMIC DYSREFLEXIA during the study.       The cystoscope was removed and the findings were explained to the patient.    Evaluation and Management visit:  After completion of the csytoscopy the patient was brought to a consult room for extended discussion about urinary diversion options.   She has neurogenic bladder problems with indwelling urethral catheter including incontinence.  Discussed Mitrofanoff and augment and BN closure vs. [place] pouch vs. Ileal conduit.   She will consider her options and talk with a patient liaison and get back to me. She is leaning toward a continent diversion. Even though she is not able to cath herself she has her mother to help her and says she will move in with her kids when her mom is no longer able to help.     Given that, I favor [place] pouch and leave bladder in place. Low risk of pyocystis given the large urethra.     Time spent:  30 minutes spent on the date of the encounter doing chart review, history and exam, documentation and further activities per the note outside of  the time for the cysto and the outside of the time for the documentation of the cysto.         Mata Bacon MD  June 27, 2022       · Subjective and Objective:   CC: Fall with ankle pain  HPI: Patient is a 50yFemale with a pmh/o Sjogrens, UC, Hashimotos thyroiditis, DMII diet controlled previously on metformin, Fibromyalgia, Bipolar disease, OCD, Brain developmental venous anomaly, who presents to [place] ED after fall. Pt states she was walking down the stairs when she missed the last step and tripped and fell with subsequent pain and inability to walk/get self up. Denies HS/LOC.    - plan for OR today. no cp, sob, dyspnea.  pain controlled   - pain well controlled. participated with PT.  no cp, sob    ROS:   All 10 systems reviewed and found to be negative with the exception of what has been described above.    Gen: NAD, Resting comfortably  HEENT: NCAT, EOMI, PERRLA  Cardiac: RRR, +s1/s2  RESP: no w/r/r, CTABL  GI: soft, NT, ND, +BS  EXT: LLE in dressing placed by ortho team    LABS:                                       12.2   7.75  )-----------( 237      ( 01 Sep 2024 07:46 )             37.0     09-    140  |  109<H>  |  11  ----------------------------<  122<H>  4.5   |  26  |  0.76    Ca    9.0      01 Sep 2024 07:46      PT/INR - ( 31 Aug 2024 04:40 )   PT: 11.1 sec;   INR: 0.98 ratio         PTT - ( 31 Aug 2024 04:40 )  PTT:32.5 sec  Urinalysis Basic - ( 01 Sep 2024 07:46 )    Color: x / Appearance: x / SG: x / pH: x  Gluc: 122 mg/dL / Ketone: x  / Bili: x / Urobili: x   Blood: x / Protein: x / Nitrite: x   Leuk Esterase: x / RBC: x / WBC x   Sq Epi: x / Non Sq Epi: x / Bacteria: x        IMAGIN.   Comminuted fracture distal fibula with lateral angulation. Associated  syndesmotic injury with widening at the distal tibiotalar joint.  2.   Mildly displaced fracture corner of the posterior malleolus. Avulsion  fracture tip medial malleolus.    A/P: 49yo F who presents s/p mechanical fall, found to have :      #Left ankle fracture  - s/p ORIF on   - plan as per ortho  - PT eval   - pain meds prn    #BPD/OCD  c/w lamotrigine  c/w latuda   c/w Luvox    #Sjogrens  c/w plaquenil    #h/o Hashimotos  c/w levothyroxine    #Ulcerative colitis  c/w mesalamine

## 2024-09-03 ENCOUNTER — VIRTUAL VISIT (OUTPATIENT)
Dept: UROLOGY | Facility: CLINIC | Age: 29
End: 2024-09-03
Payer: MEDICARE

## 2024-09-03 DIAGNOSIS — R34 LOW URINE OUTPUT: ICD-10-CM

## 2024-09-03 DIAGNOSIS — R82.991 HYPOCITRATURIA: ICD-10-CM

## 2024-09-03 DIAGNOSIS — N20.0 NEPHROLITHIASIS: Primary | ICD-10-CM

## 2024-09-03 PROCEDURE — 99213 OFFICE O/P EST LOW 20 MIN: CPT | Mod: 95 | Performed by: NURSE PRACTITIONER

## 2024-09-03 ASSESSMENT — PAIN SCALES - GENERAL: PAINLEVEL: NO PAIN (0)
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
## 2024-09-03 NOTE — NURSING NOTE
Is the patient currently in the state of [place]? YES    Visit mode:VIDEO    If the visit is dropped, the patient can be reconnected by: VIDEO VISIT: Send to e-mail at: [email or web address]    Will anyone else be joining the visit? NO  (If patient encounters technical issues they should call [phone]    How would you like to obtain your AVS? MyChart    Are changes needed to the allergy or medication list? No    Are refills needed on medications prescribed by this physician? NO    Reason for visit: Follow Up    Kerline WYATT

## 2024-09-04 ENCOUNTER — TELEPHONE (OUTPATIENT)
Dept: INFECTIOUS DISEASES | Facility: CLINIC | Age: 29
End: 2024-09-04
Payer: MEDICARE

## 2024-09-04 DIAGNOSIS — L89.314 PRESSURE INJURY OF RIGHT ISCHIUM, STAGE 4 (H): Primary | Chronic | ICD-10-CM

## 2024-09-04 RX ORDER — DOXYCYCLINE HYCLATE 100 MG
100 TABLET ORAL 2 TIMES DAILY
Qty: 60 TABLET | Refills: 0 | Status: ON HOLD | OUTPATIENT
Start: 2024-09-04 | End: 2024-10-04

## 2024-09-04 NOTE — TELEPHONE ENCOUNTER
[facility] Call Center    Phone Message    May a detailed message be left on voicemail: yes     Reason for Call: Medication Refill Request    Has the patient contacted the pharmacy for the refill? Yes     Name of medication being requested:   doxycycline hyclate (VIBRA-TABS) 100 MG tablet     Provider who prescribed the medication: Yao    Pharmacy:   [facility] [id number] [address]     Date medication is needed: 9/4/2024      Patient states she is out of the medication and is needing a refill ASAP. Patient states the pharmacy sent a request over to the clinic with no response back.          Action Taken: Message routed to:  [facility] ([facility]): ID    Travel Screening: Not Applicable     Date of Service:

## 2024-09-04 NOTE — TELEPHONE ENCOUNTER
"Medication Refill                                                     Refill request receive for: Doxycycline    Last refill: 02/14/2024    Last Office Visit: 02/14/2024  Future appt scheduled? 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  Currently at about 4.5 months of treatment (started on abx on 9/23/23)  I am leaning toward 12 months of therapy, provided patient continues to tolerate amoxicillin, given flap closure and risk of further infection.  Patient was due back in May 2024, will ask scheduling to reach out. Will route to Dr Samayoa to address refill.      RECENT LABS/VITALS                                                        Lab Results   Component Value Date    AST 16 11/13/2023    AST 20 11/16/2020     Lab Results   Component Value Date    ALT 14 11/13/2023    ALT 25 12/06/2016     Creatinine   Date Value Ref Range Status   11/13/2023 0.45 (L) 0.51 - 0.95 mg/dL Final   11/16/2020 0.39 (L) 0.52 - 1.04 mg/dL Final   ]  Alkaline Phosphatase   Date/Time Value Ref Range Status   11/13/2023 09:05  (H) 35 - 104 U/L Final     No results found for: \"LABAPCBCDIFF\"                    "

## 2024-09-05 NOTE — TELEPHONE ENCOUNTER
** Possible Duplicate    Forms/Letter Request     Type of form/letter: OTHER: [facility]HA Effective 8.25.2024 [id number]     Do we have the form/letter: Yes:       Who is the form from? [facility]      Where did/will the form come from? form was faxed in     When is form/letter needed by: complete @ your earliest convenience     How would you like the form/letter returned: Fax : [phone]

## 2024-09-09 ENCOUNTER — MEDICAL CORRESPONDENCE (OUTPATIENT)
Dept: HEALTH INFORMATION MANAGEMENT | Facility: CLINIC | Age: 29
End: 2024-09-09
Payer: MEDICARE
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
## 2024-09-11 ENCOUNTER — VIRTUAL VISIT (OUTPATIENT)
Dept: INFECTIOUS DISEASES | Facility: CLINIC | Age: 29
End: 2024-09-11
Attending: STUDENT IN AN ORGANIZED HEALTH CARE EDUCATION/TRAINING PROGRAM
Payer: MEDICARE

## 2024-09-11 VITALS — HEIGHT: 66 IN | WEIGHT: 120 LBS | BODY MASS INDEX: 19.29 KG/M2

## 2024-09-11 DIAGNOSIS — M86.651 CHRONIC OSTEOMYELITIS OF PELVIS, RIGHT (H): Primary | ICD-10-CM

## 2024-09-11 PROCEDURE — 99214 OFFICE O/P EST MOD 30 MIN: CPT | Mod: 95 | Performed by: STUDENT IN AN ORGANIZED HEALTH CARE EDUCATION/TRAINING PROGRAM

## 2024-09-11 ASSESSMENT — PAIN SCALES - GENERAL: PAINLEVEL: NO PAIN (0)

## 2024-09-11 NOTE — NURSING NOTE
Current patient location: [address] [place] [id number]    Is the patient currently in the state of [place]? YES    Visit mode:VIDEO    If the visit is dropped, the patient can be reconnected by: VIDEO VISIT: Text to cell phone:   Telephone Information:   Mobile [phone]       Will anyone else be joining the visit? NO  (If patient encounters technical issues they should call [phone]    How would you like to obtain your AVS? MyChart    Are changes needed to the allergy or medication list? Pt stated no changes to allergies and Pt stated no med changes    Are refills needed on medications prescribed by this physician? Yes, pt needs a refill on the following medication per pt  Doxycyline 100mg 2x daily       Reason for visit: RECHECK    No other vitals to report per pt    Dianne Chopra VVF
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
## 2024-09-12 DIAGNOSIS — N20.0 KIDNEY STONE: Primary | ICD-10-CM

## 2024-09-13 ENCOUNTER — TELEPHONE (OUTPATIENT)
Dept: UROLOGY | Facility: CLINIC | Age: 29
End: 2024-09-13
Payer: MEDICARE

## 2024-09-14 ENCOUNTER — HEALTH MAINTENANCE LETTER (OUTPATIENT)
Age: 29
End: 2024-09-14

## 2024-09-23 ENCOUNTER — TELEPHONE (OUTPATIENT)
Dept: UROLOGY | Facility: CLINIC | Age: 29
End: 2024-09-23
Payer: MEDICARE

## 2024-09-23 NOTE — TELEPHONE ENCOUNTER
Surgery date and surgeon change   Spoke to patient   Moved to Oct 4 [facility] patient is scheduled with Dr Schofield         PCNL    Spoke with: Patient       Date of surgery: Monday October 21 2024 with Dr Escamilla      Location: [facility]       Informed patient they will need a adult : 23 hr obs      Pre op with provider: Patient will schedule with her PCP ZACHARIAH Collier      H&P Scheduled in PAC- NA        Pre procedure covid : Not req      Additional imaging: NA        Surgery Packet : Sent via [employer]      Additional comments: Please call for surgery teaching

## 2024-09-26 ENCOUNTER — TELEPHONE (OUTPATIENT)
Dept: SURGERY | Facility: CLINIC | Age: 29
End: 2024-09-26
Payer: MEDICARE

## 2024-09-26 ENCOUNTER — TELEPHONE (OUTPATIENT)
Dept: UROLOGY | Facility: CLINIC | Age: 29
End: 2024-09-26
Payer: MEDICARE

## 2024-09-26 DIAGNOSIS — N20.0 KIDNEY STONE: Primary | ICD-10-CM

## 2024-09-27 ENCOUNTER — TELEPHONE (OUTPATIENT)
Dept: FAMILY MEDICINE | Facility: CLINIC | Age: 29
End: 2024-09-27
Payer: MEDICARE

## 2024-09-27 NOTE — TELEPHONE ENCOUNTER
Reason for Call:  Appointment Request    Patient requesting this type of appt: Pre-op    Requested provider: Suzan Willis    Reason patient unable to be scheduled: Not with their preferred provider    When does patient want to be seen/preferred time: 1-2 days    Comments: patient has a PRE OP scheduled for 10/3 with another provider - she is wanting to see PCP instead - procedure is scheduled for 10/4     Could we send this information to you in [facility] or would you prefer to receive a phone call?:   Patient would prefer a phone call   Okay to leave a detailed message?: Yes at Home number on file [phone] (home)    Call taken on 9/27/2024 at 10:01 AM by Cortney Perez

## 2024-09-30 ENCOUNTER — TELEPHONE (OUTPATIENT)
Dept: UROLOGY | Facility: CLINIC | Age: 29
End: 2024-09-30
Payer: MEDICARE

## 2024-09-30 ENCOUNTER — PATIENT OUTREACH (OUTPATIENT)
Dept: UROLOGY | Facility: CLINIC | Age: 29
End: 2024-09-30
Payer: MEDICARE

## 2024-09-30 DIAGNOSIS — N39.0 UTI (URINARY TRACT INFECTION): Primary | ICD-10-CM

## 2024-09-30 DIAGNOSIS — N20.0 NEPHROLITHIASIS: Primary | ICD-10-CM

## 2024-09-30 RX ORDER — CEPHALEXIN 500 MG/1
500 CAPSULE ORAL 2 TIMES DAILY
Qty: 10 CAPSULE | Refills: 0 | Status: SHIPPED | OUTPATIENT
Start: 2024-09-30 | End: 2024-10-01

## 2024-09-30 NOTE — TELEPHONE ENCOUNTER
Message left for patient regarding a urine check today if possible.  Order is in chart.  Preop is scheduled for 10/3, surgery is on 10/4.  Will also send a MyChart to patient and update provider.  Alisa Beck RN

## 2024-09-30 NOTE — PROGRESS NOTES
LM for pt on number listed with details of Keflex abx 500mg PO BID per Dr Schofield written orders and will send to last pharmacy on file [facility] mail/specialty.  Writer also sent mychart and advised if needs another pharmacy that she can ask for medication to be transferred.  PT will need to give UC as well and based on those results if medication needs to be different we will update her post UC results as appropriate.  Urology line left for pt to call with any questions.  AVELINO Bender  Care Coordinator Urology

## 2024-10-01 RX ORDER — CEPHALEXIN 500 MG/1
500 CAPSULE ORAL 2 TIMES DAILY
Qty: 10 CAPSULE | Refills: 0 | Status: ON HOLD | OUTPATIENT
Start: 2024-10-01 | End: 2024-10-04

## 2024-10-01 NOTE — PROGRESS NOTES
Per pt need med sent to [facility] per mychart request.  Updated and sent Rx  AVELINO Bender  Care Coordinator Urology  [phone]

## 2024-10-03 ENCOUNTER — OFFICE VISIT (OUTPATIENT)
Dept: FAMILY MEDICINE | Facility: CLINIC | Age: 29
End: 2024-10-03
Payer: MEDICARE

## 2024-10-03 ENCOUNTER — ANESTHESIA EVENT (OUTPATIENT)
Dept: SURGERY | Facility: CLINIC | Age: 29
End: 2024-10-03
Payer: MEDICARE

## 2024-10-03 VITALS
RESPIRATION RATE: 12 BRPM | DIASTOLIC BLOOD PRESSURE: 58 MMHG | HEIGHT: 66 IN | BODY MASS INDEX: 19.29 KG/M2 | WEIGHT: 120 LBS | TEMPERATURE: 97.6 F | OXYGEN SATURATION: 94 % | SYSTOLIC BLOOD PRESSURE: 88 MMHG | HEART RATE: 64 BPM

## 2024-10-03 DIAGNOSIS — Z01.818 PRE-OPERATIVE EXAMINATION: Primary | ICD-10-CM

## 2024-10-03 DIAGNOSIS — G70.9 NEUROMUSCULAR RESPIRATORY WEAKNESS (H): Chronic | ICD-10-CM

## 2024-10-03 DIAGNOSIS — R25.2 SPASTICITY: Chronic | ICD-10-CM

## 2024-10-03 DIAGNOSIS — G90.4 AUTONOMIC DYSREFLEXIA: Chronic | ICD-10-CM

## 2024-10-03 DIAGNOSIS — Z86.718 PERSONAL HISTORY OF DVT (DEEP VEIN THROMBOSIS): Chronic | ICD-10-CM

## 2024-10-03 DIAGNOSIS — F32.4 MAJOR DEPRESSIVE DISORDER WITH SINGLE EPISODE, IN PARTIAL REMISSION (H): ICD-10-CM

## 2024-10-03 DIAGNOSIS — F41.1 GAD (GENERALIZED ANXIETY DISORDER): ICD-10-CM

## 2024-10-03 DIAGNOSIS — J99 NEUROMUSCULAR RESPIRATORY WEAKNESS (H): Chronic | ICD-10-CM

## 2024-10-03 DIAGNOSIS — S14.109S QUADRIPLEGIA, POST-TRAUMATIC (H): Chronic | ICD-10-CM

## 2024-10-03 DIAGNOSIS — N20.0 NEPHROLITHIASIS: ICD-10-CM

## 2024-10-03 DIAGNOSIS — G82.50 QUADRIPLEGIA, POST-TRAUMATIC (H): Chronic | ICD-10-CM

## 2024-10-03 DIAGNOSIS — K59.2 NEUROGENIC BOWEL: Chronic | ICD-10-CM

## 2024-10-03 LAB
ALBUMIN UR-MCNC: NEGATIVE MG/DL
APPEARANCE UR: CLEAR
BACTERIA #/AREA URNS HPF: ABNORMAL /HPF
BASOPHILS # BLD AUTO: 0 10E3/UL (ref 0–0.2)
BASOPHILS NFR BLD AUTO: 0 %
BILIRUB UR QL STRIP: NEGATIVE
COLOR UR AUTO: YELLOW
EOSINOPHIL # BLD AUTO: 0.4 10E3/UL (ref 0–0.7)
EOSINOPHIL NFR BLD AUTO: 3 %
ERYTHROCYTE [DISTWIDTH] IN BLOOD BY AUTOMATED COUNT: 13.8 % (ref 10–15)
GLUCOSE UR STRIP-MCNC: NEGATIVE MG/DL
HCG UR QL: NEGATIVE
HCT VFR BLD AUTO: 42.5 % (ref 35–47)
HGB BLD-MCNC: 13.8 G/DL (ref 11.7–15.7)
HGB UR QL STRIP: ABNORMAL
IMM GRANULOCYTES # BLD: 0 10E3/UL
IMM GRANULOCYTES NFR BLD: 0 %
KETONES UR STRIP-MCNC: NEGATIVE MG/DL
LEUKOCYTE ESTERASE UR QL STRIP: ABNORMAL
LYMPHOCYTES # BLD AUTO: 3.1 10E3/UL (ref 0.8–5.3)
LYMPHOCYTES NFR BLD AUTO: 25 %
MCH RBC QN AUTO: 28 PG (ref 26.5–33)
MCHC RBC AUTO-ENTMCNC: 32.5 G/DL (ref 31.5–36.5)
MCV RBC AUTO: 86 FL (ref 78–100)
MONOCYTES # BLD AUTO: 0.9 10E3/UL (ref 0–1.3)
MONOCYTES NFR BLD AUTO: 7 %
NEUTROPHILS # BLD AUTO: 7.7 10E3/UL (ref 1.6–8.3)
NEUTROPHILS NFR BLD AUTO: 64 %
NITRATE UR QL: POSITIVE
PH UR STRIP: 6.5 [PH] (ref 5–7)
PLATELET # BLD AUTO: 331 10E3/UL (ref 150–450)
RBC # BLD AUTO: 4.92 10E6/UL (ref 3.8–5.2)
RBC #/AREA URNS AUTO: ABNORMAL /HPF
SP GR UR STRIP: 1.01 (ref 1–1.03)
SQUAMOUS #/AREA URNS AUTO: ABNORMAL /LPF
UROBILINOGEN UR STRIP-ACNC: 0.2 E.U./DL
WBC # BLD AUTO: 11.9 10E3/UL (ref 4–11)
WBC # BLD AUTO: 12.1 10E3/UL (ref 4–11)
WBC #/AREA URNS AUTO: ABNORMAL /HPF
WBC CLUMPS #/AREA URNS HPF: PRESENT /HPF

## 2024-10-03 PROCEDURE — 81001 URINALYSIS AUTO W/SCOPE: CPT | Performed by: FAMILY MEDICINE

## 2024-10-03 PROCEDURE — 99214 OFFICE O/P EST MOD 30 MIN: CPT | Performed by: FAMILY MEDICINE

## 2024-10-03 PROCEDURE — G2211 COMPLEX E/M VISIT ADD ON: HCPCS | Performed by: FAMILY MEDICINE

## 2024-10-03 PROCEDURE — 87086 URINE CULTURE/COLONY COUNT: CPT | Performed by: FAMILY MEDICINE

## 2024-10-03 PROCEDURE — 80048 BASIC METABOLIC PNL TOTAL CA: CPT | Performed by: FAMILY MEDICINE

## 2024-10-03 PROCEDURE — 87186 SC STD MICRODIL/AGAR DIL: CPT | Performed by: FAMILY MEDICINE

## 2024-10-03 PROCEDURE — 36415 COLL VENOUS BLD VENIPUNCTURE: CPT | Performed by: FAMILY MEDICINE

## 2024-10-03 PROCEDURE — 81025 URINE PREGNANCY TEST: CPT | Performed by: FAMILY MEDICINE

## 2024-10-03 PROCEDURE — 87088 URINE BACTERIA CULTURE: CPT | Performed by: FAMILY MEDICINE

## 2024-10-03 PROCEDURE — 85025 COMPLETE CBC W/AUTO DIFF WBC: CPT | Performed by: FAMILY MEDICINE

## 2024-10-03 RX ORDER — SERTRALINE HYDROCHLORIDE 100 MG/1
150 TABLET, FILM COATED ORAL DAILY
Qty: 135 TABLET | Refills: 0 | Status: CANCELLED | OUTPATIENT
Start: 2024-10-03

## 2024-10-03 RX ORDER — HYDROXYZINE PAMOATE 25 MG/1
25 CAPSULE ORAL 3 TIMES DAILY PRN
Qty: 270 CAPSULE | Refills: 0 | Status: CANCELLED | OUTPATIENT
Start: 2024-10-03

## 2024-10-03 RX ORDER — MIDODRINE HYDROCHLORIDE 5 MG/1
10 TABLET ORAL 2 TIMES DAILY
Qty: 6 TABLET | Refills: 0 | Status: CANCELLED | OUTPATIENT
Start: 2024-10-03

## 2024-10-03 ASSESSMENT — ANXIETY QUESTIONNAIRES
6. BECOMING EASILY ANNOYED OR IRRITABLE: SEVERAL DAYS
4. TROUBLE RELAXING: NOT AT ALL
5. BEING SO RESTLESS THAT IT IS HARD TO SIT STILL: NOT AT ALL
1. FEELING NERVOUS, ANXIOUS, OR ON EDGE: NOT AT ALL
7. FEELING AFRAID AS IF SOMETHING AWFUL MIGHT HAPPEN: NOT AT ALL
7. FEELING AFRAID AS IF SOMETHING AWFUL MIGHT HAPPEN: NOT AT ALL
8. IF YOU CHECKED OFF ANY PROBLEMS, HOW DIFFICULT HAVE THESE MADE IT FOR YOU TO DO YOUR WORK, TAKE CARE OF THINGS AT HOME, OR GET ALONG WITH OTHER PEOPLE?: NOT DIFFICULT AT ALL
GAD7 TOTAL SCORE: 1
3. WORRYING TOO MUCH ABOUT DIFFERENT THINGS: NOT AT ALL
IF YOU CHECKED OFF ANY PROBLEMS ON THIS QUESTIONNAIRE, HOW DIFFICULT HAVE THESE PROBLEMS MADE IT FOR YOU TO DO YOUR WORK, TAKE CARE OF THINGS AT HOME, OR GET ALONG WITH OTHER PEOPLE: NOT DIFFICULT AT ALL
GAD7 TOTAL SCORE: 1
2. NOT BEING ABLE TO STOP OR CONTROL WORRYING: NOT AT ALL
GAD7 TOTAL SCORE: 1

## 2024-10-03 ASSESSMENT — PAIN SCALES - GENERAL: PAINLEVEL: NO PAIN (0)

## 2024-10-03 ASSESSMENT — PATIENT HEALTH QUESTIONNAIRE - PHQ9
SUM OF ALL RESPONSES TO PHQ QUESTIONS 1-9: 2
10. IF YOU CHECKED OFF ANY PROBLEMS, HOW DIFFICULT HAVE THESE PROBLEMS MADE IT FOR YOU TO DO YOUR WORK, TAKE CARE OF THINGS AT HOME, OR GET ALONG WITH OTHER PEOPLE: NOT DIFFICULT AT ALL
SUM OF ALL RESPONSES TO PHQ QUESTIONS 1-9: 2

## 2024-10-03 ASSESSMENT — ENCOUNTER SYMPTOMS
DYSRHYTHMIAS: 0
SEIZURES: 0

## 2024-10-03 ASSESSMENT — LIFESTYLE VARIABLES: TOBACCO_USE: 1
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
## 2024-10-03 NOTE — RESULT ENCOUNTER NOTE
Jovita Bustamante,    If you have not viewed these results on [email or web address] within 3 days, we will use an alternative method to contact you. We will contact you via the following protocol:    - Via letter if your results are normal.  - Via phone ([phone]) if your results are abnormal.     Here are my comments about your recent results:    CBC Results - Your cell counts were normal.    Please call the clinic ([phone]), or message us on [employer] with any questions you may have.     Have a great day,    Dr. Dixon

## 2024-10-03 NOTE — RESULT ENCOUNTER NOTE
Jovita Bustamante,    If you have not viewed these results on [employer] within 3 days, we will use an alternative method to contact you. We will contact you via the following protocol:    - Via letter if your results are normal.  - Via phone ([phone]) if your results are abnormal.     Here are my comments about your recent results:    Urine pregnancy test normal.    Urine test shows signs of ongoing infection, will send to your surgeon to give their recommendations. Culture still pending.    Please call the clinic ([phone]), or message us on [employer] with any questions you may have.     Have a great day,    Dr. Dixon
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
## 2024-10-03 NOTE — PATIENT INSTRUCTIONS
How to Take Your Medication Before Surgery  Preoperative Medication Instructions   Antiplatelet or Anticoagulation Medication Instructions   - Patient is on no antiplatelet or anticoagulation medications.    Additional Medication Instructions  Take all scheduled medications on the day of surgery EXCEPT for modifications listed below:   - Herbal medications and vitamins: DO NOT TAKE 14 days prior to surgery.       Patient Education   Preparing for Your Surgery  For Adults  Getting started  In most cases, a nurse will call to review your health history and instructions. They will give you an arrival time based on your scheduled surgery time. Please be ready to share:  Your doctor's clinic name and phone number  Your medical, surgical, and anesthesia history  A list of allergies and sensitivities  A list of medicines, including herbal treatments and over-the-counter drugs  Whether the patient has a legal guardian (ask how to send us the papers in advance)  Note: You may not receive a call if you were seen at our PAC (Preoperative Assessment Center).  Please tell us if you're pregnant--or if there's any chance you might be pregnant. Some surgeries may injure a fetus (unborn baby), so they require a pregnancy test. Surgeries that are safe for a fetus don't always need a test, and you can choose whether to have one.   Preparing for surgery  Within 10 to 30 days of surgery: Have a pre-op exam (sometimes called an H&P, or History and Physical). This can be done at a clinic or pre-operative center.  If you're having a , you may not need this exam. Talk to your care team.  At your pre-op exam, talk to your care team about all medicines you take. (This includes CBD oil and any drugs, such as THC, marijuana, and other forms of cannabis.) If you need to stop any medicine before surgery, ask when to start taking it again.  This is for your safety. Many medicines and drugs can make you bleed too much during surgery. Some  change how well surgery (anesthesia) drugs work.  Call your insurance company to let them know you're having surgery. (If you don't have insurance, call [phone].)  Call your clinic if there's any change in your health. This includes a scrape or scratch near the surgery site, or any signs of a cold (sore throat, runny nose, cough, rash, fever).  Eating and drinking guidelines  For your safety: Unless your surgeon tells you otherwise, follow the guidelines below.  Eat and drink as normal until 8 hours before you arrive for surgery. After that, no food or milk. You can spit out gum when you arrive.  Drink clear liquids until 2 hours before you arrive. These are liquids you can see through, like water, Gatorade, and Propel Water. They also include plain black coffee and tea (no cream or milk).  No alcohol for 24 hours before you arrive. The night before surgery, stop any drinks that contain THC.  If your care team tells you to take medicine on the morning of surgery, it's okay to take it with a sip of water. No other medicines or drugs are allowed (including CBD oil)--follow your care team's instructions.  If you have questions the day of surgery, call your hospital or surgery center.   Preventing infection  Shower or bathe the night before and the morning of surgery. Follow the instructions your clinic gave you. (If no instructions, use regular soap.)  Don't shave or clip hair near your surgery site. We'll remove the hair if needed.  Don't smoke or vape the morning of surgery. No chewing tobacco for 6 hours before you arrive. A nicotine patch is okay. You may spit out nicotine gum when you arrive.  For some surgeries, the surgeon will tell you to fully quit smoking and nicotine.  We will make every effort to keep you safe from infection. We will:  Clean our hands often with soap and water (or an alcohol-based hand rub).  Clean the skin at your surgery site with a special soap that kills germs.  Give you a special  gown to keep you warm. (Cold raises the risk of infection.)  Wear hair covers, masks, gowns, and gloves during surgery.  Give antibiotic medicine, if prescribed. Not all surgeries need this medicine.  What to bring on the day of surgery  Photo ID and insurance card  Copy of your health care directive, if you have one  Glasses and hearing aids (bring cases)  You can't wear contacts during surgery  Inhaler and eye drops, if you use them (tell us about these when you arrive)  CPAP machine or breathing device, if you use them  A few personal items, if spending the night  If you have . . .  A pacemaker, ICD (cardiac defibrillator), or other implant: Bring the ID card.  An implanted stimulator: Bring the remote control.  A legal guardian: Bring a copy of the certified (court-stamped) guardianship papers.  Please remove any jewelry, including body piercings. Leave jewelry and other valuables at home.  If you're going home the day of surgery  You must have a responsible adult drive you home. They should stay with you overnight as well.  If you don't have someone to stay with you, and you aren't safe to go home alone, we may keep you overnight. Insurance often won't pay for this.  After surgery  If it's hard to control your pain or you need more pain medicine, please call your surgeon's office.  Questions?   If you have any questions for your care team, list them here:   ____________________________________________________________________________________________________________________________________________________________________________________________________________________________________________________________  For informational purposes only. Not to replace the advice of your health care provider. Copyright   2003, 2019 [facility]. All rights reserved. Clinically reviewed by Jatin Corea MD. [employer] [id number] - REV 08/24.

## 2024-10-04 ENCOUNTER — ANESTHESIA (OUTPATIENT)
Dept: SURGERY | Facility: CLINIC | Age: 29
End: 2024-10-04
Payer: MEDICARE

## 2024-10-04 ENCOUNTER — HOSPITAL ENCOUNTER (OUTPATIENT)
Facility: CLINIC | Age: 29
Discharge: HOME | End: 2024-10-05
Attending: UROLOGY | Admitting: RADIOLOGY
Payer: MEDICARE

## 2024-10-04 ENCOUNTER — APPOINTMENT (OUTPATIENT)
Dept: INTERVENTIONAL RADIOLOGY/VASCULAR | Facility: CLINIC | Age: 29
End: 2024-10-04
Attending: STUDENT IN AN ORGANIZED HEALTH CARE EDUCATION/TRAINING PROGRAM
Payer: MEDICARE

## 2024-10-04 ENCOUNTER — TELEPHONE (OUTPATIENT)
Dept: FAMILY MEDICINE | Facility: CLINIC | Age: 29
End: 2024-10-04

## 2024-10-04 ENCOUNTER — APPOINTMENT (OUTPATIENT)
Dept: GENERAL RADIOLOGY | Facility: CLINIC | Age: 29
End: 2024-10-04
Attending: UROLOGY
Payer: MEDICARE

## 2024-10-04 DIAGNOSIS — N20.0 NEPHROLITHIASIS: Primary | ICD-10-CM

## 2024-10-04 DIAGNOSIS — N20.0 KIDNEY STONE: ICD-10-CM

## 2024-10-04 LAB
ANION GAP SERPL CALCULATED.3IONS-SCNC: 12 MMOL/L (ref 7–15)
BUN SERPL-MCNC: 9.8 MG/DL (ref 6–20)
CALCIUM SERPL-MCNC: 9.4 MG/DL (ref 8.8–10.4)
CHLORIDE SERPL-SCNC: 106 MMOL/L (ref 98–107)
CREAT SERPL-MCNC: 0.48 MG/DL (ref 0.51–0.95)
EGFRCR SERPLBLD CKD-EPI 2021: >90 ML/MIN/1.73M2
GLUCOSE SERPL-MCNC: 90 MG/DL (ref 70–99)
HCO3 SERPL-SCNC: 22 MMOL/L (ref 22–29)
POTASSIUM SERPL-SCNC: 3.9 MMOL/L (ref 3.4–5.3)
SODIUM SERPL-SCNC: 140 MMOL/L (ref 135–145)

## 2024-10-04 PROCEDURE — 82365 CALCULUS SPECTROSCOPY: CPT | Performed by: UROLOGY

## 2024-10-04 PROCEDURE — 370N000017 HC ANESTHESIA TECHNICAL FEE, PER MIN: Performed by: UROLOGY

## 2024-10-04 PROCEDURE — 710N000009 HC RECOVERY PHASE 1, LEVEL 1, PER MIN: Performed by: UROLOGY

## 2024-10-04 PROCEDURE — 250N000013 HC RX MED GY IP 250 OP 250 PS 637: Performed by: UROLOGY

## 2024-10-04 PROCEDURE — 50080 PERQ NL/PL LITHOTRP SMPL<2CM: CPT | Mod: LT | Performed by: UROLOGY

## 2024-10-04 PROCEDURE — 250N000025 HC SEVOFLURANE, PER MIN: Performed by: UROLOGY

## 2024-10-04 PROCEDURE — 87077 CULTURE AEROBIC IDENTIFY: CPT | Performed by: UROLOGY

## 2024-10-04 PROCEDURE — 999N000083 IR NEPHROLITHOTOMY

## 2024-10-04 PROCEDURE — 50436 DILAT XST TRC NDURLGC PX: CPT

## 2024-10-04 PROCEDURE — 258N000001 HC RX 258: Performed by: UROLOGY

## 2024-10-04 PROCEDURE — 272N000001 HC OR GENERAL SUPPLY STERILE: Performed by: UROLOGY

## 2024-10-04 PROCEDURE — C1725 CATH, TRANSLUMIN NON-LASER: HCPCS | Performed by: UROLOGY

## 2024-10-04 PROCEDURE — 50436 DILAT XST TRC NDURLGC PX: CPT | Performed by: ANESTHESIOLOGY

## 2024-10-04 PROCEDURE — 258N000003 HC RX IP 258 OP 636: Performed by: UROLOGY

## 2024-10-04 PROCEDURE — C1769 GUIDE WIRE: HCPCS | Performed by: UROLOGY

## 2024-10-04 PROCEDURE — C1729 CATH, DRAINAGE: HCPCS | Performed by: UROLOGY

## 2024-10-04 PROCEDURE — 255N000002 HC RX 255 OP 636: Performed by: UROLOGY

## 2024-10-04 PROCEDURE — C1887 CATHETER, GUIDING: HCPCS | Performed by: UROLOGY

## 2024-10-04 PROCEDURE — 999N000179 XR SURGERY CARM FLUORO LESS THAN 5 MIN W STILLS: Mod: TC

## 2024-10-04 PROCEDURE — 272N000002 HC OR SUPPLY OTHER OPNP: Performed by: UROLOGY

## 2024-10-04 PROCEDURE — 258N000003 HC RX IP 258 OP 636: Performed by: ANESTHESIOLOGY

## 2024-10-04 PROCEDURE — 250N000009 HC RX 250

## 2024-10-04 PROCEDURE — 999N000141 HC STATISTIC PRE-PROCEDURE NURSING ASSESSMENT: Performed by: UROLOGY

## 2024-10-04 PROCEDURE — 250N000011 HC RX IP 250 OP 636: Performed by: UROLOGY

## 2024-10-04 PROCEDURE — 360N000085 HC SURGERY LEVEL 5 W/ FLUORO, PER MIN: Performed by: UROLOGY

## 2024-10-04 PROCEDURE — 250N000011 HC RX IP 250 OP 636

## 2024-10-04 RX ORDER — NALOXONE HYDROCHLORIDE 0.4 MG/ML
0.2 INJECTION, SOLUTION INTRAMUSCULAR; INTRAVENOUS; SUBCUTANEOUS
Status: DISCONTINUED | OUTPATIENT
Start: 2024-10-04 | End: 2024-10-05 | Stop reason: HOSPADM

## 2024-10-04 RX ORDER — AMOXICILLIN 250 MG
1 CAPSULE ORAL DAILY
Status: DISCONTINUED | OUTPATIENT
Start: 2024-10-04 | End: 2024-10-05 | Stop reason: HOSPADM

## 2024-10-04 RX ORDER — NALOXONE HYDROCHLORIDE 0.4 MG/ML
0.4 INJECTION, SOLUTION INTRAMUSCULAR; INTRAVENOUS; SUBCUTANEOUS
Status: DISCONTINUED | OUTPATIENT
Start: 2024-10-04 | End: 2024-10-05 | Stop reason: HOSPADM

## 2024-10-04 RX ORDER — HYDROXYZINE PAMOATE 25 MG/1
25 CAPSULE ORAL 3 TIMES DAILY PRN
Status: DISCONTINUED | OUTPATIENT
Start: 2024-10-04 | End: 2024-10-05 | Stop reason: HOSPADM

## 2024-10-04 RX ORDER — ONDANSETRON 2 MG/ML
4 INJECTION INTRAMUSCULAR; INTRAVENOUS EVERY 30 MIN PRN
Status: DISCONTINUED | OUTPATIENT
Start: 2024-10-04 | End: 2024-10-04 | Stop reason: HOSPADM

## 2024-10-04 RX ORDER — BUPIVACAINE HYDROCHLORIDE 5 MG/ML
INJECTION, SOLUTION PERINEURAL PRN
Status: DISCONTINUED | OUTPATIENT
Start: 2024-10-04 | End: 2024-10-04 | Stop reason: HOSPADM

## 2024-10-04 RX ORDER — FLUCONAZOLE 2 MG/ML
200 INJECTION, SOLUTION INTRAVENOUS EVERY 24 HOURS
Status: DISCONTINUED | OUTPATIENT
Start: 2024-10-04 | End: 2024-10-04 | Stop reason: HOSPADM

## 2024-10-04 RX ORDER — SODIUM CHLORIDE FOR INHALATION 3 %
3 VIAL, NEBULIZER (ML) INHALATION EVERY 6 HOURS PRN
Status: DISCONTINUED | OUTPATIENT
Start: 2024-10-04 | End: 2024-10-05 | Stop reason: HOSPADM

## 2024-10-04 RX ORDER — KETOROLAC TROMETHAMINE 15 MG/ML
15 INJECTION, SOLUTION INTRAMUSCULAR; INTRAVENOUS EVERY 6 HOURS PRN
Status: DISCONTINUED | OUTPATIENT
Start: 2024-10-04 | End: 2024-10-05 | Stop reason: HOSPADM

## 2024-10-04 RX ORDER — DEXAMETHASONE SODIUM PHOSPHATE 4 MG/ML
INJECTION, SOLUTION INTRA-ARTICULAR; INTRALESIONAL; INTRAMUSCULAR; INTRAVENOUS; SOFT TISSUE PRN
Status: DISCONTINUED | OUTPATIENT
Start: 2024-10-04 | End: 2024-10-04

## 2024-10-04 RX ORDER — HYDROMORPHONE HCL IN WATER/PF 6 MG/30 ML
0.4 PATIENT CONTROLLED ANALGESIA SYRINGE INTRAVENOUS
Status: DISCONTINUED | OUTPATIENT
Start: 2024-10-04 | End: 2024-10-05 | Stop reason: HOSPADM

## 2024-10-04 RX ORDER — DIPHENHYDRAMINE HCL 25 MG
25 CAPSULE ORAL EVERY 6 HOURS PRN
Status: DISCONTINUED | OUTPATIENT
Start: 2024-10-04 | End: 2024-10-05 | Stop reason: HOSPADM

## 2024-10-04 RX ORDER — NALOXONE HYDROCHLORIDE 0.4 MG/ML
0.1 INJECTION, SOLUTION INTRAMUSCULAR; INTRAVENOUS; SUBCUTANEOUS
Status: DISCONTINUED | OUTPATIENT
Start: 2024-10-04 | End: 2024-10-04 | Stop reason: HOSPADM

## 2024-10-04 RX ORDER — LIDOCAINE 40 MG/G
CREAM TOPICAL
Status: DISCONTINUED | OUTPATIENT
Start: 2024-10-04 | End: 2024-10-05 | Stop reason: HOSPADM

## 2024-10-04 RX ORDER — ALBUTEROL SULFATE 0.83 MG/ML
2.5 SOLUTION RESPIRATORY (INHALATION) EVERY 4 HOURS PRN
Status: DISCONTINUED | OUTPATIENT
Start: 2024-10-04 | End: 2024-10-05 | Stop reason: HOSPADM

## 2024-10-04 RX ORDER — ONDANSETRON 2 MG/ML
4 INJECTION INTRAMUSCULAR; INTRAVENOUS EVERY 6 HOURS PRN
Status: DISCONTINUED | OUTPATIENT
Start: 2024-10-04 | End: 2024-10-05 | Stop reason: HOSPADM

## 2024-10-04 RX ORDER — ONDANSETRON 4 MG/1
4 TABLET, ORALLY DISINTEGRATING ORAL EVERY 6 HOURS PRN
Status: DISCONTINUED | OUTPATIENT
Start: 2024-10-04 | End: 2024-10-05 | Stop reason: HOSPADM

## 2024-10-04 RX ORDER — MIDODRINE HYDROCHLORIDE 5 MG/1
10 TABLET ORAL 2 TIMES DAILY
Status: DISCONTINUED | OUTPATIENT
Start: 2024-10-04 | End: 2024-10-05 | Stop reason: HOSPADM

## 2024-10-04 RX ORDER — PROPOFOL 10 MG/ML
INJECTION, EMULSION INTRAVENOUS PRN
Status: DISCONTINUED | OUTPATIENT
Start: 2024-10-04 | End: 2024-10-04

## 2024-10-04 RX ORDER — HYDROMORPHONE HCL IN WATER/PF 6 MG/30 ML
0.4 PATIENT CONTROLLED ANALGESIA SYRINGE INTRAVENOUS EVERY 5 MIN PRN
Status: DISCONTINUED | OUTPATIENT
Start: 2024-10-04 | End: 2024-10-04 | Stop reason: HOSPADM

## 2024-10-04 RX ORDER — SODIUM CHLORIDE, SODIUM LACTATE, POTASSIUM CHLORIDE, CALCIUM CHLORIDE 600; 310; 30; 20 MG/100ML; MG/100ML; MG/100ML; MG/100ML
INJECTION, SOLUTION INTRAVENOUS CONTINUOUS
Status: DISCONTINUED | OUTPATIENT
Start: 2024-10-04 | End: 2024-10-04 | Stop reason: HOSPADM

## 2024-10-04 RX ORDER — FLUTICASONE FUROATE AND VILANTEROL 100; 25 UG/1; UG/1
1 POWDER RESPIRATORY (INHALATION) DAILY
Status: DISCONTINUED | OUTPATIENT
Start: 2024-10-05 | End: 2024-10-04

## 2024-10-04 RX ORDER — ONDANSETRON 2 MG/ML
INJECTION INTRAMUSCULAR; INTRAVENOUS PRN
Status: DISCONTINUED | OUTPATIENT
Start: 2024-10-04 | End: 2024-10-04

## 2024-10-04 RX ORDER — FENTANYL CITRATE 0.05 MG/ML
25 INJECTION, SOLUTION INTRAMUSCULAR; INTRAVENOUS EVERY 5 MIN PRN
Status: DISCONTINUED | OUTPATIENT
Start: 2024-10-04 | End: 2024-10-04 | Stop reason: HOSPADM

## 2024-10-04 RX ORDER — BISACODYL 10 MG
10 SUPPOSITORY, RECTAL RECTAL AT BEDTIME
Status: DISCONTINUED | OUTPATIENT
Start: 2024-10-04 | End: 2024-10-05 | Stop reason: HOSPADM

## 2024-10-04 RX ORDER — OXYCODONE HYDROCHLORIDE 5 MG/1
10 TABLET ORAL EVERY 4 HOURS PRN
Status: DISCONTINUED | OUTPATIENT
Start: 2024-10-04 | End: 2024-10-05 | Stop reason: HOSPADM

## 2024-10-04 RX ORDER — BACLOFEN 10 MG/1
30 TABLET ORAL 3 TIMES DAILY
Status: DISCONTINUED | OUTPATIENT
Start: 2024-10-04 | End: 2024-10-05 | Stop reason: HOSPADM

## 2024-10-04 RX ORDER — OXYBUTYNIN CHLORIDE 5 MG/1
5 TABLET, EXTENDED RELEASE ORAL EVERY EVENING
Status: DISCONTINUED | OUTPATIENT
Start: 2024-10-05 | End: 2024-10-05 | Stop reason: HOSPADM

## 2024-10-04 RX ORDER — GABAPENTIN 300 MG/1
300 CAPSULE ORAL AT BEDTIME
Status: DISCONTINUED | OUTPATIENT
Start: 2024-10-04 | End: 2024-10-05 | Stop reason: HOSPADM

## 2024-10-04 RX ORDER — FENTANYL CITRATE 0.05 MG/ML
50 INJECTION, SOLUTION INTRAMUSCULAR; INTRAVENOUS EVERY 5 MIN PRN
Status: DISCONTINUED | OUTPATIENT
Start: 2024-10-04 | End: 2024-10-04 | Stop reason: HOSPADM

## 2024-10-04 RX ORDER — FLUTICASONE FUROATE AND VILANTEROL 100; 25 UG/1; UG/1
1 POWDER RESPIRATORY (INHALATION) DAILY
Status: DISCONTINUED | OUTPATIENT
Start: 2024-10-05 | End: 2024-10-05 | Stop reason: HOSPADM

## 2024-10-04 RX ORDER — HYDROMORPHONE HCL IN WATER/PF 6 MG/30 ML
0.2 PATIENT CONTROLLED ANALGESIA SYRINGE INTRAVENOUS EVERY 5 MIN PRN
Status: DISCONTINUED | OUTPATIENT
Start: 2024-10-04 | End: 2024-10-04 | Stop reason: HOSPADM

## 2024-10-04 RX ORDER — ONDANSETRON 4 MG/1
4 TABLET, ORALLY DISINTEGRATING ORAL EVERY 30 MIN PRN
Status: DISCONTINUED | OUTPATIENT
Start: 2024-10-04 | End: 2024-10-04 | Stop reason: HOSPADM

## 2024-10-04 RX ORDER — PROCHLORPERAZINE MALEATE 10 MG
10 TABLET ORAL EVERY 6 HOURS PRN
Status: DISCONTINUED | OUTPATIENT
Start: 2024-10-04 | End: 2024-10-05 | Stop reason: HOSPADM

## 2024-10-04 RX ORDER — SODIUM CHLORIDE, SODIUM LACTATE, POTASSIUM CHLORIDE, CALCIUM CHLORIDE 600; 310; 30; 20 MG/100ML; MG/100ML; MG/100ML; MG/100ML
INJECTION, SOLUTION INTRAVENOUS CONTINUOUS PRN
Status: DISCONTINUED | OUTPATIENT
Start: 2024-10-04 | End: 2024-10-04

## 2024-10-04 RX ORDER — OXYCODONE HYDROCHLORIDE 5 MG/1
5 TABLET ORAL EVERY 4 HOURS PRN
Status: DISCONTINUED | OUTPATIENT
Start: 2024-10-04 | End: 2024-10-05 | Stop reason: HOSPADM

## 2024-10-04 RX ORDER — ACETAMINOPHEN 650 MG/1
650 SUPPOSITORY RECTAL ONCE
Status: COMPLETED | OUTPATIENT
Start: 2024-10-04 | End: 2024-10-04

## 2024-10-04 RX ORDER — ACETAMINOPHEN 325 MG/1
650 TABLET ORAL EVERY 4 HOURS PRN
Status: DISCONTINUED | OUTPATIENT
Start: 2024-10-07 | End: 2024-10-05 | Stop reason: HOSPADM

## 2024-10-04 RX ORDER — SODIUM CHLORIDE 9 MG/ML
INJECTION, SOLUTION INTRAVENOUS CONTINUOUS
Status: ACTIVE | OUTPATIENT
Start: 2024-10-04 | End: 2024-10-05

## 2024-10-04 RX ORDER — ACETAMINOPHEN 325 MG/1
975 TABLET ORAL EVERY 8 HOURS
Status: DISCONTINUED | OUTPATIENT
Start: 2024-10-04 | End: 2024-10-05 | Stop reason: HOSPADM

## 2024-10-04 RX ORDER — AMPICILLIN 2 G/1
2 INJECTION, POWDER, FOR SOLUTION INTRAVENOUS ONCE
Status: COMPLETED | OUTPATIENT
Start: 2024-10-04 | End: 2024-10-04

## 2024-10-04 RX ORDER — ACETAMINOPHEN 325 MG/1
975 TABLET ORAL ONCE
Status: COMPLETED | OUTPATIENT
Start: 2024-10-04 | End: 2024-10-04

## 2024-10-04 RX ORDER — LIDOCAINE HYDROCHLORIDE 20 MG/ML
INJECTION, SOLUTION INFILTRATION; PERINEURAL PRN
Status: DISCONTINUED | OUTPATIENT
Start: 2024-10-04 | End: 2024-10-04

## 2024-10-04 RX ORDER — IOPAMIDOL 612 MG/ML
INJECTION, SOLUTION INTRAVASCULAR PRN
Status: DISCONTINUED | OUTPATIENT
Start: 2024-10-04 | End: 2024-10-04 | Stop reason: HOSPADM

## 2024-10-04 RX ORDER — DEXAMETHASONE SODIUM PHOSPHATE 4 MG/ML
4 INJECTION, SOLUTION INTRA-ARTICULAR; INTRALESIONAL; INTRAMUSCULAR; INTRAVENOUS; SOFT TISSUE
Status: DISCONTINUED | OUTPATIENT
Start: 2024-10-04 | End: 2024-10-04 | Stop reason: HOSPADM

## 2024-10-04 RX ORDER — FENTANYL CITRATE 50 UG/ML
INJECTION, SOLUTION INTRAMUSCULAR; INTRAVENOUS PRN
Status: DISCONTINUED | OUTPATIENT
Start: 2024-10-04 | End: 2024-10-04

## 2024-10-04 RX ORDER — DIPHENHYDRAMINE HYDROCHLORIDE 50 MG/ML
25 INJECTION INTRAMUSCULAR; INTRAVENOUS EVERY 6 HOURS PRN
Status: DISCONTINUED | OUTPATIENT
Start: 2024-10-04 | End: 2024-10-05 | Stop reason: HOSPADM

## 2024-10-04 RX ORDER — HYDROMORPHONE HCL IN WATER/PF 6 MG/30 ML
0.2 PATIENT CONTROLLED ANALGESIA SYRINGE INTRAVENOUS
Status: DISCONTINUED | OUTPATIENT
Start: 2024-10-04 | End: 2024-10-05 | Stop reason: HOSPADM

## 2024-10-04 RX ADMIN — SODIUM CHLORIDE: 9 INJECTION, SOLUTION INTRAVENOUS at 20:33

## 2024-10-04 RX ADMIN — ONDANSETRON 4 MG: 2 INJECTION INTRAMUSCULAR; INTRAVENOUS at 12:54

## 2024-10-04 RX ADMIN — ACETAMINOPHEN 975 MG: 325 TABLET ORAL at 20:00

## 2024-10-04 RX ADMIN — ROCURONIUM BROMIDE 50 MG: 50 INJECTION, SOLUTION INTRAVENOUS at 12:55

## 2024-10-04 RX ADMIN — GENTAMICIN SULFATE 280 MG: 40 INJECTION, SOLUTION INTRAMUSCULAR; INTRAVENOUS at 11:56

## 2024-10-04 RX ADMIN — FENTANYL CITRATE 50 MCG: 50 INJECTION INTRAMUSCULAR; INTRAVENOUS at 12:54

## 2024-10-04 RX ADMIN — LIDOCAINE HYDROCHLORIDE 40 MG: 20 INJECTION, SOLUTION INFILTRATION; PERINEURAL at 12:54

## 2024-10-04 RX ADMIN — BACLOFEN 30 MG: 10 TABLET ORAL at 21:25

## 2024-10-04 RX ADMIN — GABAPENTIN 300 MG: 300 CAPSULE ORAL at 21:25

## 2024-10-04 RX ADMIN — ACETAMINOPHEN 975 MG: 325 TABLET ORAL at 11:59

## 2024-10-04 RX ADMIN — SERTRALINE HYDROCHLORIDE 150 MG: 50 TABLET ORAL at 19:58

## 2024-10-04 RX ADMIN — SODIUM CHLORIDE, POTASSIUM CHLORIDE, SODIUM LACTATE AND CALCIUM CHLORIDE: 600; 310; 30; 20 INJECTION, SOLUTION INTRAVENOUS at 11:56

## 2024-10-04 RX ADMIN — DEXAMETHASONE SODIUM PHOSPHATE 4 MG: 4 INJECTION, SOLUTION INTRA-ARTICULAR; INTRALESIONAL; INTRAMUSCULAR; INTRAVENOUS; SOFT TISSUE at 12:54

## 2024-10-04 RX ADMIN — Medication 1 LOZENGE: at 20:40

## 2024-10-04 RX ADMIN — SODIUM CHLORIDE, POTASSIUM CHLORIDE, SODIUM LACTATE AND CALCIUM CHLORIDE: 600; 310; 30; 20 INJECTION, SOLUTION INTRAVENOUS at 15:02

## 2024-10-04 RX ADMIN — FENTANYL CITRATE 50 MCG: 50 INJECTION INTRAMUSCULAR; INTRAVENOUS at 13:53

## 2024-10-04 RX ADMIN — ROCURONIUM BROMIDE 10 MG: 50 INJECTION, SOLUTION INTRAVENOUS at 13:53

## 2024-10-04 RX ADMIN — Medication 200 MG: at 14:57

## 2024-10-04 RX ADMIN — AMPICILLIN SODIUM 2 G: 2 INJECTION, POWDER, FOR SOLUTION INTRAMUSCULAR; INTRAVENOUS at 12:54

## 2024-10-04 RX ADMIN — PROPOFOL 100 MG: 10 INJECTION, EMULSION INTRAVENOUS at 12:54

## 2024-10-04 RX ADMIN — FLUCONAZOLE IN SODIUM CHLORIDE 200 MG: 2 INJECTION, SOLUTION INTRAVENOUS at 13:36

## 2024-10-04 RX ADMIN — MIDAZOLAM 2 MG: 1 INJECTION INTRAMUSCULAR; INTRAVENOUS at 12:44

## 2024-10-04 ASSESSMENT — ACTIVITIES OF DAILY LIVING (ADL)
ADLS_ACUITY_SCORE: 40
ADLS_ACUITY_SCORE: 39

## 2024-10-04 NOTE — RESULT ENCOUNTER NOTE
Jovita Bustamante,    If you have not viewed these results on [employer] within 3 days, we will use an alternative method to contact you. We will contact you via the following protocol:    - Via letter if your results are normal.  - Via phone ([phone]) if your results are abnormal.     Here are my comments about your recent results:    BMP - Your blood sugar was normal. Your kidney function and electrolytes were normal.    Please call the clinic ([phone]), or message us on [employer] with any questions you may have.     Have a great day,    Dr. Dixon

## 2024-10-04 NOTE — OP NOTE
OPERATIVE REPORT    PREOPERATIVE DIAGNOSIS:  Kidney stone [N20.0]     POSTOPERATIVE DIAGNOSIS:  Same    PROCEDURES PERFORMED:   Left Percutaneous nephrolithotomy, lithotripsy stone extraction, antegrade ureteroscopy, nephrostomy tube  placement,  Dilation of tract, percutaneous, new access into the renal collecting system (performed by Dr. Sanon)     STAFF SURGEON: Parveen Schofield MD was present and participatory for the entire case.   RESIDENT(S): None  FELLOW (in learning role): Gigi Green MD    ANESTHESIA: General    ESTIMATED BLOOD LOSS: 75 ml    DRAINS/TUBES:   Left 8Fr nephrostomy tube  14 Latvian rogers catheter    IV FLUIDS: Please see dictated anesthesia record  COMPLICATIONS: None.   SPECIMEN:   Left renal stone for analysis and culture    SIGNIFICANT FINDINGS:   -Numerous left renal stones located across multiple calyces removed via interpolar access.   -Visually stone free at the end of the case.    BRIEF OPERATIVE INDICATIONS: Dianna Cottrell who is a 29 year old female here for percutaneous nephrolithotomy.  she was recently diagnosed with kidney stones and was counseled regarding available treatment options and associated risks.  she elected to proceed with percutaneous nephrolithotomy.  she is aware of the risks of surgery.    OPERATIVE DETAILS: After informed consent was obtained, the patient was taken back to the operating room. Anesthesia was induced and the patient was intubated. IV culture directed antibiotics were given and bilateral sequential compression devices were placed. The patient was then carefully placed in the prone position onto the operating room table ensuring padding of shoulders and all pressure points. Prep and drape was done in the usual fashion. A timeout was taken to ensure proper patient, placement and procedure.      imaging revealed  imaging demonstrates radio-opaque renal stone consistent with pre-operative imaging.   Renal access was obtained by   Fab, which is separately dictated by him.  With 2 wires in place we then used a 24F balloon dilator over the Super Stiff guidewire and used this to dilate our percutaneous tract. This was monitored with fluoroscopy. The sheath was advanced into the calyx of access. At this point we deflated the balloon and removed the dilator. We then inserted the rigid nephroscope into the dilated tract.  The access appeared to be infundibular between two lower pole calyces.     However, there was no excessive bleeding and access to the remaining collecting system was excellent. Dr. Sanon left and Dr. Green and I proceeded with the rest of the PCNL.  We proceeded to remove all accessible stones.  We then switched to the flexible cystoscope to clear the remaining calyces.   We performed antegrade ureteroscopy to remove stone fragments from the ureter ensured that the ureter was patent with no evidence of residual ureteral calculi or ureteral trauma.  We were able to pass the scope all the way to the bladder.  We placed a 10 Faroese nephrostomy tube into the kidney which we ensured had a full curl.  We performed an antegrade nephrostogram confirming the tube was in the appropriate position and that there was no extravasation  We removed the Amplatz access sheath and held several minutes or pressure ensuring there was no significant bleeding  We secured the tubes to the skin and closed the wound with interrupted absorbable suture.  We injected local anesthetic into the wound. We placed a clean dressing over the wound   At this point, the patient was carefully placed back into the supine position, awakened from anesthesia and extubated. she was transferred to the PACU in stable condition. she tolerated the procedure well without complication.        PLAN:    Admit for observation  Morning CT AP w/o and labs  14Fr rogers (in mitrofanoff) to be removed in AM. Can resume home CIC/irrigation regimen.  Plan to LEAVE nephrostomy tube  in place until outpatient follow-up  Home Antibiotics Cipro 500 BID 1 week    I, Parveen Schofield, was present for the entire case on 10/04/24.

## 2024-10-04 NOTE — PLAN OF CARE
"Talked with PACU RN Martha about special bed. Per policy the new Iso Flex beds hospital wide are suppose to replace an \"air bed\" a pump was added to the bed and a TAPS repositioning system.     Farzana Fajardo RN   "

## 2024-10-04 NOTE — DISCHARGE INSTRUCTIONS
Home-going instructions-----------------    You had had percutaneous nephrostolithotomy (or nephrolithotomy), the provider passed a small medical instrument through your skin into your kidney. This was done to break up or remove kidney stones.    Wound  Keep incision clean and dry. Do not cover tightly with bandages. Until the wound completely closes,  you may note some clear fluid draining from the site for a few days. This is normal. You may place  gauze pads loosely over the wound to protect clothing.            Activity Limitation:     - No heavy lifting for 2 weeks  - No driving or operating heavy machinery while on narcotic pain medication.     FOLLOW THESE INSTRUCTIONS AS INDICATED BELOW:  - Observe operative area for signs of excessive bleeding.  - You may shower.  - Increase fluid intake to promote clear urine.  - Resume usual diet as tolerated    What to expect while recovering-----------  - You may experience some intermittent bleeding that makes your urine pink or cherry colored. This is normal.  - However, if you are unable to urinate, passing large amount of clots, have nani blood in your urine, or have a temperature >101 degrees, call the urology nurse on call, or present to your nearest emergency department.  - You are encouraged to walk daily  - A NEPHROSTOMY TUBE has been placed that allows urine to flow unobstructed from your kidney into a drainage bag.  The tube has a curl in the kidney to keep it in place, and is sutured to your skin.  The curl in the kidney can cause some mild flank discomfort.  This may be more noticeable when you urinate.  A nephrostomy tube is meant to be left in temporarily.  If it's not removed it can result in stone overgrowth on the stent that can cause pain, infection, and can be very difficult to remove.      Follow-up:  Dr. Schofield's team will contact you to schedule a follow-up appointment in approximately 1 week after your surgery.      Questions/concerns------------------------  [facility]: [phone]

## 2024-10-04 NOTE — BRIEF OP NOTE
[facility]    Brief Operative Note    Pre-operative diagnosis: Kidney stone [N20.0]  Post-operative diagnosis Same as pre-operative diagnosis    Procedure: NEPHROLITHOTOMY, PERCUTANEOUS- LEFT, LEFT ANTEGRADE URETEROSCOPY, LEFT NEPHROSTOMY TUBE PLACEMENT, Left - Flank    Surgeon: Surgeons and Role:     * Parveen Schofield MD - Primary     * Gigi Green MD - Fellow - Assisting  Anesthesia: General   Estimated Blood Loss: 100 mL from 10/4/2024 12:42 PM to 10/4/2024  3:04 PM      Drains: Left nephrostomy tube, 14Fr rogers catheter within catheterizable channel     Specimens:   ID Type Source Tests Collected by Time Destination   A : LEFT KIDNEY STONE Calculus/Stone Kidney, Left AEROBIC BACTERIAL CULTURE ROUTINE Parveen Schofield MD 10/4/2024  2:05 PM    B : Left Kidney stone Calculus/Stone Kidney, Left STONE ANALYSIS Parveen Schofield MD 10/4/2024  2:49 PM      Findings:   Approximately 1cm left renal pelvis stone, numerous smaller stones across multiple calyces .    Complications:   None.  Implants: * No implants in log *

## 2024-10-04 NOTE — ANESTHESIA PROCEDURE NOTES
Airway       Patient location during procedure: OR       Procedure Start/Stop Times: 10/4/2024 12:56 PM  Staff -        CRNA: Alphonso Casanova APRN CRNA       Performed By: CRNA  Consent for Airway        Urgency: elective  Indications and Patient Condition       Indications for airway management: adonay-procedural       Induction type:intravenous       Mask difficulty assessment: 1 - vent by mask    Final Airway Details       Final airway type: endotracheal airway       Successful airway: ETT - single and Oral  Endotracheal Airway Details        ETT size (mm): 7.0       Cuffed: yes       Successful intubation technique: video laryngoscopy       VL Blade Size: Glidescope 3       Grade View of Cords: 1       Adjucts: stylet       Position: Right       Measured from: gums/teeth       Secured at (cm): 21       Bite block used: None    Post intubation assessment        Placement verified by: capnometry, equal breath sounds and chest rise        Number of attempts at approach: 1       Number of other approaches attempted: 0       Secured with: tape       Ease of procedure: easy       Dentition: Intact and Unchanged    Medication(s) Administered   Medication Administration Time: 10/4/2024 12:56 PM    Additional Comments       Cervical neutrality maintained.         The patient is a 38y Female complaining of

## 2024-10-04 NOTE — ANESTHESIA CARE TRANSFER NOTE
[facility]                                      Department of Human Services                                   Certificate of Child Health Examination       Student's Name  Griselda Arambula Birth Date  9/20/2025  Sex  Female Race/Ethnicity   School/Grade Level/ID#     Address  [address]  [facility] [address] Parent/Guardian      Telephone# - Home   Telephone# - Work                              IMMUNIZATIONS:  To be completed by health care provider.  The mo/da/yr for every dose administered is required.  If a specific vaccine is medically contraindicated, a separate written statement must be attached by the health care provider responsible for completing the health examination explaining the medical reason for the contradiction.   VACCINE/DOSE DATE DATE   Diphtheria, Tetanus and Pertussis (DTP or DTap) 11/27/2023    Tdap     Td     Pediatric DT     Inactivate Polio (IPV) 11/27/2023    Oral Polio (OPV)     Haemophilus Influenza Type B (Hib) 11/27/2023    Hepatitis B (HB) 9/21/2023 11/27/2023   Varicella (Chickenpox)     Combined Measles, Mumps and Rubella (MMR)     Measles (Rubeola)     Rubella (3-day measles)     Mumps     Pneumococcal     Meningococcal Conjugate        RECOMMENDED, BUT NOT REQUIRED  Vaccine/Dose        VACCINE/DOSE   Hepatitis A   HPV   Influenza   Men B   Covid      Other:  Specify Immunization/Adminstered Dates:   Health care provider (MD, , APN, PA , school health professional) verifying above immunization history must sign below.  Signature                                                                                                                                          Title       DO                    Date  11/27/2023   Signature                                                                                                                                              Title                           Date    (If    Patient: Dianna Cottrell    Procedure: Procedure(s):  NEPHROLITHOTOMY, PERCUTANEOUS- LEFT, LEFT ANTEGRADE URETEROSCOPY, LEFT NEPHROSTOMY TUBE PLACEMENT       Diagnosis: Kidney stone [N20.0]  Diagnosis Additional Information: No value filed.    Anesthesia Type:   General     Note:    Oropharynx: oropharynx clear of all foreign objects and spontaneously breathing  Level of Consciousness: awake  Oxygen Supplementation: face mask  Level of Supplemental Oxygen (L/min / FiO2): 6  Independent Airway: airway patency satisfactory and stable  Dentition: dentition unchanged  Vital Signs Stable: post-procedure vital signs reviewed and stable  Report to RN Given: handoff report given  Patient transferred to: PACU    Handoff Report: Identifed the Patient, Identified the Reponsible Provider, Reviewed the pertinent medical history, Discussed the surgical course, Reviewed Intra-OP anesthesia mangement and issues during anesthesia, Set expectations for post-procedure period and Allowed opportunity for questions and acknowledgement of understanding      Vitals:  Vitals Value Taken Time   /75 10/04/24 1510   Temp 36.8 C    Pulse 73 10/04/24 1512   Resp 17 10/04/24 1512   SpO2 100 % 10/04/24 1512   Vitals shown include unfiled device data.    Electronically Signed By: ODILIA Hammonds CRNA  October 4, 2024  3:13 PM   adding dates to the above immunization history section, put your initials by date(s) and sign here.)   ALTERNATIVE PROOF OF IMMUNITY   1.Clinical diagnosis (measles, mumps, hepatits B) is allowed when verified by physician & supported with lab confirmation. Attach copy of lab result.       *MEASLES (Rubeola)  MO/DA/YR        * MUMPS MO/DA/YR       HEPATITIS B   MO/DA/YR        VARICELLA MO/DA/YR           2.  History of varicella (chickenpox) disease is acceptable if verified by health care provider, school health professional, or health official.       Person signing below is verifying  parent/guardian’s description of varicella disease is indicative of past infection and is accepting such hx as documentation of disease.       Date of Disease                                  Signature                                                                         Title                           Date             3.  Lab Evidence of Immunity (check one)    __Measles*       __Mumps *       __Rubella        __Varicella      __Hepatitis B       *Measles diagnosed on/after 7/1/2002 AND mumps diagnosed on/after 7/1/2013 must be confirmed by laboratory evidence   Completion of Alternatives 1 or 3 MUST be accompanied by Labs & Physician Signature:  Physician Statements of Immunity MUST be submitted to ID for review.   Certificates of [employer]ah[employer] Exemption to Immunizations or Physician Medical Statements of Medical Contraindication are Reviewed and Maintained by the School Authority.           Student's Name  Griselda Arambula Birth Date  9/20/2025  Sex  Female School   Grade Level/ID#     HEALTH HISTORY          TO BE COMPLETED AND SIGNED BY PARENT/GUARDIAN AND VERIFIED BY HEALTH CARE PROVIDER    ALLERGIES  (Food, drug, insect, other)  Patient has no known allergies. MEDICATION  (List all prescribed or taken on a regular basis.)  No current outpatient medications on file.   Diagnosis of asthma?  Child wakes during the night  coughing   Yes   No    Yes   No    Loss of function of one of paired organs? (eye/ear/kidney/testicle)   Yes   No      Birth Defects?  Developmental delay?   Yes   No    Yes   No  Hospitalizations?  When?  What for?   Yes   No    Blood disorders?  Hemophilia, Sickle Cell, Other?  Explain.   Yes   No  Surgery?  (List all.)  When?  What for?   Yes   No    Diabetes?   Yes   No  Serious injury or illness?   Yes   No    Head Injury/Concussion/Passed out?   Yes   No  TB skin text positive (past/present)?   Yes   No *If yes, refer to local    Seizures?  What are they like?   Yes   No  TB disease (past or present)?   Yes   No *health department   Heart problem/Shortness of breath?   Yes   No  Tobacco use (type, frequency)?   Yes   No    Heart murmur/High blood pressure?   Yes   No  Alcohol/Drug use?   Yes   No    Dizziness or chest pain with exercise?   Yes   No  Fam hx sudden death < age 50 (Cause?)    Yes   No    Eye/Vision problems?  Yes  No   Glasses  Yes   No  Contacts  Yes    No   Last eye exam___  Other concerns? (crossed eye, drooping lids, squinting, difficulty reading) Dental:  ____Braces    ____Bridge    ____Plate    ____Other  Other concerns?     Ear/Hearing problems?   Yes   No  Information may be shared with appropriate personnel for health /educational purposes.   Bone/Joint problem/injury/scoliosis?   Yes   No  Parent/Guardian Signature                                          Date     PHYSICAL EXAMINATION REQUIREMENTS    Entire section below to be completed by MD/DO/APN/PA       PHYSICAL EXAMINATION REQUIREMENTS (head circumference if <2-3 yr)  Ht: 23.25\"   Wt. 11#, 8oz (5.216 kg), head circ: 14.96 in (38cm)   DIABETES SCREENING  BMI>85% age/sex  No And any two of the following:  Family History No    Ethnic Minority  No          Signs of Insulin Resistance (hypertension, dyslipidemia, polycystic ovarian syndrome, acanthosis nigricans)    No           At Risk  No   Lead Risk Questionnaire  Req'd for children  6 months thru 6 yrs enrolled in licensed or public school operated day care, ,  nursery school and/or  (blood test req’d if resides in [facility] or high risk zip)   Questionnaire Administered   Blood Test Indicated:No   Blood Test Date                 Result:                 TB Skin OR Blood Test   Rec.only for children in high-risk groups incl. children immunosuppressed due to HIV infection or other conditions, frequent travel to or born in high prevalence countries or those exposed to adults in high-risk categories.  See CDCguidelines.  http://www.cdc.gov/tb/publications/factsheets/testing/TB_testing.htm.      No Test Needed        Skin Test:     Date Read                  /      /              Result:                     mm    ______________                         Blood Test:   Date Reported          /      /              Result:                  Value ______________               LAB TESTS (Recommended) Date Results  Date Results   Hemoglobin or Hematocrit   Sickle Cell  (when indicated)     Urinalysis   Developmental Screening Tool     SYSTEM REVIEW Normal Comments/Follow-up/Needs  Normal Comments/Follow-up/Needs   Skin Yes  Endocrine Yes    Ears Yes                      Screen result: Gastrointestinal Yes    Eyes Yes     Screen result:   Genito-Urinary Yes  LMP   Nose Yes  Neurological Yes    Throat Yes  Musculoskeletal Yes    Mouth/Dental Yes  Spinal examination Yes    Cardiovascular/HTN Yes  Nutritional status Yes    Respiratory Yes                   Diagnosis of Asthma: No Mental Health Yes        Currently Prescribed Asthma Medication:            Quick-relief  medication (e.g. Short Acting Beta Antagonist): No          Controller medication (e.g. inhaled corticosteroid):   No Other   NEEDS/MODIFICATIONS required in the school setting  None DIETARY Needs/Restrictions     None   SPECIAL INSTRUCTIONS/DEVICES e.g. safety glasses, glass eye, chest protector for arrhythmia, pacemaker, prosthetic  device, dental bridge, false teeth, athleticsupport/cup     None   MENTAL HEALTH/OTHER   Is there anything else the school should know about this student?  No  If you would like to discuss this student's health with school or school health professional, check title:  __Nurse  __Teacher  __Counselor  __Principal   EMERGENCY ACTION  needed while at school due to child's health condition (e.g., seizures, asthma, insect sting, food, peanut allergy, bleeding problem, diabetes, heart problem)?  No  If yes, please describe.     On the basis of the examination on this day, I approve this child's participation in        (If No or Modified, please attach explanation.)  PHYSICAL EDUCATION    Yes      INTERSCHOLASTIC SPORTS   Yes   Physician/Advanced Practice Nurse/Physician Assistant performing examination  Print Name  Skip Orozco D.O.                                        Signature                      Date  11/27/2023     Address/Phone  [facility] [address] [address]  [phone]   Rev 11/15                                                                    Printed by the Authority of the [facility]

## 2024-10-04 NOTE — ANESTHESIA POSTPROCEDURE EVALUATION
Patient: Dianna Cottrell    Procedure: Procedure(s):  NEPHROLITHOTOMY, PERCUTANEOUS- LEFT, LEFT ANTEGRADE URETEROSCOPY, LEFT NEPHROSTOMY TUBE PLACEMENT       Anesthesia Type:  General    Note:     Postop Pain Control: Uneventful            Sign Out: Well controlled pain   PONV: No   Neuro/Psych: Uneventful            Sign Out: Acceptable/Baseline neuro status   Airway/Respiratory: Uneventful            Sign Out: Acceptable/Baseline resp. status   CV/Hemodynamics: Uneventful            Sign Out: Acceptable CV status; No obvious hypovolemia; No obvious fluid overload   Other NRE: NONE   DID A NON-ROUTINE EVENT OCCUR? No           Last vitals:  Vitals Value Taken Time   /77 10/04/24 1730   Temp 36.1  C (96.9  F) 10/04/24 1510   Pulse 98 10/04/24 1750   Resp 23 10/04/24 1750   SpO2 98 % 10/04/24 1700   Vitals shown include unfiled device data.    Electronically Signed By: Israel Fajardo MD  October 4, 2024  5:52 PM

## 2024-10-04 NOTE — RESULT ENCOUNTER NOTE
Jovita Bustamante,    If you have not viewed these results on [employer] within 3 days, we will use an alternative method to contact you. We will contact you via the following protocol:    - Via letter if your results are normal.  - Via phone ([phone]) if your results are abnormal.     Here are my comments about your recent results:    No pathological bacteria were grown on your urine culture.    Please call the clinic ([phone]), or message us on [employer] with any questions you may have.     Have a great day,    Dr. Dixon

## 2024-10-04 NOTE — PROVIDER NOTIFICATION
Kanwal PRESLEY at bedside speaking with patient and her mother, Areli. Discussing plan for admission with patient. Will plan for transfer to inpatient, Dr. Schofield specifically requests patient has air bed for weight transferring. Will continue to monitor through PACU course.
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
## 2024-10-04 NOTE — INTERVAL H&P NOTE
The History and Physical has been reviewed, the patient has been examined and no changes have occurred in the patient's condition since the H & P was completed.     We discussed the benefits and risks of percutaneous nephrolithotomy, including but not limited to, bleeding requiring blood transfusion, infection, need for nephrostomy tube or ureteral stent, ureteral stent related symptoms and cystoscopic removal in the office after surgery, injury to ureter, injury to the kidney rarely leading to long-term kidney damage, injury to the lungs/colon/intraabdomnal organs, need for second procedure if unable to reach stone or residual fragments.      Aware if urine appears infected in the kidney, we will just place a nephrostomy tube and bring her back for PCNL after cultures return.    Will try to discharge today.

## 2024-10-04 NOTE — TELEPHONE ENCOUNTER
Forms/Letter Request     Type of form/letter: OTHER: HOME HEALTH CERTIFICATION AN PLAN OF CARE PERIOD 10/07/24-12/05/24      Do we have the form/letter: Yes:       Who is the form from? [facility]      Where did/will the form come from? form was faxed in     When is form/letter needed by: 3-5     How would you like the form/letter returned: Fax : [phone]

## 2024-10-05 ENCOUNTER — APPOINTMENT (OUTPATIENT)
Dept: CT IMAGING | Facility: CLINIC | Age: 29
End: 2024-10-05
Attending: UROLOGY
Payer: MEDICARE

## 2024-10-05 VITALS
OXYGEN SATURATION: 93 % | WEIGHT: 125.6 LBS | SYSTOLIC BLOOD PRESSURE: 91 MMHG | HEART RATE: 68 BPM | RESPIRATION RATE: 14 BRPM | TEMPERATURE: 98.9 F | HEIGHT: 66 IN | BODY MASS INDEX: 20.18 KG/M2 | DIASTOLIC BLOOD PRESSURE: 56 MMHG

## 2024-10-05 LAB
ANION GAP SERPL CALCULATED.3IONS-SCNC: 13 MMOL/L (ref 7–15)
BUN SERPL-MCNC: 6.9 MG/DL (ref 6–20)
CALCIUM SERPL-MCNC: 9.2 MG/DL (ref 8.8–10.4)
CHLORIDE SERPL-SCNC: 109 MMOL/L (ref 98–107)
CREAT SERPL-MCNC: 0.46 MG/DL (ref 0.51–0.95)
EGFRCR SERPLBLD CKD-EPI 2021: >90 ML/MIN/1.73M2
ERYTHROCYTE [DISTWIDTH] IN BLOOD BY AUTOMATED COUNT: 13.8 % (ref 10–15)
GLUCOSE SERPL-MCNC: 118 MG/DL (ref 70–99)
HCO3 SERPL-SCNC: 21 MMOL/L (ref 22–29)
HCT VFR BLD AUTO: 37.4 % (ref 35–47)
HGB BLD-MCNC: 12.4 G/DL (ref 11.7–15.7)
MCH RBC QN AUTO: 28.4 PG (ref 26.5–33)
MCHC RBC AUTO-ENTMCNC: 33.2 G/DL (ref 31.5–36.5)
MCV RBC AUTO: 86 FL (ref 78–100)
PLATELET # BLD AUTO: 299 10E3/UL (ref 150–450)
POTASSIUM SERPL-SCNC: 3.7 MMOL/L (ref 3.4–5.3)
RBC # BLD AUTO: 4.36 10E6/UL (ref 3.8–5.2)
SODIUM SERPL-SCNC: 143 MMOL/L (ref 135–145)
WBC # BLD AUTO: 21.4 10E3/UL (ref 4–11)

## 2024-10-05 PROCEDURE — 74176 CT ABD & PELVIS W/O CONTRAST: CPT | Mod: MG

## 2024-10-05 PROCEDURE — 85027 COMPLETE CBC AUTOMATED: CPT | Performed by: UROLOGY

## 2024-10-05 PROCEDURE — 36415 COLL VENOUS BLD VENIPUNCTURE: CPT | Performed by: UROLOGY

## 2024-10-05 PROCEDURE — 250N000013 HC RX MED GY IP 250 OP 250 PS 637: Performed by: UROLOGY

## 2024-10-05 PROCEDURE — 80048 BASIC METABOLIC PNL TOTAL CA: CPT | Performed by: UROLOGY

## 2024-10-05 PROCEDURE — 258N000003 HC RX IP 258 OP 636: Performed by: UROLOGY

## 2024-10-05 PROCEDURE — G1010 CDSM STANSON: HCPCS

## 2024-10-05 PROCEDURE — 250N000011 HC RX IP 250 OP 636: Performed by: UROLOGY

## 2024-10-05 RX ORDER — CIPROFLOXACIN 500 MG/1
500 TABLET, FILM COATED ORAL 2 TIMES DAILY
Qty: 14 TABLET | Refills: 0 | Status: SHIPPED | OUTPATIENT
Start: 2024-10-05 | End: 2024-10-12

## 2024-10-05 RX ADMIN — MIDODRINE HYDROCHLORIDE 10 MG: 5 TABLET ORAL at 08:46

## 2024-10-05 RX ADMIN — FLUTICASONE FUROATE AND VILANTEROL 1 PUFF: 100; 25 POWDER RESPIRATORY (INHALATION) at 08:42

## 2024-10-05 RX ADMIN — GENTAMICIN SULFATE 280 MG: 40 INJECTION, SOLUTION INTRAMUSCULAR; INTRAVENOUS at 12:54

## 2024-10-05 RX ADMIN — BACLOFEN 30 MG: 10 TABLET ORAL at 08:46

## 2024-10-05 RX ADMIN — SENNOSIDES AND DOCUSATE SODIUM 1 TABLET: 50; 8.6 TABLET ORAL at 08:46

## 2024-10-05 RX ADMIN — Medication 1 LOZENGE: at 00:47

## 2024-10-05 RX ADMIN — SERTRALINE HYDROCHLORIDE 150 MG: 50 TABLET ORAL at 08:47

## 2024-10-05 ASSESSMENT — ACTIVITIES OF DAILY LIVING (ADL)
ADLS_ACUITY_SCORE: 39
ADLS_ACUITY_SCORE: 40
ADLS_ACUITY_SCORE: 39
ADLS_ACUITY_SCORE: 40
ADLS_ACUITY_SCORE: 40
ADLS_ACUITY_SCORE: 39
ADLS_ACUITY_SCORE: 40
ADLS_ACUITY_SCORE: 40
ADLS_ACUITY_SCORE: 39
ADLS_ACUITY_SCORE: 40
ADLS_ACUITY_SCORE: 39
ADLS_ACUITY_SCORE: 40
ADLS_ACUITY_SCORE: 39
ADLS_ACUITY_SCORE: 39

## 2024-10-05 NOTE — PROGRESS NOTES
[facility] Urology Progress Note          Assessment and Plan:     Active Problems:    Nephrolithiasis    Assessment: POD #1 s/p PCNL    Plan:     - Redose Gent today  - remove rogers via Mitrofanoff  - discharge to home      Víctor Lundy MD   [facility] Urology  Office: [phone]               Interval History:     Resting comfortably, no issues overnight              Review of Systems:     The 5 point Review of Systems is negative other than noted in the HPI             Medications:     Current Facility-Administered Medications   Medication Dose Route Frequency Provider Last Rate Last Admin    [START ON 10/7/2024] acetaminophen (TYLENOL) tablet 650 mg  650 mg Oral Q4H PRN Gigi Green MD        acetaminophen (TYLENOL) tablet 975 mg  975 mg Oral Q8H Gigi Green MD   975 mg at 10/04/24 2000    albuterol (PROVENTIL) neb solution 2.5 mg  2.5 mg Nebulization Q4H PRN Gigi Green MD        baclofen (LIORESAL) tablet 30 mg  30 mg Oral TID Gigi Green MD   30 mg at 10/05/24 0846    benzocaine-menthol (CHLORASEPTIC) 6-10 MG lozenge 1-2 lozenge  1-2 lozenge Buccal Q1H PRN Gigi Green MD   1 lozenge at 10/05/24 0047    bisacodyl (DULCOLAX) suppository 10 mg  10 mg Rectal At Bedtime Gigi Green MD        diphenhydrAMINE (BENADRYL) capsule 25 mg  25 mg Oral Q6H PRN Gigi Green MD        Or    diphenhydrAMINE (BENADRYL) injection 25 mg  25 mg Intravenous Q6H PRN Gigi Green MD        fluticasone-vilanterol (BREO ELLIPTA) 100-25 MCG/ACT inhaler 1 puff  1 puff Inhalation Daily Gigi Green MD   1 puff at 10/05/24 0842    gabapentin (NEURONTIN) capsule 300 mg  300 mg Oral At Bedtime Gigi Green MD   300 mg at 10/04/24 2125    HYDROmorphone (DILAUDID) injection 0.2 mg  0.2 mg Intravenous Q2H PRN Gigi Green MD        Or    HYDROmorphone (DILAUDID) injection 0.4 mg  0.4 mg Intravenous Q2H PRN Gigi Green MD        hydrOXYzine lina (VISTARIL) capsule 25 mg  25 mg Oral  TID PRN Gigi Green MD        ketorolac (TORADOL) injection 15 mg  15 mg Intravenous Q6H PRN Gigi Green MD        lidocaine (LMX4) cream   Topical Q1H PRN Gigi Green MD        lidocaine 1 % 0.1-1 mL  0.1-1 mL Other Q1H PRN Gigi Green MD        [START ON 10/6/2024] magnesium hydroxide (MILK OF MAGNESIA) suspension 30 mL  30 mL Oral Daily PRN Gigi Green MD        midodrine (PROAMATINE) tablet 10 mg  10 mg Oral BID Gigi Green MD   10 mg at 10/05/24 0846    naloxone (NARCAN) injection 0.2 mg  0.2 mg Intravenous Q2 Min PRN Parveen Schofield MD        Or    naloxone (NARCAN) injection 0.4 mg  0.4 mg Intravenous Q2 Min PRN Parveen Schofield MD        Or    naloxone (NARCAN) injection 0.2 mg  0.2 mg Intramuscular Q2 Min PRN Parveen Schofield MD        Or    naloxone (NARCAN) injection 0.4 mg  0.4 mg Intramuscular Q2 Min PRN Parveen Schofield MD        nicotine (NICORETTE) gum 2 mg  2 mg Buccal Q1H PRN Gigi Green MD        ondansetron (ZOFRAN ODT) ODT tab 4 mg  4 mg Oral Q6H PRN Gigi Green MD        Or    ondansetron (ZOFRAN) injection 4 mg  4 mg Intravenous Q6H PRN Gigi Green MD        oxyBUTYnin ER (DITROPAN XL) 24 hr tablet 5 mg  5 mg Oral QPM Gigi Green MD        oxyCODONE (ROXICODONE) tablet 5 mg  5 mg Oral Q4H PRN Gigi Green MD        Or    oxyCODONE (ROXICODONE) tablet 10 mg  10 mg Oral Q4H PRN Gigi Green MD        prochlorperazine (COMPAZINE) injection 10 mg  10 mg Intravenous Q6H PRN Gigi Green MD        Or    prochlorperazine (COMPAZINE) tablet 10 mg  10 mg Oral Q6H PRN Gigi Green MD        senna-docusate (SENOKOT-S/PERICOLACE) 8.6-50 MG per tablet 1 tablet  1 tablet Oral Daily Gigi Green MD   1 tablet at 10/05/24 0846    sertraline (ZOLOFT) tablet 150 mg  150 mg Oral Daily Gigi Green MD   150 mg at 10/05/24 0847    sodium chloride (NEBUSAL) 3 % neb solution 3 mL  3 mL Nebulization Q6H PRN Gigi Green MD        sodium chloride (PF) 0.9%  PF flush 3 mL  3 mL Intracatheter Q8H Gigi Green MD        sodium chloride (PF) 0.9% PF flush 3 mL  3 mL Intracatheter q1 min prn Gigi Green MD         Facility-Administered Medications Ordered in Other Encounters   Medication Dose Route Frequency Provider Last Rate Last Admin    levonorgestrel (MIRENA) 20 MCG/24HR IUD    PRN Carlotta Lock MD   1 each at 02/02/21 1103                  Physical Exam:   Vitals were reviewed  Patient Vitals for the past 8 hrs:   BP Temp Temp src Pulse Resp SpO2   10/05/24 0756 91/56 98.9  F (37.2  C) Oral 68 14 93 %   10/05/24 0400 112/73 98.9  F (37.2  C) Oral 73 16 --     GEN: NAD, lying in bed  HEENT: EOMI  NECK: Supple  ABD: soft  EXT: No LE edema  : Winston draining clear, PNT draining well           Data:     Lab Results   Component Value Date    CR 0.46 (L) 10/05/2024    CR 0.48 (L) 10/03/2024    CR 0.45 (L) 11/13/2023    CR 0.49 (L) 11/06/2023    CR 0.47 (L) 10/23/2023     Lab Results   Component Value Date    WBC 21.4 (H) 10/05/2024    WBC 11.9 (H) 10/03/2024    WBC 12.1 (H) 10/03/2024    HGB 12.4 10/05/2024    HGB 13.8 10/03/2024    HGB 11.1 (L) 11/13/2023    HCT 37.4 10/05/2024    HCT 42.5 10/03/2024    HCT 34.3 (L) 11/13/2023    MCV 86 10/05/2024    MCV 86 10/03/2024    MCV 84 11/13/2023     10/05/2024     10/03/2024     11/13/2023     Lab Results   Component Value Date    INR 1.05 07/01/2023    INR 2.19 (H) 06/28/2023    INR 2.11 (H) 06/28/2023

## 2024-10-05 NOTE — PLAN OF CARE
Date & Time: 1900-0700  Surgery/POD#: POD 1 from L nephrostomy tube placement   Behavior & Aggression: Green   Fall Risk: Yes   Orientation: A&Ox4  ABNL VS/O2:VSS on RA   Pain Management: Scheduled tylenol   Drains: L neph tube, changed dressing x1 for drainage. Has mitrofanoff catheter with rogers, to be removed today.   Wounds/incisions: Old R buttock ulcer with mepilex on   Diet:Tolerating regular diet   Activity Level: Ax2 lift. Wheelchair baseline, incomplete quadriplegic   Anticipated  DC Date: Today   Significant Information: Q 1hr turns per pt request. Has an old ulcer that is healing and wants position to be changed more frequently. Pt has PRN irrigation orders that she does at home, did this x2

## 2024-10-07 ENCOUNTER — TELEPHONE (OUTPATIENT)
Dept: FAMILY MEDICINE | Facility: CLINIC | Age: 29
End: 2024-10-07
Payer: MEDICARE

## 2024-10-07 DIAGNOSIS — Z53.9 DIAGNOSIS NOT YET DEFINED: Primary | ICD-10-CM

## 2024-10-07 LAB
BACTERIA SPEC CULT: ABNORMAL
BACTERIA UR CULT: ABNORMAL

## 2024-10-07 PROCEDURE — G0179 MD RECERTIFICATION HHA PT: HCPCS | Performed by: PHYSICIAN ASSISTANT

## 2024-10-08 ENCOUNTER — PATIENT OUTREACH (OUTPATIENT)
Dept: UROLOGY | Facility: CLINIC | Age: 29
End: 2024-10-08
Payer: MEDICARE

## 2024-10-08 LAB
APPEARANCE STONE: NORMAL
COMPN STONE: NORMAL
SPECIMEN WT: 246 MG

## 2024-10-08 NOTE — PROGRESS NOTES
RNCC called Amita READ to check on scheduling the left neph tube removal for the pt wed or later of this week.  Spoke to [facility], who advised the referral was not in the right pool so they did not see it initially.  [facility] will update the orders and then have staff reach out to the pt for scheduling. Provider updated as well.  AVELINO Bender  Care Coordinator Urology  [phone]

## 2024-10-09 ENCOUNTER — MYC MEDICAL ADVICE (OUTPATIENT)
Dept: INTERVENTIONAL RADIOLOGY/VASCULAR | Facility: CLINIC | Age: 29
End: 2024-10-09
Payer: MEDICARE

## 2024-10-09 NOTE — TELEPHONE ENCOUNTER
Detailed Voicemail message left with request for patient to contact Interventional Radiology Department and acknowledge understanding of MYCHART instructions that were sent in anticipation of procedure scheduled 10/10/2024.      IR contact number provided in [facility].    Suzan ESCOBAR  Interventional Radiology RN   [phone]

## 2024-10-10 ENCOUNTER — HOSPITAL ENCOUNTER (OUTPATIENT)
Facility: CLINIC | Age: 29
Discharge: HOME | End: 2024-10-10
Admitting: RADIOLOGY
Payer: MEDICARE

## 2024-10-10 ENCOUNTER — APPOINTMENT (OUTPATIENT)
Dept: INTERVENTIONAL RADIOLOGY/VASCULAR | Facility: CLINIC | Age: 29
End: 2024-10-10
Attending: UROLOGY
Payer: MEDICARE

## 2024-10-10 VITALS
HEART RATE: 74 BPM | TEMPERATURE: 98.4 F | DIASTOLIC BLOOD PRESSURE: 70 MMHG | HEIGHT: 66 IN | SYSTOLIC BLOOD PRESSURE: 103 MMHG | OXYGEN SATURATION: 93 % | WEIGHT: 120 LBS | BODY MASS INDEX: 19.29 KG/M2 | RESPIRATION RATE: 16 BRPM

## 2024-10-10 DIAGNOSIS — N20.0 NEPHROLITHIASIS: ICD-10-CM

## 2024-10-10 PROCEDURE — 50431 NJX PX NFROSGRM &/URTRGRM: CPT

## 2024-10-10 PROCEDURE — 999N000163 HC STATISTIC SIMPLE TUBE INSERTION/CHARGE, PORT, CATH, FISTULOGRAM

## 2024-10-10 RX ORDER — IOPAMIDOL 612 MG/ML
50 INJECTION, SOLUTION INTRAVASCULAR ONCE
Status: DISCONTINUED | OUTPATIENT
Start: 2024-10-10 | End: 2024-10-10 | Stop reason: HOSPADM

## 2024-10-10 ASSESSMENT — ACTIVITIES OF DAILY LIVING (ADL)
ADLS_ACUITY_SCORE: 38
ADLS_ACUITY_SCORE: 41
ADLS_ACUITY_SCORE: 41

## 2024-10-10 NOTE — IR NOTE
Interventional Radiology Intra-procedural Nursing Note    Patient Name: Dianna Cottrell  Medical Record Number: [id number]  Today's Date: October 10, 2024    Procedure: Left nephrostogram and possible left nephrostomy tube removal     Start time: 1300  End time: 1304    Report provided to: POOJA-[id number] RN      Note: Patient entered Interventional Radiology Suite number 2 via cart. Patient awake, alert and oriented. Assisted onto procedural table in Prone position. Prepped and draped.  Dr. De Leon in room. Time out and procedure started.       Procedure well tolerated by patient without complications. Procedure end with debrief by Dr. De Leon.   hemostasis achieved. Gauze, Tegaderm dressing applied to Left Nephrostomy Tube Site.    Administered medication totals:      No Sedation or Medication Given for this Procedure

## 2024-10-10 NOTE — DISCHARGE INSTRUCTIONS
Nephrostomy Tube  Exchange Discharge Instructions     After you go home:    You may resume your normal diet  Drink plenty of fluids, especially water  Have an adult stay with you for 6 hours if you received sedation       For 24 hours - due to the sedation you received:  Relax and take it easy  Do NOT make any important or legal decisions  Do NOT drive or operate machines at home or at work  Do NOT drink alcohol    Care of Tube Site:    For the first 48 hrs, check your puncture site every couple hours while you are awake   Check the tube site twice a day for signs of infection  Keep the dressing around the tube dry  Change the dressing every 2-3 days if it is gauze/tape. If there is a Stay Fix dressing intact - this should be changed weekly.  Change the dressing if it becomes wet or dirty  You may shower but do not get the dressing wet. Place a waterproof cover over the dressing (such as plastic wrap).   No tub baths, whirlpools or swimming until the tube is removed     Activity:    You may go back to normal activity in 24 hours  Wait 48 hours before lifting, straining, exercise or other strenuous activity    Medicines:    You may resume all medications  Resume your Platelet Inhibitors and Aspirin tomorrow at your regular dose  For minor pain, you may take Acetaminophen (Tylenol) or Ibuprofen (Advil)               Call the provider who ordered this procedure if:    The site is red, swollen, hot or tender  There is foul-smelling drainage from the tube site  You have pain that is getting worse or that does not improve with pain medication  You have chills or a fever greater than 101 F (38 C)  Fluid stops draining or is leaking around the tube onto your skin  The tube falls out   Any questions or concerns    Call  911 or go to the Emergency Room if:    Severe pain or trouble breathing  Bleeding that you cannot control    Other Instructions:    If the tube falls out - cover the opening with gauze & tape  Record drainage  output amounts & bring sheet to return appointments  Take your temperature daily    If you have questions call:          [facility] Radiology Dept @ [phone]    Or you can contact your provider via My Chart

## 2024-10-10 NOTE — PRE-PROCEDURE
GENERAL PRE-PROCEDURE:   Procedure:  Left nephrostogram and possible left nephrostomy tube removal  Date/Time:  10/10/2024 12:00 PM    Written consent obtained?: Yes    Risks and benefits: Risks, benefits and alternatives were discussed    DC Plan: Appropriate discharge home plan in place for patients who are going home after procedure   Consent given by:  Patient  Patient states understanding of procedure being performed: Yes    Patient's understanding of procedure matches consent: Yes    Procedure consent matches procedure scheduled: Yes    Appropriately NPO:  Yes    Total time: 15 minutes    Thanks [facility] Interventional Radiology CNP ([phone]) (phone [phone])

## 2024-10-10 NOTE — PROGRESS NOTES
Care Suites Admission Nursing Note    Patient Information  Name: Dianna Cottrell  Age: 29 year old  Reason for admission: Neph tube check  Care Suites arrival time: 1130    Visitor Information  Name: Tre del rio      Patient Admission/Assessment   Pre-procedure assessment complete: Yes  If abnormal assessment/labs, provider notified: N/A  NPO: N/A  Medications held per instructions/orders: N/A  Consent: deferred  If applicable, pregnancy test status: deferred  Patient oriented to room: Yes  Education/questions answered: Yes  Plan/other: Neph tube check    Discharge Planning  Discharge name/phone number: Areli- [phone]   Overnight post sedation caregiver: above  Discharge location: home    Antony Solomon RN

## 2024-10-10 NOTE — PROGRESS NOTES
Care Suites Discharge Nursing Note    Patient Information  Name: Dianna Cottrell  Age: 29 year old    Discharge Education:  Discharge instructions reviewed: Yes  Additional education/resources provided: [facility], she will call the patient tomorrow to let them know when to return to  IR for neph tube check. She will also contact Dianna's urologist to request that he and Dr. De Leon discuss a POC. Dianna also instructed to call her urologist for further antibiotic management.  Patient/patient representative verbalizes understanding: Yes  Patient discharging on new medications: No  Medication education completed: N/A    Discharge Plans:   Discharge location: home  Discharge ride contacted: Yes  Approximate discharge time: 1445    Discharge Criteria:  Discharge criteria met and vital signs stable: Yes    Patient Belongs:  Patient belongings returned to patient: Yes    Elizabeth Blanco RN

## 2024-10-16 ENCOUNTER — MYC MEDICAL ADVICE (OUTPATIENT)
Dept: INTERVENTIONAL RADIOLOGY/VASCULAR | Facility: CLINIC | Age: 29
End: 2024-10-16
Payer: MEDICARE

## 2024-10-17 NOTE — TELEPHONE ENCOUNTER
Detailed Voicemail message left with request for patient to contact Interventional Radiology Department and acknowledge understanding of MYCHART instructions that were sent in anticipation of procedure scheduled 10/18/2024.      IR contact number provided in [facility].    Suzan ESCOBAR  Interventional Radiology RN   [phone]

## 2024-10-18 ENCOUNTER — APPOINTMENT (OUTPATIENT)
Dept: INTERVENTIONAL RADIOLOGY/VASCULAR | Facility: CLINIC | Age: 29
End: 2024-10-18
Attending: NURSE PRACTITIONER
Payer: MEDICARE

## 2024-10-18 ENCOUNTER — HOSPITAL ENCOUNTER (OUTPATIENT)
Facility: CLINIC | Age: 29
Discharge: HOME | End: 2024-10-18
Admitting: RADIOLOGY
Payer: MEDICARE

## 2024-10-18 VITALS
DIASTOLIC BLOOD PRESSURE: 61 MMHG | HEART RATE: 68 BPM | SYSTOLIC BLOOD PRESSURE: 97 MMHG | OXYGEN SATURATION: 98 % | WEIGHT: 120 LBS | BODY MASS INDEX: 19.29 KG/M2 | RESPIRATION RATE: 16 BRPM | TEMPERATURE: 98.4 F | HEIGHT: 66 IN

## 2024-10-18 DIAGNOSIS — N20.0 NEPHROLITHIASIS: ICD-10-CM

## 2024-10-18 PROCEDURE — 50431 NJX PX NFROSGRM &/URTRGRM: CPT

## 2024-10-18 PROCEDURE — 255N000002 HC RX 255 OP 636: Performed by: RADIOLOGY

## 2024-10-18 PROCEDURE — 999N000163 HC STATISTIC SIMPLE TUBE INSERTION/CHARGE, PORT, CATH, FISTULOGRAM

## 2024-10-18 RX ORDER — IOPAMIDOL 612 MG/ML
30 INJECTION, SOLUTION INTRAVASCULAR ONCE
Status: COMPLETED | OUTPATIENT
Start: 2024-10-18 | End: 2024-10-18

## 2024-10-18 RX ADMIN — IOPAMIDOL 10 ML: 612 INJECTION, SOLUTION INTRAVENOUS at 14:20

## 2024-10-18 ASSESSMENT — ACTIVITIES OF DAILY LIVING (ADL)
ADLS_ACUITY_SCORE: 38
ADLS_ACUITY_SCORE: 38

## 2024-10-18 NOTE — DISCHARGE INSTRUCTIONS
Nephrostomy Tube Removal Discharge Instructions     After you go home:    You may resume your normal diet    Care of Insertion Site:    For the first 48 hrs, check your puncture site every couple hours while you are awake   You may change the dressing daily, but more often if dressing becomes wet or dirty. It is not unusual to have drainage from the opening until the site is completely healed.   You may remove the dressing when the site is completely healed   You may shower tomorrow  No tub baths, whirlpools or swimming until your puncture site has fully healed    Activity:    You may go back to normal activity in 24 hours  Wait 48 hours before lifting, straining, exercise or other strenuous activity    Medicines:    You may resume all medications  For minor pain, you may take Acetaminophen (Tylenol) or Ibuprofen (Advil)               Call the provider who ordered this procedure if:    The site is red, swollen, hot or tender  There is foul-smelling drainage from the tube site  You have pain that is getting worse or that does not improve with pain medication  You have chills or a fever greater than 101 F (38 C)  If the leaking continues for more than 3 days  Any questions or concerns    Call  911 or go to the Emergency Room if you have:    Severe pain or trouble breathing  Bleeding that you cannot control      If you have questions call:          [facility] Radiology Dept @ [phone]    Or you can contact your provider via My Chart

## 2024-10-18 NOTE — PROGRESS NOTES
Care Suites Admission Nursing Note    Patient Information  Name: Dianna Cottrell  Age: 29 year old  Reason for admission: Neph tube check  Care Suites arrival time: 1345    Visitor Information  Name: Areli      Patient Admission/Assessment   Pre-procedure assessment complete: Yes  If abnormal assessment/labs, provider notified: N/A  NPO: Yes  Medications held per instructions/orders: Yes  Consent: deferred  If applicable, pregnancy test status: deferred  Patient oriented to room: Yes  Education/questions answered: Yes  Plan/other:   Proceed with plan     Discharge Planning  Discharge name/phone number: Areli  [phone]  Overnight post sedation caregiver: no sedation   Discharge location: home    Peter Suarez RN

## 2024-10-18 NOTE — IR NOTE
Interventional Radiology Intra-procedural Nursing Note    Patient Name: Dianna Cottrell  Medical Record Number: [id number]  Today's Date: October 18, 2024    Procedure: left nephrotomy tube removal  Start time:  1414  End time: 1418  Report provided to: sergio YOU  Patient depart time and location: 1420 to cs11    Note: Patient entered Interventional Radiology Suite number 2 via cart. Patient awake, alert and oriented. Assisted onto procedural table in prone position. Prepped and draped.  Dr. Cabral in room. Time out and procedure started.     Procedure well tolerated by patient without complications. Procedure end with debrief by Dr. Cabral.

## 2024-10-18 NOTE — PRE-PROCEDURE
GENERAL PRE-PROCEDURE:   Procedure:  Left nephrostomy tube nephrostogram with possible removal  Date/Time:  10/18/2024 2:01 PM    Written consent obtained?: Yes    Risks and benefits: Risks, benefits and alternatives were discussed    DC Plan: Appropriate discharge home plan in place for patients who are going home after procedure   Consent given by:  Patient  Patient states understanding of procedure being performed: Yes    Patient's understanding of procedure matches consent: Yes    Procedure consent matches procedure scheduled: Yes    Appropriately NPO:  Yes    Total time: 20 minutes    Thanks [facility] Interventional Radiology CNP ([phone]) (phone [phone])

## 2024-10-18 NOTE — PROCEDURES
[facility]    Procedure: Nephrostogram    Date/Time: 10/18/2024 6:00 PM    Performed by: Renetta Cabral DO  Authorized by: Renetta Cabral DO  IR Fellow Physician:    Pre Procedure Diagnosis: status post PCNL, ureteral occlusion  Post Procedure Diagnosis: same    UNIVERSAL PROTOCOL   Site Marked: Yes  Prior Images Obtained and Reviewed:  Yes  Required items: Required blood products, implants, devices and special equipment available    Patient identity confirmed:  Verbally with patient  Patient was reevaluated immediately before administering moderate or deep sedation or anesthesia  Confirmation Checklist:  Patient's identity using two indicators, procedure was appropriate and matched the consent or emergent situation, relevant allergies and correct equipment/implants were available  Time out: Immediately prior to the procedure a time out was called    Universal Protocol: the Joint Commission Universal Protocol was followed    Preparation: Patient was prepped and draped in usual sterile fashion       ANESTHESIA    Local Anesthetic:  Lidocaine 1% without epinephrine      SEDATION    Patient Sedated: No    Findings: Left antegrade nephrostogram shows some stenosis in the proximal left ureter, however contrast rapidly flows down the ureter into the bladder.     The nephrostomy tube was removed.     Specimens: none    Procedural Complications: None    Condition: Stable      PROCEDURE    Length of time physician/provider present for 1:1 monitoring during sedation:  0 min

## 2024-10-18 NOTE — DISCHARGE SUMMARY
Care Suites Discharge Nursing Note    Patient Information  Name: Dianna Cottrell  Age: 29 year old    Discharge Education:  Discharge instructions reviewed: Yes  Additional education/resources provided: new dressing supplies given to patient  Patient/patient representative verbalizes understanding: Yes  Patient discharging on new medications: No  Medication education completed: N/A    Discharge Plans:   Discharge location: home  Discharge ride contacted: Yes  Approximate discharge time: 1440    Discharge Criteria:  Discharge criteria met and vital signs stable: Yes    Patient Belongs:  Patient belongings returned to patient: Yes    Patricio Whalen RN

## 2024-11-14 DIAGNOSIS — J99 NEUROMUSCULAR RESPIRATORY WEAKNESS (H): ICD-10-CM

## 2024-11-14 DIAGNOSIS — G70.9 NEUROMUSCULAR RESPIRATORY WEAKNESS (H): ICD-10-CM

## 2024-11-14 DIAGNOSIS — R94.2 IMPAIRED LUNG FUNCTION: Chronic | ICD-10-CM

## 2024-11-18 RX ORDER — FLUTICASONE FUROATE AND VILANTEROL TRIFENATATE 100; 25 UG/1; UG/1
1 POWDER RESPIRATORY (INHALATION) DAILY
Qty: 1 EACH | Refills: 2 | Status: SHIPPED | OUTPATIENT
Start: 2024-11-18

## 2024-11-26 DIAGNOSIS — F32.4 MAJOR DEPRESSIVE DISORDER WITH SINGLE EPISODE, IN PARTIAL REMISSION (H): ICD-10-CM

## 2024-11-26 DIAGNOSIS — F41.1 GAD (GENERALIZED ANXIETY DISORDER): ICD-10-CM

## 2024-11-26 RX ORDER — SERTRALINE HYDROCHLORIDE 100 MG/1
150 TABLET, FILM COATED ORAL DAILY
Qty: 135 TABLET | Refills: 0 | Status: SHIPPED | OUTPATIENT
Start: 2024-11-26

## 2024-12-01 ENCOUNTER — MYC MEDICAL ADVICE (OUTPATIENT)
Dept: UROLOGY | Facility: CLINIC | Age: 29
End: 2024-12-01

## 2024-12-02 ENCOUNTER — PATIENT OUTREACH (OUTPATIENT)
Dept: UROLOGY | Facility: CLINIC | Age: 29
End: 2024-12-02
Payer: MEDICARE

## 2024-12-02 DIAGNOSIS — N39.0 UTI (URINARY TRACT INFECTION): Primary | ICD-10-CM

## 2024-12-11 ENCOUNTER — TELEPHONE (OUTPATIENT)
Dept: FAMILY MEDICINE | Facility: CLINIC | Age: 29
End: 2024-12-11
Payer: MEDICARE

## 2024-12-11 DIAGNOSIS — N39.0 UTI (URINARY TRACT INFECTION): Primary | ICD-10-CM

## 2024-12-11 DIAGNOSIS — Z53.9 DIAGNOSIS NOT YET DEFINED: Primary | ICD-10-CM

## 2024-12-11 PROCEDURE — G0179 MD RECERTIFICATION HHA PT: HCPCS | Performed by: PHYSICIAN ASSISTANT

## 2024-12-11 RX ORDER — NITROFURANTOIN 25; 75 MG/1; MG/1
100 CAPSULE ORAL 2 TIMES DAILY
Qty: 20 CAPSULE | Refills: 0 | Status: SHIPPED | OUTPATIENT
Start: 2024-12-11 | End: 2024-12-21

## 2024-12-11 NOTE — PROGRESS NOTES
Per Dr Schofield written order for Macrobid 100mg po bid x10 days.  Writer sent Rx to preferred pharmacy and notified pt via [employer].  Yulia, RN  Care Coordinator Urology  [phone]

## 2024-12-11 NOTE — TELEPHONE ENCOUNTER
Forms/Letter Request     Type of form/letter: OTHER: HOME HEALTH  Plan of Care Order [id number]     Do we have the form/letter: Yes:       Who is the form from? [facility]      Where did/will the form come from? form was faxed in     When is form/letter needed by: by December 18     How would you like the form/letter returned: Fax : [phone]

## 2024-12-19 ENCOUNTER — VIRTUAL VISIT (OUTPATIENT)
Dept: FAMILY MEDICINE | Facility: CLINIC | Age: 29
End: 2024-12-19
Payer: MEDICARE

## 2024-12-19 DIAGNOSIS — L60.1 ONYCHOLYSIS: Primary | ICD-10-CM

## 2024-12-19 DIAGNOSIS — L89.314 PRESSURE INJURY OF RIGHT ISCHIUM, STAGE 4 (H): ICD-10-CM

## 2024-12-19 DIAGNOSIS — L03.011 PARONYCHIA OF FINGER OF RIGHT HAND: ICD-10-CM

## 2024-12-19 DIAGNOSIS — N20.0 NEPHROLITHIASIS: ICD-10-CM

## 2024-12-19 NOTE — PATIENT INSTRUCTIONS
Instructions from Today's Visit:  Send a photo in my chart of the nails.  This looks like a separation of the nail from the nail bed.   Can be from nail fungus. Hard to tell on video.     Non-ealth [place] [facility] :  [facility] [place]: Darryl Aldana [place] [phone]  [facility]: Eve Sequeira, [place], [place], [place], [facility] [phone]  [facility]: [place] [phone]  [facility]: [facility], Kailyn Chowdary [phone]  [facility]: [facility] [phone]  D[facility]ology Specialists: Katya Abreu [phone]  [facility]: Pavan Aldana [phone]  [facility] Dermatology: [place] [phone], [place] [phone]  [facility] PA: [place], [place] ([place]), [facility] [phone]  [facility] Dermatologist, PA: [place] [phone]  [facility] (formerly [facility]): [place] [phone], [place] [phone]  [facility] Dermatology Specialists: [place] [phone]  [facility] Dermatology, PA: Lashawn Quintanilla [place], [place]), [place] [phone]  [facility]at[facility], [place]: [place] [phone]       General Instructions After Your Visit  If you have been seen for a concern and are worse or not improving, please schedule a follow-up visit or reach out to a member of our care team or nurses if it is urgent.  Nurse advice is available 24/7 by calling [phone].  For emergencies, please call [phone].    My Clinic Hours  I am in the office Mondays, Wednesdays, and Thursdays. Messages received outside of normal clinic hours and on my days out of the office will be reviewed by our [facility] team, but may not be addressed by me personally until I am back in the office.    Test results  You may see your lab or test  results before we can make recommendations. This is common, as sometimes we are awaiting other labs to return or we are out of office on a particular day. Please be patient, and if you don't see a response from me or one of my colleagues within 2-3 business days, and you have a specific concern, please send us a message.    Refills  If you have run out of refills, please schedule a visit. This is generally an indication that you are due for a follow-up visit. All prescriptions are only valid for 1 year and need a visit annually to renew. Most mental health and chronic diseases we are treating (diabetes, high blood pressure, etc) require some type of visit every 6 months. We are now offering telephone or video visits! However, if a physical exams are needed or it is a complex concern, we may ask you to be seen in person.    Physicals & Preventative Visits   These appointment slots fill up fast. Please consider scheduling these 2-3 months in advance to allow for the appointment time that fits you best. If you have medications ordered or other issues addressed that are not preventive at these visits, please be aware there are extra costs associated with this.

## 2024-12-19 NOTE — PROGRESS NOTES
Dianna is a 29 year old who is being evaluated via a billable video visit.    How would you like to obtain your AVS? MyChart  If the video visit is dropped, the invitation should be resent by: Text to cell phone: [phone]  Will anyone else be joining your video visit? No      Assessment & Plan     Onycholysis  Paronychia of finger of right hand  Appearance of onycholysis but video quality limits. May be due to fungal infection. She will send in photos. She is concerned there may be a skin infection around one of the nails. Will review photos. Would treat w/keflex. If purulent pocket discussed this would julieth an I&D and should be seen in urgent care. Ref to derm for the nails.     Nephrolithiasis  She is following with urology . currently on antibiotics (macrobid) from him and his team. She passed a stone fragment this past week and is concerned there are more. She would like kidney panel checked. Lab ordered.     Pressure injury of right ischium, stage 4 (H)  Chronic osteomyelitis. Was following w/Infectious disease  and wound care. Per last notes the wound was no longer open          Nicotine/Tobacco Cessation  She reports that she has been smoking cigarettes, other, and vaping device. She has been exposed to tobacco smoke. She uses smokeless tobacco.  Nicotine/Tobacco Cessation Plan  Information offered: Patient not interested at this time        Follow Up: see above. Additionally patient was instructed to contact clinic for worsening symptoms, non-improvement in time frame discussed, and for questions regarding treatment plan.   For virtual visits, the patient was advised to be seen for in person evaluation if symptoms or condition are worsening or non-improvement as expected.   Suzan Willis PA-C    Subjective   Dianna is a 29 year old, presenting for the following health issues:  Referral        12/19/2024     1:44 PM   Additional Questions   Roomed by Merry GARDNER   Accompanied by Rashida  "        12/19/2024     1:44 PM   Patient Reported Additional Medications   Patient reports taking the following new medications NA     Pt would like to have a \"full blood panel\"  Is requesting \"hand specialist\" referral for potential nail fungal infection. Denies itching. Reports redness     Video Start Time:  1:48pm    History of Present Illness       Reason for visit:  Blood work and hand specialist referral  Symptom onset:  1-2 weeks ago  Symptoms include:  Odor to urine, less urine out put  Symptom intensity:  Moderate  Symptom progression:  Staying the same  Had these symptoms before:  Yes  Has tried/received treatment for these symptoms:  Yes  Previous treatment was successful:  Yes  Prior treatment description:  Kidney stone removal  What makes it worse:  N/A  What makes it better:  N/A   She is taking medications regularly.     Nail changes- right hand fingers 3 and 4-   Nail is white and can see under it a little.  The skin around the nail is more red and inflamed.  There is a while area of skin near one of the cuticles- not sure if could be an infection   Her mom wondered if due to getting gel nail maincures.  Wants to see a hand specialist about it  Sometimes she bites her nails  No fevers. No streaking    Kidney stones-   Had surgery for stones this fall.   Working with her urology team. Is currently on antibiotics for urinary sx- macrobid- cloudy odorous urine. No fevers, abd pain, N/V. Notes more spactiicity in legs perhaps with UTIs.   She wants to be sure \"kidneys are working \" and requests full blood panel       Review of Systems  Constitutional, HEENT, cardiovascular, pulmonary, gi and gu systems are negative, except as otherwise noted.      Objective           Vitals:  No vitals were obtained today due to virtual visit.    Physical Exam   GENERAL: alert and no distress  EYES: Eyes grossly normal to inspection.  No discharge or erythema, or obvious scleral/conjunctival abnormalities.  RESP: No " audible wheeze, cough, or visible cyanosis.    PSYCH: Appropriate affect, tone, and pace of words  Hand: right: 3rd and 4th finger appearance of onycholysis of nail from nail bed. Video grainy. Mild pink tone around nail.           Video-Visit Details    Type of service:  Video Visit   Video End Time: 2:03pm  Originating Location (pt. Location): Home    Distant Location (provider location):  Off-site  Platform used for Video Visit: Cherie  Signed Electronically by: Suzan Willis PA-C

## 2024-12-30 ENCOUNTER — TELEPHONE (OUTPATIENT)
Dept: UROLOGY | Facility: CLINIC | Age: 29
End: 2024-12-30
Payer: MEDICARE

## 2025-01-14 ENCOUNTER — TELEPHONE (OUTPATIENT)
Dept: UROLOGY | Facility: CLINIC | Age: 30
End: 2025-01-14
Payer: MEDICARE

## 2025-01-14 DIAGNOSIS — N31.9 NEUROGENIC BLADDER: ICD-10-CM

## 2025-01-14 DIAGNOSIS — N20.0 NEPHROLITHIASIS: Primary | ICD-10-CM

## 2025-01-15 ENCOUNTER — MYC REFILL (OUTPATIENT)
Dept: FAMILY MEDICINE | Facility: CLINIC | Age: 30
End: 2025-01-15
Payer: MEDICARE

## 2025-01-15 NOTE — TELEPHONE ENCOUNTER
Clinic RN: Please investigate patient's chart or contact patient if the information cannot be found because the medication is listed as historical or discontinued. Confirm patient is taking this medication. Document findings and route refill encounter to provider for approval or denial. They are all listed as historical. Thank you!     Alethea Casey, RN on 1/15/2025 at 2:22 PM

## 2025-01-15 NOTE — TELEPHONE ENCOUNTER
Sent Message    Demi Salazar RN  [facility] - Registered Nurse  Clinic Triage [place]   January 15, 2025

## 2025-01-21 NOTE — TELEPHONE ENCOUNTER
Patient Contact    Attempt # 2  Desmondt from 1/20 is unread    RN did attempt to reach patient. No answer. Message left for patient to call the clinic back and ask to speak to a member of the care team. Wanting to verify medications listed as historical/discontinued:     Baclofen, Neurontin, midodrine, and ditropan XL     Olive Herrera, RN on 1/21/2025 at 10:20 AM

## 2025-01-22 RX ORDER — BACLOFEN 10 MG/1
30 TABLET ORAL 3 TIMES DAILY
OUTPATIENT
Start: 2025-01-22

## 2025-01-22 RX ORDER — MIDODRINE HYDROCHLORIDE 5 MG/1
10 TABLET ORAL 2 TIMES DAILY
Qty: 6 TABLET | Refills: 0 | OUTPATIENT
Start: 2025-01-22

## 2025-01-22 RX ORDER — GABAPENTIN 300 MG/1
300 CAPSULE ORAL AT BEDTIME
OUTPATIENT
Start: 2025-01-22

## 2025-01-22 RX ORDER — OXYBUTYNIN CHLORIDE 5 MG/1
5 TABLET, EXTENDED RELEASE ORAL EVERY EVENING
OUTPATIENT
Start: 2025-01-22

## 2025-01-22 NOTE — TELEPHONE ENCOUNTER
Last read by Dianna Cottrell at  1:15 AM on 1/22/2025.     Read MyChart and no response    Demi Salazar RN  [facility] - Registered Nurse  Clinic Triage [place]   January 22, 2025

## 2025-01-29 ENCOUNTER — PRE VISIT (OUTPATIENT)
Dept: UROLOGY | Facility: CLINIC | Age: 30
End: 2025-01-29
Payer: MEDICARE

## 2025-01-29 NOTE — TELEPHONE ENCOUNTER
Reason for visit: Follow-up     Relevant information: Review CT    Records/imaging/labs/orders: CT scheduled same day before appointment.     Pt called: No need for a call    At Rooming: Mary Grace Garner, EMT-P  1/29/2025  10:52 AM

## 2025-02-04 ENCOUNTER — TELEPHONE (OUTPATIENT)
Dept: FAMILY MEDICINE | Facility: CLINIC | Age: 30
End: 2025-02-04
Payer: MEDICARE

## 2025-02-04 NOTE — TELEPHONE ENCOUNTER
Form requires provider signature only; form in provider bin.    Crista Hassan CMA ([facility])

## 2025-02-04 NOTE — TELEPHONE ENCOUNTER
Forms/Letter Request    Type of form/letter: Home Health Certification      Do we have the form/letter: Yes:     Who is the form from? FAX (if other please explain)    Where did/will the form come from? form was faxed in    When is form/letter needed by: SHELLY    How would you like the form/letter returned: Fax : [phone]    Patient Notified form requests are processed in 5-7 business days:N/A    Could we send this information to you in [employer] or would you prefer to receive a phone call?:   No preference   Okay to leave a detailed message?: N/A at Cell number on file:    Telephone Information:   Mobile [phone]

## 2025-02-05 DIAGNOSIS — Z53.9 DIAGNOSIS NOT YET DEFINED: Primary | ICD-10-CM

## 2025-02-05 PROCEDURE — G0179 MD RECERTIFICATION HHA PT: HCPCS | Performed by: PHYSICIAN ASSISTANT

## 2025-02-07 ENCOUNTER — OFFICE VISIT (OUTPATIENT)
Dept: UROLOGY | Facility: CLINIC | Age: 30
End: 2025-02-07
Payer: MEDICARE

## 2025-02-07 ENCOUNTER — ANCILLARY PROCEDURE (OUTPATIENT)
Dept: CT IMAGING | Facility: CLINIC | Age: 30
End: 2025-02-07
Payer: MEDICARE

## 2025-02-07 VITALS
DIASTOLIC BLOOD PRESSURE: 59 MMHG | SYSTOLIC BLOOD PRESSURE: 90 MMHG | WEIGHT: 121 LBS | HEIGHT: 66 IN | HEART RATE: 109 BPM | BODY MASS INDEX: 19.44 KG/M2

## 2025-02-07 DIAGNOSIS — N31.9 NEUROGENIC BLADDER: Primary | ICD-10-CM

## 2025-02-07 DIAGNOSIS — N31.9 NEUROGENIC BLADDER: ICD-10-CM

## 2025-02-07 DIAGNOSIS — N20.0 NEPHROLITHIASIS: ICD-10-CM

## 2025-02-07 LAB — RADIOLOGIST FLAGS: ABNORMAL

## 2025-02-07 PROCEDURE — 74176 CT ABD & PELVIS W/O CONTRAST: CPT | Performed by: RADIOLOGY

## 2025-02-07 ASSESSMENT — PAIN SCALES - GENERAL: PAINLEVEL_OUTOF10: NO PAIN (0)

## 2025-02-07 NOTE — NURSING NOTE
"Chief Complaint   Patient presents with    Follow Up       Blood pressure 90/59, pulse 109, height 1.676 m (5' 6\"), weight 54.9 kg (121 lb), not currently breastfeeding. Body mass index is 19.53 kg/m .    Patient Active Problem List   Diagnosis    C5 burst fracture    Lesions of vulva    IUD (intrauterine device) in place- placed 12/2017    H/O: pneumonia    АНДРЕЙ (generalized anxiety disorder)    Quadriplegia, post-traumatic (H)- incomplete quad, limited use upper extremities    Autonomic dysreflexia    Pressure injury of right ischium, stage 4 (H)    Personal history of DVT (deep vein thrombosis)    Neurogenic bladder    Impaired lung function    YONI III with severe dysplasia    Major depression    Neurogenic bowel    Spasticity    Spinal cord injury, C5-C7 (H)    Urinary incontinence    Nephrolithiasis    Neuromuscular respiratory weakness (H)    Urinary retention    S/P flap graft    Chronic osteomyelitis of pelvis, right (H)    Marijuana use       Allergies   Allergen Reactions    Succinylcholine Other (See Comments)     Spinal cord injury 12/18/12, patient at risk for extrajunctional receptors and hyperkalemia       Current Outpatient Medications   Medication Sig Dispense Refill    acetaminophen (TYLENOL) 325 MG tablet Take 325-650 mg by mouth every 6 hours as needed.      albuterol (PROVENTIL) (2.5 MG/3ML) 0.083% neb solution TAKE 1 VIAL BY NEBULIZATION EVERY 4 HOURS AS NEEDED FOR SHORTNESS OF BREATH / DYSPNEA OR WHEEZING 150 mL 3    baclofen (LIORESAL) 10 MG tablet Take 30 mg by mouth 3 times daily (10MG X 3 = 30MG)      bisacodyl (DULCOLAX) 10 MG suppository Place 1 suppository (10 mg) rectally At Bedtime 30 suppository 0    clindamycin (CLEOCIN T) 1 % external lotion Apply topically 2 times daily 60 mL 1    fluticasone-vilanterol (BREO ELLIPTA) 100-25 MCG/ACT inhaler INHALE 1 PUFF BY MOUTH EVERY DAY 1 each 2    gabapentin (NEURONTIN) 300 MG capsule Take 300 mg by mouth At Bedtime       hydrOXYzine lina " (VISTARIL) 25 MG capsule TAKE 1 CAPSULE (25 MG) BY MOUTH 3 TIMES DAILY AS NEEDED FOR ANXIETY (OR AT BEDTIME FOR SLEEP.) 270 capsule 0    miconazole (MICATIN) 2 % external cream Apply topically 2 times daily 113 g 1    midodrine (PROAMATINE) 5 MG tablet Take 10 mg by mouth 2 times daily  6 tablet 0    Misc Natural Products (ELDERBERRY/VITAMIN C/ZINC PO) Take 1 tablet by mouth daily      Multiple Vitamins-Minerals (WOMENS MULTIVITAMIN) TABS Take 1 tablet by mouth daily      nicotine (NICORETTE) 2 MG gum Place 1 each (2 mg) inside cheek every hour as needed for nicotine withdrawal symptoms 200 each 2    oxybutynin ER (DITROPAN-XL) 5 MG 24 hr tablet Take 5 mg by mouth every evening      senna-docusate (SENOKOT-S/PERICOLACE) 8.6-50 MG tablet Take 1 tablet by mouth daily      sertraline (ZOLOFT) 100 MG tablet Take 1.5 tablets (150 mg) by mouth daily. 135 tablet 0    sodium chloride (NEBUSAL) 3 % neb solution Take 3 mLs by nebulization every 6 hours as needed for wheezing or other (sputum clearance difficulty due to quadridplegia.) 300 mL 1    sterile water, bottle, irrigation Irrigate with 240 mLs as directed daily 7200 mL 11    Vitamin D3 (CHOLECALCIFEROL) 125 MCG (5000 UT) tablet Take 1 tablet by mouth every other day         Social History     Tobacco Use    Smoking status: Every Day     Types: Cigarettes, Other, Vaping Device     Passive exposure: Current (per pt)    Smokeless tobacco: Current   Vaping Use    Vaping status: Every Day    Substances: THC, Flavoring    Devices: Disposable, Pre-filled pod   Substance Use Topics    Alcohol use: No    Drug use: Yes     Types: Marijuana     Comment: usually smokes at least two bongs per day, also smokes marijuana out of e-cig pen most days,       Crista Shrestha  2/7/2025  3:05 PM
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
## 2025-02-10 ENCOUNTER — TELEPHONE (OUTPATIENT)
Dept: UROLOGY | Facility: CLINIC | Age: 30
End: 2025-02-10
Payer: MEDICARE

## 2025-02-10 NOTE — TELEPHONE ENCOUNTER
Left Voicemail (1st Attempt) for the patient to call back and schedule the following:    Appointment type: cystoscopy  Provider: Dr. Bacon  Return date: next available  Specialty phone number: [phone]  Additional appointment(s) needed: N/a  Additonal Notes: Per checkout notes with Tala Vazquez 2/7

## 2025-02-11 NOTE — PATIENT INSTRUCTIONS
Irrigate bladder via rogers catheter with 240 mL tap water/distilled water     1.perform routine straight cath to empty your bladder  2.Using a piston syringe, instill 60 mL distilled water or tap water.   3.Instill a second syringe full of 60 mL distilled water or tap water.   4.Once second syringe of 60mL (120 total) has been instilled, gently pull back 60 mL.  5.Repeat step 4 until contents has become sufficiently turbulent.  6.Remove the catheter     Sediment or mucous may be seen on withdrawal of distilled water or tap water.        [email or web address]

## 2025-02-13 DIAGNOSIS — G70.9 NEUROMUSCULAR RESPIRATORY WEAKNESS (H): ICD-10-CM

## 2025-02-13 DIAGNOSIS — R94.2 IMPAIRED LUNG FUNCTION: Chronic | ICD-10-CM

## 2025-02-13 DIAGNOSIS — J99 NEUROMUSCULAR RESPIRATORY WEAKNESS (H): ICD-10-CM

## 2025-02-13 RX ORDER — FLUTICASONE FUROATE AND VILANTEROL TRIFENATATE 100; 25 UG/1; UG/1
1 POWDER RESPIRATORY (INHALATION) DAILY
Qty: 28 EACH | Refills: 5 | Status: SHIPPED | OUTPATIENT
Start: 2025-02-13

## 2025-02-21 ENCOUNTER — TELEPHONE (OUTPATIENT)
Dept: FAMILY MEDICINE | Facility: CLINIC | Age: 30
End: 2025-02-21
Payer: MEDICARE

## 2025-02-21 NOTE — TELEPHONE ENCOUNTER
Forms/Letter Request    Type of form/letter: Home Health Certification      Do we have the form/letter: Yes:      Who is the form from? [employer]    Where did/will the form come from? form was faxed in    When is form/letter needed by: complete @ your convenience    How would you like the form/letter returned: Fax : to:  [phone]    Patient Notified form requests are processed in 5-7 business days:N/A    Could we send this information to you in [employer] or would you prefer to receive a phone call?:   Patient would like to be contacted via [employer]

## 2025-02-23 DIAGNOSIS — F32.4 MAJOR DEPRESSIVE DISORDER WITH SINGLE EPISODE, IN PARTIAL REMISSION: ICD-10-CM

## 2025-02-23 DIAGNOSIS — F41.1 GAD (GENERALIZED ANXIETY DISORDER): ICD-10-CM

## 2025-02-24 ENCOUNTER — PATIENT OUTREACH (OUTPATIENT)
Dept: CARE COORDINATION | Facility: CLINIC | Age: 30
End: 2025-02-24
Payer: MEDICARE

## 2025-02-24 ENCOUNTER — MEDICAL CORRESPONDENCE (OUTPATIENT)
Dept: HEALTH INFORMATION MANAGEMENT | Facility: CLINIC | Age: 30
End: 2025-02-24
Payer: MEDICARE

## 2025-02-24 RX ORDER — SERTRALINE HYDROCHLORIDE 100 MG/1
150 TABLET, FILM COATED ORAL DAILY
Qty: 135 TABLET | Refills: 0 | Status: SHIPPED | OUTPATIENT
Start: 2025-02-24

## 2025-02-26 ENCOUNTER — TELEPHONE (OUTPATIENT)
Dept: OBGYN | Facility: CLINIC | Age: 30
End: 2025-02-26
Payer: MEDICARE

## 2025-02-27 ENCOUNTER — PRE VISIT (OUTPATIENT)
Dept: UROLOGY | Facility: CLINIC | Age: 30
End: 2025-02-27
Payer: MEDICARE

## 2025-02-27 NOTE — TELEPHONE ENCOUNTER
Reason for visit: Cystoscopy     Relevant information: Bladder Stone Removal     Records/imaging/labs/orders: Available in Epic    Pt called: No need for a call    At Rooming: Cystoscope with Extraction Tool    Amos Garner, EMT-P  2/27/2025  12:05 PM

## 2025-03-03 ENCOUNTER — OFFICE VISIT (OUTPATIENT)
Dept: UROLOGY | Facility: CLINIC | Age: 30
End: 2025-03-03
Payer: MEDICARE

## 2025-03-03 VITALS
BODY MASS INDEX: 22.84 KG/M2 | HEIGHT: 61 IN | DIASTOLIC BLOOD PRESSURE: 65 MMHG | SYSTOLIC BLOOD PRESSURE: 95 MMHG | WEIGHT: 121 LBS | OXYGEN SATURATION: 95 % | HEART RATE: 94 BPM

## 2025-03-03 DIAGNOSIS — N31.9 NEUROGENIC BLADDER: ICD-10-CM

## 2025-03-03 DIAGNOSIS — N21.0 BLADDER STONE: Primary | ICD-10-CM

## 2025-03-03 DIAGNOSIS — N21.0 BLADDER STONES: ICD-10-CM

## 2025-03-03 PROCEDURE — 3074F SYST BP LT 130 MM HG: CPT | Performed by: UROLOGY

## 2025-03-03 PROCEDURE — 3078F DIAST BP <80 MM HG: CPT | Performed by: UROLOGY

## 2025-03-03 PROCEDURE — 1126F AMNT PAIN NOTED NONE PRSNT: CPT | Performed by: UROLOGY

## 2025-03-03 PROCEDURE — 52000 CYSTOURETHROSCOPY: CPT | Performed by: UROLOGY

## 2025-03-03 RX ORDER — CLINDAMYCIN PHOSPHATE 900 MG/50ML
900 INJECTION, SOLUTION INTRAVENOUS SEE ADMIN INSTRUCTIONS
OUTPATIENT
Start: 2025-03-03

## 2025-03-03 RX ORDER — ACETAMINOPHEN 325 MG/1
975 TABLET ORAL ONCE
OUTPATIENT
Start: 2025-03-03 | End: 2025-03-03

## 2025-03-03 RX ORDER — CLINDAMYCIN PHOSPHATE 900 MG/50ML
900 INJECTION, SOLUTION INTRAVENOUS
OUTPATIENT
Start: 2025-03-03

## 2025-03-03 RX ORDER — ACETAMINOPHEN 650 MG/1
650 SUPPOSITORY RECTAL ONCE
OUTPATIENT
Start: 2025-03-03

## 2025-03-03 ASSESSMENT — PAIN SCALES - GENERAL: PAINLEVEL_OUTOF10: NO PAIN (0)

## 2025-03-03 NOTE — NURSING NOTE
"Chief Complaint   Patient presents with    Cystoscopy       Blood pressure 95/65, pulse 94, height 1.549 m (5' 1\"), weight 54.9 kg (121 lb), SpO2 95%, not currently breastfeeding. Body mass index is 22.86 kg/m .    Patient Active Problem List   Diagnosis    C5 burst fracture    Lesions of vulva    IUD (intrauterine device) in place- placed 12/2017    H/O: pneumonia    АНДРЕЙ (generalized anxiety disorder)    Quadriplegia, post-traumatic (H)- incomplete quad, limited use upper extremities    Autonomic dysreflexia    Pressure injury of right ischium, stage 4 (H)    Personal history of DVT (deep vein thrombosis)    Neurogenic bladder    Impaired lung function    YONI III with severe dysplasia    Major depression    Neurogenic bowel    Spasticity    Spinal cord injury, C5-C7 (H)    Urinary incontinence    Nephrolithiasis    Neuromuscular respiratory weakness (H)    Urinary retention    S/P flap graft    Chronic osteomyelitis of pelvis, right (H)    Marijuana use       Allergies   Allergen Reactions    Succinylcholine Other (See Comments)     Spinal cord injury 12/18/12, patient at risk for extrajunctional receptors and hyperkalemia       Current Outpatient Medications   Medication Sig Dispense Refill    acetaminophen (TYLENOL) 325 MG tablet Take 325-650 mg by mouth every 6 hours as needed.      albuterol (PROVENTIL) (2.5 MG/3ML) 0.083% neb solution TAKE 1 VIAL BY NEBULIZATION EVERY 4 HOURS AS NEEDED FOR SHORTNESS OF BREATH / DYSPNEA OR WHEEZING 150 mL 3    baclofen (LIORESAL) 10 MG tablet Take 30 mg by mouth 3 times daily (10MG X 3 = 30MG)      bisacodyl (DULCOLAX) 10 MG suppository Place 1 suppository (10 mg) rectally At Bedtime 30 suppository 0    clindamycin (CLEOCIN T) 1 % external lotion Apply topically 2 times daily 60 mL 1    fluticasone-vilanterol (BREO ELLIPTA) 100-25 MCG/ACT inhaler INHALE 1 PUFF BY MOUTH EVERY DAY 28 each 5    gabapentin (NEURONTIN) 300 MG capsule Take 300 mg by mouth At Bedtime       hydrOXYzine " lina (VISTARIL) 25 MG capsule TAKE 1 CAPSULE (25 MG) BY MOUTH 3 TIMES DAILY AS NEEDED FOR ANXIETY (OR AT BEDTIME FOR SLEEP.) 270 capsule 0    miconazole (MICATIN) 2 % external cream Apply topically 2 times daily 113 g 1    midodrine (PROAMATINE) 5 MG tablet Take 10 mg by mouth 2 times daily  6 tablet 0    Misc Natural Products (ELDERBERRY/VITAMIN C/ZINC PO) Take 1 tablet by mouth daily      Multiple Vitamins-Minerals (WOMENS MULTIVITAMIN) TABS Take 1 tablet by mouth daily      nicotine (NICORETTE) 2 MG gum Place 1 each (2 mg) inside cheek every hour as needed for nicotine withdrawal symptoms 200 each 2    oxybutynin ER (DITROPAN-XL) 5 MG 24 hr tablet Take 5 mg by mouth every evening      senna-docusate (SENOKOT-S/PERICOLACE) 8.6-50 MG tablet Take 1 tablet by mouth daily      sertraline (ZOLOFT) 100 MG tablet TAKE 1.5 TABLETS BY MOUTH DAILY 135 tablet 0    sodium chloride (NEBUSAL) 3 % neb solution Take 3 mLs by nebulization every 6 hours as needed for wheezing or other (sputum clearance difficulty due to quadridplegia.) 300 mL 1    sterile water, bottle, irrigation Irrigate with 240 mLs as directed daily 7200 mL 11    Vitamin D3 (CHOLECALCIFEROL) 125 MCG (5000 UT) tablet Take 1 tablet by mouth every other day         Social History     Tobacco Use    Smoking status: Every Day     Types: Cigarettes, Other, Vaping Device     Passive exposure: Current (per pt)    Smokeless tobacco: Current   Vaping Use    Vaping status: Every Day    Substances: THC, Flavoring    Devices: Disposable, Pre-filled pod   Substance Use Topics    Alcohol use: No    Drug use: Yes     Types: Marijuana     Comment: usually smokes at least two bongs per day, also smokes marijuana out of e-cig pen most days,       Invasive Procedure Safety Checklist:    Procedure: Cystoscopy    Action: Complete sections and checkboxes as appropriate.    Pre-procedure:  1. Patient ID Verified with 2 identifiers ([employer] and  or MRN) : YES    2. Procedure and site  verified with patient/designee (when able) : YES    3. Accurate consent documentation in medical record : YES    4. H&P (or appropriate assessment) documented in medical record : N/A  H&P must be up to 30 days prior to procedure an updated within 24 hours of                 Procedure as applicable.     5. Relevant diagnostic and radiology test results appropriately labeled and displayed as applicable : YES    6. Blood products, implants, devices, and/or special equipment available for the procedure as applicable : YES    7. Procedure site(s) marked with provider initials [Exclusions: none] : NO    8. Marking not required. Reason : Yes  Procedure does not require site marking    Time Out:     Time-Out performed immediately prior to starting procedure, including verbal and active participation of all team members addressing: YES    1. Correct patient identity.  2. Confirmed that the correct side and site are marked.  3. An accurate procedure to be done.  4. Agreement on the procedure to be done.  5. Correct patient position.  6. Relevant images and results are properly labeled and appropriately displayed.  7. The need to administer antibiotics or fluids for irrigation purposes during the procedure as applicable.  8. Safety precautions based on patient history or medication use.    During Procedure: Verification of correct person, site, and procedure occurs any time the responsibility for care of the patient is transferred to another member of the care team.        Clau rByan  3/3/2025  1:51 PM

## 2025-03-03 NOTE — PROGRESS NOTES
Cystoscopy after Bladder Augmentation    PRE-PROCEDURE DIAGNOSIS: neurogenic bladder and bladder stones   POST-PROCEDURE DIAGNOSIS: neurogenic bladder and bladder stones  PROCEDURE: cystoscopy; evacuation of mucous. Made more difficult by presence of augmentation and catheterizable stoma.     HISTORY: Dianna Cottrell is a 29 year old female with a history of neurogenic bladder secondary to cervical spinal cord injury.    2023: CCIC and BN closure by me (small capacity bladder pre-op and long-term indwelling urethral Winston)  10/2024: PCNL (Kanwal)  1/2025: Surveillance CT after PCNL --> bladder stones; several, none larger than 1cm  3/3/2025: recurrent UTIs, clogging of catheter, frequent AUTONOMIC DYSREFLEXIA . She is irrigating bid    DESCRIPTION OF PROCEDURE:  After informed consent was obtained, the patient was brought to the procedure room where shewas placed in the sitting position with all pressure points well padded. The area around the catheterizable stoma was prepped and draped in a sterile fashion. The bladder was emptied by sterile catheterization and urine was collected for cytology. A flexible adult cystoscope was introduced through a well-lubricated catheterizable stoma.    Strictures were absent.  False passages were absent.  The Mitrofanoff/Virgil tunnel was intact.   Bladder neck was surgically closed. Stones were present -- several, none larger than 1cm. Tumors were absent.  Degree of mucous was moderate.   The opening between the bladder and the augment was wide.   The scope was removed, the bladder was emptied by sterile catheterization and the findings were described to the patient.     ASSESSMENT AND PLAN:  29 year old female with neurogenic bladder and prior bladder augmentation, now with bladder stones.  Explained these were too large to remove per channel.   Will require percutaneous removal (too numerous to laser them through the stoma).   Will do surveillance cysto 6 months after  procedure.

## 2025-03-03 NOTE — LETTER
3/3/2025       RE: Dianna Cottrell  [address] [address]     Dear Colleague,    Thank you for referring your patient, Dianna Cottrell, to the [facility] [place] at [facility]. Please see a copy of my visit note below.    Cystoscopy after Bladder Augmentation    PRE-PROCEDURE DIAGNOSIS: neurogenic bladder and bladder stones   POST-PROCEDURE DIAGNOSIS: neurogenic bladder and bladder stones  PROCEDURE: cystoscopy; evacuation of mucous. Made more difficult by presence of augmentation and catheterizable stoma.     HISTORY: Dianna Cottrell is a 29 year old female with a history of neurogenic bladder secondary to cervical spinal cord injury.    2023: CCIC and BN closure by me (small capacity bladder pre-op and long-term indwelling urethral [place])  10/2024: PCNL (Kanwal)  1/2025: Surveillance CT after PCNL --> bladder stones; several, none larger than 1cm  3/3/2025: recurrent UTIs, clogging of catheter, frequent AUTONOMIC DYSREFLEXIA . She is irrigating bid    DESCRIPTION OF PROCEDURE:  After informed consent was obtained, the patient was brought to the procedure room where shewas placed in the sitting position with all pressure points well padded. The area around the catheterizable stoma was prepped and draped in a sterile fashion. The bladder was emptied by sterile catheterization and urine was collected for cytology. A flexible adult cystoscope was introduced through a well-lubricated catheterizable stoma.    Strictures were absent.  False passages were absent.  The Mitrofanoff/Virgil tunnel was intact.   Bladder neck was surgically closed. Stones were present -- several, none larger than 1cm. Tumors were absent.  Degree of mucous was moderate.   The opening between the bladder and the augment was wide.   The scope was removed, the bladder was emptied by sterile catheterization and the findings were described to the  patient.     ASSESSMENT AND PLAN:  29 year old female with neurogenic bladder and prior bladder augmentation, now with bladder stones.  Explained these were too large to remove per channel.   Will require percutaneous removal (too numerous to laser them through the stoma).   Will do surveillance cysto 6 months after procedure.              Again, thank you for allowing me to participate in the care of your patient.      Sincerely,    Mata Bacon MD

## 2025-03-03 NOTE — NURSING NOTE
Pre Op Teaching Flowsheet       Pre and Post op Patient Education  Relevant Diagnosis:  bladder stones  Surgical procedure:  percutaneous   Teaching Topic:  Pre and post op teaching  Person Involved in teaching: Yes    Motivation Level:  Asks Questions: Yes  Eager to Learn: Yes  Cooperative: Yes  Receptive (willing/able to accept information):  Yes    Patient demonstrates understanding of the following:  Date of surgery:   to call   Location of surgery:  [facility] - 5th Floor  History and Physical and any other testing necessary prior to surgery: Yes  Required time line for completion of History and Physical and any pre-op testing: Yes    Patient demonstrates understanding of the following:  Pre-op bowel prep:  N/A  Pre-op showering/scrub information with PCMX Soap: Yes  Blood thinner medications discussed and when to stop (if applicable):  N/A  Discussed no visitor's at this time due to increase Covid-19 cases and how we need to make sure everyone stays safe.    Infection Prevention:   Patient demonstrates understanding of the following:  Surgical procedure site care taught: Yes  Signs and symptoms of infection taught: Yes      Post-op follow-up:  Discussed how to contact the hospital, nurse, and clinic scheduling staff if necessary. (See packet information)    Instructional materials used/given/mailed:  [place] Surgery Packet, post op teaching sheet, Map, Soap, and with the arrival/location information to come closer to the surgery date.    Surgical instructions packet given to patient in office:  N/A    Follow up: Discussed arranging for someone to drive you home. ( No public transportation)  Someone needed to stay the first twenty hours after surgery: Yes     referral: not needed     home:  mother    Care Giver:  mother    PCP:  Suzan Willis PA-C    Patient notified she would need a urine culture 2 weeks before the procedure      Jessica Lopez, RN, BSN, PHN  Care Coordinator Urology  [facility] [place]  Urology [facility]  [phone]

## 2025-03-04 ENCOUNTER — MYC MEDICAL ADVICE (OUTPATIENT)
Dept: UROLOGY | Facility: CLINIC | Age: 30
End: 2025-03-04
Payer: MEDICARE

## 2025-03-04 ENCOUNTER — TELEPHONE (OUTPATIENT)
Dept: UROLOGY | Facility: CLINIC | Age: 30
End: 2025-03-04
Payer: MEDICARE

## 2025-03-04 NOTE — TELEPHONE ENCOUNTER
Left message for patient regarding scheduling surgery with Dr. Bacon     Direct call back number given  Ph: [phone]      Jo Ann Tillman on 3/4/2025 at 3:50 PM

## 2025-03-04 NOTE — TELEPHONE ENCOUNTER
Patient returned call to schedule surgery with Dr. Bacon    Spoke with: Patient    Date of Surgery: 04/09/2025 - First available     Approximate surgery arrival time given:  No    Location of surgery: [facility]     Pre-Op H&P: Instructed to schedule w/ PCP - Suzan Willis PA-C within 10-30 days of surgery     Pre-op Imaging: No     Post-Op Appt Date: Dr. Ford 4/28 at 3:30PM     Post-op Imaging:  N/A     Discussed with patient pre-op RN will call 2-3 days prior to surgery with arrival time and instructions:  Yes     Packet sent out: Yes 03/04/25  via [employer] Message      Additional Comments:  Offered additional surgery date of 4/18.     Patient is aware to have a  for surgery.       All patients questions were answered and was instructed to review surgical packet and call back with any questions or concerns.       Jo Ann Tillman on 3/4/2025 at 3:57 PM

## 2025-03-12 DIAGNOSIS — N39.0 URINARY TRACT INFECTION WITHOUT HEMATURIA, SITE UNSPECIFIED: Primary | ICD-10-CM

## 2025-03-12 NOTE — TELEPHONE ENCOUNTER
Left Voicemail (1st Attempt) for the patient to call back and schedule the following:    Appointment type: P-O  Provider: Logan  Return date: Reschedule 4/28 needed  Specialty phone number: [phone]  Additional appointment(s) needed: NA  Additonal Notes: NA

## 2025-03-14 PROBLEM — N21.0 BLADDER STONE: Status: ACTIVE | Noted: 2025-03-03

## 2025-03-25 ENCOUNTER — ANCILLARY PROCEDURE (OUTPATIENT)
Dept: ULTRASOUND IMAGING | Facility: CLINIC | Age: 30
End: 2025-03-25
Attending: OBSTETRICS & GYNECOLOGY
Payer: MEDICARE

## 2025-03-25 ENCOUNTER — OFFICE VISIT (OUTPATIENT)
Dept: OBGYN | Facility: CLINIC | Age: 30
End: 2025-03-25
Attending: OBSTETRICS & GYNECOLOGY
Payer: MEDICARE

## 2025-03-25 VITALS
HEART RATE: 60 BPM | SYSTOLIC BLOOD PRESSURE: 146 MMHG | DIASTOLIC BLOOD PRESSURE: 97 MMHG | WEIGHT: 130 LBS | BODY MASS INDEX: 24.56 KG/M2

## 2025-03-25 DIAGNOSIS — Z30.430 ENCOUNTER FOR IUD INSERTION: ICD-10-CM

## 2025-03-25 DIAGNOSIS — Z30.430 ENCOUNTER FOR IUD INSERTION: Primary | ICD-10-CM

## 2025-03-25 PROCEDURE — 58300 INSERT INTRAUTERINE DEVICE: CPT | Performed by: OBSTETRICS & GYNECOLOGY

## 2025-03-25 PROCEDURE — 3077F SYST BP >= 140 MM HG: CPT | Performed by: OBSTETRICS & GYNECOLOGY

## 2025-03-25 PROCEDURE — 58300 INSERT INTRAUTERINE DEVICE: CPT

## 2025-03-25 PROCEDURE — 250N000011 HC RX IP 250 OP 636: Performed by: OBSTETRICS & GYNECOLOGY

## 2025-03-25 PROCEDURE — 3080F DIAST BP >= 90 MM HG: CPT | Performed by: OBSTETRICS & GYNECOLOGY

## 2025-03-25 PROCEDURE — 58301 REMOVE INTRAUTERINE DEVICE: CPT | Performed by: OBSTETRICS & GYNECOLOGY

## 2025-03-25 RX ADMIN — LEVONORGESTREL 1 EACH: 52 INTRAUTERINE DEVICE INTRAUTERINE at 15:24

## 2025-03-25 NOTE — PROGRESS NOTES
Chief Complaint   Patient presents with    Follow Up     Mispositioned IUD (Mirena)    Lilli Leyva LPN

## 2025-03-25 NOTE — PROGRESS NOTES
SUBJECTIVE: iDanna Cottrell is a 30 year old  female, with CT evidence of IUD malposition. It was placed in OR in 2021.  CT images reviewed today, does appear to be positioned with one arm in the myometrium of posterior uterine wall.      Verification of Procedure:  Just before the procedure begans through verbal and active participation of team members, I verified:     Initials   Patient Name smd   Patient  smd   Procedure to be performed smd     Consent:  Risks, benefits of treatment, and alternative options for contraception were discussed.  Patient's questions were elicited and answered.  Written consent was obtained and scanned into medical record.       OBJECTIVE: BP (!) 146/97   Pulse 60   Wt 59 kg (130 lb)   BMI 24.56 kg/m      Pelvic Exam:  EG/BUS: Normal genital architecture without lesions, erythema or abnormal secretions. Bartholin's, Urethra, [place]'s glands are normal.   Vagina: moist, pink, rugae with creamy, white, and odorlesssecretions  Rectum: anus normal      PROCEDURE NOTE -  IUD removal    Reason for removal: IUD malposition    Cervix was visualized with a medium Graves speculum and prepped with. The strings were grasped with a uterine packing forceps and the IUD removed with gentle traction.  No resistance was encountered.  The Mirena IUD immediately regained resting shape and was without odor or discoloration.  There were no complications.  Dianna Cottrell reported no pain.  There was no bleeding.     EBL:  Minimal     Complications:  None     SUBJECTIVE: Dianna Cottrell is a 30 year old  female, requests IUD reinsertion    Verification of Procedure:  Just before the procedure begans through verbal and active participation of team members, I verified:     Initials   Patient Name SMD   Patient  SMD   Procedure to be performed SMD     Consent:  Risks, benefits of treatment, and alternative options for contraception were discussed.  Patient's questions were elicited  and answered.  Written consent was obtained and scanned into medical record.     PROCEDURE NOTE  --  Mirena Insertion    Reason for Insertion:  contraception and abnormal uterine bleeding.    Pregnancy test: not done, IUD in place    Counseling:  Patient counseled on efficacy, benefits, risks, potential side effects of IUD.  Insertion procedure and risk of infection, perforation, and spontaneous expulsion reviewed.   Advised to plan removal and/or replacement of IUD in 8 years from today or when desired.       Under sterile technique, cervix was visualized with a medium Graves speculum and prepped with Betadine solution. The uterus was sounded to 7 cm. The Mirena IUD insertion apparatus was prepared and placed through the cervix without significant resistance and deployed at the fundus in the usual fashion.  Unfortunately, the IUD was stuck in the applicator and was then noted to be in the cervix. The IUD was immediately removed.  Next, using ultrasound guidance the cervix was reprepped, the tenaculum replaced, the uterus sounded to 7 cm.  The IUD was placed using U/s guidance and the strings were trimmed to 2.5 cm    LOT: [id number]  Exp: 4/27    EBL:  Minimal     Complications:  None      Dianna Cottrell tolerated procedure well.    PLAN:      Written information on IUD use reviewed and given.  Symptoms to report reviewed. To report heavy bleeding, severe cramping, or abnormal vaginal discharge.  May take Ibuprofen 400-800 mg PO TID PRN or Naproxen 500 mg PO BID for cramping. Patient has been counseled to use backup birth control method for 1 week.  Dianna Cottrell  verbalized understanding of instructions.    Carlotta Lock MD, FACOG  (she/her/hers)    Department of Ob/Gyn/Women's Health  [employer]  [place] [facility]  [address] [place], [place] [address]. [place] [address]  [email or web address]  p. [phone]  f. [phone]

## 2025-03-25 NOTE — PATIENT INSTRUCTIONS
Thank you for trusting us with your care!   Please be aware, if you are on Mychart, you may see your results prior to your providers review. If labs are abnormal, we will call or message you on [employer] with a follow up plan.    If you need to contact us for questions about:  Symptoms, Scheduling & Medical Questions; Non-urgent (2-3 day response) Punctil message, Urgent (needing response today) [phone] (if after 3:30pm next day response)   Prescriptions: Please call your Pharmacy   Billing: Alejandra [phone] or [facility]:[phone]

## 2025-03-25 NOTE — LETTER
3/25/2025       RE: Dianna Cottrell  [address] [facility] [address]     Dear Colleague,    Thank you for referring your patient, Dianna Cottrell, to the [facility] [place] at [facility]. Please see a copy of my visit note below.    Chief Complaint   Patient presents with     Follow Up     Mispositioned IUD (Mirena)    Lilli Leyva LPN     SUBJECTIVE: Dianna Cottrell is a 30 year old  female, with CT evidence of IUD malposition. It was placed in OR in 2021.  CT images reviewed today, does appear to be positioned with one arm in the myometrium of posterior uterine wall.      Verification of Procedure:  Just before the procedure begans through verbal and active participation of team members, I verified:     Initials   Patient Name smd   Patient  smd   Procedure to be performed smd     Consent:  Risks, benefits of treatment, and alternative options for contraception were discussed.  Patient's questions were elicited and answered.  Written consent was obtained and scanned into medical record.       OBJECTIVE: BP (!) 146/97   Pulse 60   Wt 59 kg (130 lb)   BMI 24.56 kg/m      Pelvic Exam:  EG/BUS: Normal genital architecture without lesions, erythema or abnormal secretions. Bartholin's, Urethra, [place]'s glands are normal.   Vagina: moist, pink, rugae with creamy, white, and odorlesssecretions  Rectum: anus normal      PROCEDURE NOTE -  IUD removal    Reason for removal: IUD malposition    Cervix was visualized with a medium Graves speculum and prepped with. The strings were grasped with a uterine packing forceps and the IUD removed with gentle traction.  No resistance was encountered.  The Mirena IUD immediately regained resting shape and was without odor or discoloration.  There were no complications.  Dianna Cottrell reported no pain.  There was no bleeding.     EBL:  Minimal     Complications:  None      SUBJECTIVE: Dianna Cottrell is a 30 year old  female, requests IUD reinsertion    Verification of Procedure:  Just before the procedure begans through verbal and active participation of team members, I verified:     Initials   Patient Name SMD   Patient  SMD   Procedure to be performed SMD     Consent:  Risks, benefits of treatment, and alternative options for contraception were discussed.  Patient's questions were elicited and answered.  Written consent was obtained and scanned into medical record.     PROCEDURE NOTE  --  Mirena Insertion    Reason for Insertion:  contraception and abnormal uterine bleeding.    Pregnancy test: not done, IUD in place    Counseling:  Patient counseled on efficacy, benefits, risks, potential side effects of IUD.  Insertion procedure and risk of infection, perforation, and spontaneous expulsion reviewed.   Advised to plan removal and/or replacement of IUD in 8 years from today or when desired.       Under sterile technique, cervix was visualized with a medium Graves speculum and prepped with Betadine solution. The uterus was sounded to 7 cm. The Mirena IUD insertion apparatus was prepared and placed through the cervix without significant resistance and deployed at the fundus in the usual fashion.  Unfortunately, the IUD was stuck in the applicator and was then noted to be in the cervix. The IUD was immediately removed.  Next, using ultrasound guidance the cervix was reprepped, the tenaculum replaced, the uterus sounded to 7 cm.  The IUD was placed using U/s guidance and the strings were trimmed to 2.5 cm    LOT: [id number]  Exp:     EBL:  Minimal     Complications:  None      Dianna Cottrell tolerated procedure well.    PLAN:      Written information on IUD use reviewed and given.  Symptoms to report reviewed. To report heavy bleeding, severe cramping, or abnormal vaginal discharge.  May take Ibuprofen 400-800 mg PO TID PRN or Naproxen 500 mg PO BID for cramping.  Patient has been counseled to use backup birth control method for 1 week.  Dianna Cottrell  verbalized understanding of instructions.    Carlotta Lock MD, FACOG  (she/her/hers)    Department of Ob/Gyn/Women's Health  [employer]  [place]  [address] [place] [id number]  [email or web address]  p. [phone]  f. [phone]        Again, thank you for allowing me to participate in the care of your patient.      Sincerely,    Carlotta Lock MD

## 2025-03-31 ENCOUNTER — OFFICE VISIT (OUTPATIENT)
Dept: FAMILY MEDICINE | Facility: CLINIC | Age: 30
End: 2025-03-31
Payer: MEDICARE

## 2025-03-31 VITALS
BODY MASS INDEX: 20.09 KG/M2 | TEMPERATURE: 97.5 F | DIASTOLIC BLOOD PRESSURE: 82 MMHG | WEIGHT: 125 LBS | HEIGHT: 66 IN | OXYGEN SATURATION: 97 % | HEART RATE: 80 BPM | RESPIRATION RATE: 18 BRPM | SYSTOLIC BLOOD PRESSURE: 128 MMHG

## 2025-03-31 DIAGNOSIS — L60.1 ONYCHOLYSIS: ICD-10-CM

## 2025-03-31 DIAGNOSIS — S14.109S QUADRIPLEGIA, POST-TRAUMATIC (H): ICD-10-CM

## 2025-03-31 DIAGNOSIS — J99 NEUROMUSCULAR RESPIRATORY WEAKNESS (H): ICD-10-CM

## 2025-03-31 DIAGNOSIS — G82.50 QUADRIPLEGIA, POST-TRAUMATIC (H): ICD-10-CM

## 2025-03-31 DIAGNOSIS — R94.2 IMPAIRED LUNG FUNCTION: ICD-10-CM

## 2025-03-31 DIAGNOSIS — G90.4 AUTONOMIC DYSREFLEXIA: ICD-10-CM

## 2025-03-31 DIAGNOSIS — Z01.818 PREOP GENERAL PHYSICAL EXAM: Primary | ICD-10-CM

## 2025-03-31 DIAGNOSIS — G70.9 NEUROMUSCULAR RESPIRATORY WEAKNESS (H): ICD-10-CM

## 2025-03-31 DIAGNOSIS — N21.0 BLADDER STONE: ICD-10-CM

## 2025-03-31 DIAGNOSIS — Z97.5 IUD (INTRAUTERINE DEVICE) IN PLACE: Chronic | ICD-10-CM

## 2025-03-31 DIAGNOSIS — F41.1 GAD (GENERALIZED ANXIETY DISORDER): ICD-10-CM

## 2025-03-31 DIAGNOSIS — Z86.718 PERSONAL HISTORY OF DVT (DEEP VEIN THROMBOSIS): Chronic | ICD-10-CM

## 2025-03-31 DIAGNOSIS — F32.4 MAJOR DEPRESSIVE DISORDER WITH SINGLE EPISODE, IN PARTIAL REMISSION: ICD-10-CM

## 2025-03-31 DIAGNOSIS — F12.90 MARIJUANA USE: Chronic | ICD-10-CM

## 2025-03-31 PROBLEM — L89.314 PRESSURE INJURY OF RIGHT ISCHIUM, STAGE 4 (H): Chronic | Status: RESOLVED | Noted: 2020-02-05 | Resolved: 2025-03-31

## 2025-03-31 LAB
ALBUMIN UR-MCNC: 30 MG/DL
APPEARANCE UR: CLEAR
BACTERIA #/AREA URNS HPF: ABNORMAL /HPF
BILIRUB UR QL STRIP: NEGATIVE
COLOR UR AUTO: YELLOW
GLUCOSE UR STRIP-MCNC: NEGATIVE MG/DL
HGB UR QL STRIP: ABNORMAL
KETONES UR STRIP-MCNC: NEGATIVE MG/DL
LEUKOCYTE ESTERASE UR QL STRIP: ABNORMAL
NITRATE UR QL: POSITIVE
PH UR STRIP: 7 [PH] (ref 5–7)
RBC #/AREA URNS AUTO: ABNORMAL /HPF
SP GR UR STRIP: 1.01 (ref 1–1.03)
SQUAMOUS #/AREA URNS AUTO: ABNORMAL /LPF
UROBILINOGEN UR STRIP-ACNC: 0.2 E.U./DL
WBC #/AREA URNS AUTO: ABNORMAL /HPF
WBC CLUMPS #/AREA URNS HPF: PRESENT /HPF

## 2025-03-31 PROCEDURE — 1126F AMNT PAIN NOTED NONE PRSNT: CPT | Performed by: PHYSICIAN ASSISTANT

## 2025-03-31 PROCEDURE — 81001 URINALYSIS AUTO W/SCOPE: CPT | Performed by: PHYSICIAN ASSISTANT

## 2025-03-31 PROCEDURE — 87086 URINE CULTURE/COLONY COUNT: CPT | Performed by: PHYSICIAN ASSISTANT

## 2025-03-31 PROCEDURE — 3079F DIAST BP 80-89 MM HG: CPT | Performed by: PHYSICIAN ASSISTANT

## 2025-03-31 PROCEDURE — 99214 OFFICE O/P EST MOD 30 MIN: CPT | Performed by: PHYSICIAN ASSISTANT

## 2025-03-31 PROCEDURE — 3074F SYST BP LT 130 MM HG: CPT | Performed by: PHYSICIAN ASSISTANT

## 2025-03-31 PROCEDURE — 87088 URINE BACTERIA CULTURE: CPT | Performed by: PHYSICIAN ASSISTANT

## 2025-03-31 PROCEDURE — G2211 COMPLEX E/M VISIT ADD ON: HCPCS | Performed by: PHYSICIAN ASSISTANT

## 2025-03-31 PROCEDURE — 87186 SC STD MICRODIL/AGAR DIL: CPT | Performed by: PHYSICIAN ASSISTANT

## 2025-03-31 RX ORDER — CICLOPIROX 80 MG/ML
SOLUTION TOPICAL
Qty: 6.6 ML | Refills: 11 | Status: SHIPPED | OUTPATIENT
Start: 2025-03-31

## 2025-03-31 ASSESSMENT — PAIN SCALES - GENERAL: PAINLEVEL_OUTOF10: NO PAIN (0)

## 2025-03-31 NOTE — PATIENT INSTRUCTIONS
How to Take Your Medication Before Surgery  Preoperative Medication Instructions   Antiplatelet or Anticoagulation Medication Instructions   - We reviewed the medication list and the patient is not on an antiplatelet or anticoagulation medications.    Use your inhaler the morning of surgery  Do use a neb morning of surgery.     Abstain from vaping/marijuana 24-48hr prior to surgery if able.       Additional Medication Instructions  We reviewed the medication list and there are no chronic medications that need to be adjusted for this procedure.       Patient Education   Preparing for Your Surgery  For Adults  Getting started  In most cases, a nurse will call to review your health history and instructions. They will give you an arrival time based on your scheduled surgery time. Please be ready to share:  Your doctor's clinic name and phone number  Your medical, surgical, and anesthesia history  A list of allergies and sensitivities  A list of medicines, including herbal treatments and over-the-counter drugs  Whether the patient has a legal guardian (ask how to send us the papers in advance)  Note: You may not receive a call if you were seen at our PAC (Preoperative Assessment Center).  Please tell us if you're pregnant--or if there's any chance you might be pregnant. Some surgeries may injure a fetus (unborn baby), so they require a pregnancy test. Surgeries that are safe for a fetus don't always need a test, and you can choose whether to have one.   Preparing for surgery  Within 10 to 30 days of surgery: Have a pre-op exam (sometimes called an H&P, or History and Physical). This can be done at a clinic or pre-operative center.  If you're having a , you may not need this exam. Talk to your care team.  At your pre-op exam, talk to your care team about all medicines you take. (This includes CBD oil and any drugs, such as THC, marijuana, and other forms of cannabis.) If you need to stop any medicine before  surgery, ask when to start taking it again.  This is for your safety. Many medicines and drugs can make you bleed too much during surgery. Some change how well surgery (anesthesia) drugs work.  Call your insurance company to let them know you're having surgery. (If you don't have insurance, call [phone].)  Call your clinic if there's any change in your health. This includes a scrape or scratch near the surgery site, or any signs of a cold (sore throat, runny nose, cough, rash, fever).  Eating and drinking guidelines  For your safety: Unless your surgeon tells you otherwise, follow the guidelines below.  Eat and drink as normal until 8 hours before you arrive for surgery. After that, no food or milk. You can spit out gum when you arrive.  Drink clear liquids until 2 hours before you arrive. These are liquids you can see through, like water, Gatorade, and Propel Water. They also include plain black coffee and tea (no cream or milk).  No alcohol for 24 hours before you arrive. The night before surgery, stop any drinks that contain THC.  If your care team tells you to take medicine on the morning of surgery, it's okay to take it with a sip of water. No other medicines or drugs are allowed (including CBD oil)--follow your care team's instructions.  If you have questions the day of surgery, call your hospital or surgery center.   Preventing infection  Shower or bathe the night before and the morning of surgery. Follow the instructions your clinic gave you. (If no instructions, use regular soap.)  Don't shave or clip hair near your surgery site. We'll remove the hair if needed.  Don't smoke or vape the morning of surgery. No chewing tobacco for 6 hours before you arrive. A nicotine patch is okay. You may spit out nicotine gum when you arrive.  For some surgeries, the surgeon will tell you to fully quit smoking and nicotine.  We will make every effort to keep you safe from infection. We will:  Clean our hands often  with soap and water (or an alcohol-based hand rub).  Clean the skin at your surgery site with a special soap that kills germs.  Give you a special gown to keep you warm. (Cold raises the risk of infection.)  Wear hair covers, masks, gowns, and gloves during surgery.  Give antibiotic medicine, if prescribed. Not all surgeries need this medicine.  What to bring on the day of surgery  Photo ID and insurance card  Copy of your health care directive, if you have one  Glasses and hearing aids (bring cases)  You can't wear contacts during surgery  Inhaler and eye drops, if you use them (tell us about these when you arrive)  CPAP machine or breathing device, if you use them  A few personal items, if spending the night  If you have . . .  A pacemaker, ICD (cardiac defibrillator), or other implant: Bring the ID card.  An implanted stimulator: Bring the remote control.  A legal guardian: Bring a copy of the certified (court-stamped) guardianship papers.  Please remove any jewelry, including body piercings. Leave jewelry and other valuables at home.  If you're going home the day of surgery  You must have a responsible adult drive you home. They should stay with you overnight as well.  If you don't have someone to stay with you, and you aren't safe to go home alone, we may keep you overnight. Insurance often won't pay for this.  After surgery  If it's hard to control your pain or you need more pain medicine, please call your surgeon's office.  Questions?   If you have any questions for your care team, list them here:   ____________________________________________________________________________________________________________________________________________________________________________________________________________________________________________________________  For informational purposes only. Not to replace the advice of your health care provider. Copyright   2003, 2019 [facility]. All rights reserved.  Clinically reviewed by Jatin Corea MD. [employer] [id number] - REV 08/24.
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
## 2025-04-01 PROBLEM — M86.651: Chronic | Status: RESOLVED | Noted: 2024-02-19 | Resolved: 2025-04-01

## 2025-04-02 DIAGNOSIS — N39.0 URINARY TRACT INFECTION WITHOUT HEMATURIA, SITE UNSPECIFIED: ICD-10-CM

## 2025-04-02 DIAGNOSIS — N31.9 NEUROGENIC BLADDER: Primary | ICD-10-CM

## 2025-04-02 LAB
BACTERIA UR CULT: ABNORMAL
BACTERIA UR CULT: ABNORMAL

## 2025-04-02 RX ORDER — SULFAMETHOXAZOLE AND TRIMETHOPRIM 800; 160 MG/1; MG/1
1 TABLET ORAL 2 TIMES DAILY
Qty: 3 TABLET | Refills: 0 | Status: SHIPPED | OUTPATIENT
Start: 2025-04-09 | End: 2025-04-02

## 2025-04-02 RX ORDER — CIPROFLOXACIN 500 MG/1
500 TABLET, FILM COATED ORAL 2 TIMES DAILY
Qty: 6 TABLET | Refills: 0 | Status: SHIPPED | OUTPATIENT
Start: 2025-04-09 | End: 2025-04-12

## 2025-04-07 ENCOUNTER — TELEPHONE (OUTPATIENT)
Dept: FAMILY MEDICINE | Facility: CLINIC | Age: 30
End: 2025-04-07
Payer: MEDICARE

## 2025-04-07 NOTE — TELEPHONE ENCOUNTER
Forms/Letter Request     Type of form/letter: Home Health Certification        Do we have the form/letter: Yes:       Who is the form from? [employer]     Where did/will the form come from? form was faxed in     When is form/letter needed by: complete @ your convenience     How would you like the form/letter returned: Fax : to:  [phone]     Patient Notified form requests are processed in 5-7 business days:N/A     Could we send this information to you in [employer] or would you prefer to receive a phone call?:   Patient would like to be contacted via [employer]

## 2025-04-10 ENCOUNTER — ANESTHESIA EVENT (OUTPATIENT)
Dept: SURGERY | Facility: AMBULATORY SURGERY CENTER | Age: 30
End: 2025-04-10
Payer: MEDICARE

## 2025-04-11 ENCOUNTER — HOSPITAL ENCOUNTER (OUTPATIENT)
Facility: AMBULATORY SURGERY CENTER | Age: 30
Discharge: HOME | End: 2025-04-11
Attending: UROLOGY
Payer: MEDICARE

## 2025-04-11 ENCOUNTER — ANESTHESIA (OUTPATIENT)
Dept: SURGERY | Facility: AMBULATORY SURGERY CENTER | Age: 30
End: 2025-04-11
Payer: MEDICARE

## 2025-04-11 VITALS
DIASTOLIC BLOOD PRESSURE: 70 MMHG | OXYGEN SATURATION: 98 % | WEIGHT: 125 LBS | BODY MASS INDEX: 20.09 KG/M2 | RESPIRATION RATE: 13 BRPM | TEMPERATURE: 97.2 F | HEIGHT: 66 IN | HEART RATE: 76 BPM | SYSTOLIC BLOOD PRESSURE: 97 MMHG

## 2025-04-11 DIAGNOSIS — N21.0 BLADDER STONE: ICD-10-CM

## 2025-04-11 DIAGNOSIS — R33.9 URINARY RETENTION: Primary | Chronic | ICD-10-CM

## 2025-04-11 PROCEDURE — 99000 SPECIMEN HANDLING OFFICE-LAB: CPT | Performed by: PATHOLOGY

## 2025-04-11 PROCEDURE — 82365 CALCULUS SPECTROSCOPY: CPT | Mod: 90 | Performed by: PATHOLOGY

## 2025-04-11 RX ORDER — HYDROMORPHONE HYDROCHLORIDE 1 MG/ML
0.4 INJECTION, SOLUTION INTRAMUSCULAR; INTRAVENOUS; SUBCUTANEOUS EVERY 5 MIN PRN
Status: DISCONTINUED | OUTPATIENT
Start: 2025-04-11 | End: 2025-04-15 | Stop reason: HOSPADM

## 2025-04-11 RX ORDER — OXYCODONE HYDROCHLORIDE 5 MG/1
5 TABLET ORAL
Status: DISCONTINUED | OUTPATIENT
Start: 2025-04-11 | End: 2025-04-15 | Stop reason: HOSPADM

## 2025-04-11 RX ORDER — FENTANYL CITRATE 50 UG/ML
INJECTION, SOLUTION INTRAMUSCULAR; INTRAVENOUS PRN
Status: DISCONTINUED | OUTPATIENT
Start: 2025-04-11 | End: 2025-04-11

## 2025-04-11 RX ORDER — PROPOFOL 10 MG/ML
INJECTION, EMULSION INTRAVENOUS CONTINUOUS PRN
Status: DISCONTINUED | OUTPATIENT
Start: 2025-04-11 | End: 2025-04-11

## 2025-04-11 RX ORDER — DIMENHYDRINATE 50 MG/ML
25 INJECTION, SOLUTION INTRAMUSCULAR; INTRAVENOUS
Status: DISCONTINUED | OUTPATIENT
Start: 2025-04-11 | End: 2025-04-15 | Stop reason: HOSPADM

## 2025-04-11 RX ORDER — OXYCODONE HYDROCHLORIDE 5 MG/1
10 TABLET ORAL
Status: DISCONTINUED | OUTPATIENT
Start: 2025-04-11 | End: 2025-04-15 | Stop reason: HOSPADM

## 2025-04-11 RX ORDER — NALOXONE HYDROCHLORIDE 0.4 MG/ML
0.1 INJECTION, SOLUTION INTRAMUSCULAR; INTRAVENOUS; SUBCUTANEOUS
Status: DISCONTINUED | OUTPATIENT
Start: 2025-04-11 | End: 2025-04-15 | Stop reason: HOSPADM

## 2025-04-11 RX ORDER — FENTANYL CITRATE 50 UG/ML
50 INJECTION, SOLUTION INTRAMUSCULAR; INTRAVENOUS EVERY 5 MIN PRN
Status: DISCONTINUED | OUTPATIENT
Start: 2025-04-11 | End: 2025-04-15 | Stop reason: HOSPADM

## 2025-04-11 RX ORDER — DEXAMETHASONE SODIUM PHOSPHATE 10 MG/ML
4 INJECTION, SOLUTION INTRAMUSCULAR; INTRAVENOUS
Status: DISCONTINUED | OUTPATIENT
Start: 2025-04-11 | End: 2025-04-15 | Stop reason: HOSPADM

## 2025-04-11 RX ORDER — SODIUM CHLORIDE, SODIUM LACTATE, POTASSIUM CHLORIDE, CALCIUM CHLORIDE 600; 310; 30; 20 MG/100ML; MG/100ML; MG/100ML; MG/100ML
INJECTION, SOLUTION INTRAVENOUS CONTINUOUS
Status: DISCONTINUED | OUTPATIENT
Start: 2025-04-11 | End: 2025-04-15 | Stop reason: HOSPADM

## 2025-04-11 RX ORDER — LABETALOL HYDROCHLORIDE 5 MG/ML
10 INJECTION, SOLUTION INTRAVENOUS
Status: DISCONTINUED | OUTPATIENT
Start: 2025-04-11 | End: 2025-04-15 | Stop reason: HOSPADM

## 2025-04-11 RX ORDER — CLINDAMYCIN PHOSPHATE 900 MG/50ML
900 INJECTION, SOLUTION INTRAVENOUS SEE ADMIN INSTRUCTIONS
Status: DISCONTINUED | OUTPATIENT
Start: 2025-04-11 | End: 2025-04-15 | Stop reason: HOSPADM

## 2025-04-11 RX ORDER — HALOPERIDOL 5 MG/ML
1 INJECTION INTRAMUSCULAR
Status: DISCONTINUED | OUTPATIENT
Start: 2025-04-11 | End: 2025-04-15 | Stop reason: HOSPADM

## 2025-04-11 RX ORDER — LIDOCAINE 40 MG/G
CREAM TOPICAL
Status: DISCONTINUED | OUTPATIENT
Start: 2025-04-11 | End: 2025-04-15 | Stop reason: HOSPADM

## 2025-04-11 RX ORDER — ACETAMINOPHEN 325 MG/1
975 TABLET ORAL ONCE
Status: DISCONTINUED | OUTPATIENT
Start: 2025-04-11 | End: 2025-04-15 | Stop reason: HOSPADM

## 2025-04-11 RX ORDER — AMOXICILLIN 250 MG
1-2 CAPSULE ORAL 2 TIMES DAILY
Qty: 30 TABLET | Refills: 0 | Status: SHIPPED | OUTPATIENT
Start: 2025-04-11

## 2025-04-11 RX ORDER — ONDANSETRON 4 MG/1
4 TABLET, ORALLY DISINTEGRATING ORAL EVERY 30 MIN PRN
Status: DISCONTINUED | OUTPATIENT
Start: 2025-04-11 | End: 2025-04-15 | Stop reason: HOSPADM

## 2025-04-11 RX ORDER — LIDOCAINE HYDROCHLORIDE 20 MG/ML
INJECTION, SOLUTION INFILTRATION; PERINEURAL PRN
Status: DISCONTINUED | OUTPATIENT
Start: 2025-04-11 | End: 2025-04-11

## 2025-04-11 RX ORDER — FENTANYL CITRATE 50 UG/ML
25 INJECTION, SOLUTION INTRAMUSCULAR; INTRAVENOUS EVERY 5 MIN PRN
Status: DISCONTINUED | OUTPATIENT
Start: 2025-04-11 | End: 2025-04-15 | Stop reason: HOSPADM

## 2025-04-11 RX ORDER — ACETAMINOPHEN 650 MG/1
650 SUPPOSITORY RECTAL ONCE
Status: COMPLETED | OUTPATIENT
Start: 2025-04-11 | End: 2025-04-11

## 2025-04-11 RX ORDER — HYDROMORPHONE HYDROCHLORIDE 1 MG/ML
0.2 INJECTION, SOLUTION INTRAMUSCULAR; INTRAVENOUS; SUBCUTANEOUS EVERY 5 MIN PRN
Status: DISCONTINUED | OUTPATIENT
Start: 2025-04-11 | End: 2025-04-15 | Stop reason: HOSPADM

## 2025-04-11 RX ORDER — CLINDAMYCIN PHOSPHATE 900 MG/50ML
900 INJECTION, SOLUTION INTRAVENOUS
Status: COMPLETED | OUTPATIENT
Start: 2025-04-11 | End: 2025-04-11

## 2025-04-11 RX ORDER — DEXAMETHASONE SODIUM PHOSPHATE 4 MG/ML
INJECTION, SOLUTION INTRA-ARTICULAR; INTRALESIONAL; INTRAMUSCULAR; INTRAVENOUS; SOFT TISSUE PRN
Status: DISCONTINUED | OUTPATIENT
Start: 2025-04-11 | End: 2025-04-11

## 2025-04-11 RX ORDER — ACETAMINOPHEN 325 MG/1
975 TABLET ORAL ONCE
Status: COMPLETED | OUTPATIENT
Start: 2025-04-11 | End: 2025-04-11

## 2025-04-11 RX ORDER — ONDANSETRON 2 MG/ML
4 INJECTION INTRAMUSCULAR; INTRAVENOUS EVERY 30 MIN PRN
Status: DISCONTINUED | OUTPATIENT
Start: 2025-04-11 | End: 2025-04-15 | Stop reason: HOSPADM

## 2025-04-11 RX ORDER — PROPOFOL 10 MG/ML
INJECTION, EMULSION INTRAVENOUS PRN
Status: DISCONTINUED | OUTPATIENT
Start: 2025-04-11 | End: 2025-04-11

## 2025-04-11 RX ORDER — ONDANSETRON 2 MG/ML
INJECTION INTRAMUSCULAR; INTRAVENOUS PRN
Status: DISCONTINUED | OUTPATIENT
Start: 2025-04-11 | End: 2025-04-11

## 2025-04-11 RX ORDER — OXYCODONE HYDROCHLORIDE 5 MG/1
5-10 TABLET ORAL EVERY 4 HOURS PRN
Qty: 12 TABLET | Refills: 0 | Status: SHIPPED | OUTPATIENT
Start: 2025-04-11

## 2025-04-11 RX ADMIN — PROPOFOL 150 MCG/KG/MIN: 10 INJECTION, EMULSION INTRAVENOUS at 12:56

## 2025-04-11 RX ADMIN — ACETAMINOPHEN 975 MG: 325 TABLET ORAL at 11:49

## 2025-04-11 RX ADMIN — DEXAMETHASONE SODIUM PHOSPHATE 4 MG: 4 INJECTION, SOLUTION INTRA-ARTICULAR; INTRALESIONAL; INTRAMUSCULAR; INTRAVENOUS; SOFT TISSUE at 13:02

## 2025-04-11 RX ADMIN — PROPOFOL 150 MG: 10 INJECTION, EMULSION INTRAVENOUS at 12:56

## 2025-04-11 RX ADMIN — CLINDAMYCIN PHOSPHATE 900 MG: 900 INJECTION, SOLUTION INTRAVENOUS at 12:46

## 2025-04-11 RX ADMIN — ONDANSETRON 4 MG: 2 INJECTION INTRAMUSCULAR; INTRAVENOUS at 13:02

## 2025-04-11 RX ADMIN — SODIUM CHLORIDE, SODIUM LACTATE, POTASSIUM CHLORIDE, CALCIUM CHLORIDE: 600; 310; 30; 20 INJECTION, SOLUTION INTRAVENOUS at 11:46

## 2025-04-11 RX ADMIN — FENTANYL CITRATE 100 MCG: 50 INJECTION, SOLUTION INTRAMUSCULAR; INTRAVENOUS at 12:56

## 2025-04-11 RX ADMIN — LIDOCAINE HYDROCHLORIDE 100 MG: 20 INJECTION, SOLUTION INFILTRATION; PERINEURAL at 12:56

## 2025-04-11 RX ADMIN — Medication 0.5 MG: at 13:32

## 2025-04-11 ASSESSMENT — ENCOUNTER SYMPTOMS
DYSRHYTHMIAS: 0
SEIZURES: 0

## 2025-04-11 ASSESSMENT — LIFESTYLE VARIABLES: TOBACCO_USE: 1

## 2025-04-11 NOTE — DISCHARGE INSTRUCTIONS
"Please complete course of prescribed antibiotics.  SP catheter removal in early may as arranged with Dr. Ford.  OK to take catheter off traction tape tomorrow morning (the tape on your right abdomen that is keeping the catheter taut). The sticker on your leg should stay in place. The dressing around the catheter can be taken off tomorrow and replaced if necessary (sometimes leakage around the catheter may occur)  Please irrigate catheter daily. Please insert catheter through channel daily to make sure this does not close up.    Wound Care: -You may shower and get incisions wet starting 48 hrs after surgery but do not scrub incisions or submerge wounds (aka, bath, pool, hot tub, ect.) for 2 weeks.    Pain medication: -Do not drive until you can withstand pressing the break peddle quickly and fully without pain and you are not distracted by pain. Do not operate a motor vehicle while taking narcotics. Transition to tylenol for pain and reduce the amount of narcotics you take. If you develop constipation, take over-the-counter stool softeners such as colace or miralax, but stop if you develop diarrhea.    Monitor for: If you develop any fever/chills, worsening pain, redness, swelling, or drainage from your wound please call or return sooner than your regularly scheduled visit. You may call your clinic or [phone] and ask to speak with the surgery resident on call.    Discharge diet: Regular   Discharge activity: Activity as tolerated  No heavy lifting for 3-4 weeks.         Phone numbers:   - Monday through Friday 8am to 4:30pm: Call [phone] with questions, requests for medication refills, or to schedule or confirm an appointment.  - Nights or weekends: call the after hours emergency pager - [phone] and tell the  \"I would like to page the Urology Resident on call.\" Please note, due to prescribing laws, resident physicians are unable to prescribe narcotics after-hours. If you feel as though you will " need a refill of a narcotic pain medication, you will need to call the clinic during business hours OR seek emergency care.  - For emergencies, call 911        [facility]  Home Care Following Anesthesia  For 24 hours after surgery:  Get plenty of rest.  A responsible adult must stay with you for at least 24 hours after you leave the surgery center.  Do not drive or use heavy equipment.  If you have weakness or tingling, don't drive or use heavy equipment until this feeling goes away.   Do not drink alcohol.   Avoid strenuous or risky activities.  Ask for help when climbing stairs.  You may feel lightheaded.  IF so, sit for a few minutes before standing.  Have someone help you get up.   If you have nausea (feel sick to your stomach): Drink only clear liquids such as apple juice, ginger ale, broth or 7-Up.  Rest may also help.  Be sure to drink enough fluids.  Move to a regular diet as you feel able.   You may have a slight fever.  Call the doctor if your fever is over 100 F (37.7 C) (taken under the tongue) or lasts longer than 24 hours.  You may have a dry mouth, a sore throat, muscle aches or trouble sleeping. These should go away after 24 hours.  Do not make important or legal decisions.   It is recommended to avoid smoking.               Tips for taking pain medications  To get the best pain relief possible, remember these points:  Take pain medications as directed, before pain becomes severe.  Pain medication can upset your stomach: taking it with food may help.  Constipation is a common side effect of pain medication. Drink plenty of  fluids.  Eat foods high in fiber. Take a stool softener if recommended by your doctor or pharmacist.  Do not drink alcohol, drive or operate machinery while taking pain medications.  Ask about other ways to control pain, such as with heat, ice or relaxation.    Tylenol/Acetaminophen Consumption    If you feel your pain relief is insufficient,  you may take Tylenol/Acetaminophen in addition to your narcotic pain medication.   Be careful not to exceed 4,000 mg of Tylenol/Acetaminophen in a 24 hour period from all sources.  If you are taking extra strength Tylenol/acetaminophen (500 mg), the maximum dose is 8 tablets in 24 hours.  If you are taking regular strength acetaminophen (325 mg), the maximum dose is 12 tablets in 24 hours.    Call a doctor for any of the following:  Signs of infection (fever, growing tenderness at the surgery site, a large amount of drainage or bleeding, severe pain, foul-smelling drainage, redness, swelling).  It has been over 8 to 10 hours since surgery and you are still not able to urinate (pass water).  Headache for over 24 hours.  Numbness, tingling or weakness the day after surgery (if you had spinal anesthesia).  Signs of Covid-19 infection (temperature over 100 degrees, shortness of breath, cough, loss of taste/smell, generalized body aches, persistent headache, chills, sore throat, nausea/vomiting/diarrhea)    Your doctor is:  Dr. Mata Noriega, Prostate and Urology: [phone]                    After hours and weekends call the hospital @ [phone] and ask for the resident on call for:  Prostate Urology  For emergency care, call the:  [facility]:  [phone] (TTY for hearing impaired: [phone])

## 2025-04-11 NOTE — ANESTHESIA PROCEDURE NOTES
Airway       Patient location during procedure: OR  Staff -        CRNA: Margot Bruce APRN CRNA       Performed By: CRNA  Consent for Airway        Urgency: elective  Indications and Patient Condition       Indications for airway management: adonay-procedural and airway protection       Induction type:intravenous       Mask difficulty assessment: 1 - vent by mask    Final Airway Details       Final airway type: supraglottic airway    Supraglottic Airway Details        Type: LMA       Brand: LMA Unique       LMA size: 4    Post intubation assessment        Placement verified by: capnometry, equal breath sounds and chest rise        Number of attempts at approach: 1       Secured with: tape       Ease of procedure: easy       Dentition: Intact
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
## 2025-04-11 NOTE — ANESTHESIA CARE TRANSFER NOTE
Patient: Dianna Cottrell    Procedure: Procedure(s):  CYSTOLITHOTOMY, PERCUTANEOUS       Diagnosis: Bladder stone [N21.0]  Diagnosis Additional Information: No value filed.    Anesthesia Type:   General     Note:    Oropharynx: oropharynx clear of all foreign objects and spontaneously breathing  Level of Consciousness: awake  Oxygen Supplementation: nasal cannula  Level of Supplemental Oxygen (L/min / FiO2): 2  Independent Airway: airway patency satisfactory and stable  Dentition: dentition unchanged  Vital Signs Stable: post-procedure vital signs reviewed and stable  Report to RN Given: handoff report given  Patient transferred to: Phase II    Handoff Report: Identifed the Patient, Identified the Reponsible Provider, Reviewed the pertinent medical history, Discussed the surgical course, Reviewed Intra-OP anesthesia mangement and issues during anesthesia, Set expectations for post-procedure period and Allowed opportunity for questions and acknowledgement of understanding      Vitals:  Vitals Value Taken Time   /68 04/11/25 1412   Temp 36.1  C (96.98  F) 04/11/25 1412   Pulse 74 04/11/25 1412   Resp 16 04/11/25 1412   SpO2 95 % 04/11/25 1412   Vitals shown include unfiled device data.    Electronically Signed By: ODILIA Lechuga CRNA  April 11, 2025  2:13 PM
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
## 2025-04-14 DIAGNOSIS — Z53.9 DIAGNOSIS NOT YET DEFINED: Primary | ICD-10-CM

## 2025-04-14 PROCEDURE — G0179 MD RECERTIFICATION HHA PT: HCPCS | Performed by: PHYSICIAN ASSISTANT

## 2025-04-14 NOTE — ANESTHESIA POSTPROCEDURE EVALUATION
Patient: Dianna Cottrell    Procedure: Procedure(s):  CYSTOLITHOTOMY, PERCUTANEOUS       Anesthesia Type:  General    Note:  Disposition: Outpatient   Postop Pain Control: Uneventful            Sign Out: Well controlled pain   PONV: No   Neuro/Psych: Uneventful            Sign Out: Acceptable/Baseline neuro status   Airway/Respiratory: Uneventful            Sign Out: Acceptable/Baseline resp. status   CV/Hemodynamics: Uneventful            Sign Out: Acceptable CV status; No obvious hypovolemia; No obvious fluid overload   Other NRE: NONE   DID A NON-ROUTINE EVENT OCCUR?            Last vitals:  Vitals Value Taken Time   BP 93/61 04/11/25 1440   Temp 36  C (96.8  F) 04/11/25 1440   Pulse 71 04/11/25 1440   Resp 9 04/11/25 1440   SpO2 93 % 04/11/25 1440   Vitals shown include unfiled device data.    Electronically Signed By: Vega Morse MD  April 14, 2025  7:52 AM

## 2025-04-16 LAB
APPEARANCE STONE: NORMAL
COMPN STONE: NORMAL
SPECIMEN WT: 434 MG

## 2025-04-22 ENCOUNTER — PRE VISIT (OUTPATIENT)
Dept: UROLOGY | Facility: CLINIC | Age: 30
End: 2025-04-22
Payer: MEDICARE

## 2025-04-22 NOTE — TELEPHONE ENCOUNTER
Reason for visit: Post op     Dx/Hx/Sx: cystolithotomy done 4/11/25 via     Records/imaging/labs/orders: All records available    At Rooming: Jostin Coleman  4/22/2025  4:01 PM

## 2025-05-02 ENCOUNTER — TELEPHONE (OUTPATIENT)
Dept: FAMILY MEDICINE | Facility: CLINIC | Age: 30
End: 2025-05-02
Payer: MEDICARE

## 2025-05-02 NOTE — TELEPHONE ENCOUNTER
Forms/Letter Request    [facility]: [facility]       Do we have the form/letter: Yes:     Who is the form from?  [facility]   (if other please explain)    Where did/will the form come from? form was faxed in    When is form/letter needed by:     How would you like the form/letter returned: Fax : [phone]    Patient Notified form requests are processed in 5-7 business days:N/A    Could we send this information to you in [email or web address] or would you prefer to receive a phone call?:   No preference   Okay to leave a detailed message?: N/A at Cell number on file:    Telephone Information:   Mobile [phone]

## 2025-05-05 ENCOUNTER — OFFICE VISIT (OUTPATIENT)
Dept: UROLOGY | Facility: CLINIC | Age: 30
End: 2025-05-05
Payer: MEDICARE

## 2025-05-05 VITALS — DIASTOLIC BLOOD PRESSURE: 51 MMHG | OXYGEN SATURATION: 97 % | SYSTOLIC BLOOD PRESSURE: 76 MMHG | HEART RATE: 87 BPM

## 2025-05-05 DIAGNOSIS — N31.9 NEUROGENIC BLADDER: ICD-10-CM

## 2025-05-05 DIAGNOSIS — N21.0 BLADDER STONE: Primary | ICD-10-CM

## 2025-05-05 ASSESSMENT — PAIN SCALES - GENERAL: PAINLEVEL_OUTOF10: NO PAIN (0)

## 2025-05-05 NOTE — PROGRESS NOTES
UROLOGY OUTPATIENT CLINIC NOTE    Name: Dianna Cottrell    MRN: [id number]   YOB: 1995  Date of Encounter: 05/05/25              History of Present Illness:   Mr. Dianna Cottrell is a 30 year old female seen for post-op follow-up. 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    2023: CCIC and BN closure by SPE (small capacity bladder pre-op and long-term indwelling urethral Winston)  10/2024: PCNL ([facility])  1/2025: Surveillance CT after PCNL --> bladder stones; several, none larger than 1cm  3/3/2025: recurrent UTIs, clogging of catheter, frequent AUTONOMIC DYSREFLEXIA . She is irrigating bid. Cysto - intact channel, bladder neck closed, several bladder stones.    4/11/25: percutaneous cystolithotomy. Discharged with SPT.    5/5/25: here for follow-up and SPT removal. Doing well since surgery.    Suprapubic tube removal (05/05/25)  Patient was positioned supine and the bladder was emptied. SPT capped. The balloon was taken down and the SPT site was dressed with dry gauze. Tolerated well.         Past Medical History:     Past Medical History:   Diagnosis Date    Anemia     with pregnancy    c5 burst fracture 12/18/2012    C5-C7 fracture with cord injury    Compression fracture of L1 lumbar vertebra (H) 12/18/2012    L1 superior endplate compression fracture    Depressive disorder     Encounter for insertion of Mirena IUD 12/13/2017    Fracture of thoracic spine without spinal cord lesion (H) 12/18/2012    T3-T8 spinous process fractures    History of spinal cord injury     History of thrombophlebitis     Hypertension 12/06/2016    Impaired lung function     Nephrolithiasis 04/11/2022    Neurogenic bladder     Neurogenic bowel     Quadriplegia (H)     Thrombosis     Tobacco use     Uncomplicated asthma     Urinary tract infection     Vocal cord dysfunction     Left vocal cord weakness noted by ENT post extubation 12/2012          Past Surgical History:     Past Surgical History:    Procedure Laterality Date    BIOPSY      BLADDER SURGERY      C4-C7 interbody fusion with anterior screw and plate fixation and posterior erna and pedicle screw fixation with interspace bone graft and C5 and C6 partial corpectomies  2012     SECTION  2013    Procedure:  SECTION;   Section ;  Surgeon: Ricki Nelson MD;  [facility]     SECTION N/A 2016    Procedure:  SECTION;  Surgeon: Floridalma Kiran MD;  [facility]    CONIZATION LEEP N/A 2021    Procedure: CONE BIOPSY, CERVIX, USING LOOP ELECTROSURGICAL EXCISION PROCEDURE (LEEP) and ECC;  Surgeon: Carlotta Lock MD;  [facility]: [facility]    HEAD & NECK SURGERY      INSERT INTRAUTERINE DEVICE N/A 2021    Procedure: INSERTION, INTRAUTERINE DEVICE , replacement of Mirena Intrauterine device;  Surgeon: Carlotta Lock MD;  [facility]    IR CYSTOGRAM  2022    IR IVC FILTER PLACEMENT  2012    IR IVC FILTER REMOVAL  2013    IR LUMBAR PUNCTURE  2012    IR NEPHROLITHOTOMY  10/4/2024    IR NEPHROSTOMY TUBE PLACEMENT LEFT  2023    IRRIGATION AND DEBRIDEMENT BUTTOCKS Right 2020    Procedure: Sharp excisional debridement of right ischial tuberosity decubitus,   Bone biopsies for cultures and path,  VAC Via placement.;  Surgeon: Vira Zacarias MD;  [facility]    IRRIGATION AND DEBRIDEMENT DECUBITUS WITH FLAP CLOSURE, COMBINED Right 2023    Procedure: IRRIGATION AND DEBRIDEMENT, PRESSURE ULCER, WITH FLAP CLOSURE, Right ischial decubitus with osteomyelitis.  posterior thigh flap;  Surgeon: Vira Zacarias MD;  [facility]    LAPAROSCOPIC HAND ASSISTED BLADDER AUGMENTATION N/A 3/16/2023    Procedure: HAND-ASSISTED LAPAROSCOPIC BLADDER AUGMENTATION WITH CATHETERIZABLE CHANNEL; BLADDER NECK CLOSURE;  Surgeon: Mata Bacon MD;  [facility]    LASER HOLMIUM LITHOTRIPSY URETER(S), INSERT STENT, COMBINED Bilateral  4/11/2022    Procedure:  CYSTOSCOPY, BILATERAL  PYELOGRAM, BILATERAL URETEROSCOPY WITH  RIGHT HOLMIUM LITHOTRIPSY, BILATERAL STONE BASKET EXTRACTION, BILATERAL URETERAL STENT PLACEMENT.  LEFT PERCUTANEOUS NEPHROLITHOTOMY;  Surgeon: Parveen Schofield MD;  Location: [facility]    LASER HOLMIUM NEPHROLITHOTOMY VIA PERCUTANEOUS NEPHROSTOMY Left 9/20/2023    Procedure: NEPHROLITHOTOMY, PERCUTANEOUS, USING HOLMIUM LASER, NEPHROSTOMY TUBE EXCHANGE;  Surgeon: Parveen Schofield MD;  [facility]    LASER HOLMIUM NEPHROLITHOTOMY VIA PERCUTANEOUS NEPHROSTOMY Left 10/4/2024    Procedure: NEPHROLITHOTOMY, PERCUTANEOUS- LEFT, LEFT ANTEGRADE URETEROSCOPY, LEFT NEPHROSTOMY TUBE PLACEMENT;  Surgeon: Parveen Schofield MD;  Location: [facility]    LUMBAR DRAIN  12/18/2012    PERCUTANEOUS CYSTOLITHOTOMY N/A 4/11/2025    Procedure: CYSTOLITHOTOMY, PERCUTANEOUS;  Surgeon: Mata Bacon MD;  Location: [facility] OR    PERCUTANEOUS NEPHROLITHOTOMY Left 4/11/2022    Procedure: NEPHROLITHOTOMY, PERCUTANEOUS;  Surgeon: Parveen Schofield MD;  Location: [facility]          Social History:     Social History     Tobacco Use    Smoking status: Former     Types: Other     Passive exposure: Current (per pt)    Smokeless tobacco: Current   Substance Use Topics    Alcohol use: No          Family History:     Family History   Problem Relation Age of Onset    Lung Cancer Maternal Grandfather     Hypertension No family hx of     Diabetes No family hx of           Allergies:     Allergies   Allergen Reactions    Succinylcholine Other (See Comments)     Spinal cord injury 12/18/12, patient at risk for extrajunctional receptors and hyperkalemia          Medications:     Current Outpatient Medications   Medication Sig Dispense Refill    acetaminophen (TYLENOL) 325 MG tablet Take 325-650 mg by mouth every 6 hours as needed.      albuterol (PROVENTIL) (2.5 MG/3ML) 0.083% neb solution TAKE 1 VIAL BY NEBULIZATION EVERY 4 HOURS AS NEEDED FOR SHORTNESS OF BREATH / DYSPNEA OR  WHEEZING 150 mL 3    baclofen (LIORESAL) 10 MG tablet Take 30 mg by mouth 3 times daily (10MG X 3 = 30MG)      bisacodyl (DULCOLAX) 10 MG suppository Place 1 suppository (10 mg) rectally At Bedtime 30 suppository 0    ciclopirox (PENLAC) 8 % external solution Apply to adjacent skin and affected nails daily.  Remove with alcohol every 7 days, then repeat. 6.6 mL 11    fluticasone-vilanterol (BREO ELLIPTA) 100-25 MCG/ACT inhaler INHALE 1 PUFF BY MOUTH EVERY DAY 28 each 5    gabapentin (NEURONTIN) 300 MG capsule Take 300 mg by mouth At Bedtime       hydrOXYzine lina (VISTARIL) 25 MG capsule TAKE 1 CAPSULE (25 MG) BY MOUTH 3 TIMES DAILY AS NEEDED FOR ANXIETY (OR AT BEDTIME FOR SLEEP.) 270 capsule 0    midodrine (PROAMATINE) 5 MG tablet Take 10 mg by mouth 2 times daily  6 tablet 0    Misc Natural Products (ELDERBERRY/VITAMIN C/ZINC PO) Take 1 tablet by mouth daily      Multiple Vitamins-Minerals (WOMENS MULTIVITAMIN) TABS Take 1 tablet by mouth daily      nicotine (NICORETTE) 2 MG gum Place 1 each (2 mg) inside cheek every hour as needed for nicotine withdrawal symptoms 200 each 2    oxybutynin ER (DITROPAN-XL) 5 MG 24 hr tablet Take 5 mg by mouth every evening      oxyCODONE (ROXICODONE) 5 MG tablet Take 1-2 tablets (5-10 mg) by mouth every 4 hours as needed for moderate to severe pain. 12 tablet 0    senna-docusate (SENOKOT-S/PERICOLACE) 8.6-50 MG tablet Take 1-2 tablets by mouth 2 times daily. 30 tablet 0    senna-docusate (SENOKOT-S/PERICOLACE) 8.6-50 MG tablet Take 1 tablet by mouth daily      sertraline (ZOLOFT) 100 MG tablet TAKE 1.5 TABLETS BY MOUTH DAILY 135 tablet 0    sodium chloride (NEBUSAL) 3 % neb solution Take 3 mLs by nebulization every 6 hours as needed for wheezing or other (sputum clearance difficulty due to quadridplegia.) 300 mL 1    sterile water, bottle, irrigation Irrigate with 240 mLs as directed daily 7200 mL 11    Vitamin D3 (CHOLECALCIFEROL) 125 MCG (5000 UT) tablet Take 1 tablet by mouth  "every other day       No current facility-administered medications for this visit.     Facility-Administered Medications Ordered in Other Visits   Medication Dose Route Frequency Provider Last Rate Last Admin    levonorgestrel (MIRENA) 20 MCG/24HR IUD    Carlotta Echevarria MD   1 each at 02/02/21 1103          Review of Systems:    ROS: 14-point review of systems was negative aside from that noted in the HPI. Additional pertinent positives are listed below.          Physical Exam:   BP (!) 76/51 (BP Location: Right arm, Patient Position: Sitting, Cuff Size: Adult Regular)   Pulse 87   LMP  (LMP Unknown)   SpO2 97%   Estimated body mass index is 20.18 kg/m  as calculated from the following:    Height as of 4/11/25: 1.676 m (5' 6\").    Weight as of 4/11/25: 56.7 kg (125 lb).  General: Pleasant female in no apparent distress.  Resp: No respiratory distress  CV: RRR  Abdomen: Soft, nontender, nondistended. SPT stoma.      Imaging:    All imaging reviewed with patient.    CTAP (2/2025):  1. Left-sided nephrolithiasis measuring up to 18 mm and multiple  layering bladder calculi.  2. Interval removal of percutaneous left nephrostomy tubes since  10/18/2024. No obstructing calculi or hydronephrosis.  3. Malpositioned IUD in the uterus with possible extension of the IUD  arm through the posterior uterine wall. Questionable embedment of the  IUD stem into the lower uterine myometrium.  4. Hepatomegaly.      Outside records:    I spent 15 minutes reviewing outside records.         Assessment and Plan:   30 year old female with NGB 2/2 cervical SCI, s/p CCIC and BN closure, with multiple renal and bladder calculi s/p percutaneous cystolithotomy (4/11/25). SPT removed today.    I reviewed her recent CT (prior to bladder stone removal) which also demonstrates some recurrent left renal calculi. I don't believe she has followed up with an endourologist so will try to arrange this.    Follow up in 6 months for cystoscopy " (bladder stone surveillance, mucus/debris removal)  Daily irrigation  Resume regular CIC regimen. Can increase frequency for 1-2 weeks to help keep bladder empty and allow SPT tract to heal. No cystogram necessary.  Will reach out about endourology follow-up.    Arsh Ford MD  Genitourinary Reconstructive Surgery Fellow

## 2025-05-05 NOTE — PROGRESS NOTES
Chief Complaint   Patient presents with    Follow Up    SP tube removal      Blood pressure today is low. Patient states she is feeling well, no red flag symptoms including chest pain, dizziness, lightheadedness, vision changes, headache, fever or chills. She declines repeat blood pressure check today. She is here today with her mother.     Blood pressure (!) 76/51, pulse 87, SpO2 97%, not currently breastfeeding. There is no height or weight on file to calculate BMI.    Patient Active Problem List   Diagnosis    C5 burst fracture    IUD (intrauterine device) in place- placed 03/2025    H/O: pneumonia    АНДРЕЙ (generalized anxiety disorder)    Quadriplegia, post-traumatic (H)- incomplete quad, limited use upper extremities    Autonomic dysreflexia    Personal history of DVT (deep vein thrombosis)    Neurogenic bladder    Impaired lung function    YONI III with severe dysplasia    Major depression    Neurogenic bowel    Spasticity    Spinal cord injury, C5-C7 (H)    Urinary incontinence    Nephrolithiasis    Neuromuscular respiratory weakness (H)    Urinary retention    S/P flap graft    Marijuana use    Bladder stone       Allergies   Allergen Reactions    Succinylcholine Other (See Comments)     Spinal cord injury 12/18/12, patient at risk for extrajunctional receptors and hyperkalemia       Current Outpatient Medications   Medication Sig Dispense Refill    acetaminophen (TYLENOL) 325 MG tablet Take 325-650 mg by mouth every 6 hours as needed.      albuterol (PROVENTIL) (2.5 MG/3ML) 0.083% neb solution TAKE 1 VIAL BY NEBULIZATION EVERY 4 HOURS AS NEEDED FOR SHORTNESS OF BREATH / DYSPNEA OR WHEEZING 150 mL 3    baclofen (LIORESAL) 10 MG tablet Take 30 mg by mouth 3 times daily (10MG X 3 = 30MG)      bisacodyl (DULCOLAX) 10 MG suppository Place 1 suppository (10 mg) rectally At Bedtime 30 suppository 0    ciclopirox (PENLAC) 8 % external solution Apply to adjacent skin and affected nails daily.  Remove with alcohol  every 7 days, then repeat. 6.6 mL 11    fluticasone-vilanterol (BREO ELLIPTA) 100-25 MCG/ACT inhaler INHALE 1 PUFF BY MOUTH EVERY DAY 28 each 5    gabapentin (NEURONTIN) 300 MG capsule Take 300 mg by mouth At Bedtime       hydrOXYzine lina (VISTARIL) 25 MG capsule TAKE 1 CAPSULE (25 MG) BY MOUTH 3 TIMES DAILY AS NEEDED FOR ANXIETY (OR AT BEDTIME FOR SLEEP.) 270 capsule 0    midodrine (PROAMATINE) 5 MG tablet Take 10 mg by mouth 2 times daily  6 tablet 0    Misc Natural Products (ELDERBERRY/VITAMIN C/ZINC PO) Take 1 tablet by mouth daily      Multiple Vitamins-Minerals (WOMENS MULTIVITAMIN) TABS Take 1 tablet by mouth daily      nicotine (NICORETTE) 2 MG gum Place 1 each (2 mg) inside cheek every hour as needed for nicotine withdrawal symptoms 200 each 2    oxybutynin ER (DITROPAN-XL) 5 MG 24 hr tablet Take 5 mg by mouth every evening      oxyCODONE (ROXICODONE) 5 MG tablet Take 1-2 tablets (5-10 mg) by mouth every 4 hours as needed for moderate to severe pain. 12 tablet 0    senna-docusate (SENOKOT-S/PERICOLACE) 8.6-50 MG tablet Take 1-2 tablets by mouth 2 times daily. 30 tablet 0    senna-docusate (SENOKOT-S/PERICOLACE) 8.6-50 MG tablet Take 1 tablet by mouth daily      sertraline (ZOLOFT) 100 MG tablet TAKE 1.5 TABLETS BY MOUTH DAILY 135 tablet 0    sodium chloride (NEBUSAL) 3 % neb solution Take 3 mLs by nebulization every 6 hours as needed for wheezing or other (sputum clearance difficulty due to quadridplegia.) 300 mL 1    sterile water, bottle, irrigation Irrigate with 240 mLs as directed daily 7200 mL 11    Vitamin D3 (CHOLECALCIFEROL) 125 MCG (5000 UT) tablet Take 1 tablet by mouth every other day         Social History     Tobacco Use    Smoking status: Former     Types: Other     Passive exposure: Current (per pt)    Smokeless tobacco: Current   Vaping Use    Vaping status: Every Day    Substances: Nicotine, THC, Flavoring    Devices: Disposable, Pre-filled pod   Substance Use Topics    Alcohol use: No     Drug use: Yes     Types: Marijuana     Comment: usually smokes at least two bongs per day, also smokes marijuana out of e-cig pen most days,       Crista Mckee  5/5/2025  3:57 PM

## 2025-05-05 NOTE — LETTER
5/5/2025       RE: Dianna Cottrell  [address] [facility] [address] [address]     Dear Colleague,    Thank you for referring your patient, Dianna Cottrell, to the [facility] [place] at [facility]. Please see a copy of my visit note below.    UROLOGY OUTPATIENT CLINIC NOTE    Name: Dianna Cottrell    MRN: [id number]   YOB: 1995  Date of Encounter: 05/05/25              History of Present Illness:   Mr. Dianna Cottrell is a 30 year old female seen for post-op follow-up. 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    2023: CCIC and BN closure by SPE (small capacity bladder pre-op and long-term indwelling urethral Winston)  10/2024: PCNL (Kanwal)  1/2025: Surveillance CT after PCNL --> bladder stones; several, none larger than 1cm  3/3/2025: recurrent UTIs, clogging of catheter, frequent AUTONOMIC DYSREFLEXIA . She is irrigating bid. Cysto - intact channel, bladder neck closed, several bladder stones.    4/11/25: percutaneous cystolithotomy. Discharged with SPT.    5/5/25: here for follow-up and SPT removal. Doing well since surgery.    Suprapubic tube removal (05/05/25)  Patient was positioned supine and the bladder was emptied. SPT capped. The balloon was taken down and the SPT site was dressed with dry gauze. Tolerated well.         Past Medical History:     Past Medical History:   Diagnosis Date     Anemia     with pregnancy     c5 burst fracture 12/18/2012    C5-C7 fracture with cord injury     Compression fracture of L1 lumbar vertebra (H) 12/18/2012    L1 superior endplate compression fracture     Depressive disorder      Encounter for insertion of Mirena IUD 12/13/2017     Fracture of thoracic spine without spinal cord lesion (H) 12/18/2012    T3-T8 spinous process fractures     History of spinal cord injury      History of thrombophlebitis      Hypertension 12/06/2016     Impaired lung function       Nephrolithiasis 2022     Neurogenic bladder      Neurogenic bowel      Quadriplegia (H)      Thrombosis      Tobacco use      Uncomplicated asthma      Urinary tract infection      Vocal cord dysfunction     Left vocal cord weakness noted by ENT post extubation 2012          Past Surgical History:     Past Surgical History:   Procedure Laterality Date     BIOPSY       BLADDER SURGERY       C4-C7 interbody fusion with anterior screw and plate fixation and posterior erna and pedicle screw fixation with interspace bone graft and C5 and C6 partial corpectomies  2012      SECTION  2013    Procedure:  SECTION;   Section ;  Surgeon: Ricki Nelson MD;  [facility]      SECTION N/A 2016    Procedure:  SECTION;  Surgeon: Floridalma Kiran MD;  [facility]     CONIZATION LEEP N/A 2021    Procedure: CONE BIOPSY, CERVIX, USING LOOP ELECTROSURGICAL EXCISION PROCEDURE (LEEP) and ECC;  Surgeon: Carlotta Lock MD;  Location: [facility] OR     HEAD & NECK SURGERY       INSERT INTRAUTERINE DEVICE N/A 2021    Procedure: INSERTION, INTRAUTERINE DEVICE , replacement of Mirena Intrauterine device;  Surgeon: Carlotta Lock MD;  Location[facility] OR     IR CYSTOGRAM  2022     IR IVC FILTER PLACEMENT  2012     IR IVC FILTER REMOVAL  2013     IR LUMBAR PUNCTURE  2012     IR NEPHROLITHOTOMY  10/4/2024     IR NEPHROSTOMY TUBE PLACEMENT LEFT  2023     IRRIGATION AND DEBRIDEMENT BUTTOCKS Right 2020    Procedure: Sharp excisional debridement of right ischial tuberosity decubitus,   Bone biopsies for cultures and path,  VAC Via placement.;  Surgeon: Vira Zacarias MD;  [facility]     IRRIGATION AND DEBRIDEMENT DECUBITUS WITH FLAP CLOSURE, COMBINED Right 2023    Procedure: IRRIGATION AND DEBRIDEMENT, PRESSURE ULCER, WITH FLAP CLOSURE, Right ischial decubitus with osteomyelitis.  posterior thigh flap;  Surgeon:  Vira Zacarias MD;  [facility]     LAPAROSCOPIC HAND ASSISTED BLADDER AUGMENTATION N/A 3/16/2023    Procedure: HAND-ASSISTED LAPAROSCOPIC BLADDER AUGMENTATION WITH CATHETERIZABLE CHANNEL; BLADDER NECK CLOSURE;  Surgeon: Mata Bacon MD;  [facility]     LASER HOLMIUM LITHOTRIPSY URETER(S), INSERT STENT, COMBINED Bilateral 4/11/2022    Procedure:  CYSTOSCOPY, BILATERAL  PYELOGRAM, BILATERAL URETEROSCOPY WITH  RIGHT HOLMIUM LITHOTRIPSY, BILATERAL STONE BASKET EXTRACTION, BILATERAL URETERAL STENT PLACEMENT.  LEFT PERCUTANEOUS NEPHROLITHOTOMY;  Surgeon: Parveen Schofield MD;  Location: [facility] OR     LASER HOLMIUM NEPHROLITHOTOMY VIA PERCUTANEOUS NEPHROSTOMY Left 9/20/2023    Procedure: NEPHROLITHOTOMY, PERCUTANEOUS, USING HOLMIUM LASER, NEPHROSTOMY TUBE EXCHANGE;  Surgeon: Parveen Schofield MD;  [facility]     LASER HOLMIUM NEPHROLITHOTOMY VIA PERCUTANEOUS NEPHROSTOMY Left 10/4/2024    Procedure: NEPHROLITHOTOMY, PERCUTANEOUS- LEFT, LEFT ANTEGRADE URETEROSCOPY, LEFT NEPHROSTOMY TUBE PLACEMENT;  Surgeon: Parveen Schofield MD;  Location: [facility]     LUMBAR DRAIN  12/18/2012     PERCUTANEOUS CYSTOLITHOTOMY N/A 4/11/2025    Procedure: CYSTOLITHOTOMY, PERCUTANEOUS;  Surgeon: Mata Bacon MD;  Location: [facility] OR     PERCUTANEOUS NEPHROLITHOTOMY Left 4/11/2022    Procedure: NEPHROLITHOTOMY, PERCUTANEOUS;  Surgeon: Parveen Schofield MD;  Location: [facility] OR          Social History:     Social History     Tobacco Use     Smoking status: Former     Types: Other     Passive exposure: Current (per pt)     Smokeless tobacco: Current   Substance Use Topics     Alcohol use: No          Family History:     Family History   Problem Relation Age of Onset     Lung Cancer Maternal Grandfather      Hypertension No family hx of      Diabetes No family hx of           Allergies:     Allergies   Allergen Reactions     Succinylcholine Other (See Comments)     Spinal cord injury 12/18/12, patient at risk for  extrajunctional receptors and hyperkalemia          Medications:     Current Outpatient Medications   Medication Sig Dispense Refill     acetaminophen (TYLENOL) 325 MG tablet Take 325-650 mg by mouth every 6 hours as needed.       albuterol (PROVENTIL) (2.5 MG/3ML) 0.083% neb solution TAKE 1 VIAL BY NEBULIZATION EVERY 4 HOURS AS NEEDED FOR SHORTNESS OF BREATH / DYSPNEA OR WHEEZING 150 mL 3     baclofen (LIORESAL) 10 MG tablet Take 30 mg by mouth 3 times daily (10MG X 3 = 30MG)       bisacodyl (DULCOLAX) 10 MG suppository Place 1 suppository (10 mg) rectally At Bedtime 30 suppository 0     ciclopirox (PENLAC) 8 % external solution Apply to adjacent skin and affected nails daily.  Remove with alcohol every 7 days, then repeat. 6.6 mL 11     fluticasone-vilanterol (BREO ELLIPTA) 100-25 MCG/ACT inhaler INHALE 1 PUFF BY MOUTH EVERY DAY 28 each 5     gabapentin (NEURONTIN) 300 MG capsule Take 300 mg by mouth At Bedtime        hydrOXYzine lina (VISTARIL) 25 MG capsule TAKE 1 CAPSULE (25 MG) BY MOUTH 3 TIMES DAILY AS NEEDED FOR ANXIETY (OR AT BEDTIME FOR SLEEP.) 270 capsule 0     midodrine (PROAMATINE) 5 MG tablet Take 10 mg by mouth 2 times daily  6 tablet 0     Misc Natural Products (ELDERBERRY/VITAMIN C/ZINC PO) Take 1 tablet by mouth daily       Multiple Vitamins-Minerals (WOMENS MULTIVITAMIN) TABS Take 1 tablet by mouth daily       nicotine (NICORETTE) 2 MG gum Place 1 each (2 mg) inside cheek every hour as needed for nicotine withdrawal symptoms 200 each 2     oxybutynin ER (DITROPAN-XL) 5 MG 24 hr tablet Take 5 mg by mouth every evening       oxyCODONE (ROXICODONE) 5 MG tablet Take 1-2 tablets (5-10 mg) by mouth every 4 hours as needed for moderate to severe pain. 12 tablet 0     senna-docusate (SENOKOT-S/PERICOLACE) 8.6-50 MG tablet Take 1-2 tablets by mouth 2 times daily. 30 tablet 0     senna-docusate (SENOKOT-S/PERICOLACE) 8.6-50 MG tablet Take 1 tablet by mouth daily       sertraline (ZOLOFT) 100 MG tablet TAKE  "1.5 TABLETS BY MOUTH DAILY 135 tablet 0     sodium chloride (NEBUSAL) 3 % neb solution Take 3 mLs by nebulization every 6 hours as needed for wheezing or other (sputum clearance difficulty due to quadridplegia.) 300 mL 1     sterile water, bottle, irrigation Irrigate with 240 mLs as directed daily 7200 mL 11     Vitamin D3 (CHOLECALCIFEROL) 125 MCG (5000 UT) tablet Take 1 tablet by mouth every other day       No current facility-administered medications for this visit.     Facility-Administered Medications Ordered in Other Visits   Medication Dose Route Frequency Provider Last Rate Last Admin     levonorgestrel (MIRENA) 20 MCG/24HR IUD    PRCarlotta Rome MD   1 each at 02/02/21 1103          Review of Systems:    ROS: 14-point review of systems was negative aside from that noted in the HPI. Additional pertinent positives are listed below.          Physical Exam:   BP (!) 76/51 (BP Location: Right arm, Patient Position: Sitting, Cuff Size: Adult Regular)   Pulse 87   LMP  (LMP Unknown)   SpO2 97%   Estimated body mass index is 20.18 kg/m  as calculated from the following:    Height as of 4/11/25: 1.676 m (5' 6\").    Weight as of 4/11/25: 56.7 kg (125 lb).  General: Pleasant female in no apparent distress.  Resp: No respiratory distress  CV: RRR  Abdomen: Soft, nontender, nondistended. SPT stoma.      Imaging:    All imaging reviewed with patient.    CTAP (2/2025):  1. Left-sided nephrolithiasis measuring up to 18 mm and multiple  layering bladder calculi.  2. Interval removal of percutaneous left nephrostomy tubes since  10/18/2024. No obstructing calculi or hydronephrosis.  3. Malpositioned IUD in the uterus with possible extension of the IUD  arm through the posterior uterine wall. Questionable embedment of the  IUD stem into the lower uterine myometrium.  4. Hepatomegaly.      Outside records:    I spent 15 minutes reviewing outside records.         Assessment and Plan:   30 year old female with NGB " 2/2 cervical SCI, s/p CCIC and BN closure, with multiple renal and bladder calculi s/p percutaneous cystolithotomy (4/11/25). SPT removed today.    I reviewed her recent CT (prior to bladder stone removal) which also demonstrates some recurrent left renal calculi. I don't believe she has followed up with an endourologist so will try to arrange this.    Follow up in 6 months for cystoscopy (bladder stone surveillance, mucus/debris removal)  Daily irrigation  Resume regular CIC regimen. Can increase frequency for 1-2 weeks to help keep bladder empty and allow SPT tract to heal. No cystogram necessary.  Will reach out about endourology follow-up.    Arsh Ford MD  Genitourinary Reconstructive Surgery Fellow    Chief Complaint   Patient presents with     Follow Up     SP tube removal      Blood pressure today is low. Patient states she is feeling well, no red flag symptoms including chest pain, dizziness, lightheadedness, vision changes, headache, fever or chills. She declines repeat blood pressure check today. She is here today with her mother.     Blood pressure (!) 76/51, pulse 87, SpO2 97%, not currently breastfeeding. There is no height or weight on file to calculate BMI.    Patient Active Problem List   Diagnosis     C5 burst fracture     IUD (intrauterine device) in place- placed 03/2025     H/O: pneumonia     АНДРЕЙ (generalized anxiety disorder)     Quadriplegia, post-traumatic (H)- incomplete quad, limited use upper extremities     Autonomic dysreflexia     Personal history of DVT (deep vein thrombosis)     Neurogenic bladder     Impaired lung function     YONI III with severe dysplasia     Major depression     Neurogenic bowel     Spasticity     Spinal cord injury, C5-C7 (H)     Urinary incontinence     Nephrolithiasis     Neuromuscular respiratory weakness (H)     Urinary retention     S/P flap graft     Marijuana use     Bladder stone       Allergies   Allergen Reactions     Succinylcholine Other (See  Comments)     Spinal cord injury 12/18/12, patient at risk for extrajunctional receptors and hyperkalemia       Current Outpatient Medications   Medication Sig Dispense Refill     acetaminophen (TYLENOL) 325 MG tablet Take 325-650 mg by mouth every 6 hours as needed.       albuterol (PROVENTIL) (2.5 MG/3ML) 0.083% neb solution TAKE 1 VIAL BY NEBULIZATION EVERY 4 HOURS AS NEEDED FOR SHORTNESS OF BREATH / DYSPNEA OR WHEEZING 150 mL 3     baclofen (LIORESAL) 10 MG tablet Take 30 mg by mouth 3 times daily (10MG X 3 = 30MG)       bisacodyl (DULCOLAX) 10 MG suppository Place 1 suppository (10 mg) rectally At Bedtime 30 suppository 0     ciclopirox (PENLAC) 8 % external solution Apply to adjacent skin and affected nails daily.  Remove with alcohol every 7 days, then repeat. 6.6 mL 11     fluticasone-vilanterol (BREO ELLIPTA) 100-25 MCG/ACT inhaler INHALE 1 PUFF BY MOUTH EVERY DAY 28 each 5     gabapentin (NEURONTIN) 300 MG capsule Take 300 mg by mouth At Bedtime        hydrOXYzine lina (VISTARIL) 25 MG capsule TAKE 1 CAPSULE (25 MG) BY MOUTH 3 TIMES DAILY AS NEEDED FOR ANXIETY (OR AT BEDTIME FOR SLEEP.) 270 capsule 0     midodrine (PROAMATINE) 5 MG tablet Take 10 mg by mouth 2 times daily  6 tablet 0     Misc Natural Products (ELDERBERRY/VITAMIN C/ZINC PO) Take 1 tablet by mouth daily       Multiple Vitamins-Minerals (WOMENS MULTIVITAMIN) TABS Take 1 tablet by mouth daily       nicotine (NICORETTE) 2 MG gum Place 1 each (2 mg) inside cheek every hour as needed for nicotine withdrawal symptoms 200 each 2     oxybutynin ER (DITROPAN-XL) 5 MG 24 hr tablet Take 5 mg by mouth every evening       oxyCODONE (ROXICODONE) 5 MG tablet Take 1-2 tablets (5-10 mg) by mouth every 4 hours as needed for moderate to severe pain. 12 tablet 0     senna-docusate (SENOKOT-S/PERICOLACE) 8.6-50 MG tablet Take 1-2 tablets by mouth 2 times daily. 30 tablet 0     senna-docusate (SENOKOT-S/PERICOLACE) 8.6-50 MG tablet Take 1 tablet by mouth daily        sertraline (ZOLOFT) 100 MG tablet TAKE 1.5 TABLETS BY MOUTH DAILY 135 tablet 0     sodium chloride (NEBUSAL) 3 % neb solution Take 3 mLs by nebulization every 6 hours as needed for wheezing or other (sputum clearance difficulty due to quadridplegia.) 300 mL 1     sterile water, bottle, irrigation Irrigate with 240 mLs as directed daily 7200 mL 11     Vitamin D3 (CHOLECALCIFEROL) 125 MCG (5000 UT) tablet Take 1 tablet by mouth every other day         Social History     Tobacco Use     Smoking status: Former     Types: Other     Passive exposure: Current (per pt)     Smokeless tobacco: Current   Vaping Use     Vaping status: Every Day     Substances: Nicotine, THC, Flavoring     Devices: Disposable, Pre-filled pod   Substance Use Topics     Alcohol use: No     Drug use: Yes     Types: Marijuana     Comment: usually smokes at least two bongs per day, also smokes marijuana out of e-cig pen most days,       Crista Mckee  5/5/2025  3:57 PM         Again, thank you for allowing me to participate in the care of your patient.      Sincerely,    Arsh Ford MD

## 2025-06-05 ENCOUNTER — TELEPHONE (OUTPATIENT)
Dept: FAMILY MEDICINE | Facility: CLINIC | Age: 30
End: 2025-06-05
Payer: MEDICARE

## 2025-06-05 DIAGNOSIS — Z53.9 DIAGNOSIS NOT YET DEFINED: Primary | ICD-10-CM

## 2025-06-05 PROCEDURE — G0179 MD RECERTIFICATION HHA PT: HCPCS | Performed by: PHYSICIAN ASSISTANT

## 2025-06-05 NOTE — TELEPHONE ENCOUNTER
Forms/Letter Request    Type of form/letter: Home Health Certification      Do we have the form/letter: Yes:     Who is the form from? Home care - [facility]    Where did/will the form come from? form was faxed in    When is form/letter needed by: When able    How would you like the form/letter returned: Fax : [phone]

## 2025-06-17 ENCOUNTER — TELEPHONE (OUTPATIENT)
Dept: FAMILY MEDICINE | Facility: CLINIC | Age: 30
End: 2025-06-17
Payer: MEDICARE

## 2025-06-17 NOTE — TELEPHONE ENCOUNTER
Forms/Letter Request    Type of form/letter: OTHER: [facility]       Do we have the form/letter: Yes:     Who is the form from?  (if other please explain)    Where did/will the form come from? form was faxed in    When is form/letter needed by:     How would you like the form/letter returned: Fax : [phone]    Patient Notified form requests are processed in 5-7 business days:N/A    Could we send this information to you in [employer] or would you prefer to receive a phone call?:   No preference   Okay to leave a detailed message?: N/A at Cell number on file:    Telephone Information:   Mobile [phone]

## 2025-06-18 ENCOUNTER — TELEPHONE (OUTPATIENT)
Dept: FAMILY MEDICINE | Facility: CLINIC | Age: 30
End: 2025-06-18
Payer: MEDICARE

## 2025-06-18 NOTE — TELEPHONE ENCOUNTER
Medication Question or Refill    Contacts       Contact Date/Time Type Contact Phone/Fax    06/18/2025 06:05 PM CDT Phone (Incoming) [facility] [facility] (Other) [phone]     [facility]            What medication are you calling about (include dose and sig)?: Medical Supply - Catheter.      Preferred Pharmacy:   [facility] [address] [place] [facility] - [place], [place] - [address]  [address] [facility] [address]  Phone: [phone] Fax: [phone]    [facility] - [place], [place] - [address]  [address]  [facility] [place] [address]  Phone: [phone] Fax: [phone]    [facility] - [place], [place] - [address]  [address]  [facility] [place] [address]  Phone: [phone] Fax: [phone] Alternate Fax: [phone], [phone]      Controlled Substance Agreement on file:   CSA -- Patient Level:    CSA: None found at the patient level.       Who prescribed the medication?: Suzan Willis PA-C    Do you need a refill? Yes    When did you use the medication last?  N/A    Patient offered an appointment? No    Do you have any questions or concerns?  Yes: [place] [facility] sent over a fax for Medical Supply for pt Catheter. The fax needs to be signed by   Suzan Willis PA-C and sent back for approval. Fax Number - [phone]       Could we send this information to you in [facility] or would you prefer to receive a phone call?:   Patient would prefer a phone call   Okay to leave a detailed message?: Yes at Other phone number:  [phone]

## 2025-06-19 NOTE — TELEPHONE ENCOUNTER
Nj with [employer] called in to follow up on their request for catheter prescription. He states they have not yet received the signed prescription. As noted below, faxed and sent to scanning. Was this the signed prescription that was faxed? He is asking for this to be resent as they have not received it. Verified the fax number is [phone].    TING DuenasN, RN

## 2025-07-21 ENCOUNTER — TELEPHONE (OUTPATIENT)
Dept: FAMILY MEDICINE | Facility: CLINIC | Age: 30
End: 2025-07-21
Payer: MEDICARE

## 2025-07-21 ENCOUNTER — MEDICAL CORRESPONDENCE (OUTPATIENT)
Dept: HEALTH INFORMATION MANAGEMENT | Facility: CLINIC | Age: 30
End: 2025-07-21
Payer: MEDICARE

## 2025-07-21 NOTE — TELEPHONE ENCOUNTER
Forms/Letter Request    Type of form/letter: ORDERS       Do we have the form/letter: Yes:     Who is the form from? [facility]    Where did/will the form come from? form was faxed in    When is form/letter needed by: When able    How would you like the form/letter returned: Fax : [phone]

## 2025-08-07 ENCOUNTER — TELEPHONE (OUTPATIENT)
Dept: FAMILY MEDICINE | Facility: CLINIC | Age: 30
End: 2025-08-07
Payer: MEDICARE

## 2025-08-14 DIAGNOSIS — Z53.9 DIAGNOSIS NOT YET DEFINED: Primary | ICD-10-CM

## 2025-08-15 DIAGNOSIS — R94.2 IMPAIRED LUNG FUNCTION: Chronic | ICD-10-CM

## 2025-08-15 DIAGNOSIS — J99 NEUROMUSCULAR RESPIRATORY WEAKNESS (H): ICD-10-CM

## 2025-08-15 DIAGNOSIS — G70.9 NEUROMUSCULAR RESPIRATORY WEAKNESS (H): ICD-10-CM

## 2025-08-20 RX ORDER — FLUTICASONE FUROATE AND VILANTEROL TRIFENATATE 100; 25 UG/1; UG/1
1 POWDER RESPIRATORY (INHALATION) DAILY
Qty: 60 EACH | Refills: 5 | Status: SHIPPED | OUTPATIENT
Start: 2025-08-20

## 2025-08-21 DIAGNOSIS — F41.1 GAD (GENERALIZED ANXIETY DISORDER): ICD-10-CM

## 2025-08-21 DIAGNOSIS — F32.4 MAJOR DEPRESSIVE DISORDER WITH SINGLE EPISODE, IN PARTIAL REMISSION: ICD-10-CM

## 2025-08-25 RX ORDER — SERTRALINE HYDROCHLORIDE 100 MG/1
150 TABLET, FILM COATED ORAL DAILY
Qty: 135 TABLET | Refills: 0 | Status: SHIPPED | OUTPATIENT
Start: 2025-08-25

## 2025-08-26 DIAGNOSIS — N31.9 NEUROGENIC BLADDER: Primary | ICD-10-CM

## 2025-08-29 ENCOUNTER — MEDICAL CORRESPONDENCE (OUTPATIENT)
Dept: HEALTH INFORMATION MANAGEMENT | Facility: CLINIC | Age: 30
End: 2025-08-29
Payer: MEDICARE

## (undated) DEVICE — SOL NACL 0.9% IRRIG 1000ML BOTTLE 2F7124

## (undated) DEVICE — SU SILK 0 SH 30" K834H

## (undated) DEVICE — SU VICRYL 3-0 SH 27" UND J416H

## (undated) DEVICE — MANIFOLD NEPTUNE 4 PORT 700-20

## (undated) DEVICE — CATH URETERAL DUAL LUMEN 10FRX54CM M0064051000

## (undated) DEVICE — NDL COUNTER 40CT  31142311

## (undated) DEVICE — SU SILK 2-0 TIE 12X30" A305H

## (undated) DEVICE — COVER PROBE ULTRASOUND 3D W/GEL 5X96" LF 20-P3D596

## (undated) DEVICE — BAG DRAIN BILIARY NEPHROSTOMY REMINGTON 600ML LF 600-D

## (undated) DEVICE — GUIDEWIRE SENSOR DUAL FLEX STR 0.035"X150CM M0066703080

## (undated) DEVICE — VESSEL LOOPS YELLOW MAXI 31145694

## (undated) DEVICE — TUBING SUCTION MEDI-VAC SOFT 3/16"X20' N520A

## (undated) DEVICE — PREP CHLORAPREP 26ML TINTED HI-LITE ORANGE 930815

## (undated) DEVICE — TUBING IRRIG TUR Y TYPE 96" LF 6543-01

## (undated) DEVICE — BONE CLEANING TIP INTERPULSE  0210-010-000

## (undated) DEVICE — BLADE CLIPPER SGL USE 9680

## (undated) DEVICE — SOL NACL 0.9% IRRIG 3000ML BAG 2B7477

## (undated) DEVICE — GOWN IMPERVIOUS ZONED XLG 9041

## (undated) DEVICE — PANTIES MESH LG/XLG 2PK 706M2

## (undated) DEVICE — SUCTION MANIFOLD DORNOCH ULTRA CART UL-CL500

## (undated) DEVICE — Device

## (undated) DEVICE — CATH SELF CATH MALE 14FR 414

## (undated) DEVICE — GOWN IMPERVIOUS SPECIALTY XLG/XLONG 32474

## (undated) DEVICE — TUBING SUCTION MEDI-VAC 1/4"X20' N620A

## (undated) DEVICE — DRAPE C-ARM W/STRAPS 42X72" 07-CA104

## (undated) DEVICE — LIGHT HANDLE X2

## (undated) DEVICE — DEVICE MULTI TORQUE TD01

## (undated) DEVICE — GUIDEWIRE AMPLATZ SUPER STIFF 0.035"X180CM M001465250

## (undated) DEVICE — NDL 25GA 1.5" 305838

## (undated) DEVICE — DRSG TEGADERM 4X4 3/4" 1626W

## (undated) DEVICE — DRAPE IOBAN INCISE 23X17" 6650EZ

## (undated) DEVICE — LINEN TOWEL PACK X5 5464

## (undated) DEVICE — PREP SKIN SCRUB TRAY 4461A

## (undated) DEVICE — ESU PENCIL W/SMOKE EVAC NEPTUNE STRYKER 0703-046-000

## (undated) DEVICE — SHEATH ACCESS 30FR 130CM AMPLATZ DKS30.0-105-30

## (undated) DEVICE — SUCTION IRR STRYKERFLOW II W/TIP 250-070-520

## (undated) DEVICE — TAPE MEDIPORE 4"X10YD 2964

## (undated) DEVICE — DRAPE POUCH INSTRUMENT 1018

## (undated) DEVICE — PACK CYSTOSCOPY SBA15CYFSI

## (undated) DEVICE — CATH FOLYSIL 16FR 15ML AA6116

## (undated) DEVICE — DRAPE PCNL 41-0021

## (undated) DEVICE — TAPE DURAPORE 3" SILK 1538-3

## (undated) DEVICE — CATH URETERAL FLEX TIP TIGERTAIL 06FRX70CM 139006

## (undated) DEVICE — PAD CHUX UNDERPAD 23X24" 7136

## (undated) DEVICE — GUIDEWIRE AMPLATZ SUPER STIFF .035 X 145CM M0066401080

## (undated) DEVICE — GUIDEWIRE AMPLATZ SUPER STIFF 0.035"X260CM M001465260

## (undated) DEVICE — SOL WATER IRRIG 3000ML BAG 2B7117

## (undated) DEVICE — NDL COUNTER 20CT 31142493

## (undated) DEVICE — SYR 10ML LL W/O NDL 302995

## (undated) DEVICE — SOL WATER IRRIG 1000ML BOTTLE 2F7114

## (undated) DEVICE — LINEN ORTHO PACK 5446

## (undated) DEVICE — SPONGE KITTNER 30-101

## (undated) DEVICE — ENDO TROCAR FIRST ENTRY KII FIOS Z-THRD 05X100MM CTF03

## (undated) DEVICE — PAD CHUX UNDERPAD 30X36" P3036C

## (undated) DEVICE — DILATOR VASC W/INTRO GW 8FRX19CM .038"

## (undated) DEVICE — ENDO SCOPE WARMER SEAL  C3101

## (undated) DEVICE — GLOVE PROTEXIS BLUE W/NEU-THERA 7.0  2D73EB70

## (undated) DEVICE — PEN MARKING SKIN W/PAPER RULER 31145785

## (undated) DEVICE — SPONGE LAP 18X18" X8435

## (undated) DEVICE — ESU GROUND PAD UNIVERSAL W/O CORD

## (undated) DEVICE — PACK AB HYST II

## (undated) DEVICE — CATH URETERAL OPEN END 6FR AXXCESS

## (undated) DEVICE — PREP BRUSH SURG SCRUB CHLOROXYLENOL PCMX 3% 371163

## (undated) DEVICE — CATH PLUG W/CAP 000076

## (undated) DEVICE — CLIP HORIZON MED BLUE 002200

## (undated) DEVICE — DRAPE GYN/UROLOGY FLUID POUCH TUR 29455

## (undated) DEVICE — TUBING INSUFFLATION PNEUMOCLEAR 0620050100

## (undated) DEVICE — JELLY LUBRICATING SURGILUBE 2OZ TUBE 0281-0205-02

## (undated) DEVICE — SUCTION IRR SYSTEM W/O TIP INTERPULSE HANDPIECE 0210-100-000

## (undated) DEVICE — INTRO KIT ACUSTICK II 21GA .018-.038" 20-703

## (undated) DEVICE — BASKET NITINOL TIPLESS HALO  1.5FRX120CM 554120

## (undated) DEVICE — SHEATH URETERAL ACCESS NAVIGATOR HD 11/13FRX36CM M0062502220

## (undated) DEVICE — RAD NDL TROCAR 18GAX15CM G00945

## (undated) DEVICE — CATH BALLOON DILATION X FORCE 30FR 10MMX15CM 996101

## (undated) DEVICE — GOWN XLG DISP 9545

## (undated) DEVICE — SYR 10ML FINGER CONTROL W/O NDL 309695

## (undated) DEVICE — LINEN TOWEL PACK X30 5481

## (undated) DEVICE — POSITIONER HEAD DONUT FOAM 9" LF FP-HEAD9

## (undated) DEVICE — ESU CLEANER TIP 31142717

## (undated) DEVICE — SPECIMEN CONTAINER 5OZ STERILE 2600SA

## (undated) DEVICE — BLADE KNIFE SURG 11 WITH HANDLE 4-411

## (undated) DEVICE — TUBING SET THERMEDX UROLOGY SGL USE LL0006

## (undated) DEVICE — DRAPE UNDER BUTTOCK 8483

## (undated) DEVICE — SU SILK 3-0 TIE 12X30" A304H

## (undated) DEVICE — LIGHT HANDLE X1 31140133

## (undated) DEVICE — GLIDEWIRE TERUMO .035X180 ANG STIFF GS3508

## (undated) DEVICE — DRAPE MAYO STAND 23X54 8337

## (undated) DEVICE — CATH ANGIO TORCON BECAON TIP JB-1 5FRX65CM G11208

## (undated) DEVICE — CUP AND LID 4OZ STERILE SSK9008A

## (undated) DEVICE — LINEN TOWEL PACK X6 WHITE 5487

## (undated) DEVICE — RAD RX ISOVUE 300 (50ML) 61% IOPAMIDOL CHARGE PER ML

## (undated) DEVICE — KIT ENDO FIRST STEP DISINFECTANT 200ML W/POUCH EP-4

## (undated) DEVICE — PROBE TRILOGY KIT 3.4MM X 340MM M0068402910

## (undated) DEVICE — PITCHER STERILE 1000ML  SSK9004A

## (undated) DEVICE — SU VICRYL 3-0 FS-1 27" J442H

## (undated) DEVICE — DRSG COTTON BALL 6PK LCB62

## (undated) DEVICE — SU PDS II 3-0 SH 27" Z316H

## (undated) DEVICE — SPONGE SURGIFOAM 50

## (undated) DEVICE — DRSG TELFA 3X8" 1238

## (undated) DEVICE — DRAPE NEURO TIBURON W/XL FLUID CONTROL POUCH

## (undated) DEVICE — RAD NDL CHIBA BX 22GAX15CM G00012

## (undated) DEVICE — BLADE KNIFE SURG 15 371115

## (undated) DEVICE — ESU GROUND PAD ADULT W/CORD E7507

## (undated) DEVICE — SET TUBNG SUC IRRIG ENDOMAT 031523-01

## (undated) DEVICE — TRAY PREP DRY SKIN SCRUB 067

## (undated) DEVICE — GLOVE BIOGEL PI MICRO SZ 7.5 48575

## (undated) DEVICE — SYR PISTON URETHRAL 60ML 68000

## (undated) DEVICE — BAG URINARY DRAIN 4000ML LF 153509

## (undated) DEVICE — LINEN GOWN X4 5410

## (undated) DEVICE — GLOVE BIOGEL PI MICRO SZ 8.0 48580

## (undated) DEVICE — RAD INFLATOR BASIC COMPAK  IN4130

## (undated) DEVICE — DRSG TEGADERM 6X8" 1628

## (undated) DEVICE — CATH INTERMITTENT CLEAN-CATH FEMALE 14FR 6" VINYL LF 420614

## (undated) DEVICE — SU VICRYL 3-0 SH 8X18" UND J864D

## (undated) DEVICE — NDL ECHOTIP PERC ENTRY 18GA 20CM BLUNT CAN G05042

## (undated) DEVICE — SYR 50ML CATH TIP W/O NDL 309620

## (undated) DEVICE — ENDO GELPORT 100/120MM C8XX2

## (undated) DEVICE — SUCTION WATERBUG 12FT FLR FLEX TUBE NS LF DISP A90030

## (undated) DEVICE — LINEN GOWN XLG 5407

## (undated) DEVICE — JELLY LUBRICATING SURGILUBE 2OZ TUBE

## (undated) DEVICE — KIT CATH BALLOON NEPHROMAX 24FRX12CM M0062101600

## (undated) DEVICE — DECANTER TRANSFER DEVICE 2008S

## (undated) DEVICE — DRAPE SHEET REV FOLD 3/4 9349

## (undated) DEVICE — CATH ANGIO IMPRESS 5FRX65CM BERENSTEIN 56538BER

## (undated) DEVICE — PROBE SET CYBERWAND LITHO GYRUS RENAL/BLADDER CW-RBP

## (undated) DEVICE — PREP POVIDONE-IODINE 7.5% SCRUB 4OZ BOTTLE MDS093945

## (undated) DEVICE — TUBING IRR TUR Y TYPE 2C4041

## (undated) DEVICE — PREP POVIDONE IODINE SOLUTION 10% 4OZ BOTTLE 29906-004

## (undated) DEVICE — SYR BULB IRRIG DOVER 60 ML LATEX FREE 67000

## (undated) DEVICE — NDL PERC ACCESS 18GX20CM M0067001220

## (undated) DEVICE — PROBE SHOCKPULSE LITHOTRIPTER 3.76X396MM SPL-PDBX376

## (undated) DEVICE — ESU LIGASURE MARYLAND LAPAROSCOPIC SLR/DVDR 5MMX37CM LF1937

## (undated) DEVICE — CATH FOLYSIL 20FR 15ML AA6120

## (undated) DEVICE — GLOVE SURG PI ULTRA TOUCH M SZ 6-1/2 LF

## (undated) DEVICE — SU MONOCRYL 4-0 PS-2 18" UND Y496G

## (undated) DEVICE — DRAIN JACKSON PRATT RESERVOIR 100ML SU130-1305

## (undated) DEVICE — SU VICRYL 2-0 CT-2 27" UND J269H

## (undated) DEVICE — DRAPE LEGGINGS 8421

## (undated) DEVICE — DRSG PRIMAPORE 02X3" 7133

## (undated) DEVICE — LASER FIBER HOLMIUM MOSES 200 D/F/L AC-10030100

## (undated) DEVICE — STPL LINEAR CUT 100MM TLC10

## (undated) DEVICE — BASKET STONE RETRIEVAL NTNL ZERO TIP 1.9FRX90CM M0063901050

## (undated) DEVICE — NDL 22GA 1.5"

## (undated) DEVICE — SU MONOCRYL 4-0 PS-2 27" UND Y426H

## (undated) DEVICE — SU PDS II 0 CT-1 27" Z340H

## (undated) DEVICE — PACK SPECIAL PROCEDURE CUSTOM

## (undated) DEVICE — FILTER HEPA FLUID TRAP NEPTUNE 0703-040-001

## (undated) DEVICE — WIRE GUIDE AMPLATZ SUPER STIFF 0.035"X260CM M001465261

## (undated) DEVICE — DRSG GAUZE 4X4" 8044

## (undated) DEVICE — DRSG STERI STRIP 1X5" R1548

## (undated) DEVICE — COVER BACK TABLE STERILE-Z 5575

## (undated) DEVICE — NEEDLE HYPO MONOJECT STANDARD 22GA 1 1/2IN BLUE 1188822112

## (undated) DEVICE — COVER CAMERA IN-LIGHT DISP LT-C02

## (undated) DEVICE — GLOVE BIOGEL PI MICRO SZ 7.0 48570

## (undated) DEVICE — BASKET STONE RETRIEVAL ZERO TIP 2.4FRX120CM M0063901010

## (undated) DEVICE — TUBING SUCTION 12"X1/4" N612

## (undated) DEVICE — ADH SKIN CLOSURE PREMIERPRO EXOFIN 1.0ML 3470

## (undated) DEVICE — DRAPE SLEEVE 599

## (undated) DEVICE — STPL SKIN 35W ROTATING HEAD PRW35

## (undated) DEVICE — CONTAINER SPECIMEN 4OZ STERILE 17099

## (undated) DEVICE — TUBE CULTURE AEROBIC/ANAEROBIC W/O SWABS A.C.T.I.1. 12401

## (undated) DEVICE — DRSG GAUZE 4X4" 3033

## (undated) DEVICE — PREP TECHNI-CARE CHLOROXYLENOL 3% 4OZ BOTTLE C222-4ZWO

## (undated) DEVICE — SYR CONTRAST DELIVERY ANGIOGRAPHY

## (undated) DEVICE — SOL NACL 0.9% INJ 1000ML BAG 2B1324X

## (undated) DEVICE — GLOVE SENSICARE MICRO PF 7.0 LATEX FREE MSG1670

## (undated) DEVICE — GUIDEWIRE ZIPWIRE STD STR .035"X150CM M006630205B0

## (undated) DEVICE — ADAPTER SCOPE UROLOK II LF M0067301400

## (undated) DEVICE — ESU ELEC LEEP BALL 5MM

## (undated) DEVICE — CATH TRAY FOLEY SURESTEP 16FR WDRAIN BAG STLK LATEX A300316A

## (undated) DEVICE — SU VICRYL 4-0 RB-1 27" J304

## (undated) DEVICE — TUBING EXTENSION SET 20" B1327

## (undated) DEVICE — CONTRAST MEDIA ISOVUE 300 61% IOPAMIDOL  CHRG PER 1ML 131535

## (undated) DEVICE — DRAIN JACKSON PRATT 19FR ROUND SU130-1325

## (undated) DEVICE — ENDO ADAPTER CHECK-FLO DISP 27550-CKU

## (undated) DEVICE — STRAP KNEE/BODY 31143004

## (undated) DEVICE — SYR 10ML SLIP TIP W/O NDL

## (undated) DEVICE — POSITIONER PT PRONESAFE HEAD REST W/DERMAPROX INSERT 40599

## (undated) DEVICE — CATH URETERAL OPEN END 5FRX70CM M0064002010

## (undated) DEVICE — ESU ELEC LEEP LOOP 20X12MM WHITE DLP-W11

## (undated) DEVICE — SU SILK 0 TIE 6X30" A306H

## (undated) DEVICE — ENDO TROCAR FIRST ENTRY KII FIOS Z-THRD 12X100MM CTF73

## (undated) DEVICE — SUCTION MANIFOLD NEPTUNE 2 SYS 4 PORT 0702-020-000

## (undated) DEVICE — SYR BULB IRRIG 50ML LATEX FREE 0035280

## (undated) DEVICE — POSITIONER HEAD FRAME FOAM LF FP-HEADSF

## (undated) DEVICE — DRSG ABDOMINAL 07 1/2X8" 7197D

## (undated) DEVICE — CUP AND LID 2PK 2OZ STERILE  SSK9006A

## (undated) DEVICE — SWAB PROCTO 16" 2/PK 32-046

## (undated) DEVICE — DRSG PRIMAPORE 03 1/8X6" 66000318

## (undated) DEVICE — SPECIMEN CONTAINER W/10% BUFFERED FORMALIN 120ML 591201

## (undated) DEVICE — GLOVE PROTEXIS MICRO 7.0  2D73PM70

## (undated) DEVICE — ENDO TROCAR SLEEVE KII Z-THREADED 05X100MM CTS02

## (undated) DEVICE — WIPES FOLEY CARE SURESTEP PROVON DFC100

## (undated) DEVICE — BLADE KNIFE SURG 11 371111

## (undated) DEVICE — SYR 30ML LL W/O NDL 302832

## (undated) DEVICE — PREP CHLORAPREP 26ML TINTED ORANGE  260815

## (undated) DEVICE — GLOVE PROTEXIS MICRO 6.5  2D73PM65

## (undated) DEVICE — SOL WOUND IRRIG PRONTOSAN 350 ML BOTTLE 400441

## (undated) DEVICE — DRAIN JACKSON PRATT CHANNEL 15FR ROUND HUBLESS SIL JP-2228

## (undated) DEVICE — GLOVE PROTEXIS W/NEU-THERA 6.5  2D73TE65

## (undated) DEVICE — STPL RELOAD LINEAR CUT 100X3.8MM TCR10

## (undated) DEVICE — JELLY LUBRICATING SURGILUBE 4OZ TUBE

## (undated) DEVICE — APPLICATORS COTTON TIP 6"X2 STERILE LF C15053-006

## (undated) DEVICE — SU ETHILON 2-0 FS 18" 664H

## (undated) DEVICE — PREP POVIDONE-IODINE 10% SOLUTION 4OZ BOTTLE MDS093944

## (undated) DEVICE — DRAPE LAP PEDS DISP 29492

## (undated) DEVICE — WIRE GUIDE AMPLATZ SUPER STIFF 0.035"X145CM 46-524

## (undated) DEVICE — BAG DRAIN URO FOR SIEMENS 8MM ADAPTER NS CC164NS-A

## (undated) DEVICE — SUTURE BOOTS 051003PBX

## (undated) DEVICE — NDL 18GA 1.5" 305196

## (undated) DEVICE — TUBE FEEDING NEOCONNECT SIL ENFIT 5FR 40CM PFTS5.0S-NC

## (undated) RX ORDER — CEFAZOLIN SODIUM 2 G/100ML
INJECTION, SOLUTION INTRAVENOUS
Status: DISPENSED
Start: 2020-09-18

## (undated) RX ORDER — EPHEDRINE SULFATE 50 MG/ML
INJECTION, SOLUTION INTRAMUSCULAR; INTRAVENOUS; SUBCUTANEOUS
Status: DISPENSED
Start: 2023-03-16

## (undated) RX ORDER — DEXAMETHASONE SODIUM PHOSPHATE 4 MG/ML
INJECTION, SOLUTION INTRA-ARTICULAR; INTRALESIONAL; INTRAMUSCULAR; INTRAVENOUS; SOFT TISSUE
Status: DISPENSED
Start: 2023-09-20

## (undated) RX ORDER — FENTANYL CITRATE 50 UG/ML
INJECTION, SOLUTION INTRAMUSCULAR; INTRAVENOUS
Status: DISPENSED
Start: 2023-03-16

## (undated) RX ORDER — FENTANYL CITRATE 50 UG/ML
INJECTION, SOLUTION INTRAMUSCULAR; INTRAVENOUS
Status: DISPENSED
Start: 2022-04-11

## (undated) RX ORDER — ACETAMINOPHEN 325 MG/1
TABLET ORAL
Status: DISPENSED
Start: 2025-04-11

## (undated) RX ORDER — FENTANYL CITRATE 50 UG/ML
INJECTION, SOLUTION INTRAMUSCULAR; INTRAVENOUS
Status: DISPENSED
Start: 2020-09-18

## (undated) RX ORDER — LIDOCAINE HYDROCHLORIDE 20 MG/ML
INJECTION, SOLUTION EPIDURAL; INFILTRATION; INTRACAUDAL; PERINEURAL
Status: DISPENSED
Start: 2022-04-11

## (undated) RX ORDER — FENTANYL CITRATE 50 UG/ML
INJECTION, SOLUTION INTRAMUSCULAR; INTRAVENOUS
Status: DISPENSED
Start: 2025-04-11

## (undated) RX ORDER — GLYCOPYRROLATE 0.2 MG/ML
INJECTION, SOLUTION INTRAMUSCULAR; INTRAVENOUS
Status: DISPENSED
Start: 2023-09-20

## (undated) RX ORDER — BUPIVACAINE HYDROCHLORIDE AND EPINEPHRINE 5; 5 MG/ML; UG/ML
INJECTION, SOLUTION EPIDURAL; INTRACAUDAL; PERINEURAL
Status: DISPENSED
Start: 2024-10-04

## (undated) RX ORDER — ONDANSETRON 2 MG/ML
INJECTION INTRAMUSCULAR; INTRAVENOUS
Status: DISPENSED
Start: 2021-02-02

## (undated) RX ORDER — DEXAMETHASONE SODIUM PHOSPHATE 4 MG/ML
INJECTION, SOLUTION INTRA-ARTICULAR; INTRALESIONAL; INTRAMUSCULAR; INTRAVENOUS; SOFT TISSUE
Status: DISPENSED
Start: 2023-03-16

## (undated) RX ORDER — PROPOFOL 10 MG/ML
INJECTION, EMULSION INTRAVENOUS
Status: DISPENSED
Start: 2023-03-16

## (undated) RX ORDER — FENTANYL CITRATE-0.9 % NACL/PF 10 MCG/ML
PLASTIC BAG, INJECTION (ML) INTRAVENOUS
Status: DISPENSED
Start: 2023-03-16

## (undated) RX ORDER — FENTANYL CITRATE 50 UG/ML
INJECTION, SOLUTION INTRAMUSCULAR; INTRAVENOUS
Status: DISPENSED
Start: 2024-10-04

## (undated) RX ORDER — DEXAMETHASONE SODIUM PHOSPHATE 4 MG/ML
INJECTION, SOLUTION INTRA-ARTICULAR; INTRALESIONAL; INTRAMUSCULAR; INTRAVENOUS; SOFT TISSUE
Status: DISPENSED
Start: 2022-04-11

## (undated) RX ORDER — ERTAPENEM 1 G/1
INJECTION, POWDER, LYOPHILIZED, FOR SOLUTION INTRAMUSCULAR; INTRAVENOUS
Status: DISPENSED
Start: 2023-03-16

## (undated) RX ORDER — BUPIVACAINE HYDROCHLORIDE 2.5 MG/ML
INJECTION, SOLUTION INFILTRATION; PERINEURAL
Status: DISPENSED
Start: 2023-09-20

## (undated) RX ORDER — LIDOCAINE HYDROCHLORIDE 20 MG/ML
INJECTION, SOLUTION EPIDURAL; INFILTRATION; INTRACAUDAL; PERINEURAL
Status: DISPENSED
Start: 2021-02-02

## (undated) RX ORDER — DEXAMETHASONE SODIUM PHOSPHATE 4 MG/ML
INJECTION, SOLUTION INTRA-ARTICULAR; INTRALESIONAL; INTRAMUSCULAR; INTRAVENOUS; SOFT TISSUE
Status: DISPENSED
Start: 2021-02-02

## (undated) RX ORDER — ONDANSETRON 2 MG/ML
INJECTION INTRAMUSCULAR; INTRAVENOUS
Status: DISPENSED
Start: 2023-03-16

## (undated) RX ORDER — PROPOFOL 10 MG/ML
INJECTION, EMULSION INTRAVENOUS
Status: DISPENSED
Start: 2025-04-11

## (undated) RX ORDER — ATROPA BELLADONNA AND OPIUM 16.2; 3 MG/1; MG/1
SUPPOSITORY RECTAL
Status: DISPENSED
Start: 2022-04-11

## (undated) RX ORDER — PROPOFOL 10 MG/ML
INJECTION, EMULSION INTRAVENOUS
Status: DISPENSED
Start: 2021-02-02

## (undated) RX ORDER — BUPIVACAINE HYDROCHLORIDE 5 MG/ML
INJECTION, SOLUTION EPIDURAL; INTRACAUDAL
Status: DISPENSED
Start: 2022-04-11

## (undated) RX ORDER — FUROSEMIDE 10 MG/ML
INJECTION INTRAMUSCULAR; INTRAVENOUS
Status: DISPENSED
Start: 2023-03-16

## (undated) RX ORDER — CEFAZOLIN SODIUM/WATER 2 G/20 ML
SYRINGE (ML) INTRAVENOUS
Status: DISPENSED
Start: 2023-09-20

## (undated) RX ORDER — FENTANYL CITRATE 50 UG/ML
INJECTION, SOLUTION INTRAMUSCULAR; INTRAVENOUS
Status: DISPENSED
Start: 2021-02-02

## (undated) RX ORDER — CLINDAMYCIN PHOSPHATE 900 MG/50ML
INJECTION, SOLUTION INTRAVENOUS
Status: DISPENSED
Start: 2025-04-11

## (undated) RX ORDER — AMPICILLIN 2 G/1
INJECTION, POWDER, FOR SOLUTION INTRAVENOUS
Status: DISPENSED
Start: 2024-10-04

## (undated) RX ORDER — DEXAMETHASONE SODIUM PHOSPHATE 4 MG/ML
INJECTION, SOLUTION INTRA-ARTICULAR; INTRALESIONAL; INTRAMUSCULAR; INTRAVENOUS; SOFT TISSUE
Status: DISPENSED
Start: 2025-04-11

## (undated) RX ORDER — EPHEDRINE SULFATE 50 MG/ML
INJECTION, SOLUTION INTRAMUSCULAR; INTRAVENOUS; SUBCUTANEOUS
Status: DISPENSED
Start: 2023-09-20

## (undated) RX ORDER — MANNITOL 20 G/100ML
INJECTION, SOLUTION INTRAVENOUS
Status: DISPENSED
Start: 2023-03-16

## (undated) RX ORDER — PROPOFOL 10 MG/ML
INJECTION, EMULSION INTRAVENOUS
Status: DISPENSED
Start: 2023-09-20

## (undated) RX ORDER — ESMOLOL HYDROCHLORIDE 10 MG/ML
INJECTION INTRAVENOUS
Status: DISPENSED
Start: 2023-03-16

## (undated) RX ORDER — GENTAMICIN 40 MG/ML
INJECTION, SOLUTION INTRAMUSCULAR; INTRAVENOUS
Status: DISPENSED
Start: 2025-04-11

## (undated) RX ORDER — PROPOFOL 10 MG/ML
INJECTION, EMULSION INTRAVENOUS
Status: DISPENSED
Start: 2024-10-04

## (undated) RX ORDER — CALCIUM CHLORIDE 100 MG/ML
INJECTION INTRAVENOUS; INTRAVENTRICULAR
Status: DISPENSED
Start: 2023-03-16

## (undated) RX ORDER — FENTANYL CITRATE 50 UG/ML
INJECTION, SOLUTION INTRAMUSCULAR; INTRAVENOUS
Status: DISPENSED
Start: 2023-09-20

## (undated) RX ORDER — GLYCOPYRROLATE 0.2 MG/ML
INJECTION, SOLUTION INTRAMUSCULAR; INTRAVENOUS
Status: DISPENSED
Start: 2023-03-16

## (undated) RX ORDER — HYDROMORPHONE HYDROCHLORIDE 1 MG/ML
INJECTION, SOLUTION INTRAMUSCULAR; INTRAVENOUS; SUBCUTANEOUS
Status: DISPENSED
Start: 2025-04-11

## (undated) RX ORDER — HYDROMORPHONE HYDROCHLORIDE 1 MG/ML
INJECTION, SOLUTION INTRAMUSCULAR; INTRAVENOUS; SUBCUTANEOUS
Status: DISPENSED
Start: 2023-03-16

## (undated) RX ORDER — ONDANSETRON 2 MG/ML
INJECTION INTRAMUSCULAR; INTRAVENOUS
Status: DISPENSED
Start: 2022-04-11

## (undated) RX ORDER — BUPIVACAINE HYDROCHLORIDE 5 MG/ML
INJECTION, SOLUTION EPIDURAL; INTRACAUDAL
Status: DISPENSED
Start: 2024-10-04

## (undated) RX ORDER — GLYCOPYRROLATE 0.2 MG/ML
INJECTION, SOLUTION INTRAMUSCULAR; INTRAVENOUS
Status: DISPENSED
Start: 2025-04-11

## (undated) RX ORDER — ONDANSETRON 2 MG/ML
INJECTION INTRAMUSCULAR; INTRAVENOUS
Status: DISPENSED
Start: 2023-09-20

## (undated) RX ORDER — ACETAMINOPHEN 325 MG/1
TABLET ORAL
Status: DISPENSED
Start: 2023-09-20

## (undated) RX ORDER — ACETAMINOPHEN 325 MG/1
TABLET ORAL
Status: DISPENSED
Start: 2024-10-04

## (undated) RX ORDER — HYDROMORPHONE HYDROCHLORIDE 1 MG/ML
INJECTION, SOLUTION INTRAMUSCULAR; INTRAVENOUS; SUBCUTANEOUS
Status: DISPENSED
Start: 2022-04-11

## (undated) RX ORDER — ONDANSETRON 2 MG/ML
INJECTION INTRAMUSCULAR; INTRAVENOUS
Status: DISPENSED
Start: 2025-04-11

## (undated) RX ORDER — PROPOFOL 10 MG/ML
INJECTION, EMULSION INTRAVENOUS
Status: DISPENSED
Start: 2022-04-11